# Patient Record
Sex: FEMALE | Race: BLACK OR AFRICAN AMERICAN | NOT HISPANIC OR LATINO | Employment: OTHER | URBAN - METROPOLITAN AREA
[De-identification: names, ages, dates, MRNs, and addresses within clinical notes are randomized per-mention and may not be internally consistent; named-entity substitution may affect disease eponyms.]

---

## 2017-05-09 PROBLEM — E78.2 MIXED HYPERLIPIDEMIA: Status: ACTIVE | Noted: 2017-05-09

## 2017-05-09 PROBLEM — F17.200 TOBACCO USE DISORDER: Status: ACTIVE | Noted: 2017-05-09

## 2017-05-09 PROBLEM — E11.9 DIABETES MELLITUS TYPE 2, INSULIN DEPENDENT: Status: ACTIVE | Noted: 2017-05-09

## 2017-05-09 PROBLEM — I10 ESSENTIAL HYPERTENSION: Status: ACTIVE | Noted: 2017-05-09

## 2017-05-09 PROBLEM — Z79.4 DIABETES MELLITUS TYPE 2, INSULIN DEPENDENT: Status: ACTIVE | Noted: 2017-05-09

## 2017-09-27 ENCOUNTER — TELEPHONE (OUTPATIENT)
Dept: ADMINISTRATIVE | Facility: HOSPITAL | Age: 57
End: 2017-09-27

## 2017-10-02 ENCOUNTER — TELEPHONE (OUTPATIENT)
Dept: ADMINISTRATIVE | Facility: HOSPITAL | Age: 57
End: 2017-10-02

## 2018-10-08 ENCOUNTER — HOSPITAL ENCOUNTER (EMERGENCY)
Facility: HOSPITAL | Age: 58
Discharge: LEFT BEFORE DISCHARGE | End: 2018-10-08
Attending: SURGERY
Payer: MEDICARE

## 2018-10-08 ENCOUNTER — OFFICE VISIT (OUTPATIENT)
Dept: URGENT CARE | Facility: CLINIC | Age: 58
End: 2018-10-08
Payer: MEDICARE

## 2018-10-08 VITALS
WEIGHT: 200 LBS | OXYGEN SATURATION: 98 % | SYSTOLIC BLOOD PRESSURE: 184 MMHG | RESPIRATION RATE: 16 BRPM | HEIGHT: 66 IN | BODY MASS INDEX: 32.14 KG/M2 | TEMPERATURE: 97 F | HEART RATE: 67 BPM | DIASTOLIC BLOOD PRESSURE: 97 MMHG

## 2018-10-08 VITALS
TEMPERATURE: 97 F | DIASTOLIC BLOOD PRESSURE: 84 MMHG | SYSTOLIC BLOOD PRESSURE: 183 MMHG | OXYGEN SATURATION: 98 % | BODY MASS INDEX: 39.16 KG/M2 | HEART RATE: 58 BPM | RESPIRATION RATE: 25 BRPM | WEIGHT: 242.63 LBS

## 2018-10-08 DIAGNOSIS — R50.9 FEVER, UNSPECIFIED FEVER CAUSE: ICD-10-CM

## 2018-10-08 DIAGNOSIS — I10 ESSENTIAL HYPERTENSION: ICD-10-CM

## 2018-10-08 DIAGNOSIS — R07.9 CHEST PAIN: ICD-10-CM

## 2018-10-08 DIAGNOSIS — R06.02 SHORTNESS OF BREATH: ICD-10-CM

## 2018-10-08 DIAGNOSIS — Z91.148 NON COMPLIANCE W MEDICATION REGIMEN: ICD-10-CM

## 2018-10-08 DIAGNOSIS — Z53.29 LEFT AGAINST MEDICAL ADVICE: Primary | ICD-10-CM

## 2018-10-08 DIAGNOSIS — R63.4 WEIGHT LOSS: ICD-10-CM

## 2018-10-08 DIAGNOSIS — E11.9 DIABETES MELLITUS TYPE 2, INSULIN DEPENDENT: ICD-10-CM

## 2018-10-08 DIAGNOSIS — R05.9 COUGH: ICD-10-CM

## 2018-10-08 DIAGNOSIS — E78.2 MIXED HYPERLIPIDEMIA: ICD-10-CM

## 2018-10-08 DIAGNOSIS — Z79.4 DIABETES MELLITUS TYPE 2, INSULIN DEPENDENT: ICD-10-CM

## 2018-10-08 DIAGNOSIS — F17.200 TOBACCO USE DISORDER: ICD-10-CM

## 2018-10-08 DIAGNOSIS — R07.9 CHEST PAIN, UNSPECIFIED TYPE: Primary | ICD-10-CM

## 2018-10-08 LAB
ALBUMIN SERPL BCP-MCNC: 3.2 G/DL
ALP SERPL-CCNC: 76 U/L
ALT SERPL W/O P-5'-P-CCNC: 22 U/L
ANION GAP SERPL CALC-SCNC: 10 MMOL/L
APTT BLDCRRT: 27.8 SEC
AST SERPL-CCNC: 27 U/L
BASOPHILS # BLD AUTO: 0.02 K/UL
BASOPHILS NFR BLD: 0.2 %
BILIRUB SERPL-MCNC: 0.3 MG/DL
BNP SERPL-MCNC: 81 PG/ML
BUN SERPL-MCNC: 11 MG/DL
CALCIUM SERPL-MCNC: 8.7 MG/DL
CHLORIDE SERPL-SCNC: 106 MMOL/L
CK MB SERPL-MCNC: 4.9 NG/ML
CK MB SERPL-RTO: 1.1 %
CK SERPL-CCNC: 459 U/L
CK SERPL-CCNC: 459 U/L
CO2 SERPL-SCNC: 23 MMOL/L
CREAT SERPL-MCNC: 0.8 MG/DL
D DIMER PPP IA.FEU-MCNC: 0.45 MG/L FEU
DIFFERENTIAL METHOD: NORMAL
EOSINOPHIL # BLD AUTO: 0.1 K/UL
EOSINOPHIL NFR BLD: 1.4 %
ERYTHROCYTE [DISTWIDTH] IN BLOOD BY AUTOMATED COUNT: 14.2 %
EST. GFR  (AFRICAN AMERICAN): >60 ML/MIN/1.73 M^2
EST. GFR  (NON AFRICAN AMERICAN): >60 ML/MIN/1.73 M^2
GLUCOSE SERPL-MCNC: 327 MG/DL
HCT VFR BLD AUTO: 42.4 %
HGB BLD-MCNC: 14.6 G/DL
INR PPP: 1
LYMPHOCYTES # BLD AUTO: 2.9 K/UL
LYMPHOCYTES NFR BLD: 34.5 %
MCH RBC QN AUTO: 29.8 PG
MCHC RBC AUTO-ENTMCNC: 34.4 G/DL
MCV RBC AUTO: 87 FL
MONOCYTES # BLD AUTO: 0.5 K/UL
MONOCYTES NFR BLD: 5.4 %
NEUTROPHILS # BLD AUTO: 4.9 K/UL
NEUTROPHILS NFR BLD: 58.5 %
PLATELET # BLD AUTO: 319 K/UL
PMV BLD AUTO: 9.7 FL
POCT GLUCOSE: 298 MG/DL (ref 70–110)
POTASSIUM SERPL-SCNC: 3.4 MMOL/L
PROT SERPL-MCNC: 7.2 G/DL
PROTHROMBIN TIME: 10 SEC
RBC # BLD AUTO: 4.9 M/UL
SODIUM SERPL-SCNC: 139 MMOL/L
TROPONIN I SERPL DL<=0.01 NG/ML-MCNC: 0.06 NG/ML
WBC # BLD AUTO: 8.32 K/UL

## 2018-10-08 PROCEDURE — 93010 ELECTROCARDIOGRAM REPORT: CPT | Mod: HCNC,,, | Performed by: INTERNAL MEDICINE

## 2018-10-08 PROCEDURE — 83880 ASSAY OF NATRIURETIC PEPTIDE: CPT

## 2018-10-08 PROCEDURE — 63600175 PHARM REV CODE 636 W HCPCS: Performed by: SURGERY

## 2018-10-08 PROCEDURE — 85379 FIBRIN DEGRADATION QUANT: CPT

## 2018-10-08 PROCEDURE — 84484 ASSAY OF TROPONIN QUANT: CPT

## 2018-10-08 PROCEDURE — 85025 COMPLETE CBC W/AUTO DIFF WBC: CPT

## 2018-10-08 PROCEDURE — 85610 PROTHROMBIN TIME: CPT

## 2018-10-08 PROCEDURE — 96374 THER/PROPH/DIAG INJ IV PUSH: CPT

## 2018-10-08 PROCEDURE — 36415 COLL VENOUS BLD VENIPUNCTURE: CPT

## 2018-10-08 PROCEDURE — 82550 ASSAY OF CK (CPK): CPT

## 2018-10-08 PROCEDURE — 93005 ELECTROCARDIOGRAM TRACING: CPT

## 2018-10-08 PROCEDURE — 3008F BODY MASS INDEX DOCD: CPT | Mod: CPTII,S$GLB,, | Performed by: NURSE PRACTITIONER

## 2018-10-08 PROCEDURE — 80053 COMPREHEN METABOLIC PANEL: CPT

## 2018-10-08 PROCEDURE — 99285 EMERGENCY DEPT VISIT HI MDM: CPT | Mod: 25

## 2018-10-08 PROCEDURE — 82962 GLUCOSE BLOOD TEST: CPT

## 2018-10-08 PROCEDURE — 25000003 PHARM REV CODE 250: Performed by: SURGERY

## 2018-10-08 PROCEDURE — 3077F SYST BP >= 140 MM HG: CPT | Mod: CPTII,S$GLB,, | Performed by: NURSE PRACTITIONER

## 2018-10-08 PROCEDURE — 85730 THROMBOPLASTIN TIME PARTIAL: CPT

## 2018-10-08 PROCEDURE — 3080F DIAST BP >= 90 MM HG: CPT | Mod: CPTII,S$GLB,, | Performed by: NURSE PRACTITIONER

## 2018-10-08 PROCEDURE — 96375 TX/PRO/DX INJ NEW DRUG ADDON: CPT

## 2018-10-08 PROCEDURE — 82553 CREATINE MB FRACTION: CPT

## 2018-10-08 PROCEDURE — 99215 OFFICE O/P EST HI 40 MIN: CPT | Mod: S$GLB,,, | Performed by: NURSE PRACTITIONER

## 2018-10-08 RX ORDER — METOPROLOL TARTRATE 1 MG/ML
5 INJECTION, SOLUTION INTRAVENOUS
Status: COMPLETED | OUTPATIENT
Start: 2018-10-08 | End: 2018-10-08

## 2018-10-08 RX ORDER — PANTOPRAZOLE SODIUM 40 MG/1
40 TABLET, DELAYED RELEASE ORAL
Status: COMPLETED | OUTPATIENT
Start: 2018-10-08 | End: 2018-10-08

## 2018-10-08 RX ORDER — NAPROXEN SODIUM 220 MG/1
81 TABLET, FILM COATED ORAL
Status: COMPLETED | OUTPATIENT
Start: 2018-10-08 | End: 2018-10-08

## 2018-10-08 RX ORDER — KETOROLAC TROMETHAMINE 30 MG/ML
15 INJECTION, SOLUTION INTRAMUSCULAR; INTRAVENOUS
Status: COMPLETED | OUTPATIENT
Start: 2018-10-08 | End: 2018-10-08

## 2018-10-08 RX ORDER — ATORVASTATIN CALCIUM 40 MG/1
40 TABLET, FILM COATED ORAL
Status: COMPLETED | OUTPATIENT
Start: 2018-10-08 | End: 2018-10-08

## 2018-10-08 RX ADMIN — METOPROLOL TARTRATE 5 MG: 5 INJECTION, SOLUTION INTRAVENOUS at 01:10

## 2018-10-08 RX ADMIN — LIDOCAINE HYDROCHLORIDE 50 ML: 20 SOLUTION ORAL; TOPICAL at 01:10

## 2018-10-08 RX ADMIN — PANTOPRAZOLE SODIUM 40 MG: 40 TABLET, DELAYED RELEASE ORAL at 01:10

## 2018-10-08 RX ADMIN — ATORVASTATIN CALCIUM 40 MG: 40 TABLET, FILM COATED ORAL at 01:10

## 2018-10-08 RX ADMIN — NITROGLYCERIN 1 INCH: 20 OINTMENT TOPICAL at 01:10

## 2018-10-08 RX ADMIN — ASPIRIN 81 MG 81 MG: 81 TABLET ORAL at 12:10

## 2018-10-08 RX ADMIN — KETOROLAC TROMETHAMINE: 30 INJECTION, SOLUTION INTRAMUSCULAR at 03:10

## 2018-10-08 NOTE — ED NOTES
Patient had to be told to keep BP cuff and pulse ox on.  Patient is complaining about the people in the next bed. The patient is complaining that she cannot have a lunch tray.  The patient is complaining that she wants to go walking.  MD notified.

## 2018-10-08 NOTE — PROGRESS NOTES
"Subjective:       Patient ID: Kitty Barnes is a 58 y.o. female.    Vitals:  height is 5' 6" (1.676 m) and weight is 90.7 kg (200 lb). Her oral temperature is 97.1 °F (36.2 °C). Her blood pressure is 184/97 (abnormal) and her pulse is 67. Her respiration is 16 and oxygen saturation is 98%.     Chief Complaint: Cough; Emesis; and Diarrhea    57 y/o female new to me presents with c/o being sick for the last 2 weeks. Reports she has been having cough until vomiting x 2 weeks. Reports also having headache. Pounding. Reports she is out of fiorcet. 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 Reports having chest pain with coughing. Sister gave her some phenergan with codiene cough syrup. Admits to having 10/10 shoulder pain with radiation down the arm. Also reports having heaviness on her chest, "like an elephant sitting on her chest."     Has an appt with cards on Thursday. Has appts every 6 months with them.       Cough   This is a new problem. The current episode started 1 to 4 weeks ago. The problem has been gradually worsening. The cough is non-productive. Associated symptoms include chest pain, chills, a fever, headaches and shortness of breath. Pertinent negatives include no ear pain, eye redness, myalgias, sore throat or wheezing. Nothing aggravates the symptoms. She has tried OTC cough suppressant for the symptoms. The treatment provided no relief.   Emesis    This is a new problem. The current episode started in the past 7 days. The problem occurs less than 2 times per day. The problem has been gradually worsening. The emesis has an appearance of stomach contents. There has been no fever. Associated symptoms include abdominal pain, chest pain, chills, coughing, a fever and headaches. Pertinent negatives include no diarrhea or myalgias. Risk factors: phenergan  The treatment provided no relief.   Diarrhea    This is a new problem. The current episode started in the " past 7 days. The problem has been gradually worsening. The stool consistency is described as watery. The patient states that diarrhea awakens her from sleep. Associated symptoms include abdominal pain, chills, coughing, a fever, headaches and vomiting. Pertinent negatives include no myalgias. Nothing aggravates the symptoms. The treatment provided no relief.     Review of Systems   Constitution: Positive for chills, decreased appetite, diaphoresis, fever, weakness, malaise/fatigue and night sweats.   HENT: Negative for congestion, ear pain, hoarse voice and sore throat.    Eyes: Negative for discharge and redness.   Cardiovascular: Positive for chest pain, dyspnea on exertion and near-syncope. Negative for leg swelling and syncope.   Respiratory: Positive for cough, shortness of breath and sputum production. Negative for wheezing.    Musculoskeletal: Negative for back pain and myalgias.   Gastrointestinal: Positive for abdominal pain and vomiting. Negative for constipation, diarrhea, hematochezia, melena and nausea.   Genitourinary: Negative for dysuria.   Neurological: Positive for headaches. Negative for numbness, paresthesias, seizures and sensory change.       Objective:      Physical Exam   Constitutional: She is oriented to person, place, and time. She appears well-developed and well-nourished. She appears distressed.   unkept   HENT:   Head: Normocephalic and atraumatic.   Cardiovascular: Normal rate, regular rhythm and normal heart sounds.   Pulmonary/Chest:   Seems winded at rest and worse with ambulation. Crackles to bilateral bases. Denies weight gain, reports she is loosing weight.    Musculoskeletal: She exhibits no edema.   Neurological: She is alert and oriented to person, place, and time.   Skin: Skin is warm and dry.   Psychiatric:   Not scared for loosing her life. As I advised her on seriousness of her condition, she seems to not care.    Nursing note and vitals reviewed.      Assessment:       1.  Chest pain, unspecified type    2. Shortness of breath    3. Cough    4. Fever, unspecified fever cause    5. Non compliance w medication regimen    6. Diabetes mellitus type 2, insulin dependent    7. Mixed hyperlipidemia    8. Essential hypertension    9. Tobacco use disorder    10. Weight loss        Plan:         Needs heart w/up. EKG here with Sinus kam with Prolonged QT interval, inverted T waves, Left ventricular hypertrophy changes. Pt with insulin dep diabetic with ? Heart failure vs pneumonia vs tb vs MI. This lady needs a big work-up and frankly just seems to not care. She has been obviously non-compliant with regimen. I do suspect questionable drug use. And then on top of the rest her BP is extremely high and needs tx.     Refused [facility], but  reports she will bring her [facility]. Chely HUTCHISON took report.

## 2018-10-08 NOTE — ED TRIAGE NOTES
"Pt reports cough x's 2 weeks. Also c/o left sided chest pressure radiating down left arm, intermittently "for a while". States has not taken any medications in over 2 months  "

## 2018-10-08 NOTE — ED NOTES
"Lab at bedside to redraw blood. Pt became upset stating she did not want more blood work done. Yelling "give me that paper to sign so I can get out of here". Dr. Torres notified. Pt AAOX4, signed AMA pt, ambulated out of ED without complications  "

## 2018-10-08 NOTE — ED PROVIDER NOTES
[facility]                                                 Chief Complaint  58 y.o. female with chest pain    History of Present Illness  Kitty Barnes presents to the emergency room with chest pain since the weekend  Patient has been coughing with chest pain, showed up at the urgent care that this a.m.  Patient has a cardiac history, diabetic and noncompliant with insulin for over a year now  Patient has a normal sinus rhythm on EKG without ST changes noted in the ER today  Refuses 2nd troponin, noncompliant in the ER and has asked to smoke several times    The history is provided by the patient   device was not used during this ER visit    Past Medical History   -- Anxiety    -- Arthritis    -- CVA (cerebral vascular accident)    -- Depression    -- Diabetes mellitus    -- Heart problem    -- History of psychiatric hospitalization    -- HTN (hypertension)    -- Hx of psychiatric care    -- Hypertension    -- Pacemaker    -- Psychiatric exam requested by authority    -- Psychiatric problem    -- TBI (traumatic brain injury)    -- Therapy      Surgeries:  Pacemaker, cholecystectomy, hysterectomy  Allergies:  Penicillin    Review of Systems and Physical Exam      Review of Systems  -- Constitution - no fever, denies fatigue, no weakness, no chills  -- Eyes - no tearing or redness, no visual disturbance  -- Ear, Nose - no tinnitus or earache, no nasal congestion or discharge  -- Mouth,Throat - no sore throat, no toothache, normal voice, normal swallowing  -- Respiratory - cough and congestion, no shortness of breath, no CALDERA  -- Cardiovascular - chest pain, no palpitations, denies claudication  -- Gastrointestinal - denies abdominal pain, nausea, vomiting, or diarrhea  -- Genitourinary - no dysuria, denies flank pain, no hematuria, no STD risk  -- Musculoskeletal - denies back pain, negative for myalgias and arthralgias   -- Neurological - no headache, denies weakness or  seizure; no LOC  -- Skin - denies pallor, rash, or changes in skin. no hives or welts noted  -- Psychiatric - Denies SI or HI, no psychosis or fractured thought noted     Vital Signs  Her oral temperature is 97 °F (36.1 °C).   Her blood pressure is 183/84 and her pulse is 58  Her respiration is 25 and oxygen saturation is 98%.     Physical Exam  -- Nursing note and vitals reviewed  -- Constitutional: Appears well-developed and well-nourished  -- Head: Atraumatic. Normocephalic. No obvious abnormality  -- Eyes: Pupils are equal and reactive to light. Normal conjunctiva and lids  -- Cardiac: Normal rate, regular rhythm and normal heart sounds  -- Pulmonary: Normal respiratory effort, breath sounds clear to auscultation  -- Abdominal: Soft, no tenderness. Normal bowel sounds. Normal liver edge  -- Musculoskeletal: Normal range of motion, no effusions. Joints stable   -- Neurological: No focal deficits. Showed good interaction with staff  -- Vascular: Posterior tibial, dorsalis pedis and radial pulses 2+ bilaterally      Emergency Room Course      Lab Results     K 3.4 (L)      CO2 23   BUN 11   CREATININE 0.8    (H)   ALKPHOS 76   AST 27   ALT 22   BILITOT 0.3   ALBUMIN 3.2 (L)   PROT 7.2   WBC 8.32   HGB 14.6   HCT 42.4       (H)   CPKMB 4.9   TROPONINI 0.058 (H)   INR 1.0   BNP 81   DDIMER 0.45     EKG  -- The EKG findings today were without concerning findings from baseline    Radiology  -- Chest x-ray showed no infiltrate and showed no acute pathology    Medications Given  aspirin chewable tablet 81 mg (81 mg Oral Given 10/8/18 1224)   nitroGLYCERIN 2% TD oint ointment 1 inch (1 inch Topical (Top) Given 10/8/18 1327)   pantoprazole EC tablet 40 mg (40 mg Oral Given 10/8/18 1327)   atorvastatin tablet 40 mg (40 mg Oral Given 10/8/18 1327)   GI cocktail (mylanta 30 mL, lidocaine 2 % viscous 10 mL, dicyclomine 10 mL) 50 mL   metoprolol injection 5 mg (5 mg Intravenous Given 10/8/18  5144)   ketorolac injection 15 mg ( Intravenous Given 10/8/18 1530)     Medical Decision Making  -- Diagnosis management comments: 58 y.o. female with chest pain issues  -- patient was sent from an urgent care for chest pain evaluation today  -- patient refuses repeat troponins, signed out AMA this afternoon    Diagnosis  -- The primary encounter diagnosis was Left against medical advice.   -- A diagnosis of Chest pain was also pertinent to this visit.    Disposition and Plan  -- Disposition: home  -- Condition: stable  -- Patient is of sound mind and capable of making rational decisions  -- Patient is fully aware of the diagnosis and the consequences of leaving AMA  -- Pt was told inpatient treatment needed but wants to leave against advice  -- Patient signed the AMA papers; all questions answered. Voiced understanding    This note is dictated on Dragon Natural Speaking word recognition program.  There are word recognition mistakes that are occasionally missed on review.         Markel Torres MD  10/08/18 3654

## 2018-10-09 ENCOUNTER — TELEPHONE (OUTPATIENT)
Dept: URGENT CARE | Facility: CLINIC | Age: 58
End: 2018-10-09

## 2018-10-15 ENCOUNTER — OFFICE VISIT (OUTPATIENT)
Dept: URGENT CARE | Facility: CLINIC | Age: 58
End: 2018-10-15
Payer: MEDICARE

## 2018-10-15 VITALS
DIASTOLIC BLOOD PRESSURE: 83 MMHG | SYSTOLIC BLOOD PRESSURE: 157 MMHG | BODY MASS INDEX: 38.89 KG/M2 | HEART RATE: 67 BPM | TEMPERATURE: 98 F | WEIGHT: 242 LBS | HEIGHT: 66 IN | OXYGEN SATURATION: 100 % | RESPIRATION RATE: 18 BRPM

## 2018-10-15 DIAGNOSIS — G62.9 NEUROPATHY: ICD-10-CM

## 2018-10-15 DIAGNOSIS — G43.901 MIGRAINE WITH STATUS MIGRAINOSUS, NOT INTRACTABLE, UNSPECIFIED MIGRAINE TYPE: Primary | ICD-10-CM

## 2018-10-15 PROCEDURE — 99214 OFFICE O/P EST MOD 30 MIN: CPT | Mod: S$GLB,,, | Performed by: NURSE PRACTITIONER

## 2018-10-15 PROCEDURE — 3008F BODY MASS INDEX DOCD: CPT | Mod: CPTII,S$GLB,, | Performed by: NURSE PRACTITIONER

## 2018-10-15 PROCEDURE — 3079F DIAST BP 80-89 MM HG: CPT | Mod: CPTII,S$GLB,, | Performed by: NURSE PRACTITIONER

## 2018-10-15 PROCEDURE — 3077F SYST BP >= 140 MM HG: CPT | Mod: CPTII,S$GLB,, | Performed by: NURSE PRACTITIONER

## 2018-10-15 RX ORDER — BUTALBITAL, ACETAMINOPHEN AND CAFFEINE 50; 325; 40 MG/1; MG/1; MG/1
1 TABLET ORAL EVERY 6 HOURS PRN
Qty: 15 TABLET | Refills: 0 | Status: SHIPPED | OUTPATIENT
Start: 2018-10-15 | End: 2018-12-21 | Stop reason: SDUPTHER

## 2018-10-15 NOTE — PROGRESS NOTES
"Subjective:       Patient ID: Kitty Barnes is a 58 y.o. female.    Vitals:  height is 5' 6" (1.676 m) and weight is 109.8 kg (242 lb). Her oral temperature is 97.5 °F (36.4 °C). Her blood pressure is 157/83 (abnormal) and her pulse is 67. Her respiration is 18 and oxygen saturation is 100%.     Chief Complaint: Pain (Bilateral Foot pain)    59 y/o female presents with headaches and bilateral foot pain. Patient reports hx of migraine headaches and are similar to past headaches and usually relieved with Fioricet. Pain in feet is described as burning and states she takes Neurontin.       Pain   This is a new problem. The current episode started in the past 7 days. The problem occurs constantly. The problem has been gradually worsening. Associated symptoms include headaches and joint swelling. Pertinent negatives include no abdominal pain, chest pain, chills, congestion, coughing, fever, nausea, rash, sore throat, vomiting or weakness. Associated symptoms comments: Nerve type pain in both feet. Nothing aggravates the symptoms. Treatments tried: Fioricet and Neurontin  The treatment provided mild relief.     Review of Systems   Constitution: Negative for chills, decreased appetite, fever, weakness and weight loss.   HENT: Negative for congestion and sore throat.    Eyes: Negative for blurred vision, double vision, pain, photophobia, redness, visual disturbance and visual halos.   Cardiovascular: Negative for chest pain and cyanosis.   Respiratory: Negative for cough, shortness of breath and wheezing.    Skin: Negative for rash.   Musculoskeletal: Positive for joint pain and joint swelling. Negative for back pain.   Gastrointestinal: Negative for abdominal pain, diarrhea, nausea and vomiting.   Neurological: Positive for headaches and paresthesias.   Psychiatric/Behavioral: The patient is not nervous/anxious.        Objective:      Physical Exam   Constitutional: She is oriented to person, place, and time. She is " cooperative.  Non-toxic appearance. She does not appear ill. No distress.   HENT:   Head: Normocephalic and atraumatic.   Right Ear: Hearing, tympanic membrane, external ear and ear canal normal.   Left Ear: Hearing, tympanic membrane, external ear and ear canal normal.   Nose: Nose normal. No mucosal edema, rhinorrhea or nasal deformity. No epistaxis. Right sinus exhibits no maxillary sinus tenderness and no frontal sinus tenderness. Left sinus exhibits no maxillary sinus tenderness and no frontal sinus tenderness.   Mouth/Throat: Uvula is midline, oropharynx is clear and moist and mucous membranes are normal. No trismus in the jaw. Normal dentition. No uvula swelling. No posterior oropharyngeal erythema.   No temporal TTP   Eyes: Conjunctivae, EOM and lids are normal. Pupils are equal, round, and reactive to light. No scleral icterus.   Sclera clear bilat   Neck: Trachea normal, normal range of motion, full passive range of motion without pain and phonation normal. Neck supple. No neck rigidity.   Cardiovascular: Normal rate, regular rhythm, normal heart sounds, intact distal pulses and normal pulses.   Pulmonary/Chest: Effort normal and breath sounds normal. No respiratory distress.   Abdominal: Soft. Normal appearance and bowel sounds are normal. She exhibits no distension. There is no tenderness.   Musculoskeletal: Normal range of motion. She exhibits no edema or deformity.   Neurological: She is alert and oriented to person, place, and time. No cranial nerve deficit. She exhibits normal muscle tone. Coordination normal.   Skin: Skin is warm, dry and intact. She is not diaphoretic. No pallor.   Psychiatric: She has a normal mood and affect. Her speech is normal and behavior is normal. Judgment and thought content normal. Cognition and memory are normal.   Nursing note and vitals reviewed.      Assessment:       1. Migraine with status migrainosus, not intractable, unspecified migraine type    2. Neuropathy         Plan:         Migraine with status migrainosus, not intractable, unspecified migraine type  -     butalbital-acetaminophen-caffeine -40 mg (FIORICET, ESGIC) -40 mg per tablet; Take 1 tablet by mouth every 6 (six) hours as needed for Pain.  Dispense: 15 tablet; Refill: 0    Neuropathy    Continue Neurontin    Follow up with PCP.

## 2018-10-15 NOTE — PATIENT INSTRUCTIONS

## 2018-10-18 ENCOUNTER — TELEPHONE (OUTPATIENT)
Dept: URGENT CARE | Facility: CLINIC | Age: 58
End: 2018-10-18

## 2018-11-14 ENCOUNTER — OFFICE VISIT (OUTPATIENT)
Dept: URGENT CARE | Facility: CLINIC | Age: 58
End: 2018-11-14
Payer: MEDICARE

## 2018-11-14 VITALS
OXYGEN SATURATION: 97 % | SYSTOLIC BLOOD PRESSURE: 171 MMHG | DIASTOLIC BLOOD PRESSURE: 95 MMHG | TEMPERATURE: 98 F | BODY MASS INDEX: 38.89 KG/M2 | HEIGHT: 66 IN | WEIGHT: 242 LBS | HEART RATE: 79 BPM

## 2018-11-14 DIAGNOSIS — J01.10 ACUTE FRONTAL SINUSITIS, RECURRENCE NOT SPECIFIED: Primary | ICD-10-CM

## 2018-11-14 DIAGNOSIS — R05.9 COUGH: ICD-10-CM

## 2018-11-14 DIAGNOSIS — H65.03 BILATERAL ACUTE SEROUS OTITIS MEDIA, RECURRENCE NOT SPECIFIED: ICD-10-CM

## 2018-11-14 PROCEDURE — 99213 OFFICE O/P EST LOW 20 MIN: CPT | Mod: S$GLB,,, | Performed by: INTERNAL MEDICINE

## 2018-11-14 PROCEDURE — 3080F DIAST BP >= 90 MM HG: CPT | Mod: CPTII,S$GLB,, | Performed by: INTERNAL MEDICINE

## 2018-11-14 PROCEDURE — 3077F SYST BP >= 140 MM HG: CPT | Mod: CPTII,S$GLB,, | Performed by: INTERNAL MEDICINE

## 2018-11-14 PROCEDURE — 3008F BODY MASS INDEX DOCD: CPT | Mod: CPTII,S$GLB,, | Performed by: INTERNAL MEDICINE

## 2018-11-14 RX ORDER — PREDNISONE 20 MG/1
20 TABLET ORAL DAILY
Qty: 5 TABLET | Refills: 0 | Status: SHIPPED | OUTPATIENT
Start: 2018-11-14 | End: 2018-11-19

## 2018-11-14 RX ORDER — AZITHROMYCIN 250 MG/1
250 TABLET, FILM COATED ORAL DAILY
Qty: 6 TABLET | Refills: 0 | Status: SHIPPED | OUTPATIENT
Start: 2018-11-14 | End: 2018-11-19

## 2018-11-14 NOTE — PATIENT INSTRUCTIONS
Acute Bacterial Rhinosinusitis (ABRS)    Acute bacterial rhinosinusitis (ABRS) is an infection of your nasal cavity and sinuses. Its caused by bacteria. Acute means that youve had symptoms for less than 12 weeks.  Understanding your sinuses  The nasal cavity is the large air-filled space behind your nose. The sinuses are a group of spaces formed by the bones of your face. They connect with your nasal cavity. ABRS causes the tissue lining these spaces to become inflamed. Mucus may not drain normally. This leads to facial pain and other symptoms.  What causes ABRS?  ABRS most often follows an upper respiratory infection caused by a virus. Bacteria then infect the lining of your nasal cavity and sinuses. But you can also get ABRS if you have:  · Nasal allergies  · Long-term nasal swelling and congestion not caused by allergies  · Blockage in the nose  Symptoms of ABRS  The symptoms of ABRS may be different for each person, and can include:  · Nasal congestion  · Runny nose  · Fluid draining from the nose down the throat (postnasal drip)  · Headache  · Cough  · Pain in the sinuses  · Thick, colored fluid from the nose (mucus)  · Fever  Diagnosing ABRS  ABRS may be diagnosed if youve had an upper respiratory infection like a cold and cough for longer than 10 to 14 days. Your health care provider will ask about your symptoms and your medical history. The provider will check your vital signs, including your temperature. Youll have a physical exam. The health care provider will check your ears, nose, and throat. You likely wont need any tests. If ABRS comes back, you may have a culture or other tests.  Treatment for ABRS  Treatment may include:  · Antibiotic medicine. This is for symptoms that last for at least 10 to 14 days.  · Nasal corticosteroid medicine. Drops or spray used in the nose can lessen swelling and congestion.  · Over-the-counter pain medicine. This is to lessen sinus pain and pressure.  · Nasal  decongestant medicine. Spray or drops may help to lessen congestion. Do not use them for more than a few days.  · Salt wash (saline irrigation). This can help to loosen mucus.  Possible complications of ABRS  ABRS may come back or become long-term (chronic).  In rare cases, ABRS may cause complications such as:   · Inflamed tissue around the brain and spinal cord (meningitis)  · Inflamed tissue around the eyes (orbital cellulitis)  · Inflamed bones around the sinuses (osteitis)  These problems may need to be treated in a hospital with intravenous (IV) antibiotic medicine or surgery.  When to call the health care provider  Call your health care provider if you have any of the following:  · Symptoms that dont get better, or get worse  · Symptoms that dont get better after 3 to 5 days on antibiotics  · Trouble seeing  · Swelling around your eyes  · Confusion or trouble staying awake   Date Last Reviewed: 3/3/2015  © 5300-6479 [employer]. [address] [place] [address]. All rights reserved. This information is not intended as a substitute for professional medical care. Always follow your healthcare professional's instructions.    1) Motrin/advil/ibuprofen- Take Two to Three Tablets(200 mg) three Times a Day for 5 to 7 Days.  2) Mucinex D 1/2 to 1 Tablet twice a day for 5 to 7 Days.  3) Drink Hot Liquids(coffee,WATER,Tea,Hot Chocolate,or Soup) that you put in a Mug place in Microwave for 2.5 to 3 minutes CHANGE THE CUP THAT WAS USED IN THE MICROWAVE SO AS NOT TO BURN YOUR MOUTH,then sniff the steam from the cup and sip the heated liquid TEN TO TWELVE TIMES A DAY for 5 to 7 Days.  4) These 3 things will help the antibiotics and other medications work faster and will speed your recovery!

## 2018-11-14 NOTE — PROGRESS NOTES
"Subjective:       Patient ID: Kitty Barnes is a 58 y.o. female.    Vitals:  height is 5' 6" (1.676 m) and weight is 109.8 kg (242 lb). Her temperature is 98 °F (36.7 °C). Her blood pressure is 171/95 (abnormal) and her pulse is 79. Her oxygen saturation is 97%.     Chief Complaint: Cough    Cough   This is a new problem. The current episode started in the past 7 days. The problem has been unchanged. The cough is productive of purulent sputum. Associated symptoms include postnasal drip and a sore throat. Pertinent negatives include no chest pain, chills, ear pain, eye redness, fever, headaches, myalgias, shortness of breath or wheezing. Nothing aggravates the symptoms. She has tried nothing for the symptoms.     Review of Systems   Constitution: Negative for chills, fever and malaise/fatigue.   HENT: Positive for postnasal drip and sore throat. Negative for congestion, ear pain and hoarse voice.    Eyes: Negative for discharge and redness.   Cardiovascular: Negative for chest pain, dyspnea on exertion and leg swelling.   Respiratory: Positive for cough. Negative for shortness of breath, sputum production and wheezing.    Musculoskeletal: Negative for myalgias.   Gastrointestinal: Negative for abdominal pain and nausea.   Neurological: Negative for headaches.       Objective:      Physical Exam   Constitutional: She is oriented to person, place, and time. She appears well-developed and well-nourished. She is cooperative.  Non-toxic appearance. She does not appear ill. No distress.   HENT:   Head: Normocephalic and atraumatic.   Right Ear: Hearing, external ear and ear canal normal. Tympanic membrane is injected. Tympanic membrane is not erythematous.   Left Ear: Hearing, external ear and ear canal normal. Tympanic membrane is injected. Tympanic membrane is not erythematous.   Nose: Mucosal edema and rhinorrhea present. No nasal deformity. No epistaxis. Right sinus exhibits frontal sinus tenderness. Right sinus " exhibits no maxillary sinus tenderness. Left sinus exhibits frontal sinus tenderness. Left sinus exhibits no maxillary sinus tenderness.   Mouth/Throat: Uvula is midline, oropharynx is clear and moist and mucous membranes are normal. No trismus in the jaw. Normal dentition. No uvula swelling. No posterior oropharyngeal erythema.       Eyes: Conjunctivae and lids are normal. No scleral icterus.   Sclera clear bilat   Neck: Trachea normal, full passive range of motion without pain and phonation normal. Neck supple.   Cardiovascular: Normal rate, regular rhythm, normal heart sounds, intact distal pulses and normal pulses.   Pulmonary/Chest: Effort normal and breath sounds normal. No respiratory distress.   Abdominal: Soft. Normal appearance and bowel sounds are normal. She exhibits no distension. There is no tenderness.   Musculoskeletal: Normal range of motion. She exhibits no edema or deformity.   Neurological: She is alert and oriented to person, place, and time. She exhibits normal muscle tone. Coordination normal.   Skin: Skin is warm, dry and intact. She is not diaphoretic. No pallor.   Psychiatric: She has a normal mood and affect. Her speech is normal and behavior is normal. Judgment and thought content normal. Cognition and memory are normal.   Nursing note and vitals reviewed.      Assessment:       1. Acute frontal sinusitis, recurrence not specified    2. Cough    3. Bilateral acute serous otitis media, recurrence not specified        Plan:         Acute frontal sinusitis, recurrence not specified  -     azithromycin (ZITHROMAX) 250 MG tablet; Take 1 tablet (250 mg total) by mouth once daily. Double first dose for 5 doses  Dispense: 6 tablet; Refill: 0  -     predniSONE (DELTASONE) 20 MG tablet; Take 1 tablet (20 mg total) by mouth once daily. for 5 days  Dispense: 5 tablet; Refill: 0    Cough    Bilateral acute serous otitis media, recurrence not specified  -     azithromycin (ZITHROMAX) 250 MG tablet;  Take 1 tablet (250 mg total) by mouth once daily. Double first dose for 5 doses  Dispense: 6 tablet; Refill: 0  -     predniSONE (DELTASONE) 20 MG tablet; Take 1 tablet (20 mg total) by mouth once daily. for 5 days  Dispense: 5 tablet; Refill: 0       take meds

## 2018-11-17 ENCOUNTER — TELEPHONE (OUTPATIENT)
Dept: URGENT CARE | Facility: CLINIC | Age: 58
End: 2018-11-17

## 2018-12-21 ENCOUNTER — OFFICE VISIT (OUTPATIENT)
Dept: URGENT CARE | Facility: CLINIC | Age: 58
End: 2018-12-21
Payer: MEDICARE

## 2018-12-21 VITALS
HEART RATE: 65 BPM | DIASTOLIC BLOOD PRESSURE: 99 MMHG | RESPIRATION RATE: 16 BRPM | WEIGHT: 220 LBS | BODY MASS INDEX: 35.36 KG/M2 | TEMPERATURE: 98 F | SYSTOLIC BLOOD PRESSURE: 218 MMHG | OXYGEN SATURATION: 96 % | HEIGHT: 66 IN

## 2018-12-21 DIAGNOSIS — G43.901 MIGRAINE WITH STATUS MIGRAINOSUS, NOT INTRACTABLE, UNSPECIFIED MIGRAINE TYPE: ICD-10-CM

## 2018-12-21 DIAGNOSIS — G62.9 NEUROPATHY: Primary | ICD-10-CM

## 2018-12-21 DIAGNOSIS — I10 ESSENTIAL HYPERTENSION: ICD-10-CM

## 2018-12-21 PROCEDURE — 3080F DIAST BP >= 90 MM HG: CPT | Mod: CPTII,S$GLB,, | Performed by: NURSE PRACTITIONER

## 2018-12-21 PROCEDURE — 3077F SYST BP >= 140 MM HG: CPT | Mod: CPTII,S$GLB,, | Performed by: NURSE PRACTITIONER

## 2018-12-21 PROCEDURE — 3008F BODY MASS INDEX DOCD: CPT | Mod: CPTII,S$GLB,, | Performed by: NURSE PRACTITIONER

## 2018-12-21 PROCEDURE — 99214 OFFICE O/P EST MOD 30 MIN: CPT | Mod: S$GLB,,, | Performed by: NURSE PRACTITIONER

## 2018-12-21 RX ORDER — GABAPENTIN 600 MG/1
600 TABLET ORAL 2 TIMES DAILY
Qty: 60 TABLET | Refills: 11 | Status: SHIPPED | OUTPATIENT
Start: 2018-12-21 | End: 2019-02-11

## 2018-12-21 RX ORDER — BUTALBITAL, ACETAMINOPHEN AND CAFFEINE 50; 325; 40 MG/1; MG/1; MG/1
1 TABLET ORAL EVERY 6 HOURS PRN
Qty: 7 TABLET | Refills: 0 | Status: SHIPPED | OUTPATIENT
Start: 2018-12-21 | End: 2019-02-11 | Stop reason: SDUPTHER

## 2018-12-21 RX ORDER — PANTOPRAZOLE SODIUM 40 MG/1
40 TABLET, DELAYED RELEASE ORAL DAILY
COMMUNITY
End: 2019-02-11 | Stop reason: SDUPTHER

## 2018-12-21 RX ORDER — CLONIDINE HYDROCHLORIDE 0.1 MG/1
0.1 TABLET ORAL DAILY
Qty: 30 TABLET | Refills: 0 | Status: SHIPPED | OUTPATIENT
Start: 2018-12-21 | End: 2020-12-07

## 2018-12-21 NOTE — PROGRESS NOTES
"Subjective:       Patient ID: Kitty Barnes is a 58 y.o. female.    Vitals:  height is 5' 6" (1.676 m) and weight is 99.8 kg (220 lb). Her oral temperature is 98.3 °F (36.8 °C). Her blood pressure is 218/99 (abnormal) and her pulse is 65. Her respiration is 16 and oxygen saturation is 96%.     Chief Complaint: Migraine    Patient presents with complaints of migraine.  No new or unusual symptoms from previous heaches.        Migraine    This is a recurrent problem. The current episode started yesterday. The problem occurs constantly. The problem has been gradually worsening. The pain is located in the bilateral region. The quality of the pain is described as shooting and pulsating. The pain is at a severity of 10/10. Pertinent negatives include no dizziness, eye pain, fever, loss of balance, nausea, photophobia, tinnitus, vomiting or weakness. She has tried nothing (out of Rx medication) for the symptoms. Her past medical history is significant for migraine headaches.       Constitution: Negative for chills, sweating and fever.   HENT: Negative for tinnitus, facial swelling, congestion and sinus pain.    Neck: Negative for neck stiffness.   Eyes: Negative for eye pain, photophobia, vision loss and double vision.   Gastrointestinal: Negative for nausea and vomiting.   Genitourinary: Negative for missed menses.   Musculoskeletal: Negative for trauma and muscle ache.   Skin: Negative for rash, wound and lesion.   Neurological: Positive for history of migraines. Negative for dizziness, history of vertigo, light-headedness, facial drooping, speech difficulty, coordination disturbances, loss of balance, disorientation and loss of consciousness.   Psychiatric/Behavioral: Negative for disorientation, confusion, nervous/anxious, sleep disturbance and depression. The patient is not nervous/anxious.        Objective:      Physical Exam   Constitutional: She is oriented to person, place, and time. She appears well-developed " and well-nourished. She is cooperative.  Non-toxic appearance. She does not appear ill. No distress.   HENT:   Head: Normocephalic and atraumatic.   Right Ear: Hearing, tympanic membrane, external ear and ear canal normal.   Left Ear: Hearing, tympanic membrane, external ear and ear canal normal.   Nose: Nose normal. No mucosal edema, rhinorrhea or nasal deformity. No epistaxis. Right sinus exhibits no maxillary sinus tenderness and no frontal sinus tenderness. Left sinus exhibits no maxillary sinus tenderness and no frontal sinus tenderness.   Mouth/Throat: Uvula is midline, oropharynx is clear and moist and mucous membranes are normal. No trismus in the jaw. Normal dentition. No uvula swelling. No posterior oropharyngeal erythema.   No temporal TTP   Eyes: Conjunctivae, EOM and lids are normal. Pupils are equal, round, and reactive to light. No scleral icterus.   Sclera clear bilat   Neck: Trachea normal, normal range of motion, full passive range of motion without pain and phonation normal. Neck supple. No neck rigidity.   Cardiovascular: Normal rate, regular rhythm, normal heart sounds, intact distal pulses and normal pulses.   Pulmonary/Chest: Effort normal and breath sounds normal. No respiratory distress.   Abdominal: Soft. Normal appearance and bowel sounds are normal. She exhibits no distension. There is no tenderness.   Musculoskeletal: Normal range of motion. She exhibits no edema or deformity.   Neurological: She is alert and oriented to person, place, and time. No cranial nerve deficit. She exhibits normal muscle tone. Coordination normal.   Skin: Skin is warm, dry and intact. She is not diaphoretic. No pallor.   Psychiatric: She has a normal mood and affect. Her speech is normal and behavior is normal. Judgment and thought content normal. Cognition and memory are normal.   Nursing note and vitals reviewed.      Assessment:       1. Neuropathy    2. Migraine with status migrainosus, not intractable,  unspecified migraine type    3. Essential hypertension        Plan:         Neuropathy  -     gabapentin (NEURONTIN) 600 MG tablet; Take 1 tablet (600 mg total) by mouth 2 (two) times daily.  Dispense: 60 tablet; Refill: 11    Migraine with status migrainosus, not intractable, unspecified migraine type  -     butalbital-acetaminophen-caffeine -40 mg (FIORICET, ESGIC) -40 mg per tablet; Take 1 tablet by mouth every 6 (six) hours as needed for Pain.  Dispense: 7 tablet; Refill: 0    Essential hypertension  -     cloNIDine (CATAPRES) 0.1 MG tablet; Take 1 tablet (0.1 mg total) by mouth once daily.  Dispense: 30 tablet; Refill: 0

## 2018-12-21 NOTE — PATIENT INSTRUCTIONS
Peripheral Neuropathy  Peripheral neuropathy is a condition that affects the nerves of the arms or legs. It causes a change in physical feeling. Sometimes it causes weakness in the muscles. You may feel tingling, numbness or shooting pains. Symptoms may be more common at night. Skin may be extra sensitive to light touch or temperature changes.  Neuropathy may be a complication of a chronic disease such as diabetes. A ruptured disk with pressure on the spinal nerve may also lead to the problem. Certain vitamin deficiencies may lead to it. It may also be caused by exposure to certain drugs or chemicals.  Home care  · Tell the healthcare provider about all medicines you take. This includes prescription and over-the-counter medicines, vitamins, and herbs. Ask if any of the medicines may be causing your problems. Do not make any changes to prescription medicines without talking to your healthcare provider first.  · You may be prescribed medicines to help relieve the tingling feeling or for pain. Take all medicines as directed.  · A numb hand or foot may be more prone to injury. To help protect it:  ¨ Always use oven mitts.  ¨ Test water with an unaffected hand or foot.  ¨ Use caution when trimming nails. File sharp areas.  ¨ Wear shoes that fit well to avoid pressure points, blisters, and ulcers.  ¨ Inspect your hands and feet carefully (including the soles of your feet and between your toes) at least once a week. If you see red areas, sores, or other problems, tell your healthcare provider.  Follow-up care  Follow up with your doctor or as advised by our staff. You may need further testing or evaluation.  When to seek medical advice  Call your healthcare provider right away if any of the following occur:  · Redness, swelling, cracking, or ulcer on any numb area, especially the feet  · New symptoms of numbness or muscle weakness numbness  · Loss of bowel or bladder control  · Slurred speech, confusion, or trouble  speaking, walking, or seeing  Date Last Reviewed: 9/26/2015  © 3688-4768 [employer]. [address] [place] [address], [place] [address]. All rights reserved. This information is not intended as a substitute for professional medical care. Always follow your healthcare professional's instructions.        Managing Post-Traumatic Headaches After Traumatic Brain Injury  A traumatic brain injury (TBI) is a sudden jolt to your head that changes the way your brain works. Its not surprising that headache would be the most common physical symptom after a brain injury. Because a jolt to your head also causes a jolt to your neck, headaches with neck pain are the most common types of pain after a TBI.  The type of headache you get does not depend on the severity of your TBI is. Thats because symptoms after a TBI are unpredictable. You could have a mild TBI but still have very painful headaches. Also, having headaches can make your other TBI symptoms worse, and other TBI symptoms can make your headaches worse. Because of this, its important to learn how to manage your headaches.  Types of headaches after a TBI   Your headaches may be mild or severe. They may come and go, or you may have them all the time. TBI symptoms, such as trouble sleeping, anxiety, fatigue, depression, slowed thinking, and memory loss, can all be made worse by headaches.  Here are some common types of TBI headaches:  · Headaches that start with pain in the back of your neck and spread to your head. These headaches get worse as the day goes on. They are more likely if your TBI includes a neck injury. This type of headache is often called a tension headache. It is probably the most common type of TBI headache.  · Migraine headaches. These may be triggered by TBI, especially if you have a family history of migraines. A migraine headache is a pounding headache, usually on one side of your head. Its caused by abnormal blood flow to your brain. Stressful  symptoms of a TBI may trigger a migraine attack.  Managing TBI headaches  Strong pain medicines, called opiates or narcotics, are usually not the answer for TBI headaches. These medicines can make other TBI symptoms worse. They also can have unpredictable side effects in a person with a TBI.  If pain medicines are needed, the first choice is a nonnarcotic medicine. Examples include over-the-counter pain relievers like acetaminophen or ibuprofen. If you use pain relief medicines for a headache more than 3 days a week, you need to watch if your headaches are getting worse. This is called rebound headache.  The best way to manage your headaches is with self-care, also called headache hygiene. Here are some self-care tips:  · Take medicines only as your healthcare provider prescribes them. Dont take any medicines on your own.  · If you start getting a headache, try to find a dark, quiet place where you can lie down.  · Wear dark glasses if bright lights seem to trigger your headaches.  · Avoid any foods and beverages that seem to trigger a headache.  · Learn to avoid stress and relax by using techniques like listening to music, meditating, or deep breathing. If needed, work with a mental health expert to reduce stress and anxiety.  · Get daily exercise. This helps your body and your brain.  · Go to bed and get up at the same time every day.  · Avoid caffeine, alcohol, and tobacco.  · Take part in physical therapy to stretch and strengthen your muscles. Learn how to do these exercises at home.  · Think about trying alternative treatments like acupuncture or massage therapy.  · Keep a headache journal to share with your healthcare provider. Write down every time you get a headache, how severe it is, and what seemed to have triggered it.  TBI headaches usually go away with time. How long it takes your brain to recover depends on the type of injury you had. Managing headache pain with self-care can help you heal faster.  Call your healthcare provider if your pain is getting worse. And remember, never try to treat headaches on your own with drugs or alcohol.  Date Last Reviewed: 7/1/2016 © 2000-2017 [employer]. [address] [place] [address], [place] [address]. All rights reserved. This information is not intended as a substitute for professional medical care. Always follow your healthcare professional's instructions.        Uncontrolled High Blood Pressure (Established)    Your blood pressure was unusually high today. This can occur if youve missed doses of your blood pressure medicine. Or it can happen if you are taking other medicines. These include some asthma inhalers, decongestants, diet pills, and street drugs like cocaine and amphetamine.  Other causes include:  · Weight gain  · More salt in your diet  · Smoking  · Caffeine  Your blood pressure can also rise if you are emotionally upset or in intense pain. It may go back to normal after a period of rest.  A blood pressure reading is made up of 2 numbers. There is a top number over a bottom number. The top number is the systolic pressure. The bottom number is the diastolic pressure. A normal blood pressure is a systolic pressure of less than 120 over a diastolic pressure of less than 80. High blood pressure (hypertension) is when the top number is 140 or higher. Or it is when the bottom number is 90 or higher. You will see your blood pressure readings written together. For example, a person with a systolic pressure of 118 and a diastolic pressure of 78 will have 118/78 written in the medical record. To be high blood pressure, the numbers must be higher when tested over a period of time. The blood pressures between normal and hypertension are called prehypertension. Prehypertension is a warning sign. The information gives you a chance to make lifestyle changes (weight loss, more exercise) that can keep your blood pressure from going higher.  Home care  Its important  to take steps to lower your blood pressure. If you are taking blood pressure medicine, the guidelines below may help you need less or no medicines in the future.  · Begin a weight-loss program if you are overweight.  · Cut back on the amount of salt in your diet:  ¨ Avoid high-salt foods like olives, pickles, smoked meats, and salted potato chips.  ¨ Dont add salt to your food at the table.  ¨ Use only small amounts of salt when cooking.  · Begin an exercise program. Talk with your health care provider about what exercise program is best for you. It doesnt have to be difficult. Even brisk walking for 20 minutes 3 times a week is a good form of exercise.  · Avoid medicines that stimulates the heart. This includes many over-the-counter cold and sinus decongestant pills and sprays, as well as diet pills. Check the warnings about hypertension on the label. Before purchasing any over-the-counter medicines or supplements, always ask the pharmacist about the product's potential interaction with your high blood pressure and your medicines.  · Stimulants such as amphetamine or cocaine could be lethal for someone with hypertension. Never take these.  · Limit how much caffeine you drink. Or switch to noncaffeinated beverages.  · Stop smoking. If you are a long-time smoker, this can be hard. Enroll in a stop-smoking program to make it more likely that you will succeed. Talk with your provider about ways to quit.  · Learn how to handle stress better. This is an important part of any program to lower blood pressure. Learn ways to relax. These include meditation, yoga, and biofeedback.  · If medicines were prescribed, take them exactly as directed. Missing doses may cause your blood pressure to get out of control.  · If you miss a dose or doses of your medicines, check with your healthcare provider or pharmacist about what to do.  · Consider buying an automatic blood pressure machine. Your provider may recommend a certain type.  You can get one of these at most pharmacies. Measure your blood pressure twice a day, in the morning, and in the late afternoon. Keep a written record of your home blood pressure readings and take the record to your medical appointments.  Here are some additional guidelines on home blood pressure monitoring from the American [employer] Association.  · Don't smoke or drink coffee for 30 minutes  · Go to the bathroom before the test.  · Relax for 5 minutes before taking the measurement.  · Sit correctly. Be sure your back is supported. Don't sit on a couch or soft chair. Uncross your feet and place them flat on the floor. Place your arm on a solid, flat surface like a table with the upper arm at heart level. Make certain the middle of the cuff is directly above the eye of the elbow. Check the monitor's instruction manual for an illustration.  · Take multiple readings. When you measure, take 2 or 3 readings one minute apart and record all of the results.  · Take your blood pressure at the same time every day, or as your healthcare provider recommends.  · Record the date, time, and blood pressure reading.  · Take the record with you to your next appointment. If your blood pressure monitor has a built-in memory, simply take the monitor with you to your next appointment.  · Call your provider if you have several high readings. Don't be frightened by a single high reading, but if you get several high readings, check in with your healthcare provider.  · Note: When blood pressure reaches a systolic (top number) of 180 or higher or a diastolic (bottom number) of 110 or higher, emergency medical treatment is required. Call your healthcare provider immediately.  Follow-up care  Regular visits to your own healthcare provider for blood pressure and medicine checks are an important part of your care. Make a follow-up appointment as directed. Bring the record of your home blood pressure readings to the appointment.  When to seek medical  advice  Call your healthcare provider right away if any of these occur:  · Blood pressure reaches a systolic (top number) of 180 or higher or diastolic (bottom number) of 110 or higher, emergency medical treatment is required.  · Chest, arm, shoulder, neck, or upper back pain  · Shortness of breath  · Severe headache  · Throbbing or rushing sound in the ears  · Nosebleed  · Extreme drowsiness, confusion, or fainting  · Dizziness or dizziness with spinning sensation (vertigo)  · Weakness in an arm or leg or on one side of the face  · Trouble speaking or seeing   Date Last Reviewed: 1/1/2017 © 2000-2017 [employer]. [address] [place] [address], [place] [address]. All rights reserved. This information is not intended as a substitute for professional medical care. Always follow your healthcare professional's instructions.      .  Take all medications as directed. If you have been prescribed antibiotics, make sure to complete them.    You should schedule a follow-up appointment with your Primary Care Provider/Pediatrician for recheck in 2-3 days or as directed at this visit.   .  If your condition fails to improve in a timely manner, you should receive another evaluation by your Primary Care Provider/Pediatrician to discuss your concerns or return to urgent care for a recheck.  If your condition worsens at any time, you should report immediately to your nearest Emergency Department for further evaluation. **You must understand that you have received Urgent Care treatment only and that you may be released before all of your medical problems are known or treated. You, the patient, are responsible to arrange for follow-up care as instructed.

## 2018-12-27 ENCOUNTER — TELEPHONE (OUTPATIENT)
Dept: URGENT CARE | Facility: CLINIC | Age: 58
End: 2018-12-27

## 2018-12-27 NOTE — TELEPHONE ENCOUNTER
Called [facility] to verify Rx doses for BP medication and gabapentin and last refill.  Notified patient that Medication refilled but must follow up with PCP ASAP.

## 2019-01-09 ENCOUNTER — HOSPITAL ENCOUNTER (EMERGENCY)
Facility: HOSPITAL | Age: 59
Discharge: HOME | End: 2019-01-09
Attending: SURGERY
Payer: MEDICARE

## 2019-01-09 VITALS
SYSTOLIC BLOOD PRESSURE: 111 MMHG | DIASTOLIC BLOOD PRESSURE: 58 MMHG | OXYGEN SATURATION: 96 % | TEMPERATURE: 99 F | HEART RATE: 60 BPM | BODY MASS INDEX: 32.14 KG/M2 | HEIGHT: 66 IN | RESPIRATION RATE: 17 BRPM | WEIGHT: 200 LBS

## 2019-01-09 DIAGNOSIS — F43.21 GRIEF: Primary | ICD-10-CM

## 2019-01-09 DIAGNOSIS — I10 HTN (HYPERTENSION): ICD-10-CM

## 2019-01-09 LAB
ALBUMIN SERPL BCP-MCNC: 3.4 G/DL
ALP SERPL-CCNC: 93 U/L
ALT SERPL W/O P-5'-P-CCNC: 20 U/L
ANION GAP SERPL CALC-SCNC: 13 MMOL/L
AST SERPL-CCNC: 21 U/L
BASOPHILS # BLD AUTO: 0.01 K/UL
BASOPHILS NFR BLD: 0.1 %
BILIRUB SERPL-MCNC: 0.4 MG/DL
BUN SERPL-MCNC: 11 MG/DL
CALCIUM SERPL-MCNC: 8.9 MG/DL
CHLORIDE SERPL-SCNC: 102 MMOL/L
CK MB SERPL-MCNC: 3.7 NG/ML
CK MB SERPL-MCNC: 4.5 NG/ML
CK MB SERPL-RTO: 0.6 %
CK MB SERPL-RTO: 0.7 %
CK SERPL-CCNC: 605 U/L
CK SERPL-CCNC: 605 U/L
CK SERPL-CCNC: 654 U/L
CK SERPL-CCNC: 654 U/L
CO2 SERPL-SCNC: 23 MMOL/L
CREAT SERPL-MCNC: 0.9 MG/DL
DIFFERENTIAL METHOD: ABNORMAL
EOSINOPHIL # BLD AUTO: 0.1 K/UL
EOSINOPHIL NFR BLD: 0.7 %
ERYTHROCYTE [DISTWIDTH] IN BLOOD BY AUTOMATED COUNT: 14.7 %
EST. GFR  (AFRICAN AMERICAN): >60 ML/MIN/1.73 M^2
EST. GFR  (NON AFRICAN AMERICAN): >60 ML/MIN/1.73 M^2
GLUCOSE SERPL-MCNC: 352 MG/DL
HCT VFR BLD AUTO: 43.6 %
HGB BLD-MCNC: 14.6 G/DL
LYMPHOCYTES # BLD AUTO: 1.3 K/UL
LYMPHOCYTES NFR BLD: 13.4 %
MCH RBC QN AUTO: 29.2 PG
MCHC RBC AUTO-ENTMCNC: 33.5 G/DL
MCV RBC AUTO: 87 FL
MONOCYTES # BLD AUTO: 0.4 K/UL
MONOCYTES NFR BLD: 4.4 %
NEUTROPHILS # BLD AUTO: 7.9 K/UL
NEUTROPHILS NFR BLD: 81.4 %
PLATELET # BLD AUTO: 305 K/UL
PMV BLD AUTO: 10.1 FL
POTASSIUM SERPL-SCNC: 2.9 MMOL/L
PROT SERPL-MCNC: 7.7 G/DL
RBC # BLD AUTO: 5 M/UL
SODIUM SERPL-SCNC: 138 MMOL/L
TROPONIN I SERPL DL<=0.01 NG/ML-MCNC: 0.05 NG/ML
TROPONIN I SERPL DL<=0.01 NG/ML-MCNC: 0.06 NG/ML
WBC # BLD AUTO: 9.75 K/UL

## 2019-01-09 PROCEDURE — 99284 EMERGENCY DEPT VISIT MOD MDM: CPT | Mod: 25

## 2019-01-09 PROCEDURE — 84484 ASSAY OF TROPONIN QUANT: CPT | Mod: 91

## 2019-01-09 PROCEDURE — 25000003 PHARM REV CODE 250: Performed by: SURGERY

## 2019-01-09 PROCEDURE — 93005 ELECTROCARDIOGRAM TRACING: CPT

## 2019-01-09 PROCEDURE — 82550 ASSAY OF CK (CPK): CPT | Mod: 91

## 2019-01-09 PROCEDURE — 63600175 PHARM REV CODE 636 W HCPCS: Performed by: SURGERY

## 2019-01-09 PROCEDURE — 96372 THER/PROPH/DIAG INJ SC/IM: CPT

## 2019-01-09 PROCEDURE — 80053 COMPREHEN METABOLIC PANEL: CPT

## 2019-01-09 PROCEDURE — 36415 COLL VENOUS BLD VENIPUNCTURE: CPT

## 2019-01-09 PROCEDURE — 93010 EKG 12-LEAD: ICD-10-PCS | Mod: HCNC,,, | Performed by: INTERNAL MEDICINE

## 2019-01-09 PROCEDURE — 93010 ELECTROCARDIOGRAM REPORT: CPT | Mod: HCNC,,, | Performed by: INTERNAL MEDICINE

## 2019-01-09 PROCEDURE — 85025 COMPLETE CBC W/AUTO DIFF WBC: CPT

## 2019-01-09 PROCEDURE — 82553 CREATINE MB FRACTION: CPT

## 2019-01-09 RX ORDER — CLONIDINE HYDROCHLORIDE 0.1 MG/1
0.2 TABLET ORAL
Status: COMPLETED | OUTPATIENT
Start: 2019-01-09 | End: 2019-01-09

## 2019-01-09 RX ORDER — LORAZEPAM 2 MG/ML
1 INJECTION INTRAMUSCULAR
Status: COMPLETED | OUTPATIENT
Start: 2019-01-09 | End: 2019-01-09

## 2019-01-09 RX ADMIN — CLONIDINE HYDROCHLORIDE 0.2 MG: 0.1 TABLET ORAL at 03:01

## 2019-01-09 RX ADMIN — LORAZEPAM 1 MG: 2 INJECTION INTRAMUSCULAR; INTRAVENOUS at 03:01

## 2019-01-09 NOTE — ED PROVIDER NOTES
[facility]                                                 Chief Complaint  58 y.o. female with GRIEF REACTION    History of Present Illness  Kitty Barnes presents to the emergency room with stressing grief reaction  Patient's sister  of a heart attack tonight, the patient is very very upset tonight  Patient then developed chest pressure after learning of the patient's sisters death  Patient has no chest pain currently, but has a very high blood pressure on ER triage  Patient is normal sinus rhythm on EKG without ST changes noted today in the ER  Patient is obviously upset and distraught, she is noncompliant with lifestyle recently    The history is provided by the patient   device was not used during this ER visit     Past Medical History   -- Anxiety     -- Arthritis     -- CVA (cerebral vascular accident)     -- Depression     -- Diabetes mellitus     -- Heart problem     -- History of psychiatric hospitalization     -- HTN (hypertension)     -- Hx of psychiatric care     -- Hypertension     -- Pacemaker     -- Psychiatric exam requested by authority     -- Psychiatric problem     -- TBI (traumatic brain injury)     -- Therapy        Surgeries:  Pacemaker, cholecystectomy, hysterectomy  Allergies:  Penicillin    Review of Systems and Physical Exam      Review of Systems  -- Constitution - no fever, denies fatigue, no weakness, no chills  -- Eyes - no tearing or redness, no visual disturbance  -- Ear, Nose - no tinnitus or earache, no nasal congestion or discharge  -- Mouth,Throat - no sore throat, no toothache, normal voice, normal swallowing  -- Respiratory - denies cough and congestion, no shortness of breath, no CALDERA  -- Cardiovascular - chest tightness, no palpitations, denies claudication  -- Gastrointestinal - denies abdominal pain, nausea, vomiting, or diarrhea  -- Genitourinary - no dysuria, denies flank pain, no hematuria, no STD risk  --  Musculoskeletal - denies back pain, negative for trauma or injury  -- Neurological - no headache, denies weakness or seizure; no LOC  -- Skin - denies pallor, rash, or changes in skin. no hives or welts noted    Vital Signs  Her oral temperature is 98.6 °F (37 °C).   Her blood pressure is 111/58 and her pulse is 60.   Her respiration is 17 and oxygen saturation is 96%.     Physical Exam  -- Nursing note and vitals reviewed  -- Constitutional: Appears well-developed and well-nourished  -- Head: Atraumatic. Normocephalic. No obvious abnormality  -- Eyes: Pupils are equal and reactive to light. Normal conjunctiva and lids  -- Cardiac: Normal rate, regular rhythm and normal heart sounds  -- Pulmonary: Normal respiratory effort, breath sounds clear to auscultation  -- Abdominal: Soft, no tenderness. Normal bowel sounds. Normal liver edge  -- Musculoskeletal: Normal range of motion, no effusions. Joints stable   -- Neurological: No focal deficits. Showed good interaction with staff  -- Vascular: Posterior tibial, dorsalis pedis and radial pulses 2+ bilaterally    -- Lymphatics: No cervical or peripheral lymphadenopathy. No edema noted  -- Skin: Warm and dry. No evidence of rash or cellulitis  -- Psychiatric: Normal mood and affect. Bedside behavior is appropriate    Emergency Room Course      Lab Results     K 2.9 (L)      CO2 23   BUN 11   CREATININE 0.9    (H)   ALKPHOS 93   AST 21   ALT 20   BILITOT 0.4   ALBUMIN 3.4 (L)   PROT 7.7   WBC 9.75   HGB 14.6   HCT 43.6       (H)    (H)   CPKMB 3.7   TROPONINI 0.053 (H)     EKG  -- The EKG findings today were without concerning findings from baseline     Medications Given  lorazepam injection 1 mg (1 mg Intramuscular Given 1/9/19 0316)   cloNIDine tablet 0.2 mg (0.2 mg Oral Given 1/9/19 0316)     Medical Decision Making  -- Diagnosis management comments: 58 y.o. female with grief after loss of sister  -- high blood pressure, patient  was watched with stable cardiac enzymes tonight  -- stable EKG, patient feels much better after blood pressure treatment in the ER    Diagnosis  -- The primary encounter diagnosis was Grief.   -- A diagnosis of HTN (hypertension) was also pertinent to this visit.    Disposition and Plan  -- Disposition: home  -- Condition: stable  -- Follow-up: Patient to follow up with Omayra Castellano MD in 1-2 days.  -- I advised the patient that we have found no life threatening condition today  -- At this time, I believe the patient is clinically stable for discharge.   -- The patient acknowledges that close follow up with a MD is required   -- Patient agrees to comply with all instruction and direction given in the ER    This note is dictated on M*Modal word recognition program.  There are word recognition mistakes that are occasionally missed on review.                  Markel Torres MD  01/10/19 0634

## 2019-01-19 NOTE — TELEPHONE ENCOUNTER
Attempted to call pt back, straight to VM.   
Called pt, as soon as she picked up the phone she sounded very dyspneic. Reports she is doing worse today than yesterday. I advised her that she needs help for her heart. She had elevated troponins yesterday which meant that her heart was in distress. She reports realizing that and that she should have stayed. I offered to call 911 for her. She reports that her co-worker is calling her now and she will get her to bring her here. I advised she needs to go back to the ER now. She v/u.   
Pt called back, reports increasing chest pain and sob. Requesting for me to call 911. I told her that I would call them now.     Hung up, called 911. Physical address given. Advised to bring to [facility] as she is having active chest pain and they have cards on staff. In route.   
Spoke with pt, vicki is in room. Advised for him to look for [facility] and direct them to her apt. Asked if she has ASA, she states no. Advised for her to sit still and rest with slow deep breaths until they arrive. She hung up with me because son was calling. Vicki remains on site.   
Implemented All Fall Risk Interventions:  [place] to call system. Call bell, personal items and telephone within reach. Instruct patient to call for assistance. Room bathroom lighting operational. Non-slip footwear when patient is off stretcher. Physically safe environment: no spills, clutter or unnecessary equipment. Stretcher in lowest position, wheels locked, appropriate side rails in place. Provide visual cue, wrist band, yellow gown, etc. Monitor gait and stability. Monitor for mental status changes and reorient to person, place, and time. Review medications for side effects contributing to fall risk. Reinforce activity limits and safety measures with patient and family.

## 2019-02-11 ENCOUNTER — OFFICE VISIT (OUTPATIENT)
Dept: INTERNAL MEDICINE | Facility: CLINIC | Age: 59
End: 2019-02-11
Payer: MEDICARE

## 2019-02-11 VITALS
HEART RATE: 92 BPM | RESPIRATION RATE: 18 BRPM | BODY MASS INDEX: 38.65 KG/M2 | SYSTOLIC BLOOD PRESSURE: 130 MMHG | DIASTOLIC BLOOD PRESSURE: 90 MMHG | WEIGHT: 240.5 LBS | HEIGHT: 66 IN | OXYGEN SATURATION: 94 %

## 2019-02-11 DIAGNOSIS — K29.70 GASTRITIS, PRESENCE OF BLEEDING UNSPECIFIED, UNSPECIFIED CHRONICITY, UNSPECIFIED GASTRITIS TYPE: ICD-10-CM

## 2019-02-11 DIAGNOSIS — G43.901 MIGRAINE WITH STATUS MIGRAINOSUS, NOT INTRACTABLE, UNSPECIFIED MIGRAINE TYPE: ICD-10-CM

## 2019-02-11 DIAGNOSIS — I10 HYPERTENSION, UNSPECIFIED TYPE: Primary | ICD-10-CM

## 2019-02-11 DIAGNOSIS — E78.2 MIXED HYPERLIPIDEMIA: ICD-10-CM

## 2019-02-11 DIAGNOSIS — R07.89 RIGHT-SIDED CHEST WALL PAIN: ICD-10-CM

## 2019-02-11 DIAGNOSIS — G62.9 NEUROPATHY: ICD-10-CM

## 2019-02-11 DIAGNOSIS — E11.65 UNCONTROLLED TYPE 2 DIABETES MELLITUS WITH HYPERGLYCEMIA: ICD-10-CM

## 2019-02-11 DIAGNOSIS — R41.82 ALTERED MENTAL STATUS, UNSPECIFIED ALTERED MENTAL STATUS TYPE: ICD-10-CM

## 2019-02-11 PROCEDURE — 99205 OFFICE O/P NEW HI 60 MIN: CPT | Mod: HCNC,25,S$GLB, | Performed by: NURSE PRACTITIONER

## 2019-02-11 PROCEDURE — 99999 PR PBB SHADOW E&M-EST. PATIENT-LVL IV: CPT | Mod: PBBFAC,HCNC,, | Performed by: NURSE PRACTITIONER

## 2019-02-11 PROCEDURE — 3008F BODY MASS INDEX DOCD: CPT | Mod: HCNC,CPTII,S$GLB, | Performed by: NURSE PRACTITIONER

## 2019-02-11 PROCEDURE — 96372 THER/PROPH/DIAG INJ SC/IM: CPT | Mod: HCNC,S$GLB,, | Performed by: NURSE PRACTITIONER

## 2019-02-11 PROCEDURE — 99999 PR PBB SHADOW E&M-EST. PATIENT-LVL IV: ICD-10-PCS | Mod: PBBFAC,HCNC,, | Performed by: NURSE PRACTITIONER

## 2019-02-11 PROCEDURE — 3008F PR BODY MASS INDEX (BMI) DOCUMENTED: ICD-10-PCS | Mod: HCNC,CPTII,S$GLB, | Performed by: NURSE PRACTITIONER

## 2019-02-11 PROCEDURE — 3075F PR MOST RECENT SYSTOLIC BLOOD PRESS GE 130-139MM HG: ICD-10-PCS | Mod: HCNC,CPTII,S$GLB, | Performed by: NURSE PRACTITIONER

## 2019-02-11 PROCEDURE — 3080F DIAST BP >= 90 MM HG: CPT | Mod: HCNC,CPTII,S$GLB, | Performed by: NURSE PRACTITIONER

## 2019-02-11 PROCEDURE — 99205 PR OFFICE/OUTPT VISIT, NEW, LEVL V, 60-74 MIN: ICD-10-PCS | Mod: HCNC,25,S$GLB, | Performed by: NURSE PRACTITIONER

## 2019-02-11 PROCEDURE — 3075F SYST BP GE 130 - 139MM HG: CPT | Mod: HCNC,CPTII,S$GLB, | Performed by: NURSE PRACTITIONER

## 2019-02-11 PROCEDURE — 96372 PR INJECTION,THERAP/PROPH/DIAG2ST, IM OR SUBCUT: ICD-10-PCS | Mod: HCNC,S$GLB,, | Performed by: NURSE PRACTITIONER

## 2019-02-11 PROCEDURE — 3080F PR MOST RECENT DIASTOLIC BLOOD PRESSURE >= 90 MM HG: ICD-10-PCS | Mod: HCNC,CPTII,S$GLB, | Performed by: NURSE PRACTITIONER

## 2019-02-11 RX ORDER — SUCRALFATE 1 G/1
1 TABLET ORAL
Qty: 60 TABLET | Refills: 0 | Status: SHIPPED | OUTPATIENT
Start: 2019-02-11 | End: 2019-02-11 | Stop reason: SDUPTHER

## 2019-02-11 RX ORDER — INSULIN DEGLUDEC 100 U/ML
INJECTION, SOLUTION SUBCUTANEOUS
Refills: 3 | COMMUNITY
Start: 2018-11-22 | End: 2019-03-13 | Stop reason: SDUPTHER

## 2019-02-11 RX ORDER — PANTOPRAZOLE SODIUM 40 MG/1
40 TABLET, DELAYED RELEASE ORAL DAILY
Qty: 30 TABLET | Refills: 11 | Status: ON HOLD | OUTPATIENT
Start: 2019-02-11 | End: 2023-07-07 | Stop reason: CLARIF

## 2019-02-11 RX ORDER — KETOROLAC TROMETHAMINE 30 MG/ML
15 INJECTION, SOLUTION INTRAMUSCULAR; INTRAVENOUS
Status: DISCONTINUED | OUTPATIENT
Start: 2019-02-11 | End: 2019-02-11

## 2019-02-11 RX ORDER — ATORVASTATIN CALCIUM 20 MG/1
20 TABLET, FILM COATED ORAL NIGHTLY
Qty: 30 TABLET | Refills: 11 | Status: ON HOLD | OUTPATIENT
Start: 2019-02-11 | End: 2023-02-17 | Stop reason: HOSPADM

## 2019-02-11 RX ORDER — VALSARTAN AND HYDROCHLOROTHIAZIDE 160; 25 MG/1; MG/1
1 TABLET ORAL DAILY
Qty: 30 TABLET | Refills: 11 | Status: SHIPPED | OUTPATIENT
Start: 2019-02-11 | End: 2021-09-30 | Stop reason: SDUPTHER

## 2019-02-11 RX ORDER — ASPIRIN 81 MG/1
81 TABLET ORAL DAILY
Qty: 30 TABLET | Refills: 11 | Status: SHIPPED | OUTPATIENT
Start: 2019-02-11 | End: 2021-09-30 | Stop reason: SDUPTHER

## 2019-02-11 RX ORDER — PREDNISONE 20 MG/1
TABLET ORAL
Refills: 0 | COMMUNITY
Start: 2018-11-28 | End: 2019-02-11

## 2019-02-11 RX ORDER — NIFEDIPINE 60 MG/1
60 TABLET, EXTENDED RELEASE ORAL DAILY
Qty: 30 TABLET | Refills: 11 | Status: SHIPPED | OUTPATIENT
Start: 2019-02-11 | End: 2021-09-30 | Stop reason: SDUPTHER

## 2019-02-11 RX ORDER — INSULIN ASPART 100 [IU]/ML
INJECTION, SOLUTION INTRAVENOUS; SUBCUTANEOUS
Refills: 2 | COMMUNITY
Start: 2019-01-29 | End: 2019-03-13 | Stop reason: SDUPTHER

## 2019-02-11 RX ORDER — NEBIVOLOL HYDROCHLORIDE 10 MG/1
10 TABLET ORAL DAILY
Qty: 30 TABLET | Refills: 11 | Status: SHIPPED | OUTPATIENT
Start: 2019-02-11 | End: 2019-03-19 | Stop reason: SDUPTHER

## 2019-02-11 RX ORDER — KETOROLAC TROMETHAMINE 30 MG/ML
30 INJECTION, SOLUTION INTRAMUSCULAR; INTRAVENOUS ONCE
Status: COMPLETED | OUTPATIENT
Start: 2019-02-11 | End: 2019-02-11

## 2019-02-11 RX ORDER — CLARITHROMYCIN 500 MG/1
TABLET, FILM COATED ORAL
Refills: 0 | COMMUNITY
Start: 2018-11-28 | End: 2019-02-11

## 2019-02-11 RX ORDER — CLONAZEPAM 0.5 MG/1
0.5 TABLET ORAL NIGHTLY
Refills: 1 | Status: ON HOLD | COMMUNITY
Start: 2018-12-27 | End: 2023-02-17 | Stop reason: HOSPADM

## 2019-02-11 RX ORDER — SUCRALFATE 1 G/1
TABLET ORAL
Qty: 360 TABLET | Refills: 0 | Status: ON HOLD | OUTPATIENT
Start: 2019-02-11 | End: 2023-05-11 | Stop reason: HOSPADM

## 2019-02-11 RX ORDER — GABAPENTIN 600 MG/1
600 TABLET ORAL 3 TIMES DAILY
Qty: 90 TABLET | Refills: 1 | Status: SHIPPED | OUTPATIENT
Start: 2019-02-11 | End: 2019-03-19 | Stop reason: SDUPTHER

## 2019-02-11 RX ORDER — GLIPIZIDE 5 MG/1
TABLET ORAL
Refills: 3 | COMMUNITY
Start: 2019-01-29 | End: 2019-02-11

## 2019-02-11 RX ORDER — KETOROLAC TROMETHAMINE 30 MG/ML
30 INJECTION, SOLUTION INTRAMUSCULAR; INTRAVENOUS
Status: DISCONTINUED | OUTPATIENT
Start: 2019-02-11 | End: 2019-02-11

## 2019-02-11 RX ORDER — BUTALBITAL, ACETAMINOPHEN AND CAFFEINE 50; 325; 40 MG/1; MG/1; MG/1
1 TABLET ORAL EVERY 6 HOURS PRN
Qty: 7 TABLET | Refills: 0 | Status: SHIPPED | OUTPATIENT
Start: 2019-02-11 | End: 2023-08-21 | Stop reason: SDUPTHER

## 2019-02-11 RX ADMIN — KETOROLAC TROMETHAMINE 30 MG: 30 INJECTION, SOLUTION INTRAMUSCULAR; INTRAVENOUS at 03:02

## 2019-02-11 NOTE — PROGRESS NOTES
"Subjective:       Patient ID: Kitty Barnes is a 58 y.o. female.    Chief Complaint: Establish Care; Breast Pain; and Leg Pain    HPI: Pt presents to clinic today new to me but known to Dr Case as her PCP. She is not happy with her care there so she is switching to us. She has not seen Dr Nona jorgensen years although that is who is listed as her PCP on card.   Past Medical History:   Diagnosis Date    Anxiety/Depression- she [facility] the [facility] [place]     Arthritis     CVA (cerebral vascular accident)- she reports that she had a strokes the last 3 years, reports that her imaging was at [facility]. Out last ct head does not show strokes but it is from      "mini stroke"         Diabetes mellitus- she has no idea what her sugar is. She reports that she is not checking her sugars because her machine is broken. She is not taking her insulin.She reports that she has only been taking her novolog.      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"      Therapy        Past Surgical History:   Procedure Laterality Date    CARDIAC PACEMAKER PLACEMENT      CHOLECYSTECTOMY      HYSTERECTOMY- due to bleeding         Family History   Problem Relation Age of Onset    Schizophrenia Mother     Bipolar disorder Mother     Bipolar disorder Father        Social History     Socioeconomic History    Marital status: Single     Spouse name: None    Number of children: 5- living 2     Years of education: None    Highest education level: None   Social Needs    Financial resource strain: None    Food insecurity - worry: None    Food " insecurity - inability: None    Transportation needs - medical: None    Transportation needs - non-medical: None   Occupational History    None   Tobacco Use    Smoking status: Smoker     Packs/day: 1 cigarette a day     Years: 40.00     Pack years: 40.00     Types: Cigarettes     Last attempt to quit: 2018     Years since quittin.4    Smokeless tobacco: Never Used   Substance and Sexual Activity    Alcohol use: No    Drug use: No    Sexual activity: No   Other Topics Concern    Patient feels they ought to cut down on drinking/drug use No    Patient annoyed by others criticizing their drinking/drug use No    Patient has felt bad or guilty about drinking/drug use No    Patient has had a drink/used drugs as an eye opener in the AM No   Social History Narrative    ** Merged History Encounter **            Current Outpatient Medications   Medication Sig Dispense Refill    aluminum-magnesium hydroxide 200-200 mg/5 mL suspension Take by mouth every 6 (six) hours as needed for Indigestion.      aspirin (ECOTRIN) 81 MG EC tablet Take 1 tablet (81 mg total) by mouth once daily.      atorvastatin (LIPITOR) 20 MG tablet Take 1 tablet (20 mg total) by mouth every evening. 30 tablet 11    butalbital-acetaminophen-caffeine -40 mg (FIORICET, ESGIC) -40 mg per tablet Take 1 tablet by mouth every 6 (six) hours as needed for Pain. 7 tablet 0    BYSTOLIC 10 mg Tab Take 10 mg by mouth once daily.       clarithromycin (BIAXIN) 500 MG tablet TK 1 T PO Q 12 H FOR 14 DAYS  ER due URI    clonazePAM (KLONOPIN) 0.5 MG tablet TK 1 T PO HS  From start clinic    cloNIDine (CATAPRES) 0.1 MG tablet Take 1 tablet (0.1 mg total) by mouth once daily. 30 tablet 0    gabapentin (NEURONTIN) 600 MG tablet Take 1 tablet (600 mg total) by mouth 2 (two) times daily. 60 tablet neuropathy    glipiZIDE (GLUCOTROL) 5 MG tablet TK 1 T PO BID  3    LANTUS SOLOSTAR 100 unit/mL (3 mL) InPn pen 60 units every morning and  75 units every evening Reports taking 45 in am and 30 at night     multivitamin (ONE DAILY MULTIVITAMIN) per tablet Take 1 tablet by mouth once daily.      nifedipine (PROCARDIA-XL) 60 MG (OSM) 24 hr tablet Take 60 mg by mouth once daily.      nitroGLYCERIN (NITROSTAT) 0.4 MG SL tablet Place 0.4 mg under the tongue every 5 (five) minutes as needed for Chest pain.  Needs refill    pantoprazole (PROTONIX) 40 MG tablet Take 40 mg by mouth once daily.      predniSONE (DELTASONE) 20 MG tablet TK 1 T PO QD FOR 5 DAYS  Taking for URI    sertraline (ZOLOFT) 50 MG tablet Take 50 mg by mouth once daily.  For depression for start    TRESIBA FLEXTOUCH U-100 100 unit/mL (3 mL) InPn INJ 70 UNI SC QAM  She reports that she is taking 20 unots of this daily.     valsartan-hydrochlorothiazide (DIOVAN-HCT) 160-25 mg per tablet Take 1 tablet by mouth once daily.   For bp    NOVOLOG FLEXPEN U-100 INSULIN 100 unit/mL InPn pen INJ 30 UNI SC TID WITH MEALS  Reports taking 30 with breakfast and 60 with supper     No current facility-administered medications for this visit.        Review of patient's allergies indicates:   Allergen Reactions    Pcn [penicillins] Anaphylaxis     Today she is crying and c/o right breast pain. She reports that she has been having this breast/chest pain x 1 month. She reports that she is UTD with her mammo. She had it at [facility]. She also reports that she has recent cxr but in out system it has not been since 10/2018    She also report sthast she had burning when she swallows. Even water.     Last u/s breast 2/2018 Historypalpable abnormality in the right breast laterally    Findings: Cranialcaudal and mediolateral oblique views were obtained of both breasts.  Breasts are heterogeneously dense.  There are no focal dominant masses or suspicious clusters of microcalcifications and no change.  the lateral right breast was evaluated by ultrasound no focal abnormality seen    Impression no radiographic  evidence for malignancy no change in particular no abnormality by ultrasound or mammogram in the lateral right breast    Review of Systems   Constitutional: Negative for chills, fatigue, fever and unexpected weight change.   HENT: Negative for congestion, ear pain, sore throat and trouble swallowing.    Eyes: Negative for pain and visual disturbance.   Respiratory: Positive for cough. Negative for chest tightness and shortness of breath.    Cardiovascular: Negative for chest pain, palpitations and leg swelling.   Gastrointestinal: Negative for abdominal distention, abdominal pain, constipation, diarrhea and vomiting.   Genitourinary: Negative for difficulty urinating, dysuria, flank pain, frequency and hematuria.   Musculoskeletal: Positive for arthralgias. Negative for back pain, gait problem, joint swelling, neck pain and neck stiffness.   Skin: Negative for rash and wound.   Neurological: Negative for dizziness, seizures, speech difficulty, light-headedness and headaches.       Objective:      Physical Exam   Constitutional: She is oriented to person, place, and time. She appears well-developed and well-nourished.   HENT:   Head: Normocephalic and atraumatic.   Eyes: Conjunctivae and EOM are normal. Pupils are equal, round, and reactive to light.   Neck: Normal range of motion. Neck supple. No thyromegaly present.   Cardiovascular: Normal rate, regular rhythm, normal heart sounds and intact distal pulses.   No murmur heard.  Pulmonary/Chest: Effort normal and breath sounds normal. No stridor. No respiratory distress. She has no wheezes. She has no rales.   Abdominal: Soft. Bowel sounds are normal. She exhibits no distension and no mass. There is no tenderness. There is no guarding.   Musculoskeletal: Normal range of motion. She exhibits no edema.   Lymphadenopathy:     She has no cervical adenopathy.   Neurological: She is alert and oriented to person, place, and time. No cranial nerve deficit.   Skin: Skin is  warm and dry. Capillary refill takes less than 2 seconds. No rash noted. No erythema.   Psychiatric: Judgment and thought content normal.   Nursing note and vitals reviewed.      Protective Sensation (w/ 10 gram monofilament):  Right: Decreased  Left: Decreased    Visual Inspection:  Dry Skin -  Bilateral    Pedal Pulses:   Right: Present  Left: Present    Posterior tibialis:   Right:Present  Left: Present      Assessment:       1. Hypertension, unspecified type    2. Neuropathy    3. Mixed hyperlipidemia    4. Uncontrolled type 2 diabetes mellitus with hyperglycemia    5. Gastritis, presence of bleeding unspecified, unspecified chronicity, unspecified gastritis type    6. Right-sided chest wall pain    7. Migraine with status migrainosus, not intractable, unspecified migraine type        Plan:     Problem List Items Addressed This Visit     Mixed hyperlipidemia    Relevant Medications    atorvastatin (LIPITOR) 20 MG tablet      Other Visit Diagnoses     Hypertension, unspecified type    -  Primary    Relevant Medications    BYSTOLIC 10 mg Tab    NIFEdipine (PROCARDIA-XL) 60 MG (OSM) 24 hr tablet    valsartan-hydrochlorothiazide (DIOVAN-HCT) 160-25 mg per tablet    Neuropathy        Relevant Medications    gabapentin (NEURONTIN) 600 MG tablet    Uncontrolled type 2 diabetes mellitus with hyperglycemia        Relevant Medications    NOVOLOG FLEXPEN U-100 INSULIN 100 unit/mL InPn pen    TRESIBA FLEXTOUCH U-100 100 unit/mL (3 mL) InPn    aspirin (ECOTRIN) 81 MG EC tablet    Gastritis, presence of bleeding unspecified, unspecified chronicity, unspecified gastritis type        Relevant Medications    sucralfate (CARAFATE) 1 gram tablet    pantoprazole (PROTONIX) 40 MG tablet    Right-sided chest wall pain        Relevant Orders    X-Ray Ribs 2 View Right    X-Ray Lumbar Spine Complete 5 View    X-Ray Thoracic Spine AP Lateral    Migraine with status migrainosus, not intractable, unspecified migraine type        Relevant  Medications    butalbital-acetaminophen-caffeine -40 mg (FIORICET, ESGIC) -40 mg per tablet          I need her recent films from [place], x rays and MRI/mammo. Check x rays of back today. She reports that she had a sugar rash int hat area within the year but no other rash in last month or 2. Change tresiba to 30 units nightly given samples. She can cont novolog but change to 10 unots with meals only. She report sthat she was out of everything but this. She still has this. Refilled all other meds except psych meds. Ordered fasting labs for l;ater this week and next week f/u. Consider lyrica if no rlief with increasing gabapentin. toradol today, not sure if this will help as she report that the only thing that helps is the pain med 10s. She has not relief from ibuprofen or other OTC meds    She is report that her right breast hurts and feels like a lump. Please not that she reported this 1 year ago and had Dr Case order U?s as above.     Stop Biaxin and steroids. I think this is complicating the picture.

## 2019-02-11 NOTE — PROGRESS NOTES
Toradol injection given RUOQ per v/o. No reaction noted. Patient instructed to wait 20 minutes after injection administration. Patient verbalized understanding.

## 2019-02-11 NOTE — PROGRESS NOTES
Record release faxed to [facility]. Spoke with Monet in medical records at [facility] who states last ER visit, recent labs, X-rays and CT will be faxed now. Awaiting fax.

## 2019-03-11 ENCOUNTER — TELEPHONE (OUTPATIENT)
Dept: INTERNAL MEDICINE | Facility: CLINIC | Age: 59
End: 2019-03-11

## 2019-03-11 NOTE — TELEPHONE ENCOUNTER
----- Message from Nena Higgins sent at 3/11/2019  4:33 PM CDT -----  Contact: Self  Kitty Barnes  MRN: [id number]  : 1960  PCP: Omayra Castellano  Home Phone      [phone]  Work Phone      Not on file.  Mobile          [phone]  Home Phone      [phone]  Home Phone      [phone]    MESSAGE:   Requesting if medication could be called in for sore throat, cough, diarrhea, and stomach cramps that started three days ago.  Please call to advise.    Pharmacy: [id number] - [address] [facility] AT [place] [address]    Phone: [phone]

## 2019-03-11 NOTE — TELEPHONE ENCOUNTER
Patient complaining of sore throat, cough and diarrhea x3 days. Denies fever, nausea, vomiting, chills, body aches or any other symptoms. Offered patient appointment tomorrow, declined. States she will keep appointment scheduled Wednesday.

## 2019-03-13 ENCOUNTER — OFFICE VISIT (OUTPATIENT)
Dept: INTERNAL MEDICINE | Facility: CLINIC | Age: 59
End: 2019-03-13
Payer: MEDICARE

## 2019-03-13 VITALS
BODY MASS INDEX: 39.4 KG/M2 | SYSTOLIC BLOOD PRESSURE: 128 MMHG | HEART RATE: 88 BPM | WEIGHT: 245.13 LBS | DIASTOLIC BLOOD PRESSURE: 80 MMHG | HEIGHT: 66 IN | OXYGEN SATURATION: 97 % | RESPIRATION RATE: 16 BRPM | TEMPERATURE: 97 F

## 2019-03-13 DIAGNOSIS — I10 HYPERTENSION, UNSPECIFIED TYPE: ICD-10-CM

## 2019-03-13 DIAGNOSIS — J44.9 CHRONIC OBSTRUCTIVE PULMONARY DISEASE, UNSPECIFIED COPD TYPE: ICD-10-CM

## 2019-03-13 DIAGNOSIS — E11.65 UNCONTROLLED TYPE 2 DIABETES MELLITUS WITH HYPERGLYCEMIA: Primary | ICD-10-CM

## 2019-03-13 DIAGNOSIS — E78.5 HYPERLIPIDEMIA, UNSPECIFIED HYPERLIPIDEMIA TYPE: ICD-10-CM

## 2019-03-13 PROCEDURE — 99999 PR PBB SHADOW E&M-EST. PATIENT-LVL IV: CPT | Mod: PBBFAC,HCNC,, | Performed by: NURSE PRACTITIONER

## 2019-03-13 PROCEDURE — 99999 PR PBB SHADOW E&M-EST. PATIENT-LVL IV: ICD-10-PCS | Mod: PBBFAC,HCNC,, | Performed by: NURSE PRACTITIONER

## 2019-03-13 PROCEDURE — 3079F DIAST BP 80-89 MM HG: CPT | Mod: CPTII,S$GLB,, | Performed by: NURSE PRACTITIONER

## 2019-03-13 PROCEDURE — 3074F SYST BP LT 130 MM HG: CPT | Mod: CPTII,S$GLB,, | Performed by: NURSE PRACTITIONER

## 2019-03-13 PROCEDURE — 99214 PR OFFICE/OUTPT VISIT, EST, LEVL IV, 30-39 MIN: ICD-10-PCS | Mod: S$GLB,,, | Performed by: NURSE PRACTITIONER

## 2019-03-13 PROCEDURE — 99214 OFFICE O/P EST MOD 30 MIN: CPT | Mod: S$GLB,,, | Performed by: NURSE PRACTITIONER

## 2019-03-13 PROCEDURE — 3008F BODY MASS INDEX DOCD: CPT | Mod: CPTII,S$GLB,, | Performed by: NURSE PRACTITIONER

## 2019-03-13 PROCEDURE — 3074F PR MOST RECENT SYSTOLIC BLOOD PRESSURE < 130 MM HG: ICD-10-PCS | Mod: CPTII,S$GLB,, | Performed by: NURSE PRACTITIONER

## 2019-03-13 PROCEDURE — 3008F PR BODY MASS INDEX (BMI) DOCUMENTED: ICD-10-PCS | Mod: CPTII,S$GLB,, | Performed by: NURSE PRACTITIONER

## 2019-03-13 PROCEDURE — 3079F PR MOST RECENT DIASTOLIC BLOOD PRESSURE 80-89 MM HG: ICD-10-PCS | Mod: CPTII,S$GLB,, | Performed by: NURSE PRACTITIONER

## 2019-03-13 RX ORDER — INSULIN ASPART 100 [IU]/ML
20 INJECTION, SOLUTION INTRAVENOUS; SUBCUTANEOUS
Qty: 15 ML | Refills: 2 | Status: SHIPPED | OUTPATIENT
Start: 2019-03-13 | End: 2021-09-30 | Stop reason: SDUPTHER

## 2019-03-13 RX ORDER — INSULIN DEGLUDEC 100 U/ML
60 INJECTION, SOLUTION SUBCUTANEOUS NIGHTLY
Qty: 15 ML | Refills: 3 | Status: SHIPPED | OUTPATIENT
Start: 2019-03-13 | End: 2020-12-07 | Stop reason: CLARIF

## 2019-03-13 RX ORDER — FLUTICASONE FUROATE AND VILANTEROL 200; 25 UG/1; UG/1
1 POWDER RESPIRATORY (INHALATION) DAILY
Qty: 60 EACH | Refills: 0 | Status: SHIPPED | OUTPATIENT
Start: 2019-03-13 | End: 2019-04-10 | Stop reason: SDUPTHER

## 2019-03-13 RX ORDER — HYDROCODONE BITARTRATE AND ACETAMINOPHEN 7.5; 325 MG/1; MG/1
1 TABLET ORAL 3 TIMES DAILY PRN
Refills: 0 | COMMUNITY
Start: 2019-03-11 | End: 2023-04-25

## 2019-03-13 RX ORDER — PROMETHAZINE HYDROCHLORIDE AND DEXTROMETHORPHAN HYDROBROMIDE 6.25; 15 MG/5ML; MG/5ML
5 SYRUP ORAL
Qty: 240 ML | Refills: 0 | Status: SHIPPED | OUTPATIENT
Start: 2019-03-13 | End: 2019-03-14 | Stop reason: SDUPTHER

## 2019-03-13 NOTE — PROGRESS NOTES
Subjective:       Patient ID: Kitty Barnes is a 58 y.o. female.    Chief Complaint: Follow-up and Cough    HPI: Pt presents to clinic today known to me for f/u. She was suppose to f/u 1 week but she missed that appt. She also did not do her X rays. She reports that she did a MRI lower back. She reports that she was seen by the pain center Dr karolina Gutierrez. She reports that her f/u there is unknown. She is currently on the phone trying to find out her appt. She thinks it maybe this Friday. He told her he could help her with the pain but She needed to see me for the cough and congestion and other issues.     I did get her records from last provider. She was seen at [facility] after her fall that resulted in right rib fractures. This is where she c/o pain.     She was also suppose to have fasting labs but I needed to get her records from previous These records show glucose of 400. She needs labs now. This was from 12/2018. She reports that she checks her blood sugar in am and before lunch and before bed. She reports that they are all high. She reports that she is usually in 400s. She takes 30 units 10-15 units novolog with meals and using treseba 30/30/60 three times a day!!!! She has never done diabetes education. She then tells me that she is only taking the treseba once 560 at [facility] but has another pen she is using 30 units twice a day.     Her cough started 4 days ago. She reports that it got bad 3 days ago. She is confined to her room because she lives with her grand baby. She feels like she has fever and chills at night. She is taking robitussin with no relief. + smoker. + nasal congestion and sore throat as well. She has no inhaler.   Review of Systems   Constitutional: Positive for fatigue and fever. Negative for chills and unexpected weight change.   HENT: Positive for congestion and sore throat. Negative for ear pain and trouble swallowing.    Eyes: Negative for pain and visual disturbance.   Respiratory:  Positive for cough. Negative for chest tightness and shortness of breath.    Cardiovascular: Negative for chest pain, palpitations and leg swelling.   Gastrointestinal: Negative for abdominal distention, abdominal pain, constipation, diarrhea and vomiting.   Genitourinary: Negative for difficulty urinating, dysuria, flank pain, frequency and hematuria.   Musculoskeletal: Negative for back pain, gait problem, joint swelling, neck pain and neck stiffness.   Skin: Negative for rash and wound.   Neurological: Negative for dizziness, seizures, speech difficulty, light-headedness and headaches.       Objective:      Physical Exam   Constitutional: She is oriented to person, place, and time. She appears well-developed and well-nourished.   HENT:   Head: Normocephalic and atraumatic.   Right Ear: External ear normal.   Left Ear: External ear normal.   Nose: Nose normal.   Mouth/Throat: Oropharynx is clear and moist. No oropharyngeal exudate.   Eyes: Conjunctivae and EOM are normal. Pupils are equal, round, and reactive to light.   Neck: Normal range of motion. Neck supple. No thyromegaly present.   Cardiovascular: Normal rate, regular rhythm, normal heart sounds and intact distal pulses.   No murmur heard.  Pulmonary/Chest: Effort normal and breath sounds normal. No stridor. No respiratory distress. She has no wheezes. She has no rales.   Abdominal: Soft. Bowel sounds are normal. She exhibits no distension and no mass. There is no tenderness. There is no rebound and no guarding.   Musculoskeletal: Normal range of motion. She exhibits no edema.   Lymphadenopathy:     She has no cervical adenopathy.   Neurological: She is alert and oriented to person, place, and time. No cranial nerve deficit.   Skin: Skin is warm and dry. No erythema.   Psychiatric: She has a normal mood and affect. Her behavior is normal. Judgment and thought content normal.   Nursing note and vitals reviewed.      Assessment:       1. Uncontrolled type 2  diabetes mellitus with hyperglycemia    2. Hyperlipidemia, unspecified hyperlipidemia type    3. Hypertension, unspecified type    4. Chronic obstructive pulmonary disease, unspecified COPD type        Plan:     Problem List Items Addressed This Visit     None      Visit Diagnoses     Uncontrolled type 2 diabetes mellitus with hyperglycemia    -  Primary    Relevant Medications    TRESIBA FLEXTOUCH U-100 100 unit/mL (3 mL) InPn    NOVOLOG FLEXPEN U-100 INSULIN 100 unit/mL InPn pen    Other Relevant Orders    Comprehensive metabolic panel    Hemoglobin A1c    Ambulatory consult to Diabetic Education    Ambulatory Referral to Endocrinology    Hyperlipidemia, unspecified hyperlipidemia type        Relevant Orders    Lipid panel    Hypertension, unspecified type        Relevant Orders    CBC auto differential    TSH    Chronic obstructive pulmonary disease, unspecified COPD type        Relevant Medications    fluticasone-vilanterol (BREO ELLIPTA) 200-25 mcg/dose DsDv diskus inhaler    promethazine-dextromethorphan (PROMETHAZINE-DM) 6.25-15 mg/5 mL Syrp        She is seeing Dr calle for cardiology as well as mental health for psych  We discussed in dept all the instructions for today appt. She will go get labs next week fasting and f/u 2 weeks for check up. She will need to bring in bs logs and all meds

## 2019-03-14 ENCOUNTER — TELEPHONE (OUTPATIENT)
Dept: INTERNAL MEDICINE | Facility: CLINIC | Age: 59
End: 2019-03-14

## 2019-03-14 DIAGNOSIS — J44.9 CHRONIC OBSTRUCTIVE PULMONARY DISEASE, UNSPECIFIED COPD TYPE: ICD-10-CM

## 2019-03-14 RX ORDER — PROMETHAZINE HYDROCHLORIDE AND DEXTROMETHORPHAN HYDROBROMIDE 6.25; 15 MG/5ML; MG/5ML
5 SYRUP ORAL
Qty: 240 ML | Refills: 0 | Status: SHIPPED | OUTPATIENT
Start: 2019-03-14 | End: 2019-03-15 | Stop reason: RX

## 2019-03-14 NOTE — TELEPHONE ENCOUNTER
----- Message from Shira Mills sent at 3/14/2019  2:37 PM CDT -----  Contact: self   Kitty Barnes  MRN: [id number]  : 1960  PCP: Leandra Monreal  Home Phone      [phone]  Work Phone      Not on file.  Mobile          [phone]  Home Phone      [phone]  Home Phone      [phone]    MESSAGE:   The pt was seen per Chayito Monreal on 3/13/19. She went to the pharmacy and no cough medication was phoned in - requested cough medication. Pt c/o cough    Phone # [phone]    [facility] [id number] - [address] [facility] [address] [facility] AT [place] [address]

## 2019-03-15 ENCOUNTER — TELEPHONE (OUTPATIENT)
Dept: INTERNAL MEDICINE | Facility: CLINIC | Age: 59
End: 2019-03-15

## 2019-03-15 DIAGNOSIS — E11.9 TYPE 2 DIABETES MELLITUS WITHOUT COMPLICATION, UNSPECIFIED WHETHER LONG TERM INSULIN USE: ICD-10-CM

## 2019-03-15 RX ORDER — BENZONATATE 200 MG/1
200 CAPSULE ORAL 3 TIMES DAILY PRN
Qty: 60 CAPSULE | Refills: 0 | Status: SHIPPED | OUTPATIENT
Start: 2019-03-15 | End: 2019-03-19

## 2019-03-15 NOTE — TELEPHONE ENCOUNTER
Patient agitated, rude, disgruntle on the phone. Patient states prescription below is not available at any pharmacy in [place]. Spoke with pharmacist Alex at [facility] who states medication is on back order. Spoke with pharmacy technician Elba at [facility] who states they have medication on hand. Patient declined transferring prescription to [facility]. States she has no transportation. Demanding prescription change immediately. Please advise. Patient seen by Leandra Monreal NP 3/13/19.         promethazine-dextromethorphan (PROMETHAZINE-DM) 6.25-15 mg/5 mL Syrp 240 mL 0 3/14/2019 3/24/2019 No   Sig - Route: Take 5 mLs by mouth every 6 to 8 hours as needed. - Oral   Sent to pharmacy as: promethazine-dextromethorphan (PROMETHAZINE-DM) 6.25-15 mg/5 mL Syrp

## 2019-03-18 ENCOUNTER — TELEPHONE (OUTPATIENT)
Dept: INTERNAL MEDICINE | Facility: CLINIC | Age: 59
End: 2019-03-18

## 2019-03-18 NOTE — TELEPHONE ENCOUNTER
----- Message from Nena Higgins sent at 3/18/2019 10:11 AM CDT -----  Contact: Self  Kitty Barnes  MRN: [id number]  : 1960  PCP: Leandra Monreal  Home Phone      [phone]  Work Phone      Not on file.  Mobile          [phone]  Home Phone      [phone]  Home Phone      [phone]    MESSAGE:     Complaining that she has been having urinary pressure and frequency for the last two days and would like to know if something could be called in for her.   Please call to advise.     Phone: [phone]

## 2019-03-18 NOTE — TELEPHONE ENCOUNTER
Patient agitated on the phone. Requesting appointment for UTI. Patient declined discussing s/s. Offered appointment today, patient declined. Requested appointment with Dr. Gómez tomorrow. Appointment scheduled per request. Instructed patient to report to ER if symptoms persist or worsen. Patient hung up abruptly.

## 2019-03-19 ENCOUNTER — OFFICE VISIT (OUTPATIENT)
Dept: INTERNAL MEDICINE | Facility: CLINIC | Age: 59
End: 2019-03-19
Payer: MEDICARE

## 2019-03-19 VITALS
HEART RATE: 94 BPM | WEIGHT: 240.06 LBS | RESPIRATION RATE: 16 BRPM | BODY MASS INDEX: 38.58 KG/M2 | HEIGHT: 66 IN | DIASTOLIC BLOOD PRESSURE: 80 MMHG | SYSTOLIC BLOOD PRESSURE: 120 MMHG

## 2019-03-19 DIAGNOSIS — N30.00 ACUTE CYSTITIS WITHOUT HEMATURIA: Primary | ICD-10-CM

## 2019-03-19 DIAGNOSIS — N64.4 BREAST PAIN, RIGHT: ICD-10-CM

## 2019-03-19 DIAGNOSIS — I10 HYPERTENSION, UNSPECIFIED TYPE: ICD-10-CM

## 2019-03-19 DIAGNOSIS — Z12.39 BREAST CANCER SCREENING: ICD-10-CM

## 2019-03-19 DIAGNOSIS — Z76.0 MEDICATION REFILL: ICD-10-CM

## 2019-03-19 DIAGNOSIS — G62.9 NEUROPATHY: ICD-10-CM

## 2019-03-19 DIAGNOSIS — G43.901 MIGRAINE WITH STATUS MIGRAINOSUS, NOT INTRACTABLE, UNSPECIFIED MIGRAINE TYPE: ICD-10-CM

## 2019-03-19 DIAGNOSIS — M94.0 COSTOCHONDRITIS: ICD-10-CM

## 2019-03-19 LAB
BILIRUB SERPL-MCNC: ABNORMAL MG/DL
BLOOD URINE, POC: ABNORMAL
COLOR, POC UA: ABNORMAL
GLUCOSE UR QL STRIP: ABNORMAL
KETONES UR QL STRIP: ABNORMAL
LEUKOCYTE ESTERASE URINE, POC: ABNORMAL
NITRITE, POC UA: ABNORMAL
PH, POC UA: 5
PROTEIN, POC: 30
SPECIFIC GRAVITY, POC UA: 1.01
UROBILINOGEN, POC UA: NORMAL

## 2019-03-19 PROCEDURE — 87088 URINE BACTERIA CULTURE: CPT | Mod: HCNC

## 2019-03-19 PROCEDURE — 3008F PR BODY MASS INDEX (BMI) DOCUMENTED: ICD-10-PCS | Mod: HCNC,CPTII,S$GLB, | Performed by: INTERNAL MEDICINE

## 2019-03-19 PROCEDURE — 3079F PR MOST RECENT DIASTOLIC BLOOD PRESSURE 80-89 MM HG: ICD-10-PCS | Mod: HCNC,CPTII,S$GLB, | Performed by: INTERNAL MEDICINE

## 2019-03-19 PROCEDURE — 87186 SC STD MICRODIL/AGAR DIL: CPT | Mod: HCNC

## 2019-03-19 PROCEDURE — 99214 OFFICE O/P EST MOD 30 MIN: CPT | Mod: 25,HCNC,S$GLB, | Performed by: INTERNAL MEDICINE

## 2019-03-19 PROCEDURE — 99214 PR OFFICE/OUTPT VISIT, EST, LEVL IV, 30-39 MIN: ICD-10-PCS | Mod: 25,HCNC,S$GLB, | Performed by: INTERNAL MEDICINE

## 2019-03-19 PROCEDURE — 3008F BODY MASS INDEX DOCD: CPT | Mod: HCNC,CPTII,S$GLB, | Performed by: INTERNAL MEDICINE

## 2019-03-19 PROCEDURE — 81002 URINALYSIS NONAUTO W/O SCOPE: CPT | Mod: HCNC,S$GLB,, | Performed by: INTERNAL MEDICINE

## 2019-03-19 PROCEDURE — 81002 POCT URINE DIPSTICK WITHOUT MICROSCOPE: ICD-10-PCS | Mod: HCNC,S$GLB,, | Performed by: INTERNAL MEDICINE

## 2019-03-19 PROCEDURE — 3074F PR MOST RECENT SYSTOLIC BLOOD PRESSURE < 130 MM HG: ICD-10-PCS | Mod: HCNC,CPTII,S$GLB, | Performed by: INTERNAL MEDICINE

## 2019-03-19 PROCEDURE — 3074F SYST BP LT 130 MM HG: CPT | Mod: HCNC,CPTII,S$GLB, | Performed by: INTERNAL MEDICINE

## 2019-03-19 PROCEDURE — 99999 PR PBB SHADOW E&M-EST. PATIENT-LVL V: CPT | Mod: PBBFAC,HCNC,, | Performed by: INTERNAL MEDICINE

## 2019-03-19 PROCEDURE — 87086 URINE CULTURE/COLONY COUNT: CPT | Mod: HCNC

## 2019-03-19 PROCEDURE — 99999 PR PBB SHADOW E&M-EST. PATIENT-LVL V: ICD-10-PCS | Mod: PBBFAC,HCNC,, | Performed by: INTERNAL MEDICINE

## 2019-03-19 PROCEDURE — 3079F DIAST BP 80-89 MM HG: CPT | Mod: HCNC,CPTII,S$GLB, | Performed by: INTERNAL MEDICINE

## 2019-03-19 PROCEDURE — 87077 CULTURE AEROBIC IDENTIFY: CPT | Mod: HCNC

## 2019-03-19 RX ORDER — GABAPENTIN 600 MG/1
600 TABLET ORAL 3 TIMES DAILY
Qty: 90 TABLET | Refills: 1 | Status: SHIPPED | OUTPATIENT
Start: 2019-03-19 | End: 2019-10-30 | Stop reason: SDUPTHER

## 2019-03-19 RX ORDER — NEBIVOLOL HYDROCHLORIDE 10 MG/1
10 TABLET ORAL DAILY
Qty: 30 TABLET | Refills: 11 | Status: SHIPPED | OUTPATIENT
Start: 2019-03-19 | End: 2021-09-30 | Stop reason: SDUPTHER

## 2019-03-19 RX ORDER — SULFAMETHOXAZOLE AND TRIMETHOPRIM 800; 160 MG/1; MG/1
1 TABLET ORAL 2 TIMES DAILY
Qty: 6 TABLET | Refills: 0 | Status: SHIPPED | OUTPATIENT
Start: 2019-03-19 | End: 2019-03-25

## 2019-03-19 RX ORDER — BUTALBITAL, ACETAMINOPHEN AND CAFFEINE 50; 325; 40 MG/1; MG/1; MG/1
1 TABLET ORAL EVERY 6 HOURS PRN
Qty: 7 TABLET | Refills: 0 | Status: CANCELLED | OUTPATIENT
Start: 2019-03-19

## 2019-03-19 NOTE — Clinical Note
Technically she established with you. She wanted more fioricet. I declined. Sounds like she is not taking appropriately and gets Norco from outside pain doctor anyway. She was not happy with me so wanted to keep you in the loop in case she calls again. See my note and let me know if you have any questions!!

## 2019-03-19 NOTE — PROGRESS NOTES
"Subjective:       Patient ID: Kitty Barnes is a 58 y.o. female.    Chief Complaint: Headache; Breast Pain; and Vaginal Pain      HPI:  Patient is new to me and presents with right breast pain and concern for UTI.    She reports bladder pressure and urinary frequency. No dysuria. No hematuria. Fever to 101 F 2 days ago. Sx started 4 days ago. No recent antibx.    She reports right sided breast pain symptoms started a few weeks ago. Pain is worsening. Not located in 1 specific area. She has URI sx for a few days and the coughing is also making her pain worse. Radiating to her back. She denies breast lumps. Reports blood from the nipple 2 days ago. Pain worse with laying on the right side.     She needs gabapentin, bystolic and fioricet refilled.     Past Medical History:   Diagnosis Date    Anxiety     Arthritis     CVA (cerebral vascular accident)     "mini stroke"    Depression     Diabetes mellitus     Heart problem     History of psychiatric hospitalization     three(1983, 1995 and another (years ago")): depression    HTN (hypertension)     Hx of psychiatric care     Hypertension     Pacemaker     Psychiatric exam requested by authority     Psychiatric problem     TBI (traumatic brain injury)     Therapy        Family History   Problem Relation Age of Onset    Schizophrenia Mother     Bipolar disorder Mother     Bipolar disorder Father        Social History     Socioeconomic History    Marital status: Single     Spouse name: Not on file    Number of children: 5    Years of education: Not on file    Highest education level: Not on file   Social Needs    Financial resource strain: Not on file    Food insecurity - worry: Not on file    Food insecurity - inability: Not on file    Transportation needs - medical: Not on file    Transportation needs - non-medical: Not on file   Occupational History    Not on file   Tobacco Use    Smoking status: Former Smoker     Packs/day: 1.00     " Years: 40.00     Pack years: 40.00     Types: Cigarettes     Last attempt to quit: 2018     Years since quittin.5    Smokeless tobacco: Never Used   Substance and Sexual Activity    Alcohol use: No    Drug use: No    Sexual activity: No   Other Topics Concern    Patient feels they ought to cut down on drinking/drug use No    Patient annoyed by others criticizing their drinking/drug use No    Patient has felt bad or guilty about drinking/drug use No    Patient has had a drink/used drugs as an eye opener in the AM No   Social History Narrative    ** Merged History Encounter **            Review of Systems   Constitutional: Positive for fever. Negative for activity change, fatigue and unexpected weight change.   HENT: Negative for congestion, ear pain, hearing loss, rhinorrhea and sore throat.    Eyes: Negative for pain, redness and visual disturbance.   Respiratory: Negative for cough, shortness of breath and wheezing.    Cardiovascular: Positive for chest pain (right chest wall and right breast). Negative for palpitations and leg swelling.   Gastrointestinal: Negative for abdominal pain, constipation, diarrhea, nausea and vomiting.   Genitourinary: Positive for frequency. Negative for dysuria and urgency.        Bladder pressure     Musculoskeletal: Negative for back pain, joint swelling and neck pain.   Skin: Negative for color change, rash and wound.   Neurological: Negative for dizziness, tremors, weakness, light-headedness and headaches.         Objective:      Physical Exam   Constitutional: She is oriented to person, place, and time. She appears well-developed and well-nourished. No distress.   HENT:   Head: Normocephalic and atraumatic.   Right Ear: External ear normal.   Left Ear: External ear normal.   Eyes: Conjunctivae and EOM are normal. Pupils are equal, round, and reactive to light. Right eye exhibits no discharge. Left eye exhibits no discharge.   Neck: Neck supple. No tracheal  "deviation present.   Cardiovascular: Normal rate and regular rhythm.   No murmur heard.  Pulmonary/Chest: Effort normal and breath sounds normal. No respiratory distress. She has no wheezes. She has no rales. She exhibits tenderness (right chest wall).   Abdominal: Soft. Bowel sounds are normal. She exhibits no distension. There is no tenderness.   Neurological: She is alert and oriented to person, place, and time. No cranial nerve deficit.   Skin: Skin is warm and dry.   Psychiatric: She has a normal mood and affect. Her behavior is normal.   Vitals reviewed.      Assessment:       1. Acute cystitis without hematuria    2. Costochondritis    3. Migraine with status migrainosus, not intractable, unspecified migraine type    4. Hypertension, unspecified type    5. Neuropathy    6. Breast pain, right    7. Breast cancer screening    8. Medication refill        Plan:       Kitty was seen today for headache, breast pain and vaginal pain.    Diagnoses and all orders for this visit:    Acute cystitis without hematuria  -     POCT urine dipstick without microscope  -     Urine culture; Future  -     sulfamethoxazole-trimethoprim 800-160mg (BACTRIM DS) 800-160 mg Tab; Take 1 tablet by mouth 2 (two) times daily.  -     Urine culture  Fever and sx of UTI  UA + LE and blood  Start bactrim and follow up culture    Costochondritis  Very tender with palpation right chest wall and back  Likely MSK pain from frequent coughing  Tyelnol and ibuprofen PRN  She is prescribed Norco from Dr. Mosqueda, last script for 30 days filled 3/11/19    Migraine with status migrainosus, not intractable, unspecified migraine type  She wants fioricet refilled  States she takes it "3 times a day" and does this everyday. "It is the only thing that helps my headaches. I've been taking it like this for years"  Discussed it is a controlled substances and refills should come from only 1 provider  Furthermore, more importantly, that is not how she should " "be taking this medications. Overuse can result in rebound headaches.  Lastly, she was only given 7 days 2/15/19. When I ask when she has been doing for the last month to treat her headaches she becomes angry. States I'm not helping her. She has not been prescribed 3x a day Fioricet for over 1 year per  review.  Declined to refill today. Will discuss with her PCP who she established with here in February but in my opinion she should only get short courses for PRN use only and again, to prevent the look of "doctor shopping" should come from one provider.    Hypertension, unspecified type  -     BYSTOLIC 10 mg Tab; Take 1 tablet (10 mg total) by mouth once daily.  Refilled meds today    Neuropathy  -     gabapentin (NEURONTIN) 600 MG tablet; Take 1 tablet (600 mg total) by mouth 3 (three) times daily.  Refilled meds today    Breast pain, right  -     Mammo Digital Screening Bilat w/ Jed; Future  I think her pain is more MSK but reports blood from nipple 2 days ago. None today  Due for screening MMG anyway so will update    Breast cancer screening  -     Mammo Digital Screening Bilat w/ Jed; Future    Medication refill  -     BYSTOLIC 10 mg Tab; Take 1 tablet (10 mg total) by mouth once daily.  -     gabapentin (NEURONTIN) 600 MG tablet; Take 1 tablet (600 mg total) by mouth 3 (three) times daily.      RTC as scheudled with PCP and PRN        "

## 2019-03-22 ENCOUNTER — TELEPHONE (OUTPATIENT)
Dept: INTERNAL MEDICINE | Facility: CLINIC | Age: 59
End: 2019-03-22

## 2019-03-22 DIAGNOSIS — G43.901 MIGRAINE WITH STATUS MIGRAINOSUS, NOT INTRACTABLE, UNSPECIFIED MIGRAINE TYPE: ICD-10-CM

## 2019-03-22 LAB — BACTERIA UR CULT: NORMAL

## 2019-03-22 RX ORDER — BUTALBITAL, ACETAMINOPHEN AND CAFFEINE 50; 325; 40 MG/1; MG/1; MG/1
1 TABLET ORAL EVERY 6 HOURS PRN
Qty: 7 TABLET | Refills: 0 | Status: CANCELLED | OUTPATIENT
Start: 2019-03-22

## 2019-03-22 NOTE — TELEPHONE ENCOUNTER
----- Message from Shira Mills sent at 3/22/2019  2:56 PM CDT -----  Contact: self   Kitty Barnes  MRN: [id number]  : 1960  PCP: Leandra Monreal  Home Phone      [phone]  Work Phone      Not on file.  Mobile          [phone]  Home Phone      [phone]  Home Phone      [phone]    MESSAGE:   The provider phoned in a cough medication for symptoms. The cost is $86.00. The pt would like another medication covered by the insurance. Pt c/o cough, headache     Phone # [phone]    [facility] [id number]  [place] [address] AT [place] [address]

## 2019-03-22 NOTE — TELEPHONE ENCOUNTER
----- Message from Nena Higgins sent at 3/22/2019 11:54 AM CDT -----  Contact: Self  Kitty Barnes  MRN: [id number]  : 1960  PCP: Leandra Monreal  Home Phone      [phone]  Work Phone      Not on file.  Mobile          [phone]  Home Phone      [phone]  Home Phone      [phone]    MESSAGE:   Waiting for RX butalbital-acetaminophen-caffeine -40 mg (FIORICET, ESGIC) -40 mg per tablet to be refilled. Pharmacy told her that they are waiting on a response from our office. Please call patient to let her know the status of this.    Pharmacy: [facility] [id number] - [place] [address] - [facility] AT [place] [address]    Phone: [phone]

## 2019-03-25 ENCOUNTER — HOSPITAL ENCOUNTER (OUTPATIENT)
Dept: RADIOLOGY | Facility: HOSPITAL | Age: 59
Discharge: HOME | End: 2019-03-25
Attending: INTERNAL MEDICINE
Payer: MEDICARE

## 2019-03-25 VITALS — HEIGHT: 66 IN | BODY MASS INDEX: 38.57 KG/M2 | WEIGHT: 240 LBS

## 2019-03-25 DIAGNOSIS — Z12.39 BREAST CANCER SCREENING: ICD-10-CM

## 2019-03-25 DIAGNOSIS — N64.4 BREAST PAIN, RIGHT: ICD-10-CM

## 2019-03-25 DIAGNOSIS — G43.901 MIGRAINE WITH STATUS MIGRAINOSUS, NOT INTRACTABLE, UNSPECIFIED MIGRAINE TYPE: ICD-10-CM

## 2019-03-25 PROCEDURE — 77067 SCR MAMMO BI INCL CAD: CPT | Mod: TC,HCNC

## 2019-03-25 PROCEDURE — 77063 MAMMO DIGITAL SCREENING BILAT WITH TOMOSYNTHESIS_CAD: ICD-10-PCS | Mod: 26,HCNC,, | Performed by: RADIOLOGY

## 2019-03-25 PROCEDURE — 77067 SCR MAMMO BI INCL CAD: CPT | Mod: 26,HCNC,, | Performed by: RADIOLOGY

## 2019-03-25 PROCEDURE — 77063 BREAST TOMOSYNTHESIS BI: CPT | Mod: 26,HCNC,, | Performed by: RADIOLOGY

## 2019-03-25 PROCEDURE — 77067 MAMMO DIGITAL SCREENING BILAT WITH TOMOSYNTHESIS_CAD: ICD-10-PCS | Mod: 26,HCNC,, | Performed by: RADIOLOGY

## 2019-03-25 RX ORDER — NITROFURANTOIN 25; 75 MG/1; MG/1
100 CAPSULE ORAL 2 TIMES DAILY
Qty: 6 CAPSULE | Refills: 0 | Status: SHIPPED | OUTPATIENT
Start: 2019-03-25 | End: 2019-03-28

## 2019-03-25 RX ORDER — BUTALBITAL, ACETAMINOPHEN AND CAFFEINE 50; 325; 40 MG/1; MG/1; MG/1
1 TABLET ORAL EVERY 6 HOURS PRN
Qty: 7 TABLET | Refills: 0 | OUTPATIENT
Start: 2019-03-25

## 2019-03-25 NOTE — TELEPHONE ENCOUNTER
Yaa Marie LPN and I have spoken with patient multiple times today. Patient is scheduled for appointment on Wednesday per patient request.

## 2019-03-25 NOTE — TELEPHONE ENCOUNTER
Patient is very upset d/t breast pain and also c/o headaches. She states that she has been awaiting a phone call for 3 days. I reminded her that someone already spoke to her today and that she saw Dr. Gómez for breast pain last week. I advised that she should have MMG done as soon as possible. MMG scheduled for tomorrow @ 3:20 pm. She will follow up in clinic with Leandra on 3/27/19. She is requesting refill on Fioricet. Rx pended. Thanks.

## 2019-03-25 NOTE — TELEPHONE ENCOUNTER
----- Message from Desi Bo sent at 3/25/2019  3:51 PM CDT -----  Contact: Self  Kitty Barnes  MRN: [id number]  : 1960  PCP: Leandra Monreal  Home Phone      [phone]  Work Phone      Not on file.  Mobile          [phone]  Home Phone      [phone]  Home Phone      [phone]      MESSAGE:   Pt states she cannot get a return call and has been calling for a week. States she has miniature seizures and if no one wants to care for her, she can find a new doctor, but she  someone needs to call her back. Please advise. [phone]

## 2019-03-25 NOTE — TELEPHONE ENCOUNTER
"Spoke with patient and "". She claims she needs her fioricet for her "lil brain seizures". I explained that fioricet is not used to treat or prevent seizures. I also expressed that if she is indeed having seizure activity that she needs to go to the ER>. Then her "" got onto the phone and asked when her appt was. Appt date and time given and the call was ended.     Pt has called the clinic numerous times today and was calld back promptly each time. Patient is verbally abusive with each and every phone call. She demands her specific medications (many controlled) and when she does not receive them, she verbally attacks the staff.   "

## 2019-03-25 NOTE — TELEPHONE ENCOUNTER
----- Message from Desi Bo sent at 3/25/2019 12:48 PM CDT -----  Contact: Self  Kitty Barnes  MRN: [id number]  : 1960  PCP: Leandra Monreal  Home Phone      [phone]  Work Phone      Not on file.  Mobile          [phone]  Home Phone      [phone]  Home Phone      [phone]      MESSAGE:   Pt states shes been calling for a month without a return call. Experiencing breast pain, and a pink discharge from her breast. Now she is experiencing a bad headache and she can't get anyone to call her back. Please advise.   Kitty [phone]

## 2019-03-27 ENCOUNTER — OFFICE VISIT (OUTPATIENT)
Dept: INTERNAL MEDICINE | Facility: CLINIC | Age: 59
End: 2019-03-27
Payer: MEDICARE

## 2019-03-27 VITALS
HEART RATE: 70 BPM | BODY MASS INDEX: 38.57 KG/M2 | HEIGHT: 66 IN | SYSTOLIC BLOOD PRESSURE: 120 MMHG | WEIGHT: 240 LBS | RESPIRATION RATE: 10 BRPM | DIASTOLIC BLOOD PRESSURE: 86 MMHG

## 2019-03-27 DIAGNOSIS — N64.4 BREAST PAIN: Primary | ICD-10-CM

## 2019-03-27 PROCEDURE — 99499 UNLISTED E&M SERVICE: CPT | Mod: HCNC,S$GLB,, | Performed by: NURSE PRACTITIONER

## 2019-03-27 PROCEDURE — 99499 NO LOS: ICD-10-PCS | Mod: HCNC,S$GLB,, | Performed by: NURSE PRACTITIONER

## 2019-03-27 PROCEDURE — 99999 PR PBB SHADOW E&M-EST. PATIENT-LVL III: ICD-10-PCS | Mod: PBBFAC,HCNC,, | Performed by: NURSE PRACTITIONER

## 2019-03-27 PROCEDURE — 99999 PR PBB SHADOW E&M-EST. PATIENT-LVL III: CPT | Mod: PBBFAC,HCNC,, | Performed by: NURSE PRACTITIONER

## 2019-03-27 NOTE — PROGRESS NOTES
"Subjective:       Patient ID: Kitty Barnes is a 58 y.o. female.    Chief Complaint: Headache; Blurred Vision; and Breast Pain (pt states that she has an infection in her right breast)    HPI: Pt presents to clinic today known t me for f/u from Dr banks visit. She reports that she was seen last week for right breast pain. This is a long time pain. I reviewed records from years ago of mammos and also has a hx of rib fractures on that side. She also started with breast drainage from right breast last week. Mammo shows The breasts have scattered areas of fibroglandular density. There is no evidence of suspicious masses, microcalcifications or architectural distortion. I asked her to see the drainage. She reports that she has not had drainage since 2 weeks ago. She can not express any drainage today.     She has mammo in 2018 that showed a right breast lump, she then had an u/s that did not show anything. She denies that she ever had pain in that breast prior to jan this year.     She is also seeing Dr Mosqueda for pain management- she reports that she sees him for RA and neuropathy.     She reports that she did not have the back x rays that I ordered in Feb because she had MRI with pain     Review of Systems    Objective:      Physical Exam    Assessment:       No diagnosis found.    Plan:     Problem List Items Addressed This Visit     None        The more we talked the more aggressive she became. When I talked about her U/s she absolutely denied that she ever had this. I pulled up her reports but she is not hearing of it. I also discussed her mammo findings from this visit and she is not happy that it does not show anything. I offered to send her to a breast specialist and that was when she got loud and irritated. She started yelling at me in office that I was doing nothing to help her. She has "something wrong with my breast and you do not care. You wont help me" I tried to talk her down discussing the " "records we reviewed and test we ordered. She is now demanding to be admitted and have test done. I told her I see no reason to admit her and she shouted at me and started bad mouthing the clinic. 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 "I called this clinic 3 days without a call back. " "and that doctor aint worth nothin" (which there is documentation that she was called back) She was told that I don't think that out relationship as provider and patient was going to work out and that she hould seek care else where. She walked out very hostile fussing the whole way down the stone. We had to calm and assure the rest of the patient in the clinic that everything was fine.     "

## 2019-04-01 DIAGNOSIS — Z11.59 NEED FOR HEPATITIS C SCREENING TEST: ICD-10-CM

## 2019-04-01 DIAGNOSIS — Z12.11 COLON CANCER SCREENING: ICD-10-CM

## 2019-04-06 DIAGNOSIS — G62.9 NEUROPATHY: ICD-10-CM

## 2019-04-08 RX ORDER — GABAPENTIN 600 MG/1
TABLET ORAL
Qty: 90 TABLET | Refills: 0 | OUTPATIENT
Start: 2019-04-08

## 2019-04-10 DIAGNOSIS — J44.9 CHRONIC OBSTRUCTIVE PULMONARY DISEASE, UNSPECIFIED COPD TYPE: ICD-10-CM

## 2019-04-11 RX ORDER — FLUTICASONE FUROATE AND VILANTEROL TRIFENATATE 200; 25 UG/1; UG/1
POWDER RESPIRATORY (INHALATION)
Qty: 60 EACH | Refills: 1 | Status: SHIPPED | OUTPATIENT
Start: 2019-04-11 | End: 2022-12-21 | Stop reason: SDUPTHER

## 2021-05-04 ENCOUNTER — PATIENT MESSAGE (OUTPATIENT)
Dept: RESEARCH | Facility: HOSPITAL | Age: 61
End: 2021-05-04

## 2021-07-01 ENCOUNTER — PATIENT MESSAGE (OUTPATIENT)
Dept: ADMINISTRATIVE | Facility: OTHER | Age: 61
End: 2021-07-01

## 2021-09-30 ENCOUNTER — HOSPITAL ENCOUNTER (EMERGENCY)
Facility: HOSPITAL | Age: 61
Discharge: HOME | End: 2021-09-30
Attending: SURGERY
Payer: MEDICARE

## 2021-09-30 VITALS
TEMPERATURE: 98 F | RESPIRATION RATE: 16 BRPM | DIASTOLIC BLOOD PRESSURE: 91 MMHG | HEART RATE: 92 BPM | SYSTOLIC BLOOD PRESSURE: 190 MMHG | OXYGEN SATURATION: 96 %

## 2021-09-30 DIAGNOSIS — I10 HYPERTENSION, UNSPECIFIED TYPE: ICD-10-CM

## 2021-09-30 DIAGNOSIS — E11.65 UNCONTROLLED TYPE 2 DIABETES MELLITUS WITH HYPERGLYCEMIA: ICD-10-CM

## 2021-09-30 DIAGNOSIS — R53.83 FATIGUE: ICD-10-CM

## 2021-09-30 DIAGNOSIS — R30.0 DYSURIA: ICD-10-CM

## 2021-09-30 DIAGNOSIS — R73.9 HYPERGLYCEMIA: Primary | ICD-10-CM

## 2021-09-30 DIAGNOSIS — Z76.0 MEDICATION REFILL: ICD-10-CM

## 2021-09-30 LAB
ALBUMIN SERPL BCP-MCNC: 3.4 G/DL (ref 3.5–5.2)
ALP SERPL-CCNC: 87 U/L (ref 55–135)
ALT SERPL W/O P-5'-P-CCNC: 21 U/L (ref 10–44)
ANION GAP SERPL CALC-SCNC: 11 MMOL/L (ref 8–16)
AST SERPL-CCNC: 14 U/L (ref 10–40)
BASOPHILS # BLD AUTO: 0.04 K/UL (ref 0–0.2)
BASOPHILS NFR BLD: 0.3 % (ref 0–1.9)
BILIRUB SERPL-MCNC: 0.5 MG/DL (ref 0.1–1)
BILIRUB UR QL STRIP: NEGATIVE
BNP SERPL-MCNC: 12 PG/ML (ref 0–99)
BUN SERPL-MCNC: 10 MG/DL (ref 6–20)
CALCIUM SERPL-MCNC: 9.7 MG/DL (ref 8.7–10.5)
CHLORIDE SERPL-SCNC: 98 MMOL/L (ref 95–110)
CK MB SERPL-MCNC: 3.2 NG/ML (ref 0.1–6.5)
CK MB SERPL-RTO: 1.2 % (ref 0–5)
CK SERPL-CCNC: 278 U/L (ref 20–180)
CK SERPL-CCNC: 278 U/L (ref 20–180)
CLARITY UR: CLEAR
CO2 SERPL-SCNC: 25 MMOL/L (ref 23–29)
COLOR UR: YELLOW
CREAT SERPL-MCNC: 1.3 MG/DL (ref 0.5–1.4)
DIFFERENTIAL METHOD: ABNORMAL
EOSINOPHIL # BLD AUTO: 0.1 K/UL (ref 0–0.5)
EOSINOPHIL NFR BLD: 0.4 % (ref 0–8)
ERYTHROCYTE [DISTWIDTH] IN BLOOD BY AUTOMATED COUNT: 13.6 % (ref 11.5–14.5)
EST. GFR  (AFRICAN AMERICAN): 52 ML/MIN/1.73 M^2
EST. GFR  (NON AFRICAN AMERICAN): 45 ML/MIN/1.73 M^2
GLUCOSE SERPL-MCNC: 503 MG/DL (ref 70–110)
GLUCOSE UR QL STRIP: ABNORMAL
HCT VFR BLD AUTO: 47.9 % (ref 37–48.5)
HGB BLD-MCNC: 16 G/DL (ref 12–16)
HGB UR QL STRIP: ABNORMAL
IMM GRANULOCYTES # BLD AUTO: 0.05 K/UL (ref 0–0.04)
IMM GRANULOCYTES NFR BLD AUTO: 0.4 % (ref 0–0.5)
KETONES UR QL STRIP: NEGATIVE
LEUKOCYTE ESTERASE UR QL STRIP: NEGATIVE
LYMPHOCYTES # BLD AUTO: 2.4 K/UL (ref 1–4.8)
LYMPHOCYTES NFR BLD: 20.9 % (ref 18–48)
MCH RBC QN AUTO: 29.9 PG (ref 27–31)
MCHC RBC AUTO-ENTMCNC: 33.4 G/DL (ref 32–36)
MCV RBC AUTO: 90 FL (ref 82–98)
MONOCYTES # BLD AUTO: 0.5 K/UL (ref 0.3–1)
MONOCYTES NFR BLD: 4.6 % (ref 4–15)
NEUTROPHILS # BLD AUTO: 8.5 K/UL (ref 1.8–7.7)
NEUTROPHILS NFR BLD: 73.4 % (ref 38–73)
NITRITE UR QL STRIP: NEGATIVE
NRBC BLD-RTO: 0 /100 WBC
PH UR STRIP: 6 [PH] (ref 5–8)
PLATELET # BLD AUTO: 315 K/UL (ref 150–450)
PMV BLD AUTO: 9.5 FL (ref 9.2–12.9)
POCT GLUCOSE: 313 MG/DL (ref 70–110)
POCT GLUCOSE: 386 MG/DL (ref 70–110)
POCT GLUCOSE: 448 MG/DL (ref 70–110)
POTASSIUM SERPL-SCNC: 3.9 MMOL/L (ref 3.5–5.1)
PROT SERPL-MCNC: 8.6 G/DL (ref 6–8.4)
PROT UR QL STRIP: NEGATIVE
RBC # BLD AUTO: 5.35 M/UL (ref 4–5.4)
SODIUM SERPL-SCNC: 134 MMOL/L (ref 136–145)
SP GR UR STRIP: 1.01 (ref 1–1.03)
TROPONIN I SERPL DL<=0.01 NG/ML-MCNC: 0.08 NG/ML (ref 0–0.03)
URN SPEC COLLECT METH UR: ABNORMAL
UROBILINOGEN UR STRIP-ACNC: NEGATIVE EU/DL
WBC # BLD AUTO: 11.58 K/UL (ref 3.9–12.7)

## 2021-09-30 PROCEDURE — 63600175 PHARM REV CODE 636 W HCPCS: Performed by: SURGERY

## 2021-09-30 PROCEDURE — 96361 HYDRATE IV INFUSION ADD-ON: CPT

## 2021-09-30 PROCEDURE — 82962 GLUCOSE BLOOD TEST: CPT | Mod: 91

## 2021-09-30 PROCEDURE — 82553 CREATINE MB FRACTION: CPT | Performed by: SURGERY

## 2021-09-30 PROCEDURE — 80053 COMPREHEN METABOLIC PANEL: CPT | Performed by: SURGERY

## 2021-09-30 PROCEDURE — 85025 COMPLETE CBC W/AUTO DIFF WBC: CPT | Performed by: SURGERY

## 2021-09-30 PROCEDURE — 99291 CRITICAL CARE FIRST HOUR: CPT | Mod: 25

## 2021-09-30 PROCEDURE — 96374 THER/PROPH/DIAG INJ IV PUSH: CPT

## 2021-09-30 PROCEDURE — 84484 ASSAY OF TROPONIN QUANT: CPT | Performed by: SURGERY

## 2021-09-30 PROCEDURE — 93010 ELECTROCARDIOGRAM REPORT: CPT | Mod: ,,, | Performed by: INTERNAL MEDICINE

## 2021-09-30 PROCEDURE — 25000003 PHARM REV CODE 250: Performed by: SURGERY

## 2021-09-30 PROCEDURE — 81003 URINALYSIS AUTO W/O SCOPE: CPT | Performed by: SURGERY

## 2021-09-30 PROCEDURE — 36415 COLL VENOUS BLD VENIPUNCTURE: CPT | Mod: 59 | Performed by: SURGERY

## 2021-09-30 PROCEDURE — 93010 EKG 12-LEAD: ICD-10-PCS | Mod: ,,, | Performed by: INTERNAL MEDICINE

## 2021-09-30 PROCEDURE — 83880 ASSAY OF NATRIURETIC PEPTIDE: CPT | Performed by: SURGERY

## 2021-09-30 PROCEDURE — 93005 ELECTROCARDIOGRAM TRACING: CPT

## 2021-09-30 RX ORDER — INSULIN ASPART 100 [IU]/ML
20 INJECTION, SOLUTION INTRAVENOUS; SUBCUTANEOUS
Qty: 15 ML | Refills: 2 | Status: SHIPPED | OUTPATIENT
Start: 2021-09-30 | End: 2022-12-23 | Stop reason: SDUPTHER

## 2021-09-30 RX ORDER — ASPIRIN 81 MG/1
81 TABLET ORAL DAILY
Qty: 30 TABLET | Refills: 11 | Status: SHIPPED | OUTPATIENT
Start: 2021-09-30 | End: 2024-02-29 | Stop reason: SDUPTHER

## 2021-09-30 RX ORDER — NITROFURANTOIN 25; 75 MG/1; MG/1
100 CAPSULE ORAL 2 TIMES DAILY
Qty: 14 CAPSULE | Refills: 0 | Status: SHIPPED | OUTPATIENT
Start: 2021-09-30 | End: 2021-09-30 | Stop reason: ALTCHOICE

## 2021-09-30 RX ORDER — VALSARTAN AND HYDROCHLOROTHIAZIDE 160; 25 MG/1; MG/1
1 TABLET ORAL DAILY
Qty: 30 TABLET | Refills: 11 | Status: SHIPPED | OUTPATIENT
Start: 2021-09-30 | End: 2022-12-23 | Stop reason: ALTCHOICE

## 2021-09-30 RX ORDER — NITROFURANTOIN 25; 75 MG/1; MG/1
100 CAPSULE ORAL
Status: COMPLETED | OUTPATIENT
Start: 2021-09-30 | End: 2021-09-30

## 2021-09-30 RX ORDER — NEBIVOLOL HYDROCHLORIDE 10 MG/1
10 TABLET ORAL DAILY
Qty: 30 TABLET | Refills: 11 | Status: SHIPPED | OUTPATIENT
Start: 2021-09-30 | End: 2023-02-13 | Stop reason: DRUGHIGH

## 2021-09-30 RX ORDER — NIFEDIPINE 60 MG/1
60 TABLET, EXTENDED RELEASE ORAL DAILY
Qty: 30 TABLET | Refills: 11 | Status: SHIPPED | OUTPATIENT
Start: 2021-09-30 | End: 2022-12-21

## 2021-09-30 RX ORDER — SODIUM CHLORIDE 9 MG/ML
INJECTION, SOLUTION INTRAVENOUS
Status: COMPLETED | OUTPATIENT
Start: 2021-09-30 | End: 2021-09-30

## 2021-09-30 RX ADMIN — NITROFURANTOIN (MONOHYDRATE/MACROCRYSTALS) 100 MG: 75; 25 CAPSULE ORAL at 06:09

## 2021-09-30 RX ADMIN — SODIUM CHLORIDE 500 ML/HR: 0.9 INJECTION, SOLUTION INTRAVENOUS at 04:09

## 2021-09-30 RX ADMIN — INSULIN HUMAN 7 UNITS: 100 INJECTION, SOLUTION PARENTERAL at 04:09

## 2022-01-31 ENCOUNTER — HISTORICAL (OUTPATIENT)
Dept: ADMINISTRATIVE | Facility: HOSPITAL | Age: 62
End: 2022-01-31

## 2022-10-28 DIAGNOSIS — Z79.4 DIABETES MELLITUS TYPE 2, INSULIN DEPENDENT: Primary | ICD-10-CM

## 2022-10-28 DIAGNOSIS — E11.9 DIABETES MELLITUS TYPE 2, INSULIN DEPENDENT: Primary | ICD-10-CM

## 2022-11-03 DIAGNOSIS — N18.2 CHRONIC KIDNEY DISEASE, STAGE II (MILD): Primary | ICD-10-CM

## 2022-12-06 ENCOUNTER — HOSPITAL ENCOUNTER (EMERGENCY)
Facility: HOSPITAL | Age: 62
Discharge: HOME | End: 2022-12-06
Attending: EMERGENCY MEDICINE
Payer: COMMERCIAL

## 2022-12-06 VITALS
HEART RATE: 75 BPM | TEMPERATURE: 98 F | WEIGHT: 254.44 LBS | BODY MASS INDEX: 40.89 KG/M2 | DIASTOLIC BLOOD PRESSURE: 88 MMHG | OXYGEN SATURATION: 96 % | HEIGHT: 66 IN | RESPIRATION RATE: 20 BRPM | SYSTOLIC BLOOD PRESSURE: 138 MMHG

## 2022-12-06 DIAGNOSIS — M25.551 RIGHT HIP PAIN: Primary | ICD-10-CM

## 2022-12-06 DIAGNOSIS — M25.561 ACUTE PAIN OF RIGHT KNEE: ICD-10-CM
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
## 2022-12-06 DIAGNOSIS — R05.9 COUGH: ICD-10-CM

## 2022-12-06 PROCEDURE — 99284 EMERGENCY DEPT VISIT MOD MDM: CPT | Mod: 25

## 2022-12-06 PROCEDURE — 25000003 PHARM REV CODE 250: Performed by: NURSE PRACTITIONER

## 2022-12-06 RX ORDER — KETOROLAC TROMETHAMINE 10 MG/1
10 TABLET, FILM COATED ORAL
Status: COMPLETED | OUTPATIENT
Start: 2022-12-06 | End: 2022-12-06

## 2022-12-06 RX ORDER — TIZANIDINE 4 MG/1
4 TABLET ORAL 3 TIMES DAILY PRN
Qty: 15 TABLET | Refills: 0 | Status: SHIPPED | OUTPATIENT
Start: 2022-12-06 | End: 2022-12-11

## 2022-12-06 RX ADMIN — KETOROLAC TROMETHAMINE 10 MG: 10 TABLET, FILM COATED ORAL at 11:12

## 2022-12-06 NOTE — ED PROVIDER NOTES
"Encounter Date: 2022       History     Chief Complaint   Patient presents with    Back Pain     Right lower back pain radiating down the hip, leg, to the foot.     Pt is a 62 y.o. female who presents to the [facility] ED complaining of Rt hip, Rt knee pain after falling approx 2 days ago. Denies hitting her head, LOC, chest pain, SOB, weakness, or dizziness. Pt reports mild ache to Rt ribs as well with slight "trouble" when taking deep breaths and coughing. Denies deformity to chest or loss of bowel or bladder control.     Review of patient's allergies indicates:   Allergen Reactions    Pcn [penicillins] Anaphylaxis     Past Medical History:   Diagnosis Date    Anxiety     Arthritis     CVA (cerebral vascular accident)     "mini stroke"    Depression     Diabetes mellitus     Heart problem     History of psychiatric hospitalization     three(,  and another (years ago")): depression    HTN (hypertension)     Hx of psychiatric care     Hypertension     Pacemaker     Psychiatric exam requested by authority     Psychiatric problem     TBI (traumatic brain injury)     Therapy      Past Surgical History:   Procedure Laterality Date    CARDIAC PACEMAKER PLACEMENT      CHOLECYSTECTOMY      HYSTERECTOMY       Family History   Problem Relation Age of Onset    Schizophrenia Mother     Bipolar disorder Mother     Bipolar disorder Father      Social History     Tobacco Use    Smoking status: Every Day     Packs/day: 1.00     Years: 40.00     Pack years: 40.00     Types: Cigarettes     Last attempt to quit: 2018     Years since quittin.2    Smokeless tobacco: Never   Substance Use Topics    Alcohol use: No    Drug use: No     Review of Systems   Constitutional:  Negative for chills, diaphoresis, fatigue and fever.   HENT:  Negative for facial swelling, rhinorrhea, sinus pressure, sinus pain, sore throat and trouble swallowing.    Respiratory:  Positive for cough. Negative for chest tightness, shortness of breath " and wheezing.    Cardiovascular:  Negative for chest pain, palpitations and leg swelling.   Gastrointestinal:  Negative for abdominal pain, diarrhea, nausea and vomiting.   Genitourinary:  Negative for dysuria, flank pain, frequency, hematuria and urgency.   Musculoskeletal:  Positive for arthralgias. Negative for back pain, joint swelling and myalgias.        Rt rib pain   Skin:  Negative for color change and rash.   Neurological:  Negative for dizziness, syncope, weakness and light-headedness.   Hematological:  Does not bruise/bleed easily.   All other systems reviewed and are negative.    Physical Exam     Initial Vitals [12/06/22 1018]   BP Pulse Resp Temp SpO2   (!) 140/84 88 20 98 °F (36.7 °C) 95 %      MAP       --         Physical Exam    Nursing note and vitals reviewed.  Constitutional: She appears well-developed and well-nourished.   HENT:   Head: Normocephalic and atraumatic.   Nose: Nose normal.   Mouth/Throat: Oropharynx is clear and moist.   Eyes: Conjunctivae and EOM are normal. Pupils are equal, round, and reactive to light.   Neck: Neck supple.   Normal range of motion.  Cardiovascular:  Normal rate, regular rhythm, normal heart sounds and intact distal pulses.           Pulmonary/Chest: Effort normal and breath sounds normal. No respiratory distress. She has no wheezes. She has no rhonchi. She has no rales.         Chest wall is not dull to percussion. She exhibits tenderness. She exhibits no bony tenderness, no crepitus, no edema and no retraction.   Abdominal: Abdomen is soft and flat. Bowel sounds are normal. She exhibits no distension. There is no abdominal tenderness. There is no rebound, no guarding, no tenderness at McBurney's point and negative Amato's sign. negative psoas sign  Musculoskeletal:         General: Normal range of motion.      Cervical back: Normal range of motion and neck supple.      Right hip: Tenderness present. No deformity or crepitus. Normal range of motion. Normal  strength.      Right knee: No swelling or deformity. Tenderness present. Normal pulse.        Legs:      Neurological: She is alert and oriented to person, place, and time. She has normal strength and normal reflexes.   Skin: Skin is warm and dry. Capillary refill takes less than 2 seconds.   Psychiatric: She has a normal mood and affect. Her speech is normal and behavior is normal. Judgment and thought content normal.       ED Course   Procedures  Labs Reviewed - No data to display       Imaging Results              X-Ray Hip 2 or 3 views Right (with Pelvis when performed) (Final result)  Result time 12/06/22 13:04:00      Final result by Sung Elaine MD (12/06/22 13:04:00)                   Impression:      Mildly limited assessment with no acute osseous process appreciated.      Electronically signed by: Sung Elaine  Date:    12/06/2022  Time:    13:04               Narrative:    EXAMINATION:  XR HIP WITH PELVIS WHEN PERFORMED, 2 OR 3  VIEWS RIGHT    CLINICAL HISTORY:  Unspecified fall, initial encounter    TECHNIQUE:  AP view of the pelvis and frog leg lateral view of the right hip    COMPARISON:  Pelvic radiograph 10/10/2014    FINDINGS:  Assessment mildly limited by image quality.  No acute fracture identified.  Right hip aligned with underlying mild to moderate osteoarthritis.                                       X-Ray Chest PA And Lateral (Final result)  Result time 12/06/22 13:01:17      Final result by Sung Elaine MD (12/06/22 13:01:17)                   Impression:      No acute pulmonary process appreciated.      Electronically signed by: Sung Elaine  Date:    12/06/2022  Time:    13:01               Narrative:    EXAMINATION:  XR CHEST PA AND LATERAL    CLINICAL HISTORY:  Cough, unspecified    TECHNIQUE:  PA and lateral radiographs of the chest    COMPARISON:  Radiography 02/20/2020    FINDINGS:  Normal cardiac silhouette. The lungs are well-inflated. No consolidation identified. No  pleural effusion or discernible pneumothorax.                                       X-Ray Knee 3 View Right (Final result)  Result time 12/06/22 12:59:30      Final result by Sung Elaine MD (12/06/22 12:59:30)                   Impression:      No acute osseous process appreciated.      Electronically signed by: Sung Elaine  Date:    12/06/2022  Time:    12:59               Narrative:    EXAMINATION:  XR KNEE 3 VIEW RIGHT    CLINICAL HISTORY:  Unspecified fall, initial encounter    TECHNIQUE:  AP, lateral, and oblique views of the right knee.    COMPARISON:  Radiographs 01/28/2020    FINDINGS:  Moderate tricompartment degenerative changes.  No acute fracture or dislocation.  No significant knee effusion.                                       Medications   ketorolac tablet 10 mg (10 mg Oral Given 12/6/22 1106)     Medical Decision Making:   Differential Diagnosis:   Muscle strain  Fall  Fracture  Clinical Tests:   Radiological Study: Ordered and Reviewed  ED Management:  1:25 PM Reassessed patient at this time. Reports condition has improved. Discussed with patient all pertinent ED information and results. Discussed diagnosis and treatment plan with patient. Follow up instructions and return to ED instruction have been given. All questions and concerns were addressed at this time. Patient voices understanding of information and instructions. Patient is comfortable with plan and discharge. Patient is stable for discharge.                           Clinical Impression:   Final diagnoses:  [W19.XXXA] Fall  [R05.9] Cough  [M25.551] Right hip pain (Primary)  [M25.561] Acute pain of right knee        ED Disposition Condition    Discharge Stable          ED Prescriptions       Medication Sig Dispense Start Date End Date Auth. Provider    tiZANidine (ZANAFLEX) 4 MG tablet Take 1 tablet (4 mg total) by mouth 3 (three) times daily as needed (Muscle spasms). 15 tablet 12/6/2022 12/11/2022 Mauri Lima Jr., FNP           Follow-up Information       Follow up With Specialties Details Why Contact Info    Vinay Sandoval MD Internal Medicine In 1 week  [address] [place] [id number]  [phone]      [facility] Emergency Medicine In 3 days As needed, If symptoms worsen [address] [facility] [phone]             Mauri Lima Jr., FNP  12/06/22 1329

## 2022-12-15 ENCOUNTER — HOSPITAL ENCOUNTER (OUTPATIENT)
Dept: RADIOLOGY | Facility: HOSPITAL | Age: 62
Discharge: HOME | End: 2022-12-15
Attending: NURSE PRACTITIONER
Payer: COMMERCIAL

## 2022-12-15 DIAGNOSIS — N18.2 CHRONIC KIDNEY DISEASE, STAGE II (MILD): ICD-10-CM

## 2022-12-15 PROCEDURE — 76770 US EXAM ABDO BACK WALL COMP: CPT | Mod: TC

## 2022-12-21 ENCOUNTER — OFFICE VISIT (OUTPATIENT)
Dept: INTERNAL MEDICINE | Facility: CLINIC | Age: 62
End: 2022-12-21
Payer: COMMERCIAL

## 2022-12-21 VITALS
RESPIRATION RATE: 18 BRPM | BODY MASS INDEX: 40.88 KG/M2 | HEIGHT: 66 IN | TEMPERATURE: 98 F | DIASTOLIC BLOOD PRESSURE: 78 MMHG | HEART RATE: 86 BPM | SYSTOLIC BLOOD PRESSURE: 148 MMHG | WEIGHT: 254.38 LBS

## 2022-12-21 DIAGNOSIS — E66.01 OBESITY, MORBID, BMI 40.0-49.9: ICD-10-CM

## 2022-12-21 DIAGNOSIS — J44.9 CHRONIC OBSTRUCTIVE PULMONARY DISEASE, UNSPECIFIED COPD TYPE: ICD-10-CM

## 2022-12-21 DIAGNOSIS — E11.65 UNCONTROLLED TYPE 2 DIABETES MELLITUS WITH HYPERGLYCEMIA: ICD-10-CM

## 2022-12-21 DIAGNOSIS — F17.200 TOBACCO USE DISORDER: Primary | ICD-10-CM

## 2022-12-21 DIAGNOSIS — Z12.31 ENCOUNTER FOR SCREENING MAMMOGRAM FOR MALIGNANT NEOPLASM OF BREAST: ICD-10-CM

## 2022-12-21 DIAGNOSIS — Z91.199 NONCOMPLIANCE: ICD-10-CM

## 2022-12-21 DIAGNOSIS — M15.9 PRIMARY OSTEOARTHRITIS INVOLVING MULTIPLE JOINTS: ICD-10-CM

## 2022-12-21 DIAGNOSIS — Z12.11 SCREENING FOR MALIGNANT NEOPLASM OF COLON: ICD-10-CM

## 2022-12-21 DIAGNOSIS — F43.10 PTSD (POST-TRAUMATIC STRESS DISORDER): ICD-10-CM

## 2022-12-21 DIAGNOSIS — I10 ESSENTIAL HYPERTENSION: ICD-10-CM

## 2022-12-21 DIAGNOSIS — Z76.89 ENCOUNTER TO ESTABLISH CARE: ICD-10-CM

## 2022-12-21 DIAGNOSIS — Z78.0 POSTMENOPAUSAL: ICD-10-CM

## 2022-12-21 PROCEDURE — 3077F PR MOST RECENT SYSTOLIC BLOOD PRESSURE >= 140 MM HG: ICD-10-PCS | Mod: CPTII,,, | Performed by: NURSE PRACTITIONER

## 2022-12-21 PROCEDURE — 4010F PR ACE/ARB THEARPY RXD/TAKEN: ICD-10-PCS | Mod: CPTII,,, | Performed by: NURSE PRACTITIONER

## 2022-12-21 PROCEDURE — 3078F PR MOST RECENT DIASTOLIC BLOOD PRESSURE < 80 MM HG: ICD-10-PCS | Mod: CPTII,,, | Performed by: NURSE PRACTITIONER

## 2022-12-21 PROCEDURE — 3078F DIAST BP <80 MM HG: CPT | Mod: CPTII,,, | Performed by: NURSE PRACTITIONER

## 2022-12-21 PROCEDURE — 4010F ACE/ARB THERAPY RXD/TAKEN: CPT | Mod: CPTII,,, | Performed by: NURSE PRACTITIONER

## 2022-12-21 PROCEDURE — 99406 PR TOBACCO USE CESSATION INTERMEDIATE 3-10 MINUTES: ICD-10-PCS | Mod: S$PBB,,, | Performed by: NURSE PRACTITIONER

## 2022-12-21 PROCEDURE — 99215 OFFICE O/P EST HI 40 MIN: CPT | Mod: PBBFAC | Performed by: NURSE PRACTITIONER

## 2022-12-21 PROCEDURE — 99205 OFFICE O/P NEW HI 60 MIN: CPT | Mod: 25,S$PBB,, | Performed by: NURSE PRACTITIONER

## 2022-12-21 PROCEDURE — 1159F PR MEDICATION LIST DOCUMENTED IN MEDICAL RECORD: ICD-10-PCS | Mod: CPTII,,, | Performed by: NURSE PRACTITIONER

## 2022-12-21 PROCEDURE — 3008F BODY MASS INDEX DOCD: CPT | Mod: CPTII,,, | Performed by: NURSE PRACTITIONER

## 2022-12-21 PROCEDURE — 99406 BEHAV CHNG SMOKING 3-10 MIN: CPT | Mod: S$PBB,,, | Performed by: NURSE PRACTITIONER

## 2022-12-21 PROCEDURE — 1159F MED LIST DOCD IN RCRD: CPT | Mod: CPTII,,, | Performed by: NURSE PRACTITIONER

## 2022-12-21 PROCEDURE — 3077F SYST BP >= 140 MM HG: CPT | Mod: CPTII,,, | Performed by: NURSE PRACTITIONER

## 2022-12-21 PROCEDURE — 3008F PR BODY MASS INDEX (BMI) DOCUMENTED: ICD-10-PCS | Mod: CPTII,,, | Performed by: NURSE PRACTITIONER

## 2022-12-21 PROCEDURE — 99205 PR OFFICE/OUTPT VISIT, NEW, LEVL V, 60-74 MIN: ICD-10-PCS | Mod: 25,S$PBB,, | Performed by: NURSE PRACTITIONER

## 2022-12-21 RX ORDER — LORATADINE 10 MG/1
10 TABLET ORAL
Status: ON HOLD | COMMUNITY
Start: 2022-10-26 | End: 2023-05-11 | Stop reason: HOSPADM

## 2022-12-21 RX ORDER — ALPRAZOLAM 1 MG/1
TABLET ORAL
COMMUNITY
Start: 2022-12-12

## 2022-12-21 RX ORDER — ACETAMINOPHEN 500 MG
1 TABLET ORAL
COMMUNITY
Start: 2022-08-13

## 2022-12-21 RX ORDER — VALSARTAN AND HYDROCHLOROTHIAZIDE 320; 25 MG/1; MG/1
1 TABLET, FILM COATED ORAL DAILY
COMMUNITY
Start: 2022-09-12 | End: 2022-12-23 | Stop reason: ALTCHOICE

## 2022-12-21 RX ORDER — FLUTICASONE FUROATE AND VILANTEROL 200; 25 UG/1; UG/1
1 POWDER RESPIRATORY (INHALATION) DAILY
Qty: 180 EACH | Refills: 3 | Status: ON HOLD | OUTPATIENT
Start: 2022-12-21 | End: 2023-07-07 | Stop reason: CLARIF

## 2022-12-21 RX ORDER — NYSTATIN 100000 U/G
CREAM TOPICAL
COMMUNITY
Start: 2022-10-26

## 2022-12-21 RX ORDER — POLYETHYLENE GLYCOL 3350 17 G/17G
17 POWDER, FOR SOLUTION ORAL
Status: ON HOLD | COMMUNITY
Start: 2022-04-20 | End: 2023-05-11 | Stop reason: HOSPADM

## 2022-12-21 RX ORDER — SYRING-NEEDL,DISP,INSUL,0.3 ML 29 G X1/2"
296 SYRINGE, EMPTY DISPOSABLE MISCELLANEOUS DAILY PRN
COMMUNITY
Start: 2022-04-20

## 2022-12-21 RX ORDER — B-COMPLEX WITH VITAMIN C
1 TABLET ORAL DAILY
Status: ON HOLD | COMMUNITY
End: 2023-05-11 | Stop reason: HOSPADM

## 2022-12-21 RX ORDER — DICLOFENAC SODIUM 10 MG/G
2 GEL TOPICAL 4 TIMES DAILY
Qty: 100 G | Refills: 3 | Status: SHIPPED | OUTPATIENT
Start: 2022-12-21 | End: 2023-09-26

## 2022-12-21 RX ORDER — CARVEDILOL 3.12 MG/1
3.12 TABLET ORAL 2 TIMES DAILY
Status: ON HOLD | COMMUNITY
Start: 2022-12-01 | End: 2023-02-17 | Stop reason: DRUGHIGH

## 2022-12-21 RX ORDER — NAPROXEN 500 MG/1
500 TABLET ORAL 2 TIMES DAILY
COMMUNITY
Start: 2022-11-07 | End: 2023-06-08 | Stop reason: ALTCHOICE

## 2022-12-21 RX ORDER — VITAMIN B COMPLEX
1 CAPSULE ORAL EVERY MORNING
Status: ON HOLD | COMMUNITY
End: 2023-05-11 | Stop reason: HOSPADM

## 2022-12-21 RX ORDER — INSULIN LISPRO 100 [IU]/ML
INJECTION, SOLUTION INTRAVENOUS; SUBCUTANEOUS
COMMUNITY
Start: 2022-10-26 | End: 2022-12-23 | Stop reason: ALTCHOICE

## 2022-12-21 RX ORDER — GABAPENTIN 800 MG/1
800 TABLET ORAL EVERY 8 HOURS
Qty: 90 TABLET | Refills: 11 | Status: ON HOLD | OUTPATIENT
Start: 2022-12-21 | End: 2023-05-11 | Stop reason: SDUPTHER

## 2022-12-21 RX ORDER — VALSARTAN 160 MG/1
320 TABLET ORAL
Status: ON HOLD | COMMUNITY
Start: 2022-11-22 | End: 2023-02-17 | Stop reason: HOSPADM

## 2022-12-21 RX ORDER — ATORVASTATIN CALCIUM 40 MG/1
40 TABLET, FILM COATED ORAL NIGHTLY
COMMUNITY
Start: 2022-11-22

## 2022-12-21 RX ORDER — ONDANSETRON HYDROCHLORIDE 8 MG/1
8 TABLET, FILM COATED ORAL EVERY 6 HOURS
Status: ON HOLD | COMMUNITY
Start: 2022-11-07 | End: 2023-02-17 | Stop reason: DRUGHIGH

## 2022-12-21 RX ORDER — GLIPIZIDE 10 MG/1
10 TABLET ORAL EVERY MORNING
COMMUNITY
Start: 2022-11-22 | End: 2022-12-23 | Stop reason: ALTCHOICE

## 2022-12-21 RX ORDER — NIFEDIPINE 90 MG/1
90 TABLET, EXTENDED RELEASE ORAL
Status: ON HOLD | COMMUNITY
Start: 2022-11-22 | End: 2023-02-17 | Stop reason: HOSPADM

## 2022-12-21 RX ORDER — NALOXONE HYDROCHLORIDE 4 MG/.1ML
SPRAY NASAL
Status: ON HOLD | COMMUNITY
Start: 2022-10-12 | End: 2023-07-07 | Stop reason: CLARIF

## 2022-12-21 RX ORDER — ASCORBIC ACID 500 MG
500 TABLET,CHEWABLE ORAL
Status: ON HOLD | COMMUNITY
End: 2023-05-11 | Stop reason: HOSPADM

## 2022-12-21 RX ORDER — CONTAINER,EMPTY
EACH MISCELLANEOUS
Status: ON HOLD | COMMUNITY
Start: 2022-04-06 | End: 2023-07-07 | Stop reason: CLARIF

## 2022-12-21 RX ORDER — ESOMEPRAZOLE MAGNESIUM 40 MG/1
40 CAPSULE, DELAYED RELEASE ORAL EVERY MORNING
COMMUNITY
Start: 2022-11-22 | End: 2024-02-29 | Stop reason: SDUPTHER

## 2022-12-21 RX ORDER — BLOOD-GLUCOSE METER
EACH MISCELLANEOUS 4 TIMES DAILY
Status: ON HOLD | COMMUNITY
Start: 2022-12-19 | End: 2023-07-07 | Stop reason: CLARIF

## 2022-12-21 RX ORDER — DEXTROSE 4 G
TABLET,CHEWABLE ORAL
Status: ON HOLD | COMMUNITY
Start: 2022-04-06 | End: 2023-07-07 | Stop reason: CLARIF

## 2022-12-21 RX ORDER — DULOXETIN HYDROCHLORIDE 30 MG/1
30 CAPSULE, DELAYED RELEASE ORAL
Status: ON HOLD | COMMUNITY
Start: 2022-11-07 | End: 2023-07-07 | Stop reason: CLARIF

## 2022-12-21 RX ORDER — LANCETS 33 GAUGE
EACH MISCELLANEOUS 4 TIMES DAILY
Status: ON HOLD | COMMUNITY
Start: 2022-10-27 | End: 2023-07-07 | Stop reason: CLARIF

## 2022-12-21 RX ORDER — INSULIN GLARGINE 100 [IU]/ML
INJECTION, SOLUTION SUBCUTANEOUS
COMMUNITY
Start: 2022-12-19 | End: 2022-12-23 | Stop reason: SDUPTHER

## 2022-12-21 RX ORDER — SYRINGE-NEEDLE,INSULIN,0.5 ML 27GX1/2"
SYRINGE, EMPTY DISPOSABLE MISCELLANEOUS
Status: ON HOLD | COMMUNITY
Start: 2022-04-06 | End: 2023-07-07 | Stop reason: CLARIF

## 2022-12-21 RX ORDER — DOCUSATE SODIUM 250 MG
1 CAPSULE ORAL 2 TIMES DAILY
COMMUNITY
Start: 2022-04-20 | End: 2023-03-08 | Stop reason: SDUPTHER

## 2022-12-21 RX ORDER — PEN NEEDLE, DIABETIC 31 GX5/16"
NEEDLE, DISPOSABLE MISCELLANEOUS
Status: ON HOLD | COMMUNITY
Start: 2022-10-24 | End: 2023-07-07 | Stop reason: CLARIF

## 2022-12-21 RX ORDER — AMLODIPINE BESYLATE 2.5 MG/1
2.5 TABLET ORAL NIGHTLY
COMMUNITY
Start: 2022-12-01 | End: 2023-02-13 | Stop reason: ALTCHOICE

## 2022-12-21 RX ORDER — HYDRALAZINE HYDROCHLORIDE 25 MG/1
25 TABLET, FILM COATED ORAL 3 TIMES DAILY
Status: ON HOLD | COMMUNITY
Start: 2022-11-22 | End: 2023-02-17 | Stop reason: HOSPADM

## 2022-12-21 NOTE — PROGRESS NOTES
VIRGEN Hoyt   [facility]  [address] [place] [address]      PATIENT NAME: Kitty Barnes  : 1960  DATE: 22  MRN: [id number]      Billing Provider: VIRGEN Hoyt  Level of Service: [place] OFFICE/OUTPT VISIT, DWAYNE MARTINEZ, 60-74 MIN  Patient PCP Information       Provider PCP Type    Vinay Sandoval MD General            Reason for Visit / Chief Complaint: Establish Care (Fasting, c/o RUE, RLE pain, refused vaccines)       History of Present Illness / Problem Focused Workflow     Kitty Barnes is a 62 y.o. Black or  female presents to the clinic to establish PCP in [facility] [place] [facility]; formerly seeing Dr. Ana Maria Riddle at [facility], for primary care. PMH uncontrolled DM, HTN, CAD, CHF, DM polyneuropathy, CVA w/right sided deficits (2021), MI, mood disorder (inpt admits x 3), TBI (), lumbar stenosis w/sciatica, noncompliance, GERD, osteoarthritis of multiple sites, tobacco abuse, and morbid obesity. Followed by Dr. Hong, cardio, Marga Toscano, psych, and [facility]. , Laly, present during visit today. Pt was referred to endocrinology during last hospital admission at [facility] in Oct; [facility] never been contacted for appt. Pt had workup for possible NSTEMI and trig were 713; A1C 10.5. Pt underwent LHC on 22 which pt [facility] [place] [facility] has not been informed of results. Has cardio testing scheduled for tomorrow and provider f/u on 1/3/23. Release signed for cardio records. Per chart review, longstanding hx of noncompliance with diabetes. Is attempting ADA diet. States CBG was 97 yesterday and 161 this am which she states is improving. States checks CBGs BID. Reports ongoing right shoulder, hip and knee pain. Has not attempted any OTC measures. Previously had norco; states makes too drowsy. Reports improvement with water therapy in the past. Home Health services have been discontinued.  "Uses rollator for ambulation. Has labs ordered per renal next month. Reports med compliance. Requesting orders for MMG, bone scan and colon cancer screening today. Unsure of last GYN visit. Smokes 1/2 ppd/cigs, not ready to quit. Declines all vaccines today. Denies chest pain, shortness of breath, cough, fever, headache, dizziness, weakness, abdominal pain, nausea, vomiting, diarrhea, constipation, dysuria, SI/HI.    I spent a total of 55 minutes on the day of the visit.This includes face to face time and non-face to face time preparing to see the patient (eg, review of tests), obtaining and/or reviewing separately obtained history, documenting clinical information in the electronic or other health record, independently interpreting results and communicating results to the patient/family/caregiver, or care coordinator.      Review of Systems     Review of Systems   Constitutional: Negative.    HENT: Negative.     Eyes: Negative.    Respiratory: Negative.     Cardiovascular: Negative.    Gastrointestinal: Negative.    Endocrine: Negative.    Genitourinary: Negative.    Musculoskeletal: Negative.    Integumentary:  Negative.   Neurological: Negative.    Psychiatric/Behavioral: Negative.        Medical / Social / Family History     Past Medical History:   Diagnosis Date    Anxiety     Arthritis     CVA (cerebral vascular accident)     "mini stroke"    Depression     Diabetes mellitus     Heart problem     History of psychiatric hospitalization     three(1983, 1995 and another (years ago")): depression    HTN (hypertension)     Hx of psychiatric care     Hypertension     Pacemaker     Psychiatric exam requested by authority     Psychiatric problem     TBI (traumatic brain injury)     Therapy        Past Surgical History:   Procedure Laterality Date    CARDIAC PACEMAKER PLACEMENT      CARDIAC PACEMAKER REMOVAL      CHOLECYSTECTOMY      HYSTERECTOMY         Social History  Ms. Barnes reports that she has been smoking " "cigarettes. She has a 20.00 pack-year smoking history. She has never used smokeless tobacco. She reports current alcohol use. She reports that she does not use drugs.    Family History  's family history includes Bipolar disorder in her father and mother; Schizophrenia in her mother.    Medications and Allergies     Medications  Medication List with Changes/Refills   New Medications    DICLOFENAC SODIUM (VOLTAREN) 1 % GEL    Apply 2 g topically 4 (four) times daily.    GABAPENTIN (NEURONTIN) 800 MG TABLET    Take 1 tablet (800 mg total) by mouth every 8 (eight) hours.   Current Medications    ALBUTEROL (PROVENTIL/VENTOLIN HFA) 90 MCG/ACTUATION INHALER    Inhale 1-2 puffs into the lungs every 6 (six) hours as needed for Wheezing. Rescue    ALPRAZOLAM (XANAX) 1 MG TABLET    Take 1 tablet by mouth once a day as needed as needed for anxiety    AMLODIPINE (NORVASC) 2.5 MG TABLET    Take 2.5 mg by mouth every evening.    ASCORBIC ACID, VITAMIN C, 500 MG CHEW    Take 500 mg by mouth.    ASPIRIN (ECOTRIN) 81 MG EC TABLET    Take 1 tablet (81 mg total) by mouth once daily.    ATORVASTATIN (LIPITOR) 20 MG TABLET    Take 1 tablet (20 mg total) by mouth every evening.    ATORVASTATIN (LIPITOR) 40 MG TABLET    Take 40 mg by mouth every evening.    B COMPLEX VITAMINS CAPSULE    Take 1 capsule by mouth every morning.    B-COMPLEX WITH VITAMIN C (Z-BEC OR EQUIV) TABLET    Take 1 capsule by mouth once daily.    BD ULTRA-FINE SHORT PEN NEEDLE 31 GAUGE X 5/16" NDLE    SMARTSIG:Injection Twice Daily    BLOOD-GLUCOSE METER MISC    CHECK BLOOD GLUCOSE LEVEL TWICE DAILY.    BUTALBITAL-ACETAMINOPHEN-CAFFEINE -40 MG (FIORICET, ESGIC) -40 MG PER TABLET    Take 1 tablet by mouth every 6 (six) hours as needed for Pain.    BYSTOLIC 10 MG TAB    Take 1 tablet (10 mg total) by mouth once daily.    CARVEDILOL (COREG) 3.125 MG TABLET    Take 3.125 mg by mouth 2 (two) times daily.    CHOLECALCIFEROL, VITAMIN D3, (VITAMIN D3) " "50 MCG (2,000 UNIT) CAP CAPSULE    Take 1 capsule by mouth.    CLONAZEPAM (KLONOPIN) 0.5 MG TABLET    Take 0.5 mg by mouth every evening.     CLONIDINE (CATAPRES) 0.1 MG TABLET    Take 0.1 mg by mouth once daily.    DOCUSATE SODIUM (COLACE) 250 MG CAPSULE    Take 1 capsule by mouth 2 (two) times daily.    DULOXETINE (CYMBALTA) 30 MG CAPSULE    Take 30 mg by mouth.    EMPTY CONTAINER (SHARPS CONTAINER) MISC    N/A    ESOMEPRAZOLE (NEXIUM) 40 MG CAPSULE    Take 40 mg by mouth every morning.    HYDRALAZINE (APRESOLINE) 25 MG TABLET    Take 25 mg by mouth 3 (three) times daily.    HYDROCODONE-ACETAMINOPHEN (NORCO) 7.5-325 MG PER TABLET    Take 1 tablet by mouth 3 (three) times daily as needed for Pain.     INSULIN SYRINGE-NEEDLE U-100 1 ML 28 GAUGE X 1/2" SYRG    USE TO inject insulin TWICE DAILY    LORATADINE (CLARITIN) 10 MG TABLET    Take 10 mg by mouth.    MAGNESIUM CITRATE SOLUTION    Take 296 mLs by mouth daily as needed.    MULTIVITAMIN (THERAGRAN) PER TABLET    Take 1 tablet by mouth once daily.    NALOXONE (NARCAN) 4 MG/ACTUATION SPRY    SMARTSI Spray(s) Both Nares PRN    NAPROXEN (NAPROSYN) 500 MG TABLET    Take 500 mg by mouth 2 (two) times daily.    NIFEDIPINE (PROCARDIA-XL) 90 MG (OSM) 24 HR TABLET    Take 90 mg by mouth.    NITROGLYCERIN (NITROSTAT) 0.4 MG SL TABLET    Place 0.4 mg under the tongue every 5 (five) minutes as needed for Chest pain.    NYSTATIN (MYCOSTATIN) CREAM    Apply topically 3 (three) times daily. Use under breast    ONDANSETRON (ZOFRAN) 8 MG TABLET    Take 8 mg by mouth every 6 (six) hours.    ONETOUCH DELICA PLUS LANCET 33 GAUGE MISC    Apply topically 4 (four) times daily.    ONETOUCH VERIO TEST STRIPS STRP    4 (four) times daily.    PANTOPRAZOLE (PROTONIX) 40 MG TABLET    Take 1 tablet (40 mg total) by mouth once daily.    POLYETHYLENE GLYCOL (GLYCOLAX) 17 GRAM/DOSE POWDER    Take 17 g by mouth.    SERTRALINE (ZOLOFT) 50 MG TABLET    Take 50 mg by mouth once daily.    " SUCRALFATE (CARAFATE) 1 GRAM TABLET    TAKE 1 TABLET(1 GRAM) BY MOUTH FOUR TIMES DAILY BEFORE MEALS AND AT NIGHT    VALSARTAN (DIOVAN) 160 MG TABLET    Take 320 mg by mouth.   Changed and/or Refilled Medications    Modified Medication Previous Medication    BASAGLAR KWIKPEN U-100 INSULIN GLARGINE 100 UNITS/ML SUBQ PEN BASAGLAR KWIKPEN U-100 INSULIN glargine 100 units/mL SubQ pen       Inject 80 Units into the skin 2 (two) times daily with meals.    SMARTSI Unit(s) SUB-Q Twice Daily    FLUTICASONE FUROATE-VILANTEROL (BREO ELLIPTA) 200-25 MCG/DOSE DSDV DISKUS INHALER BREO ELLIPTA 200-25 mcg/dose DsDv diskus inhaler       Inhale 1 puff into the lungs once daily. Controller    TAKE 1 INHALATIONS BY MOUTH ONCE DAILY    NOVOLOG FLEXPEN U-100 INSULIN 100 UNIT/ML (3 ML) INPN PEN NOVOLOG FLEXPEN U-100 INSULIN 100 unit/mL (3 mL) InPn pen       Inject 20 Units into the skin 3 (three) times daily with meals.    Inject 20 Units into the skin 3 (three) times daily with meals.   Discontinued Medications    ERYTHROMYCIN (ROMYCIN) OPHTHALMIC OINTMENT    Place a 1/2 inch ribbon of ointment onto eyelid.    GABAPENTIN (NEURONTIN) 600 MG TABLET    Take 600 mg by mouth 3 (three) times daily.    GLIPIZIDE (GLUCOTROL) 10 MG TABLET    Take 10 mg by mouth every morning.    INSULIN LISPRO 100 UNIT/ML INJECTION    Inject into the skin.    NIFEDIPINE (PROCARDIA-XL) 60 MG (OSM) 24 HR TABLET    Take 1 tablet (60 mg total) by mouth once daily.    VALSARTAN-HYDROCHLOROTHIAZIDE (DIOVAN-HCT) 160-25 MG PER TABLET    Take 1 tablet by mouth once daily.    VALSARTAN-HYDROCHLOROTHIAZIDE (DIOVAN-HCT) 320-25 MG PER TABLET    Take 1 tablet by mouth once daily.         Allergies  Review of patient's allergies indicates:   Allergen Reactions    Pcn [penicillins] Anaphylaxis       Physical Examination   Vital Signs  Temp: 98.2 °F (36.8 °C)  Temp src: Oral  Pulse: 86  Resp: 18  BP: (!) 148/78  BP Location: Left arm  Patient Position: Sitting  Pain Score:  "10-Worst pain ever  Pain Loc: Knee (right)  Height and Weight  Height: 5' 5.98" (167.6 cm)  Weight: 115.4 kg (254 lb 6.4 oz)  BSA (Calculated - sq m): 2.32 sq meters  BMI (Calculated): 41.1  Weight in (lb) to have BMI = 25: 154.5]    Physical Exam  Constitutional:       Appearance: Normal appearance.   Cardiovascular:      Rate and Rhythm: Normal rate and regular rhythm.      Pulses:           Dorsalis pedis pulses are 2+ on the right side and 2+ on the left side.        Posterior tibial pulses are 2+ on the right side and 2+ on the left side.   Pulmonary:      Effort: Pulmonary effort is normal.      Breath sounds: Normal breath sounds.   Musculoskeletal:         General: Normal range of motion.      Cervical back: Normal range of motion.   Feet:      Right foot:      Protective Sensation: 9 sites tested.  7 sites sensed.      Skin integrity: Dry skin present.      Toenail Condition: Right toenails are long and ingrown.      Left foot:      Protective Sensation: 9 sites tested.  6 sites sensed.      Skin integrity: Dry skin present.      Toenail Condition: Left toenails are long and ingrown.   Skin:     General: Skin is warm and dry.   Neurological:      General: No focal deficit present.      Mental Status: She is alert and oriented to person, place, and time.   Psychiatric:         Mood and Affect: Mood normal.         Results     Lab Results   Component Value Date    WBC 11.58 09/30/2021    RBC 5.35 09/30/2021    HGB 16.0 09/30/2021    HCT 47.9 09/30/2021    MCV 90 09/30/2021    MCH 29.9 09/30/2021    MCHC 33.4 09/30/2021    RDW 13.6 09/30/2021     09/30/2021    MPV 9.5 09/30/2021    GRAN 8.5 (H) 09/30/2021    GRAN 73.4 (H) 09/30/2021    LYMPH 2.4 09/30/2021    LYMPH 20.9 09/30/2021    MONO 0.5 09/30/2021    MONO 4.6 09/30/2021    EOS 0.1 09/30/2021    BASO 0.04 09/30/2021    EOSINOPHIL 0.4 09/30/2021    BASOPHIL 0.3 09/30/2021     Sodium   Date Value Ref Range Status   09/30/2021 134 (L) 136 - 145 mmol/L " Final     Potassium   Date Value Ref Range Status   09/30/2021 3.9 3.5 - 5.1 mmol/L Final     Chloride   Date Value Ref Range Status   09/30/2021 98 95 - 110 mmol/L Final     CO2   Date Value Ref Range Status   09/30/2021 25 23 - 29 mmol/L Final     Glucose   Date Value Ref Range Status   09/30/2021 503 (HH) 70 - 110 mg/dL Final     Comment:     Glucose  critical result(s) called and verbal readback obtained from   Dr. Torres by AK5 09/30/2021 16:20       BUN   Date Value Ref Range Status   09/30/2021 10 6 - 20 mg/dL Final     Creatinine   Date Value Ref Range Status   09/30/2021 1.3 0.5 - 1.4 mg/dL Final     Calcium   Date Value Ref Range Status   09/30/2021 9.7 8.7 - 10.5 mg/dL Final     Total Protein   Date Value Ref Range Status   09/30/2021 8.6 (H) 6.0 - 8.4 g/dL Final     Albumin   Date Value Ref Range Status   09/30/2021 3.4 (L) 3.5 - 5.2 g/dL Final     Total Bilirubin   Date Value Ref Range Status   09/30/2021 0.5 0.1 - 1.0 mg/dL Final     Comment:     For infants and newborns, interpretation of results should be based  on gestational age, weight and in agreement with clinical  observations.    Premature Infant recommended reference ranges:  Up to 24 hours.............<8.0 mg/dL  Up to 48 hours............<12.0 mg/dL  3-5 days..................<15.0 mg/dL  6-29 days.................<15.0 mg/dL       Alkaline Phosphatase   Date Value Ref Range Status   09/30/2021 87 55 - 135 U/L Final     AST   Date Value Ref Range Status   09/30/2021 14 10 - 40 U/L Final     ALT   Date Value Ref Range Status   09/30/2021 21 10 - 44 U/L Final     Anion Gap   Date Value Ref Range Status   09/30/2021 11 8 - 16 mmol/L Final     eGFR if    Date Value Ref Range Status   09/30/2021 52 (A) >60 mL/min/1.73 m^2 Final     eGFR if non    Date Value Ref Range Status   09/30/2021 45 (A) >60 mL/min/1.73 m^2 Final     Comment:     Calculation used to obtain the estimated glomerular filtration  rate (eGFR) is  the CKD-EPI equation.        No results found for: CHOL  No results found for: HDL  No results found for: LDLCALC  No results found for: TRIG  No results found for: CHOLHDL  No results found for: TSH  Lab Results   Component Value Date    PHUR 6.0 09/30/2021    SPECGRAV 1.015 09/30/2021    PROTEINUA Negative 09/30/2021    GLUCUA 2+ (A) 09/30/2021    KETONESU Negative 09/30/2021    OCCULTUA Trace (A) 09/30/2021    NITRITE Negative 09/30/2021    LEUKOCYTESUR Negative 09/30/2021     Lab Results   Component Value Date    HGBA1C 10.5 (H) 10/18/2022    HGBA1C 11.5 (H) 03/23/2022    HGBA1C >14.0 (H) 11/24/2021     X-Ray Hip 2 or 3 views Right (with Pelvis when performed)  Order: [id number]  Status: Final result     Visible to patient: Yes (not seen)     Next appt: None     Dx: Fall     0 Result Notes  Details    Reading Physician Reading Date Result Priority   Sung Elaine MD  [phone] 12/6/2022 STAT     Narrative & Impression  EXAMINATION:  XR HIP WITH PELVIS WHEN PERFORMED, 2 OR 3  VIEWS RIGHT     CLINICAL HISTORY:  Unspecified fall, initial encounter     TECHNIQUE:  AP view of the pelvis and frog leg lateral view of the right hip     COMPARISON:  Pelvic radiograph 10/10/2014     FINDINGS:  Assessment mildly limited by image quality.  No acute fracture identified.  Right hip aligned with underlying mild to moderate osteoarthritis.     Impression:     Mildly limited assessment with no acute osseous process appreciated.        Electronically signed by: Sung Elaine  Date:                                            12/06/2022  Time:                                           13:04      X-Ray Knee 3 View Right  Order: [id number]  Status: Final result     Visible to patient: Yes (not seen)     Next appt: None     Dx: Fall     0 Result Notes  Details    Reading Physician Reading Date Result Priority   Sung Elaine MD  [phone] 12/6/2022 STAT     Narrative & Impression  EXAMINATION:  XR KNEE 3 VIEW RIGHT      CLINICAL HISTORY:  Unspecified fall, initial encounter     TECHNIQUE:  AP, lateral, and oblique views of the right knee.     COMPARISON:  Radiographs 01/28/2020     FINDINGS:  Moderate tricompartment degenerative changes.  No acute fracture or dislocation.  No significant knee effusion.     Impression:     No acute osseous process appreciated.        Electronically signed by: Sung Elaine  Date:                                            12/06/2022  Time:                                           12:59         Assessment and Plan (including Health Maintenance)     Plan: RTC 4 weeks and prn. Labs must be completed prior to visit.         Health Maintenance Due   Topic Date Due    Hepatitis C Screening  Never done    COVID-19 Vaccine (1) Never done    Diabetes Urine Screening  Never done    Eye Exam  Never done    HIV Screening  Never done    Colorectal Cancer Screening  Never done    Shingles Vaccine (1 of 2) Never done    Pneumococcal Vaccines (Age 0-64) (2 - PCV) 05/09/2017    Mammogram  03/25/2020    Influenza Vaccine (1) Never done       Problem List Items Addressed This Visit          Psychiatric    PTSD (post-traumatic stress disorder)    Current Assessment & Plan     Keep MH appts as scheduled.  Take meds as prescribed.  Denies SI/HI.  Read positive daily meditations, avoid negative media, set healthy boundaries.  Exercise daily, keep consistent sleep pattern, eat a healthy diet.  Establish good social support, make changes to reduce stress.  Do not drink alcohol or use illicit drugs.  Reports any symptoms of suicidal/homicidal ideations or self harm immediately, go to nearest emergency room.                Cardiac/Vascular    Essential hypertension    Current Assessment & Plan     Borderline.  Did not take meds this am.  Continue valsartan and procardia.  Low sodium/dash diet and aerobic exercise encouraged.  Smoking cessation discussed.         Relevant Orders    TSH       Endocrine    Obesity, morbid,  BMI 40.0-49.9    Current Assessment & Plan     BMI 41. Goal BMI <30.  Aerobic exercise 150 minutes per week.  Avoid soda, simple sugars, sweets, excessive rice, pasta, potatoes or bread.   Choose brown options when available and portion control.  Limit fast foods and fried foods.   Choose complex carbs in moderation (ex: green, leafy vegetables, beans, oatmeal).  Eat plenty of fresh fruits and vegetables with lean meats daily.   Consider permanent healthy lifestyle changes.           Uncontrolled type 2 diabetes mellitus with hyperglycemia    Current Assessment & Plan     A1C ordered. Previous A1C 10.5.   Med changes made approx one month ago.  Continue basaglar 80 units BID.  Continue lispro 20 units TIDWM.  Follow ADA diet.  Avoid soda, simple sweets, and limit rice/pasta/bread/starches and consume brown options when possible.   Maintain healthy weight with BMI goal <30.   Perform aerobic exercise for 150 minutes per week (or 5 days a week for 30 minutes each day).   Examine feet daily.   Obtain annual dilated eye exam.  Eye exam: at next visit  Foot exam: 12/21/22           Relevant Medications    NOVOLOG FLEXPEN U-100 INSULIN 100 unit/mL (3 mL) InPn pen    BASAGLAR KWIKPEN U-100 INSULIN glargine 100 units/mL SubQ pen    Other Relevant Orders    Hemoglobin A1C    Lipid Panel    CBC Auto Differential    Comprehensive Metabolic Panel    Urinalysis, Reflex to Urine Culture Urine, Clean Catch    Microalbumin/Creatinine Ratio, Urine    Ambulatory referral/consult to Wound Clinic       Palliative Care    Noncompliance    Current Assessment & Plan     At length discussion with pt regarding continued medical/medicine noncompliance, i.e. stroke, MI, DKA, loss of sight, limb, renal failure and possible death.  Understanding voiced.              Other    Tobacco use disorder - Primary    Current Assessment & Plan     Smoking cessation discussed; total time 3 mins.   Pt not ready to quit.  Declines referral to Smoking  Cessation program.  Discussed benefits of quitting including improved health, decreased cardiac/vascular/pulmonary/stroke risks as well as saving money.           Encounter to establish care    Current Assessment & Plan     Review of complex medical history/EMR.           Other Visit Diagnoses       Encounter for screening mammogram for malignant neoplasm of breast        Relevant Orders    Mammo Digital Screening Bilat w/ Jed    Postmenopausal        Relevant Orders    DXA Bone Density Appendicular Skeleton    Screening for malignant neoplasm of colon        Relevant Orders    Cologuard Screening (Multitarget Stool DNA)    Chronic obstructive pulmonary disease, unspecified COPD type        Relevant Medications    fluticasone furoate-vilanteroL (BREO ELLIPTA) 200-25 mcg/dose DsDv diskus inhaler    Primary osteoarthritis involving multiple joints        Relevant Orders    Ambulatory referral/consult to Physical/Occupational Therapy            Health Maintenance Topics with due status: Not Due       Topic Last Completion Date    TETANUS VACCINE 04/03/2017    LDCT Lung Screen 03/23/2022    Lipid Panel 10/18/2022    Hemoglobin A1c 10/18/2022    High Dose Statin 12/21/2022    Foot Exam 12/21/2022       Future Appointments   Date Time Provider Department Center   12/30/2022 12:45 PM [facility] MAMMO1 SCR1 [facility] [place]   12/30/2022  1:00 PM [facility] [place] [facility]   1/9/2023  2:00 PM [facility] [place]   2/13/2023 12:00 PM [facility]IRGEN [facility] INTMED [place]            Signature:  VIRGEN Hoyt  [facility]  [address] [address]    Date of encounter: 12/21/22

## 2022-12-23 RX ORDER — INSULIN GLARGINE 100 [IU]/ML
80 INJECTION, SOLUTION SUBCUTANEOUS 2 TIMES DAILY WITH MEALS
Qty: 48 ML | Refills: 3 | Status: SHIPPED | OUTPATIENT
Start: 2022-12-23 | End: 2023-06-09

## 2022-12-23 RX ORDER — INSULIN ASPART 100 [IU]/ML
20 INJECTION, SOLUTION INTRAVENOUS; SUBCUTANEOUS
Qty: 18 ML | Refills: 2 | Status: SHIPPED | OUTPATIENT
Start: 2022-12-23 | End: 2023-03-22

## 2022-12-23 NOTE — ASSESSMENT & PLAN NOTE
Borderline.  Did not take meds this am.  Continue valsartan and procardia.  Low sodium/dash diet and aerobic exercise encouraged.  Smoking cessation discussed.

## 2022-12-23 NOTE — ASSESSMENT & PLAN NOTE
Keep MH appts as scheduled.  Take meds as prescribed.  Denies SI/HI.  Read positive daily meditations, avoid negative media, set healthy boundaries.  Exercise daily, keep consistent sleep pattern, eat a healthy diet.  Establish good social support, make changes to reduce stress.  Do not drink alcohol or use illicit drugs.  Reports any symptoms of suicidal/homicidal ideations or self harm immediately, go to nearest emergency room.

## 2022-12-23 NOTE — ASSESSMENT & PLAN NOTE
A1C ordered. Previous A1C 10.5.   Med changes made approx one month ago.  Continue basaglar 80 units BID.  Continue lispro 20 units TIDWM.  Follow ADA diet.  Avoid soda, simple sweets, and limit rice/pasta/bread/starches and consume brown options when possible.   Maintain healthy weight with BMI goal <30.   Perform aerobic exercise for 150 minutes per week (or 5 days a week for 30 minutes each day).   Examine feet daily.   Obtain annual dilated eye exam.  Eye exam: at next visit  Foot exam: 12/21/22

## 2022-12-23 NOTE — ASSESSMENT & PLAN NOTE
At length discussion with pt regarding continued medical/medicine noncompliance, i.e. stroke, MI, DKA, loss of sight, limb, renal failure and possible death.  Understanding voiced.

## 2022-12-23 NOTE — ASSESSMENT & PLAN NOTE
Smoking cessation discussed; total time 3 mins.   Pt not ready to quit.  Declines referral to Smoking Cessation program.  Discussed benefits of quitting including improved health, decreased cardiac/vascular/pulmonary/stroke risks as well as saving money.

## 2022-12-30 ENCOUNTER — HOSPITAL ENCOUNTER (OUTPATIENT)
Dept: RADIOLOGY | Facility: HOSPITAL | Age: 62
Discharge: HOME | End: 2022-12-30
Attending: NURSE PRACTITIONER
Payer: COMMERCIAL

## 2022-12-30 DIAGNOSIS — Z12.31 ENCOUNTER FOR SCREENING MAMMOGRAM FOR MALIGNANT NEOPLASM OF BREAST: ICD-10-CM

## 2022-12-30 DIAGNOSIS — Z78.0 POSTMENOPAUSAL: ICD-10-CM

## 2023-01-06 ENCOUNTER — HOSPITAL ENCOUNTER (EMERGENCY)
Facility: HOSPITAL | Age: 63
Discharge: HOME | End: 2023-01-06
Attending: STUDENT IN AN ORGANIZED HEALTH CARE EDUCATION/TRAINING PROGRAM
Payer: COMMERCIAL

## 2023-01-06 VITALS
BODY MASS INDEX: 41.02 KG/M2 | TEMPERATURE: 98 F | HEART RATE: 74 BPM | SYSTOLIC BLOOD PRESSURE: 183 MMHG | WEIGHT: 254 LBS | DIASTOLIC BLOOD PRESSURE: 94 MMHG | OXYGEN SATURATION: 97 % | RESPIRATION RATE: 17 BRPM

## 2023-01-06 DIAGNOSIS — R06.00 DYSPNEA, UNSPECIFIED TYPE: ICD-10-CM

## 2023-01-06 DIAGNOSIS — M25.561 CHRONIC PAIN OF RIGHT KNEE: Primary | ICD-10-CM

## 2023-01-06 DIAGNOSIS — G89.29 CHRONIC PAIN OF RIGHT KNEE: Primary | ICD-10-CM

## 2023-01-06 DIAGNOSIS — R05.9 COUGH, UNSPECIFIED TYPE: ICD-10-CM

## 2023-01-06 DIAGNOSIS — R73.9 HYPERGLYCEMIA: ICD-10-CM

## 2023-01-06 LAB
ALBUMIN SERPL-MCNC: 3.3 G/DL (ref 3.4–4.8)
ALBUMIN/GLOB SERPL: 0.7 RATIO (ref 1.1–2)
ALP SERPL-CCNC: 68 UNIT/L (ref 40–150)
ALT SERPL-CCNC: 27 UNIT/L (ref 0–55)
AST SERPL-CCNC: 24 UNIT/L (ref 5–34)
BASOPHILS # BLD AUTO: 0.04 X10(3)/MCL (ref 0–0.2)
BASOPHILS NFR BLD AUTO: 0.5 %
BILIRUBIN DIRECT+TOT PNL SERPL-MCNC: 0.3 MG/DL
BNP BLD-MCNC: 27.9 PG/ML
BUN SERPL-MCNC: 12 MG/DL (ref 9.8–20.1)
CALCIUM SERPL-MCNC: 9.5 MG/DL (ref 8.4–10.2)
CHLORIDE SERPL-SCNC: 104 MMOL/L (ref 98–107)
CO2 SERPL-SCNC: 26 MMOL/L (ref 23–31)
CREAT SERPL-MCNC: 0.91 MG/DL (ref 0.55–1.02)
EOSINOPHIL # BLD AUTO: 0.09 X10(3)/MCL (ref 0–0.9)
EOSINOPHIL NFR BLD AUTO: 1.2 %
ERYTHROCYTE [DISTWIDTH] IN BLOOD BY AUTOMATED COUNT: 14.2 % (ref 11–14.5)
FLUAV AG UPPER RESP QL IA.RAPID: NOT DETECTED
FLUBV AG UPPER RESP QL IA.RAPID: NOT DETECTED
GFR SERPLBLD CREATININE-BSD FMLA CKD-EPI: 72 MLS/MIN/1.73/M2
GLOBULIN SER-MCNC: 4.7 GM/DL (ref 2.4–3.5)
GLUCOSE SERPL-MCNC: 290 MG/DL (ref 82–115)
HCT VFR BLD AUTO: 44.9 % (ref 37–47)
HGB BLD-MCNC: 14.7 GM/DL (ref 12–16)
IMM GRANULOCYTES # BLD AUTO: 0.02 X10(3)/MCL (ref 0–0.04)
IMM GRANULOCYTES NFR BLD AUTO: 0.3 %
LYMPHOCYTES # BLD AUTO: 2.62 X10(3)/MCL (ref 0.6–4.6)
LYMPHOCYTES NFR BLD AUTO: 34.1 %
MCH RBC QN AUTO: 28.6 PG
MCHC RBC AUTO-ENTMCNC: 32.7 MG/DL (ref 33–36)
MCV RBC AUTO: 87.4 FL (ref 80–94)
MONOCYTES # BLD AUTO: 0.37 X10(3)/MCL (ref 0.1–1.3)
MONOCYTES NFR BLD AUTO: 4.8 %
NEUTROPHILS # BLD AUTO: 4.54 X10(3)/MCL (ref 2.1–9.2)
NEUTROPHILS NFR BLD AUTO: 59.1 %
NRBC BLD AUTO-RTO: 0 % (ref 0–1)
PLATELET # BLD AUTO: 345 X10(3)/MCL (ref 140–371)
PMV BLD AUTO: 9.8 FL (ref 9.4–12.4)
POCT GLUCOSE: 261 MG/DL (ref 70–110)
POTASSIUM SERPL-SCNC: 4.2 MMOL/L (ref 3.5–5.1)
PROT SERPL-MCNC: 8 GM/DL (ref 5.8–7.6)
RBC # BLD AUTO: 5.14 X10(6)/MCL (ref 4.2–5.4)
RSV A 5' UTR RNA NPH QL NAA+PROBE: NOT DETECTED
SARS-COV-2 RNA RESP QL NAA+PROBE: NOT DETECTED
SODIUM SERPL-SCNC: 140 MMOL/L (ref 136–145)
TROPONIN I SERPL-MCNC: 0.06 NG/ML (ref 0–0.04)
TROPONIN I SERPL-MCNC: 0.07 NG/ML (ref 0–0.04)
WBC # SPEC AUTO: 7.7 X10(3)/MCL (ref 4.5–11.5)

## 2023-01-06 PROCEDURE — 96372 THER/PROPH/DIAG INJ SC/IM: CPT | Performed by: STUDENT IN AN ORGANIZED HEALTH CARE EDUCATION/TRAINING PROGRAM

## 2023-01-06 PROCEDURE — 93005 ELECTROCARDIOGRAM TRACING: CPT

## 2023-01-06 PROCEDURE — 83880 ASSAY OF NATRIURETIC PEPTIDE: CPT | Performed by: STUDENT IN AN ORGANIZED HEALTH CARE EDUCATION/TRAINING PROGRAM

## 2023-01-06 PROCEDURE — 80053 COMPREHEN METABOLIC PANEL: CPT | Performed by: STUDENT IN AN ORGANIZED HEALTH CARE EDUCATION/TRAINING PROGRAM

## 2023-01-06 PROCEDURE — 82962 GLUCOSE BLOOD TEST: CPT

## 2023-01-06 PROCEDURE — 63600175 PHARM REV CODE 636 W HCPCS: Performed by: STUDENT IN AN ORGANIZED HEALTH CARE EDUCATION/TRAINING PROGRAM

## 2023-01-06 PROCEDURE — 0241U COVID/RSV/FLU A&B PCR: CPT | Performed by: STUDENT IN AN ORGANIZED HEALTH CARE EDUCATION/TRAINING PROGRAM

## 2023-01-06 PROCEDURE — 99285 EMERGENCY DEPT VISIT HI MDM: CPT | Mod: 25

## 2023-01-06 PROCEDURE — 85025 COMPLETE CBC W/AUTO DIFF WBC: CPT | Performed by: STUDENT IN AN ORGANIZED HEALTH CARE EDUCATION/TRAINING PROGRAM

## 2023-01-06 PROCEDURE — 84484 ASSAY OF TROPONIN QUANT: CPT | Mod: 91 | Performed by: STUDENT IN AN ORGANIZED HEALTH CARE EDUCATION/TRAINING PROGRAM

## 2023-01-06 PROCEDURE — 25000003 PHARM REV CODE 250: Performed by: STUDENT IN AN ORGANIZED HEALTH CARE EDUCATION/TRAINING PROGRAM

## 2023-01-06 RX ORDER — ASPIRIN 325 MG
325 TABLET ORAL
Status: COMPLETED | OUTPATIENT
Start: 2023-01-06 | End: 2023-01-06

## 2023-01-06 RX ORDER — KETOROLAC TROMETHAMINE 10 MG/1
10 TABLET, FILM COATED ORAL EVERY 6 HOURS PRN
Qty: 20 TABLET | Refills: 0 | Status: SHIPPED | OUTPATIENT
Start: 2023-01-06 | End: 2023-01-11

## 2023-01-06 RX ORDER — KETOROLAC TROMETHAMINE 30 MG/ML
30 INJECTION, SOLUTION INTRAMUSCULAR; INTRAVENOUS
Status: COMPLETED | OUTPATIENT
Start: 2023-01-06 | End: 2023-01-06

## 2023-01-06 RX ORDER — BENZONATATE 100 MG/1
200 CAPSULE ORAL ONCE
Status: COMPLETED | OUTPATIENT
Start: 2023-01-06 | End: 2023-01-06

## 2023-01-06 RX ORDER — BENZONATATE 100 MG/1
200 CAPSULE ORAL 3 TIMES DAILY PRN
Qty: 20 CAPSULE | Refills: 0 | Status: SHIPPED | OUTPATIENT
Start: 2023-01-06 | End: 2023-02-13 | Stop reason: ALTCHOICE

## 2023-01-06 RX ADMIN — KETOROLAC TROMETHAMINE 30 MG: 30 INJECTION, SOLUTION INTRAMUSCULAR; INTRAVENOUS at 11:01

## 2023-01-06 RX ADMIN — ASPIRIN 325 MG ORAL TABLET 325 MG: 325 PILL ORAL at 11:01

## 2023-01-06 RX ADMIN — BENZONATATE 200 MG: 100 CAPSULE ORAL at 11:01

## 2023-01-06 NOTE — ED PROVIDER NOTES
"Encounter Date: 1/6/2023       History     Chief Complaint   Patient presents with    Chest Pain     Chest pain and SOB with cough x1 week, pt reports heart cath x2 weeks ago, pt doesn't know cardiologist.      62-year-old female presents to ED for shortness of breath, cough and right knee pain.  Patient states she has had problems with her right knee for months.  Grossly unchanged.  States she "can not take it anymore".  Is requesting relief from her chronic knee, specifically a knee brace.  Denies any recent traumas, no swelling, no skin changes.  Does not take chronic pain medication.  Additionally states she had a heart catheterization performed 2 weeks ago using a right radial approach.  Informed the cath was clean without any blockages.  Has not had heart attack before, no stents.  For the last week she reports gradual worsening shortness of breath, even at baseline.  Significantly worsened with exertion.  Denies any fever or congestion.  No myalgias.  No known sick contacts.  Also reporting a mild nonproductive cough.  States over-the-counter treatments are not working.  No pleuritic component.  Is denying any new or worsening lower extremity edema or weight change.  Ambulates at baseline with a walker which she does today.  No other complaints or concerns at this time.    Review of patient's allergies indicates:   Allergen Reactions    Pcn [penicillins] Anaphylaxis     Past Medical History:   Diagnosis Date    Anxiety     Arthritis     CVA (cerebral vascular accident)     "mini stroke"    Depression     Diabetes mellitus     Heart problem     History of psychiatric hospitalization     three(1983, 1995 and another (years ago")): depression    HTN (hypertension)     Hx of psychiatric care     Hypertension     Pacemaker     Psychiatric exam requested by authority     Psychiatric problem     TBI (traumatic brain injury)     Therapy      Past Surgical History:   Procedure Laterality Date    " CARDIAC PACEMAKER PLACEMENT      CARDIAC PACEMAKER REMOVAL      CHOLECYSTECTOMY      HYSTERECTOMY       Family History   Problem Relation Age of Onset    Schizophrenia Mother     Bipolar disorder Mother     Bipolar disorder Father      Social History     Tobacco Use    Smoking status: Every Day     Packs/day: 0.50     Years: 40.00     Pack years: 20.00     Types: Cigarettes     Last attempt to quit: 2018     Years since quittin.3    Smokeless tobacco: Never   Substance Use Topics    Alcohol use: Yes     Comment: wine on occ    Drug use: No     Review of Systems   Constitutional:  Negative for chills, diaphoresis and fever.   HENT:  Negative for congestion, rhinorrhea, sinus pain and sore throat.    Eyes:  Negative for pain, discharge and itching.   Respiratory:  Positive for cough and shortness of breath. Negative for chest tightness, wheezing and stridor.    Cardiovascular:  Negative for chest pain, palpitations and leg swelling.   Gastrointestinal:  Negative for abdominal pain, nausea and vomiting.   Genitourinary:  Negative for dysuria, flank pain and hematuria.   Musculoskeletal:  Positive for arthralgias. Negative for back pain and myalgias.   Skin:  Negative for color change and rash.   Neurological:  Negative for dizziness, weakness and headaches.   Psychiatric/Behavioral:  Negative for confusion. The patient is not hyperactive.      Physical Exam     Initial Vitals [23 1014]   BP Pulse Resp Temp SpO2   (!) 202/105 85 18 98.1 °F (36.7 °C) 100 %      MAP       --         Physical Exam    Vitals reviewed.  Constitutional: She appears well-developed and well-nourished. She is not diaphoretic. No distress.   Ambulates with rolling walker.  No acute distress.  No exertional dyspnea.   HENT:   Head: Normocephalic and atraumatic.   Eyes: Conjunctivae and EOM are normal. Pupils are equal, round, and reactive to light.   Neck: Neck supple. No tracheal deviation present.   Normal range of  motion.  Cardiovascular:  Normal rate, regular rhythm, normal heart sounds and intact distal pulses.           Pulmonary/Chest: Breath sounds normal. No accessory muscle usage. No tachypnea. No respiratory distress. She has no decreased breath sounds. She has no wheezes. She has no rhonchi. She has no rales.   Abdominal: Abdomen is soft. There is no abdominal tenderness. There is no rebound and no guarding.   Musculoskeletal:         General: Tenderness present. No edema. Normal range of motion.      Cervical back: Normal range of motion and neck supple.      Comments: Knees appear symmetric.  No gross swelling or evidence of trauma of the right, affected, knee.  Generalized tenderness to palpation, no large effusion.  Patient demonstrates full active range of motion.  Neurovascularly intact distally.     Neurological: She is alert and oriented to person, place, and time. She has normal strength. GCS score is 15. GCS eye subscore is 4. GCS verbal subscore is 5. GCS motor subscore is 6.   Skin: Skin is warm and dry. Capillary refill takes less than 2 seconds. No rash noted.   Psychiatric: She has a normal mood and affect. Her behavior is normal. Judgment and thought content normal.       ED Course   Procedures  Labs Reviewed   COMPREHENSIVE METABOLIC PANEL - Abnormal; Notable for the following components:       Result Value    Glucose Level 290 (*)     Protein Total 8.0 (*)     Albumin Level 3.3 (*)     Globulin 4.7 (*)     Albumin/Globulin Ratio 0.7 (*)     All other components within normal limits   TROPONIN I - Abnormal; Notable for the following components:    Troponin-I 0.074 (*)     All other components within normal limits   CBC WITH DIFFERENTIAL - Abnormal; Notable for the following components:    MCHC 32.7 (*)     All other components within normal limits   TROPONIN I - Abnormal; Notable for the following components:    Troponin-I 0.064 (*)     All other components within normal limits   POCT GLUCOSE -  Abnormal; Notable for the following components:    POCT Glucose 261 (*)     All other components within normal limits   B-TYPE NATRIURETIC PEPTIDE - Normal   COVID/RSV/FLU A&B PCR - Normal    Narrative:     The Xpert Xpress SARS-CoV-2/FLU/RSV plus is a rapid, multiplexed real-time PCR test intended for the simultaneous qualitative detection and differentiation of SARS-CoV-2, Influenza A, Influenza B, and respiratory syncytial virus (RSV) viral RNA in either nasopharyngeal swab or nasal swab specimens.         CBC W/ AUTO DIFFERENTIAL    Narrative:     The following orders were created for panel order CBC auto differential.  Procedure                               Abnormality         Status                     ---------                               -----------         ------                     CBC with Differential[[id number]]        Abnormal            Final result                 Please view results for these tests on the individual orders.   EXTRA TUBES    Narrative:     The following orders were created for panel order EXTRA TUBES.  Procedure                               Abnormality         Status                     ---------                               -----------         ------                     Light Blue Top Hold[[id number]]                              In process                   Please view results for these tests on the individual orders.   LIGHT BLUE TOP HOLD     EKG Readings: (Independently Interpreted)   Initial Reading: No STEMI. Rhythm: Normal Sinus Rhythm. Ectopy: No Ectopy. Conduction: Normal. Axis: Left Axis Deviation. Clinical Impression: Normal Sinus Rhythm Other Impression: LVH   ECG Results              EKG 12-lead (Final result)  Result time 01/06/23 15:43:09      Final result by Interface, Lab In [facility] (01/06/23 15:43:09)                   Narrative:    Test Reason : R07.9,    Vent. Rate : 084 BPM     Atrial Rate : 084 BPM     P-R Int : 142 ms          QRS Dur : 104 ms      QT Int :  402 ms       P-R-T Axes : 058 -26 078 degrees     QTc Int : 475 ms    Normal sinus rhythm  Voltage criteria for left ventricular hypertrophy  Abnormal ECG  Confirmed by Frank Burr MD ([id number]) on 1/6/2023 3:43:00 PM    Referred By: [facility]MA   SELF           Confirmed By:Frank Burr MD                                  Imaging Results              X-Ray Chest PA And Lateral (Final result)  Result time 01/06/23 11:04:45      Final result by Enmanuel Orellana MD (01/06/23 11:04:45)                   Impression:      No acute chest disease is identified.      Electronically signed by: Enmanuel Orellana  Date:    01/06/2023  Time:    11:04               Narrative:    EXAMINATION:  XR CHEST PA AND LATERAL    CLINICAL HISTORY:  Chest Pain;, .    COMPARISON:  December 6, 2022    FINDINGS:  No alveolar consolidation, effusion, or pneumothorax is seen.   The thoracic aorta is normal  cardiac silhouette, central pulmonary vessels and mediastinum are normal in size and are grossly unremarkable.   visualized osseous structures are grossly unremarkable.                                       X-Ray Knee 3 View Right (Final result)  Result time 01/06/23 11:05:22      Final result by Enmanuel Orellana MD (01/06/23 11:05:22)                   Impression:      Tricompartmental degenerative changes.      Electronically signed by: Enmanuel Orellana  Date:    01/06/2023  Time:    11:05               Narrative:    EXAMINATION:  XR KNEE 3 VIEW RIGHT    CLINICAL HISTORY:  Pain, unspecified    COMPARISON:  None.    FINDINGS:  No acute displaced fractures or dislocations.    There is significant narrowing of the lateral compartment of the knee joint with some sclerosis marginal osteophytosis and some subchondral cyst formation degenerative changes also identified of the medial compartment with some narrowing of the patellofemoral joint    No blastic or lytic lesions.    Soft tissues within normal limits.                                        Medications   aspirin tablet 325 mg (325 mg Oral Given 1/6/23 1111)   ketorolac injection 30 mg (30 mg Intramuscular Given 1/6/23 1111)   benzonatate capsule 200 mg (200 mg Oral Given 1/6/23 1111)     Medical Decision Making:   Clinical Tests:   Lab Tests: Reviewed and Ordered  Radiological Study: Ordered and Reviewed  Medical Tests: Reviewed and Ordered  ED Management:  62-year-old female presents to ED for gradual worsening shortness of breath, dry cough and chronic right knee pain.  Denies any trauma.  No fevers.  No exertional dyspnea.  On exam lungs clear without acute pulmonary findings.  No cough evident on exam.  HEENT exam benign.  Patient's knees appear symmetric and mild generalized tenderness to palpation of the right knee.  No evidence of trauma, swelling or infection.  Patient demonstrates full range of motion of the knee without difficulty.  Patient's knee x-ray demonstrates chronic changes and chest x-ray is clear.  Laboratory analysis grossly unremarkable except for initially mildly elevated cardiac enzymes. However when compared to previous workups patient's troponin is always minimally elevated and when repeated 2 hours later was down trending. At no point chest pain or pressure. Patient at this time is stable for discharge.  Provided medications in department for relief of her knee, prescribed antitussives medications and is to follow up closely in the outpatient setting.  Ultimately discharged stable with strict return precautions. (Zmora)                        Clinical Impression:   Final diagnoses:  [M25.561, G89.29] Chronic pain of right knee (Primary)  [R05.9] Cough, unspecified type  [R06.00] Dyspnea, unspecified type  [R73.9] Hyperglycemia        ED Disposition Condition    Discharge Stable          ED Prescriptions       Medication Sig Dispense Start Date End Date Auth. Provider    benzonatate (TESSALON) 100 MG capsule Take 2 capsules (200 mg total) by mouth 3 (three) times daily  as needed for Cough. 20 capsule 1/6/2023 1/16/2023 Markel Rodriguez MD    ketorolac (TORADOL) 10 mg tablet Take 1 tablet (10 mg total) by mouth every 6 (six) hours as needed for Pain. 20 tablet 1/6/2023 1/11/2023 Markel Rodriguez MD          Follow-up Information       Follow up With Specialties Details Why Contact Info    Vinay Sandoval MD Internal Medicine Schedule an appointment as soon as possible for a visit in 3 days  [address] [place] [id number]  [phone]      [facility] Emergency Medicine  As needed, If symptoms worsen [address] [facility] [address] [phone]    Orthopedics  Schedule an appointment as soon as possible for a visit in 1 week               Markel Rodriguez MD  01/07/23 [id number]

## 2023-01-09 ENCOUNTER — HOSPITAL ENCOUNTER (OUTPATIENT)
Dept: WOUND CARE | Facility: HOSPITAL | Age: 63
Discharge: HOME | End: 2023-01-09
Attending: NURSE PRACTITIONER
Payer: COMMERCIAL

## 2023-01-09 VITALS — RESPIRATION RATE: 20 BRPM | DIASTOLIC BLOOD PRESSURE: 88 MMHG | SYSTOLIC BLOOD PRESSURE: 158 MMHG | TEMPERATURE: 98 F

## 2023-01-09 DIAGNOSIS — E66.01 CLASS 3 SEVERE OBESITY WITH BODY MASS INDEX (BMI) OF 40.0 TO 44.9 IN ADULT, UNSPECIFIED OBESITY TYPE, UNSPECIFIED WHETHER SERIOUS COMORBIDITY PRESENT: ICD-10-CM

## 2023-01-09 DIAGNOSIS — L60.2 OVERGROWN TOENAILS: Primary | ICD-10-CM

## 2023-01-09 DIAGNOSIS — E11.65 UNCONTROLLED TYPE 2 DIABETES MELLITUS WITH HYPERGLYCEMIA: ICD-10-CM

## 2023-01-09 PROCEDURE — 11719 PR TRIM NAIL(S): ICD-10-PCS | Mod: Q9,,, | Performed by: NURSE PRACTITIONER

## 2023-01-09 PROCEDURE — 11719 TRIM NAIL(S) ANY NUMBER: CPT | Mod: Q9,,, | Performed by: NURSE PRACTITIONER

## 2023-01-09 PROCEDURE — 11719 TRIM NAIL(S) ANY NUMBER: CPT

## 2023-01-09 PROCEDURE — 99202 OFFICE O/P NEW SF 15 MIN: CPT | Mod: 25,,, | Performed by: NURSE PRACTITIONER

## 2023-01-09 PROCEDURE — 99211 OFF/OP EST MAY X REQ PHY/QHP: CPT | Mod: 25

## 2023-01-09 PROCEDURE — 99202 PR OFFICE/OUTPT VISIT, NEW, LEVL II, 15-29 MIN: ICD-10-PCS | Mod: 25,,, | Performed by: NURSE PRACTITIONER

## 2023-01-09 NOTE — PROGRESS NOTES
Subjective:       Patient ID: Elba Barnes is a 62 y.o. female.    Chief Complaint: DFC    62-year-old female presents to wound care clinic today for diabetic foot care.  Patient presents to clinic with caregiver, and rolling walker.  PCP, VIRGEN Ho last visit 12/21/2022.  Medical history:  Depression, anxiety, Pt SD, obesity, tobacco abuse, hypertension, hyperlipidemia, and noncompliance.    Today's visit 01/09/2023:  Trimmed all 10 toenails using podiatry clippers, filed with Cammal board. Used Dremmel to debulked all nail beds. Monofilament test done with minimal sensation noted is all areas.  Tolerated well.  Instructed on diabetic foot care, checking feet daily, and proper footwear.  Diabetic pamphlet hand out given.  Verbalized understanding of all instructions.  Will have her return in 3 months for diabetic foot care.  Instructed to call the office with any questions, concerns, or new skin issues.    Lab Results   Component Value Date/Time    WBC 7.7 01/06/2023 11:30 AM    WBC 11.58 09/30/2021 03:42 PM    RBC 5.14 01/06/2023 11:30 AM    RBC 5.35 09/30/2021 03:42 PM    HGB 14.7 01/06/2023 11:30 AM    HGB 16.0 09/30/2021 03:42 PM    HCT 44.9 01/06/2023 11:30 AM    HCT 47.9 09/30/2021 03:42 PM    MCV 87.4 01/06/2023 11:30 AM    MCV 90 09/30/2021 03:42 PM    MCH 28.6 01/06/2023 11:30 AM    MCH 29.9 09/30/2021 03:42 PM    CREATININE 0.91 01/06/2023 11:30 AM    CREATININE 1.3 09/30/2021 03:42 PM    HGBA1C 10.5 (H) 10/18/2022 02:27 AM    HGBA1C 9.6 (H) 12/07/2020 02:14 PM     Review of Systems   All other systems reviewed and are negative.        Objective:        Physical Exam  Vitals reviewed.   Cardiovascular:      Pulses:           Dorsalis pedis pulses are detected w/ Doppler on the right side and detected w/ Doppler on the left side.        Posterior tibial pulses are detected w/ Doppler on the right side and detected w/ Doppler on the left side.   Feet:      Right foot:      Skin integrity:  Skin integrity normal.      Toenail Condition: Right toenails are abnormally thick and long.      Left foot:      Skin integrity: Skin integrity normal.      Toenail Condition: Left toenails are abnormally thick and long.      Comments: Monofilament test done minimal sensation in all areas.    Skin:     General: Skin is cool and dry.      Capillary Refill: Capillary refill takes less than 2 seconds.   Neurological:      Mental Status: She is alert.   Psychiatric:         Behavior: Behavior is cooperative.                  Assessment:         ICD-10-CM ICD-9-CM   1. Overgrown toenails  L60.2 703.8   2. Uncontrolled type 2 diabetes mellitus with hyperglycemia  E11.65 250.02   3. Class 3 severe obesity with body mass index (BMI) of 40.0 to 44.9 in adult, unspecified obesity type, unspecified whether serious comorbidity present  E66.01 278.01    Z68.41 V85.41         Plan:   Tissue pathology and/or culture taken:  [] Yes [x] No   Sharp debridement performed:   [] Yes [x] No   Labs ordered this visit:   [] Yes [x] No   Imaging ordered this visit:   [] Yes [x] No             1. Overgrown toenails     Trimmed and tolerated well. Instructed on proper nail care.    2. Uncontrolled type 2 diabetes mellitus with hyperglycemia     Co-factor development of ulcers. Instructed on taking medication DM medications. Last A1C 10.5. Under the care of Purvi Rai NP.    3. Class 3 severe obesity with body mass index (BMI) of 40.0 to 44.9 in adult, unspecified obesity type, unspecified whether serious comorbidity present     Co-factor in delayed healing. Under the care of Purvi Rai NP.              Follow up in about 3 months (around 4/9/2023).

## 2023-01-10 ENCOUNTER — TELEPHONE (OUTPATIENT)
Dept: ADMINISTRATIVE | Facility: HOSPITAL | Age: 63
End: 2023-01-10
Payer: COMMERCIAL

## 2023-01-10 NOTE — TELEPHONE ENCOUNTER
----- Message from VIRGEN Sharp sent at 1/9/2023  3:16 PM CST -----  Regarding: DFC  Good afternoon,   I saw mrs. May today for her DFC. Could she received a script for DM shoes or inserts? Thank you:)

## 2023-01-17 NOTE — TELEPHONE ENCOUNTER
I will refer her to [facility] for rx for DM shoes and inserts; she has medicare. Thanks for seeing her.

## 2023-01-18 ENCOUNTER — HOSPITAL ENCOUNTER (OUTPATIENT)
Dept: RADIOLOGY | Facility: HOSPITAL | Age: 63
Discharge: HOME | End: 2023-01-18
Attending: NURSE PRACTITIONER
Payer: COMMERCIAL

## 2023-01-18 PROCEDURE — 77063 MAMMO DIGITAL SCREENING BILAT WITH TOMO: ICD-10-PCS | Mod: 26,,, | Performed by: RADIOLOGY

## 2023-01-18 PROCEDURE — 77067 MAMMO DIGITAL SCREENING BILAT WITH TOMO: ICD-10-PCS | Mod: 26,,, | Performed by: RADIOLOGY

## 2023-01-18 PROCEDURE — 77081 DXA BONE DENSITY APPENDICULR: CPT | Mod: TC

## 2023-01-18 PROCEDURE — 77067 SCR MAMMO BI INCL CAD: CPT | Mod: 26,,, | Performed by: RADIOLOGY

## 2023-01-18 PROCEDURE — 77063 BREAST TOMOSYNTHESIS BI: CPT | Mod: 26,,, | Performed by: RADIOLOGY

## 2023-01-18 PROCEDURE — 77067 SCR MAMMO BI INCL CAD: CPT | Mod: TC

## 2023-02-08 ENCOUNTER — PATIENT MESSAGE (OUTPATIENT)
Dept: RESEARCH | Facility: HOSPITAL | Age: 63
End: 2023-02-08
Payer: COMMERCIAL

## 2023-02-09 ENCOUNTER — LAB VISIT (OUTPATIENT)
Dept: LAB | Facility: HOSPITAL | Age: 63
End: 2023-02-09
Attending: NURSE PRACTITIONER
Payer: COMMERCIAL

## 2023-02-09 DIAGNOSIS — E11.65 UNCONTROLLED TYPE 2 DIABETES MELLITUS WITH HYPERGLYCEMIA: ICD-10-CM

## 2023-02-09 DIAGNOSIS — I10 ESSENTIAL HYPERTENSION: ICD-10-CM

## 2023-02-09 LAB
ALBUMIN SERPL-MCNC: 3.3 G/DL (ref 3.4–4.8)
ALBUMIN/GLOB SERPL: 0.7 RATIO (ref 1.1–2)
ALP SERPL-CCNC: 71 UNIT/L (ref 40–150)
ALT SERPL-CCNC: 29 UNIT/L (ref 0–55)
APPEARANCE UR: ABNORMAL
AST SERPL-CCNC: 25 UNIT/L (ref 5–34)
BACTERIA #/AREA URNS AUTO: ABNORMAL /HPF
BASOPHILS # BLD AUTO: 0.05 X10(3)/MCL (ref 0–0.2)
BASOPHILS NFR BLD AUTO: 0.5 %
BILIRUB UR QL STRIP.AUTO: NEGATIVE MG/DL
BILIRUBIN DIRECT+TOT PNL SERPL-MCNC: 0.3 MG/DL
BUN SERPL-MCNC: 20 MG/DL (ref 9.8–20.1)
CALCIUM SERPL-MCNC: 9.4 MG/DL (ref 8.4–10.2)
CHLORIDE SERPL-SCNC: 109 MMOL/L (ref 98–107)
CHOLEST SERPL-MCNC: 181 MG/DL
CHOLEST/HDLC SERPL: 4 {RATIO} (ref 0–5)
CO2 SERPL-SCNC: 26 MMOL/L (ref 23–31)
COLOR UR AUTO: ABNORMAL
CREAT SERPL-MCNC: 0.91 MG/DL (ref 0.55–1.02)
CREAT UR-MCNC: 65.7 MG/DL (ref 47–110)
EOSINOPHIL # BLD AUTO: 0.09 X10(3)/MCL (ref 0–0.9)
EOSINOPHIL NFR BLD AUTO: 1 %
ERYTHROCYTE [DISTWIDTH] IN BLOOD BY AUTOMATED COUNT: 14.5 % (ref 11.5–17)
EST. AVERAGE GLUCOSE BLD GHB EST-MCNC: 203 MG/DL
GFR SERPLBLD CREATININE-BSD FMLA CKD-EPI: >60 MLS/MIN/1.73/M2
GLOBULIN SER-MCNC: 4.8 GM/DL (ref 2.4–3.5)
GLUCOSE SERPL-MCNC: 140 MG/DL (ref 82–115)
GLUCOSE UR QL STRIP.AUTO: NORMAL MG/DL
HBA1C MFR BLD: 8.7 %
HCT VFR BLD AUTO: 48 % (ref 37–47)
HDLC SERPL-MCNC: 45 MG/DL (ref 35–60)
HGB BLD-MCNC: 15.5 GM/DL (ref 12–16)
HYALINE CASTS #/AREA URNS LPF: ABNORMAL /LPF
IMM GRANULOCYTES # BLD AUTO: 0.06 X10(3)/MCL (ref 0–0.04)
IMM GRANULOCYTES NFR BLD AUTO: 0.6 %
KETONES UR QL STRIP.AUTO: NEGATIVE MG/DL
LDLC SERPL CALC-MCNC: 97 MG/DL (ref 50–140)
LEUKOCYTE ESTERASE UR QL STRIP.AUTO: 250 UNIT/L
LYMPHOCYTES # BLD AUTO: 3.39 X10(3)/MCL (ref 0.6–4.6)
LYMPHOCYTES NFR BLD AUTO: 35.8 %
MCH RBC QN AUTO: 28.1 PG
MCHC RBC AUTO-ENTMCNC: 32.3 MG/DL (ref 33–36)
MCV RBC AUTO: 87.1 FL (ref 80–94)
MICROALBUMIN UR-MCNC: 697 UG/ML
MICROALBUMIN/CREAT RATIO PNL UR: 1060.9 MG/GM CR (ref 0–30)
MONOCYTES # BLD AUTO: 0.44 X10(3)/MCL (ref 0.1–1.3)
MONOCYTES NFR BLD AUTO: 4.6 %
MUCOUS THREADS URNS QL MICRO: ABNORMAL /LPF
NEUTROPHILS # BLD AUTO: 5.44 X10(3)/MCL (ref 2.1–9.2)
NEUTROPHILS NFR BLD AUTO: 57.5 %
NITRITE UR QL STRIP.AUTO: NEGATIVE
NRBC BLD AUTO-RTO: 0 %
PH UR STRIP.AUTO: 5.5 [PH]
PLATELET # BLD AUTO: 386 X10(3)/MCL (ref 130–400)
PMV BLD AUTO: 10 FL (ref 7.4–10.4)
POTASSIUM SERPL-SCNC: 3.6 MMOL/L (ref 3.5–5.1)
PROT SERPL-MCNC: 8.1 GM/DL (ref 5.8–7.6)
PROT UR QL STRIP.AUTO: ABNORMAL MG/DL
RBC # BLD AUTO: 5.51 X10(6)/MCL (ref 4.2–5.4)
RBC #/AREA URNS AUTO: ABNORMAL /HPF
RBC UR QL AUTO: ABNORMAL UNIT/L
SODIUM SERPL-SCNC: 145 MMOL/L (ref 136–145)
SP GR UR STRIP.AUTO: 1.01
SQUAMOUS #/AREA URNS LPF: ABNORMAL /HPF
TRIGL SERPL-MCNC: 195 MG/DL (ref 37–140)
TSH SERPL-ACNC: 2.1 UIU/ML (ref 0.35–4.94)
UROBILINOGEN UR STRIP-ACNC: NORMAL MG/DL
VLDLC SERPL CALC-MCNC: 39 MG/DL
WBC # SPEC AUTO: 9.5 X10(3)/MCL (ref 4.5–11.5)
WBC #/AREA URNS AUTO: ABNORMAL /HPF

## 2023-02-09 PROCEDURE — 87186 SC STD MICRODIL/AGAR DIL: CPT

## 2023-02-09 PROCEDURE — 85025 COMPLETE CBC W/AUTO DIFF WBC: CPT

## 2023-02-09 PROCEDURE — 87088 URINE BACTERIA CULTURE: CPT

## 2023-02-09 PROCEDURE — 81001 URINALYSIS AUTO W/SCOPE: CPT

## 2023-02-09 PROCEDURE — 84443 ASSAY THYROID STIM HORMONE: CPT

## 2023-02-09 PROCEDURE — 82043 UR ALBUMIN QUANTITATIVE: CPT

## 2023-02-09 PROCEDURE — 80061 LIPID PANEL: CPT

## 2023-02-09 PROCEDURE — 80053 COMPREHEN METABOLIC PANEL: CPT

## 2023-02-09 PROCEDURE — 36415 COLL VENOUS BLD VENIPUNCTURE: CPT

## 2023-02-09 PROCEDURE — 83036 HEMOGLOBIN GLYCOSYLATED A1C: CPT

## 2023-02-12 LAB — BACTERIA UR CULT: ABNORMAL

## 2023-02-13 ENCOUNTER — OFFICE VISIT (OUTPATIENT)
Dept: INTERNAL MEDICINE | Facility: CLINIC | Age: 63
End: 2023-02-13
Payer: COMMERCIAL

## 2023-02-13 ENCOUNTER — HOSPITAL ENCOUNTER (OUTPATIENT)
Facility: HOSPITAL | Age: 63
Discharge: HOME | End: 2023-02-17
Attending: INTERNAL MEDICINE | Admitting: INTERNAL MEDICINE
Payer: COMMERCIAL

## 2023-02-13 VITALS
DIASTOLIC BLOOD PRESSURE: 90 MMHG | RESPIRATION RATE: 20 BRPM | SYSTOLIC BLOOD PRESSURE: 180 MMHG | HEART RATE: 86 BPM | TEMPERATURE: 98 F | BODY MASS INDEX: 40.88 KG/M2 | WEIGHT: 254.38 LBS | HEIGHT: 66 IN

## 2023-02-13 DIAGNOSIS — E11.65 UNCONTROLLED TYPE 2 DIABETES MELLITUS WITH HYPERGLYCEMIA: Chronic | ICD-10-CM

## 2023-02-13 DIAGNOSIS — I16.1 HYPERTENSIVE EMERGENCY WITHOUT CONGESTIVE HEART FAILURE: Primary | ICD-10-CM

## 2023-02-13 DIAGNOSIS — Z13.5 SCREENING FOR DIABETIC RETINOPATHY: Primary | ICD-10-CM

## 2023-02-13 DIAGNOSIS — R07.9 CHEST PAIN, UNSPECIFIED TYPE: ICD-10-CM

## 2023-02-13 DIAGNOSIS — I10 ESSENTIAL HYPERTENSION: ICD-10-CM

## 2023-02-13 DIAGNOSIS — I20.0 UNSTABLE ANGINA: ICD-10-CM

## 2023-02-13 DIAGNOSIS — R11.0 NAUSEA: ICD-10-CM

## 2023-02-13 DIAGNOSIS — R07.9 CHEST PAIN: ICD-10-CM

## 2023-02-13 DIAGNOSIS — E78.5 DYSLIPIDEMIA ASSOCIATED WITH TYPE 2 DIABETES MELLITUS: ICD-10-CM

## 2023-02-13 DIAGNOSIS — E11.69 DYSLIPIDEMIA ASSOCIATED WITH TYPE 2 DIABETES MELLITUS: ICD-10-CM

## 2023-02-13 DIAGNOSIS — N30.00 ACUTE CYSTITIS WITHOUT HEMATURIA: ICD-10-CM

## 2023-02-13 PROBLEM — F17.200 TOBACCO USE DISORDER: Chronic | Status: ACTIVE | Noted: 2017-05-09

## 2023-02-13 PROBLEM — M17.11 TRICOMPARTMENT OSTEOARTHRITIS OF RIGHT KNEE: Status: ACTIVE | Noted: 2023-02-13

## 2023-02-13 PROBLEM — E78.2 MIXED HYPERLIPIDEMIA: Chronic | Status: ACTIVE | Noted: 2017-05-09

## 2023-02-13 PROBLEM — M17.11 TRICOMPARTMENT OSTEOARTHRITIS OF RIGHT KNEE: Chronic | Status: ACTIVE | Noted: 2023-02-13

## 2023-02-13 LAB
ANION GAP SERPL CALC-SCNC: 8 MEQ/L
APTT PPP: 30.7 SECONDS
BASOPHILS # BLD AUTO: 0.05 X10(3)/MCL (ref 0–0.2)
BASOPHILS NFR BLD AUTO: 0.4 %
BUN SERPL-MCNC: 18 MG/DL (ref 9.8–20.1)
CALCIUM SERPL-MCNC: 9.7 MG/DL (ref 8.4–10.2)
CHLORIDE SERPL-SCNC: 105 MMOL/L (ref 98–107)
CO2 SERPL-SCNC: 29 MMOL/L (ref 23–31)
CREAT SERPL-MCNC: 0.94 MG/DL (ref 0.55–1.02)
CREAT/UREA NIT SERPL: 19
EOSINOPHIL # BLD AUTO: 0.16 X10(3)/MCL (ref 0–0.9)
EOSINOPHIL NFR BLD AUTO: 1.4 %
ERYTHROCYTE [DISTWIDTH] IN BLOOD BY AUTOMATED COUNT: 14.7 % (ref 11.5–17)
FLUAV AG UPPER RESP QL IA.RAPID: NOT DETECTED
FLUBV AG UPPER RESP QL IA.RAPID: NOT DETECTED
GFR SERPLBLD CREATININE-BSD FMLA CKD-EPI: >60 MLS/MIN/1.73/M2
GLUCOSE SERPL-MCNC: 157 MG/DL (ref 82–115)
HCT VFR BLD AUTO: 45.3 % (ref 37–47)
HGB BLD-MCNC: 14.8 GM/DL (ref 12–16)
IMM GRANULOCYTES # BLD AUTO: 0.06 X10(3)/MCL (ref 0–0.04)
IMM GRANULOCYTES NFR BLD AUTO: 0.5 %
LYMPHOCYTES # BLD AUTO: 3.67 X10(3)/MCL (ref 0.6–4.6)
LYMPHOCYTES NFR BLD AUTO: 32.1 %
MCH RBC QN AUTO: 28.5 PG
MCHC RBC AUTO-ENTMCNC: 32.7 MG/DL (ref 33–36)
MCV RBC AUTO: 87.3 FL (ref 80–94)
MONOCYTES # BLD AUTO: 0.46 X10(3)/MCL (ref 0.1–1.3)
MONOCYTES NFR BLD AUTO: 4 %
NEUTROPHILS # BLD AUTO: 7.04 X10(3)/MCL (ref 2.1–9.2)
NEUTROPHILS NFR BLD AUTO: 61.6 %
NRBC BLD AUTO-RTO: 0 %
PLATELET # BLD AUTO: 339 X10(3)/MCL (ref 130–400)
PMV BLD AUTO: 9.9 FL (ref 7.4–10.4)
POCT GLUCOSE: 202 MG/DL (ref 70–110)
POTASSIUM SERPL-SCNC: 3.7 MMOL/L (ref 3.5–5.1)
RBC # BLD AUTO: 5.19 X10(6)/MCL (ref 4.2–5.4)
RSV A 5' UTR RNA NPH QL NAA+PROBE: NOT DETECTED
SARS-COV-2 RNA RESP QL NAA+PROBE: NOT DETECTED
SODIUM SERPL-SCNC: 142 MMOL/L (ref 136–145)
TROPONIN I SERPL-MCNC: 0.09 NG/ML (ref 0–0.04)
TROPONIN I SERPL-MCNC: 0.09 NG/ML (ref 0–0.04)
WBC # SPEC AUTO: 11.4 X10(3)/MCL (ref 4.5–11.5)

## 2023-02-13 PROCEDURE — 1159F PR MEDICATION LIST DOCUMENTED IN MEDICAL RECORD: ICD-10-PCS | Mod: CPTII,,, | Performed by: NURSE PRACTITIONER

## 2023-02-13 PROCEDURE — 93005 ELECTROCARDIOGRAM TRACING: CPT

## 2023-02-13 PROCEDURE — 3008F BODY MASS INDEX DOCD: CPT | Mod: CPTII,,, | Performed by: NURSE PRACTITIONER

## 2023-02-13 PROCEDURE — 0241U COVID/RSV/FLU A&B PCR: CPT

## 2023-02-13 PROCEDURE — 3080F PR MOST RECENT DIASTOLIC BLOOD PRESSURE >= 90 MM HG: ICD-10-PCS | Mod: CPTII,,, | Performed by: NURSE PRACTITIONER

## 2023-02-13 PROCEDURE — 99215 PR OFFICE/OUTPT VISIT, EST, LEVL V, 40-54 MIN: ICD-10-PCS | Mod: S$PBB,,, | Performed by: NURSE PRACTITIONER

## 2023-02-13 PROCEDURE — 3077F SYST BP >= 140 MM HG: CPT | Mod: CPTII,,, | Performed by: NURSE PRACTITIONER

## 2023-02-13 PROCEDURE — 25000003 PHARM REV CODE 250

## 2023-02-13 PROCEDURE — 85610 PROTHROMBIN TIME: CPT | Performed by: INTERNAL MEDICINE

## 2023-02-13 PROCEDURE — 3066F PR DOCUMENTATION OF TREATMENT FOR NEPHROPATHY: ICD-10-PCS | Mod: CPTII,,, | Performed by: NURSE PRACTITIONER

## 2023-02-13 PROCEDURE — 1160F PR REVIEW ALL MEDS BY PRESCRIBER/CLIN PHARMACIST DOCUMENTED: ICD-10-PCS | Mod: CPTII,,, | Performed by: NURSE PRACTITIONER

## 2023-02-13 PROCEDURE — 3066F NEPHROPATHY DOC TX: CPT | Mod: CPTII,,, | Performed by: NURSE PRACTITIONER

## 2023-02-13 PROCEDURE — 96366 THER/PROPH/DIAG IV INF ADDON: CPT

## 2023-02-13 PROCEDURE — 80048 BASIC METABOLIC PNL TOTAL CA: CPT | Performed by: INTERNAL MEDICINE

## 2023-02-13 PROCEDURE — 4010F PR ACE/ARB THEARPY RXD/TAKEN: ICD-10-PCS | Mod: CPTII,,, | Performed by: NURSE PRACTITIONER

## 2023-02-13 PROCEDURE — 25000242 PHARM REV CODE 250 ALT 637 W/ HCPCS: Performed by: INTERNAL MEDICINE

## 2023-02-13 PROCEDURE — 25000003 PHARM REV CODE 250: Performed by: INTERNAL MEDICINE

## 2023-02-13 PROCEDURE — 25000003 PHARM REV CODE 250: Performed by: STUDENT IN AN ORGANIZED HEALTH CARE EDUCATION/TRAINING PROGRAM

## 2023-02-13 PROCEDURE — 3077F PR MOST RECENT SYSTOLIC BLOOD PRESSURE >= 140 MM HG: ICD-10-PCS | Mod: CPTII,,, | Performed by: NURSE PRACTITIONER

## 2023-02-13 PROCEDURE — 1159F MED LIST DOCD IN RCRD: CPT | Mod: CPTII,,, | Performed by: NURSE PRACTITIONER

## 2023-02-13 PROCEDURE — 96372 THER/PROPH/DIAG INJ SC/IM: CPT | Mod: 59

## 2023-02-13 PROCEDURE — 84484 ASSAY OF TROPONIN QUANT: CPT | Performed by: INTERNAL MEDICINE

## 2023-02-13 PROCEDURE — 1160F RVW MEDS BY RX/DR IN RCRD: CPT | Mod: CPTII,,, | Performed by: NURSE PRACTITIONER

## 2023-02-13 PROCEDURE — 85025 COMPLETE CBC W/AUTO DIFF WBC: CPT | Performed by: INTERNAL MEDICINE

## 2023-02-13 PROCEDURE — 20000000 HC ICU ROOM

## 2023-02-13 PROCEDURE — 85730 THROMBOPLASTIN TIME PARTIAL: CPT

## 2023-02-13 PROCEDURE — G0378 HOSPITAL OBSERVATION PER HR: HCPCS

## 2023-02-13 PROCEDURE — 96375 TX/PRO/DX INJ NEW DRUG ADDON: CPT

## 2023-02-13 PROCEDURE — 82962 GLUCOSE BLOOD TEST: CPT

## 2023-02-13 PROCEDURE — 63600175 PHARM REV CODE 636 W HCPCS

## 2023-02-13 PROCEDURE — 3008F PR BODY MASS INDEX (BMI) DOCUMENTED: ICD-10-PCS | Mod: CPTII,,, | Performed by: NURSE PRACTITIONER

## 2023-02-13 PROCEDURE — 3062F POS MACROALBUMINURIA REV: CPT | Mod: CPTII,,, | Performed by: NURSE PRACTITIONER

## 2023-02-13 PROCEDURE — 99291 CRITICAL CARE FIRST HOUR: CPT

## 2023-02-13 PROCEDURE — 3080F DIAST BP >= 90 MM HG: CPT | Mod: CPTII,,, | Performed by: NURSE PRACTITIONER

## 2023-02-13 PROCEDURE — 4010F ACE/ARB THERAPY RXD/TAKEN: CPT | Mod: CPTII,,, | Performed by: NURSE PRACTITIONER

## 2023-02-13 PROCEDURE — 99215 OFFICE O/P EST HI 40 MIN: CPT | Mod: S$PBB,,, | Performed by: NURSE PRACTITIONER

## 2023-02-13 PROCEDURE — 99215 OFFICE O/P EST HI 40 MIN: CPT | Mod: PBBFAC,25 | Performed by: NURSE PRACTITIONER

## 2023-02-13 PROCEDURE — 96365 THER/PROPH/DIAG IV INF INIT: CPT

## 2023-02-13 PROCEDURE — 84484 ASSAY OF TROPONIN QUANT: CPT

## 2023-02-13 PROCEDURE — 3062F PR POS MACROALBUMINURIA RESULT DOCUMENTED/REVIEW: ICD-10-PCS | Mod: CPTII,,, | Performed by: NURSE PRACTITIONER

## 2023-02-13 PROCEDURE — 96376 TX/PRO/DX INJ SAME DRUG ADON: CPT

## 2023-02-13 RX ORDER — HYDRALAZINE HYDROCHLORIDE 25 MG/1
25 TABLET, FILM COATED ORAL 3 TIMES DAILY
Status: DISCONTINUED | OUTPATIENT
Start: 2023-02-13 | End: 2023-02-13

## 2023-02-13 RX ORDER — NITROGLYCERIN 0.4 MG/1
0.4 TABLET SUBLINGUAL
Status: COMPLETED | OUTPATIENT
Start: 2023-02-13 | End: 2023-02-13

## 2023-02-13 RX ORDER — SERTRALINE HYDROCHLORIDE 50 MG/1
50 TABLET, FILM COATED ORAL DAILY
Status: DISCONTINUED | OUTPATIENT
Start: 2023-02-14 | End: 2023-02-17 | Stop reason: HOSPADM

## 2023-02-13 RX ORDER — IBUPROFEN 200 MG
16 TABLET ORAL
Status: DISCONTINUED | OUTPATIENT
Start: 2023-02-13 | End: 2023-02-17

## 2023-02-13 RX ORDER — ENOXAPARIN SODIUM 100 MG/ML
40 INJECTION SUBCUTANEOUS EVERY 24 HOURS
Status: DISCONTINUED | OUTPATIENT
Start: 2023-02-13 | End: 2023-02-13

## 2023-02-13 RX ORDER — ACETAMINOPHEN 325 MG/1
650 TABLET ORAL EVERY 8 HOURS PRN
Status: DISCONTINUED | OUTPATIENT
Start: 2023-02-13 | End: 2023-02-17 | Stop reason: HOSPADM

## 2023-02-13 RX ORDER — DULOXETIN HYDROCHLORIDE 30 MG/1
30 CAPSULE, DELAYED RELEASE ORAL DAILY
Status: DISCONTINUED | OUTPATIENT
Start: 2023-02-14 | End: 2023-02-17 | Stop reason: HOSPADM

## 2023-02-13 RX ORDER — NIFEDIPINE 30 MG/1
90 TABLET, EXTENDED RELEASE ORAL DAILY
Status: DISCONTINUED | OUTPATIENT
Start: 2023-02-14 | End: 2023-02-13

## 2023-02-13 RX ORDER — HEPARIN SODIUM,PORCINE/D5W 25000/250
0-40 INTRAVENOUS SOLUTION INTRAVENOUS CONTINUOUS
Status: DISCONTINUED | OUTPATIENT
Start: 2023-02-13 | End: 2023-02-14

## 2023-02-13 RX ORDER — PANTOPRAZOLE SODIUM 40 MG/1
40 TABLET, DELAYED RELEASE ORAL DAILY
Status: DISCONTINUED | OUTPATIENT
Start: 2023-02-14 | End: 2023-02-17 | Stop reason: HOSPADM

## 2023-02-13 RX ORDER — TALC
6 POWDER (GRAM) TOPICAL NIGHTLY PRN
Status: DISCONTINUED | OUTPATIENT
Start: 2023-02-13 | End: 2023-02-17 | Stop reason: HOSPADM

## 2023-02-13 RX ORDER — NEBIVOLOL 10 MG/1
10 TABLET ORAL DAILY
Status: ON HOLD | COMMUNITY
End: 2023-07-17 | Stop reason: HOSPADM

## 2023-02-13 RX ORDER — CYCLOBENZAPRINE HCL 10 MG
5 TABLET ORAL EVERY 8 HOURS PRN
Status: ON HOLD | COMMUNITY
Start: 2023-02-07 | End: 2023-02-17 | Stop reason: HOSPADM

## 2023-02-13 RX ORDER — VALSARTAN 80 MG/1
320 TABLET ORAL DAILY
Status: DISCONTINUED | OUTPATIENT
Start: 2023-02-14 | End: 2023-02-13

## 2023-02-13 RX ORDER — FLUTICASONE FUROATE AND VILANTEROL 200; 25 UG/1; UG/1
1 POWDER RESPIRATORY (INHALATION) DAILY
Status: DISCONTINUED | OUTPATIENT
Start: 2023-02-14 | End: 2023-02-17 | Stop reason: HOSPADM

## 2023-02-13 RX ORDER — LABETALOL HCL 20 MG/4 ML
20 SYRINGE (ML) INTRAVENOUS
Status: COMPLETED | OUTPATIENT
Start: 2023-02-13 | End: 2023-02-13

## 2023-02-13 RX ORDER — NITROGLYCERIN 0.4 MG/1
0.4 TABLET SUBLINGUAL EVERY 5 MIN PRN
Status: DISCONTINUED | OUTPATIENT
Start: 2023-02-13 | End: 2023-02-17 | Stop reason: HOSPADM

## 2023-02-13 RX ORDER — GLIPIZIDE 10 MG/1
10 TABLET ORAL
COMMUNITY
Start: 2023-01-19 | End: 2024-02-29 | Stop reason: DRUGHIGH

## 2023-02-13 RX ORDER — NAPROXEN SODIUM 220 MG/1
324 TABLET, FILM COATED ORAL
Status: COMPLETED | OUTPATIENT
Start: 2023-02-13 | End: 2023-02-13

## 2023-02-13 RX ORDER — IPRATROPIUM BROMIDE AND ALBUTEROL SULFATE 2.5; .5 MG/3ML; MG/3ML
3 SOLUTION RESPIRATORY (INHALATION) EVERY 4 HOURS PRN
Status: DISCONTINUED | OUTPATIENT
Start: 2023-02-13 | End: 2023-02-17 | Stop reason: HOSPADM

## 2023-02-13 RX ORDER — AMMONIUM LACTATE 12 G/100G
LOTION TOPICAL
COMMUNITY
Start: 2023-01-05

## 2023-02-13 RX ORDER — CLONIDINE HYDROCHLORIDE 0.1 MG/1
0.1 TABLET ORAL DAILY
Status: DISCONTINUED | OUTPATIENT
Start: 2023-02-14 | End: 2023-02-13

## 2023-02-13 RX ORDER — SODIUM CHLORIDE 0.9 % (FLUSH) 0.9 %
10 SYRINGE (ML) INJECTION
Status: DISCONTINUED | OUTPATIENT
Start: 2023-02-13 | End: 2023-02-17 | Stop reason: HOSPADM

## 2023-02-13 RX ORDER — ACETAMINOPHEN 500 MG
1000 TABLET ORAL
Status: COMPLETED | OUTPATIENT
Start: 2023-02-13 | End: 2023-02-13

## 2023-02-13 RX ORDER — ALPRAZOLAM 0.5 MG/1
1 TABLET ORAL 4 TIMES DAILY PRN
Status: DISCONTINUED | OUTPATIENT
Start: 2023-02-13 | End: 2023-02-17 | Stop reason: HOSPADM

## 2023-02-13 RX ORDER — KETOROLAC TROMETHAMINE 30 MG/ML
15 INJECTION, SOLUTION INTRAMUSCULAR; INTRAVENOUS EVERY 6 HOURS PRN
Status: DISCONTINUED | OUTPATIENT
Start: 2023-02-13 | End: 2023-02-13

## 2023-02-13 RX ORDER — AMLODIPINE BESYLATE 2.5 MG/1
2.5 TABLET ORAL DAILY
Status: ON HOLD | COMMUNITY
End: 2023-07-17 | Stop reason: HOSPADM

## 2023-02-13 RX ORDER — DOCUSATE CALCIUM 240 MG
240 CAPSULE ORAL 2 TIMES DAILY
Status: DISCONTINUED | OUTPATIENT
Start: 2023-02-13 | End: 2023-02-17 | Stop reason: HOSPADM

## 2023-02-13 RX ORDER — NITROFURANTOIN 25; 75 MG/1; MG/1
100 CAPSULE ORAL 2 TIMES DAILY
Status: DISCONTINUED | OUTPATIENT
Start: 2023-02-13 | End: 2023-02-15

## 2023-02-13 RX ORDER — HYDRALAZINE HYDROCHLORIDE 20 MG/ML
10 INJECTION INTRAMUSCULAR; INTRAVENOUS EVERY 4 HOURS PRN
Status: DISCONTINUED | OUTPATIENT
Start: 2023-02-13 | End: 2023-02-13

## 2023-02-13 RX ORDER — GLUCAGON 1 MG
1 KIT INJECTION
Status: DISCONTINUED | OUTPATIENT
Start: 2023-02-13 | End: 2023-02-17

## 2023-02-13 RX ORDER — LABETALOL HCL 20 MG/4 ML
10 SYRINGE (ML) INTRAVENOUS EVERY 4 HOURS PRN
Status: DISCONTINUED | OUTPATIENT
Start: 2023-02-13 | End: 2023-02-13

## 2023-02-13 RX ORDER — NICARDIPINE HYDROCHLORIDE 0.2 MG/ML
0-15 INJECTION INTRAVENOUS CONTINUOUS
Status: DISCONTINUED | OUTPATIENT
Start: 2023-02-13 | End: 2023-02-14

## 2023-02-13 RX ORDER — ATORVASTATIN CALCIUM 40 MG/1
40 TABLET, FILM COATED ORAL NIGHTLY
Status: DISCONTINUED | OUTPATIENT
Start: 2023-02-13 | End: 2023-02-17 | Stop reason: HOSPADM

## 2023-02-13 RX ORDER — DOCUSATE SODIUM 250 MG
250 CAPSULE ORAL 2 TIMES DAILY
Status: DISCONTINUED | OUTPATIENT
Start: 2023-02-13 | End: 2023-02-13

## 2023-02-13 RX ORDER — NICARDIPINE HYDROCHLORIDE 0.2 MG/ML
0-15 INJECTION INTRAVENOUS CONTINUOUS
Status: DISCONTINUED | OUTPATIENT
Start: 2023-02-13 | End: 2023-02-13

## 2023-02-13 RX ORDER — NITROFURANTOIN 25; 75 MG/1; MG/1
100 CAPSULE ORAL 2 TIMES DAILY
Qty: 14 CAPSULE | Refills: 0 | Status: SHIPPED | OUTPATIENT
Start: 2023-02-13 | End: 2023-02-20

## 2023-02-13 RX ORDER — INSULIN ASPART 100 [IU]/ML
0-15 INJECTION, SOLUTION INTRAVENOUS; SUBCUTANEOUS
Status: DISCONTINUED | OUTPATIENT
Start: 2023-02-13 | End: 2023-02-17

## 2023-02-13 RX ORDER — IBUPROFEN 200 MG
24 TABLET ORAL
Status: DISCONTINUED | OUTPATIENT
Start: 2023-02-13 | End: 2023-02-17

## 2023-02-13 RX ORDER — CARVEDILOL 3.12 MG/1
3.12 TABLET ORAL 2 TIMES DAILY
Status: DISCONTINUED | OUTPATIENT
Start: 2023-02-13 | End: 2023-02-13

## 2023-02-13 RX ORDER — MORPHINE SULFATE 2 MG/ML
1 INJECTION, SOLUTION INTRAMUSCULAR; INTRAVENOUS EVERY 6 HOURS PRN
Status: DISCONTINUED | OUTPATIENT
Start: 2023-02-13 | End: 2023-02-17 | Stop reason: HOSPADM

## 2023-02-13 RX ORDER — METOPROLOL TARTRATE 50 MG/1
50 TABLET ORAL ONCE
Status: COMPLETED | OUTPATIENT
Start: 2023-02-13 | End: 2023-02-13

## 2023-02-13 RX ORDER — ASPIRIN 81 MG/1
81 TABLET ORAL DAILY
Status: DISCONTINUED | OUTPATIENT
Start: 2023-02-14 | End: 2023-02-17 | Stop reason: HOSPADM

## 2023-02-13 RX ADMIN — INSULIN DETEMIR 40 UNITS: 100 INJECTION, SOLUTION SUBCUTANEOUS at 08:02

## 2023-02-13 RX ADMIN — HEPARIN SODIUM 12 UNITS/KG/HR: 10000 INJECTION, SOLUTION INTRAVENOUS at 06:02

## 2023-02-13 RX ADMIN — DOCUSATE CALCIUM 240 MG: 240 CAPSULE, LIQUID FILLED ORAL at 08:02

## 2023-02-13 RX ADMIN — NICARDIPINE HYDROCHLORIDE 0 MG/HR: 0.2 INJECTION, SOLUTION INTRAVENOUS at 07:02

## 2023-02-13 RX ADMIN — NITROGLYCERIN 0.4 MG: 0.4 TABLET SUBLINGUAL at 02:02

## 2023-02-13 RX ADMIN — ACETAMINOPHEN 1000 MG: 500 TABLET ORAL at 07:02

## 2023-02-13 RX ADMIN — ALPRAZOLAM 1 MG: 0.5 TABLET ORAL at 08:02

## 2023-02-13 RX ADMIN — LABETALOL HYDROCHLORIDE 20 MG: 5 INJECTION, SOLUTION INTRAVENOUS at 04:02

## 2023-02-13 RX ADMIN — NITROFURANTOIN 100 MG: 25; 75 CAPSULE ORAL at 08:02

## 2023-02-13 RX ADMIN — NITROGLYCERIN 1 INCH: 20 OINTMENT TOPICAL at 04:02

## 2023-02-13 RX ADMIN — METOPROLOL TARTRATE 50 MG: 50 TABLET, FILM COATED ORAL at 02:02

## 2023-02-13 RX ADMIN — ASPIRIN 324 MG: 81 TABLET, CHEWABLE ORAL at 04:02

## 2023-02-13 RX ADMIN — NICARDIPINE HYDROCHLORIDE 2.5 MG/HR: 0.2 INJECTION, SOLUTION INTRAVENOUS at 06:02

## 2023-02-13 RX ADMIN — ATORVASTATIN CALCIUM 40 MG: 40 TABLET, FILM COATED ORAL at 08:02

## 2023-02-13 NOTE — LETTER
Patient: Elba Barnes  YOB: 1960  Date: 2/13/2023 Time: 3:17 PM  Location: [facility]    Leaving the Hospital Against Medical Advice    Chart #:[id number]    This will certify that I, the undersigned,    ______________________________________________________________________    A patient in the above named medical center, having requested discharge and removal from the medical center against the advice of my attending physician(s), hereby release [facility], its physicians, officers and employees, severally and individually, from any and all liability of any nature whatsoever for any injury or harm or complication of any kind that may result directly or indirectly, by reason of my terminating my stay as a patient at [facility] and my departure from [facility], and hereby waive any and all rights of action I may now have or later acquire as a result of my voluntary departure from [facility] and the termination of my stay as a patient therein.    This release is made with the full knowledge of the danger that may result from the action which I am taking.      Date:_______________________                         ___________________________                                                                                    Patient/Legal Representative    Witness:        ____________________________                          ___________________________  Nurse                                                                        Physician

## 2023-02-13 NOTE — Clinical Note
Diagnosis: Hypertensive emergency without congestive heart failure [029843]   Admitting Provider:: BOLA INGRAM [[id number]]   Future Attending Provider: BOLA INGRAM [[id number]]   Reason for IP Medical Treatment  (Clinical interventions that can only be accomplished in the IP setting? ) :: drip   Estimated Length of Stay:: 2 midnights   I certify that Inpatient services for greater than or equal to 2 midnights are medically necessary:: Yes   Plans for Post-Acute care--if anticipated (pick the single best option):: A. No post acute care anticipated at this time

## 2023-02-13 NOTE — PROGRESS NOTES
Purvi Rai, VIRGEN   [facility]  [address] [facility] [address]      PATIENT NAME: Elba Barnes  : 1960  DATE: 23  MRN: [id number]      Reason for Visit / Chief Complaint: Hypertension (Lab results, refused vaccines, needs refills )       History of Present Illness / Problem Focused Workflow     Elba Barnes is a 62 y.o. Black or  female presents to the clinic for lab results and uncontrolled DM. PMH uncontrolled DM, HTN, CAD, CHF, DM polyneuropathy, CVA w/right sided deficits (2021), MI, mood disorder (inpt admits x 3), TBI (), lumbar stenosis w/sciatica, noncompliance, GERD, osteoarthritis of multiple sites, tobacco abuse, and morbid obesity. Followed by Dr. Hong, cardio, Marga Galeasfield, psych, and [facility]. , Laly, present during visit today.     Today, pt reports has been to ED x 3, [facility] and [facility], for ongoing chronic right knee pain. Upon entry to exam room, pt states has been having chest pain, radiating down her left arm with associated SOB since Saturday. Did not report to ED for evaluation as she thought I could admit her into the hospital from clinic. BP elevated; states took meds prior to appt. Pt has cardiologist, Dr. Sylvester Hong. Per med review, pt is taking procardia and norvasc and also coreg and bystolic. BP meds ordered per cardio. Report called to KIANNA Wilson in ED. Pt transported in  per Sarah Suarez LPN at 1300.     Review of Systems     Review of Systems     See HPI for details    Constitutional: Denies Change in appetite. Denies Chills. Denies Fever. Denies Night sweats.  Eye: Denies Blurred vision. Denies Discharge. Denies Eye pain.  ENT: Denies Decreased hearing. Denies Sore throat. Denies Swollen glands.  Respiratory: Denies Cough. AdmitsShortness of breath. Denies Shortness of breath with exertion. Denies  "Wheezing.  Cardiovascular: Admits Chest pain at rest. Admits Chest pain with exertion. Denies Irregular heartbeat. Denies Palpitations. Denies Edema.  Gastrointestinal: Denies Abdominal pain. Denies Diarrhea. Denies Nausea. Denies Vomiting. Denies Hematemesis or Hematochezia.  Genitourinary: Denies Dysuria. Denies Urinary frequency. Denies Urinary urgency. Denies Blood in urine.  Endocrine: Denies Cold intolerance. Denies Excessive thirst. Denies Heat intolerance. Denies Weight loss. Denies Weight gain.  Musculoskeletal: Denies Painful joints. Denies Weakness.  Integumentary: Denies Rash. Denies Itching. Denies Dry skin.  Neurologic: Denies Dizziness. Denies Fainting. Denies Headache.  Psychiatric: Denies Depression. Denies Anxiety. Denies Suicidal/Homicidal ideations.    All Other ROS: Negative except as stated in HPI.     Medical / Surgical / Social / Family History       ----------------------------  Anxiety  Arthritis  CVA (cerebral vascular accident)      Comment:  "mini stroke"  Depression  Diabetes mellitus  Heart problem  History of psychiatric hospitalization      Comment:  three(,  and another (years ago")): depression  HTN (hypertension)  Hx of psychiatric care  Hypertension  Pacemaker  Psychiatric exam requested by authority  Psychiatric problem  TBI (traumatic brain injury)  Therapy     Past Surgical History:   Procedure Laterality Date    CARDIAC PACEMAKER PLACEMENT      CARDIAC PACEMAKER REMOVAL      CHOLECYSTECTOMY      HYSTERECTOMY         Social History     Socioeconomic History    Marital status: Single    Number of children: 5   Tobacco Use    Smoking status: Every Day     Packs/day: 0.50     Years: 40.00     Pack years: 20.00     Types: Cigarettes     Last attempt to quit: 2018     Years since quittin.4    Smokeless tobacco: Never   Substance and Sexual Activity    Alcohol use: Yes     Comment: wine on occ    Drug use: No    Sexual activity: Never   Other Topics Concern    " Patient feels they ought to cut down on drinking/drug use No    Patient annoyed by others criticizing their drinking/drug use No    Patient has felt bad or guilty about drinking/drug use No    Patient has had a drink/used drugs as an eye opener in the AM No   Social History Narrative    ** Merged History Encounter **          Social Determinants of Health     Transportation Needs: Unmet Transportation Needs    Lack of Transportation (Medical): Yes    Lack of Transportation (Non-Medical): Yes   Social Connections: Moderately Isolated    Frequency of Communication with Friends and Family: More than three times a week    Frequency of Social Gatherings with Friends and Family: Three times a week    Attends [employer] Services: More than 4 times per year    Active Member of Clubs or Organizations: No    Attends Club or Organization Meetings: Never    Marital Status: Never    Housing Stability: Low Risk     Unable to Pay for Housing in the Last Year: No    Number of Places Lived in the Last Year: 1    Unstable Housing in the Last Year: No        Family History   Problem Relation Age of Onset    Schizophrenia Mother     Bipolar disorder Mother     Bipolar disorder Father         Medications and Allergies     Medications  Current Outpatient Medications   Medication Instructions    albuterol (PROVENTIL/VENTOLIN HFA) 90 mcg/actuation inhaler 1-2 puffs, Inhalation, Every 6 hours PRN, Rescue    ALPRAZolam (XANAX) 1 MG tablet Take 1 tablet by mouth once a day as needed as needed for anxiety    amLODIPine (NORVASC) 2.5 mg, Oral, Daily    ammonium lactate (LAC-HYDRIN) 12 % lotion Apply to dry skin areas on feet, legs, and elbow areas TWICE A DAY AS NEEDED    ascorbic acid (vitamin C) 500 mg, Oral    aspirin (ECOTRIN) 81 mg, Oral, Daily    atorvastatin (LIPITOR) 20 mg, Oral, Nightly    atorvastatin (LIPITOR) 40 mg, Oral, Nightly    b complex vitamins capsule 1 capsule, Oral, Every morning    B-complex with vitamin C (Z-BEC OR  "EQUIV) tablet 1 capsule, Oral, Daily    BASAGLAR KWIKPEN U-100 INSULIN 80 Units, Subcutaneous, 2 times daily with meals    BD ULTRA-FINE SHORT PEN NEEDLE 31 gauge x 5/16" Ndle SMARTSIG:Injection Twice Daily    blood-glucose meter Misc CHECK BLOOD GLUCOSE LEVEL TWICE DAILY.    butalbital-acetaminophen-caffeine -40 mg (FIORICET, ESGIC) -40 mg per tablet 1 tablet, Oral, Every 6 hours PRN    carvediloL (COREG) 3.125 mg, Oral, 2 times daily    cholecalciferol, vitamin D3, (VITAMIN D3) 50 mcg (2,000 unit) Cap capsule 1 capsule, Oral    clonazePAM (KLONOPIN) 0.5 mg, Oral, Nightly    cloNIDine (CATAPRES) 0.1 mg, Oral, Daily    cyclobenzaprine (FLEXERIL) 5 mg, Oral, Every 8 hours PRN    diclofenac sodium (VOLTAREN) 2 g, Topical (Top), 4 times daily    docusate sodium (COLACE) 250 MG capsule 1 capsule, Oral, 2 times daily    DULoxetine (CYMBALTA) 30 mg, Oral    empty container (SHARPS CONTAINER) Misc N/A    esomeprazole (NEXIUM) 40 mg, Oral, Every morning    fluticasone furoate-vilanteroL (BREO ELLIPTA) 200-25 mcg/dose DsDv diskus inhaler 1 puff, Inhalation, Daily, Controller    gabapentin (NEURONTIN) 800 mg, Oral, Every 8 hours    glipiZIDE (GLUCOTROL) 10 mg, Oral    hydrALAZINE (APRESOLINE) 25 mg, Oral, 3 times daily    HYDROcodone-acetaminophen (NORCO) 7.5-325 mg per tablet 1 tablet, Oral, 3 times daily PRN    insulin syringe-needle U-100 1 mL 28 gauge x 1/2" Syrg USE TO inject insulin TWICE DAILY    loratadine (CLARITIN) 10 mg, Oral    magnesium citrate solution 296 mLs, Oral, Daily PRN    multivitamin (THERAGRAN) per tablet 1 tablet, Oral, Daily    naloxone (NARCAN) 4 mg/actuation Spry SMARTSI Spray(s) Both Nares PRN    naproxen (NAPROSYN) 500 mg, Oral, 2 times daily    nebivoloL (BYSTOLIC) 10 mg, Oral, Daily    NIFEdipine (PROCARDIA-XL) 90 mg, Oral    nitrofurantoin, macrocrystal-monohydrate, (MACROBID) 100 MG capsule 100 mg, Oral, 2 times daily    nitroGLYCERIN (NITROSTAT) 0.4 mg, Sublingual, Every 5 min " "PRN    NovoLOG FlexPen U-100 Insulin 20 Units, Subcutaneous, 3 times daily with meals    nystatin (MYCOSTATIN) cream Apply topically 3 (three) times daily. Use under breast    ondansetron (ZOFRAN) 8 mg, Oral, Every 6 hours    ONETOUCH DELICA PLUS LANCET 33 gauge Misc Topical (Top), 4 times daily    ONETOUCH VERIO TEST STRIPS Strp 4 times daily    pantoprazole (PROTONIX) 40 mg, Oral, Daily    polyethylene glycol (GLYCOLAX) 17 g, Oral    sertraline (ZOLOFT) 50 mg, Oral, Daily    sucralfate (CARAFATE) 1 gram tablet TAKE 1 TABLET(1 GRAM) BY MOUTH FOUR TIMES DAILY BEFORE MEALS AND AT NIGHT    valsartan (DIOVAN) 320 mg, Oral         Allergies  Review of patient's allergies indicates:   Allergen Reactions    Pcn [penicillins] Anaphylaxis       Physical Examination     BP (!) 180/90 (BP Location: Left arm, Patient Position: Sitting, BP Method: Medium (Manual))   Pulse 86   Temp 98.2 °F (36.8 °C) (Oral)   Resp 20   Ht 5' 5.98" (1.676 m)   Wt 115.4 kg (254 lb 6.4 oz)   BMI 41.08 kg/m²     Physical Exam  Constitutional:       Appearance: She is morbidly obese.       General: Alert and oriented, No acute distress.  Head: Normocephalic, Atraumatic.  Eye: Pupils are equal, round and reactive to light, Extraocular movements are intact, Sclera non-icteric.  Ears/Nose/Throat: Normal, Mucosa moist,Clear.  Neck/Thyroid: Supple, Non-tender, No carotid bruit, No lymphadenopathy, No JVD, Full range of motion.  Respiratory: Clear to auscultation bilaterally; No wheezes, rales or rhonchi,Non-labored respirations, Symmetrical chest wall expansion.  Cardiovascular: Regular rate and rhythm, S1/S2 normal, No murmurs, rubs or gallops.  Gastrointestinal: Soft, Non-tender, Non-distended, Normal bowel sounds, No palpable organomegaly.  Musculoskeletal: Normal range of motion.  Integumentary: Warm, Dry, Intact, No suspicious lesions or rashes.  Extremities: No clubbing, cyanosis or edema  Neurologic: No focal deficits, Cranial Nerves II-XII " are grossly intact, Motor strength normal upper and lower extremities, Sensory exam intact.  Psychiatric: Normal interaction, Coherent speech, Euthymic mood, Appropriate affect       Results     Lab Results   Component Value Date    WBC 9.5 02/09/2023    HGB 15.5 02/09/2023    HCT 48.0 (H) 02/09/2023     02/09/2023    CHOL 181 02/09/2023    TRIG 195 (H) 02/09/2023    ALT 29 02/09/2023    AST 25 02/09/2023     02/09/2023    K 3.6 02/09/2023    CL 98 09/30/2021    CREATININE 0.91 02/09/2023    BUN 20.0 02/09/2023    CO2 26 02/09/2023    TSH 2.100 02/09/2023    INR 1.0 10/08/2018    HGBA1C 8.7 (H) 02/09/2023       Urine Culture, Routine >/= 100,000 colonies/ml Escherichia coli Abnormal             Resulting Agency: [facility]     Susceptibility     Escherichia coli     Not Specified     Amox/K Clav 4  Sensitive     Ampicillin >=32  Resistant     Cefepime <=0.12  Sensitive     Ceftriaxone <=0.25  Sensitive     Cefuroxime 4  Sensitive     Ciprofloxacin <=0.25  Sensitive     Gentamicin <=1  Sensitive     Levofloxacin <=0.12  Sensitive     Meropenem <=0.25  Sensitive     Nitrofurantoin <=16  Sensitive     Piperacillin/Tazobactam <=4  Sensitive     Tetracycline <=1  Sensitive     Tobramycin <=1  Sensitive     Trimethoprim/Sulfamethoxazole <=20  Sensitive                      Assessment and Plan (including Health Maintenance)     Plan:     1. Chest pain, unspecified type  To ED to eval/treat.  Contact cardiologist for f/u visit and med clarification.      2. Essential hypertension  BP elevated at 180/90.  Reports took med this am.    3. Acute cystitis without hematuria  Rx macrobid BID x 7 days.  Start antibiotics as prescribed.   Complete the full course of the medication.  Report any continuing signs such as nausea/vomiting, visible blood in urine, increased low back or flank pain, worsening burning upon urination after antibiotic completion or fever.  Drink plenty of water.  Avoid soda or carbonated beverages.    Urinate frequently; do not hold urine for extended periods of time.  Wear cotton underwear, avoid tight fitting pants.  Use OTC AZO or pyridium for relief of urinary spasms.  Women: wipe front to back, urinate after sexual intercourse, and avoid scented or irritating feminine products, bubble baths, body washes, bath bombs, and soaps.    - nitrofurantoin, macrocrystal-monohydrate, (MACROBID) 100 MG capsule; Take 1 capsule (100 mg total) by mouth 2 (two) times daily. for 7 days  Dispense: 14 capsule; Refill: 0      Problem List Items Addressed This Visit          Cardiac/Vascular    Essential hypertension (Chronic)    Chest pain       Renal/    Acute cystitis without hematuria    Relevant Medications    nitrofurantoin, macrocrystal-monohydrate, (MACROBID) 100 MG capsule       Endocrine    Uncontrolled type 2 diabetes mellitus with hyperglycemia (Chronic)    Relevant Medications    glipiZIDE (GLUCOTROL) 10 MG tablet    Other Relevant Orders    Lipid Panel    Urinalysis, Reflex to Urine Culture    Hemoglobin A1C     Other Visit Diagnoses       Screening for diabetic retinopathy    -  Primary    Relevant Orders    Diabetic Eye Screening Photo    Dyslipidemia associated with type 2 diabetes mellitus        Relevant Medications    glipiZIDE (GLUCOTROL) 10 MG tablet    Other Relevant Orders    Lipid Panel              Health Maintenance Due   Topic Date Due    Hepatitis C Screening  Never done    COVID-19 Vaccine (1) Never done    Eye Exam  Never done    HIV Screening  Never done    Colorectal Cancer Screening  Never done    Shingles Vaccine (1 of 2) Never done    Pneumococcal Vaccines (Age 0-64) (2 - PCV) 05/09/2017    Influenza Vaccine (1) Never done       Follow up in about 3 months (around 5/13/2023) for Follow up. Labs one week prior to appt. .        Signature:  VIRGEN Hoyt  [facility]  [address] [facility] [address]

## 2023-02-13 NOTE — ED PROVIDER NOTES
"Encounter Date: 2023       History     Chief Complaint   Patient presents with    Chest Pain    Shortness of Breath     C/o right chest pain radiating under left breast. Also c/o left arm paresthesia since the weekend. C/o sob during these intervals. Sent from IM clinic     Presents from IM clinic due to chest pain since Saturday. States pain is constant, anterior chest, associated to numbness of her left arm. Denies shortness of breath, nausea or diaphoresis, States some leg swelling but improved. Took a nitroglycerin Friday, but make the pain worse for which do not took any more.     The history is provided by the patient.   Review of patient's allergies indicates:   Allergen Reactions    Pcn [penicillins] Anaphylaxis     Past Medical History:   Diagnosis Date    Anxiety     Arthritis     CVA (cerebral vascular accident)     "mini stroke"    Depression     Diabetes mellitus     Heart problem     History of psychiatric hospitalization     three(,  and another (years ago")): depression    HTN (hypertension)     Hx of psychiatric care     Hypertension     Pacemaker     Psychiatric exam requested by authority     Psychiatric problem     TBI (traumatic brain injury)     Therapy      Past Surgical History:   Procedure Laterality Date    CARDIAC PACEMAKER PLACEMENT      CARDIAC PACEMAKER REMOVAL      CHOLECYSTECTOMY      HYSTERECTOMY       Family History   Problem Relation Age of Onset    Schizophrenia Mother     Bipolar disorder Mother     Bipolar disorder Father      Social History     Tobacco Use    Smoking status: Every Day     Packs/day: 0.50     Years: 40.00     Pack years: 20.00     Types: Cigarettes     Last attempt to quit: 2018     Years since quittin.4    Smokeless tobacco: Never   Substance Use Topics    Alcohol use: Yes     Comment: wine on occ    Drug use: No     Review of Systems   Constitutional:  Negative for fever.   HENT:  Negative for sore throat.    Respiratory:  Negative for " shortness of breath.    Cardiovascular:  Positive for chest pain and leg swelling.   Gastrointestinal:  Negative for nausea.   Genitourinary:  Negative for dysuria.   Musculoskeletal:  Negative for back pain.   Skin:  Negative for rash.   Neurological:  Positive for numbness. Negative for weakness.   Hematological:  Does not bruise/bleed easily.   All other systems reviewed and are negative.    Physical Exam     Initial Vitals [02/13/23 1309]   BP Pulse Resp Temp SpO2   (!) 190/109 86 20 97.5 °F (36.4 °C) 97 %      MAP       --         Physical Exam    Nursing note and vitals reviewed.  Constitutional: She appears well-developed and well-nourished.   HENT:   Head: Normocephalic and atraumatic.   Mouth/Throat: Oropharynx is clear and moist.   Eyes: Conjunctivae are normal. Pupils are equal, round, and reactive to light.   Neck: Neck supple.   Normal range of motion.  Cardiovascular:  Normal rate, regular rhythm, normal heart sounds and intact distal pulses.           Pulmonary/Chest: Breath sounds normal.   Abdominal: Abdomen is soft. Bowel sounds are normal. She exhibits no distension. There is no abdominal tenderness. There is no rebound and no guarding.   Musculoskeletal:         General: No edema. Normal range of motion.      Cervical back: Normal range of motion and neck supple.     Neurological: She is alert and oriented to person, place, and time. She has normal strength. GCS score is 15. GCS eye subscore is 4. GCS verbal subscore is 5. GCS motor subscore is 6.   Skin: Skin is warm and dry. No rash noted.   Psychiatric: Her behavior is normal.       ED Course   Critical Care    Date/Time: 2/13/2023 4:10 PM  Performed by: Bello Acosta MD  Authorized by: Bello Acosta MD   Direct patient critical care time: 10 minutes  Additional history critical care time: 5 minutes  Ordering / reviewing critical care time: 10 minutes  Documentation critical care time: 5 minutes  Consulting other  physicians critical care time: 5 minutes  Total critical care time (exclusive of procedural time) : 35 minutes  Critical care was necessary to treat or prevent imminent or life-threatening deterioration of the following conditions: cardiac failure.  Critical care was time spent personally by me on the following activities: development of treatment plan with patient or surrogate, discussions with consultants, interpretation of cardiac output measurements, evaluation of patient's response to treatment, examination of patient, obtaining history from patient or surrogate, ordering and performing treatments and interventions, ordering and review of laboratory studies, ordering and review of radiographic studies, pulse oximetry, re-evaluation of patient's condition and review of old charts.      Labs Reviewed   TROPONIN I - Abnormal; Notable for the following components:       Result Value    Troponin-I 0.093 (*)     All other components within normal limits   BASIC METABOLIC PANEL - Abnormal; Notable for the following components:    Glucose Level 157 (*)     All other components within normal limits   CBC W/ AUTO DIFFERENTIAL    Narrative:     The following orders were created for panel order CBC auto differential.  Procedure                               Abnormality         Status                     ---------                               -----------         ------                     CBC with Differential[[id number]]                                                         Please view results for these tests on the individual orders.   CBC WITH DIFFERENTIAL   PROTIME-INR     EKG Readings: (Independently Interpreted)   Initial Reading: No STEMI. Rhythm: Normal Sinus Rhythm. Heart Rate: 90. Ectopy: No Ectopy. Conduction: Normal. ST Segments: Normal ST Segments. T Waves: Normal. Axis: Left Axis Deviation. Clinical Impression: Normal Sinus Rhythm Other Impression: LVH   ECG Results              EKG 12-lead (In process)   Result time 02/13/23 13:29:36      In process by Interface, Lab In [facility] (02/13/23 13:29:36)                   Narrative:    Test Reason : R07.9,    Vent. Rate : 091 BPM     Atrial Rate : 091 BPM     P-R Int : 134 ms          QRS Dur : 098 ms      QT Int : 392 ms       P-R-T Axes : 060 -11 118 degrees     QTc Int : 482 ms    Normal sinus rhythm  LVH with repolarization abnormality  Cannot rule out Septal infarct ,age undetermined  Abnormal ECG  When compared with ECG of 06-JAN-2023 10:31,  Minimal criteria for Septal infarct are now Present    Referred By: [email or web address]   SELF           Confirmed By:                                   Imaging Results              X-Ray Chest PA And Lateral (Final result)  Result time 02/13/23 14:16:57      Final result by Sung Elaine MD (02/13/23 14:16:57)                   Impression:      No acute pulmonary process appreciated.      Electronically signed by: Sung Elaine  Date:    02/13/2023  Time:    14:16               Narrative:    EXAMINATION:  XR CHEST PA AND LATERAL    CLINICAL HISTORY:  Chest pain, unspecified    TECHNIQUE:  PA and lateral radiographs of the chest    COMPARISON:  Radiography 01/06/2023    FINDINGS:  Normal cardiac silhouette. The lungs are well-inflated. No consolidation identified. No pleural effusion or discernible pneumothorax.                                       Medications   labetalol 20 mg/4 mL (5 mg/mL) IV syring (has no administration in time range)   nitroGLYCERIN 2% TD oint ointment 1 inch (has no administration in time range)   aspirin chewable tablet 324 mg (has no administration in time range)   metoprolol tartrate (LOPRESSOR) tablet 50 mg (50 mg Oral Given 2/13/23 1428)   nitroGLYCERIN SL tablet 0.4 mg (0.4 mg Sublingual Given 2/13/23 1428)     Medical Decision Making:   Differential Diagnosis:   Miocardial infarction, pneumothorax, aortic dissection, pulmonary emboli, pneumonia, among others    Independently Interpreted Test(s):   I have  ordered and independently interpreted X-rays - see prior notes.  I have ordered and independently interpreted EKG Reading(s) - see prior notes  Clinical Tests:   Lab Tests: Ordered  Radiological Study: Ordered  Medical Tests: Ordered  Other:   I have discussed this case with another health care provider.                 4:07 PM    Pt stable, asymptomatic at this time. Troponin elevated, hier than her baseline but not in MI threshold but needs to be trended. /143 mmHg for which labetalol is ordered and nitropaste placed. Will admit for cardiology evaluation.       Clinical Impression:   Final diagnoses:  [R07.9] Chest pain  [I16.1] Hypertensive emergency without congestive heart failure (Primary)               Bello Acosta MD  02/13/23 [id number]

## 2023-02-13 NOTE — H&P
[facility] Internal Medicine History and Physical     Date of Admit: 2/13/2023    Chief Complaint     Chest Pain and Shortness of Breath (C/o right chest pain radiating under left breast. Also c/o left arm paresthesia since the weekend. C/o sob during these intervals. Sent from  clinic)      Subjective:      History of Present Illness:  Elba Barnes is a 62 y.o. female who has a PMH of hypertension, poorly controlled diabetes, traumatic brain injury, CHF, CVA with right sided deficits, GERD, osteoarthritis of multiple sites, tobacco abuse, and obesity. The patient presented to [facility] ED from  clinic on 2/13/2023 with a primary complaint of chest pain. She was at her PCP appointment today who recommended her go to ED due to chest pain and elevated blood pressure. She says that her symptoms began 1 day prior to arrival. She describes her pain as crushing, located under the left breast, and it is associated with shortness of breath. She says that yesterday she rated her pain as 10/10 and today it is a 9/10. She received 1 dose of nitro SL in ED which states relieved her pain. She says that her shortness of breath has also been present for the past 24 hours. She also has associated numbness and tingling in her left arm in the ulnar nerve distribution which is new. She states that she has had two heart attacks in the past but has never had stents placed, however she did have a LHC in December 2022 and was told that coronary arteries were clear. 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 She currently smokes 1/2 pack of cigarettes per day and is not interested in quitting. She denies alcohol or other illicit drug use. She currently lives with her daughter but wants to live in a nursing home. During the interview she is somewhat tearful saying that she feels that she is a burden to her children  "and that her body is failing her.     In the ED she was noted to be severely hypertensive at 218/118 max, responded to labetalol and lopressor, on 168/94 on my evaluation. Afebrile, normal respiratory rate. Labs reveal elevated troponin of 0.093. EKG with new T wave inversions lead I. CXR clear. Internal medicine consulted for admission due to concern of NSTEMI and hypertensive urgency.    Past Medical History:  Past Medical History:   Diagnosis Date    Anxiety     Arthritis     CVA (cerebral vascular accident)     "mini stroke"    Depression     Diabetes mellitus     Heart problem     History of psychiatric hospitalization     three(1983, 1995 and another (years ago")): depression    HTN (hypertension)     Hx of psychiatric care     Hypertension     Pacemaker     Psychiatric exam requested by authority     Psychiatric problem     TBI (traumatic brain injury)     Therapy        Past Surgical History:  Past Surgical History:   Procedure Laterality Date    CARDIAC PACEMAKER PLACEMENT      CARDIAC PACEMAKER REMOVAL      CHOLECYSTECTOMY      HYSTERECTOMY         Allergies:  Review of patient's allergies indicates:   Allergen Reactions    Pcn [penicillins] Anaphylaxis       Home Medications:  Prior to Admission medications    Medication Sig Start Date End Date Taking? Authorizing Provider   albuterol (PROVENTIL/VENTOLIN HFA) 90 mcg/actuation inhaler Inhale 1-2 puffs into the lungs every 6 (six) hours as needed for Wheezing. Rescue  Patient not taking: Reported on 2/13/2023 1/28/20   Gt Santillan MD   ALPRAZolam (XANAX) 1 MG tablet Take 1 tablet by mouth once a day as needed as needed for anxiety 12/12/22   Historical Provider   amLODIPine (NORVASC) 2.5 MG tablet Take 2.5 mg by mouth once daily.    Historical Provider   ammonium lactate (LAC-HYDRIN) 12 % lotion Apply to dry skin areas on feet, legs, and elbow areas TWICE A DAY AS NEEDED 1/5/23   Historical Provider   ascorbic acid, vitamin C, 500 mg Chew Take 500 mg " "by mouth.    Historical Provider   aspirin (ECOTRIN) 81 MG EC tablet Take 1 tablet (81 mg total) by mouth once daily. 9/30/21   Markel Torres MD   atorvastatin (LIPITOR) 20 MG tablet Take 1 tablet (20 mg total) by mouth every evening.  Patient not taking: Reported on 2/13/2023 2/11/19   Leandra Monreal NP   atorvastatin (LIPITOR) 40 MG tablet Take 40 mg by mouth every evening. 11/22/22   Historical Provider   b complex vitamins capsule Take 1 capsule by mouth every morning.    Historical Provider   B-complex with vitamin C (Z-BEC OR EQUIV) tablet Take 1 capsule by mouth once daily.    Historical Provider   BASAGLAR KWIKPEN U-100 INSULIN glargine 100 units/mL SubQ pen Inject 80 Units into the skin 2 (two) times daily with meals. 12/23/22   VIRGEN Ho   BD ULTRA-FINE SHORT PEN NEEDLE 31 gauge x 5/16" Ndle SMARTSIG:Injection Twice Daily 10/24/22   Historical Provider   blood-glucose meter Misc CHECK BLOOD GLUCOSE LEVEL TWICE DAILY. 4/6/22   Historical Provider   butalbital-acetaminophen-caffeine -40 mg (FIORICET, ESGIC) -40 mg per tablet Take 1 tablet by mouth every 6 (six) hours as needed for Pain. 2/11/19   Leandra Monreal NP   carvediloL (COREG) 3.125 MG tablet Take 3.125 mg by mouth 2 (two) times daily. 12/1/22   Historical Provider   cholecalciferol, vitamin D3, (VITAMIN D3) 50 mcg (2,000 unit) Cap capsule Take 1 capsule by mouth. 8/13/22   Historical Provider   clonazePAM (KLONOPIN) 0.5 MG tablet Take 0.5 mg by mouth every evening.  12/27/18   Historical Provider   cloNIDine (CATAPRES) 0.1 MG tablet Take 0.1 mg by mouth once daily.    Historical Provider   cyclobenzaprine (FLEXERIL) 10 MG tablet Take 5 mg by mouth every 8 (eight) hours as needed. 2/7/23   Historical Provider   diclofenac sodium (VOLTAREN) 1 % Gel Apply 2 g topically 4 (four) times daily. 12/21/22   VIRGEN Ho   docusate sodium (COLACE) 250 MG capsule Take 1 capsule by mouth 2 (two) times daily. 4/20/22   " "Historical Provider   DULoxetine (CYMBALTA) 30 MG capsule Take 30 mg by mouth. 22   Historical Provider   empty container (SHARPS CONTAINER) Misc N/A 22   Historical Provider   esomeprazole (NEXIUM) 40 MG capsule Take 40 mg by mouth every morning. 22   Historical Provider   fluticasone furoate-vilanteroL (BREO ELLIPTA) 200-25 mcg/dose DsDv diskus inhaler Inhale 1 puff into the lungs once daily. Controller 22   PatiVIRGEN Yarbrough   gabapentin (NEURONTIN) 800 MG tablet Take 1 tablet (800 mg total) by mouth every 8 (eight) hours. 22   VIRGEN Ho   glipiZIDE (GLUCOTROL) 10 MG tablet Take 10 mg by mouth. 23   Historical Provider   hydrALAZINE (APRESOLINE) 25 MG tablet Take 25 mg by mouth 3 (three) times daily. 22   Historical Provider   HYDROcodone-acetaminophen (NORCO) 7.5-325 mg per tablet Take 1 tablet by mouth 3 (three) times daily as needed for Pain.  3/11/19   Historical Provider   insulin syringe-needle U-100 1 mL 28 gauge x 1/2" Syrg USE TO inject insulin TWICE DAILY 22   Historical Provider   loratadine (CLARITIN) 10 mg tablet Take 10 mg by mouth. 10/26/22 7/23/23  Historical Provider   magnesium citrate solution Take 296 mLs by mouth daily as needed. 22   Historical Provider   multivitamin (THERAGRAN) per tablet Take 1 tablet by mouth once daily.    Historical Provider   naloxone (NARCAN) 4 mg/actuation Spry SMARTSI Spray(s) Both Nares PRN 10/12/22   Historical Provider   naproxen (NAPROSYN) 500 MG tablet Take 500 mg by mouth 2 (two) times daily. 22   Historical Provider   nebivoloL (BYSTOLIC) 10 MG Tab Take 10 mg by mouth once daily.    Historical Provider   NIFEdipine (PROCARDIA-XL) 90 MG (OSM) 24 hr tablet Take 90 mg by mouth. 22   Historical Provider   nitrofurantoin, macrocrystal-monohydrate, (MACROBID) 100 MG capsule Take 1 capsule (100 mg total) by mouth 2 (two) times daily. for 7 days 23  Patience VIRGEN Rai "   nitroGLYCERIN (NITROSTAT) 0.4 MG SL tablet Place 0.4 mg under the tongue every 5 (five) minutes as needed for Chest pain.    Historical Provider   NOVOLOG FLEXPEN U-100 INSULIN 100 unit/mL (3 mL) InPn pen Inject 20 Units into the skin 3 (three) times daily with meals. 12/23/22   VIRGEN Ho   nystatin (MYCOSTATIN) cream Apply topically 3 (three) times daily. Use under breast 10/26/22   Historical Provider   ondansetron (ZOFRAN) 8 MG tablet Take 8 mg by mouth every 6 (six) hours. 11/7/22   Historical Provider   ONETOUCH DELICA PLUS LANCET 33 gauge Misc Apply topically 4 (four) times daily. 10/27/22   Historical Provider   ONETOUCH VERIO TEST STRIPS Strp 4 (four) times daily. 12/19/22   Historical Provider   pantoprazole (PROTONIX) 40 MG tablet Take 1 tablet (40 mg total) by mouth once daily. 2/11/19   Leandra Monreal NP   polyethylene glycol (GLYCOLAX) 17 gram/dose powder Take 17 g by mouth. 4/20/22   Historical Provider   sertraline (ZOLOFT) 50 MG tablet Take 50 mg by mouth once daily.    Historical Provider   sucralfate (CARAFATE) 1 gram tablet TAKE 1 TABLET(1 GRAM) BY MOUTH FOUR TIMES DAILY BEFORE MEALS AND AT NIGHT  Patient taking differently: Take 1 g by mouth 4 (four) times daily before meals and nightly. 2/11/19   Leandra Monreal NP   valsartan (DIOVAN) 160 MG tablet Take 320 mg by mouth. 11/22/22   Historical Provider   amLODIPine (NORVASC) 2.5 MG tablet Take 2.5 mg by mouth every evening. 12/1/22 2/13/23  Historical Provider   benzonatate (TESSALON) 100 MG capsule Take 2 capsules (200 mg total) by mouth 3 (three) times daily as needed for Cough. 1/6/23 2/13/23  Markel Rodriguez MD   BYSTOLIC 10 mg Tab Take 1 tablet (10 mg total) by mouth once daily. 9/30/21 2/13/23  Markel Torres MD       Family History:  Family History   Problem Relation Age of Onset    Schizophrenia Mother     Bipolar disorder Mother     Bipolar disorder Father        Social History:  Social History     Tobacco Use     "Smoking status: Every Day     Packs/day: 0.50     Years: 40.00     Pack years: 20.00     Types: Cigarettes     Last attempt to quit: 2018     Years since quittin.4    Smokeless tobacco: Never   Substance Use Topics    Alcohol use: Yes     Comment: wine on occ    Drug use: No       Review of Systems:  Review of Systems   Constitutional:  Positive for malaise/fatigue. Negative for chills and fever.   HENT:  Negative for congestion and sinus pain.    Respiratory:  Positive for cough and shortness of breath. Negative for wheezing.    Cardiovascular:  Positive for chest pain. Negative for palpitations, orthopnea and leg swelling.   Gastrointestinal:  Positive for abdominal pain. Negative for constipation, diarrhea, nausea and vomiting.   Genitourinary:  Positive for dysuria. Negative for hematuria.   Musculoskeletal:  Positive for falls and joint pain. Negative for myalgias.   Skin:  Negative for rash.   Neurological:  Positive for tingling, sensory change and focal weakness. Negative for dizziness, speech change, seizures, loss of consciousness, weakness and headaches.   Psychiatric/Behavioral:  Positive for depression. Negative for suicidal ideas. The patient is nervous/anxious.             Objective:   Last 24 Hour Vital Signs:  Vitals  BP: (!) 168/94  Temp: 97.5 °F (36.4 °C)  Temp src: Temporal  Pulse: 96  Resp: (!) 22  SpO2: 95 %  Height: 5' 5" (165.1 cm)  Weight: 115.5 kg (254 lb 10.1 oz)    Physical Examination:  General: Patient resting comfortably, in no acute distress   Eye: pupils equal, EOMI, clear conjunctiva, eyelids normal  HENT: Head-normocephalic. Healed scar with overlying hair loss left-middle frontal bone with depression  Neck: full range of motion, no thyromegaly or lymphadenopathy, trachea midline, supple  Respiratory: clear to auscultation bilaterally without wheezes, rales, rhonchi  Cardiovascular: regular rate and rhythm without murmurs.  No gallops or rubs, no JVD.   Gastrointestinal: " soft, non-tender, non-distended with normal bowel sounds, without masses to palpation  Genitourinary: no CVA tenderness to palpation. Mild suprapubic tenderness  Musculoskeletal: moves all extremities purposefully, no signs of trauma or bruising  Integumentary: no rashes or skin lesions present  Neurologic: motor/sensory function intact, strength in left upper extremity 4/5, all other extremities strength 5/5. Sensation equal and intact bilateral LE and UE. Cranial nerves II-XII in tact.  Psychiatric:  alert and oriented, cooperative with exam, tearful during interview, reports depression but denies suicidal/homicidal ideation        Laboratory:  Most Recent Data:  Most Recent Data:  CBC:   Lab Results   Component Value Date    WBC 11.4 02/13/2023    HGB 14.8 02/13/2023    HCT 45.3 02/13/2023     02/13/2023    MCV 87.3 02/13/2023    RDW 14.7 02/13/2023     WBC Differential:   Recent Labs   Lab 02/09/23  1259 02/13/23  1624   WBC 9.5 11.4   HGB 15.5 14.8   HCT 48.0* 45.3    339   MCV 87.1 87.3     BMP:   Lab Results   Component Value Date     02/13/2023    K 3.7 02/13/2023    CL 98 09/30/2021    CO2 29 02/13/2023    BUN 18.0 02/13/2023    CREATININE 0.94 02/13/2023     (HH) 09/30/2021    CALCIUM 9.7 02/13/2023    PHOS 2.5 12/30/2022     LFTs:   Lab Results   Component Value Date    PROT 8.6 (H) 09/30/2021    ALBUMIN 3.3 (L) 02/09/2023    BILITOT 0.3 02/09/2023    AST 25 02/09/2023    ALKPHOS 71 02/09/2023    ALT 29 02/09/2023       FLP:   Lab Results   Component Value Date    CHOL 181 02/09/2023    HDL 45 02/09/2023    TRIG 195 (H) 02/09/2023     DM:   Lab Results   Component Value Date    HGBA1C 8.7 (H) 02/09/2023    HGBA1C 10.5 (H) 10/18/2022    HGBA1C 11.5 (H) 03/23/2022    CREATININE 0.94 02/13/2023     Thyroid:   Lab Results   Component Value Date    TSH 2.100 02/09/2023     Anemia: No results found for: IRON, TIBC, FERRITIN, CMRVSSQV17, FOLATE  Cardiac:   Lab Results   Component Value  Date    TROPONINI 0.093 (H) 02/13/2023    BNP 27.9 01/06/2023     Urinalysis:   Lab Results   Component Value Date    LABURIN >/= 100,000 colonies/ml Escherichia coli (A) 02/09/2023    COLORU Yellow 09/30/2021    PHUA 5.5 02/09/2023    SPECGRAV 1.015 09/30/2021    NITRITE Negative 09/30/2021    KETONESU Negative 09/30/2021    UROBILINOGEN Normal 02/09/2023    WBCUA 51-99 (A) 02/09/2023       Radiology:  Imaging Results              X-Ray Chest PA And Lateral (Final result)  Result time 02/13/23 14:16:57      Final result by Sung Elaine MD (02/13/23 14:16:57)                   Impression:      No acute pulmonary process appreciated.      Electronically signed by: Sung Elaine  Date:    02/13/2023  Time:    14:16               Narrative:    EXAMINATION:  XR CHEST PA AND LATERAL    CLINICAL HISTORY:  Chest pain, unspecified    TECHNIQUE:  PA and lateral radiographs of the chest    COMPARISON:  Radiography 01/06/2023    FINDINGS:  Normal cardiac silhouette. The lungs are well-inflated. No consolidation identified. No pleural effusion or discernible pneumothorax.                                           Assessment & Plan:     Hypertensive urgency  - On admission max /118  - Denies headache, blurry vision, confusion, nausea but does report chest pain and shortness of breath  - Reports compliance with BP medications, took morning of admission per patient  - Home meds include coreg, clonidine, hydralazine, bystolic, nifedipine, valsartan  - Will initiate nicardipine gtt with goal of lowering 25% in first 24 hours  - Will add home antihypertensives back when appropriate  - Head CT pending    Atypical chest pain  Elevated troponin  - Describes chest pain that is crushing in nature for 24 hours  - Troponin 0.09 on admission, will trend [id number] for 3 occurrences  - Was loaded with ASA in ED  - Heparin gtt  - EKG with new T wave inversion lead I, will trend q6h for 3 occurrences  - Pain control with tylenol for mild  pain, morphine for moderate/severe pain  - Nitro SL prn    Diabetes mellitus  - Long history of poorly controlled DM  - At home takes 80 units long acting insulin BID  - Hold home oral antihyperglycemic agents  - High dose SSI  - 40 units BID while inpatient    Depression, anxiety  - History of mood disorders and depression well documented in chart  - Home meds include cymbalta and zoloft  - Tearfulness during interview, denies suicidal/homicidal ideation  - Continue home medications, consider psych consult if mood worsens or if SI/HI develops  - Xanax 1 mg prn for anxiety    UTI  - Recent UA + leuk esterase, culture + E coli sensitive to macrobid  - Was prescribed 7 day course of macrobid by PCP today, will continue while inpatient     HLD  - In the past TG > 700  - Will repeat lipid panel with AM labs  - Continue atorvastatin 40 mg nightly    Tobacco use  - Has not had formal COPD diagnosis  - Home meds include breo ellipta and albuterol, will continue   - Duonebs prn    CODE STATUS: Full Code  Access: Peripheral  Antibiotics: none  Diet: Diabetic  DVT Prophylaxis: Heparin  GI Prophylaxis: proton pump inhibitor per orders  Fluids: none     Dispo: Admit to ICU for hypertensive emergency. On nicardipine gtt.      Ignacia Lopez, DO  [facility]  HO-1

## 2023-02-14 LAB
ALBUMIN SERPL-MCNC: 2.7 G/DL (ref 3.4–4.8)
ALBUMIN/GLOB SERPL: 0.7 RATIO (ref 1.1–2)
ALP SERPL-CCNC: 63 UNIT/L (ref 40–150)
ALT SERPL-CCNC: 30 UNIT/L (ref 0–55)
APTT PPP: 111.4 SECONDS
APTT PPP: 39.2 SECONDS
APTT PPP: 57.9 SECONDS
APTT PPP: 60.6 SECONDS
AST SERPL-CCNC: 19 UNIT/L (ref 5–34)
AV INDEX (PROSTH): 0.49
AV MEAN GRADIENT: 5 MMHG
AV PEAK GRADIENT: 11 MMHG
AV VALVE AREA: 2.15 CM2
AV VELOCITY RATIO: 0.52
BASOPHILS # BLD AUTO: 0.05 X10(3)/MCL (ref 0–0.2)
BASOPHILS # BLD AUTO: 0.05 X10(3)/MCL (ref 0–0.2)
BASOPHILS NFR BLD AUTO: 0.5 %
BASOPHILS NFR BLD AUTO: 0.5 %
BILIRUBIN DIRECT+TOT PNL SERPL-MCNC: 0.2 MG/DL
BSA FOR ECHO PROCEDURE: 2.35 M2
BUN SERPL-MCNC: 22.3 MG/DL (ref 9.8–20.1)
CALCIUM SERPL-MCNC: 8.9 MG/DL (ref 8.4–10.2)
CHLORIDE SERPL-SCNC: 104 MMOL/L (ref 98–107)
CHOLEST SERPL-MCNC: 173 MG/DL
CHOLEST/HDLC SERPL: 6 {RATIO} (ref 0–5)
CO2 SERPL-SCNC: 27 MMOL/L (ref 23–31)
CREAT SERPL-MCNC: 0.94 MG/DL (ref 0.55–1.02)
CV ECHO LV RWT: 0.83 CM
DOP CALC AO PEAK VEL: 1.63 M/S
DOP CALC AO VTI: 30.2 CM
DOP CALC LVOT AREA: 4.4 CM2
DOP CALC LVOT DIAMETER: 2.37 CM
DOP CALC LVOT PEAK VEL: 0.84 M/S
DOP CALC LVOT STROKE VOLUME: 64.82 CM3
DOP CALC MV VTI: 23.6 CM
DOP CALCLVOT PEAK VEL VTI: 14.7 CM
E WAVE DECELERATION TIME: 131.05 MSEC
E/A RATIO: 0.75
ECHO LV POSTERIOR WALL: 2 CM (ref 0.6–1.1)
EJECTION FRACTION: 51 %
EOSINOPHIL # BLD AUTO: 0.24 X10(3)/MCL (ref 0–0.9)
EOSINOPHIL # BLD AUTO: 0.26 X10(3)/MCL (ref 0–0.9)
EOSINOPHIL NFR BLD AUTO: 2.6 %
EOSINOPHIL NFR BLD AUTO: 2.6 %
ERYTHROCYTE [DISTWIDTH] IN BLOOD BY AUTOMATED COUNT: 14.6 % (ref 11.5–17)
ERYTHROCYTE [DISTWIDTH] IN BLOOD BY AUTOMATED COUNT: 14.8 % (ref 11.5–17)
FRACTIONAL SHORTENING: 25 % (ref 28–44)
GFR SERPLBLD CREATININE-BSD FMLA CKD-EPI: >60 MLS/MIN/1.73/M2
GLOBULIN SER-MCNC: 4 GM/DL (ref 2.4–3.5)
GLUCOSE SERPL-MCNC: 225 MG/DL (ref 82–115)
HCT VFR BLD AUTO: 38.5 % (ref 37–47)
HCT VFR BLD AUTO: 44.1 % (ref 37–47)
HDLC SERPL-MCNC: 31 MG/DL (ref 35–60)
HGB BLD-MCNC: 12.5 GM/DL (ref 12–16)
HGB BLD-MCNC: 14.2 GM/DL (ref 12–16)
IMM GRANULOCYTES # BLD AUTO: 0.05 X10(3)/MCL (ref 0–0.04)
IMM GRANULOCYTES # BLD AUTO: 0.06 X10(3)/MCL (ref 0–0.04)
IMM GRANULOCYTES NFR BLD AUTO: 0.5 %
IMM GRANULOCYTES NFR BLD AUTO: 0.7 %
INTERVENTRICULAR SEPTUM: 1.85 CM (ref 0.6–1.1)
LDLC SERPL CALC-MCNC: 85 MG/DL (ref 50–140)
LEFT ATRIUM SIZE: 4.09 CM
LEFT INTERNAL DIMENSION IN SYSTOLE: 3.63 CM (ref 2.1–4)
LEFT VENTRICLE DIASTOLIC VOLUME INDEX: 48.82 ML/M2
LEFT VENTRICLE DIASTOLIC VOLUME: 109.35 ML
LEFT VENTRICLE MASS INDEX: 201 G/M2
LEFT VENTRICLE SYSTOLIC VOLUME INDEX: 24.8 ML/M2
LEFT VENTRICLE SYSTOLIC VOLUME: 55.61 ML
LEFT VENTRICULAR INTERNAL DIMENSION IN DIASTOLE: 4.83 CM (ref 3.5–6)
LEFT VENTRICULAR MASS: 450.96 G
LVOT MG: 1.22 MMHG
LVOT MV: 0.5 CM/S
LYMPHOCYTES # BLD AUTO: 2.97 X10(3)/MCL (ref 0.6–4.6)
LYMPHOCYTES # BLD AUTO: 3.78 X10(3)/MCL (ref 0.6–4.6)
LYMPHOCYTES NFR BLD AUTO: 32.6 %
LYMPHOCYTES NFR BLD AUTO: 37.6 %
MAGNESIUM SERPL-MCNC: 2 MG/DL (ref 1.6–2.6)
MCH RBC QN AUTO: 28.4 PG
MCH RBC QN AUTO: 28.5 PG
MCHC RBC AUTO-ENTMCNC: 32.2 MG/DL (ref 33–36)
MCHC RBC AUTO-ENTMCNC: 32.5 MG/DL (ref 33–36)
MCV RBC AUTO: 87.9 FL (ref 80–94)
MCV RBC AUTO: 88.2 FL (ref 80–94)
MONOCYTES # BLD AUTO: 0.53 X10(3)/MCL (ref 0.1–1.3)
MONOCYTES # BLD AUTO: 0.55 X10(3)/MCL (ref 0.1–1.3)
MONOCYTES NFR BLD AUTO: 5.5 %
MONOCYTES NFR BLD AUTO: 5.8 %
MV MEAN GRADIENT: 2 MMHG
MV PEAK A VEL: 1.05 M/S
MV PEAK E VEL: 0.79 M/S
MV PEAK GRADIENT: 4 MMHG
MV STENOSIS PRESSURE HALF TIME: 38.01 MS
MV VALVE AREA BY CONTINUITY EQUATION: 2.75 CM2
MV VALVE AREA P 1/2 METHOD: 5.79 CM2
NEUTROPHILS # BLD AUTO: 5.26 X10(3)/MCL (ref 2.1–9.2)
NEUTROPHILS # BLD AUTO: 5.35 X10(3)/MCL (ref 2.1–9.2)
NEUTROPHILS NFR BLD AUTO: 53.3 %
NEUTROPHILS NFR BLD AUTO: 57.8 %
NRBC BLD AUTO-RTO: 0 %
NRBC BLD AUTO-RTO: 0 %
PHOSPHATE SERPL-MCNC: 3.8 MG/DL (ref 2.3–4.7)
PISA TR MAX VEL: 1.37 M/S
PLATELET # BLD AUTO: 287 X10(3)/MCL (ref 130–400)
PLATELET # BLD AUTO: 335 X10(3)/MCL (ref 130–400)
PMV BLD AUTO: 10.5 FL (ref 7.4–10.4)
PMV BLD AUTO: 9.6 FL (ref 7.4–10.4)
POCT GLUCOSE: 173 MG/DL (ref 70–110)
POCT GLUCOSE: 173 MG/DL (ref 70–110)
POCT GLUCOSE: 195 MG/DL (ref 70–110)
POCT GLUCOSE: 221 MG/DL (ref 70–110)
POTASSIUM SERPL-SCNC: 3.6 MMOL/L (ref 3.5–5.1)
PROT SERPL-MCNC: 6.7 GM/DL (ref 5.8–7.6)
RBC # BLD AUTO: 4.38 X10(6)/MCL (ref 4.2–5.4)
RBC # BLD AUTO: 5 X10(6)/MCL (ref 4.2–5.4)
RIGHT VENTRICULAR END-DIASTOLIC DIMENSION: 2.62 CM
SODIUM SERPL-SCNC: 139 MMOL/L (ref 136–145)
TR MAX PG: 8 MMHG
TRIGL SERPL-MCNC: 283 MG/DL (ref 37–140)
TROPONIN I SERPL-MCNC: 0.04 NG/ML (ref 0–0.04)
TROPONIN I SERPL-MCNC: 0.08 NG/ML (ref 0–0.04)
VLDLC SERPL CALC-MCNC: 57 MG/DL
WBC # SPEC AUTO: 10 X10(3)/MCL (ref 4.5–11.5)
WBC # SPEC AUTO: 9.1 X10(3)/MCL (ref 4.5–11.5)

## 2023-02-14 PROCEDURE — 97166 OT EVAL MOD COMPLEX 45 MIN: CPT

## 2023-02-14 PROCEDURE — 96367 TX/PROPH/DG ADDL SEQ IV INF: CPT

## 2023-02-14 PROCEDURE — 27000221 HC OXYGEN, UP TO 24 HOURS

## 2023-02-14 PROCEDURE — 99223 PR INITIAL HOSPITAL CARE,LEVL III: ICD-10-PCS | Mod: GC,,, | Performed by: INTERNAL MEDICINE

## 2023-02-14 PROCEDURE — 96376 TX/PRO/DX INJ SAME DRUG ADON: CPT

## 2023-02-14 PROCEDURE — 96372 THER/PROPH/DIAG INJ SC/IM: CPT | Performed by: STUDENT IN AN ORGANIZED HEALTH CARE EDUCATION/TRAINING PROGRAM

## 2023-02-14 PROCEDURE — 93005 ELECTROCARDIOGRAM TRACING: CPT

## 2023-02-14 PROCEDURE — 25000003 PHARM REV CODE 250: Performed by: STUDENT IN AN ORGANIZED HEALTH CARE EDUCATION/TRAINING PROGRAM

## 2023-02-14 PROCEDURE — 63600175 PHARM REV CODE 636 W HCPCS: Performed by: STUDENT IN AN ORGANIZED HEALTH CARE EDUCATION/TRAINING PROGRAM

## 2023-02-14 PROCEDURE — 63600175 PHARM REV CODE 636 W HCPCS

## 2023-02-14 PROCEDURE — 80053 COMPREHEN METABOLIC PANEL: CPT

## 2023-02-14 PROCEDURE — 25000003 PHARM REV CODE 250

## 2023-02-14 PROCEDURE — 84484 ASSAY OF TROPONIN QUANT: CPT

## 2023-02-14 PROCEDURE — 85730 THROMBOPLASTIN TIME PARTIAL: CPT | Performed by: INTERNAL MEDICINE

## 2023-02-14 PROCEDURE — 99223 1ST HOSP IP/OBS HIGH 75: CPT | Mod: GC,,, | Performed by: INTERNAL MEDICINE

## 2023-02-14 PROCEDURE — 80061 LIPID PANEL: CPT

## 2023-02-14 PROCEDURE — 97162 PT EVAL MOD COMPLEX 30 MIN: CPT

## 2023-02-14 PROCEDURE — 96375 TX/PRO/DX INJ NEW DRUG ADDON: CPT

## 2023-02-14 PROCEDURE — 96365 THER/PROPH/DIAG IV INF INIT: CPT

## 2023-02-14 PROCEDURE — 96372 THER/PROPH/DIAG INJ SC/IM: CPT | Mod: 59

## 2023-02-14 PROCEDURE — 83735 ASSAY OF MAGNESIUM: CPT

## 2023-02-14 PROCEDURE — 99900035 HC TECH TIME PER 15 MIN (STAT)

## 2023-02-14 PROCEDURE — 25000242 PHARM REV CODE 250 ALT 637 W/ HCPCS

## 2023-02-14 PROCEDURE — 85025 COMPLETE CBC W/AUTO DIFF WBC: CPT

## 2023-02-14 PROCEDURE — 85025 COMPLETE CBC W/AUTO DIFF WBC: CPT | Mod: 59 | Performed by: STUDENT IN AN ORGANIZED HEALTH CARE EDUCATION/TRAINING PROGRAM

## 2023-02-14 PROCEDURE — 94640 AIRWAY INHALATION TREATMENT: CPT

## 2023-02-14 PROCEDURE — 85730 THROMBOPLASTIN TIME PARTIAL: CPT

## 2023-02-14 PROCEDURE — 84100 ASSAY OF PHOSPHORUS: CPT

## 2023-02-14 PROCEDURE — G0378 HOSPITAL OBSERVATION PER HR: HCPCS

## 2023-02-14 RX ORDER — HYDRALAZINE HYDROCHLORIDE 50 MG/1
100 TABLET, FILM COATED ORAL EVERY 8 HOURS
Status: DISCONTINUED | OUTPATIENT
Start: 2023-02-14 | End: 2023-02-17 | Stop reason: HOSPADM

## 2023-02-14 RX ORDER — CARVEDILOL 12.5 MG/1
12.5 TABLET ORAL 2 TIMES DAILY
Status: DISCONTINUED | OUTPATIENT
Start: 2023-02-14 | End: 2023-02-17 | Stop reason: HOSPADM

## 2023-02-14 RX ORDER — VALSARTAN 80 MG/1
160 TABLET ORAL DAILY
Status: DISCONTINUED | OUTPATIENT
Start: 2023-02-14 | End: 2023-02-17 | Stop reason: HOSPADM

## 2023-02-14 RX ORDER — HEPARIN SODIUM,PORCINE/D5W 25000/250
0-40 INTRAVENOUS SOLUTION INTRAVENOUS CONTINUOUS
Status: DISCONTINUED | OUTPATIENT
Start: 2023-02-14 | End: 2023-02-14

## 2023-02-14 RX ORDER — BUTALBITAL, ACETAMINOPHEN AND CAFFEINE 50; 325; 40 MG/1; MG/1; MG/1
1 TABLET ORAL ONCE
Status: COMPLETED | OUTPATIENT
Start: 2023-02-14 | End: 2023-02-14

## 2023-02-14 RX ORDER — ENOXAPARIN SODIUM 100 MG/ML
40 INJECTION SUBCUTANEOUS EVERY 24 HOURS
Status: DISCONTINUED | OUTPATIENT
Start: 2023-02-14 | End: 2023-02-14

## 2023-02-14 RX ORDER — CARVEDILOL 3.12 MG/1
6.25 TABLET ORAL 2 TIMES DAILY
Status: DISCONTINUED | OUTPATIENT
Start: 2023-02-14 | End: 2023-02-14

## 2023-02-14 RX ORDER — NIFEDIPINE 30 MG/1
90 TABLET, EXTENDED RELEASE ORAL DAILY
Status: DISCONTINUED | OUTPATIENT
Start: 2023-02-14 | End: 2023-02-17 | Stop reason: HOSPADM

## 2023-02-14 RX ORDER — ENOXAPARIN SODIUM 100 MG/ML
40 INJECTION SUBCUTANEOUS EVERY 24 HOURS
Status: DISCONTINUED | OUTPATIENT
Start: 2023-02-14 | End: 2023-02-17 | Stop reason: HOSPADM

## 2023-02-14 RX ORDER — HYDRALAZINE HYDROCHLORIDE 25 MG/1
25 TABLET, FILM COATED ORAL EVERY 8 HOURS
Status: DISCONTINUED | OUTPATIENT
Start: 2023-02-14 | End: 2023-02-14

## 2023-02-14 RX ORDER — LABETALOL HCL 20 MG/4 ML
20 SYRINGE (ML) INTRAVENOUS EVERY 4 HOURS PRN
Status: DISCONTINUED | OUTPATIENT
Start: 2023-02-14 | End: 2023-02-17 | Stop reason: HOSPADM

## 2023-02-14 RX ADMIN — INSULIN DETEMIR 40 UNITS: 100 INJECTION, SOLUTION SUBCUTANEOUS at 09:02

## 2023-02-14 RX ADMIN — NITROFURANTOIN 100 MG: 25; 75 CAPSULE ORAL at 08:02

## 2023-02-14 RX ADMIN — MORPHINE SULFATE 1 MG: 2 INJECTION, SOLUTION INTRAMUSCULAR; INTRAVENOUS at 03:02

## 2023-02-14 RX ADMIN — NITROFURANTOIN 100 MG: 25; 75 CAPSULE ORAL at 09:02

## 2023-02-14 RX ADMIN — HEPARIN SODIUM 15 UNITS/KG/HR: 10000 INJECTION, SOLUTION INTRAVENOUS at 01:02

## 2023-02-14 RX ADMIN — INSULIN ASPART 3 UNITS: 100 INJECTION, SOLUTION INTRAVENOUS; SUBCUTANEOUS at 07:02

## 2023-02-14 RX ADMIN — ASPIRIN 81 MG: 81 TABLET, COATED ORAL at 08:02

## 2023-02-14 RX ADMIN — FLUTICASONE FUROATE AND VILANTEROL TRIFENATATE 1 PUFF: 200; 25 POWDER RESPIRATORY (INHALATION) at 08:02

## 2023-02-14 RX ADMIN — BUTALBITAL, ACETAMINOPHEN AND CAFFEINE 1 TABLET: 50; 325; 40 TABLET ORAL at 05:02

## 2023-02-14 RX ADMIN — DULOXETINE HYDROCHLORIDE 30 MG: 30 CAPSULE, DELAYED RELEASE ORAL at 08:02

## 2023-02-14 RX ADMIN — SERTRALINE HYDROCHLORIDE 50 MG: 50 TABLET ORAL at 08:02

## 2023-02-14 RX ADMIN — HYDRALAZINE HYDROCHLORIDE 100 MG: 50 TABLET ORAL at 09:02

## 2023-02-14 RX ADMIN — CARVEDILOL 6.25 MG: 3.12 TABLET, FILM COATED ORAL at 12:02

## 2023-02-14 RX ADMIN — MORPHINE SULFATE 1 MG: 2 INJECTION, SOLUTION INTRAMUSCULAR; INTRAVENOUS at 09:02

## 2023-02-14 RX ADMIN — CARVEDILOL 12.5 MG: 12.5 TABLET, FILM COATED ORAL at 09:02

## 2023-02-14 RX ADMIN — PANTOPRAZOLE SODIUM 40 MG: 40 TABLET, DELAYED RELEASE ORAL at 08:02

## 2023-02-14 RX ADMIN — INSULIN ASPART 6 UNITS: 100 INJECTION, SOLUTION INTRAVENOUS; SUBCUTANEOUS at 12:02

## 2023-02-14 RX ADMIN — INSULIN ASPART 2 UNITS: 100 INJECTION, SOLUTION INTRAVENOUS; SUBCUTANEOUS at 09:02

## 2023-02-14 RX ADMIN — DOCUSATE CALCIUM 240 MG: 240 CAPSULE, LIQUID FILLED ORAL at 09:02

## 2023-02-14 RX ADMIN — VALSARTAN 160 MG: 80 TABLET ORAL at 12:02

## 2023-02-14 RX ADMIN — INSULIN ASPART 3 UNITS: 100 INJECTION, SOLUTION INTRAVENOUS; SUBCUTANEOUS at 05:02

## 2023-02-14 RX ADMIN — ACETAMINOPHEN 650 MG: 325 TABLET, FILM COATED ORAL at 06:02

## 2023-02-14 RX ADMIN — DOCUSATE CALCIUM 240 MG: 240 CAPSULE, LIQUID FILLED ORAL at 08:02

## 2023-02-14 RX ADMIN — NIFEDIPINE 90 MG: 30 TABLET, FILM COATED, EXTENDED RELEASE ORAL at 10:02

## 2023-02-14 RX ADMIN — HYDRALAZINE HYDROCHLORIDE 75 MG: 25 TABLET ORAL at 01:02

## 2023-02-14 RX ADMIN — ATORVASTATIN CALCIUM 40 MG: 40 TABLET, FILM COATED ORAL at 09:02

## 2023-02-14 RX ADMIN — MORPHINE SULFATE 1 MG: 2 INJECTION, SOLUTION INTRAMUSCULAR; INTRAVENOUS at 12:02

## 2023-02-14 RX ADMIN — ENOXAPARIN SODIUM 40 MG: 40 INJECTION SUBCUTANEOUS at 06:02

## 2023-02-14 RX ADMIN — INSULIN DETEMIR 40 UNITS: 100 INJECTION, SOLUTION SUBCUTANEOUS at 08:02

## 2023-02-14 NOTE — ED NOTES
Notified dr camejo that pt is complaining of a headache pt is aaox4. Pt states she usually take Fioricet for it. Also informed md the pt MAP has been<113 all night so the Cardene gtt has been held. Md stated to continue to hold the Cardene gtt and he will notify the primary team in the morning to see what they would like to do.

## 2023-02-14 NOTE — H&P
[facility] - Emergency Dept  Pulmonary Critical Care Note      Patient Name: Elba Barnes  MRN: [id number]  Admission Date: 2/13/2023  Hospital Length of Stay: 0 days  Code Status: Full Code  Attending Provider: Tyrell Spain MD  Primary Care Provider: VIRGEN Hoyt     Subjective:     HPI: Elba Barnes is a 62 y.o. female who has a PMH of hypertension, poorly controlled diabetes, traumatic brain injury, CHF, CVA with right sided deficits, GERD, osteoarthritis of multiple sites, tobacco abuse, and obesity. The patient presented to [facility] ED from  clinic on 2/13/2023 with a primary complaint of chest pain. She was at her PCP appointment today who recommended her go to ED due to chest pain and elevated blood pressure. She says that her symptoms began 1 day prior to arrival. She describes her pain as crushing, located under the left breast, and it is associated with shortness of breath. She says that yesterday she rated her pain as 10/10 and today it is a 9/10. She received 1 dose of nitro SL in ED which states relieved her pain. She says that her shortness of breath has also been present for the past 24 hours. She also has associated numbness and tingling in her left arm in the ulnar nerve distribution which is new. She states that she has had two heart attacks in the past but has never had stents placed, however she did have a LHC in December 2022 and was told that coronary arteries were clear. 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 She currently smokes 1/2 pack of cigarettes per day and is not interested in quitting. She denies alcohol or other illicit drug use. She currently lives with her daughter but wants to live in a nursing home. During the interview she is somewhat tearful saying that she feels that she is a burden to her children and that her body  "is failing her.      In the ED she was noted to be severely hypertensive at 218/118 max, responded to labetalol and lopressor, on 168/94 on my evaluation. Afebrile, normal respiratory rate. Labs reveal elevated troponin of 0.093. EKG with new T wave inversions lead I. CXR clear. Internal medicine consulted for admission due to concern of NSTEMI and hypertensive urgency.     Patient says that her chest pain is a chest pressure that is not relieved with nitroglycerin.  Patient says that is unrelenting patient was sent from her primary care physician because her blood pressure was extremely high and she had some chest pain.  Patient says that she takes 2 medications for her hypertension.      Hospital Course/Significant events:   24 Hour Interval History:    Past Medical History:   Diagnosis Date    Anxiety     Arthritis     CVA (cerebral vascular accident)     "mini stroke"    Depression     Diabetes mellitus     Heart problem     History of psychiatric hospitalization     three(1983, 1995 and another (years ago")): depression    HTN (hypertension)     Hx of psychiatric care     Hypertension     Pacemaker     Psychiatric exam requested by authority     Psychiatric problem     TBI (traumatic brain injury)     Therapy        Past Surgical History:   Procedure Laterality Date    CARDIAC PACEMAKER PLACEMENT      CARDIAC PACEMAKER REMOVAL      CHOLECYSTECTOMY      HYSTERECTOMY         Review of patient's allergies indicates:   Allergen Reactions    Pcn [penicillins] Anaphylaxis       Current Outpatient Medications   Medication Instructions    albuterol (PROVENTIL/VENTOLIN HFA) 90 mcg/actuation inhaler 1-2 puffs, Inhalation, Every 6 hours PRN, Rescue    ALPRAZolam (XANAX) 1 MG tablet Take 1 tablet by mouth once a day as needed as needed for anxiety    amLODIPine (NORVASC) 2.5 mg, Oral, Daily    ammonium lactate (LAC-HYDRIN) 12 % lotion Apply to dry skin areas on feet, legs, and elbow areas TWICE A DAY AS NEEDED    ascorbic " "acid (vitamin C) 500 mg, Oral    aspirin (ECOTRIN) 81 mg, Oral, Daily    atorvastatin (LIPITOR) 20 mg, Oral, Nightly    atorvastatin (LIPITOR) 40 mg, Oral, Nightly    b complex vitamins capsule 1 capsule, Oral, Every morning    B-complex with vitamin C (Z-BEC OR EQUIV) tablet 1 capsule, Oral, Daily    BASAGLAR KWIKPEN U-100 INSULIN 80 Units, Subcutaneous, 2 times daily with meals    BD ULTRA-FINE SHORT PEN NEEDLE 31 gauge x 5/16" Ndle SMARTSIG:Injection Twice Daily    blood-glucose meter Misc CHECK BLOOD GLUCOSE LEVEL TWICE DAILY.    butalbital-acetaminophen-caffeine -40 mg (FIORICET, ESGIC) -40 mg per tablet 1 tablet, Oral, Every 6 hours PRN    carvediloL (COREG) 3.125 mg, Oral, 2 times daily    cholecalciferol, vitamin D3, (VITAMIN D3) 50 mcg (2,000 unit) Cap capsule 1 capsule, Oral    clonazePAM (KLONOPIN) 0.5 mg, Oral, Nightly    cloNIDine (CATAPRES) 0.1 mg, Oral, Daily    cyclobenzaprine (FLEXERIL) 5 mg, Oral, Every 8 hours PRN    diclofenac sodium (VOLTAREN) 2 g, Topical (Top), 4 times daily    docusate sodium (COLACE) 250 MG capsule 1 capsule, Oral, 2 times daily    DULoxetine (CYMBALTA) 30 mg, Oral    empty container (SHARPS CONTAINER) Misc N/A    esomeprazole (NEXIUM) 40 mg, Oral, Every morning    fluticasone furoate-vilanteroL (BREO ELLIPTA) 200-25 mcg/dose DsDv diskus inhaler 1 puff, Inhalation, Daily, Controller    gabapentin (NEURONTIN) 800 mg, Oral, Every 8 hours    glipiZIDE (GLUCOTROL) 10 mg, Oral    hydrALAZINE (APRESOLINE) 25 mg, Oral, 3 times daily    HYDROcodone-acetaminophen (NORCO) 7.5-325 mg per tablet 1 tablet, Oral, 3 times daily PRN    insulin syringe-needle U-100 1 mL 28 gauge x 1/2" Syrg USE TO inject insulin TWICE DAILY    loratadine (CLARITIN) 10 mg, Oral    magnesium citrate solution 296 mLs, Oral, Daily PRN    multivitamin (THERAGRAN) per tablet 1 tablet, Oral, Daily    naloxone (NARCAN) 4 mg/actuation Spry SMARTSI Spray(s) Both Nares PRN    naproxen (NAPROSYN) 500 mg, " Oral, 2 times daily    nebivoloL (BYSTOLIC) 10 mg, Oral, Daily    NIFEdipine (PROCARDIA-XL) 90 mg, Oral    nitrofurantoin, macrocrystal-monohydrate, (MACROBID) 100 MG capsule 100 mg, Oral, 2 times daily    nitroGLYCERIN (NITROSTAT) 0.4 mg, Sublingual, Every 5 min PRN    NovoLOG FlexPen U-100 Insulin 20 Units, Subcutaneous, 3 times daily with meals    nystatin (MYCOSTATIN) cream Apply topically 3 (three) times daily. Use under breast    ondansetron (ZOFRAN) 8 mg, Oral, Every 6 hours    ONETOUCH DELICA PLUS LANCET 33 gauge Misc Topical (Top), 4 times daily    ONETOUCH VERIO TEST STRIPS Strp 4 times daily    pantoprazole (PROTONIX) 40 mg, Oral, Daily    polyethylene glycol (GLYCOLAX) 17 g, Oral    sertraline (ZOLOFT) 50 mg, Oral, Daily    sucralfate (CARAFATE) 1 gram tablet TAKE 1 TABLET(1 GRAM) BY MOUTH FOUR TIMES DAILY BEFORE MEALS AND AT NIGHT    valsartan (DIOVAN) 320 mg, Oral        Social History:     Social History     Socioeconomic History    Marital status: Single    Number of children: 5   Tobacco Use    Smoking status: Every Day     Packs/day: 0.50     Years: 40.00     Pack years: 20.00     Types: Cigarettes     Last attempt to quit: 2018     Years since quittin.4    Smokeless tobacco: Never   Substance and Sexual Activity    Alcohol use: Yes     Comment: wine on occ    Drug use: No    Sexual activity: Never   Other Topics Concern    Patient feels they ought to cut down on drinking/drug use No    Patient annoyed by others criticizing their drinking/drug use No    Patient has felt bad or guilty about drinking/drug use No    Patient has had a drink/used drugs as an eye opener in the AM No   Social History Narrative    ** Merged History Encounter **          Social Determinants of Health     Transportation Needs: Unmet Transportation Needs    Lack of Transportation (Medical): Yes    Lack of Transportation (Non-Medical): Yes   Social Connections: Moderately Isolated    Frequency of Communication with  Friends and Family: More than three times a week    Frequency of Social Gatherings with Friends and Family: Three times a week    Attends Hoahaoism Services: More than 4 times per year    Active Member of Clubs or Organizations: No    Attends Club or Organization Meetings: Never    Marital Status: Never    Housing Stability: Low Risk     Unable to Pay for Housing in the Last Year: No    Number of Places Lived in the Last Year: 1    Unstable Housing in the Last Year: No       Family History   Problem Relation Age of Onset    Schizophrenia Mother     Bipolar disorder Mother     Bipolar disorder Father        ROS  Constitutional:  Positive for malaise/fatigue. Negative for chills and fever.   HENT:  Negative for congestion and sinus pain.    Respiratory:  Positive for cough and shortness of breath. Negative for wheezing.    Cardiovascular:  Positive for chest pain. Negative for palpitations, orthopnea and leg swelling.   Gastrointestinal:  Positive for abdominal pain. Negative for constipation, diarrhea, nausea and vomiting.   Genitourinary:  Positive for dysuria. Negative for hematuria.   Musculoskeletal:  Positive for falls and joint pain. Negative for myalgias.   Skin:  Negative for rash.   Neurological:  Positive for tingling, sensory change and focal weakness. Negative for dizziness, speech change, seizures, loss of consciousness, weakness and headaches.   Psychiatric/Behavioral:  Positive for depression. Negative for suicidal ideas. The patient is nervous/anxious.  Objective:     Vital Signs (Most Recent):  Temp: 97.5 °F (36.4 °C) (02/13/23 1309)  Pulse: 84 (02/13/23 1830)  Resp: (!) 22 (02/13/23 1830)  BP: (!) 167/84 (02/13/23 1830)  SpO2: 95 % (02/13/23 1830)    Body mass index is 42.37 kg/m².  Weight: 115.5 kg (254 lb 10.1 oz) Vital Signs (24h Range):  Temp:  [97.5 °F (36.4 °C)-98.2 °F (36.8 °C)] 97.5 °F (36.4 °C)  Pulse:  [84-98] 84  Resp:  [16-24] 22  SpO2:  [94 %-97 %] 95 %  BP: (167-218)/()  167/84     Input/output::   No intake or output data in the 24 hours ending 02/13/23 1843    Physical Exam:   General: Patient resting comfortably, in no acute distress   Eye: pupils equal, EOMI, clear conjunctiva, eyelids normal  HENT: Head-normocephalic. Healed scar with overlying hair loss left-middle frontal bone with depression  Neck: full range of motion, no thyromegaly or lymphadenopathy, trachea midline, supple  Respiratory: clear to auscultation bilaterally without wheezes, rales, rhonchi  Cardiovascular: regular rate and rhythm without murmurs.  No gallops or rubs, no JVD.   Gastrointestinal: soft, non-tender, non-distended with normal bowel sounds, without masses to palpation  Genitourinary: no CVA tenderness to palpation. Mild suprapubic tenderness  Musculoskeletal: moves all extremities purposefully, no signs of trauma or bruising  Integumentary: no rashes or skin lesions present  Neurologic: motor/sensory function intact, strength in left upper extremity 4/5, all other extremities strength 5/5. Sensation equal and intact bilateral LE and UE. Cranial nerves II-XII in tact.  Psychiatric:  alert and oriented, cooperative with exam, tearful during interview, reports depression but denies suicidal/homicidal ideation    Lines/Drains/Airways       None                    Vent:       Significant Labs:    Laboratory Studies:   No results for input(s): PH, PCO2, PO2, HCO3, POCSATURATED, BE in the last 24 hours.  Recent Labs   Lab 02/13/23  1624   WBC 11.4   RBC 5.19   HGB 14.8   HCT 45.3      MCV 87.3   MCH 28.5   MCHC 32.7*     Recent Labs   Lab 02/13/23  1427   GLUCOSE 157*      K 3.7   CHLORIDE 105   CO2 29   BUN 18.0   CREATININE 0.94           ABGs:    No results for input(s): PH, PO2, PCO2, HCO3, BE in the last 168 hours.    Current Medications:     Infusions:   heparin (porcine) in D5W 12 Units/kg/hr (02/13/23 1829)    niCARdipine 0 mg/hr (02/13/23 1900)         Scheduled:   [START ON  2/14/2023] aspirin  81 mg Oral Daily    atorvastatin  40 mg Oral QHS    docusate calcium  240 mg Oral BID    [START ON 2/14/2023] DULoxetine  30 mg Oral Daily    [START ON 2/14/2023] fluticasone furoate-vilanteroL  1 puff Inhalation Daily    insulin detemir U-100  40 Units Subcutaneous BID    nitrofurantoin (macrocrystal-monohydrate)  100 mg Oral BID    [START ON 2/14/2023] pantoprazole  40 mg Oral Daily    [START ON 2/14/2023] sertraline  50 mg Oral Daily         PRN:   acetaminophen    albuterol-ipratropium    ALPRAZolam    dextrose 10%    dextrose 10%    glucagon (human recombinant)    glucose    glucose    heparin (PORCINE)    heparin (PORCINE)    insulin aspart U-100    melatonin    morphine    nitroGLYCERIN    sodium chloride 0.9%        Microbiology Data:  Microbiology Results (last 7 days)       ** No results found for the last 168 hours. **             Antibiotics and Day Number of Therapy:  Antibiotics (From admission, onward)      Start     Stop Route Frequency Ordered    02/13/23 2100  nitrofurantoin (macrocrystal-monohydrate) 100 MG capsule 100 mg         -- Oral 2 times daily 02/13/23 1650                 Imaging:  X-Ray Chest PA And Lateral  Narrative: EXAMINATION:  XR CHEST PA AND LATERAL    CLINICAL HISTORY:  Chest pain, unspecified    TECHNIQUE:  PA and lateral radiographs of the chest    COMPARISON:  Radiography 01/06/2023    FINDINGS:  Normal cardiac silhouette. The lungs are well-inflated. No consolidation identified. No pleural effusion or discernible pneumothorax.  Impression: No acute pulmonary process appreciated.    Electronically signed by: Sung Elaine  Date:    02/13/2023  Time:    14:16        2D ECHO Results    No results found in the last 24 hours.       Pulmonary Functions Testing Results:    No results found for: FEV1, FVC, SLQ7OEU, TLC, DLCO      Assessment/Plan:     Assessment:  Hypertensive emergency   Troponinemia  Diabetes mellitus  Anginal chest pain  Depression,  anxiety  UTI      Plan:  -25% decrease in MAP of 151 is equivalent to 113.  Do not decreased more than 25% in the 1st 24 hours. Keep MAP above 113  -patient had a recent left heart catheterization completed in December which was clear, will treat with a heparin drip and trend troponins.  -Norvasc 2.5, nifedipine 90, Coreg 3.125 b.i.d., hydralazine 25 t.i.d., valsartan 160 patient was discontinued on clonidine  -patient was aspirin 325 load, patient will also be on a heparin GTT for NSTEMI.  Will trend these out.  Patient had a left heart catheterization done in December which was clear.   -continue 7 day course of Macrobid  -will get a head CT    DVT Prophylaxis:  Heparin GTT  GI prophylaxis:  None  Keep HOB elevated > 30°         Ariane Fernando MD  Pulmonary/Critical Care  [facility] - Emergency Dept

## 2023-02-14 NOTE — PROGRESS NOTES
Pt agreeable to having LTPCS in the home, rather than NH placement. Discussed with VOA Laly ([phone]) who arranged for assessment next week.

## 2023-02-14 NOTE — PT/OT/SLP EVAL
Physical Therapy Evaluation    Patient Name:  Elba Barnes   MRN:  [id number]    Recommendations:     Discharge Recommendations:   SNF vs home with family care 24/7 and  PT/OT  Discharge Equipment Recommendations: walker, rolling   Barriers to discharge:  assist needed at home    Assessment:     Elba Barnes is a 62 y.o. female admitted with a medical diagnosis of <principal problem not specified>.  She presents with the following impairments/functional limitations: weakness, gait instability, impaired endurance, impaired self care skills, impaired functional mobility, decreased coordination, decreased lower extremity function, decreased upper extremity function, pain, impaired coordination .  1. Hypertensive emergency without congestive heart failure    2. Chest pain        Rehab Prognosis: Good; patient would benefit from acute skilled PT services to address these deficits and reach maximum level of function.    Recent Surgery: * No surgery found *      Plan:     During this hospitalization, patient to be seen  (3-5xwk) to address the identified rehab impairments via gait training, therapeutic activities, therapeutic exercises and progress toward the following goals:    Plan of Care Expires:  03/13/23    Subjective     Chief Complaint: pain in R flank, knee and hip  Patient/Family Comments/goals: return home  Pain/Comfort:  Pain Rating 1: 9/10  Location - Side 1: Right  Location 1: flank  Pain Addressed 1: Nurse notified  Pain Rating Post-Intervention 1: 10/10  Location 2: other (see comments) (R flank, R hip and R knee)    Patients cultural, spiritual, Denominational conflicts given the current situation: no    Living Environment:  Pt. Lives at home in an apartment by herself. She has a daughter who lives with her 4 year old baby in [place], however she is thinking about moving in with her mother pt. Reports.  Prior to admission, patients level of function was that she needed a lot of help: dressing,  bathing, toileting, cleaning, cooking.  Equipment used at home: rollator, bath bench.  DME owned (not currently used): none.  Upon discharge, patient will have assistance from her neighbor, who is currently every day all day at patients house to assist her. Pt. Reports that her neighbor is not a paid assist, she does it out of the kindness of her heart. Pt. Is sleeping on her sofa.    Objective:     Communicated with nurse Troncoso prior to session.  Patient found supine with PureWick, peripheral IV, telemetry, oxygen  upon PT entry to room.    General Precautions: Standard, fall  Orthopedic Precautions:    Braces: N/A  Respiratory Status: Nasal cannula, flow 1.5 L/min    Exams:  Cognitive Exam:  Patient is oriented to Person, Place, Time, and Situation  Gross Motor Coordination:  decreased on R side hand and foot  RUE ROM: WFL  RUE Strength: WFL  LUE ROM: WFL  LUE Strength: WFL  RLE ROM: WFL  RLE Strength: 3+/5  LLE ROM: WFL  LLE Strength: WFL    Functional Mobility:  Bed Mobility:     Supine to Sit: moderate assistance  Transfers:     Sit to Stand:  moderate assistance with rolling walker  Gait: Pt. Made a few steps towards the head of the bed. Pt. Was afraid to fall, also BP was high.180/104, HR 85, SAT's 98%    Patient left supine with all lines intact, call button in reach, and nurse Aba notified.    GOALS:   Multidisciplinary Problems       Physical Therapy Goals          Problem: Physical Therapy    Goal Priority Disciplines Outcome Goal Variances Interventions   Physical Therapy Goal     PT, PT/OT      Description: Goals to be met by: dc     Patient will increase functional independence with mobility by performin. Supine to sit with Stand-by Assistance  2. Sit to stand transfer with Stand-by Assistance  3. Gait  x 50 feet with Minimal Assistance using Rolling Walker.                          History:     Past Medical History:   Diagnosis Date    Anxiety     Arthritis     CVA (cerebral vascular  "accident)     "mini stroke"    Depression     Diabetes mellitus     Heart problem     History of psychiatric hospitalization     three(1983, 1995 and another (years ago")): depression    HTN (hypertension)     Hx of psychiatric care     Hypertension     Pacemaker     Psychiatric exam requested by authority     Psychiatric problem     TBI (traumatic brain injury)     Therapy        Past Surgical History:   Procedure Laterality Date    CARDIAC PACEMAKER PLACEMENT      CARDIAC PACEMAKER REMOVAL      CHOLECYSTECTOMY      HYSTERECTOMY         Time Tracking:     PT Received On: 02/14/23  PT Start Time: 0753     PT Stop Time: 0823  PT Total Time (min): 30 min     Billable Minutes: Evaluation 30      02/14/2023  "

## 2023-02-14 NOTE — PLAN OF CARE
Problem: Occupational Therapy  Goal: Occupational Therapy Goal  Description: Goals to be met by: d/c     Patient will increase functional independence with ADLs by performing:    LE Dressing with Moderate Assistance.  Grooming while EOB with Supervision after s/u.  Toileting from bedside commode with Moderate Assistance for hygiene and clothing management.   Supine to sit with Contact Guard Assistance in prep for ADL's.  Toilet transfer to bedside commode with Contact Guard Assistance with RW .    Outcome: Ongoing, Progressing

## 2023-02-14 NOTE — PLAN OF CARE
02/14/23 1044   Discharge Assessment   Assessment Type Discharge Planning Assessment   Confirmed/corrected address, phone number and insurance Yes   Confirmed Demographics Correct on Facesheet   Source of Information family;patient   When was your last doctors appointment?   (Marshalorrie Fredi)   Reason For Admission Hypertensive emergency   People in Home child(marisela), adult   Facility Arrived From: PCP's office   Do you expect to return to your current living situation? Yes   Do you have help at home or someone to help you manage your care at home? Yes   Who are your caregiver(s) and their phone number(s)? Adriana Bolton ([phone])   Prior to hospitilization cognitive status: Alert/Oriented   Current cognitive status: Alert/Oriented   Walking or Climbing Stairs ambulation difficulty, requires equipment   Mobility Management Rollator   Dressing/Bathing bathing difficulty, requires equipment   Dressing/Bathing Management Tub Bench   Equipment Currently Used at Home bath bench;rollator   Readmission within 30 days? No   Patient currently being followed by outpatient case management? No   Do you currently have service(s) that help you manage your care at home? Yes   Name and Contact number of [employer]   Do you take prescription medications? Yes  ([facility])   Do you have prescription coverage? Yes   Coverage [employer] M/C; M/D   Do you have any problems affording any of your prescribed medications? No   Who is going to help you get home at discharge? Family   How do you get to doctors appointments? family or friend will provide   Are you on dialysis? No   Discharge Plan A Home with family   DME Needed Upon Discharge  none   Discharge Plan discussed with: Adult children   Discharge Barriers Identified Mental illness   Physical Activity   On average, how many days per week do you engage in moderate to strenuous exercise (like a brisk walk)? 0 days   On average, how many minutes do you  engage in exercise at this level? 0 min   Financial Resource Strain   How hard is it for you to pay for the very basics like food, housing, medical care, and heating? Not hard   Housing Stability   In the last 12 months, was there a time when you were not able to pay the mortgage or rent on time?  --    In the last 12 months, how many places have you lived? 1   In the last 12 months, was there a time when you did not have a steady place to sleep or slept in a shelter (including now)? N   Transportation Needs   In the past 12 months, has lack of transportation kept you from medical appointments or from getting medications? no   In the past 12 months, has lack of transportation kept you from meetings, work, or from getting things needed for daily living? No   Food Insecurity   Within the past 12 months, you worried that your food would run out before you got the money to buy more. Never true   Within the past 12 months, the food you bought just didn't last and you didn't have money to get more. Never true   Stress   Do you feel stress - tense, restless, nervous, or anxious, or unable to sleep at night because your mind is troubled all the time - these days? Not at all   Social Connections   In a typical week, how many times do you talk on the phone with family, friends, or neighbors? More than 3   How often do you get together with friends or relatives? More than 3   How often do you attend Sabianism or Cheondoism services? More than 4  (Buddhist)   Do you belong to any clubs or organizations such as Sabianism groups, unions, fraternal or athletic groups, or school groups? Yes   How often do you attend meetings of the clubs or organizations you belong to? Never   Are you , , , , never , or living with a partner? Never marrie   Alcohol Use   Q1: How often do you have a drink containing alcohol? Never   Q2: How many drinks containing alcohol do you have on a typical day when you are  "drinking? None   Q3: How often do you have six or more drinks on one occasion? Never   OTHER   Name(s) of People in Home Dghtr, Adriana Barnes ([phone])     Pt resides with one of her 6 children, Adriana Barnes ([phone]) and 4 yr old grdchild. Spoke with pt, Adriana, & Emergency Contact/Dghtr, Kathy Barnes ([phone]) re consult for NH placement. Dghtrs attributed pt's request for NH placement to pt "having one of her spells" re to hx of mental illness and insisted pt is not a burden to her children. Pt receives home services through [facility]. Pt's neighbor, Xiao Post, provides transportation as needed.   Following discussion with pt and dghtrs, pt indicated she is reconsidering request for placement and will let me know what she decides.  "

## 2023-02-14 NOTE — ED NOTES
Notified dr matt ([phone]) concerning pt Cardene gtt. I informed her pt blood pressure has been stable and her map has been <120 and the previous nurse elena stated they told him to hold the gtt and start it is the map is >125. Md (dr matt) stated to start the gtt is the map >113.

## 2023-02-14 NOTE — PLAN OF CARE
Problem: Adult Inpatient Plan of Care  Goal: Plan of Care Review  Outcome: Ongoing, Progressing  Goal: Patient-Specific Goal (Individualized)  Outcome: Ongoing, Progressing  Goal: Absence of Hospital-Acquired Illness or Injury  Outcome: Ongoing, Progressing  Goal: Optimal Comfort and Wellbeing  Outcome: Ongoing, Progressing  Goal: Readiness for Transition of Care  Outcome: Ongoing, Progressing     Problem: Bariatric Environmental Safety  Goal: Safety Maintained with Care  Outcome: Ongoing, Progressing     Problem: Fall Injury Risk  Goal: Absence of Fall and Fall-Related Injury  Outcome: Ongoing, Progressing     Problem: Fatigue  Goal: Improved Activity Tolerance  Outcome: Ongoing, Progressing     Problem: Pain Acute  Goal: Acceptable Pain Control and Functional Ability  Outcome: Ongoing, Progressing     Problem: Cardiac Impairment  Goal: Optimal Activity Tolerance  Outcome: Ongoing, Progressing     Problem: Chest Pain  Goal: Resolution of Chest Pain Symptoms  Outcome: Ongoing, Progressing

## 2023-02-14 NOTE — ED NOTES
Called the upper resident dr chang. Informed her pt is complaining of a headache and her blood pressure is 178/89 map is 118. She is not on a cardene gtt because they told previous nurse juju parker to hold Cardene gtt due to map less than 125. Md stated to give a 1 time dose of tylenol. I asked is she was putting the order in she stated no and asked for me to put the order in.

## 2023-02-14 NOTE — PROGRESS NOTES
[facility] - Emergency Dept  Pulmonary Critical Care Note      Patient Name: Elba Barnes  MRN: [id number]  Admission Date: 2/13/2023  Hospital Length of Stay: 1 days  Code Status: Full Code  Attending Provider: Kapil Monroy MD  Primary Care Provider: VIRGEN Hoyt     Subjective:     HPI: Elba Barnes is a 62 y.o. female who has a PMH of hypertension, poorly controlled diabetes, traumatic brain injury, CHF, CVA with right sided deficits, GERD, osteoarthritis of multiple sites, tobacco abuse, and obesity. The patient presented to [facility] ED from  clinic on 2/13/2023 with a primary complaint of chest pain. She was at her PCP appointment today who recommended her go to ED due to chest pain and elevated blood pressure. She says that her symptoms began 1 day prior to arrival. She describes her pain as crushing, located under the left breast, and it is associated with shortness of breath. She says that yesterday she rated her pain as 10/10 and today it is a 9/10. She received 1 dose of nitro SL in ED which states relieved her pain. She says that her shortness of breath has also been present for the past 24 hours. She also has associated numbness and tingling in her left arm in the ulnar nerve distribution which is new. She states that she has had two heart attacks in the past but has never had stents placed, however she did have a LHC in December 2022 and was told that coronary arteries were clear. 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 She currently smokes 1/2 pack of cigarettes per day and is not interested in quitting. She denies alcohol or other illicit drug use. She currently lives with her daughter but wants to live in a nursing home. During the interview she is somewhat tearful saying that she feels that she is a burden to her children and that her body  "is failing her.      In the ED she was noted to be severely hypertensive at 218/118 max, responded to labetalol and lopressor, on 168/94 on my evaluation. Afebrile, normal respiratory rate. Labs reveal elevated troponin of 0.093. EKG with new T wave inversions lead I. CXR clear. Internal medicine consulted for admission due to concern of NSTEMI and hypertensive urgency.     Patient says that her chest pain is a chest pressure that is not relieved with nitroglycerin.  Patient says that is unrelenting patient was sent from her primary care physician because her blood pressure was extremely high and she had some chest pain.  Patient says that she takes 2 medications for her hypertension.      Hospital Course/Significant events:   24 Hour Interval History:  Patient did not require any Cardene infusion, and patient troponin has decreased.  Patient has had headache.  She takes Fioricet chronically she says she takes 1 every other day.  Patient says she is doing well still has some chest pain.    Past Medical History:   Diagnosis Date    Anxiety     Arthritis     CVA (cerebral vascular accident)     "mini stroke"    Depression     Diabetes mellitus     Heart problem     History of psychiatric hospitalization     three(1983, 1995 and another (years ago")): depression    HTN (hypertension)     Hx of psychiatric care     Hypertension     Pacemaker     Psychiatric exam requested by authority     Psychiatric problem     TBI (traumatic brain injury)     Therapy        Past Surgical History:   Procedure Laterality Date    CARDIAC PACEMAKER PLACEMENT      CARDIAC PACEMAKER REMOVAL      CHOLECYSTECTOMY      HYSTERECTOMY         Review of patient's allergies indicates:   Allergen Reactions    Pcn [penicillins] Anaphylaxis       Current Outpatient Medications   Medication Instructions    albuterol (PROVENTIL/VENTOLIN HFA) 90 mcg/actuation inhaler 1-2 puffs, Inhalation, Every 6 hours PRN, Rescue    ALPRAZolam (XANAX) 1 MG tablet Take " "1 tablet by mouth once a day as needed as needed for anxiety    amLODIPine (NORVASC) 2.5 mg, Oral, Daily    ammonium lactate (LAC-HYDRIN) 12 % lotion Apply to dry skin areas on feet, legs, and elbow areas TWICE A DAY AS NEEDED    ascorbic acid (vitamin C) 500 mg, Oral    aspirin (ECOTRIN) 81 mg, Oral, Daily    atorvastatin (LIPITOR) 20 mg, Oral, Nightly    atorvastatin (LIPITOR) 40 mg, Oral, Nightly    b complex vitamins capsule 1 capsule, Oral, Every morning    B-complex with vitamin C (Z-BEC OR EQUIV) tablet 1 capsule, Oral, Daily    BASAGLAR KWIKPEN U-100 INSULIN 80 Units, Subcutaneous, 2 times daily with meals    BD ULTRA-FINE SHORT PEN NEEDLE 31 gauge x 5/16" Ndle SMARTSIG:Injection Twice Daily    blood-glucose meter Misc CHECK BLOOD GLUCOSE LEVEL TWICE DAILY.    butalbital-acetaminophen-caffeine -40 mg (FIORICET, ESGIC) -40 mg per tablet 1 tablet, Oral, Every 6 hours PRN    carvediloL (COREG) 3.125 mg, Oral, 2 times daily    cholecalciferol, vitamin D3, (VITAMIN D3) 50 mcg (2,000 unit) Cap capsule 1 capsule, Oral    clonazePAM (KLONOPIN) 0.5 mg, Oral, Nightly    cloNIDine (CATAPRES) 0.1 mg, Oral, Daily    cyclobenzaprine (FLEXERIL) 5 mg, Oral, Every 8 hours PRN    diclofenac sodium (VOLTAREN) 2 g, Topical (Top), 4 times daily    docusate sodium (COLACE) 250 MG capsule 1 capsule, Oral, 2 times daily    DULoxetine (CYMBALTA) 30 mg, Oral    empty container (SHARPS CONTAINER) Misc N/A    esomeprazole (NEXIUM) 40 mg, Oral, Every morning    fluticasone furoate-vilanteroL (BREO ELLIPTA) 200-25 mcg/dose DsDv diskus inhaler 1 puff, Inhalation, Daily, Controller    gabapentin (NEURONTIN) 800 mg, Oral, Every 8 hours    glipiZIDE (GLUCOTROL) 10 mg, Oral    hydrALAZINE (APRESOLINE) 25 mg, Oral, 3 times daily    HYDROcodone-acetaminophen (NORCO) 7.5-325 mg per tablet 1 tablet, Oral, 3 times daily PRN    insulin syringe-needle U-100 1 mL 28 gauge x 1/2" Syrg USE TO inject insulin TWICE DAILY    loratadine " (CLARITIN) 10 mg, Oral    magnesium citrate solution 296 mLs, Oral, Daily PRN    multivitamin (THERAGRAN) per tablet 1 tablet, Oral, Daily    naloxone (NARCAN) 4 mg/actuation Spry SMARTSI Spray(s) Both Nares PRN    naproxen (NAPROSYN) 500 mg, Oral, 2 times daily    nebivoloL (BYSTOLIC) 10 mg, Oral, Daily    NIFEdipine (PROCARDIA-XL) 90 mg, Oral    nitrofurantoin, macrocrystal-monohydrate, (MACROBID) 100 MG capsule 100 mg, Oral, 2 times daily    nitroGLYCERIN (NITROSTAT) 0.4 mg, Sublingual, Every 5 min PRN    NovoLOG FlexPen U-100 Insulin 20 Units, Subcutaneous, 3 times daily with meals    nystatin (MYCOSTATIN) cream Apply topically 3 (three) times daily. Use under breast    ondansetron (ZOFRAN) 8 mg, Oral, Every 6 hours    ONETOUCH DELICA PLUS LANCET 33 gauge Misc Topical (Top), 4 times daily    ONETOUCH VERIO TEST STRIPS Strp 4 times daily    pantoprazole (PROTONIX) 40 mg, Oral, Daily    polyethylene glycol (GLYCOLAX) 17 g, Oral    sertraline (ZOLOFT) 50 mg, Oral, Daily    sucralfate (CARAFATE) 1 gram tablet TAKE 1 TABLET(1 GRAM) BY MOUTH FOUR TIMES DAILY BEFORE MEALS AND AT NIGHT    valsartan (DIOVAN) 320 mg, Oral        Social History:     Social History     Socioeconomic History    Marital status: Single    Number of children: 5   Tobacco Use    Smoking status: Every Day     Packs/day: 0.50     Years: 40.00     Pack years: 20.00     Types: Cigarettes     Last attempt to quit: 2018     Years since quittin.4    Smokeless tobacco: Never   Substance and Sexual Activity    Alcohol use: Yes     Comment: wine on occ    Drug use: No    Sexual activity: Never   Other Topics Concern    Patient feels they ought to cut down on drinking/drug use No    Patient annoyed by others criticizing their drinking/drug use No    Patient has felt bad or guilty about drinking/drug use No    Patient has had a drink/used drugs as an eye opener in the AM No   Social History Narrative    ** Merged History Encounter **           Social Determinants of Health     Transportation Needs: Unmet Transportation Needs    Lack of Transportation (Medical): Yes    Lack of Transportation (Non-Medical): Yes   Social Connections: Moderately Isolated    Frequency of Communication with Friends and Family: More than three times a week    Frequency of Social Gatherings with Friends and Family: Three times a week    Attends Congregation Services: More than 4 times per year    Active Member of Clubs or Organizations: No    Attends Club or Organization Meetings: Never    Marital Status: Never    Housing Stability: Low Risk     Unable to Pay for Housing in the Last Year: No    Number of Places Lived in the Last Year: 1    Unstable Housing in the Last Year: No       Family History   Problem Relation Age of Onset    Schizophrenia Mother     Bipolar disorder Mother     Bipolar disorder Father        ROS  Constitutional:  Positive for malaise/fatigue. Negative for chills and fever.   HENT:  Negative for congestion and sinus pain.    Respiratory:  Positive for cough and shortness of breath. Negative for wheezing.    Cardiovascular:  Positive for chest pain. Negative for palpitations, orthopnea and leg swelling.   Gastrointestinal:  Positive for abdominal pain. Negative for constipation, diarrhea, nausea and vomiting.   Genitourinary:  Positive for dysuria. Negative for hematuria.   Musculoskeletal:  Positive for falls and joint pain. Negative for myalgias.   Skin:  Negative for rash.   Neurological:  Positive for tingling, sensory change and focal weakness. Negative for dizziness, speech change, seizures, loss of consciousness, weakness and headaches.   Psychiatric/Behavioral:  Positive for depression. Negative for suicidal ideas. The patient is nervous/anxious.  Objective:     Vital Signs (Most Recent):  Temp: 97.9 °F (36.6 °C) (02/14/23 0402)  Pulse: 82 (02/14/23 0602)  Resp: (!) 21 (02/14/23 0602)  BP: (!) 151/91 (02/14/23 0602)  SpO2: 95 % (02/14/23  0602)    Body mass index is 42.13 kg/m².  Weight: 118.4 kg (261 lb) Vital Signs (24h Range):  Temp:  [97.5 °F (36.4 °C)-98.2 °F (36.8 °C)] 97.9 °F (36.6 °C)  Pulse:  [] 82  Resp:  [10-31] 21  SpO2:  [92 %-100 %] 95 %  BP: (140-218)/() 151/91     Input/output::     Intake/Output Summary (Last 24 hours) at 2/14/2023 0824  Last data filed at 2/14/2023 0520  Gross per 24 hour   Intake 240 ml   Output 600 ml   Net -360 ml       Physical Exam:   General: Patient resting comfortably, in no acute distress   Eye: pupils equal, EOMI, clear conjunctiva, eyelids normal  HENT: Head-normocephalic. Healed scar with overlying hair loss left-middle frontal bone with depression  Neck: full range of motion, no thyromegaly or lymphadenopathy, trachea midline, supple  Respiratory: clear to auscultation bilaterally without wheezes, rales, rhonchi  Cardiovascular: regular rate and rhythm without murmurs.  No gallops or rubs, no JVD.   Gastrointestinal: soft, non-tender, non-distended with normal bowel sounds, without masses to palpation  Genitourinary: no CVA tenderness to palpation. Mild suprapubic tenderness  Musculoskeletal: moves all extremities purposefully, no signs of trauma or bruising  Integumentary: no rashes or skin lesions present  Neurologic: motor/sensory function intact, strength in left upper extremity 4/5, all other extremities strength 5/5. Sensation equal and intact bilateral LE and UE. Cranial nerves II-XII in tact.  Psychiatric:  alert and oriented, cooperative with exam, tearful during interview, reports depression but denies suicidal/homicidal ideation    Lines/Drains/Airways       None                    Vent:       Significant Labs:    Laboratory Studies:   No results for input(s): PH, PCO2, PO2, HCO3, POCSATURATED, BE in the last 24 hours.  Recent Labs   Lab 02/14/23 0255   WBC 10.0   RBC 4.38   HGB 12.5   HCT 38.5      MCV 87.9   MCH 28.5   MCHC 32.5*       Recent Labs   Lab 02/14/23 0255    GLUCOSE 225*      K 3.6   CHLORIDE 104   CO2 27   BUN 22.3*   CREATININE 0.94   MG 2.00             ABGs:    No results for input(s): PH, PO2, PCO2, HCO3, BE in the last 168 hours.    Current Medications:     Infusions:   heparin (porcine) in D5W 15 Units/kg/hr (02/14/23 0120)    niCARdipine           Scheduled:   aspirin  81 mg Oral Daily    atorvastatin  40 mg Oral QHS    docusate calcium  240 mg Oral BID    DULoxetine  30 mg Oral Daily    fluticasone furoate-vilanteroL  1 puff Inhalation Daily    insulin detemir U-100  40 Units Subcutaneous BID    nitrofurantoin (macrocrystal-monohydrate)  100 mg Oral BID    pantoprazole  40 mg Oral Daily    sertraline  50 mg Oral Daily         PRN:   acetaminophen    albuterol-ipratropium    ALPRAZolam    dextrose 10%    dextrose 10%    glucagon (human recombinant)    glucose    glucose    heparin (PORCINE)    heparin (PORCINE)    insulin aspart U-100    melatonin    morphine    nitroGLYCERIN    sodium chloride 0.9%        Microbiology Data:  Microbiology Results (last 7 days)       ** No results found for the last 168 hours. **             Antibiotics and Day Number of Therapy:  Antibiotics (From admission, onward)      Start     Stop Route Frequency Ordered    02/13/23 2100  nitrofurantoin (macrocrystal-monohydrate) 100 MG capsule 100 mg         -- Oral 2 times daily 02/13/23 1650                 Imaging:  CT Head Without Contrast  Narrative: EXAMINATION:  CT HEAD WITHOUT CONTRAST    CLINICAL HISTORY:  Headache, sudden, severe;    TECHNIQUE:  Axial images of the head were obtained without IV contrast administration.  Coronal and sagittal reconstructions were provided.  Three dimensional and MIP images were obtained and evaluated.  Total DLP was 960 mGy-cm. Dose lowering technique and automated exposure control were utilized for this exam.    COMPARISON:  CT of the head 12/14/2014.    FINDINGS:  There is normal brain formation.  There is normal gray-white matter  differentiation.  There is no hemorrhage, hydrocephalus, or midline shift.  There is no cytotoxic or vasogenic edema.  There is no intra or extra-axial fluid collection.  There is no herniation.    The calvarium is intact.  There is no fracture.  The bilateral orbits are normal.  The paranasal sinuses and mastoid air cells are normally developed and free of disease.  Impression: No acute intracranial abnormality.    Electronically signed by: Frank Suazo MD  Date:    02/13/2023  Time:    21:42  X-Ray Chest PA And Lateral  Narrative: EXAMINATION:  XR CHEST PA AND LATERAL    CLINICAL HISTORY:  Chest pain, unspecified    TECHNIQUE:  PA and lateral radiographs of the chest    COMPARISON:  Radiography 01/06/2023    FINDINGS:  Normal cardiac silhouette. The lungs are well-inflated. No consolidation identified. No pleural effusion or discernible pneumothorax.  Impression: No acute pulmonary process appreciated.    Electronically signed by: Sung Elaine  Date:    02/13/2023  Time:    14:16        2D ECHO Results    No results found in the last 24 hours.       Pulmonary Functions Testing Results:    No results found for: FEV1, FVC, EFL6IIM, TLC, DLCO      Assessment/Plan:     Assessment:  Hypertensive emergency   Troponinemia  Diabetes mellitus  Anginal chest pain  Depression, anxiety  UTI      Plan:  -patient had a recent left heart catheterization completed in December which was clear, will treat with a heparin drip and trend troponins.  -nifedipine 90, Coreg 3.125 b.i.d., hydralazine 25 t.i.d., valsartan 160 patient was discontinued on clonidine  -start oral medications, downgrade to the floor consult Cardiology  -continue 7 day course of Macrobid      DVT Prophylaxis:  Heparin GTT  GI prophylaxis:  None  Keep HOB elevated > 30°         Ariane Fernando MD  Pulmonary/Critical Care  [facility] - Emergency Dept

## 2023-02-14 NOTE — PLAN OF CARE
Spoke with Carmen in bed control. Notified her of downgrade to observation. She states she will deliver the MOON letter and obtain patient signature.

## 2023-02-14 NOTE — PROGRESS NOTES
Pt in agreement with HH consult. Pt nor VOA  able to recall name of previous provider. Referral submitted to [facility] via [facility], an in-network provider with [facility].

## 2023-02-14 NOTE — PT/OT/SLP EVAL
Occupational Therapy   Evaluation    Name: Elba Barnes  MRN: [id number]  Admitting Diagnosis:   1. Hypertensive emergency without congestive heart failure    2. Chest pain     Elba Barnes is a 62 y.o. female who has a PMH of hypertension, poorly controlled diabetes, traumatic brain injury, CHF, CVA with right sided deficits, GERD, osteoarthritis of multiple sites, tobacco abuse, and obesity. The patient presented to [facility] ED from  clinic on 2/13/2023 with a primary complaint of chest pain. She was at her PCP appointment today who recommended her go to ED due to chest pain and elevated blood pressure. She says that her symptoms began 1 day prior to arrival. She describes her pain as crushing, located under the left breast, and it is associated with shortness of breath. She says that yesterday she rated her pain as 10/10 and today it is a 9/10. She received 1 dose of nitro SL in ED which states relieved her pain. She says that her shortness of breath has also been present for the past 24 hours. She also has associated numbness and tingling in her left arm in the ulnar nerve distribution which is new. She states that she has had two heart attacks in the past but has never had stents placed, however she did have a LHC in December 2022 and was told that coronary arteries were clear. 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 She currently smokes 1/2 pack of cigarettes per day and is not interested in quitting. She denies alcohol or other illicit drug use. She currently lives with her daughter but wants to live in a nursing home. During the interview she is somewhat tearful saying that she feels that she is a burden to her children and that her body is failing her  Recent Surgery: * No surgery found *      Recommendations:     Discharge Recommendations: nursing facility,  skilled  Discharge Equipment Recommendations:  walker, rolling  Barriers to discharge:  Other (Comment) (severity of deficits and no caregiver living with her despite assist needed at all times)    Assessment:     Elba Barnes is a 62 y.o. female with a medical diagnosis of see above.  She presents with functional decline. Performance deficits affecting function: weakness, impaired endurance, impaired self care skills, impaired functional mobility, gait instability, impaired balance, decreased upper extremity function, decreased lower extremity function, pain, decreased ROM, impaired coordination, impaired fine motor.      Rehab Prognosis: Fair; patient would benefit from acute skilled OT services to address these deficits and reach maximum level of function.       Plan:     Patient to be seen  (Mon-Fri 2-5 times per week) to address the above listed problems via self-care/home management, neuromuscular re-education, therapeutic activities, therapeutic exercises  Plan of Care Expires:  (d/c)  Plan of Care Reviewed with: patient    Subjective     Chief Complaint: pain R flank, hip and knee  Patient/Family Comments/goals: pt would like for her daughter to come live with her (move from [facility]) so she can return home ; Daughter has a 4 year old daughter.    Occupational Profile:  Living Environment: Pt lives alone in a 1st floor apt with no steps and tub/shower combo with TTB. Pt reported that she is hoping her dgt  and 4 year old baby will move from [facility] to live with her as she depends on neighbor/friend to assist with all ADL's and mobility  Previous level of function: assisted with basic self care tasks and incontinence B and B most of the time; pt sleeps on sofa   Roles and Routines: pt does not cook or perform household chores; neighbor/friend assists with basic and I ADL's  Equipment Used at Home: bath bench, rollator, other (see comments) (brakes on rollator are broken)  Assistance upon Discharge:  neighbor/friend assists daily as able and is not paid assist.; questionable if daughter will move here to assist      Pain/Comfort:  Pain Rating 1: 9/10  Location - Side 1: Right  Location 1: flank  Pain Addressed 1: Nurse notified  Pain Rating Post-Intervention 1: 10/10  Location 2:  (R hip, knee and flank)  Pain Addressed 2: Nurse notified    Patients cultural, spiritual, Religion conflicts given the current situation: no    Objective:     Communicated with: nurse Troncoso prior to session who reported that pt was on hep drip.   Patient found HOB elevated with blood pressure cuff, oxygen, PureWick, peripheral IV, pulse ox (continuous), telemetry upon OT entry to room.    General Precautions: Standard, fall  Orthopedic Precautions: N/A  Braces: N/A  Respiratory Status: Nasal cannula, flow 1.5  L/min  VITALS  Presession   Post session  BP  180/104   196/108  HR  86   84  O2  98%   98%    Occupational Performance:    Bed Mobility:    Patient completed Supine to Sit with moderate assistance  Patient completed Sit to Supine with moderate assistance    Functional Mobility/Transfers:  Patient completed Sit <> Stand Transfer with moderate assistance  with  rolling walker   Functional Mobility: Pt sidestepped EOB with RW and mod A x 4 steps     Activities of Daily Living:  Feeding:  s/u assist    Lower Body Dressing: total assistance with attempt   Toileting: total assistance with attempt     Cognitive/Visual Perceptual:  Cognitive/Psychosocial Skills:     -       Oriented to: Person, Place, Time, and Situation   -       Follows Commands/attention:Follows two-step commands  -       Safety awareness/insight to disability: Fair ; fear of falling      Physical Exam:  Dominant hand: -       right  Upper Extremity Range of Motion:     -       Right Upper Extremity: Deficits: AROM mod decreased shoulder and grossly WFL elbow, forearm, wrist and hand; PROM WFL  -       Left Upper Extremity: WFL  Upper Extremity Strength:    -      "  Right Upper Extremity: Deficits: 2+/5 shoulder and 3-3-/5 elbow, wrist and hand   -       Left Upper Extremity: grossly WFL   Strength:    -       Right Upper Extremity: Deficits: F-  -       Left Upper Extremity: WFL  Fine Motor Coordination:    -       Impaired  Right hand, finger to nose   and Right hand thumb/finger opposition skills      Patient left HOB elevated with all lines intact, call button in reach, and nurse  notified of BP end of session    GOALS:   Multidisciplinary Problems       Occupational Therapy Goals          Problem: Occupational Therapy    Goal Priority Disciplines Outcome Interventions   Occupational Therapy Goal     OT, PT/OT Ongoing, Progressing    Description: Goals to be met by: d/c     Patient will increase functional independence with ADLs by performing:    LE Dressing with Moderate Assistance.  Grooming while EOB with Supervision after s/u.  Toileting from bedside commode with Moderate Assistance for hygiene and clothing management.   Supine to sit with Contact Guard Assistance in prep for ADL's.  Toilet transfer to bedside commode with Contact Guard Assistance with RW .                         History:     Past Medical History:   Diagnosis Date    Anxiety     Arthritis     CVA (cerebral vascular accident)     "mini stroke"    Depression     Diabetes mellitus     Heart problem     History of psychiatric hospitalization     three(1983, 1995 and another (years ago")): depression    HTN (hypertension)     Hx of psychiatric care     Hypertension     Pacemaker     Psychiatric exam requested by authority     Psychiatric problem     TBI (traumatic brain injury)     Therapy          Past Surgical History:   Procedure Laterality Date    CARDIAC PACEMAKER PLACEMENT      CARDIAC PACEMAKER REMOVAL      CHOLECYSTECTOMY      HYSTERECTOMY         Time Tracking:     OT Date of Treatment:    OT Start Time: 0753  OT Stop Time: 0820  OT Total Time (min): 27 min    Billable Minutes:Evaluation 27 " min    2/14/2023

## 2023-02-14 NOTE — PROGRESS NOTES
"Inpatient Nutrition Evaluation    Admit Date: 2023   Total duration of encounter: 1 day    Nutrition Recommendation/Prescription     Continue diabetic diet  Pt education on diet/ complete  MVI/fe  Weekly wt  Will monitor nutrition status     Nutrition Assessment     Chart Review    Reason Seen: continuous nutrition monitoring    Malnutrition Screening Tool Results   Have you recently lost weight without trying?: No  Have you been eating poorly because of a decreased appetite?: No   MST Score: 0     Diagnosis:  HTN, Troponinemia, DM, CP, Depression, UTI     Relevant Medical History: HTN, poorly controlled DM, TBI, CHF, CVA, GERD, osteoarthritis, obesity     Nutrition-Related Medications: heparin, ASA, atorvastatin, insulin, pantoprazole     Nutrition-Related Labs:  (2-14) H/H  12.5/38.5 Gluc 225(H) Bun 22 Cr 0.9 K 3.6 Alb 2.7(L)     Diet Order: Diet diabetic  Oral Supplement Order: none  Appetite/Oral Intake: good/% of meals  Factors Affecting Nutritional Intake: none identified  Food/Anabaptism/Cultural Preferences: none reported  Food Allergies: none reported       Wound(s):   none reported     Comments    () Pt reported good appetite; no N/V; neighbor assist with meal prep. No wt loss. Pt is morbidly obese; BMI 42. Encouraged diet adherence.     Anthropometrics    Height: 5' 6" (167.6 cm) Height Method: Stated  Last Weight: 118.4 kg (261 lb) (23 0704) Weight Method: Bed Scale  BMI (Calculated): 42.1  BMI Classification: obese grade III (BMI >/=40)     Ideal Body Weight (IBW), Female: 130 lb     % Ideal Body Weight, Female (lb): 203.7 %                    Usual Body Weight (UBW), k.4 kg  % Usual Body Weight: 102.8     Usual Weight Provided By: patient and EMR weight history    Wt Readings from Last 3 Encounters:   23 0704 118.4 kg (261 lb)   23 1309 115.5 kg (254 lb 10.1 oz)   23 1212 115.4 kg (254 lb 6.4 oz)   23 1014 115.2 kg (254 lb)      Weight Change(s) Since " Admission:  Admit Weight: 115.5 kg (254 lb 10.1 oz) (02/13/23 1309)  Wt --stable around 115kg     Patient Education    Education Provided: diabetic diet and low sodium diet  Teaching Method: explanation and printed materials  Comprehension: verbalizes understanding  Barriers to Learning: none evident  Expected Compliance: fair  Comments: All questions were answered and dietitian's contact information was provided.     Monitoring & Evaluation     Dietitian will monitor food and beverage intake, weight, and food/nutrition knowledge skill.  Nutrition Risk/Follow-Up: low (follow-up in 5-7 days)  Patients assigned 'low nutrition risk' status do not qualify for a full nutritional assessment but will be monitored and re-evaluated in a 5-7 day time period. Please consult if re-evaluation needed sooner.

## 2023-02-15 LAB
ALBUMIN SERPL-MCNC: 2.9 G/DL (ref 3.4–4.8)
ALBUMIN/GLOB SERPL: 0.6 RATIO (ref 1.1–2)
ALP SERPL-CCNC: 68 UNIT/L (ref 40–150)
ALT SERPL-CCNC: 25 UNIT/L (ref 0–55)
APPEARANCE UR: CLEAR
AST SERPL-CCNC: 19 UNIT/L (ref 5–34)
BACTERIA #/AREA URNS AUTO: ABNORMAL /HPF
BASOPHILS # BLD AUTO: 0.06 X10(3)/MCL (ref 0–0.2)
BASOPHILS NFR BLD AUTO: 0.7 %
BILIRUB UR QL STRIP.AUTO: NEGATIVE MG/DL
BILIRUBIN DIRECT+TOT PNL SERPL-MCNC: 0.3 MG/DL
BUN SERPL-MCNC: 18.9 MG/DL (ref 9.8–20.1)
CALCIUM SERPL-MCNC: 9.5 MG/DL (ref 8.4–10.2)
CHLORIDE SERPL-SCNC: 106 MMOL/L (ref 98–107)
CO2 SERPL-SCNC: 25 MMOL/L (ref 23–31)
COLOR UR AUTO: ABNORMAL
CREAT SERPL-MCNC: 0.84 MG/DL (ref 0.55–1.02)
EOSINOPHIL # BLD AUTO: 0.22 X10(3)/MCL (ref 0–0.9)
EOSINOPHIL NFR BLD AUTO: 2.5 %
ERYTHROCYTE [DISTWIDTH] IN BLOOD BY AUTOMATED COUNT: 14.7 % (ref 11.5–17)
GFR SERPLBLD CREATININE-BSD FMLA CKD-EPI: >60 MLS/MIN/1.73/M2
GLOBULIN SER-MCNC: 4.5 GM/DL (ref 2.4–3.5)
GLUCOSE SERPL-MCNC: 197 MG/DL (ref 82–115)
GLUCOSE UR QL STRIP.AUTO: NORMAL MG/DL
HCT VFR BLD AUTO: 42.8 % (ref 37–47)
HGB BLD-MCNC: 13.6 GM/DL (ref 12–16)
HYALINE CASTS #/AREA URNS LPF: ABNORMAL /LPF
IMM GRANULOCYTES # BLD AUTO: 0.05 X10(3)/MCL (ref 0–0.04)
IMM GRANULOCYTES NFR BLD AUTO: 0.6 %
KETONES UR QL STRIP.AUTO: NEGATIVE MG/DL
LEUKOCYTE ESTERASE UR QL STRIP.AUTO: 25 UNIT/L
LYMPHOCYTES # BLD AUTO: 2.46 X10(3)/MCL (ref 0.6–4.6)
LYMPHOCYTES NFR BLD AUTO: 27.5 %
MAGNESIUM SERPL-MCNC: 1.9 MG/DL (ref 1.6–2.6)
MCH RBC QN AUTO: 28.3 PG
MCHC RBC AUTO-ENTMCNC: 31.8 MG/DL (ref 33–36)
MCV RBC AUTO: 89 FL (ref 80–94)
MONOCYTES # BLD AUTO: 0.51 X10(3)/MCL (ref 0.1–1.3)
MONOCYTES NFR BLD AUTO: 5.7 %
MUCOUS THREADS URNS QL MICRO: ABNORMAL /LPF
NEUTROPHILS # BLD AUTO: 5.64 X10(3)/MCL (ref 2.1–9.2)
NEUTROPHILS NFR BLD AUTO: 63 %
NITRITE UR QL STRIP.AUTO: NEGATIVE
NRBC BLD AUTO-RTO: 0 %
PH UR STRIP.AUTO: 6 [PH]
PHOSPHATE SERPL-MCNC: 3.5 MG/DL (ref 2.3–4.7)
PLATELET # BLD AUTO: 325 X10(3)/MCL (ref 130–400)
PMV BLD AUTO: 9.7 FL (ref 7.4–10.4)
POCT GLUCOSE: 145 MG/DL (ref 70–110)
POCT GLUCOSE: 200 MG/DL (ref 70–110)
POCT GLUCOSE: 205 MG/DL (ref 70–110)
POCT GLUCOSE: 216 MG/DL (ref 70–110)
POCT GLUCOSE: 222 MG/DL (ref 70–110)
POTASSIUM SERPL-SCNC: 4.1 MMOL/L (ref 3.5–5.1)
PROT SERPL-MCNC: 7.4 GM/DL (ref 5.8–7.6)
PROT UR QL STRIP.AUTO: ABNORMAL MG/DL
RBC # BLD AUTO: 4.81 X10(6)/MCL (ref 4.2–5.4)
RBC #/AREA URNS AUTO: ABNORMAL /HPF
RBC UR QL AUTO: NEGATIVE UNIT/L
SODIUM SERPL-SCNC: 140 MMOL/L (ref 136–145)
SP GR UR STRIP.AUTO: 1.01
SQUAMOUS #/AREA URNS LPF: ABNORMAL /HPF
UROBILINOGEN UR STRIP-ACNC: NORMAL MG/DL
WBC # SPEC AUTO: 8.9 X10(3)/MCL (ref 4.5–11.5)
WBC #/AREA URNS AUTO: ABNORMAL /HPF

## 2023-02-15 PROCEDURE — 63600175 PHARM REV CODE 636 W HCPCS: Performed by: STUDENT IN AN ORGANIZED HEALTH CARE EDUCATION/TRAINING PROGRAM

## 2023-02-15 PROCEDURE — 84100 ASSAY OF PHOSPHORUS: CPT

## 2023-02-15 PROCEDURE — 63600175 PHARM REV CODE 636 W HCPCS

## 2023-02-15 PROCEDURE — S0030 INJECTION, METRONIDAZOLE: HCPCS

## 2023-02-15 PROCEDURE — 27000221 HC OXYGEN, UP TO 24 HOURS

## 2023-02-15 PROCEDURE — 96372 THER/PROPH/DIAG INJ SC/IM: CPT | Mod: 59

## 2023-02-15 PROCEDURE — 99900035 HC TECH TIME PER 15 MIN (STAT)

## 2023-02-15 PROCEDURE — 83735 ASSAY OF MAGNESIUM: CPT

## 2023-02-15 PROCEDURE — 25000003 PHARM REV CODE 250: Performed by: STUDENT IN AN ORGANIZED HEALTH CARE EDUCATION/TRAINING PROGRAM

## 2023-02-15 PROCEDURE — 96372 THER/PROPH/DIAG INJ SC/IM: CPT

## 2023-02-15 PROCEDURE — 96366 THER/PROPH/DIAG IV INF ADDON: CPT

## 2023-02-15 PROCEDURE — 94640 AIRWAY INHALATION TREATMENT: CPT | Mod: XB

## 2023-02-15 PROCEDURE — 81001 URINALYSIS AUTO W/SCOPE: CPT

## 2023-02-15 PROCEDURE — 85025 COMPLETE CBC W/AUTO DIFF WBC: CPT

## 2023-02-15 PROCEDURE — G0378 HOSPITAL OBSERVATION PER HR: HCPCS

## 2023-02-15 PROCEDURE — 25000003 PHARM REV CODE 250

## 2023-02-15 PROCEDURE — 25500020 PHARM REV CODE 255: Performed by: INTERNAL MEDICINE

## 2023-02-15 PROCEDURE — 97116 GAIT TRAINING THERAPY: CPT

## 2023-02-15 PROCEDURE — 96372 THER/PROPH/DIAG INJ SC/IM: CPT | Performed by: STUDENT IN AN ORGANIZED HEALTH CARE EDUCATION/TRAINING PROGRAM

## 2023-02-15 PROCEDURE — 25000242 PHARM REV CODE 250 ALT 637 W/ HCPCS

## 2023-02-15 PROCEDURE — 80053 COMPREHEN METABOLIC PANEL: CPT

## 2023-02-15 PROCEDURE — 94640 AIRWAY INHALATION TREATMENT: CPT

## 2023-02-15 PROCEDURE — S4991 NICOTINE PATCH NONLEGEND: HCPCS

## 2023-02-15 PROCEDURE — 96367 TX/PROPH/DG ADDL SEQ IV INF: CPT

## 2023-02-15 RX ORDER — CIPROFLOXACIN 2 MG/ML
400 INJECTION, SOLUTION INTRAVENOUS
Status: DISCONTINUED | OUTPATIENT
Start: 2023-02-15 | End: 2023-02-17

## 2023-02-15 RX ORDER — POLYETHYLENE GLYCOL 3350 17 G/17G
17 POWDER, FOR SOLUTION ORAL 2 TIMES DAILY
Status: DISCONTINUED | OUTPATIENT
Start: 2023-02-15 | End: 2023-02-17 | Stop reason: HOSPADM

## 2023-02-15 RX ORDER — MAGNESIUM SULFATE 1 G/100ML
1 INJECTION INTRAVENOUS ONCE
Status: DISCONTINUED | OUTPATIENT
Start: 2023-02-15 | End: 2023-02-17 | Stop reason: HOSPADM

## 2023-02-15 RX ORDER — METRONIDAZOLE 500 MG/100ML
500 INJECTION, SOLUTION INTRAVENOUS
Status: DISCONTINUED | OUTPATIENT
Start: 2023-02-15 | End: 2023-02-17

## 2023-02-15 RX ORDER — NICOTINE 7MG/24HR
1 PATCH, TRANSDERMAL 24 HOURS TRANSDERMAL DAILY
Status: DISCONTINUED | OUTPATIENT
Start: 2023-02-15 | End: 2023-02-17 | Stop reason: HOSPADM

## 2023-02-15 RX ADMIN — SERTRALINE HYDROCHLORIDE 50 MG: 50 TABLET ORAL at 08:02

## 2023-02-15 RX ADMIN — NIFEDIPINE 90 MG: 30 TABLET, FILM COATED, EXTENDED RELEASE ORAL at 08:02

## 2023-02-15 RX ADMIN — MORPHINE SULFATE 1 MG: 2 INJECTION, SOLUTION INTRAMUSCULAR; INTRAVENOUS at 08:02

## 2023-02-15 RX ADMIN — IOHEXOL 100 ML: 350 INJECTION, SOLUTION INTRAVENOUS at 11:02

## 2023-02-15 RX ADMIN — INSULIN DETEMIR 40 UNITS: 100 INJECTION, SOLUTION SUBCUTANEOUS at 09:02

## 2023-02-15 RX ADMIN — PANTOPRAZOLE SODIUM 40 MG: 40 TABLET, DELAYED RELEASE ORAL at 08:02

## 2023-02-15 RX ADMIN — ENOXAPARIN SODIUM 40 MG: 40 INJECTION SUBCUTANEOUS at 04:02

## 2023-02-15 RX ADMIN — METRONIDAZOLE 500 MG: 5 INJECTION, SOLUTION INTRAVENOUS at 08:02

## 2023-02-15 RX ADMIN — DULOXETINE HYDROCHLORIDE 30 MG: 30 CAPSULE, DELAYED RELEASE ORAL at 08:02

## 2023-02-15 RX ADMIN — INSULIN DETEMIR 40 UNITS: 100 INJECTION, SOLUTION SUBCUTANEOUS at 08:02

## 2023-02-15 RX ADMIN — CARVEDILOL 12.5 MG: 12.5 TABLET, FILM COATED ORAL at 08:02

## 2023-02-15 RX ADMIN — CIPROFLOXACIN 400 MG: 2 INJECTION, SOLUTION INTRAVENOUS at 09:02

## 2023-02-15 RX ADMIN — VALSARTAN 160 MG: 80 TABLET ORAL at 08:02

## 2023-02-15 RX ADMIN — HYDRALAZINE HYDROCHLORIDE 100 MG: 50 TABLET ORAL at 09:02

## 2023-02-15 RX ADMIN — HYDRALAZINE HYDROCHLORIDE 100 MG: 50 TABLET ORAL at 03:02

## 2023-02-15 RX ADMIN — INSULIN ASPART 2 UNITS: 100 INJECTION, SOLUTION INTRAVENOUS; SUBCUTANEOUS at 08:02

## 2023-02-15 RX ADMIN — ACETAMINOPHEN 650 MG: 325 TABLET, FILM COATED ORAL at 04:02

## 2023-02-15 RX ADMIN — DOCUSATE CALCIUM 240 MG: 240 CAPSULE, LIQUID FILLED ORAL at 09:02

## 2023-02-15 RX ADMIN — IPRATROPIUM BROMIDE AND ALBUTEROL SULFATE 3 ML: 2.5; .5 SOLUTION RESPIRATORY (INHALATION) at 03:02

## 2023-02-15 RX ADMIN — INSULIN ASPART 6 UNITS: 100 INJECTION, SOLUTION INTRAVENOUS; SUBCUTANEOUS at 12:02

## 2023-02-15 RX ADMIN — NICOTINE 1 PATCH: 7 PATCH, EXTENDED RELEASE TRANSDERMAL at 11:02

## 2023-02-15 RX ADMIN — ASPIRIN 81 MG: 81 TABLET, COATED ORAL at 08:02

## 2023-02-15 RX ADMIN — POLYETHYLENE GLYCOL 3350 17 G: 17 POWDER, FOR SOLUTION ORAL at 11:02

## 2023-02-15 RX ADMIN — ALPRAZOLAM 1 MG: 0.5 TABLET ORAL at 08:02

## 2023-02-15 RX ADMIN — INSULIN ASPART 6 UNITS: 100 INJECTION, SOLUTION INTRAVENOUS; SUBCUTANEOUS at 04:02

## 2023-02-15 RX ADMIN — HYDRALAZINE HYDROCHLORIDE 100 MG: 50 TABLET ORAL at 07:02

## 2023-02-15 RX ADMIN — METRONIDAZOLE 500 MG: 5 INJECTION, SOLUTION INTRAVENOUS at 11:02

## 2023-02-15 RX ADMIN — CIPROFLOXACIN 400 MG: 2 INJECTION, SOLUTION INTRAVENOUS at 10:02

## 2023-02-15 RX ADMIN — FLUTICASONE FUROATE AND VILANTEROL TRIFENATATE 1 PUFF: 200; 25 POWDER RESPIRATORY (INHALATION) at 06:02

## 2023-02-15 RX ADMIN — ATORVASTATIN CALCIUM 40 MG: 40 TABLET, FILM COATED ORAL at 08:02

## 2023-02-15 NOTE — PLAN OF CARE
Problem: Adult Inpatient Plan of Care  Goal: Plan of Care Review  Outcome: Ongoing, Progressing  Goal: Patient-Specific Goal (Individualized)  Outcome: Ongoing, Progressing  Goal: Absence of Hospital-Acquired Illness or Injury  Outcome: Ongoing, Progressing  Goal: Optimal Comfort and Wellbeing  Outcome: Ongoing, Progressing  Goal: Readiness for Transition of Care  Outcome: Ongoing, Progressing     Problem: Bariatric Environmental Safety  Goal: Safety Maintained with Care  Outcome: Ongoing, Progressing     Problem: Fall Injury Risk  Goal: Absence of Fall and Fall-Related Injury  Outcome: Ongoing, Progressing     Problem: Fatigue  Goal: Improved Activity Tolerance  Outcome: Ongoing, Progressing     Problem: Pain Acute  Goal: Acceptable Pain Control and Functional Ability  Outcome: Ongoing, Progressing     Problem: Cardiac Impairment  Goal: Optimal Activity Tolerance  Outcome: Ongoing, Progressing     Problem: Chest Pain  Goal: Resolution of Chest Pain Symptoms  Outcome: Ongoing, Progressing     Problem: Diabetes Comorbidity  Goal: Blood Glucose Level Within Targeted Range  Outcome: Ongoing, Progressing

## 2023-02-15 NOTE — PT/OT/SLP PROGRESS
Physical Therapy Treatment    Patient Name:  Elba Barnes   MRN:  [id number]    Recommendations:     Discharge Recommendations:  home health PT, home health OT   Discharge Equipment Recommendations: walker, rolling   Barriers to discharge: Level of Skilled Assistance Needed    Assessment:     Elba Barnes is a 62 y.o. female admitted with a medical diagnosis of <principal problem not specified>.  She presents with the following impairments/functional limitations:  weakness, impaired sensation, impaired self care skills, impaired functional mobility, gait instability, impaired balance, decreased lower extremity function, decreased upper extremity function, impaired coordination .    Rehab Prognosis: Good; patient would benefit from acute skilled PT services to address these deficits and reach maximum level of function.    Recent Surgery: * No surgery found *      Plan:     During this hospitalization, patient to be seen  (3-5xwk) to address the identified rehab impairments via gait training, therapeutic activities, therapeutic exercises and progress toward the following goals:    Plan of Care Expires:  03/13/23    Subjective     Chief Complaint: none this session   Patient/Family Comments/goals: go home  Pain/Comfort:  Pain Rating 1: 0/10  Pain Rating Post-Intervention 1: 0/10      Objective:     Communicated with nurse Haile prior to session.  Patient found supine with peripheral IV upon PT entry to room.     General Precautions: Standard, fall   Orthopedic Precautions:N/A   Braces: N/A  Respiratory Status: Room air     Functional Mobility:  Bed Mobility:     Supine to Sit: stand by assistance  Transfers:     Sit to Stand:  minimum assistance with rolling walker  Gait: 2 person assist when walking as a safety precaution due to R knee giving out at times. Pt. Had falls in the past. However no knee buckling this session, pt. Ambulated with nurse Haile and therapist x 40 ft using a RW, slow pace, small steps.      Patient left supine with all lines intact, call button in reach, and nurse present.    GOALS:   Multidisciplinary Problems       Physical Therapy Goals          Problem: Physical Therapy    Goal Priority Disciplines Outcome Goal Variances Interventions   Physical Therapy Goal     PT, PT/OT      Description: Goals to be met by: dc     Patient will increase functional independence with mobility by performin. Supine to sit with Stand-by Assistance  2. Sit to stand transfer with Stand-by Assistance  3. Gait  x 50 feet with Minimal Assistance using Rolling Walker.   Goals ongoing and progressing 2/15/2023                         Time Tracking:     PT Received On: 02/15/23  PT Start Time: 1005     PT Stop Time: 1015  PT Total Time (min): 10 min     Billable Minutes: Gait Training 10    Treatment Type: Treatment  PT/PTA: PT           02/15/2023

## 2023-02-15 NOTE — PT/OT/SLP PROGRESS
Physical Therapy  Missed Treatment Note    Patient Name:  Elba Barnes   MRN:  [id number]    Patient not seen today secondary to Other (Comment) (in process of being transferred to 6th floor). Will follow-up later today.

## 2023-02-15 NOTE — PROGRESS NOTES
[facility] - Emergency Dept  Pulmonary Critical Care Note      Patient Name: Elba Barnes  MRN: [id number]  Admission Date: 2/13/2023  Hospital Length of Stay: 1 days  Code Status: Full Code  Attending Provider: Kapil Monroy MD  Primary Care Provider: VIRGEN Hoyt     Subjective:     HPI: Elba Barnes is a 62 y.o. female who has a PMH of hypertension, poorly controlled diabetes, traumatic brain injury, CHF, CVA with right sided deficits, GERD, osteoarthritis of multiple sites, tobacco abuse, and obesity. The patient presented to [facility] ED from  clinic on 2/13/2023 with a primary complaint of chest pain. She was at her PCP appointment today who recommended her go to ED due to chest pain and elevated blood pressure. She says that her symptoms began 1 day prior to arrival. She describes her pain as crushing, located under the left breast, and it is associated with shortness of breath. She says that yesterday she rated her pain as 10/10 and today it is a 9/10. She received 1 dose of nitro SL in ED which states relieved her pain. She says that her shortness of breath has also been present for the past 24 hours. She also has associated numbness and tingling in her left arm in the ulnar nerve distribution which is new. She states that she has had two heart attacks in the past but has never had stents placed, however she did have a LHC in December 2022 and was told that coronary arteries were clear. 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 She currently smokes 1/2 pack of cigarettes per day and is not interested in quitting. She denies alcohol or other illicit drug use. She currently lives with her daughter but wants to live in a nursing home. During the interview she is somewhat tearful saying that she feels that she is a burden to her children and that her body  is failing her.      In the ED she was noted to be severely hypertensive at 218/118 max, responded to labetalol and lopressor, on 168/94 on my evaluation. Afebrile, normal respiratory rate. Labs reveal elevated troponin of 0.093. EKG with new T wave inversions lead I. CXR clear. Internal medicine consulted for admission due to concern of NSTEMI and hypertensive urgency.     Patient says that her chest pain is a chest pressure that is not relieved with nitroglycerin.  Patient says that is unrelenting patient was sent from her primary care physician because her blood pressure was extremely high and she had some chest pain.  Patient says that she takes 2 medications for her hypertension.        24 Hour Interval History:  patient had no acute events overnight. She complains of worsening suprapubic pain and is moderately tender on exam. CT abdomen and UA pending. States has not had a bowel movement in several days, refused rectal exam. Have switched to IV antibiotics. From BP standpoint her BP is doing much better, on oral meds only.           ROS Negative unless stated above.    Objective:     Vital Signs (Most Recent):  Temp: 98.2 °F (36.8 °C) (02/15/23 1056)  Pulse: 68 (02/15/23 1119)  Resp: 15 (02/15/23 1119)  BP: 135/75 (02/15/23 1056)  SpO2: (!) 91 % (02/15/23 1119)    Body mass index is 42.13 kg/m².  Weight: 118.4 kg (261 lb) Vital Signs (24h Range):  Temp:  [98.1 °F (36.7 °C)-98.2 °F (36.8 °C)] 98.2 °F (36.8 °C)  Pulse:  [68-99] 68  Resp:  [15-24] 15  SpO2:  [91 %-100 %] 91 %  BP: (127-192)/() 135/75     Input/output::   No intake or output data in the 24 hours ending 02/15/23 1142      Physical Exam:   General: Patient resting comfortably, in no acute distress   Eye: pupils equal, EOMI, clear conjunctiva, eyelids normal  HENT: Head-normocephalic. Healed scar with overlying hair loss left-middle frontal bone with depression  Neck: full range of motion, no thyromegaly or lymphadenopathy, trachea midline,  supple  Respiratory: clear to auscultation bilaterally without wheezes, rales, rhonchi  Cardiovascular: regular rate and rhythm without murmurs.  No gallops or rubs, no JVD.   Gastrointestinal: soft, non-tender, non-distended with normal bowel sounds, without masses to palpation  Genitourinary: no CVA tenderness to palpation. Mild suprapubic tenderness  Musculoskeletal: moves all extremities purposefully, no signs of trauma or bruising  Integumentary: no rashes or skin lesions present  Neurologic: motor/sensory function intact, strength in left upper extremity 4/5, all other extremities strength 5/5. Sensation equal and intact bilateral LE and UE. Cranial nerves II-XII in tact.  Psychiatric:  alert and oriented, cooperative with exam, tearful during interview, reports depression but denies suicidal/homicidal ideation  Rectal: refused        Significant Labs:  Recent Labs   Lab 02/15/23  0400   WBC 8.9   RBC 4.81   HGB 13.6   HCT 42.8      MCV 89.0   MCH 28.3   MCHC 31.8*       Recent Labs   Lab 02/15/23  0400   GLUCOSE 197*      K 4.1   CHLORIDE 106   CO2 25   BUN 18.9   CREATININE 0.84   MG 1.90                   Scheduled:   aspirin  81 mg Oral Daily    atorvastatin  40 mg Oral QHS    carvediloL  12.5 mg Oral BID    ciprofloxacin  400 mg Intravenous Q12H    docusate calcium  240 mg Oral BID    DULoxetine  30 mg Oral Daily    enoxaparin  40 mg Subcutaneous Daily    fluticasone furoate-vilanteroL  1 puff Inhalation Daily    hydrALAZINE  100 mg Oral Q8H    insulin detemir U-100  40 Units Subcutaneous BID    magnesium sulfate IVPB  1 g Intravenous Once    metronidazole  500 mg Intravenous Q8H    nicotine  1 patch Transdermal Daily    NIFEdipine  90 mg Oral Daily    pantoprazole  40 mg Oral Daily    polyethylene glycol  17 g Oral BID    sertraline  50 mg Oral Daily    valsartan  160 mg Oral Daily         PRN:   acetaminophen    albuterol-ipratropium    ALPRAZolam    dextrose 10%    dextrose 10%    glucagon  (human recombinant)    glucose    glucose    insulin aspart U-100    labetalol    melatonin    morphine    nitroGLYCERIN    sodium chloride 0.9%          Antibiotics and Day Number of Therapy:  Antibiotics (From admission, onward)      Start     Stop Route Frequency Ordered    02/15/23 1200  metronidazole IVPB 500 mg         -- IV Every 8 hours (non-standard times) 02/15/23 1051    02/15/23 0930  ciprofloxacin (CIPRO)400mg/200ml D5W IVPB 400 mg         02/22 0929 IV Every 12 hours (non-standard times) 02/15/23 0833                 Imaging:  Echo  · The left ventricle is normal in size with moderate concentric   hypertrophy and low normal systolic function.  · The estimated ejection fraction is 50-55%.  · Grade I left ventricular diastolic dysfunction.           Assessment/Plan:     Assessment:  Hypertensive emergency   Troponinemia  Diabetes mellitus  Anginal chest pain  Depression, anxiety  UTI  Constipation       Plan:  -patient had a recent left heart catheterization completed in December which was clear, will treat with a heparin drip and trend troponins.  -nifedipine 90, Coreg 3.125 b.i.d., hydralazine 25 t.i.d., valsartan 160   - clonidine dc  - spoke with Cardiology who recommended outpatient medical management no indication for LHC in the near future with hx of recent LHC  -continue IV abx for presumed UTI vs intra-abdominal infection, on cipro and flagyl, CT abdomen pending, UA pending  - Abdominal pain may be secondary to constipation, refused rectal exam, bowel regimen added       CODE STATUS: Full Code  Access: Peripheral  Antibiotics: cipro, flagyl  Diet: Diabetic  DVT Prophylaxis: Lovenox  GI Prophylaxis: proton pump inhibitor per orders  Fluids: none.     Continue inpatient treatment for hypertensive urgency, will add cipro and flagyl for presumed intraabdominal infection vs UTI, CT abdomen pending    Ignacia Lopez DO  [facility]  HO-1

## 2023-02-15 NOTE — CONSULTS
" [facility] Consult Note        Attending Physician: Dr. Baldwin  Fellow: Dr. Sammi Chapman    Date of Admit: 2/13/2023    Reason for Consult     Chest Pain and Shortness of Breath (C/o right chest pain radiating under left breast. Also c/o left arm paresthesia since the weekend. C/o sob during these intervals. Sent from IM clinic)      Subjective:      History of Present Illness:  62F with PMHx of CVA, DM2, HTN, PPM, and TBI who presented with L. Arm pain, chest tightness, and SOB x 1 day. Pt was in her USOH until yesterday afternoon when she reports onset of these sxs while at rest which prompted her to come in for further evaluation. She denies lightheadedness, dizziness,n/v, diaphoresis, peripheral edema, orthopnea, or PND. She has no further complaints at this time and reports compliance with home medications.      Past Medical History:  Past Medical History:   Diagnosis Date    Anxiety     Arthritis     CVA (cerebral vascular accident)     "mini stroke"    Depression     Diabetes mellitus     Heart problem     History of psychiatric hospitalization     three(1983, 1995 and another (years ago")): depression    HTN (hypertension)     Hx of psychiatric care     Hypertension     Pacemaker     Psychiatric exam requested by authority     Psychiatric problem     TBI (traumatic brain injury)     Therapy        Past Surgical History:  Past Surgical History:   Procedure Laterality Date    CARDIAC PACEMAKER PLACEMENT      CARDIAC PACEMAKER REMOVAL      CHOLECYSTECTOMY      HYSTERECTOMY         Allergies:  Review of patient's allergies indicates:   Allergen Reactions    Pcn [penicillins] Anaphylaxis       Home Medications:  Prior to Admission medications    Medication Sig Start Date End Date Taking? Authorizing Provider   albuterol (PROVENTIL/VENTOLIN HFA) 90 mcg/actuation inhaler Inhale 1-2 puffs into the lungs every 6 (six) hours as needed for Wheezing. Rescue  Patient not taking: Reported on 2/13/2023 1/28/20   " Dr. [facility] to hold off on ordering pain medications due to LOC. Will reevaluate if necessary. Hospice nurse notified.  Electronically signed by Luca Friedman RN on 8/13/2021 at 6:44 PM "Gt Santillan MD   ALPRAZolam (XANAX) 1 MG tablet Take 1 tablet by mouth once a day as needed as needed for anxiety 12/12/22   Historical Provider   amLODIPine (NORVASC) 2.5 MG tablet Take 2.5 mg by mouth once daily.    Historical Provider   ammonium lactate (LAC-HYDRIN) 12 % lotion Apply to dry skin areas on feet, legs, and elbow areas TWICE A DAY AS NEEDED 1/5/23   Historical Provider   ascorbic acid, vitamin C, 500 mg Chew Take 500 mg by mouth.    Historical Provider   aspirin (ECOTRIN) 81 MG EC tablet Take 1 tablet (81 mg total) by mouth once daily. 9/30/21   Markel Torres MD   atorvastatin (LIPITOR) 20 MG tablet Take 1 tablet (20 mg total) by mouth every evening.  Patient not taking: Reported on 2/13/2023 2/11/19   Leandra Monreal NP   atorvastatin (LIPITOR) 40 MG tablet Take 40 mg by mouth every evening. 11/22/22   Historical Provider   b complex vitamins capsule Take 1 capsule by mouth every morning.    Historical Provider   B-complex with vitamin C (Z-BEC OR EQUIV) tablet Take 1 capsule by mouth once daily.    Historical Provider   BASAGLAR KWIKPEN U-100 INSULIN glargine 100 units/mL SubQ pen Inject 80 Units into the skin 2 (two) times daily with meals. 12/23/22   VIRGEN Ho   BD ULTRA-FINE SHORT PEN NEEDLE 31 gauge x 5/16" Ndle SMARTSIG:Injection Twice Daily 10/24/22   Historical Provider   blood-glucose meter Misc CHECK BLOOD GLUCOSE LEVEL TWICE DAILY. 4/6/22   Historical Provider   butalbital-acetaminophen-caffeine -40 mg (FIORICET, ESGIC) -40 mg per tablet Take 1 tablet by mouth every 6 (six) hours as needed for Pain. 2/11/19   Leandra Monreal NP   carvediloL (COREG) 3.125 MG tablet Take 3.125 mg by mouth 2 (two) times daily. 12/1/22   Historical Provider   cholecalciferol, vitamin D3, (VITAMIN D3) 50 mcg (2,000 unit) Cap capsule Take 1 capsule by mouth. 8/13/22   Historical Provider   clonazePAM (KLONOPIN) 0.5 MG tablet Take 0.5 mg by mouth every evening.  12/27/18   " "Historical Provider   cloNIDine (CATAPRES) 0.1 MG tablet Take 0.1 mg by mouth once daily.    Historical Provider   cyclobenzaprine (FLEXERIL) 10 MG tablet Take 5 mg by mouth every 8 (eight) hours as needed. 23   Historical Provider   diclofenac sodium (VOLTAREN) 1 % Gel Apply 2 g topically 4 (four) times daily. 22   VIRGEN Ho   docusate sodium (COLACE) 250 MG capsule Take 1 capsule by mouth 2 (two) times daily. 22   Historical Provider   DULoxetine (CYMBALTA) 30 MG capsule Take 30 mg by mouth. 22   Historical Provider   empty container (SHARPS CONTAINER) Misc N/A 22   Historical Provider   esomeprazole (NEXIUM) 40 MG capsule Take 40 mg by mouth every morning. 22   Historical Provider   fluticasone furoate-vilanteroL (BREO ELLIPTA) 200-25 mcg/dose DsDv diskus inhaler Inhale 1 puff into the lungs once daily. Controller 22   VIRGEN Ho   gabapentin (NEURONTIN) 800 MG tablet Take 1 tablet (800 mg total) by mouth every 8 (eight) hours. 22   VIRGEN Ho   glipiZIDE (GLUCOTROL) 10 MG tablet Take 10 mg by mouth. 23   Historical Provider   hydrALAZINE (APRESOLINE) 25 MG tablet Take 25 mg by mouth 3 (three) times daily. 22   Historical Provider   HYDROcodone-acetaminophen (NORCO) 7.5-325 mg per tablet Take 1 tablet by mouth 3 (three) times daily as needed for Pain.  3/11/19   Historical Provider   insulin syringe-needle U-100 1 mL 28 gauge x 1/2" Syrg USE TO inject insulin TWICE DAILY 22   Historical Provider   loratadine (CLARITIN) 10 mg tablet Take 10 mg by mouth. 10/26/22 7/23/23  Historical Provider   magnesium citrate solution Take 296 mLs by mouth daily as needed. 22   Historical Provider   multivitamin (THERAGRAN) per tablet Take 1 tablet by mouth once daily.    Historical Provider   naloxone (NARCAN) 4 mg/actuation Spry SMARTSI Spray(s) Both Nares PRN 10/12/22   Historical Provider   naproxen (NAPROSYN) 500 MG tablet Take " 500 mg by mouth 2 (two) times daily. 11/7/22   Historical Provider   nebivoloL (BYSTOLIC) 10 MG Tab Take 10 mg by mouth once daily.    Historical Provider   NIFEdipine (PROCARDIA-XL) 90 MG (OSM) 24 hr tablet Take 90 mg by mouth. 11/22/22   Historical Provider   nitrofurantoin, macrocrystal-monohydrate, (MACROBID) 100 MG capsule Take 1 capsule (100 mg total) by mouth 2 (two) times daily. for 7 days 2/13/23 2/20/23  Patience VIRGEN Rai   nitroGLYCERIN (NITROSTAT) 0.4 MG SL tablet Place 0.4 mg under the tongue every 5 (five) minutes as needed for Chest pain.    Historical Provider   NOVOLOG FLEXPEN U-100 INSULIN 100 unit/mL (3 mL) InPn pen Inject 20 Units into the skin 3 (three) times daily with meals. 12/23/22   PatiVIRGEN Yarbrough   nystatin (MYCOSTATIN) cream Apply topically 3 (three) times daily. Use under breast 10/26/22   Historical Provider   ondansetron (ZOFRAN) 8 MG tablet Take 8 mg by mouth every 6 (six) hours. 11/7/22   Historical Provider   ONETOUCH DELICA PLUS LANCET 33 gauge Misc Apply topically 4 (four) times daily. 10/27/22   Historical Provider   ONETOUCH VERIO TEST STRIPS Strp 4 (four) times daily. 12/19/22   Historical Provider   pantoprazole (PROTONIX) 40 MG tablet Take 1 tablet (40 mg total) by mouth once daily. 2/11/19   Leandra Monreal NP   polyethylene glycol (GLYCOLAX) 17 gram/dose powder Take 17 g by mouth. 4/20/22   Historical Provider   sertraline (ZOLOFT) 50 MG tablet Take 50 mg by mouth once daily.    Historical Provider   sucralfate (CARAFATE) 1 gram tablet TAKE 1 TABLET(1 GRAM) BY MOUTH FOUR TIMES DAILY BEFORE MEALS AND AT NIGHT  Patient taking differently: Take 1 g by mouth 4 (four) times daily before meals and nightly. 2/11/19   Leandra Monreal NP   valsartan (DIOVAN) 160 MG tablet Take 320 mg by mouth. 11/22/22   Historical Provider       Family History:  Family History   Problem Relation Age of Onset    Schizophrenia Mother     Bipolar disorder Mother     Bipolar disorder  "Father        Social History:  Social History     Tobacco Use    Smoking status: Every Day     Packs/day: 0.50     Years: 40.00     Pack years: 20.00     Types: Cigarettes     Last attempt to quit: 2018     Years since quittin.4    Smokeless tobacco: Never   Substance Use Topics    Alcohol use: Yes     Comment: wine on occ    Drug use: No       Review of Systems:  All other systems are reviewed and are negative.       Objective:   Last 24 Hour Vital Signs:  BP  Min: 128/85  Max: 192/102  Pulse  Av.1  Min: 77  Max: 99  Resp  Av.9  Min: 18  Max: 25  SpO2  Av %  Min: 94 %  Max: 100 %  Height  Av' 6" (167.6 cm)  Min: 5' 6" (167.6 cm)  Max: 5' 6" (167.6 cm)  Weight  Av.4 kg (261 lb)  Min: 118.4 kg (261 lb)  Max: 118.4 kg (261 lb)  Body mass index is 42.13 kg/m².  I/O last 3 completed shifts:  In: 240 [P.O.:240]  Out: 600 [Urine:600]    Physical Examination:  Gen: AxOx4, NAD  HEENT: PERRLA, EOMI, MMM  CV: RRR, no S3 or S4, no murmurs  Lungs: CTA bilaterally; no wheezes or rhonchi  Abd: soft, nt/nd, normoactive BS  Ext: no peripheral edema, no rashes/lesions  Neuro: no focal deficits, moving all extremities spontaneously, 5/5 strength of UE and LE bilaterally      Laboratory:  Most Recent Data:  CBC:   Lab Results   Component Value Date    WBC 8.9 02/15/2023    HGB 13.6 02/15/2023    HCT 42.8 02/15/2023     02/15/2023    MCV 89.0 02/15/2023    RDW 14.7 02/15/2023     BMP:   Lab Results   Component Value Date     02/15/2023    K 4.1 02/15/2023    CL 98 2021    CO2 25 02/15/2023    BUN 18.9 02/15/2023    CREATININE 0.84 02/15/2023     (HH) 2021    CALCIUM 9.5 02/15/2023    MG 1.90 02/15/2023    PHOS 3.5 02/15/2023     LFTs:   Lab Results   Component Value Date    PROT 8.6 (H) 2021    ALBUMIN 2.9 (L) 02/15/2023    BILITOT 0.3 02/15/2023    AST 19 02/15/2023    ALKPHOS 68 02/15/2023    ALT 25 02/15/2023     Coags:   Lab Results   Component Value Date    " INR 0.96 02/13/2023    PROTIME 12.6 02/13/2023    PTT 60.6 02/14/2023     FLP:   Lab Results   Component Value Date    CHOL 173 02/14/2023    HDL 31 (L) 02/14/2023    TRIG 283 (H) 02/14/2023     DM:   Lab Results   Component Value Date    HGBA1C 8.7 (H) 02/09/2023    HGBA1C 10.5 (H) 10/18/2022    HGBA1C 11.5 (H) 03/23/2022    CREATININE 0.84 02/15/2023     Thyroid:   Lab Results   Component Value Date    TSH 2.100 02/09/2023     Anemia: No results found for: IRON, TIBC, FERRITIN, [id number], FOLATE  Cardiac:   Lab Results   Component Value Date    TROPONINI 0.043 02/14/2023    BNP 27.9 01/06/2023     Urinalysis:   Lab Results   Component Value Date    LABURIN >/= 100,000 colonies/ml Escherichia coli (A) 02/09/2023    COLORU Yellow 09/30/2021    PHUA 5.5 02/09/2023    SPECGRAV 1.015 09/30/2021    NITRITE Negative 09/30/2021    KETONESU Negative 09/30/2021    UROBILINOGEN Normal 02/09/2023    WBCUA 51-99 (A) 02/09/2023       Trended Lab Data:  Recent Labs   Lab 02/09/23  1259 02/13/23  1427 02/13/23  1624 02/14/23  0255 02/14/23  1531 02/15/23  0400   WBC 9.5  --    < > 10.0 9.1 8.9   HGB 15.5  --    < > 12.5 14.2 13.6   HCT 48.0*  --    < > 38.5 44.1 42.8     --    < > 287 335 325   MCV 87.1  --    < > 87.9 88.2 89.0   RDW 14.5  --    < > 14.8 14.6 14.7    142  --  139  --  140   K 3.6 3.7  --  3.6  --  4.1   CO2 26 29  --  27  --  25   BUN 20.0 18.0  --  22.3*  --  18.9   CREATININE 0.91 0.94  --  0.94  --  0.84   ALBUMIN 3.3*  --   --  2.7*  --  2.9*   BILITOT 0.3  --   --  0.2  --  0.3   AST 25  --   --  19  --  19   ALKPHOS 71  --   --  63  --  68   ALT 29  --   --  30  --  25    < > = values in this interval not displayed.       Trended Cardiac Data:  Recent Labs   Lab 02/13/23  1950 02/14/23  0255 02/14/23  0742   TROPONINI 0.090* 0.077* 0.043         Other Results:  EKG (my interpretation):      Assessment:     62F with PMHx of CVA, DM2, HTN, PPM, and TBI who presented with L. Arm pain, chest  tightness, and SOB in the setting of hypertensive emergency.  Plan:     -sxs and flat troponin elevation consistent with hypertensive emergency and associated demand ischemia BP on admission 218/118 no evidence of new WMA abnormalities  -started on cardene gtt with transition to PO medications thereafter  -on coreg, nifedipine,valsartan, and hydralazine at home recommend uptitration of medications prn to achieve adequate BP control prior to discharge  -follow up in cardiology clinic in 2-4 weeks    Sammi Chapman MD

## 2023-02-15 NOTE — PLAN OF CARE
DME referral packet and prescription for RW have been submitted to [facility] via [employer].

## 2023-02-15 NOTE — PROGRESS NOTES
Pt in process of being transferred ED13  to room 605. OT will attempt to see pt once she is settled in 605

## 2023-02-16 PROBLEM — R07.2 PRECORDIAL PAIN: Status: ACTIVE | Noted: 2023-02-13

## 2023-02-16 PROBLEM — I16.1 HYPERTENSIVE EMERGENCY: Status: ACTIVE | Noted: 2023-02-16

## 2023-02-16 PROBLEM — R11.2 NAUSEA AND VOMITING: Status: ACTIVE | Noted: 2023-02-16

## 2023-02-16 PROBLEM — I21.4 NSTEMI (NON-ST ELEVATED MYOCARDIAL INFARCTION): Status: ACTIVE | Noted: 2023-02-16

## 2023-02-16 PROBLEM — R10.9 ABDOMINAL PAIN: Status: ACTIVE | Noted: 2023-02-16

## 2023-02-16 LAB
ALBUMIN SERPL-MCNC: 2.9 G/DL (ref 3.4–4.8)
ALBUMIN/GLOB SERPL: 0.7 RATIO (ref 1.1–2)
ALP SERPL-CCNC: 60 UNIT/L (ref 40–150)
ALT SERPL-CCNC: 21 UNIT/L (ref 0–55)
AST SERPL-CCNC: 17 UNIT/L (ref 5–34)
BASOPHILS # BLD AUTO: 0.05 X10(3)/MCL (ref 0–0.2)
BASOPHILS NFR BLD AUTO: 0.6 %
BILIRUBIN DIRECT+TOT PNL SERPL-MCNC: 0.4 MG/DL
BUN SERPL-MCNC: 17.1 MG/DL (ref 9.8–20.1)
CALCIUM SERPL-MCNC: 9.1 MG/DL (ref 8.4–10.2)
CHLORIDE SERPL-SCNC: 106 MMOL/L (ref 98–107)
CO2 SERPL-SCNC: 24 MMOL/L (ref 23–31)
CREAT SERPL-MCNC: 0.91 MG/DL (ref 0.55–1.02)
CRP SERPL-MCNC: 16.8 MG/L
EOSINOPHIL # BLD AUTO: 0.14 X10(3)/MCL (ref 0–0.9)
EOSINOPHIL NFR BLD AUTO: 1.7 %
ERYTHROCYTE [DISTWIDTH] IN BLOOD BY AUTOMATED COUNT: 14.8 % (ref 11.5–17)
ERYTHROCYTE [SEDIMENTATION RATE] IN BLOOD: 59 MM/HR (ref 0–20)
FLUAV AG UPPER RESP QL IA.RAPID: NOT DETECTED
FLUBV AG UPPER RESP QL IA.RAPID: NOT DETECTED
GFR SERPLBLD CREATININE-BSD FMLA CKD-EPI: >60 MLS/MIN/1.73/M2
GLOBULIN SER-MCNC: 4.1 GM/DL (ref 2.4–3.5)
GLUCOSE SERPL-MCNC: 159 MG/DL (ref 82–115)
HCT VFR BLD AUTO: 41.7 % (ref 37–47)
HGB BLD-MCNC: 13.7 GM/DL (ref 12–16)
IMM GRANULOCYTES # BLD AUTO: 0.04 X10(3)/MCL (ref 0–0.04)
IMM GRANULOCYTES NFR BLD AUTO: 0.5 %
LYMPHOCYTES # BLD AUTO: 2.15 X10(3)/MCL (ref 0.6–4.6)
LYMPHOCYTES NFR BLD AUTO: 26.8 %
MAGNESIUM SERPL-MCNC: 2 MG/DL (ref 1.6–2.6)
MCH RBC QN AUTO: 28.5 PG
MCHC RBC AUTO-ENTMCNC: 32.9 MG/DL (ref 33–36)
MCV RBC AUTO: 86.9 FL (ref 80–94)
MONOCYTES # BLD AUTO: 0.53 X10(3)/MCL (ref 0.1–1.3)
MONOCYTES NFR BLD AUTO: 6.6 %
NEUTROPHILS # BLD AUTO: 5.1 X10(3)/MCL (ref 2.1–9.2)
NEUTROPHILS NFR BLD AUTO: 63.8 %
NRBC BLD AUTO-RTO: 0 %
PHOSPHATE SERPL-MCNC: 3.6 MG/DL (ref 2.3–4.7)
PLATELET # BLD AUTO: 338 X10(3)/MCL (ref 130–400)
PMV BLD AUTO: 9.9 FL (ref 7.4–10.4)
POCT GLUCOSE: 150 MG/DL (ref 70–110)
POCT GLUCOSE: 156 MG/DL (ref 70–110)
POCT GLUCOSE: 160 MG/DL (ref 70–110)
POCT GLUCOSE: 209 MG/DL (ref 70–110)
POTASSIUM SERPL-SCNC: 4.2 MMOL/L (ref 3.5–5.1)
PROT SERPL-MCNC: 7 GM/DL (ref 5.8–7.6)
RBC # BLD AUTO: 4.8 X10(6)/MCL (ref 4.2–5.4)
RSV A 5' UTR RNA NPH QL NAA+PROBE: NOT DETECTED
SARS-COV-2 RNA RESP QL NAA+PROBE: NOT DETECTED
SODIUM SERPL-SCNC: 139 MMOL/L (ref 136–145)
WBC # SPEC AUTO: 8 X10(3)/MCL (ref 4.5–11.5)

## 2023-02-16 PROCEDURE — 96375 TX/PRO/DX INJ NEW DRUG ADDON: CPT | Mod: 59

## 2023-02-16 PROCEDURE — 25000003 PHARM REV CODE 250

## 2023-02-16 PROCEDURE — 25000003 PHARM REV CODE 250: Performed by: STUDENT IN AN ORGANIZED HEALTH CARE EDUCATION/TRAINING PROGRAM

## 2023-02-16 PROCEDURE — 96366 THER/PROPH/DIAG IV INF ADDON: CPT

## 2023-02-16 PROCEDURE — 85651 RBC SED RATE NONAUTOMATED: CPT | Performed by: STUDENT IN AN ORGANIZED HEALTH CARE EDUCATION/TRAINING PROGRAM

## 2023-02-16 PROCEDURE — 83735 ASSAY OF MAGNESIUM: CPT

## 2023-02-16 PROCEDURE — G0378 HOSPITAL OBSERVATION PER HR: HCPCS

## 2023-02-16 PROCEDURE — 99900035 HC TECH TIME PER 15 MIN (STAT)

## 2023-02-16 PROCEDURE — 0241U COVID/RSV/FLU A&B PCR: CPT

## 2023-02-16 PROCEDURE — 63600175 PHARM REV CODE 636 W HCPCS

## 2023-02-16 PROCEDURE — 94640 AIRWAY INHALATION TREATMENT: CPT | Mod: XB

## 2023-02-16 PROCEDURE — 63600175 PHARM REV CODE 636 W HCPCS: Performed by: STUDENT IN AN ORGANIZED HEALTH CARE EDUCATION/TRAINING PROGRAM

## 2023-02-16 PROCEDURE — 93005 ELECTROCARDIOGRAM TRACING: CPT

## 2023-02-16 PROCEDURE — 94761 N-INVAS EAR/PLS OXIMETRY MLT: CPT

## 2023-02-16 PROCEDURE — 25500020 PHARM REV CODE 255: Performed by: INTERNAL MEDICINE

## 2023-02-16 PROCEDURE — 86140 C-REACTIVE PROTEIN: CPT | Performed by: STUDENT IN AN ORGANIZED HEALTH CARE EDUCATION/TRAINING PROGRAM

## 2023-02-16 PROCEDURE — 85025 COMPLETE CBC W/AUTO DIFF WBC: CPT

## 2023-02-16 PROCEDURE — 80053 COMPREHEN METABOLIC PANEL: CPT

## 2023-02-16 PROCEDURE — 84100 ASSAY OF PHOSPHORUS: CPT

## 2023-02-16 PROCEDURE — S4991 NICOTINE PATCH NONLEGEND: HCPCS

## 2023-02-16 PROCEDURE — 96372 THER/PROPH/DIAG INJ SC/IM: CPT

## 2023-02-16 PROCEDURE — 97530 THERAPEUTIC ACTIVITIES: CPT

## 2023-02-16 PROCEDURE — 94640 AIRWAY INHALATION TREATMENT: CPT

## 2023-02-16 PROCEDURE — 96372 THER/PROPH/DIAG INJ SC/IM: CPT | Mod: 59 | Performed by: STUDENT IN AN ORGANIZED HEALTH CARE EDUCATION/TRAINING PROGRAM

## 2023-02-16 PROCEDURE — 97116 GAIT TRAINING THERAPY: CPT

## 2023-02-16 PROCEDURE — 25000242 PHARM REV CODE 250 ALT 637 W/ HCPCS

## 2023-02-16 PROCEDURE — S0030 INJECTION, METRONIDAZOLE: HCPCS

## 2023-02-16 RX ORDER — CETIRIZINE HYDROCHLORIDE 10 MG/1
20 TABLET ORAL 2 TIMES DAILY
Status: DISCONTINUED | OUTPATIENT
Start: 2023-02-16 | End: 2023-02-17 | Stop reason: HOSPADM

## 2023-02-16 RX ORDER — ONDANSETRON 2 MG/ML
4 INJECTION INTRAMUSCULAR; INTRAVENOUS EVERY 6 HOURS PRN
Status: DISCONTINUED | OUTPATIENT
Start: 2023-02-16 | End: 2023-02-17 | Stop reason: HOSPADM

## 2023-02-16 RX ORDER — PREDNISONE 20 MG/1
40 TABLET ORAL ONCE
Status: COMPLETED | OUTPATIENT
Start: 2023-02-16 | End: 2023-02-16

## 2023-02-16 RX ADMIN — ATORVASTATIN CALCIUM 40 MG: 40 TABLET, FILM COATED ORAL at 08:02

## 2023-02-16 RX ADMIN — ALPRAZOLAM 1 MG: 0.5 TABLET ORAL at 08:02

## 2023-02-16 RX ADMIN — METRONIDAZOLE 500 MG: 5 INJECTION, SOLUTION INTRAVENOUS at 11:02

## 2023-02-16 RX ADMIN — DOCUSATE CALCIUM 240 MG: 240 CAPSULE, LIQUID FILLED ORAL at 09:02

## 2023-02-16 RX ADMIN — PANTOPRAZOLE SODIUM 40 MG: 40 TABLET, DELAYED RELEASE ORAL at 09:02

## 2023-02-16 RX ADMIN — IPRATROPIUM BROMIDE AND ALBUTEROL SULFATE 3 ML: 2.5; .5 SOLUTION RESPIRATORY (INHALATION) at 07:02

## 2023-02-16 RX ADMIN — IOHEXOL 100 ML: 350 INJECTION, SOLUTION INTRAVENOUS at 02:02

## 2023-02-16 RX ADMIN — DULOXETINE HYDROCHLORIDE 30 MG: 30 CAPSULE, DELAYED RELEASE ORAL at 09:02

## 2023-02-16 RX ADMIN — CETIRIZINE HYDROCHLORIDE 20 MG: 10 TABLET, FILM COATED ORAL at 08:02

## 2023-02-16 RX ADMIN — POLYETHYLENE GLYCOL 3350 17 G: 17 POWDER, FOR SOLUTION ORAL at 09:02

## 2023-02-16 RX ADMIN — INSULIN ASPART 2 UNITS: 100 INJECTION, SOLUTION INTRAVENOUS; SUBCUTANEOUS at 08:02

## 2023-02-16 RX ADMIN — METRONIDAZOLE 500 MG: 5 INJECTION, SOLUTION INTRAVENOUS at 08:02

## 2023-02-16 RX ADMIN — ENOXAPARIN SODIUM 40 MG: 40 INJECTION SUBCUTANEOUS at 04:02

## 2023-02-16 RX ADMIN — CARVEDILOL 12.5 MG: 12.5 TABLET, FILM COATED ORAL at 08:02

## 2023-02-16 RX ADMIN — INSULIN ASPART 3 UNITS: 100 INJECTION, SOLUTION INTRAVENOUS; SUBCUTANEOUS at 09:02

## 2023-02-16 RX ADMIN — MELATONIN TAB 3 MG 6 MG: 3 TAB at 08:02

## 2023-02-16 RX ADMIN — INSULIN DETEMIR 40 UNITS: 100 INJECTION, SOLUTION SUBCUTANEOUS at 08:02

## 2023-02-16 RX ADMIN — ONDANSETRON 4 MG: 2 INJECTION INTRAMUSCULAR; INTRAVENOUS at 11:02

## 2023-02-16 RX ADMIN — NICOTINE 1 PATCH: 7 PATCH, EXTENDED RELEASE TRANSDERMAL at 09:02

## 2023-02-16 RX ADMIN — PREDNISONE 40 MG: 20 TABLET ORAL at 08:02

## 2023-02-16 RX ADMIN — HYDRALAZINE HYDROCHLORIDE 100 MG: 50 TABLET ORAL at 10:02

## 2023-02-16 RX ADMIN — HYDRALAZINE HYDROCHLORIDE 100 MG: 50 TABLET ORAL at 01:02

## 2023-02-16 RX ADMIN — VALSARTAN 160 MG: 80 TABLET ORAL at 09:02

## 2023-02-16 RX ADMIN — METRONIDAZOLE 500 MG: 5 INJECTION, SOLUTION INTRAVENOUS at 03:02

## 2023-02-16 RX ADMIN — ACETAMINOPHEN 650 MG: 325 TABLET, FILM COATED ORAL at 03:02

## 2023-02-16 RX ADMIN — SERTRALINE HYDROCHLORIDE 50 MG: 50 TABLET ORAL at 09:02

## 2023-02-16 RX ADMIN — HYDRALAZINE HYDROCHLORIDE 100 MG: 50 TABLET ORAL at 05:02

## 2023-02-16 RX ADMIN — NIFEDIPINE 90 MG: 30 TABLET, FILM COATED, EXTENDED RELEASE ORAL at 09:02

## 2023-02-16 RX ADMIN — ACETAMINOPHEN 650 MG: 325 TABLET, FILM COATED ORAL at 08:02

## 2023-02-16 RX ADMIN — FLUTICASONE FUROATE AND VILANTEROL TRIFENATATE 1 PUFF: 200; 25 POWDER RESPIRATORY (INHALATION) at 07:02

## 2023-02-16 RX ADMIN — CIPROFLOXACIN 400 MG: 2 INJECTION, SOLUTION INTRAVENOUS at 08:02

## 2023-02-16 RX ADMIN — CARVEDILOL 12.5 MG: 12.5 TABLET, FILM COATED ORAL at 09:02

## 2023-02-16 RX ADMIN — INSULIN ASPART 6 UNITS: 100 INJECTION, SOLUTION INTRAVENOUS; SUBCUTANEOUS at 01:02

## 2023-02-16 RX ADMIN — CIPROFLOXACIN 400 MG: 2 INJECTION, SOLUTION INTRAVENOUS at 09:02

## 2023-02-16 RX ADMIN — ASPIRIN 81 MG: 81 TABLET, COATED ORAL at 09:02

## 2023-02-16 RX ADMIN — INSULIN DETEMIR 40 UNITS: 100 INJECTION, SOLUTION SUBCUTANEOUS at 09:02

## 2023-02-16 NOTE — PT/OT/SLP PROGRESS
Occupational Therapy   Treatment    Name: Elba Barnes  MRN: [id number]  Admitting Diagnosis:    Patient Active Problem List   Diagnosis    Depression    Anxiety    PTSD (post-traumatic stress disorder)    Class 3 severe obesity with body mass index (BMI) of 40.0 to 44.9 in adult    Tobacco use disorder    Essential hypertension    Mixed hyperlipidemia    Diabetes mellitus type 2, insulin dependent    Encounter to establish care    Uncontrolled type 2 diabetes mellitus with hyperglycemia    Noncompliance    Overgrown toenails    Acute cystitis without hematuria    Tricompartment osteoarthritis of right knee    Chest pain           Recommendations:     Discharge Recommendations: home with home health, home health OT, other (see comments) (home with caregiver assist)  Discharge Equipment Recommendations:  other (see comments) (Pt received RW and adjusted by PT to her height)  Barriers to discharge:  Other (Comment), Decreased caregiver support (Pt will require 24 hour caregiver assist)    Assessment:     Elba Barnes is a 62 y.o. female with a medical diagnosis of see above.  She presents with functional decline. Performance deficits affecting function are weakness, impaired endurance, impaired sensation, impaired self care skills, impaired functional mobility, gait instability, impaired balance, decreased lower extremity function.     Rehab Prognosis:  Good; patient would benefit from acute skilled OT services to address these deficits and reach maximum level of function.       Plan:     Patient to be seen  (Mon-Fri 2-5 times per week) to address the above listed problems via self-care/home management, therapeutic activities, therapeutic exercises  Plan of Care Expires:  (d/c)  Plan of Care Reviewed with: patient    Subjective     Pain/Comfort:  Pain Rating 1: 10/10  Location - Side 1: Right  Location - Orientation 1: lower  Location 1: abdomen  Pain Addressed 1: Nurse notified, Distraction  Pain Rating  Post-Intervention 1: 10/10    Objective:     Communicated with: Nurse Haile prior to session.  Patient found right sidelying  with c/o nauseau with telemetry, peripheral IV, PureWick, oxygen upon OT entry to room. Pt agreeable to try although nauseated    General Precautions: Standard, fall    Orthopedic Precautions:N/A  Braces: N/A  Respiratory Status: Nasal cannula, flow 2 L/min    Vitals  Presession  Post session  /79  133/75  HR 84  99     Occupational Performance:     Bed Mobility:    Patient completed Scooting/Bridging with stand by assistance  Patient completed Supine to Sit with stand by assistance  Patient completed Sit to Supine with stand by assistance     Functional Mobility/Transfers:  Patient completed Sit <> Stand Transfer with stand by assistance  with  rolling walker   Functional Mobility: pt refused attempt due to nauseau    Activities of Daily Living:  Grooming: supervision after s/u EOB     Treatment & Education:  Pt sat EOB ~ 8 min with supervision and ongoing c/o of nausea and refused OOB for functional mobility and self care tasks     Patient left right sidelying and emesis bag with all lines intact, call button in reach, and nurse Haile notified    GOALS:   Multidisciplinary Problems       Occupational Therapy Goals          Problem: Occupational Therapy    Goal Priority Disciplines Outcome Interventions   Occupational Therapy Goal     OT, PT/OT Ongoing, Progressing    Description: Goals to be met by: d/c     Patient will increase functional independence with ADLs by performing:    LE Dressing with Moderate Assistance.  Grooming while EOB with Supervision after s/u.-goal met 2/16/23  Toileting from bedside commode with Moderate Assistance for hygiene and clothing management.   Supine to sit with Contact Guard Assistance in prep for ADL's.-goal met 2/16/23  Toilet transfer to bedside commode with Contact Guard Assistance with RW .                         Time Tracking:     OT Date of  Treatment: 02/16/23  OT Start Time: 1017  OT Stop Time: 1036  OT Total Time (min): 19 min    Billable Minutes:Therapeutic Activity 19 min    OT/JULIETA: OT          2/16/2023

## 2023-02-16 NOTE — PT/OT/SLP PROGRESS
Physical Therapy Treatment    Patient Name:  Elba Barnes   MRN:  [id number]    Recommendations:     Discharge Recommendations:  home health PT, home health OT   Discharge Equipment Recommendations: other (see comments) (she received her RW and it was adjusted to her height)   Barriers to discharge: None    Assessment:     Elba Barnes is a 62 y.o. female admitted with a medical diagnosis of <principal problem not specified>.  She presents with the following impairments/functional limitations:  weakness, impaired sensation, impaired self care skills, impaired functional mobility, gait instability, impaired balance, decreased lower extremity function .    Rehab Prognosis: Good; patient would benefit from acute skilled PT services to address these deficits and reach maximum level of function.    Recent Surgery: * No surgery found *      Plan:     During this hospitalization, patient to be seen  (3-5xwk) to address the identified rehab impairments via gait training, therapeutic activities, therapeutic exercises and progress toward the following goals:    Plan of Care Expires:  03/13/23    Subjective     Chief Complaint: pt. Stated that she was freezing cold and upon returning to bed, pt. Said that she was nauseated.  Patient/Family Comments/goals: pt. Wants to feel better and go home  Pain/Comfort:  Pain Rating 1: 0/10  Pain Rating Post-Intervention 1: 0/10      Objective:     Communicated with nurse Haile prior to session.  Patient found left sidelying with oxygen, PureWick upon PT entry to room.     General Precautions: Standard, fall   Orthopedic Precautions:N/A   Braces: N/A  Respiratory Status: Nasal cannula, flow 1 L/min     Functional Mobility:  Bed Mobility:     Supine to Sit: stand by assistance  Transfers:     Sit to Stand:  stand by assistance with rolling walker  Gait: Pt. Ambulated 20 ft x 2 with RW with CGA on R side( precaution due to knee giving out and recent fall) No knee buckling observed,  balance was good. Pt. Wanted to return to her room because she was feeling cold and when laying down nauseated.   Changed linen with CNA prior to pt. Returning to bed.     Patient left left sidelying with all lines intact, notified Mic at end of session, call bell in reach.    GOALS:   Multidisciplinary Problems       Physical Therapy Goals          Problem: Physical Therapy    Goal Priority Disciplines Outcome Goal Variances Interventions   Physical Therapy Goal     PT, PT/OT      Description: Goals to be met by: dc     Patient will increase functional independence with mobility by performin. Supine to sit with Stand-by Assistance met 2023    2. Sit to stand transfer with Stand-by Assistance met 2023    3. Gait  x 50 feet with Minimal Assistance using Rolling Walker.   Goals ongoing and progressing 2/15/2023                         Time Tracking:     PT Received On: 23  PT Start Time: 804     PT Stop Time: 823  PT Total Time (min): 19 min     Billable Minutes: Gait Training 19    Treatment Type: Treatment  PT/PTA: PT           2023

## 2023-02-16 NOTE — PROGRESS NOTES
[facility] - Emergency Dept  Pulmonary Critical Care Note      Patient Name: Elba Barnes  MRN: [id number]  Admission Date: 2/13/2023  Hospital Length of Stay: 1 days  Code Status: Full Code  Attending Provider: Kapil Monroy MD  Primary Care Provider: VIRGEN Hoyt     Subjective:     HPI: Elba Barnes is a 62 y.o. female who has a PMH of hypertension, poorly controlled diabetes, traumatic brain injury, CHF, CVA with right sided deficits, GERD, osteoarthritis of multiple sites, tobacco abuse, and obesity. The patient presented to [facility] ED from  clinic on 2/13/2023 with a primary complaint of chest pain. She was at her PCP appointment today who recommended her go to ED due to chest pain and elevated blood pressure. She says that her symptoms began 1 day prior to arrival. She describes her pain as crushing, located under the left breast, and it is associated with shortness of breath. She says that yesterday she rated her pain as 10/10 and today it is a 9/10. She received 1 dose of nitro SL in ED which states relieved her pain. She says that her shortness of breath has also been present for the past 24 hours. She also has associated numbness and tingling in her left arm in the ulnar nerve distribution which is new. She states that she has had two heart attacks in the past but has never had stents placed, however she did have a LHC in December 2022 and was told that coronary arteries were clear. 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 She currently smokes 1/2 pack of cigarettes per day and is not interested in quitting. She denies alcohol or other illicit drug use. She currently lives with her daughter but wants to live in a nursing home. During the interview she is somewhat tearful saying that she feels that she is a burden to her children and that her body  is failing her.      In the ED she was noted to be severely hypertensive at 218/118 max, responded to labetalol and lopressor, on 168/94 on my evaluation. Afebrile, normal respiratory rate. Labs reveal elevated troponin of 0.093. EKG with new T wave inversions lead I. CXR clear. Internal medicine consulted for admission due to concern of NSTEMI and hypertensive urgency.     Patient says that her chest pain is a chest pressure that is not relieved with nitroglycerin.  Patient says that is unrelenting patient was sent from her primary care physician because her blood pressure was extremely high and she had some chest pain.  Patient says that she takes 2 medications for her hypertension.        24 Hour Interval History:  patient had no acute events overnight. She complains of suprapubic and abdominal pain that is worse after eating. Has not eaten today due to fear of abdominal pain. No fever, but does also report some nausea and chills. No vomiting. Did have bowel movement last night that was normal in caliber, non bloody          ROS Negative unless stated above.    Objective:     Vital Signs (Most Recent):  Temp: 98.7 °F (37.1 °C) (02/16/23 1111)  Pulse: 89 (02/16/23 0735)  Resp: 18 (02/16/23 1111)  BP: 105/71 (02/16/23 1111)  SpO2: (!) 93 % (02/16/23 0728)    Body mass index is 42.13 kg/m².  Weight: 118.4 kg (261 lb) Vital Signs (24h Range):  Temp:  [97.9 °F (36.6 °C)-98.7 °F (37.1 °C)] 98.7 °F (37.1 °C)  Pulse:  [78-93] 89  Resp:  [18-24] 18  SpO2:  [90 %-95 %] 93 %  BP: (105-146)/(67-84) 105/71     Input/output::     Intake/Output Summary (Last 24 hours) at 2/16/2023 1248  Last data filed at 2/16/2023 0416  Gross per 24 hour   Intake 650 ml   Output 1350 ml   Net -700 ml       Physical Exam:   General: Patient resting comfortably, in no acute distress   Eye: pupils equal, EOMI, clear conjunctiva, eyelids normal  HENT: Head-normocephalic. Healed scar with overlying hair loss left-middle frontal bone with  depression  Neck: full range of motion, no thyromegaly or lymphadenopathy, trachea midline, supple  Respiratory: clear to auscultation bilaterally without wheezes, rales, rhonchi  Cardiovascular: regular rate and rhythm without murmurs.  No gallops or rubs, no JVD.   Gastrointestinal: soft, moderately tender to palpation diffusely but worse in suprapubic area  Genitourinary: no CVA tenderness to palpation. suprapubic tenderness  Musculoskeletal: moves all extremities purposefully, no signs of trauma or bruising  Integumentary: no rashes or skin lesions present  Neurologic: motor/sensory function intact, strength in left upper extremity 4/5, all other extremities strength 5/5. Sensation equal and intact bilateral LE and UE. Cranial nerves II-XII in tact.  Psychiatric:  alert and oriented, cooperative with exam, tearful during interview, reports depression but denies suicidal/homicidal ideation  Rectal: refused        Significant Labs:  Recent Labs   Lab 02/16/23  0401   WBC 8.0   RBC 4.80   HGB 13.7   HCT 41.7      MCV 86.9   MCH 28.5   MCHC 32.9*     Recent Labs   Lab 02/16/23  0402   GLUCOSE 159*      K 4.2   CHLORIDE 106   CO2 24   BUN 17.1   CREATININE 0.91   MG 2.00                 Scheduled:   aspirin  81 mg Oral Daily    atorvastatin  40 mg Oral QHS    carvediloL  12.5 mg Oral BID    ciprofloxacin  400 mg Intravenous Q12H    docusate calcium  240 mg Oral BID    DULoxetine  30 mg Oral Daily    enoxaparin  40 mg Subcutaneous Daily    fluticasone furoate-vilanteroL  1 puff Inhalation Daily    hydrALAZINE  100 mg Oral Q8H    insulin detemir U-100  40 Units Subcutaneous BID    magnesium sulfate IVPB  1 g Intravenous Once    metronidazole  500 mg Intravenous Q8H    nicotine  1 patch Transdermal Daily    NIFEdipine  90 mg Oral Daily    pantoprazole  40 mg Oral Daily    polyethylene glycol  17 g Oral BID    sertraline  50 mg Oral Daily    valsartan  160 mg Oral Daily         PRN:   acetaminophen     albuterol-ipratropium    ALPRAZolam    dextrose 10%    dextrose 10%    glucagon (human recombinant)    glucose    glucose    insulin aspart U-100    labetalol    melatonin    morphine    nitroGLYCERIN    ondansetron    sodium chloride 0.9%          Antibiotics and Day Number of Therapy:  Antibiotics (From admission, onward)      Start     Stop Route Frequency Ordered    02/15/23 1200  metronidazole IVPB 500 mg         -- IV Every 8 hours (non-standard times) 02/15/23 1051    02/15/23 0930  ciprofloxacin (CIPRO)400mg/200ml D5W IVPB 400 mg         02/22 0929 IV Every 12 hours (non-standard times) 02/15/23 0833                 Imaging:  CT Abdomen Pelvis With Contrast  Narrative: EXAMINATION:  CT ABDOMEN PELVIS WITH CONTRAST    CLINICAL HISTORY:  LLQ abdominal pain;RLQ abdominal pain (Age >= 14y);    TECHNIQUE:  Low dose axial images, sagittal and coronal reformations were obtained from the lung bases to the pubic symphysis following the IV administration of contrast. Automatic exposure control (AEC) is utilized to reduce patient radiation exposure.    COMPARISON:  10/30/2019    FINDINGS:  The lung bases are clear.    The liver is enlarged in size and there appears to be hepatic steatosis..  No liver mass or lesion is seen.  Portal and hepatic veins appear normal.    The patient is status post cholecystectomy..    The pancreas appears normal.  No pancreatic mass or lesion is seen.    The spleen shows no acute abnormality.    There is a 2.4 x 2.4 cm nodule in the adrenal gland on the right.  It is stable since 10/30/2019..  Comparison to a chest CT scan from 10/10/2014 also shows this lesion and appears to be stable.  Findings are consistent with benign adenomatous type lesion.  The left adrenal gland appears normal.    There is a simple cyst in the lower pole of the left kidney.  Kidneys are normal in size..  No hydronephrosis is seen.  No hydroureter is seen.  No nephrolithiasis is seen.  No obvious ureteral stones  are seen.    Urinary bladder appears grossly unremarkable.    No colitis is seen.  There are some diverticuli in sigmoid colon but no diverticulitis is seen.  .  No obvious colonic mass or lesion is seen.    No free air is seen.  No free fluid is seen.    There is some old rib fracture seen on the right side laterally.  The bones are otherwise unremarkable.  Impression: Diverticulosis in the sigmoid colon but no diverticulitis    Hepatic steatosis    Benign-appearing right adrenal nodule stable since 2014    Electronically signed by: Letha Foster  Date:    02/15/2023  Time:    12:00        Assessment/Plan:     Assessment:  Hypertensive emergency   Troponinemia  Diabetes mellitus  Anginal chest pain  Depression, anxiety  UTI  Constipation, abdominal pain      Plan:  -patient had a recent left heart catheterization completed in December which was clear, will treat with a heparin drip and trend troponins.  -nifedipine 90, Coreg 3.125 b.i.d., hydralazine 25 t.i.d., valsartan 160   - clonidine has been dc  - spoke with Cardiology who recommended outpatient medical management no indication for LHC in the near future with hx of recent LHC  - on flagyl and cipro for presumed intraabdominal infection, abdominal pain likely due to UTI. UA improving from day of admission.   - Abdominal pain somewhat concerning for mesenteric ischemia, CTA ordered stat.      CODE STATUS: Full Code  Access: Peripheral  Antibiotics: cipro, flagyl  Diet: Diabetic  DVT Prophylaxis: Lovenox  GI Prophylaxis: proton pump inhibitor per orders  Fluids: none.     Dipso: hypertensive urgency resolved, BP under better control. Abdominal pain concerning for ischemia will order CTA.    Ignacia Lopez,   [facility]  HO-1

## 2023-02-17 ENCOUNTER — DOCUMENTATION ONLY (OUTPATIENT)
Dept: PHARMACY | Facility: HOSPITAL | Age: 63
End: 2023-02-17
Payer: COMMERCIAL

## 2023-02-17 VITALS
RESPIRATION RATE: 18 BRPM | TEMPERATURE: 98 F | BODY MASS INDEX: 41.95 KG/M2 | HEART RATE: 96 BPM | SYSTOLIC BLOOD PRESSURE: 142 MMHG | DIASTOLIC BLOOD PRESSURE: 69 MMHG | HEIGHT: 66 IN | WEIGHT: 261 LBS | OXYGEN SATURATION: 95 %

## 2023-02-17 LAB
ALBUMIN SERPL-MCNC: 3 G/DL (ref 3.4–4.8)
ALBUMIN/GLOB SERPL: 0.7 RATIO (ref 1.1–2)
ALP SERPL-CCNC: 61 UNIT/L (ref 40–150)
ALT SERPL-CCNC: 27 UNIT/L (ref 0–55)
AST SERPL-CCNC: 26 UNIT/L (ref 5–34)
BASOPHILS # BLD AUTO: 0.02 X10(3)/MCL (ref 0–0.2)
BASOPHILS NFR BLD AUTO: 0.3 %
BILIRUBIN DIRECT+TOT PNL SERPL-MCNC: 0.3 MG/DL
BUN SERPL-MCNC: 22.1 MG/DL (ref 9.8–20.1)
CALCIUM SERPL-MCNC: 9.1 MG/DL (ref 8.4–10.2)
CHLORIDE SERPL-SCNC: 102 MMOL/L (ref 98–107)
CO2 SERPL-SCNC: 24 MMOL/L (ref 23–31)
CREAT SERPL-MCNC: 1.18 MG/DL (ref 0.55–1.02)
EOSINOPHIL # BLD AUTO: 0 X10(3)/MCL (ref 0–0.9)
EOSINOPHIL NFR BLD AUTO: 0 %
ERYTHROCYTE [DISTWIDTH] IN BLOOD BY AUTOMATED COUNT: 15.3 % (ref 11.5–17)
GFR SERPLBLD CREATININE-BSD FMLA CKD-EPI: 53 MLS/MIN/1.73/M2
GLOBULIN SER-MCNC: 4.3 GM/DL (ref 2.4–3.5)
GLUCOSE SERPL-MCNC: 260 MG/DL (ref 82–115)
HCT VFR BLD AUTO: 42.1 % (ref 37–47)
HGB BLD-MCNC: 14.1 GM/DL (ref 12–16)
IMM GRANULOCYTES # BLD AUTO: 0.05 X10(3)/MCL (ref 0–0.04)
IMM GRANULOCYTES NFR BLD AUTO: 0.6 %
LYMPHOCYTES # BLD AUTO: 0.89 X10(3)/MCL (ref 0.6–4.6)
LYMPHOCYTES NFR BLD AUTO: 11.2 %
MAGNESIUM SERPL-MCNC: 2.1 MG/DL (ref 1.6–2.6)
MCH RBC QN AUTO: 29 PG
MCHC RBC AUTO-ENTMCNC: 33.5 MG/DL (ref 33–36)
MCV RBC AUTO: 86.6 FL (ref 80–94)
MONOCYTES # BLD AUTO: 0.08 X10(3)/MCL (ref 0.1–1.3)
MONOCYTES NFR BLD AUTO: 1 %
NEUTROPHILS # BLD AUTO: 6.88 X10(3)/MCL (ref 2.1–9.2)
NEUTROPHILS NFR BLD AUTO: 86.9 %
NRBC BLD AUTO-RTO: 0 %
PHOSPHATE SERPL-MCNC: 4.3 MG/DL (ref 2.3–4.7)
PLATELET # BLD AUTO: 354 X10(3)/MCL (ref 130–400)
PMV BLD AUTO: 9.8 FL (ref 7.4–10.4)
POCT GLUCOSE: 264 MG/DL (ref 70–110)
POCT GLUCOSE: 312 MG/DL (ref 70–110)
POCT GLUCOSE: 345 MG/DL (ref 70–110)
POTASSIUM SERPL-SCNC: 4.7 MMOL/L (ref 3.5–5.1)
PROT SERPL-MCNC: 7.3 GM/DL (ref 5.8–7.6)
RBC # BLD AUTO: 4.86 X10(6)/MCL (ref 4.2–5.4)
SODIUM SERPL-SCNC: 135 MMOL/L (ref 136–145)
WBC # SPEC AUTO: 7.9 X10(3)/MCL (ref 4.5–11.5)

## 2023-02-17 PROCEDURE — 85025 COMPLETE CBC W/AUTO DIFF WBC: CPT

## 2023-02-17 PROCEDURE — 99900035 HC TECH TIME PER 15 MIN (STAT)

## 2023-02-17 PROCEDURE — 63600175 PHARM REV CODE 636 W HCPCS: Performed by: STUDENT IN AN ORGANIZED HEALTH CARE EDUCATION/TRAINING PROGRAM

## 2023-02-17 PROCEDURE — 96372 THER/PROPH/DIAG INJ SC/IM: CPT

## 2023-02-17 PROCEDURE — S0030 INJECTION, METRONIDAZOLE: HCPCS

## 2023-02-17 PROCEDURE — 96372 THER/PROPH/DIAG INJ SC/IM: CPT | Performed by: STUDENT IN AN ORGANIZED HEALTH CARE EDUCATION/TRAINING PROGRAM

## 2023-02-17 PROCEDURE — 96366 THER/PROPH/DIAG IV INF ADDON: CPT

## 2023-02-17 PROCEDURE — 25000003 PHARM REV CODE 250

## 2023-02-17 PROCEDURE — G0378 HOSPITAL OBSERVATION PER HR: HCPCS

## 2023-02-17 PROCEDURE — 25000003 PHARM REV CODE 250: Performed by: STUDENT IN AN ORGANIZED HEALTH CARE EDUCATION/TRAINING PROGRAM

## 2023-02-17 PROCEDURE — 84100 ASSAY OF PHOSPHORUS: CPT

## 2023-02-17 PROCEDURE — S4991 NICOTINE PATCH NONLEGEND: HCPCS

## 2023-02-17 PROCEDURE — 80053 COMPREHEN METABOLIC PANEL: CPT

## 2023-02-17 PROCEDURE — 83735 ASSAY OF MAGNESIUM: CPT

## 2023-02-17 PROCEDURE — 94640 AIRWAY INHALATION TREATMENT: CPT

## 2023-02-17 PROCEDURE — 94761 N-INVAS EAR/PLS OXIMETRY MLT: CPT

## 2023-02-17 PROCEDURE — 96361 HYDRATE IV INFUSION ADD-ON: CPT

## 2023-02-17 PROCEDURE — 63600175 PHARM REV CODE 636 W HCPCS

## 2023-02-17 RX ORDER — HYDRALAZINE HYDROCHLORIDE 100 MG/1
100 TABLET, FILM COATED ORAL EVERY 8 HOURS
Qty: 90 TABLET | Refills: 11 | Status: ON HOLD | OUTPATIENT
Start: 2023-02-17 | End: 2023-07-07 | Stop reason: CLARIF

## 2023-02-17 RX ORDER — DEXTROSE 40 %
30 GEL (GRAM) ORAL
Status: DISCONTINUED | OUTPATIENT
Start: 2023-02-17 | End: 2023-02-17 | Stop reason: HOSPADM

## 2023-02-17 RX ORDER — GLUCAGON 1 MG
1 KIT INJECTION
Status: DISCONTINUED | OUTPATIENT
Start: 2023-02-17 | End: 2023-02-17 | Stop reason: HOSPADM

## 2023-02-17 RX ORDER — CIPROFLOXACIN 500 MG/1
500 TABLET ORAL EVERY 12 HOURS
Qty: 14 TABLET | Refills: 0 | Status: SHIPPED | OUTPATIENT
Start: 2023-02-17 | End: 2023-02-17 | Stop reason: SDUPTHER

## 2023-02-17 RX ORDER — VALSARTAN 160 MG/1
160 TABLET ORAL DAILY
Qty: 90 TABLET | Refills: 3 | Status: SHIPPED | OUTPATIENT
Start: 2023-02-17 | End: 2023-06-08 | Stop reason: DRUGHIGH

## 2023-02-17 RX ORDER — ALBUTEROL SULFATE 90 UG/1
1-2 AEROSOL, METERED RESPIRATORY (INHALATION) EVERY 6 HOURS PRN
Qty: 1 G | Refills: 3 | Status: SHIPPED | OUTPATIENT
Start: 2023-02-17 | End: 2024-02-06

## 2023-02-17 RX ORDER — DEXTROSE 40 %
15 GEL (GRAM) ORAL
Status: DISCONTINUED | OUTPATIENT
Start: 2023-02-17 | End: 2023-02-17 | Stop reason: HOSPADM

## 2023-02-17 RX ORDER — CIPROFLOXACIN 500 MG/1
500 TABLET ORAL EVERY 12 HOURS
Qty: 4 TABLET | Refills: 0 | Status: SHIPPED | OUTPATIENT
Start: 2023-02-17 | End: 2023-02-19

## 2023-02-17 RX ORDER — CIPROFLOXACIN 500 MG/1
500 TABLET ORAL EVERY 12 HOURS
Status: DISCONTINUED | OUTPATIENT
Start: 2023-02-17 | End: 2023-02-17 | Stop reason: HOSPADM

## 2023-02-17 RX ORDER — CARVEDILOL 12.5 MG/1
12.5 TABLET ORAL 2 TIMES DAILY
Qty: 60 TABLET | Refills: 11 | Status: ON HOLD | OUTPATIENT
Start: 2023-02-17 | End: 2023-07-07 | Stop reason: CLARIF

## 2023-02-17 RX ORDER — INSULIN ASPART 100 [IU]/ML
5 INJECTION, SOLUTION INTRAVENOUS; SUBCUTANEOUS
Status: DISCONTINUED | OUTPATIENT
Start: 2023-02-17 | End: 2023-02-17 | Stop reason: HOSPADM

## 2023-02-17 RX ORDER — NIFEDIPINE 90 MG/1
90 TABLET, EXTENDED RELEASE ORAL DAILY
Qty: 30 TABLET | Refills: 11 | Status: SHIPPED | OUTPATIENT
Start: 2023-02-17 | End: 2024-02-17

## 2023-02-17 RX ORDER — ONDANSETRON 4 MG/1
4 TABLET, FILM COATED ORAL 2 TIMES DAILY
Qty: 20 TABLET | Refills: 0 | Status: ON HOLD | OUTPATIENT
Start: 2023-02-17 | End: 2023-05-11 | Stop reason: HOSPADM

## 2023-02-17 RX ADMIN — SERTRALINE HYDROCHLORIDE 50 MG: 50 TABLET ORAL at 09:02

## 2023-02-17 RX ADMIN — ASPIRIN 81 MG: 81 TABLET, COATED ORAL at 09:02

## 2023-02-17 RX ADMIN — CARVEDILOL 12.5 MG: 12.5 TABLET, FILM COATED ORAL at 09:02

## 2023-02-17 RX ADMIN — PANTOPRAZOLE SODIUM 40 MG: 40 TABLET, DELAYED RELEASE ORAL at 09:02

## 2023-02-17 RX ADMIN — HYDRALAZINE HYDROCHLORIDE 100 MG: 50 TABLET ORAL at 06:02

## 2023-02-17 RX ADMIN — INSULIN ASPART 12 UNITS: 100 INJECTION, SOLUTION INTRAVENOUS; SUBCUTANEOUS at 06:02

## 2023-02-17 RX ADMIN — SODIUM CHLORIDE, POTASSIUM CHLORIDE, SODIUM LACTATE AND CALCIUM CHLORIDE 1000 ML: 600; 310; 30; 20 INJECTION, SOLUTION INTRAVENOUS at 06:02

## 2023-02-17 RX ADMIN — NICOTINE 1 PATCH: 7 PATCH, EXTENDED RELEASE TRANSDERMAL at 09:02

## 2023-02-17 RX ADMIN — DULOXETINE HYDROCHLORIDE 30 MG: 30 CAPSULE, DELAYED RELEASE ORAL at 09:02

## 2023-02-17 RX ADMIN — VALSARTAN 160 MG: 80 TABLET ORAL at 09:02

## 2023-02-17 RX ADMIN — CIPROFLOXACIN 400 MG: 2 INJECTION, SOLUTION INTRAVENOUS at 09:02

## 2023-02-17 RX ADMIN — FLUTICASONE FUROATE AND VILANTEROL TRIFENATATE 1 PUFF: 200; 25 POWDER RESPIRATORY (INHALATION) at 08:02

## 2023-02-17 RX ADMIN — INSULIN ASPART 5 UNITS: 100 INJECTION, SOLUTION INTRAVENOUS; SUBCUTANEOUS at 09:02

## 2023-02-17 RX ADMIN — CETIRIZINE HYDROCHLORIDE 20 MG: 10 TABLET, FILM COATED ORAL at 09:02

## 2023-02-17 RX ADMIN — DOCUSATE CALCIUM 240 MG: 240 CAPSULE, LIQUID FILLED ORAL at 09:02

## 2023-02-17 RX ADMIN — METRONIDAZOLE 500 MG: 5 INJECTION, SOLUTION INTRAVENOUS at 04:02

## 2023-02-17 RX ADMIN — NIFEDIPINE 90 MG: 30 TABLET, FILM COATED, EXTENDED RELEASE ORAL at 09:02

## 2023-02-17 NOTE — PT/OT/SLP DISCHARGE
Physical Therapy Discharge Summary    Name: Elba Barnes  MRN: [id number]   Principal Problem: Abdominal pain   Post discharge documentation.   Documenting PT did not evaluate / treat patient but evaluating PT not available to complete discharge summary.     Patient Discharged from acute Physical Therapy on 2023.  Please refer to prior PT noted date on 2/15/23 for functional status.     Assessment:     Patient was discharged unexpectedly.  Refer to therapy initial evaluation and last progress note for initial and most recent functional status and goal achievement.  Recommendations made may be found in medical record.    Objective:     GOALS:   Multidisciplinary Problems       Physical Therapy Goals          Problem: Physical Therapy    Goal Priority Disciplines Outcome Goal Variances Interventions   Physical Therapy Goal     PT, PT/OT Partially met     Description: Goals to be met by: dc     Patient will increase functional independence with mobility by performin. Supine to sit with Stand-by Assistance met 2023    2. Sit to stand transfer with Stand-by Assistance met 2023    3. Gait  x 50 feet with Minimal Assistance using Rolling Walker.   Goals ongoing and progressing 2/15/2023                         Reasons for Discontinuation of Therapy Services  Transfer to alternate level of care.      Plan:     Patient Discharged to: Home with Home Health Service.      2023

## 2023-02-17 NOTE — SIGNIFICANT EVENT
Ms. Barnes says she has had red welts on her skin since last night (night of 2/15-2/16). Currently has them that I can see on her posterior left shoulder, over her sternum, and on her right upper arm. Skin on back, abdomen, and legs is normal. Says that since last night, she's felt like she's had a small lump at the bottom of her throat but denies any trouble breathing. Has some abdominal discomfort but seems this was going on since before last night.    On review of her medications, her new medications yesterday (2/15) were IV Mg, metronidazole, contrast    Will hold tonight's dose of flagyl. Will start ceterizine 20mg BID + Prednisone 40mg x1 dose. Defer to primary team in the morning to d/c or continue prednisone     Jorge Trujillo PGY 3

## 2023-02-17 NOTE — PT/OT/SLP DISCHARGE
Post d/c documentation.     Documenting OT did not evaluate/treat patient.  Evaluating OT not available to complete discharge summary.     Occupational Therapy Discharge Summary    Elba Barnes  MRN: [id number]   Principal Problem: Abdominal pain   Patient Active Problem List   Diagnosis    Depression    Anxiety    PTSD (post-traumatic stress disorder)    Class 3 severe obesity with body mass index (BMI) of 40.0 to 44.9 in adult    Tobacco use disorder    Essential hypertension    Mixed hyperlipidemia    Diabetes mellitus type 2, insulin dependent    Encounter to establish care    Uncontrolled type 2 diabetes mellitus with hyperglycemia    Noncompliance    Overgrown toenails    Acute cystitis without hematuria    Tricompartment osteoarthritis of right knee    Precordial pain    Abdominal pain    Nausea and vomiting    NSTEMI (non-ST elevated myocardial infarction)    Hypertensive emergency          Patient Discharged from acute Occupational Therapy.  Please refer to prior OT note for functional status.    Assessment:      Patient has not met goals.    Objective:     GOALS:   Multidisciplinary Problems       Occupational Therapy Goals          Problem: Occupational Therapy    Goal Priority Disciplines Outcome Interventions   Occupational Therapy Goal     OT, PT/OT Goals not met.    Description: Goals to be met by: d/c     Patient will increase functional independence with ADLs by performing:    LE Dressing with Moderate Assistance.  Grooming while EOB with Supervision after s/u.-goal met 2/16/23  Toileting from bedside commode with Moderate Assistance for hygiene and clothing management.   Supine to sit with Contact Guard Assistance in prep for ADL's.-goal met 2/16/23  Toilet transfer to bedside commode with Contact Guard Assistance with RW .                         Reasons for Discontinuation of Therapy Services  Transfer to alternate level of care.      Plan:     Patient Discharged to: Home with Home Health  Service    2/17/2023

## 2023-02-17 NOTE — PROGRESS NOTES
[facility] filled cipro, procardia, coreg, hydralazine, albuterol, valsartan and zofran and delivered to bedside.

## 2023-02-17 NOTE — DISCHARGE SUMMARY
[facility] Discharge Summary    Admitting Physician: Kapil Monroy MD  Attending Physician: Kapil Monroy MD  Date of Admit: 2/13/2023  Date of Discharge: 2/17/2023    Condition: Good  Outcome: Condition has improved and patient is now ready for discharge.  DISPOSITION: Home or Self Care        Discharge Diagnoses:     Patient Active Problem List   Diagnosis    Depression    Anxiety    PTSD (post-traumatic stress disorder)    Class 3 severe obesity with body mass index (BMI) of 40.0 to 44.9 in adult    Tobacco use disorder    Essential hypertension    Mixed hyperlipidemia    Diabetes mellitus type 2, insulin dependent    Encounter to establish care    Uncontrolled type 2 diabetes mellitus with hyperglycemia    Noncompliance    Overgrown toenails    Acute cystitis without hematuria    Tricompartment osteoarthritis of right knee    Precordial pain    Abdominal pain    Nausea and vomiting    NSTEMI (non-ST elevated myocardial infarction)    Hypertensive emergency       Principal Problem:  Abdominal pain    Consultants and Procedures:     Consultants:  IP CONSULT TO HOSPITAL MEDICINE  IP CONSULT TO SOCIAL WORK/CASE MANAGEMENT  IP CONSULT TO CARDIOLOGY  IP CONSULT TO SOCIAL WORK/CASE MANAGEMENT  IP CONSULT TO SOCIAL WORK/CASE MANAGEMENT    Procedures:   * No surgery found *      Brief Admission History:      Elba Barnes is a 62 y.o. female who has a PMH of hypertension, poorly controlled diabetes, traumatic brain injury, CHF, CVA with right sided deficits, GERD, osteoarthritis of multiple sites, tobacco abuse, and obesity. The patient presented to [facility] ED from  clinic on 2/13/2023 with a primary complaint of chest pain. She was at her PCP appointment today who recommended her go to ED due to chest pain and elevated blood pressure. She says that her symptoms began 1 day prior to arrival. She describes her pain as crushing, located under the left breast, and it is associated  with shortness of breath. She says that yesterday she rated her pain as 10/10 and today it is a 9/10. She received 1 dose of nitro SL in ED which states relieved her pain. She says that her shortness of breath has also been present for the past 24 hours. She also has associated numbness and tingling in her left arm in the ulnar nerve distribution which is new. She states that she has had two heart attacks in the past but has never had stents placed, however she did have a C in December 2022 and was told that coronary arteries were clear. 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 She currently smokes 1/2 pack of cigarettes per day and is not interested in quitting. She denies alcohol or other illicit drug use. She currently lives with her daughter but wants to live in a nursing home. During the interview she is somewhat tearful saying that she feels that she is a burden to her children and that her body is failing her.      In the ED she was noted to be severely hypertensive at 218/118 max, responded to labetalol and lopressor, on 168/94 on my evaluation. Afebrile, normal respiratory rate. Labs reveal elevated troponin of 0.093. EKG with new T wave inversions lead I. CXR clear. Internal medicine consulted for admission due to concern of NSTEMI and hypertensive urgency.    Hospital Course with Pertinent Findings:      Patient was admitted to ICU for hypertensive urgency, was started on nicardipine gtt that only was needed for a few hours. Downgraded to regular medical floor. Troponin trended down, cardiology was consulted who recommended treatment of blood pressure and outpatient follow up in their clinic. Referral sent. During her stay she also complained of abdominal pain tender to palpation and suprapubic pain, was already being treated for UTI however due to concern for  "intraabdominal infection was started on cipro and flagyl. CT abdomen showed diverticulosis but no infection. The following day she complained of pain as well as decreased appetite, concern for acute mesenteric ischemia so CTA abdomen pelvis was ordered but no evidence of mesenteric ischemia present. The night prior to discharge she complained of red welts on skin, responded well to cetirizine and prednisone only 1 dose, resolved by the morning. On the day of dc pt feels great with no complaints and feels that she is ready to dischrage home, hemodynamically stable and BP well controlled.  Medication changes for BP delineated below. New meds include carvedilol 12.5 mg bid, hydralazine 100 mg [id number], nifedipine 90 mg daily, valsartan 160 mg daily. Have discontinued clonidine and informed pt not to take it.    Discharge physical exam:  Vitals  BP: (!) 142/69  Temp: 98.1 °F (36.7 °C)  Temp src: Oral  Pulse: 96  Resp: 18  SpO2: 95 %  Height: 5' 6" (167.6 cm)  Weight: 118.4 kg (261 lb)    General: Patient resting comfortably, in no acute distress   Eye: pupils equal, EOMI, clear conjunctiva, eyelids normal  HENT: Head-normocephalic and atraumatic  Neck: full range of motion, no thyromegaly or lymphadenopathy, trachea midline, supple  Respiratory: clear to auscultation bilaterally without wheezes, rales, rhonchi  Cardiovascular: regular rate and rhythm without murmurs.  No gallops or rubs, no JVD.  Capillary refill within normal limits.  Gastrointestinal: soft, mild diffuse nonspecific tenderness, non-distended with normal bowel sounds, without masses to palpation  Genitourinary: no CVA tenderness to palpation, mild suprapubic tenderness  Musculoskeletal: full range of motion of all extremities/spine without limitation or discomfort  Integumentary: no rashes or skin lesions present  Neurologic: no signs of peripheral neurological deficit, motor/sensory function intact  Psychiatric:  alert and oriented, cognitive function " intact, cooperative with exam      TIME SPENT ON DISCHARGE: 35 minutes    Discharge Medications:         Medication List        START taking these medications      ciprofloxacin HCl 500 MG tablet  Commonly known as: CIPRO  Take 1 tablet (500 mg total) by mouth every 12 (twelve) hours. for 7 days            CHANGE how you take these medications      atorvastatin 40 MG tablet  Commonly known as: LIPITOR  Take 40 mg by mouth every evening.  What changed: Another medication with the same name was removed. Continue taking this medication, and follow the directions you see here.     * carvediloL 3.125 MG tablet  Commonly known as: COREG  Take 3.125 mg by mouth 2 (two) times daily.  What changed: Another medication with the same name was added. Make sure you understand how and when to take each.     * carvediloL 12.5 MG tablet  Commonly known as: COREG  Take 1 tablet (12.5 mg total) by mouth 2 (two) times daily.  What changed: You were already taking a medication with the same name, and this prescription was added. Make sure you understand how and when to take each.     hydrALAZINE 100 MG tablet  Commonly known as: APRESOLINE  Take 1 tablet (100 mg total) by mouth every 8 (eight) hours.  What changed:   medication strength  how much to take  when to take this     NIFEdipine 90 MG (OSM) 24 hr tablet  Commonly known as: PROCARDIA-XL  Take 1 tablet (90 mg total) by mouth once daily.  What changed: when to take this     * ondansetron 8 MG tablet  Commonly known as: ZOFRAN  Take 8 mg by mouth every 6 (six) hours.  What changed: Another medication with the same name was added. Make sure you understand how and when to take each.     * ondansetron 4 MG tablet  Commonly known as: ZOFRAN  Take 1 tablet (4 mg total) by mouth 2 (two) times daily.  What changed: You were already taking a medication with the same name, and this prescription was added. Make sure you understand how and when to take each.     sucralfate 1 gram  "tablet  Commonly known as: CARAFATE  TAKE 1 TABLET(1 GRAM) BY MOUTH FOUR TIMES DAILY BEFORE MEALS AND AT NIGHT  What changed: See the new instructions.     valsartan 160 MG tablet  Commonly known as: DIOVAN  Take 1 tablet (160 mg total) by mouth once daily.  What changed:   how much to take  when to take this           * This list has 4 medication(s) that are the same as other medications prescribed for you. Read the directions carefully, and ask your doctor or other care provider to review them with you.                CONTINUE taking these medications      albuterol 90 mcg/actuation inhaler  Commonly known as: PROVENTIL/VENTOLIN HFA  Inhale 1-2 puffs into the lungs every 6 (six) hours as needed for Wheezing. Rescue     ALPRAZolam 1 MG tablet  Commonly known as: XANAX  Take 1 tablet by mouth once a day as needed as needed for anxiety     amLODIPine 2.5 MG tablet  Commonly known as: NORVASC  Take 2.5 mg by mouth once daily.     ammonium lactate 12 % lotion  Commonly known as: LAC-HYDRIN  Apply to dry skin areas on feet, legs, and elbow areas TWICE A DAY AS NEEDED     ascorbic acid (vitamin C) 500 mg Chew  Take 500 mg by mouth.     aspirin 81 MG EC tablet  Commonly known as: ECOTRIN  Take 1 tablet (81 mg total) by mouth once daily.     b complex vitamins capsule  Take 1 capsule by mouth every morning.     B-complex with vitamin C tablet  Commonly known as: Z-Bec or Equiv  Take 1 capsule by mouth once daily.     BASAGLAR KWIKPEN U-100 INSULIN glargine 100 units/mL SubQ pen  Generic drug: insulin  Inject 80 Units into the skin 2 (two) times daily with meals.     BD ULTRA-FINE SHORT PEN NEEDLE 31 gauge x 5/16" Ndle  Generic drug: pen needle, diabetic  SMARTSIG:Injection Twice Daily     blood-glucose meter Misc  CHECK BLOOD GLUCOSE LEVEL TWICE DAILY.     butalbital-acetaminophen-caffeine -40 mg -40 mg per tablet  Commonly known as: FIORICET, ESGIC  Take 1 tablet by mouth every 6 (six) hours as needed for " "Pain.     cholecalciferol (vitamin D3) 50 mcg (2,000 unit) Cap capsule  Commonly known as: VITAMIN D3  Take 1 capsule by mouth.     diclofenac sodium 1 % Gel  Commonly known as: VOLTAREN  Apply 2 g topically 4 (four) times daily.     docusate sodium 250 MG capsule  Commonly known as: COLACE  Take 1 capsule by mouth 2 (two) times daily.     DULoxetine 30 MG capsule  Commonly known as: CYMBALTA  Take 30 mg by mouth.     esomeprazole 40 MG capsule  Commonly known as: NEXIUM  Take 40 mg by mouth every morning.     fluticasone furoate-vilanteroL 200-25 mcg/dose Dsdv diskus inhaler  Commonly known as: BREO ELLIPTA  Inhale 1 puff into the lungs once daily. Controller     gabapentin 800 MG tablet  Commonly known as: NEURONTIN  Take 1 tablet (800 mg total) by mouth every 8 (eight) hours.     glipiZIDE 10 MG tablet  Commonly known as: GLUCOTROL  Take 10 mg by mouth.     HYDROcodone-acetaminophen 7.5-325 mg per tablet  Commonly known as: NORCO  Take 1 tablet by mouth 3 (three) times daily as needed for Pain.     insulin syringe-needle U-100 1 mL 28 gauge x 1/2" Syrg  USE TO inject insulin TWICE DAILY     loratadine 10 mg tablet  Commonly known as: CLARITIN  Take 10 mg by mouth.     magnesium citrate solution  Take 296 mLs by mouth daily as needed.     multivitamin per tablet  Commonly known as: THERAGRAN  Take 1 tablet by mouth once daily.     naloxone 4 mg/actuation Spry  Commonly known as: NARCAN  SMARTSI Spray(s) Both Nares PRN     naproxen 500 MG tablet  Commonly known as: NAPROSYN  Take 500 mg by mouth 2 (two) times daily.     nebivoloL 10 MG Tab  Commonly known as: BYSTOLIC  Take 10 mg by mouth once daily.     nitrofurantoin (macrocrystal-monohydrate) 100 MG capsule  Commonly known as: MACROBID  Take 1 capsule (100 mg total) by mouth 2 (two) times daily. for 7 days     nitroGLYCERIN 0.4 MG SL tablet  Commonly known as: NITROSTAT  Place 0.4 mg under the tongue every 5 (five) minutes as needed for Chest pain.   " "  NovoLOG FlexPen U-100 Insulin 100 unit/mL (3 mL) Inpn pen  Generic drug: insulin aspart U-100  Inject 20 Units into the skin 3 (three) times daily with meals.     nystatin cream  Commonly known as: MYCOSTATIN  Apply topically 3 (three) times daily. Use under breast     ONETOUCH DELICA PLUS LANCET 33 gauge Misc  Generic drug: lancets  Apply topically 4 (four) times daily.     ONETOUCH VERIO TEST STRIPS Strp  Generic drug: blood sugar diagnostic  4 (four) times daily.     pantoprazole 40 MG tablet  Commonly known as: PROTONIX  Take 1 tablet (40 mg total) by mouth once daily.     polyethylene glycol 17 gram/dose powder  Commonly known as: GLYCOLAX  Take 17 g by mouth.     sertraline 50 MG tablet  Commonly known as: ZOLOFT  Take 50 mg by mouth once daily.     SHARPS CONTAINER Misc  Generic drug: empty container  N/A            STOP taking these medications      clonazePAM 0.5 MG tablet  Commonly known as: KlonoPIN     cloNIDine 0.1 MG tablet  Commonly known as: CATAPRES     cyclobenzaprine 10 MG tablet  Commonly known as: FLEXERIL                Discharge Instructions:         Elba Barnes is being discharged Home or Self Care.    Discharge Procedure Orders   WALKER FOR HOME USE   Order Comments: Diagnosis CHF, traumatic brain injury, COPD     Order Specific Question Answer Comments   Type of Walker: Adult (5'4"-6'6")    With wheels? Yes    Height: 5' 6" (1.676 m)    Weight: 118.4 kg (261 lb)    Length of need (1-99 months): 99    Does patient have medical equipment at home? bath bench    Does patient have medical equipment at home? rollator    Please check all that apply: Patient's condition impairs ambulation.    Please check all that apply: Patient is unable to safely ambulate without equipment.      Ambulatory referral/consult to Cardiology   Standing Status: Future   Referral Priority: Routine Referral Type: Consultation   Referral Reason: Specialty Services Required   Requested Specialty: Cardiology "   Number of Visits Requested: 1        Follow-Up Appointments:   Follow-up Information       VIRGEN Hoyt. Schedule an appointment as soon as possible for a visit in 1 week(s).    Specialty: Nurse Practitioner  Contact information:  [address] [facility]  Internal Medicine [facility] [address] [phone]               [facility] - Cardiology Follow up.    Specialty: Cardiology  Contact information:  [address] [facility] [address] [phone]                               At this time, Elba Barnes is determined to have maximized benefits of IP hospitalization. she is discharged in stable condition with OP f/u recommendations and instructions. All questions answered, and patient verbalized agreement with the POC. They were given return precautions prior to d/c including symptoms that should prompt return to ED or to call PCP. Total time spent of DC of 35 minutes.       Ignacia Lopez DO  [facility] Internal Medicine  HO-1

## 2023-02-17 NOTE — PLAN OF CARE
Received call from Janny at [facility] stating that they have spoken with patient to set up first appointment and she told them that she goes to therapy at [facility]. Janny stated that patient can't have both home health and outpatient therapy. They will stay in touch with patient and start seeing her when her outpatient therapy is complete.

## 2023-02-17 NOTE — PLAN OF CARE
02/17/23 1314   Medicare Message   Important Message from Medicare regarding Discharge Appeal Rights Given to patient/caregiver;Explained to patient/caregiver;Signed/date by patient/caregiver   Date IMM was signed 02/17/23   Time IMM was signed 1231

## 2023-02-17 NOTE — PLAN OF CARE
DC summary and home health order have been sent to [facility] via i-drive.

## 2023-02-20 ENCOUNTER — TELEPHONE (OUTPATIENT)
Dept: ADMINISTRATIVE | Facility: HOSPITAL | Age: 63
End: 2023-02-20
Payer: COMMERCIAL

## 2023-03-13 ENCOUNTER — HOSPITAL ENCOUNTER (EMERGENCY)
Facility: HOSPITAL | Age: 63
Discharge: HOME | End: 2023-03-14
Attending: EMERGENCY MEDICINE
Payer: COMMERCIAL

## 2023-03-13 DIAGNOSIS — J18.9 PNEUMONIA DUE TO INFECTIOUS ORGANISM, UNSPECIFIED LATERALITY, UNSPECIFIED PART OF LUNG: Primary | ICD-10-CM

## 2023-03-13 DIAGNOSIS — R07.9 CHEST PAIN: ICD-10-CM

## 2023-03-13 DIAGNOSIS — R06.02 SOB (SHORTNESS OF BREATH): ICD-10-CM

## 2023-03-13 DIAGNOSIS — R51.9 NONINTRACTABLE HEADACHE, UNSPECIFIED CHRONICITY PATTERN, UNSPECIFIED HEADACHE TYPE: ICD-10-CM

## 2023-03-13 LAB
ALBUMIN SERPL-MCNC: 3.2 G/DL (ref 3.4–4.8)
ALBUMIN/GLOB SERPL: 0.7 RATIO (ref 1.1–2)
ALP SERPL-CCNC: 84 UNIT/L (ref 40–150)
ALT SERPL-CCNC: 23 UNIT/L (ref 0–55)
AST SERPL-CCNC: 19 UNIT/L (ref 5–34)
BASOPHILS # BLD AUTO: 0.04 X10(3)/MCL (ref 0–0.2)
BASOPHILS NFR BLD AUTO: 0.5 %
BILIRUBIN DIRECT+TOT PNL SERPL-MCNC: 0.3 MG/DL
BNP BLD-MCNC: 23.5 PG/ML
BUN SERPL-MCNC: 14.3 MG/DL (ref 9.8–20.1)
CALCIUM SERPL-MCNC: 9.6 MG/DL (ref 8.4–10.2)
CHLORIDE SERPL-SCNC: 108 MMOL/L (ref 98–107)
CK MB SERPL-MCNC: 2.4 NG/ML
CK SERPL-CCNC: 340 U/L (ref 29–168)
CO2 SERPL-SCNC: 27 MMOL/L (ref 23–31)
CREAT SERPL-MCNC: 1.11 MG/DL (ref 0.55–1.02)
D DIMER PPP IA.FEU-MCNC: 1 UG/ML FEU (ref 0–0.5)
EOSINOPHIL # BLD AUTO: 0.09 X10(3)/MCL (ref 0–0.9)
EOSINOPHIL NFR BLD AUTO: 1.1 %
ERYTHROCYTE [DISTWIDTH] IN BLOOD BY AUTOMATED COUNT: 15.4 % (ref 11.5–17)
GFR SERPLBLD CREATININE-BSD FMLA CKD-EPI: 57 MLS/MIN/1.73/M2
GLOBULIN SER-MCNC: 4.8 GM/DL (ref 2.4–3.5)
GLUCOSE SERPL-MCNC: 133 MG/DL (ref 82–115)
HCT VFR BLD AUTO: 42.6 % (ref 37–47)
HGB BLD-MCNC: 13.7 G/DL (ref 12–16)
IMM GRANULOCYTES # BLD AUTO: 0.05 X10(3)/MCL (ref 0–0.04)
IMM GRANULOCYTES NFR BLD AUTO: 0.6 %
LYMPHOCYTES # BLD AUTO: 2.86 X10(3)/MCL (ref 0.6–4.6)
LYMPHOCYTES NFR BLD AUTO: 33.4 %
MCH RBC QN AUTO: 28.6 PG
MCHC RBC AUTO-ENTMCNC: 32.2 G/DL (ref 33–36)
MCV RBC AUTO: 88.9 FL (ref 80–94)
MONOCYTES # BLD AUTO: 0.47 X10(3)/MCL (ref 0.1–1.3)
MONOCYTES NFR BLD AUTO: 5.5 %
NEUTROPHILS # BLD AUTO: 5.05 X10(3)/MCL (ref 2.1–9.2)
NEUTROPHILS NFR BLD AUTO: 58.9 %
NRBC BLD AUTO-RTO: 0 %
PLATELET # BLD AUTO: 358 X10(3)/MCL (ref 130–400)
PMV BLD AUTO: 9.5 FL (ref 7.4–10.4)
POTASSIUM SERPL-SCNC: 4.1 MMOL/L (ref 3.5–5.1)
PROT SERPL-MCNC: 8 GM/DL (ref 5.8–7.6)
RBC # BLD AUTO: 4.79 X10(6)/MCL (ref 4.2–5.4)
SODIUM SERPL-SCNC: 143 MMOL/L (ref 136–145)
TROPONIN I SERPL-MCNC: 0.06 NG/ML (ref 0–0.04)
TROPONIN I SERPL-MCNC: 0.07 NG/ML (ref 0–0.04)
WBC # SPEC AUTO: 8.6 X10(3)/MCL (ref 4.5–11.5)

## 2023-03-13 PROCEDURE — 80053 COMPREHEN METABOLIC PANEL: CPT | Performed by: EMERGENCY MEDICINE

## 2023-03-13 PROCEDURE — 93005 ELECTROCARDIOGRAM TRACING: CPT

## 2023-03-13 PROCEDURE — 25000003 PHARM REV CODE 250: Performed by: FAMILY MEDICINE

## 2023-03-13 PROCEDURE — 25000003 PHARM REV CODE 250: Performed by: EMERGENCY MEDICINE

## 2023-03-13 PROCEDURE — 84484 ASSAY OF TROPONIN QUANT: CPT | Mod: 91 | Performed by: FAMILY MEDICINE

## 2023-03-13 PROCEDURE — 96361 HYDRATE IV INFUSION ADD-ON: CPT

## 2023-03-13 PROCEDURE — 84484 ASSAY OF TROPONIN QUANT: CPT | Performed by: EMERGENCY MEDICINE

## 2023-03-13 PROCEDURE — 25000242 PHARM REV CODE 250 ALT 637 W/ HCPCS: Performed by: FAMILY MEDICINE

## 2023-03-13 PROCEDURE — 85025 COMPLETE CBC W/AUTO DIFF WBC: CPT | Performed by: EMERGENCY MEDICINE

## 2023-03-13 PROCEDURE — 96374 THER/PROPH/DIAG INJ IV PUSH: CPT

## 2023-03-13 PROCEDURE — 25500020 PHARM REV CODE 255: Performed by: FAMILY MEDICINE

## 2023-03-13 PROCEDURE — 82553 CREATINE MB FRACTION: CPT | Performed by: EMERGENCY MEDICINE

## 2023-03-13 PROCEDURE — 83880 ASSAY OF NATRIURETIC PEPTIDE: CPT | Performed by: EMERGENCY MEDICINE

## 2023-03-13 PROCEDURE — 82550 ASSAY OF CK (CPK): CPT | Performed by: EMERGENCY MEDICINE

## 2023-03-13 PROCEDURE — 85379 FIBRIN DEGRADATION QUANT: CPT | Performed by: EMERGENCY MEDICINE

## 2023-03-13 PROCEDURE — 63600175 PHARM REV CODE 636 W HCPCS: Performed by: FAMILY MEDICINE

## 2023-03-13 PROCEDURE — 99285 EMERGENCY DEPT VISIT HI MDM: CPT | Mod: 25

## 2023-03-13 RX ORDER — NIFEDIPINE 30 MG/1
90 TABLET, EXTENDED RELEASE ORAL
Status: COMPLETED | OUTPATIENT
Start: 2023-03-13 | End: 2023-03-13

## 2023-03-13 RX ORDER — HYDRALAZINE HYDROCHLORIDE 20 MG/ML
10 INJECTION INTRAMUSCULAR; INTRAVENOUS
Status: COMPLETED | OUTPATIENT
Start: 2023-03-13 | End: 2023-03-13

## 2023-03-13 RX ORDER — LEVOFLOXACIN 750 MG/1
750 TABLET ORAL
Status: COMPLETED | OUTPATIENT
Start: 2023-03-13 | End: 2023-03-13

## 2023-03-13 RX ORDER — NITROGLYCERIN 0.4 MG/1
0.4 TABLET SUBLINGUAL EVERY 5 MIN PRN
Status: DISCONTINUED | OUTPATIENT
Start: 2023-03-13 | End: 2023-03-14 | Stop reason: HOSPADM

## 2023-03-13 RX ORDER — ASPIRIN 325 MG
325 TABLET ORAL
Status: COMPLETED | OUTPATIENT
Start: 2023-03-13 | End: 2023-03-13

## 2023-03-13 RX ORDER — ACETAMINOPHEN 500 MG
1000 TABLET ORAL
Status: COMPLETED | OUTPATIENT
Start: 2023-03-13 | End: 2023-03-13

## 2023-03-13 RX ORDER — METOCLOPRAMIDE HYDROCHLORIDE 5 MG/ML
10 INJECTION INTRAMUSCULAR; INTRAVENOUS
Status: COMPLETED | OUTPATIENT
Start: 2023-03-13 | End: 2023-03-13

## 2023-03-13 RX ORDER — LEVOFLOXACIN 750 MG/1
750 TABLET ORAL DAILY
Status: DISCONTINUED | OUTPATIENT
Start: 2023-03-14 | End: 2023-03-13

## 2023-03-13 RX ORDER — DIPHENHYDRAMINE HCL 25 MG
25 CAPSULE ORAL
Status: COMPLETED | OUTPATIENT
Start: 2023-03-13 | End: 2023-03-13

## 2023-03-13 RX ORDER — VALSARTAN 80 MG/1
320 TABLET ORAL
Status: COMPLETED | OUTPATIENT
Start: 2023-03-13 | End: 2023-03-13

## 2023-03-13 RX ADMIN — VALSARTAN 320 MG: 80 TABLET ORAL at 08:03

## 2023-03-13 RX ADMIN — IOHEXOL 75 ML: 350 INJECTION, SOLUTION INTRAVENOUS at 07:03

## 2023-03-13 RX ADMIN — ASPIRIN 325 MG ORAL TABLET 325 MG: 325 PILL ORAL at 05:03

## 2023-03-13 RX ADMIN — LEVOFLOXACIN 750 MG: 750 TABLET, FILM COATED ORAL at 08:03

## 2023-03-13 RX ADMIN — SODIUM CHLORIDE 1000 ML: 9 INJECTION, SOLUTION INTRAVENOUS at 11:03

## 2023-03-13 RX ADMIN — DIPHENHYDRAMINE HYDROCHLORIDE 25 MG: 25 CAPSULE ORAL at 08:03

## 2023-03-13 RX ADMIN — HYDRALAZINE HYDROCHLORIDE 10 MG: 20 INJECTION INTRAMUSCULAR; INTRAVENOUS at 08:03

## 2023-03-13 RX ADMIN — METOCLOPRAMIDE 10 MG: 5 INJECTION, SOLUTION INTRAMUSCULAR; INTRAVENOUS at 08:03

## 2023-03-13 RX ADMIN — NIFEDIPINE 90 MG: 30 TABLET, FILM COATED, EXTENDED RELEASE ORAL at 08:03

## 2023-03-13 RX ADMIN — ACETAMINOPHEN 1000 MG: 500 TABLET ORAL at 08:03

## 2023-03-13 RX ADMIN — SODIUM CHLORIDE 1000 ML: 9 INJECTION, SOLUTION INTRAVENOUS at 08:03

## 2023-03-13 RX ADMIN — NITROGLYCERIN 0.4 MG: 0.4 TABLET SUBLINGUAL at 10:03

## 2023-03-13 NOTE — ED PROVIDER NOTES
"Encounter Date: 3/13/2023       History     Chief Complaint   Patient presents with    Chest Pain     CP, SOB and dizziness, hx of HF and MI. . Ekg ordered     She is here with concerns about her blood pressure as well as associated symptoms.  Complex history including hypertension, morbid obesity, diabetes, brain injury, previous pacer defibrillator placement and removal after infection, anxiety, depression, others as listed.  She reports that a nurse has been checking her blood pressure at home and it was quite elevated this morning, exact reading not available but thought to be greater than 200/120.  She has had a central chest pain or pressure all day since about 6:00 a.m. this morning with some waxing and waning, it had gotten better but got worse again recently.  There has been mild dizziness and mild dyspnea along with this.  Blood sugar prior to arrival 156.  On arrival, blood pressure 180/90.  She reports excellent compliance with medications.  She reports chronic right leg pain without recent change.  No other specific complaints.    The history is provided by the patient. No  was used.   Review of patient's allergies indicates:   Allergen Reactions    Pcn [penicillins] Anaphylaxis     Past Medical History:   Diagnosis Date    Anxiety     Arthritis     CVA (cerebral vascular accident)     "mini stroke"    Depression     Diabetes mellitus     Heart problem     History of psychiatric hospitalization     three(1983, 1995 and another (years ago")): depression    HTN (hypertension)     Hx of psychiatric care     Hypertension     Pacemaker     Psychiatric exam requested by authority     Psychiatric problem     TBI (traumatic brain injury)     Therapy      Past Surgical History:   Procedure Laterality Date    CARDIAC PACEMAKER PLACEMENT      CARDIAC PACEMAKER REMOVAL      CHOLECYSTECTOMY      HYSTERECTOMY       Family History   Problem Relation Age of Onset    Schizophrenia Mother     " Bipolar disorder Mother     Bipolar disorder Father      Social History     Tobacco Use    Smoking status: Every Day     Packs/day: 0.50     Years: 40.00     Pack years: 20.00     Types: Cigarettes     Last attempt to quit: 2018     Years since quittin.4    Smokeless tobacco: Never   Substance Use Topics    Alcohol use: Yes     Comment: wine on occ    Drug use: No     Review of Systems   Constitutional:  Negative for activity change, fatigue and fever.   HENT:  Negative for congestion, ear pain, facial swelling, nosebleeds, sinus pressure and sore throat.    Eyes:  Negative for pain, discharge, redness and visual disturbance.   Respiratory:  Negative for cough, choking, chest tightness, shortness of breath and wheezing.    Cardiovascular:  Positive for chest pain. Negative for palpitations and leg swelling.   Gastrointestinal:  Negative for abdominal distention, abdominal pain, nausea and vomiting.   Endocrine: Negative for heat intolerance, polydipsia and polyuria.   Genitourinary:  Negative for difficulty urinating, dysuria, flank pain, hematuria and urgency.   Musculoskeletal:  Negative for back pain, gait problem, joint swelling and myalgias.   Skin:  Negative for color change and rash.   Allergic/Immunologic: Negative for environmental allergies and food allergies.   Neurological:  Negative for dizziness, weakness, numbness and headaches.   Hematological:  Negative for adenopathy. Does not bruise/bleed easily.   Psychiatric/Behavioral:  Negative for agitation and behavioral problems. The patient is not nervous/anxious.    All other systems reviewed and are negative.    Physical Exam     Initial Vitals [23 1617]   BP Pulse Resp Temp SpO2   (!) 180/90 98 18 -- 100 %      MAP       --         Physical Exam    Nursing note and vitals reviewed.  Constitutional: She appears well-developed and well-nourished. She is not diaphoretic. No distress.   Obese   HENT:   Head: Normocephalic and atraumatic.    Mouth/Throat: No oropharyngeal exudate.   Eyes: Conjunctivae and EOM are normal. Pupils are equal, round, and reactive to light. Right eye exhibits no discharge. Left eye exhibits no discharge. No scleral icterus.   Neck: Neck supple. No thyromegaly present. No tracheal deviation present. No JVD present.   Normal range of motion.  Cardiovascular:  Normal rate, regular rhythm, normal heart sounds and intact distal pulses.     Exam reveals no gallop and no friction rub.       No murmur heard.  Pulmonary/Chest: Breath sounds normal. No stridor. No respiratory distress. She has no wheezes. She has no rhonchi. She has no rales. She exhibits no tenderness.   Left upper chest surgical site from previous defibrillator placement and removal is well healed and without sign of infection   Abdominal: Abdomen is soft. Bowel sounds are normal. She exhibits no distension and no mass. There is no abdominal tenderness. There is no rebound and no guarding.   Musculoskeletal:         General: No tenderness or edema. Normal range of motion.      Cervical back: Normal range of motion and neck supple.     Neurological: She is alert and oriented to person, place, and time. She has normal strength.   Skin: Skin is warm and dry. No rash and no abscess noted. No erythema.   Psychiatric: She has a normal mood and affect. Her behavior is normal. Judgment and thought content normal.       ED Course   Procedures  Labs Reviewed   COMPREHENSIVE METABOLIC PANEL - Abnormal; Notable for the following components:       Result Value    Chloride 108 (*)     Glucose Level 133 (*)     Creatinine 1.11 (*)     Protein Total 8.0 (*)     Albumin Level 3.2 (*)     Globulin 4.8 (*)     Albumin/Globulin Ratio 0.7 (*)     All other components within normal limits   TROPONIN I - Abnormal; Notable for the following components:    Troponin-I 0.067 (*)     All other components within normal limits   D DIMER, QUANTITATIVE - Abnormal; Notable for the following  components:    D-Dimer 1.00 (*)     All other components within normal limits   CK - Abnormal; Notable for the following components:    Creatine Kinase 340 (*)     All other components within normal limits   CBC WITH DIFFERENTIAL - Abnormal; Notable for the following components:    MCHC 32.2 (*)     IG# 0.05 (*)     All other components within normal limits   TROPONIN I - Abnormal; Notable for the following components:    Troponin-I 0.059 (*)     All other components within normal limits   B-TYPE NATRIURETIC PEPTIDE - Normal   CK-MB - Normal   CBC W/ AUTO DIFFERENTIAL    Narrative:     The following orders were created for panel order CBC auto differential.  Procedure                               Abnormality         Status                     ---------                               -----------         ------                     CBC with Differential[[id number]]        Abnormal            Final result                 Please view results for these tests on the individual orders.   EXTRA TUBES    Narrative:     The following orders were created for panel order EXTRA TUBES.  Procedure                               Abnormality         Status                     ---------                               -----------         ------                     Gold Top Hold[[id number]]                                    In process                   Please view results for these tests on the individual orders.   GOLD TOP HOLD     EKG Readings: (Independently Interpreted)   Initial Reading: No STEMI. Rhythm: Normal Sinus Rhythm. Heart Rate: 88.   Normal sinus rhythm at 88 beats per minute, left ventricular hypertrophy with repolarization abnormality, possible old anteroseptal infarction.  No acute concerning change.   ECG Results              Repeat EKG 12-lead (In process)  Result time 03/14/23 06:26:55      In process by Interface, Lab In [facility] (03/14/23 06:26:55)                   Narrative:    Test Reason : R07.9,    Vent. Rate :  090 BPM     Atrial Rate : 090 BPM     P-R Int : 104 ms          QRS Dur : 096 ms      QT Int : 386 ms       P-R-T Axes : 040 009 128 degrees     QTc Int : 472 ms    Sinus rhythm with short AZ  LVH with repolarization abnormality  Abnormal ECG  When compared with ECG of 13-MAR-2023 16:30,  AZ interval has decreased    Referred By: [email or web address]   SELF           Confirmed By:                                      EKG 12-lead (Preliminary result)  Result time 03/13/23 17:43:27      Wet Read by Satish Costello MD (03/13/23 17:43:27, [facility], Emergency Medicine)    Normal sinus rhythm at a rate of 88, no signs of ST elevation or depression, left axis deviation, normal intervals with a QTC of 459                      In process by Interface, Lab In [facility] (03/13/23 16:44:53)                   Narrative:    Test Reason : R07.9,    Vent. Rate : 088 BPM     Atrial Rate : 088 BPM     P-R Int : 136 ms          QRS Dur : 096 ms      QT Int : 380 ms       P-R-T Axes : 059 -03 093 degrees     QTc Int : 459 ms    Normal sinus rhythm  LVH with repolarization abnormality  Cannot rule out Septal infarct (cited on or before 16-FEB-2023)  Abnormal ECG  When compared with ECG of 16-FEB-2023 11:51,  Serial changes of Septal infarct Present    Referred By:             Confirmed By:                                   Imaging Results              CT Head Without Contrast (Preliminary result)  Result time 03/13/23 21:12:33      Preliminary result by Dank Jasso MD (03/13/23 21:12:33)                   Narrative:    START OF REPORT:  Technique: CT of the head was performed without intravenous contrast with axial as well as coronal and sagittal images.    Comparison: Comparison is with study dated â2023-02-13.    Dosage Information: Automated Exposure Control was utilized 976.54 mGy.cm.    Clinical history: Headache.    Findings:  Artifact: There is mild motion artifact a few of the images.  Hemorrhage: No  acute intracranial hemorrhage is seen.  CSF spaces: The ventricles sulci and basal cisterns are within normal limits.  Brain parenchyma: Unremarkable with preservation of the grey white junction throughout. No acute infarct. Stable appearing scattered microvascular change is seen in portions of the periventricular and deep white matter tracts.  Cerebellum: Unremarkable.  Sella and skull base: The sella appears to be within normal limits for age.  Intracranial calcifications: Incidental note is made of bilateral choroid plexus calcification. Incidental note is made of some pineal region calcification.  Calvarium: No acute linear or depressed skull fracture is seen.    Maxillofacial Structures:  Paranasal sinuses: The visualized paranasal sinuses appear clear with no significant mucoperiosteal thickening or air fluid levels identified.  Orbits: There is bilateral proptosis.  Zygomatic arches: The zygomatic arches are intact and unremarkable.  Temporal bones and mastoids: The temporal bones and mastoids appear unremarkable.  TMJ: The mandibular condyles appear normally placed with respect to the mandibular fossa.  Nasal Bones: The nasal septum is midline. No displaced nasal bone fracture is seen.    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      Impression:  1. No acute intracranial process identified. Details and other findings as noted above.                          Preliminary result by Interface, Rad Results In (03/13/23 21:12:33)                   Narrative:    START OF REPORT:  Technique: CT of the head was performed without intravenous contrast with axial as well as coronal and sagittal images.    Comparison: Comparison is with study dated â2023-02-13.    Dosage Information: Automated Exposure Control was utilized 976.54 mGy.cm.    Clinical history: Headache.    Findings:  Artifact: There is mild motion artifact a few of the images.  Hemorrhage:  No acute intracranial hemorrhage is seen.  CSF spaces: The ventricles sulci and basal cisterns are within normal limits.  Brain parenchyma: Unremarkable with preservation of the grey white junction throughout. No acute infarct. Stable appearing scattered microvascular change is seen in portions of the periventricular and deep white matter tracts.  Cerebellum: Unremarkable.  Sella and skull base: The sella appears to be within normal limits for age.  Intracranial calcifications: Incidental note is made of bilateral choroid plexus calcification. Incidental note is made of some pineal region calcification.  Calvarium: No acute linear or depressed skull fracture is seen.    Maxillofacial Structures:  Paranasal sinuses: The visualized paranasal sinuses appear clear with no significant mucoperiosteal thickening or air fluid levels identified.  Orbits: There is bilateral proptosis.  Zygomatic arches: The zygomatic arches are intact and unremarkable.  Temporal bones and mastoids: The temporal bones and mastoids appear unremarkable.  TMJ: The mandibular condyles appear normally placed with respect to the mandibular fossa.  Nasal Bones: The nasal septum is midline. No displaced nasal bone fracture is seen.    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      Impression:  1. No acute intracranial process identified. Details and other findings as noted above.                                         CTA Chest Non-Coronary (PE Studies) (Final result)  Result time 03/13/23 19:34:24      Final result by Rafael Epstein MD (03/13/23 19:34:24)                   Narrative:    EXAMINATION  CTA CHEST NON CORONARY (PE STUDIES)    CLINICAL HISTORY  Pulmonary embolism (PE) suspected, positive D-dimer;    TECHNIQUE  Post-contrast helical-acquisition CT images were obtained, with bolus timing to pulmonary arterial system for purposes of PE protocol CTA.  Multiplanar reconstructions,  including MIP images, were accomplished by a CT technologist at a separate workstation, pushed to PACS for physician review.    CONTRAST  IV: Omnipaque 350, 100 mL    COMPARISON  10 October 2014    FINDINGS  Images were reviewed in soft tissue, lung, and bone windows.    Exam quality: adequate for evaluation    Lines/tubes: none visualized    Pulmonary Vessels: No central or large segmental filling defect.    Cardiovascular: No suggestion of right heart strain; the RV:LV ratio is <1. No significant heart chamber enlargement. There is no pericardial effusion. No focal contour irregularity or intraluminal abnormality is appreciated involving the visualized aorta and branch vessels. Scattered coronary and aortic calcification noted.    Lungs/Pleura: Central airways are patent. No lobar airspace consolidation is identified.  However, there are irregular scattered areas of ground-glass attenuation through the bilateral perihilar and a few subpleural regions of the lung fields.  Chronic emphysematous changes are also noted.  No pleural thickening, significant effusion, or evidence of pneumothorax.    Other Findings: Scattered mediastinal and bihilar lymph nodes are present, with no pathologic enlargement or necrotic adenopathy visualized.  The included thyroid gland is diffusely heterogeneous and multinodular in appearance, similar to the prior study.  There is redemonstrated homogeneous low-attenuation right adrenal nodule, grossly unchanged size and overall appearance with dimensions of 2.4 cm x 2.6 cm and average internal attenuation less than 10 HU.  No acute or suspicious new focal abnormality of the upper abdomen is visualized.  There are chronic regional osseous alterations, without acute cortical displacement or destructive skeletal lesion.  Old, healed right rib deformities are similar.  No abnormality of the thoracic wall soft tissues.    IMPRESSION  1. No evidence of central or segmental pulmonary  thromboembolism.  2. Scattered bilateral lung field ground-glass attenuation and reactive-appearing intrathoracic lymph nodes may be secondary to developing atypical infectious process.  Close consideration within the full clinical context is needed, with recommended imaging follow-up in 6-8 weeks to ensure resolution.  3. Stable appearance of right adrenal nodule.  Long-term stability and attenuation less than 10 HU statistically ensured benign etiology such as adenoma.  4. Additional secondary details discussed above.    RADIATION DOSE  Automated tube current modulation, weight-based exposure dosing, and/or iterative reconstruction technique utilized to reach lowest reasonably achievable exposure rate.    DLP: 649 mGy*cm      Electronically signed by: Rafael Epstein  Date:    03/13/2023  Time:    19:34                                     X-Ray Chest AP Portable (Final result)  Result time 03/13/23 19:00:52      Final result by Letha Foster MD (03/13/23 19:00:52)                   Impression:      There is some chronic appearing lung changes bilaterally      Electronically signed by: Letha Foster  Date:    03/13/2023  Time:    19:00               Narrative:    EXAMINATION:  XR CHEST AP PORTABLE    CLINICAL HISTORY:  Chest Pain;    TECHNIQUE:  Single frontal view of the chest was performed.    COMPARISON:  02/13/2023    FINDINGS:  There are chronic appearing lung changes seen bilaterally. No evidence of acute infiltrate is seen. No mass is seen. No lesion is seen. No pleural effusion is seen. The heart appears normal. The aorta appears normal. The bones and joints show no acute abnormality.                                       Medications   nitroGLYCERIN SL tablet 0.4 mg (0.4 mg Sublingual Given 3/13/23 2235)   aspirin tablet 325 mg (325 mg Oral Given 3/13/23 1703)   acetaminophen tablet 1,000 mg (1,000 mg Oral Given 3/13/23 2052)   iohexoL (OMNIPAQUE 350) injection 75 mL (75 mLs Intravenous Given  3/13/23 1905)   levoFLOXacin tablet 750 mg (750 mg Oral Given 3/13/23 2054)   NIFEdipine 24 hr tablet 90 mg (90 mg Oral Given 3/13/23 2054)   valsartan tablet 320 mg (320 mg Oral Given 3/13/23 2051)   hydrALAZINE injection 10 mg (10 mg Intravenous Given 3/13/23 2056)   sodium chloride 0.9% bolus 1,000 mL 1,000 mL (0 mLs Intravenous Stopped 3/13/23 2302)   diphenhydrAMINE capsule 25 mg (25 mg Oral Given 3/13/23 2055)   metoclopramide HCl injection 10 mg (10 mg Intravenous Given 3/13/23 2056)   sodium chloride 0.9% bolus 1,000 mL 1,000 mL (0 mLs Intravenous Stopped 3/14/23 0211)     4:55 PM Transferred care to Dr. Costello.                ED Course as of 03/14/23 0702   Mon Mar 13, 2023   1741 Patient was signed out to myself by Dr. Gentile.    62-year-old female with a past medical history of hypertension, CHF, osteoarthritis, CVA, TBI, anxiety, depression that presents to emergency department for chest pain and shortness of breath.  Patient said her symptoms started earlier this morning.  Her home health nurse checked her blood pressure and was proximally 200.  Has been feeling lightheaded with no syncopal episodes.    On my evaluation patient is stable and nontoxic appearing.  Afebrile, non tachycardic, was hypertensive 180/90 on arrival, saturating well on room air.  Physical exam was within normal limits.  Cardiac workup was ordered. [RK]   1742 EKG is nonischemic  No leukocytosis, anemia, thrombocytopenia. [RK]   1806   Troponin 0.067, this seems to be similar to her baseline.  D-dimer elevated at 1.  Will get a repeat troponin.  Will do a CTA to rule out PE.      No severe electrolyte abnormality.   No severe SARAI.  No hyperglycemia.  Liver enzymes are within normal limits.   [RK]   1812   Patient complaining of a mild headache, will give acetaminophen. [RK]   2002 CTA shows no signs of pulmonary embolism, does show signs of infectious process.  Patient will be started on antibiotics.  Will get repeat troponin and  EKG. [RK]   2021   EKG # 2 normal sinus rhythm at a rate of 90, no signs of ST elevation or depression, normal axis, normal intervals with a QTC of 472.      Patient complaining of persistent headache.  With elevated blood pressure will get a CT head to rule out intracranial bleed.  Patient will also get migraine cocktail.  Will give patient nighttime medication for blood pressure which includes nifedipine 90 extended release and valsartan 320.  Patient will also get hydralazine 10 mg which is temporizing. [RK]   2119   Troponin # 2 is similar to previous troponin.  Low suspicion for ACS. [RK]   2152   CT head within normal limits. [RK]   2220   On re-evaluation patient said her headache is now improved.  She continues to have some mild chest pain.  Last blood pressure was 165/90.      I reviewed her cardiac history and patient has had a recent echocardiogram, no recent stress test.  Will give nitroglycerin tablet.  Will admit to the hospital for further management. [RK]   Tue Mar 14, 2023   0410 Medicine is seen and evaluated the patient do not feel that she meets admission criteria.  She is considered low risk from a pneumonia standpoint with curb 65 score 0.  Discussed with the patient trial of outpatient treatment for her pneumonia with close outpatient follow up with her PCP and strict ED return precautions.  Patient is agreeable with this plan.  She was given Levaquin here so will send her home on Levaquin and provide her with Tessalon Perles for cough and Flonase for her sinus congestion. [IB]      ED Course User Index  [IB] Rui Monroy DO  [RK] Satish Costello MD                 Clinical Impression:   Final diagnoses:  [R07.9] Chest pain  [J18.9] Pneumonia due to infectious organism, unspecified laterality, unspecified part of lung (Primary)  [R06.02] SOB (shortness of breath)  [R51.9] Nonintractable headache, unspecified chronicity pattern, unspecified headache type        ED Disposition Condition     Discharge Stable          ED Prescriptions       Medication Sig Dispense Start Date End Date Auth. Provider    benzonatate (TESSALON) 100 MG capsule Take 1 capsule (100 mg total) by mouth 3 (three) times daily as needed for Cough. 20 capsule 3/14/2023 3/24/2023 Rui Monroy DO    levoFLOXacin (LEVAQUIN) 750 MG tablet Take 1 tablet (750 mg total) by mouth once daily. for 5 days 5 tablet 3/14/2023 3/19/2023 Rui Monroy DO    fluticasone propionate (FLONASE) 50 mcg/actuation nasal spray 1 spray (50 mcg total) by Each Nostril route 2 (two) times daily as needed for Rhinitis. 15 g 3/14/2023 -- Rui Monroy DO          Follow-up Information    None          Mauri Gentile MD  03/13/23 1654       Mauri Gentile MD  03/14/23 6822

## 2023-03-14 VITALS
WEIGHT: 261 LBS | DIASTOLIC BLOOD PRESSURE: 74 MMHG | SYSTOLIC BLOOD PRESSURE: 135 MMHG | HEART RATE: 87 BPM | RESPIRATION RATE: 17 BRPM | OXYGEN SATURATION: 94 % | BODY MASS INDEX: 42.13 KG/M2

## 2023-03-14 PROCEDURE — 96361 HYDRATE IV INFUSION ADD-ON: CPT

## 2023-03-14 RX ORDER — FLUTICASONE PROPIONATE 50 MCG
1 SPRAY, SUSPENSION (ML) NASAL 2 TIMES DAILY PRN
Qty: 15 G | Refills: 0 | Status: SHIPPED | OUTPATIENT
Start: 2023-03-14

## 2023-03-14 RX ORDER — BENZONATATE 100 MG/1
100 CAPSULE ORAL 3 TIMES DAILY PRN
Qty: 20 CAPSULE | Refills: 0 | Status: SHIPPED | OUTPATIENT
Start: 2023-03-14 | End: 2023-03-24

## 2023-03-14 RX ORDER — LEVOFLOXACIN 750 MG/1
750 TABLET ORAL DAILY
Qty: 5 TABLET | Refills: 0 | Status: SHIPPED | OUTPATIENT
Start: 2023-03-14 | End: 2023-03-19

## 2023-03-14 NOTE — ED PROVIDER NOTES
ED Course as of 03/14/23 2258   Mon Mar 13, 2023   1741 Patient was signed out to myself by Dr. Gentile.    62-year-old female with a past medical history of hypertension, CHF, osteoarthritis, CVA, TBI, anxiety, depression that presents to emergency department for chest pain and shortness of breath.  Patient said her symptoms started earlier this morning.  Her home health nurse checked her blood pressure and was proximally 200.  Has been feeling lightheaded with no syncopal episodes.    On my evaluation patient is stable and nontoxic appearing.  Afebrile, non tachycardic, was hypertensive 180/90 on arrival, saturating well on room air.  Physical exam was within normal limits.  Cardiac workup was ordered. [RK]   1742 EKG is nonischemic  No leukocytosis, anemia, thrombocytopenia. [RK]   1806   Troponin 0.067, this seems to be similar to her baseline.  D-dimer elevated at 1.  Will get a repeat troponin.  Will do a CTA to rule out PE.      No severe electrolyte abnormality.   No severe SARAI.  No hyperglycemia.  Liver enzymes are within normal limits.   [RK]   1812   Patient complaining of a mild headache, will give acetaminophen. [RK]   2002 CTA shows no signs of pulmonary embolism, does show signs of infectious process.  Patient will be started on antibiotics.  Will get repeat troponin and EKG. [RK]   2021   EKG # 2 normal sinus rhythm at a rate of 90, no signs of ST elevation or depression, normal axis, normal intervals with a QTC of 472.      Patient complaining of persistent headache.  With elevated blood pressure will get a CT head to rule out intracranial bleed.  Patient will also get migraine cocktail.  Will give patient nighttime medication for blood pressure which includes nifedipine 90 extended release and valsartan 320.  Patient will also get hydralazine 10 mg which is temporizing. [RK]   2119   Troponin # 2 is similar to previous troponin.  Low suspicion for ACS. [RK]   2152   CT head within normal limits.  [RK]   2220   On re-evaluation patient said her headache is now improved.  She continues to have some mild chest pain.  Last blood pressure was 165/90.      I reviewed her cardiac history and patient has had a recent echocardiogram, no recent stress test.  Will give nitroglycerin tablet.  Will admit to the hospital for further management. [RK]   Tue Mar 14, 2023   0410 Medicine is seen and evaluated the patient do not feel that she meets admission criteria.  She is considered low risk from a pneumonia standpoint with curb 65 score 0.  Discussed with the patient trial of outpatient treatment for her pneumonia with close outpatient follow up with her PCP and strict ED return precautions.  Patient is agreeable with this plan.  She was given Levaquin here so will send her home on Levaquin and provide her with Tessalon Perles for cough and Flonase for her sinus congestion. [IB]      ED Course User Index  [IB] Rui Monroy DO  [RK] Satish Costello MD     Critical Care    Date/Time: 3/13/2023 10:22 PM  Performed by: Satish Costello MD  Authorized by: Satish Costello MD   Total critical care time (exclusive of procedural time) : 0 minutes  Comments: Upon my evaluation, this patient had a high probability of imminent or life-threatening deterioration due to hypertensive urgency, which required my direct attention, intervention, and personal management.    I have personally provided 35 minutes of critical care time exclusive of time spent on separately billed procedures. Time includes review of chart, history and physical exam of the patient, review of laboratory data, review of radiology results, discussion with consultants, and monitoring for potential decompensation. Interventions were performed as documented above.              Satish Costello MD  03/13/23 3683       Satish Costello MD  03/14/23 5211

## 2023-03-14 NOTE — CONSULTS
"[facility] Consult Note     Attending Physician: Satish Costello MD  Resident: Jo-Ann Jean Baptiste MD        Chief Complaint     "My blood pressure is high"       Subjective:      History of Present Illness:  Elba Barnes is a 62 y.o. female who with a history of HTN, poorly controlled DM II, TBI, HFpEF, CVA with right sided deficits, LYNDON compliant with CPAP, GERD, osteoarthritis of multiple sites, nicotine dependence, obesity who presented to [facility] ED on 3/13/2023  with a primary complaint of concern for elevated blood pressure.  Patient was seen and evaluated by home health nurse today and found to have elevated blood pressures with systolics in the 200s and diastolics in the 120s.  At that time was having symptoms of dizziness, weakness and nausea.  She also reports midsternal chest pain described as intermittent, nonradiating.  Associated with productive cough for the last 2-3 days with yellowish sputum. Chest pain is aggravated with coughing and deep inspiration.  Reported that she was having shortness of breath but that has since resolved while in ED. Denies fever, chills, myalgias, vomiting, abdominal pain, dysuria, diarrhea, known sick contacts, recent travel.      On initial presentation to the ED, afebrile, hypertensive to 180/90, heart rate 98, respiratory rate 18, satting 100% on room air.  Notable labs BNP and CK within normal, troponin (0.067--0.059), d-dimer 1.  Chest x-ray, CT head and CTA PE obtained.  Normal saline bolus x2, aspirin 325mg, acetaminophen a 1000 mg, diphenhydramine 25 mg hydralazine IV 10 mg metoclopramide IV 10 mg, patient's home dose Nocardia and valsartan, sublingual nitroglycerin, as well as levofloxacin 750 mg p.o. Internal medicine consulted for chest pain and hypertension.      Past Medical History:  Past Medical History:   Diagnosis Date    Anxiety     Arthritis     CVA (cerebral vascular accident)     "mini stroke"    Depression     Diabetes mellitus     Heart " "problem     History of psychiatric hospitalization     three(,  and another (years ago")): depression    HTN (hypertension)     Hx of psychiatric care     Hypertension     Pacemaker     Psychiatric exam requested by authority     Psychiatric problem     TBI (traumatic brain injury)     Therapy        Past Surgical History:  Past Surgical History:   Procedure Laterality Date    CARDIAC PACEMAKER PLACEMENT      CARDIAC PACEMAKER REMOVAL      CHOLECYSTECTOMY      HYSTERECTOMY         Family History:  Family History   Problem Relation Age of Onset    Schizophrenia Mother     Bipolar disorder Mother     Bipolar disorder Father        Social History:  Social History     Tobacco Use    Smoking status: Every Day     Packs/day: 0.50     Years: 40.00     Pack years: 20.00     Types: Cigarettes     Last attempt to quit: 2018     Years since quittin.4    Smokeless tobacco: Never   Substance Use Topics    Alcohol use: Yes     Comment: wine on occ    Drug use: No       Allergies:  Review of patient's allergies indicates:   Allergen Reactions    Pcn [penicillins] Anaphylaxis       Home Medications:  Prior to Admission medications    Medication Sig Start Date End Date Taking? Authorizing Provider   albuterol (PROVENTIL/VENTOLIN HFA) 90 mcg/actuation inhaler Inhale 1-2 puffs into the lungs every 6 (six) hours as needed for Wheezing. Rescue 23   Ignacia Lopez DO   ALPRAZolam (XANAX) 1 MG tablet Take 1 tablet by mouth once a day as needed as needed for anxiety 22   Historical Provider   amLODIPine (NORVASC) 2.5 MG tablet Take 2.5 mg by mouth once daily.    Historical Provider   ammonium lactate (LAC-HYDRIN) 12 % lotion Apply to dry skin areas on feet, legs, and elbow areas TWICE A DAY AS NEEDED 23   Historical Provider   ascorbic acid, vitamin C, 500 mg Chew Take 500 mg by mouth.    Historical Provider   aspirin (ECOTRIN) 81 MG EC tablet Take 1 tablet (81 mg total) by mouth once daily. 21   Markel " "SOHAIL Torres MD   atorvastatin (LIPITOR) 40 MG tablet Take 40 mg by mouth every evening. 11/22/22   Historical Provider   b complex vitamins capsule Take 1 capsule by mouth every morning.    Historical Provider   B-complex with vitamin C (Z-BEC OR EQUIV) tablet Take 1 capsule by mouth once daily.    Historical Provider   BASAGLAR KWIKPEN U-100 INSULIN glargine 100 units/mL SubQ pen Inject 80 Units into the skin 2 (two) times daily with meals. 12/23/22   VIRGEN Ho   BD ULTRA-FINE SHORT PEN NEEDLE 31 gauge x 5/16" Ndle SMARTSIG:Injection Twice Daily 10/24/22   Historical Provider   blood-glucose meter Misc CHECK BLOOD GLUCOSE LEVEL TWICE DAILY. 4/6/22   Historical Provider   butalbital-acetaminophen-caffeine -40 mg (FIORICET, ESGIC) -40 mg per tablet Take 1 tablet by mouth every 6 (six) hours as needed for Pain. 2/11/19   Leandra Monreal NP   carvediloL (COREG) 12.5 MG tablet Take 1 tablet (12.5 mg total) by mouth 2 (two) times daily. 2/17/23 2/17/24  Ignacia Lopez DO   cholecalciferol, vitamin D3, (VITAMIN D3) 50 mcg (2,000 unit) Cap capsule Take 1 capsule by mouth. 8/13/22   Historical Provider   diclofenac sodium (VOLTAREN) 1 % Gel Apply 2 g topically 4 (four) times daily. 12/21/22   VIRGEN Ho   docusate sodium (COLACE) 250 MG capsule Take 1 capsule (250 mg total) by mouth 2 (two) times daily. 3/8/23   VIRGEN Ho   DULoxetine (CYMBALTA) 30 MG capsule Take 30 mg by mouth. 11/7/22   Historical Provider   empty container (SHARPS CONTAINER) [facility] [place] [facility] 4/6/22   Historical Provider   esomeprazole (NEXIUM) 40 MG capsule Take 40 mg by mouth every morning. 11/22/22   Historical Provider   fluticasone furoate-vilanteroL (BREO ELLIPTA) 200-25 mcg/dose DsDv diskus inhaler Inhale 1 puff into the lungs once daily. Controller 12/21/22   VIRGEN Ho   gabapentin (NEURONTIN) 800 MG tablet Take 1 tablet (800 mg total) by mouth every 8 (eight) hours. 12/21/22   VIRGEN Ho " "  glipiZIDE (GLUCOTROL) 10 MG tablet Take 10 mg by mouth. 23   Historical Provider   hydrALAZINE (APRESOLINE) 100 MG tablet Take 1 tablet (100 mg total) by mouth every 8 (eight) hours. 23  Ignacia Lopez DO   HYDROcodone-acetaminophen (NORCO) 7.5-325 mg per tablet Take 1 tablet by mouth 3 (three) times daily as needed for Pain.  3/11/19   Historical Provider   insulin syringe-needle U-100 1 mL 28 gauge x 1/2" Syrg USE TO inject insulin TWICE DAILY 22   Historical Provider   loratadine (CLARITIN) 10 mg tablet Take 10 mg by mouth. 10/26/22 7/23/23  Historical Provider   magnesium citrate solution Take 296 mLs by mouth daily as needed. 22   Historical Provider   multivitamin (THERAGRAN) per tablet Take 1 tablet by mouth once daily.    Historical Provider   naloxone (NARCAN) 4 mg/actuation Spry SMARTSI Spray(s) Both Nares PRN 10/12/22   Historical Provider   naproxen (NAPROSYN) 500 MG tablet Take 500 mg by mouth 2 (two) times daily. 22   Historical Provider   nebivoloL (BYSTOLIC) 10 MG Tab Take 10 mg by mouth once daily.    Historical Provider   NIFEdipine (PROCARDIA-XL) 90 MG (OSM) 24 hr tablet Take 1 tablet (90 mg total) by mouth once daily. 23  Ignacia Lopez DO   nitroGLYCERIN (NITROSTAT) 0.4 MG SL tablet Place 0.4 mg under the tongue every 5 (five) minutes as needed for Chest pain.    Historical Provider   NOVOLOG FLEXPEN U-100 INSULIN 100 unit/mL (3 mL) InPn pen Inject 20 Units into the skin 3 (three) times daily with meals. 22   VIRGEN Ho   nystatin (MYCOSTATIN) cream Apply topically 3 (three) times daily. Use under breast 10/26/22   Historical Provider   ondansetron (ZOFRAN) 4 MG tablet Take 1 tablet (4 mg total) by mouth 2 (two) times daily. 23   Ignacia Lopez DO   ONETOUCH DELICA PLUS LANCET 33 gauge Misc Apply topically 4 (four) times daily. 10/27/22   Historical Provider   ONETOUCH VERIO TEST STRIPS Strp 4 (four) times daily. 22   " Historical Provider   pantoprazole (PROTONIX) 40 MG tablet Take 1 tablet (40 mg total) by mouth once daily. 19   Leandra Monreal NP   polyethylene glycol (GLYCOLAX) 17 gram/dose powder Take 17 g by mouth. 22   Historical Provider   sertraline (ZOLOFT) 50 MG tablet Take 50 mg by mouth once daily.    Historical Provider   sucralfate (CARAFATE) 1 gram tablet TAKE 1 TABLET(1 GRAM) BY MOUTH FOUR TIMES DAILY BEFORE MEALS AND AT NIGHT  Patient taking differently: Take 1 g by mouth 4 (four) times daily before meals and nightly. 19   Leandra Monreal NP   valsartan (DIOVAN) 160 MG tablet Take 1 tablet (160 mg total) by mouth once daily. 23  Ignacia Lopez DO         Review of Systems:  Constitutional: No fever, chills  HENT: +headaches, denies lightheadedness  Eyes: Denies vision changes  Cardiovascular: per HPI  Respiratory: per HPI  Abdominal: Denies nausea, vomiting, diarrhea, constipation, abdominal pain  : Denies dysuria, urinary frequency, urinary hesitancy, foul-smelling urine  MSK: chronic weakness, pain  Skin: Denies rashes, trauma  Neuro: Denies numbness, tingling, focal deficits  Heme: Denies bleeding in urine, stool, from any other sources         Objective:   Last 24 Hour Vital Signs:  BP  Min: 178/97  Max: 180/90  Pulse  Av.5  Min: 89  Max: 98  Resp  Av.5  Min: 18  Max: 19  SpO2  Av.5 %  Min: 97 %  Max: 100 %  Weight  Av.4 kg (261 lb)  Min: 118.4 kg (261 lb)  Max: 118.4 kg (261 lb)  Body mass index is 42.13 kg/m².  No intake/output data recorded.    Physical Examination:  General: Alert and oriented, no acute distress.   HEENT: Normocephalic, oral mucosa is moist. pupils are equal, round and reactive to light, extraocular movements are intact, anicteric sclera.   Neck: Supple, non-tender, no lymphadenopathy, no thyromegaly.   Respiratory:  Bibasilar crackles, respirations are non-labored, breath sounds are equal, symmetrical chest wall expansion.    Cardiovascular: Normal rate, regular rhythm, no edema, brisk capillary refill.  Anterior chest wall tenderness. BP on cardiac monitor 139/78  Gastrointestinal: Protuberant, soft, non-tender, non-distended, normoactive bowel sounds.   Musculoskeletal: Symmetric movement of all extremities.  Right-sided lower extremity weakness in comparison to left   Integumentary: Warm, dry, intact.   Neurologic: No focal deficits; Cranial Nerves II-XII are grossly intact.   Cognition and Speech: Oriented, Speech clear and coherent.   Psychiatric: Cooperative, Appropriate mood & affect.       Laboratory:  Most Recent Data:  CBC:   Lab Results   Component Value Date    WBC 8.6 03/13/2023    HGB 13.7 03/13/2023    HCT 42.6 03/13/2023     03/13/2023    MCV 88.9 03/13/2023    RDW 15.4 03/13/2023     WBC Differential:   Recent Labs   Lab 03/13/23  1702   WBC 8.6   HGB 13.7   HCT 42.6      MCV 88.9     BMP:   Lab Results   Component Value Date     03/13/2023    K 4.1 03/13/2023    CL 98 09/30/2021    CO2 27 03/13/2023    BUN 14.3 03/13/2023    CREATININE 1.11 (H) 03/13/2023     (HH) 09/30/2021    CALCIUM 9.6 03/13/2023    MG 2.10 02/17/2023    PHOS 4.3 02/17/2023     LFTs:   Lab Results   Component Value Date    PROT 8.6 (H) 09/30/2021    ALBUMIN 3.2 (L) 03/13/2023    BILITOT 0.3 03/13/2023    AST 19 03/13/2023    ALKPHOS 84 03/13/2023    ALT 23 03/13/2023       Cardiac:   Lab Results   Component Value Date    TROPONINI 0.059 (H) 03/13/2023    BNP 23.5 03/13/2023         Trended Lab Data:  Recent Labs   Lab 03/13/23  1702   WBC 8.6   HGB 13.7   HCT 42.6      MCV 88.9   RDW 15.4      K 4.1   CO2 27   BUN 14.3   CREATININE 1.11*   ALBUMIN 3.2*   BILITOT 0.3   AST 19   ALKPHOS 84   ALT 23       Trended Cardiac Data:  Recent Labs   Lab 03/13/23  1702 03/13/23 2015   TROPONINI 0.067* 0.059*   BNP 23.5  --            Radiology:  Imaging Results              CT Head Without Contrast (Preliminary result)   Result time 03/13/23 21:12:33      Preliminary result by Dank Jasso MD (03/13/23 21:12:33)                   Narrative:    START OF REPORT:  Technique: CT of the head was performed without intravenous contrast with axial as well as coronal and sagittal images.    Comparison: Comparison is with study dated â2023-02-13.    Dosage Information: Automated Exposure Control was utilized 976.54 mGy.cm.    Clinical history: Headache.    Findings:  Artifact: There is mild motion artifact a few of the images.  Hemorrhage: No acute intracranial hemorrhage is seen.  CSF spaces: The ventricles sulci and basal cisterns are within normal limits.  Brain parenchyma: Unremarkable with preservation of the grey white junction throughout. No acute infarct. Stable appearing scattered microvascular change is seen in portions of the periventricular and deep white matter tracts.  Cerebellum: Unremarkable.  Sella and skull base: The sella appears to be within normal limits for age.  Intracranial calcifications: Incidental note is made of bilateral choroid plexus calcification. Incidental note is made of some pineal region calcification.  Calvarium: No acute linear or depressed skull fracture is seen.    Maxillofacial Structures:  Paranasal sinuses: The visualized paranasal sinuses appear clear with no significant mucoperiosteal thickening or air fluid levels identified.  Orbits: There is bilateral proptosis.  Zygomatic arches: The zygomatic arches are intact and unremarkable.  Temporal bones and mastoids: The temporal bones and mastoids appear unremarkable.  TMJ: The mandibular condyles appear normally placed with respect to the mandibular fossa.  Nasal Bones: The nasal septum is midline. No displaced nasal bone fracture is seen.    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      Impression:  1. No acute intracranial process identified. Details and other findings as noted  above.                          Preliminary result by Interface, Rad Results In (03/13/23 21:12:33)                   Narrative:    START OF REPORT:  Technique: CT of the head was performed without intravenous contrast with axial as well as coronal and sagittal images.    Comparison: Comparison is with study dated â2023-02-13.    Dosage Information: Automated Exposure Control was utilized 976.54 mGy.cm.    Clinical history: Headache.    Findings:  Artifact: There is mild motion artifact a few of the images.  Hemorrhage: No acute intracranial hemorrhage is seen.  CSF spaces: The ventricles sulci and basal cisterns are within normal limits.  Brain parenchyma: Unremarkable with preservation of the grey white junction throughout. No acute infarct. Stable appearing scattered microvascular change is seen in portions of the periventricular and deep white matter tracts.  Cerebellum: Unremarkable.  Sella and skull base: The sella appears to be within normal limits for age.  Intracranial calcifications: Incidental note is made of bilateral choroid plexus calcification. Incidental note is made of some pineal region calcification.  Calvarium: No acute linear or depressed skull fracture is seen.    Maxillofacial Structures:  Paranasal sinuses: The visualized paranasal sinuses appear clear with no significant mucoperiosteal thickening or air fluid levels identified.  Orbits: There is bilateral proptosis.  Zygomatic arches: The zygomatic arches are intact and unremarkable.  Temporal bones and mastoids: The temporal bones and mastoids appear unremarkable.  TMJ: The mandibular condyles appear normally placed with respect to the mandibular fossa.  Nasal Bones: The nasal septum is midline. No displaced nasal bone fracture is seen.    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      Impression:  1. No acute intracranial process identified. Details and other findings as  noted above.                                         CTA Chest Non-Coronary (PE Studies) (Final result)  Result time 03/13/23 19:34:24      Final result by Rafael Epstein MD (03/13/23 19:34:24)                   Narrative:    EXAMINATION  CTA CHEST NON CORONARY (PE STUDIES)    CLINICAL HISTORY  Pulmonary embolism (PE) suspected, positive D-dimer;    TECHNIQUE  Post-contrast helical-acquisition CT images were obtained, with bolus timing to pulmonary arterial system for purposes of PE protocol CTA.  Multiplanar reconstructions, including MIP images, were accomplished by a CT technologist at a separate workstation, pushed to PACS for physician review.    CONTRAST  IV: Omnipaque 350, 100 mL    COMPARISON  10 October 2014    FINDINGS  Images were reviewed in soft tissue, lung, and bone windows.    Exam quality: adequate for evaluation    Lines/tubes: none visualized    Pulmonary Vessels: No central or large segmental filling defect.    Cardiovascular: No suggestion of right heart strain; the RV:LV ratio is <1. No significant heart chamber enlargement. There is no pericardial effusion. No focal contour irregularity or intraluminal abnormality is appreciated involving the visualized aorta and branch vessels. Scattered coronary and aortic calcification noted.    Lungs/Pleura: Central airways are patent. No lobar airspace consolidation is identified.  However, there are irregular scattered areas of ground-glass attenuation through the bilateral perihilar and a few subpleural regions of the lung fields.  Chronic emphysematous changes are also noted.  No pleural thickening, significant effusion, or evidence of pneumothorax.    Other Findings: Scattered mediastinal and bihilar lymph nodes are present, with no pathologic enlargement or necrotic adenopathy visualized.  The included thyroid gland is diffusely heterogeneous and multinodular in appearance, similar to the prior study.  There is redemonstrated homogeneous  low-attenuation right adrenal nodule, grossly unchanged size and overall appearance with dimensions of 2.4 cm x 2.6 cm and average internal attenuation less than 10 HU.  No acute or suspicious new focal abnormality of the upper abdomen is visualized.  There are chronic regional osseous alterations, without acute cortical displacement or destructive skeletal lesion.  Old, healed right rib deformities are similar.  No abnormality of the thoracic wall soft tissues.    IMPRESSION  1. No evidence of central or segmental pulmonary thromboembolism.  2. Scattered bilateral lung field ground-glass attenuation and reactive-appearing intrathoracic lymph nodes may be secondary to developing atypical infectious process.  Close consideration within the full clinical context is needed, with recommended imaging follow-up in 6-8 weeks to ensure resolution.  3. Stable appearance of right adrenal nodule.  Long-term stability and attenuation less than 10 HU statistically ensured benign etiology such as adenoma.  4. Additional secondary details discussed above.    RADIATION DOSE  Automated tube current modulation, weight-based exposure dosing, and/or iterative reconstruction technique utilized to reach lowest reasonably achievable exposure rate.    DLP: 649 mGy*cm      Electronically signed by: Rafael Epstein  Date:    03/13/2023  Time:    19:34                                     X-Ray Chest AP Portable (Final result)  Result time 03/13/23 19:00:52      Final result by Letha Foster MD (03/13/23 19:00:52)                   Impression:      There is some chronic appearing lung changes bilaterally      Electronically signed by: Letha Foster  Date:    03/13/2023  Time:    19:00               Narrative:    EXAMINATION:  XR CHEST AP PORTABLE    CLINICAL HISTORY:  Chest Pain;    TECHNIQUE:  Single frontal view of the chest was performed.    COMPARISON:  02/13/2023    FINDINGS:  There are chronic appearing lung changes seen  bilaterally. No evidence of acute infiltrate is seen. No mass is seen. No lesion is seen. No pleural effusion is seen. The heart appears normal. The aorta appears normal. The bones and joints show no acute abnormality.                                           Assessment & Plan:   Community Acquired Pneumonia  Pleuritic chest pain    NSTEMI II 2/2 Pneumonia   Hypertension  -PSI score of 70, low risk   -Patient is afebrile, hemodynamically stable without leukocytosis.  -Chest pain is reproducible on exam  -EKG reveals normal sinus rhythm, left axis deviation no acute signs of ischemia, normal intervals per my read.  -CTA PE negative for pulmonary thromboembolism with reactive thoracic lymph nodes concerning for infectious process  -[facility] 12/2022:  Coronary dominance 20, left main, left anterior descending, left circumflex and right dominant all patent  -patient recently admitted on 02/2023 for concern of anginal chest pain cardiology consulted and recommended outpatient medical management with no indication for repeat left heart catheterization.  -Ambulatory saturations >92%  -Recommend discharge home on oral antibiotics, Levofloxacin 750 mg for a total of 5 days  -Blood pressure now within goal.  Continue home antihypertensives  -Follow up with PCP in 2-3days.   -Patient verbalized agreement and understanding of plan    Plan discussed with attending, Dr. Loulou Jean Baptiste MD  [facility] HO-2

## 2023-03-17 ENCOUNTER — OFFICE VISIT (OUTPATIENT)
Dept: INTERNAL MEDICINE | Facility: CLINIC | Age: 63
End: 2023-03-17
Payer: COMMERCIAL

## 2023-03-17 ENCOUNTER — HOSPITAL ENCOUNTER (OUTPATIENT)
Dept: RADIOLOGY | Facility: HOSPITAL | Age: 63
Discharge: HOME | End: 2023-03-17
Attending: STUDENT IN AN ORGANIZED HEALTH CARE EDUCATION/TRAINING PROGRAM
Payer: COMMERCIAL

## 2023-03-17 ENCOUNTER — OFFICE VISIT (OUTPATIENT)
Dept: ORTHOPEDICS | Facility: CLINIC | Age: 63
End: 2023-03-17
Payer: COMMERCIAL

## 2023-03-17 VITALS
HEART RATE: 90 BPM | DIASTOLIC BLOOD PRESSURE: 71 MMHG | HEIGHT: 66 IN | BODY MASS INDEX: 41.78 KG/M2 | SYSTOLIC BLOOD PRESSURE: 106 MMHG | WEIGHT: 260 LBS

## 2023-03-17 VITALS
WEIGHT: 251.38 LBS | HEART RATE: 81 BPM | DIASTOLIC BLOOD PRESSURE: 89 MMHG | TEMPERATURE: 98 F | BODY MASS INDEX: 40.4 KG/M2 | SYSTOLIC BLOOD PRESSURE: 145 MMHG | HEIGHT: 66 IN | RESPIRATION RATE: 18 BRPM

## 2023-03-17 DIAGNOSIS — M17.11 PRIMARY LOCALIZED OSTEOARTHRITIS OF RIGHT KNEE: Primary | ICD-10-CM

## 2023-03-17 DIAGNOSIS — M25.562 PAIN IN BOTH KNEES, UNSPECIFIED CHRONICITY: ICD-10-CM

## 2023-03-17 DIAGNOSIS — M25.561 CHRONIC PAIN OF RIGHT KNEE: ICD-10-CM

## 2023-03-17 DIAGNOSIS — M25.561 PAIN IN BOTH KNEES, UNSPECIFIED CHRONICITY: ICD-10-CM

## 2023-03-17 DIAGNOSIS — Z01.818 PRE-OP EXAM: Primary | ICD-10-CM

## 2023-03-17 DIAGNOSIS — G89.29 CHRONIC PAIN OF RIGHT KNEE: ICD-10-CM

## 2023-03-17 DIAGNOSIS — E66.01 MORBID OBESITY WITH BMI OF 40.0-44.9, ADULT: ICD-10-CM

## 2023-03-17 PROCEDURE — 99215 OFFICE O/P EST HI 40 MIN: CPT | Mod: PBBFAC

## 2023-03-17 PROCEDURE — 73564 X-RAY EXAM KNEE 4 OR MORE: CPT | Mod: TC,LT

## 2023-03-17 PROCEDURE — 20610 DRAIN/INJ JOINT/BURSA W/O US: CPT | Mod: PBBFAC

## 2023-03-17 PROCEDURE — 73564 X-RAY EXAM KNEE 4 OR MORE: CPT | Mod: TC,RT

## 2023-03-17 PROCEDURE — 99215 OFFICE O/P EST HI 40 MIN: CPT | Mod: PBBFAC,27,25

## 2023-03-17 RX ORDER — CLONIDINE HYDROCHLORIDE 0.1 MG/1
0.1 TABLET ORAL EVERY 6 HOURS PRN
Status: ON HOLD | COMMUNITY
Start: 2022-08-27 | End: 2023-05-11 | Stop reason: HOSPADM

## 2023-03-17 RX ORDER — TERCONAZOLE 4 MG/G
1 CREAM VAGINAL NIGHTLY
COMMUNITY
Start: 2023-02-24

## 2023-03-17 RX ADMIN — Medication 60 MG: at 11:03

## 2023-03-17 NOTE — PROGRESS NOTES
"Subjective:    Patient ID: Elba Barnes is a 62 y.o. female  who presented to [facility] for new visit..      Chief Complaint: Pain of the Right Knee      History of Present Illness:  Pain      Elba Barnes who has a history of knee OA  presented today with with knee pain involving the right knee for the past 1 year. Pain is located lateral joint line. Quality of pain is described as Sharp, Dull, Aching, and Throbbing.  Inciting event:  fell on both knees 1 yr ago . Pain is aggravated by any weight bearing, rising after sitting, standing, and walking.  Patient has had prior knee problems. Evaluation to date: plain films, PCP evaluation, ER evaluation, and PT evaluation. Treatment to date: avoidance of activity, topical analgesics, oral analgesics, and PT. Expectations for today's visit includes pain relief.  Occupation includes retired. PCP is [facility].    Knee Review of Systems:  Swelling?  yes  Instability?  yes  Mechanical sx?  yes  <30 min AM stiffness? yes  Limited ROM? yes  Fever/Chills? yes    Current Choice of Exercise:  none    ROS  See HPI     Objective:      Physical Exam:    /71   Pulse 90   Ht 5' 6" (1.676 m)   Wt 117.9 kg (260 lb)   BMI 41.97 kg/m²     Ortho/SPM Exam    Appearance:  In wheelchair  WBAT  Alignment: Left: normal Right: normal   Soft tissue swelling: Left: no Right: yes  Effusion: Left:  Negative Right: Negative  Erythema: Left no Right: no  Ecchymosis: Left: no Right: no  Atrophy: Left: no Right: yes    Palpation:  Knee Tenderness: Left: None Right: diffuse    Range of motion:  Flexion (140): Left:  120 Right: 120  Extension (0): Left: 1 Right: 3    Strength:  Extension: Left 4/5  Pain: no     Right 4/5 Pain: yes  Flexion: Left 4/5 Pain: no Right   4/5 Pain: yes        Special Tests:  Ballotable Effusion:Left: Negative Right: Negative   Fluid Wave: Left: Negative Right: Negative   Crepitus: Left: Negative " Right: positive  Patellar grind test: Left: Negative  Right: Negative  Apprehension test: Left: Negative Right: Not performed due to pain  Varus: @ 0, Left Not performed Right: Not performed.  @ 30, Left Negative  Right Not performed due to pain  Valgus: @ 0, Left Negative Right: Not performed due to pain.  @ 30, Left Negative  Right Not performed due to pain  Lachman: Left: Negative Right: Not performed due to pain  Ant Drawer: Left: Negative Right: Not performed due to pain  Posterior Drawer: Left: Negative Right: Not performed due to pain  Dial Test: Left: Not performed Right: Not performed   Jairo: Left: Negative Right: Negative   Apley's: Left: Not performed Right: Not performed  Thessaly's: Left: Not performed Right: Not performed   Noble Compression: Left: Not performed Right: Not performed   Yoana: Left: Not performed Right: Not performed       General appearance: NAD  Peripheral pulses: normal bilaterally   Reflexes: Left: normal Right normal   Sensation: normal    Labs:  Last A1c: 8.7     Imaging:   Previous images reviewed.  X-rays ordered and performed today: yes  # of views: 4 Laterality: bilateral  My Interpretation:  no fracture, dislocation . Lateral joint space narrowing with osteophytes of the R knee. Degenerative changes of the L knee     Assessment:        Encounter Diagnoses   Code Name Primary?    M17.11 Primary localized osteoarthritis of right knee Yes    M25.561, G89.29 Chronic pain of right knee     M25.561, M25.562 Pain in both knees, unspecified chronicity     E66.01, Z68.41 Morbid obesity with BMI of 40.0-44.9, adult           Plan:           Orders Placed This Encounter   Procedures    Large Joint Aspiration/Injection: R knee     This order was created via procedure documentation    X-Ray Knee Complete 4 Or More Views Right     Standing Status:   Future     Number of Occurrences:   1     Standing Expiration Date:   3/17/2024     Order Specific Question:   May the Radiologist modify  the order per protocol to meet the clinical needs of the patient?     Answer:   Yes     Order Specific Question:   Release to patient     Answer:   Immediate    X-Ray Knee Complete 4 or More Views Left     Standing Status:   Future     Number of Occurrences:   1     Standing Expiration Date:   3/17/2024     Order Specific Question:   May the Radiologist modify the order per protocol to meet the clinical needs of the patient?     Answer:   Yes     Order Specific Question:   Release to patient     Answer:   Immediate     Medications Ordered This Encounter   Medications    hyaluronate sodium, stabilized (DUROLANE) 60 mg/3 mL 60 mg     MDM:  Referring providers external notes and studies.  Dx: bilateral Knee Osteoarthritis. Acute in moderate exacerbation.   Treatment Plan: Discussed with patient diagnosis, prognosis, and treatment recommendations. Education provided.    Home physical therapy exercise handouts provided to patient.   topical hot or cold therapy  Over the counter NSAID and/or tylenol provided you do not have contraindications such as but not limited to liver or kidney disease or uncontrolled blood pressure. If you're doctors have told you to to not take them based on your health, do not take them.   topical capsaicin as needed  Using a brace provided to you here or from your local pharmacy or durable medical equipment (DME) store.     Imaging: radiological studies ordered and independently reviewed; discussed with patient; pending radiologist interpretation.   Weight Management: is paramount. BMI >35; recommend to discuss with PCP about bariatric surgery options if applicable. A bmi 24.9 or less may provide further relief..   Procedure: Discussed CSI/VSI as treatment options; discussed CSI vs VS injections as treatment options; since conservative measures did not improve symptoms patient consented for starting VS series today.  Has failed 3 months of conservative therapy.  Activity: Activity as tolerated;  HEP to include aerobic conditioning and strength training with non-painful activity. ROM/STG exercises. Proper footware; assistive devises to avoid limping.   Therapy: Physical Therapy  Medication: first line treatment with daily acetaminophen. Up to 1000 mg three times daily can be taken; medication precautions given.. Please see your primary care physician for further refills.  RTC: PRN; call if any issues.         Large Joint Aspiration/Injection: R knee    Date/Time: 3/17/2023 9:20 AM  Performed by: Mac Urrutia MD  Authorized by: Mac Urrutia MD     Consent Done?:  Yes (Written)  Indications:  Arthritis and pain  Site marked: the procedure site was marked    Timeout: prior to procedure the correct patient, procedure, and site was verified    Prep: patient was prepped and draped in usual sterile fashion      Local anesthesia used?: Yes    Local anesthetic:  Topical anesthetic    Details:  Needle Size:  21 G  Approach:  Anteromedial  Location:  Knee  Site:  R knee  Patient tolerance:  Patient tolerated the procedure well with no immediate complications     Staff: Lachelle Rushing MD     Risks:  Possible complications with the injection include bleeding, infection (.01%), tendon rupture, steroid flare, fat pad or soft tissue atrophy, skin depigmentation, allergic reaction to medications and vasovagal response. (steroid flare treatment is rest, ice, NSAIDs and resolves in 24-36 hours.)    Consent:  No absolute contraindications (cellulitis overlying joint, infection, lack of informed consent, allergy to injection medication, AVN protein or egg allergy for sodium hyaluronate, or history of steroid flare) or relative contraindications (uncontrolled DM2 A1c>10, coagulopathy, INR > 3.5, previous joint replacement or history of AVN).        Description:  The patient was prepped in normal sterile fashion use of chlorhexidine scrub and the appropriate and anatomic landmarks were identified with  ultrasound.  Contents of syringe included: 3ml of 20mg/ml of Durolane injected.     Post Procedure: Patient alert, and moving all extremities. ROM improved, pain decreased.  Good peripheral pulses, no signs of vascular compromise and range of motion intact.  Aftercare instructions were given to patient at time of discharge.  Relative rest for 3 days-avoiding excess activity.  Place ice on the area for 15 minutes every 4-6 hours. Patient may take Tylenol a 1000 mg b.i.d. or ibuprofen 600 mg t.i.d. for the next 3-4 days if not on medication already and safe to take pending co-morbidities.  Protect the area for the next 1-8 hours if anesthetic was used.  Avoid excessive activity for the next 3-4 weeks.  ER precautions given for fever, severe joint pain or allergic reaction or other new symptoms related to the joint injection.

## 2023-03-17 NOTE — PROGRESS NOTES
[facility] INTERNAL MEDICINE  SURGICAL CLEARANCE VISIT NOTE    SUBJECTIVE:      Chief Complaint: Surgery Clearance       HPI: Elba Barnes is a 62 y.o. yo female w/ PMH of  has a past medical history of Anxiety, Arthritis, CVA (cerebral vascular accident), Depression, Diabetes mellitus, Heart problem, History of psychiatric hospitalization, HTN (hypertension), psychiatric care, Hypertension, Pacemaker, Psychiatric exam requested by authority, Psychiatric problem, TBI (traumatic brain injury), and Therapy., who presents for  surgical clearance for epidural.    She currently has right hip pain and is unable to walk well. She is scheduled for an transforaminal epidural L4-L5 and will be receiving an epidural for her pain to help her walk. Patient will be receiving IV sedation prior to having the epidural performed. She does have a PMHx of anxiety, arthritis, T2DM A1C 8.0 on 3/17/23, HTN, CKD. Questionable history of CHF. Follows with cardiology. She was seen on the 3/13/23 at [facility] ED for pneumonia, and was discharged with outpatient treatment. She is on a 5 day course of levaquin and is feeling better, but still has a cough. No other complaints other wise.    Past Medical History:   has a past medical history of Anxiety, Arthritis, CVA (cerebral vascular accident), Depression, Diabetes mellitus, Heart problem, History of psychiatric hospitalization, HTN (hypertension), psychiatric care, Hypertension, Pacemaker, Psychiatric exam requested by authority, Psychiatric problem, TBI (traumatic brain injury), and Therapy.     Past Surgical History:   has a past surgical history that includes Cardiac pacemaker placement; Cholecystectomy; Hysterectomy; and Cardiac pacemaker removal.     Family History:  family history includes Bipolar disorder in her father and mother; Schizophrenia in her mother.     Social History:   reports that she has been smoking cigarettes. She has a 20.00 pack-year smoking history. She has never used  "smokeless tobacco. She reports that she does not currently use alcohol. She reports that she does not use drugs.     Allergies:  is allergic to pcn [penicillins].     Home Medications:  Prior to Admission medications    Medication Sig Start Date End Date Taking? Authorizing Provider   albuterol (PROVENTIL/VENTOLIN HFA) 90 mcg/actuation inhaler Inhale 1-2 puffs into the lungs every 6 (six) hours as needed for Wheezing. Rescue 2/17/23  Yes Ignacia Lopez,    ALPRAZolam (XANAX) 1 MG tablet Take 1 tablet by mouth once a day as needed as needed for anxiety 12/12/22  Yes Historical Provider   amLODIPine (NORVASC) 2.5 MG tablet Take 2.5 mg by mouth once daily.   Yes Historical Provider   ammonium lactate (LAC-HYDRIN) 12 % lotion Apply to dry skin areas on feet, legs, and elbow areas TWICE A DAY AS NEEDED 1/5/23  Yes Historical Provider   ascorbic acid, vitamin C, 500 mg Chew Take 500 mg by mouth.   Yes Historical Provider   aspirin (ECOTRIN) 81 MG EC tablet Take 1 tablet (81 mg total) by mouth once daily. 9/30/21  Yes Markel Torres MD   atorvastatin (LIPITOR) 40 MG tablet Take 40 mg by mouth every evening. 11/22/22  Yes Historical Provider   b complex vitamins capsule Take 1 capsule by mouth every morning.   Yes Historical Provider   B-complex with vitamin C (Z-BEC OR EQUIV) tablet Take 1 capsule by mouth once daily.   Yes Historical Provider   BASAGLAR KWIKPEN U-100 INSULIN glargine 100 units/mL SubQ pen Inject 80 Units into the skin 2 (two) times daily with meals. 12/23/22  Yes [facility]   BD ULTRA-FINE SHORT PEN NEEDLE 31 gauge x 5/16" Ndle SMARTSIG:Injection Twice Daily 10/24/22  Yes Historical Provider   benzonatate (TESSALON) 100 MG capsule Take 1 capsule (100 mg total) by mouth 3 (three) times daily as needed for Cough. 3/14/23 3/24/23 Yes Rui Monroy,    blood-glucose meter Misc CHECK BLOOD GLUCOSE LEVEL TWICE DAILY. 4/6/22  Yes Historical Provider   butalbital-acetaminophen-caffeine -40 mg " "(FIORICET, ESGIC) -40 mg per tablet Take 1 tablet by mouth every 6 (six) hours as needed for Pain. 2/11/19  Yes Leandra Monreal NP   carvediloL (COREG) 12.5 MG tablet Take 1 tablet (12.5 mg total) by mouth 2 (two) times daily. 2/17/23 2/17/24 Yes Ignacia Lopez DO   cholecalciferol, vitamin D3, (VITAMIN D3) 50 mcg (2,000 unit) Cap capsule Take 1 capsule by mouth. 8/13/22  Yes Historical Provider   cloNIDine (CATAPRES) 0.1 MG tablet Take 0.1 mg by mouth every 6 (six) hours as needed. 8/27/22  Yes Historical Provider   diclofenac sodium (VOLTAREN) 1 % Gel Apply 2 g topically 4 (four) times daily. 12/21/22  Yes [facility]   docusate sodium (COLACE) 250 MG capsule Take 1 capsule (250 mg total) by mouth 2 (two) times daily. 3/8/23  Yes [facility]   DULoxetine (CYMBALTA) 30 MG capsule Take 30 mg by mouth. 11/7/22  Yes Historical Provider   esomeprazole (NEXIUM) 40 MG capsule Take 40 mg by mouth every morning. 11/22/22  Yes Historical Provider   fluticasone furoate-vilanteroL (BREO ELLIPTA) 200-25 mcg/dose DsDv diskus inhaler Inhale 1 puff into the lungs once daily. Controller 12/21/22  Yes [facility]   fluticasone propionate (FLONASE) 50 mcg/actuation nasal spray 1 spray (50 mcg total) by Each Nostril route 2 (two) times daily as needed for Rhinitis. 3/14/23  Yes Rui Monroy DO   gabapentin (NEURONTIN) 800 MG tablet Take 1 tablet (800 mg total) by mouth every 8 (eight) hours. 12/21/22  Yes [facility]   glipiZIDE (GLUCOTROL) 10 MG tablet Take 10 mg by mouth. 1/19/23  Yes Historical Provider   hydrALAZINE (APRESOLINE) 100 MG tablet Take 1 tablet (100 mg total) by mouth every 8 (eight) hours. 2/17/23 2/17/24 Yes Ignacia Jindia, DO   insulin syringe-needle U-100 1 mL 28 gauge x 1/2" Syrg USE TO inject insulin TWICE DAILY 4/6/22  Yes Historical Provider   levoFLOXacin (LEVAQUIN) 750 MG tablet Take 1 tablet (750 mg total) by mouth once daily. for 5 days 3/14/23 3/19/23 Yes Rui " Porfirio,    loratadine (CLARITIN) 10 mg tablet Take 10 mg by mouth. 10/26/22 7/23/23 Yes Historical Provider   multivitamin (THERAGRAN) per tablet Take 1 tablet by mouth once daily.   Yes Historical Provider   naproxen (NAPROSYN) 500 MG tablet Take 500 mg by mouth 2 (two) times daily. 11/7/22  Yes Historical Provider   nebivoloL (BYSTOLIC) 10 MG Tab Take 10 mg by mouth once daily.   Yes Historical Provider   NIFEdipine (PROCARDIA-XL) 90 MG (OSM) 24 hr tablet Take 1 tablet (90 mg total) by mouth once daily. 2/17/23 2/17/24 Yes Ignacia Lopez,    nitroGLYCERIN (NITROSTAT) 0.4 MG SL tablet Place 0.4 mg under the tongue every 5 (five) minutes as needed for Chest pain.   Yes Historical Provider   NOVOLOG FLEXPEN U-100 INSULIN 100 unit/mL (3 mL) InPn pen Inject 20 Units into the skin 3 (three) times daily with meals. 12/23/22  Yes [facility]   nystatin (MYCOSTATIN) cream Apply topically 3 (three) times daily. Use under breast 10/26/22  Yes Historical Provider   ondansetron (ZOFRAN) 4 MG tablet Take 1 tablet (4 mg total) by mouth 2 (two) times daily. 2/17/23  Yes Ignacia Lopez DO   ONETOUCH DELICA PLUS LANCET 33 gauge Misc Apply topically 4 (four) times daily. 10/27/22  Yes Historical Provider   ONETOUCH VERIO TEST STRIPS Strp 4 (four) times daily. 12/19/22  Yes Historical Provider   pantoprazole (PROTONIX) 40 MG tablet Take 1 tablet (40 mg total) by mouth once daily. 2/11/19  Yes Leandra Monreal NP   polyethylene glycol (GLYCOLAX) 17 gram/dose powder Take 17 g by mouth. 4/20/22  Yes Historical Provider   sertraline (ZOLOFT) 50 MG tablet Take 50 mg by mouth once daily.   Yes Historical Provider   sucralfate (CARAFATE) 1 gram tablet TAKE 1 TABLET(1 GRAM) BY MOUTH FOUR TIMES DAILY BEFORE MEALS AND AT NIGHT  Patient taking differently: Take 1 g by mouth 4 (four) times daily before meals and nightly. 2/11/19  Yes Leandra Monreal NP   terconazole (TERAZOL 7) 0.4 % Crea Place 1 applicator vaginally every  "evening. 23  Yes Historical Provider   valsartan (DIOVAN) 160 MG tablet Take 1 tablet (160 mg total) by mouth once daily. 23 Yes Ignacia Lopez, DO   empty container (SHARPS CONTAINER) [facility] 22   Historical Provider   HYDROcodone-acetaminophen (NORCO) 7.5-325 mg per tablet Take 1 tablet by mouth 3 (three) times daily as needed for Pain.  3/11/19   Historical Provider   magnesium citrate solution Take 296 mLs by mouth daily as needed. 22   Historical Provider   naloxone (NARCAN) 4 mg/actuation Spry SMARTSI Spray(s) Both Nares PRN 10/12/22   Historical Provider       ROS:  Review of Systems   Constitutional:  Negative for chills and fever.   Respiratory:  Negative for shortness of breath and wheezing.    Cardiovascular:  Negative for chest pain and palpitations.   Gastrointestinal:  Negative for abdominal pain, diarrhea, nausea and vomiting.         OBJECTIVE:     Vital signs:   BP (!) 145/89 (BP Location: Left arm, Patient Position: Sitting, BP Method: Large (Automatic))   Pulse 81   Temp 98.3 °F (36.8 °C) (Oral)   Resp 18   Ht 5' 6" (1.676 m)   Wt 114 kg (251 lb 6.4 oz)   BMI 40.58 kg/m²      Physical Examination:  General: Patient resting comfortably in chair, in no acute distress , obese  Eye: PERRLA, EOMI, clear conjunctiva, eyelids normal  HENT: Head-normocephalic and atraumatic  Neck: full range of motion, no thyromegaly or lymphadenopathy, trachea midline, supple, no palpable thyroid nodules  Respiratory: clear to auscultation bilaterally without wheezes, rales, rhonchi  Cardiovascular: regular rate and rhythm without murmurs.  No gallops or rubs no JVD.  Capillary refill within normal limits.  Gastrointestinal: soft, non-tender, non-distended with normal bowel sounds, without masses to palpation.  Genitourinary: no CVA tenderness to palpation  Musculoskeletal: full range of motion of all extremities/spine without limitation or discomfort  Integumentary: no rashes or skin " lesions present  Neurologic: no signs of peripheral neurological deficit, motor/sensory function intact  Psychiatric:  alert and oriented, cognitive function intact, cooperative with exam, good eye contact, judgement and insight intact, mood and affect full range.     Labs:  CMP:   Lab Results   Component Value Date    GLUCOSE 133 (H) 03/13/2023    CALCIUM 9.6 03/13/2023    ALBUMIN 3.2 (L) 03/13/2023    PROT 8.6 (H) 09/30/2021     03/13/2023    K 4.1 03/13/2023    CO2 27 03/13/2023    CL 98 09/30/2021    BUN 14.3 03/13/2023    CREATININE 1.11 (H) 03/13/2023    ALKPHOS 84 03/13/2023    ALT 23 03/13/2023    AST 19 03/13/2023    BILITOT 0.3 03/13/2023      CBC:   Lab Results   Component Value Date    WBC 8.6 03/13/2023    HGB 13.7 03/13/2023    HCT 42.6 03/13/2023    MCV 88.9 03/13/2023    RDW 15.4 03/13/2023         ASSESSMENT & PLAN:     Right hip pain, to undergo a L4-L5 Transforaminal LESI  -Will be undergoing IV sedation to receive an epidural for her R L4-L5 transforaminal LESI  -Reports not taking aspirin every day, however, counseled on holding aspirin for 7 days prior to procedure. Also counseled on holding any NSAIds 7 days prior to procedure  -No labwork necessary prior to procedure  -Patient has hx of T2DM A1C 8.0, CKD stage II, HTN, questionable CHF.  -Patient is medically stable and low risk to to receive IV sedation prior to receiving epidural, a low risk procedure.      Ricardo Arambula DO  [facility], PGY-1

## 2023-03-20 NOTE — PROGRESS NOTES
Faculty Attestation: Elba Barnes  was seen in [facility] Medicine Clinic. Patient seen and evaluated at the time of the visit. History of Present Illness, Physical Exam, and Assessment and Plan reviewed. Treatment plan is reasonable and appropriate. Compliance with treatment recommendations is important.  Radiology images independently reviewed and agree with resident interpretation.  Procedure note reviewed. Present for entire procedure with the resident. Patient tolerated procedure well.      Eder Starks MD

## 2023-04-10 PROBLEM — E11.9 ENCOUNTER FOR DIABETIC FOOT EXAM: Status: ACTIVE | Noted: 2022-12-21

## 2023-04-25 ENCOUNTER — HOSPITAL ENCOUNTER (EMERGENCY)
Facility: HOSPITAL | Age: 63
Discharge: HOME | End: 2023-04-25
Attending: STUDENT IN AN ORGANIZED HEALTH CARE EDUCATION/TRAINING PROGRAM
Payer: COMMERCIAL

## 2023-04-25 VITALS
HEIGHT: 66 IN | OXYGEN SATURATION: 95 % | HEART RATE: 93 BPM | BODY MASS INDEX: 40.39 KG/M2 | TEMPERATURE: 98 F | DIASTOLIC BLOOD PRESSURE: 89 MMHG | SYSTOLIC BLOOD PRESSURE: 148 MMHG | RESPIRATION RATE: 18 BRPM | WEIGHT: 251.31 LBS

## 2023-04-25 DIAGNOSIS — R51.9 CHRONIC NONINTRACTABLE HEADACHE, UNSPECIFIED HEADACHE TYPE: ICD-10-CM

## 2023-04-25 DIAGNOSIS — M25.511 ACUTE PAIN OF RIGHT SHOULDER: Primary | ICD-10-CM

## 2023-04-25 DIAGNOSIS — G89.29 CHRONIC NONINTRACTABLE HEADACHE, UNSPECIFIED HEADACHE TYPE: ICD-10-CM

## 2023-04-25 LAB
ALBUMIN SERPL-MCNC: 3.3 G/DL (ref 3.4–4.8)
ALBUMIN/GLOB SERPL: 0.8 RATIO (ref 1.1–2)
ALP SERPL-CCNC: 77 UNIT/L (ref 40–150)
ALT SERPL-CCNC: 15 UNIT/L (ref 0–55)
AST SERPL-CCNC: 15 UNIT/L (ref 5–34)
BASOPHILS # BLD AUTO: 0.07 X10(3)/MCL (ref 0–0.2)
BASOPHILS NFR BLD AUTO: 0.8 %
BILIRUBIN DIRECT+TOT PNL SERPL-MCNC: 0.2 MG/DL
BUN SERPL-MCNC: 16.3 MG/DL (ref 9.8–20.1)
CALCIUM SERPL-MCNC: 9.1 MG/DL (ref 8.4–10.2)
CHLORIDE SERPL-SCNC: 107 MMOL/L (ref 98–107)
CK SERPL-CCNC: 307 U/L (ref 29–168)
CO2 SERPL-SCNC: 25 MMOL/L (ref 23–31)
CREAT SERPL-MCNC: 0.96 MG/DL (ref 0.55–1.02)
EOSINOPHIL # BLD AUTO: 0.12 X10(3)/MCL (ref 0–0.9)
EOSINOPHIL NFR BLD AUTO: 1.4 %
ERYTHROCYTE [DISTWIDTH] IN BLOOD BY AUTOMATED COUNT: 14.8 % (ref 11.5–17)
FLUAV AG UPPER RESP QL IA.RAPID: NOT DETECTED
FLUBV AG UPPER RESP QL IA.RAPID: NOT DETECTED
GFR SERPLBLD CREATININE-BSD FMLA CKD-EPI: >60 MLS/MIN/1.73/M2
GLOBULIN SER-MCNC: 4.2 GM/DL (ref 2.4–3.5)
GLUCOSE SERPL-MCNC: 254 MG/DL (ref 82–115)
HCT VFR BLD AUTO: 45.6 % (ref 37–47)
HGB BLD-MCNC: 14.8 G/DL (ref 12–16)
IMM GRANULOCYTES # BLD AUTO: 0.05 X10(3)/MCL (ref 0–0.04)
IMM GRANULOCYTES NFR BLD AUTO: 0.6 %
LYMPHOCYTES # BLD AUTO: 2.98 X10(3)/MCL (ref 0.6–4.6)
LYMPHOCYTES NFR BLD AUTO: 33.8 %
MAGNESIUM SERPL-MCNC: 1.9 MG/DL (ref 1.6–2.6)
MCH RBC QN AUTO: 28.3 PG (ref 27–31)
MCHC RBC AUTO-ENTMCNC: 32.5 G/DL (ref 33–36)
MCV RBC AUTO: 87.2 FL (ref 80–94)
MONOCYTES # BLD AUTO: 0.43 X10(3)/MCL (ref 0.1–1.3)
MONOCYTES NFR BLD AUTO: 4.9 %
NEUTROPHILS # BLD AUTO: 5.16 X10(3)/MCL (ref 2.1–9.2)
NEUTROPHILS NFR BLD AUTO: 58.5 %
NRBC BLD AUTO-RTO: 0 %
PHOSPHATE SERPL-MCNC: 3 MG/DL (ref 2.3–4.7)
PLATELET # BLD AUTO: 387 X10(3)/MCL (ref 130–400)
PMV BLD AUTO: 9.6 FL (ref 7.4–10.4)
POTASSIUM SERPL-SCNC: 4.2 MMOL/L (ref 3.5–5.1)
PROT SERPL-MCNC: 7.5 GM/DL (ref 5.8–7.6)
RBC # BLD AUTO: 5.23 X10(6)/MCL (ref 4.2–5.4)
RSV A 5' UTR RNA NPH QL NAA+PROBE: NOT DETECTED
SARS-COV-2 RNA RESP QL NAA+PROBE: NOT DETECTED
SODIUM SERPL-SCNC: 141 MMOL/L (ref 136–145)
TSH SERPL-ACNC: 0.64 UIU/ML (ref 0.35–4.94)
WBC # SPEC AUTO: 8.8 X10(3)/MCL (ref 4.5–11.5)

## 2023-04-25 PROCEDURE — 93005 ELECTROCARDIOGRAM TRACING: CPT

## 2023-04-25 PROCEDURE — 85025 COMPLETE CBC W/AUTO DIFF WBC: CPT | Performed by: STUDENT IN AN ORGANIZED HEALTH CARE EDUCATION/TRAINING PROGRAM

## 2023-04-25 PROCEDURE — 82550 ASSAY OF CK (CPK): CPT | Performed by: STUDENT IN AN ORGANIZED HEALTH CARE EDUCATION/TRAINING PROGRAM

## 2023-04-25 PROCEDURE — 84100 ASSAY OF PHOSPHORUS: CPT | Performed by: STUDENT IN AN ORGANIZED HEALTH CARE EDUCATION/TRAINING PROGRAM

## 2023-04-25 PROCEDURE — 96374 THER/PROPH/DIAG INJ IV PUSH: CPT

## 2023-04-25 PROCEDURE — 0241U COVID/RSV/FLU A&B PCR: CPT | Performed by: STUDENT IN AN ORGANIZED HEALTH CARE EDUCATION/TRAINING PROGRAM

## 2023-04-25 PROCEDURE — 63600175 PHARM REV CODE 636 W HCPCS: Performed by: STUDENT IN AN ORGANIZED HEALTH CARE EDUCATION/TRAINING PROGRAM

## 2023-04-25 PROCEDURE — 84443 ASSAY THYROID STIM HORMONE: CPT | Performed by: STUDENT IN AN ORGANIZED HEALTH CARE EDUCATION/TRAINING PROGRAM

## 2023-04-25 PROCEDURE — 25000003 PHARM REV CODE 250: Performed by: STUDENT IN AN ORGANIZED HEALTH CARE EDUCATION/TRAINING PROGRAM

## 2023-04-25 PROCEDURE — 80053 COMPREHEN METABOLIC PANEL: CPT | Performed by: STUDENT IN AN ORGANIZED HEALTH CARE EDUCATION/TRAINING PROGRAM

## 2023-04-25 PROCEDURE — 99285 EMERGENCY DEPT VISIT HI MDM: CPT | Mod: 25

## 2023-04-25 PROCEDURE — 83735 ASSAY OF MAGNESIUM: CPT | Performed by: STUDENT IN AN ORGANIZED HEALTH CARE EDUCATION/TRAINING PROGRAM

## 2023-04-25 RX ORDER — KETOROLAC TROMETHAMINE 30 MG/ML
15 INJECTION, SOLUTION INTRAMUSCULAR; INTRAVENOUS
Status: COMPLETED | OUTPATIENT
Start: 2023-04-25 | End: 2023-04-25

## 2023-04-25 RX ORDER — ACETAMINOPHEN 500 MG
1000 TABLET ORAL
Status: COMPLETED | OUTPATIENT
Start: 2023-04-25 | End: 2023-04-25

## 2023-04-25 RX ORDER — HYDROCODONE BITARTRATE AND ACETAMINOPHEN 5; 325 MG/1; MG/1
1 TABLET ORAL EVERY 6 HOURS PRN
Qty: 8 TABLET | Refills: 0 | Status: ON HOLD | OUTPATIENT
Start: 2023-04-25 | End: 2023-05-11 | Stop reason: HOSPADM

## 2023-04-25 RX ADMIN — KETOROLAC TROMETHAMINE 15 MG: 30 INJECTION, SOLUTION INTRAMUSCULAR; INTRAVENOUS at 06:04

## 2023-04-25 RX ADMIN — ACETAMINOPHEN 1000 MG: 500 TABLET ORAL at 07:04

## 2023-04-25 NOTE — ED PROVIDER NOTES
"Encounter Date: 2023       History     Chief Complaint   Patient presents with    Chest Pain     Chest pain that started at 6am and right leg pain x2 weeks.      62-year-old female presents to ED for right shoulder pain.  States this has been ongoing for several days.  Reports as generalized shoulder discomfort.  Nonlocalized, no trauma, reports intact distal strength and sensation.  Hx of previous brain surgery.  States she intermittently gets headaches and will have paresthesias of the right arm and leg.  No associated weakness.  States this has been going on for months.  Unchanged.  Requesting medication for relief of the shoulder pain.  No swelling, no skin changes, no other complaints or concerns at this time.    Review of patient's allergies indicates:   Allergen Reactions    Pcn [penicillins] Anaphylaxis     Past Medical History:   Diagnosis Date    Anxiety     Arthritis     CVA (cerebral vascular accident)     "mini stroke"    Depression     Diabetes mellitus     Heart problem     History of psychiatric hospitalization     three(,  and another (years ago")): depression    HTN (hypertension)     Hx of psychiatric care     Hypertension     Pacemaker     Psychiatric exam requested by authority     Psychiatric problem     TBI (traumatic brain injury)     Therapy      Past Surgical History:   Procedure Laterality Date    CARDIAC PACEMAKER PLACEMENT      CARDIAC PACEMAKER REMOVAL      CHOLECYSTECTOMY      HYSTERECTOMY       Family History   Problem Relation Age of Onset    Schizophrenia Mother     Bipolar disorder Mother     Bipolar disorder Father      Social History     Tobacco Use    Smoking status: Every Day     Packs/day: 0.50     Years: 40.00     Pack years: 20.00     Types: Cigarettes     Last attempt to quit: 2018     Years since quittin.6    Smokeless tobacco: Never   Substance Use Topics    Alcohol use: Not Currently     Comment: wine on occ    Drug use: " No     Review of Systems   Constitutional:  Negative for chills, diaphoresis and fever.   HENT:  Negative for congestion, rhinorrhea, sinus pain and sore throat.    Eyes:  Negative for pain, discharge and itching.   Respiratory:  Negative for cough, chest tightness and shortness of breath.    Cardiovascular:  Negative for chest pain and palpitations.   Gastrointestinal:  Negative for abdominal pain, nausea and vomiting.   Genitourinary:  Negative for dysuria, flank pain and hematuria.   Musculoskeletal:  Positive for arthralgias. Negative for back pain and myalgias.        R shoulder pain   Skin:  Negative for color change and rash.   Neurological:  Positive for numbness and headaches. Negative for dizziness and weakness.   Psychiatric/Behavioral:  Negative for confusion. The patient is not hyperactive.      Physical Exam     Initial Vitals [04/25/23 1427]   BP Pulse Resp Temp SpO2   (!) 148/89 93 18 97.9 °F (36.6 °C) 95 %      MAP       --         Physical Exam    Vitals reviewed.  Constitutional: She appears well-developed and well-nourished. She is not diaphoretic. No distress.   Uses a rolling walker in no acute distress.  Playing cell phone games.  Happy pleasant.   HENT:   Head: Normocephalic and atraumatic.   Eyes: Conjunctivae and EOM are normal. Pupils are equal, round, and reactive to light.   Neck: Neck supple. No tracheal deviation present.   Normal range of motion.  Cardiovascular:  Normal rate, regular rhythm, normal heart sounds and intact distal pulses.           Pulmonary/Chest: Breath sounds normal. No respiratory distress.   Abdominal: Abdomen is soft. There is no abdominal tenderness. There is no rebound and no guarding.   Musculoskeletal:         General: Normal range of motion.        Arms:       Cervical back: Normal range of motion and neck supple.     Neurological: She is alert and oriented to person, place, and time. She has normal strength. GCS score is 15. GCS eye subscore is 4. GCS  verbal subscore is 5. GCS motor subscore is 6.   Has baseline weakness of the right leg from prior stroke 2 years ago.  Sensation intact in all dermatomes on the right side.  Neurovascularly intact distally.   Skin: Skin is warm and dry. Capillary refill takes less than 2 seconds. No rash noted.   Psychiatric: She has a normal mood and affect. Her behavior is normal. Judgment and thought content normal.       ED Course   Procedures  Labs Reviewed   COMPREHENSIVE METABOLIC PANEL - Abnormal; Notable for the following components:       Result Value    Glucose Level 254 (*)     Albumin Level 3.3 (*)     Globulin 4.2 (*)     Albumin/Globulin Ratio 0.8 (*)     All other components within normal limits   CK - Abnormal; Notable for the following components:    Creatine Kinase 307 (*)     All other components within normal limits   CBC WITH DIFFERENTIAL - Abnormal; Notable for the following components:    MCHC 32.5 (*)     IG# 0.05 (*)     All other components within normal limits   COVID/RSV/FLU A&B PCR - Normal    Narrative:     The Xpert Xpress SARS-CoV-2/FLU/RSV plus is a rapid, multiplexed real-time PCR test intended for the simultaneous qualitative detection and differentiation of SARS-CoV-2, Influenza A, Influenza B, and respiratory syncytial virus (RSV) viral RNA in either nasopharyngeal swab or nasal swab specimens.         TSH - Normal   MAGNESIUM - Normal   PHOSPHORUS - Normal   CBC W/ AUTO DIFFERENTIAL    Narrative:     The following orders were created for panel order CBC auto differential.  Procedure                               Abnormality         Status                     ---------                               -----------         ------                     CBC with Differential[[id number]]        Abnormal            Final result                 Please view results for these tests on the individual orders.   EXTRA TUBES    Narrative:     The following orders were created for panel order EXTRA  TUBES.  Procedure                               Abnormality         Status                     ---------                               -----------         ------                     Gold Top Hold[[id number]]                                    In process                   Please view results for these tests on the individual orders.   GOLD TOP HOLD     EKG Readings: (Independently Interpreted)   Initial Reading: No STEMI. Rhythm: Normal Sinus Rhythm. Ectopy: No Ectopy. Axis: Left Axis Deviation. Clinical Impression: Normal Sinus Rhythm Other Impression: LVH   ECG Results              EKG 12-lead (Chest Pain) Age >30 (Final result)  Result time 04/25/23 21:43:57      Final result by Interface, Lab In [facility] (04/25/23 21:43:57)                   Narrative:    Test Reason : R07.9,    Vent. Rate : 090 BPM     Atrial Rate : 090 BPM     P-R Int : 136 ms          QRS Dur : 096 ms      QT Int : 392 ms       P-R-T Axes : 065 -23 091 degrees     QTc Int : 479 ms    Normal sinus rhythm  LVH with repolarization abnormality  Abnormal ECG  Confirmed by Osmany Robbins MD ([id number]) on 4/25/2023 9:43:48 PM    Referred By: [email or web address]   SELF           Confirmed By:Osmany Robbins MD                                  Imaging Results              CT Head Without Contrast (Final result)  Result time 04/25/23 16:58:01      Final result by Letha Foster MD (04/25/23 16:58:01)                   Impression:      Chronic age-related changes.  No acute process      Electronically signed by: Letha Foster  Date:    04/25/2023  Time:    16:58               Narrative:    EXAMINATION:  CT HEAD WITHOUT CONTRAST    CLINICAL HISTORY:  Right-sided paresthesias;    TECHNIQUE:  Multiple axial images were obtained from the base of the brain to the vertex without contrast administration.  Sagittal and coronal reconstructions were performed..Automatic exposure control is utilized to reduce patient radiation exposure.    COMPARISON:  None    FINDINGS:  There is  no intracranial mass or lesion seen.  No hemorrhage is seen.  There is old punctate area of lacunar infarct in the basal ganglia on the right.  A no acute infarct is seen.  There is some cerebral atrophy seen.  There is some compensatory ventricular dilatation and periventricular white matter changes consistent with the patient's age.  Calvarium is intact.  The posterior fossa is unremarkable..  The visualized portions of the paranasal sinuses show no acute abnormality.                                       X-Ray Chest AP Portable (Final result)  Result time 04/25/23 16:41:31      Final result by Pham Whipple MD (04/25/23 16:41:31)                   Impression:      No acute cardiopulmonary abnormality.      Electronically signed by: Pham Whipple  Date:    04/25/2023  Time:    16:41               Narrative:    EXAMINATION:  XR CHEST AP PORTABLE    CLINICAL HISTORY:  Chest pain, unspecified    TECHNIQUE:  Single frontal view of the chest was performed.    COMPARISON:  03/13/2023    FINDINGS:  LINES AND TUBES: None    MEDIASTINUM AND ELVIE: The cardiac silhouette is normal.    LUNGS: No lobar consolidation. No edema.    PLEURA:No pleural effusion. No pneumothorax.    BONES: Old rib deformities.                                       X-Ray Shoulder Complete 2 View Right (Final result)  Result time 04/25/23 16:38:12      Final result by Pham Whipple MD (04/25/23 16:38:12)                   Impression:      No acute osseous abnormality.      Electronically signed by: Pham Whipple  Date:    04/25/2023  Time:    16:38               Narrative:    EXAMINATION:  XR SHOULDER COMPLETE 2 OR MORE VIEWS RIGHT    CLINICAL HISTORY:  Pain, unspecified    TECHNIQUE:  Three views of the right shoulder were performed.    COMPARISON  None    FINDINGS:  BONES: No shoulder fracture.  No dislocation.  Chronic right rib deformities.  Degenerative change at the acromioclavicular joint.    SOFT TISSUES:  Regional soft  tissues are normal.                                       Medications   ketorolac injection 15 mg (15 mg Intravenous Given 4/25/23 1845)   acetaminophen tablet 1,000 mg (1,000 mg Oral Given 4/25/23 1900)     Medical Decision Making:   History:   Old Medical Records: I decided to obtain old medical records.  Initial Assessment:   Acute right shoulder pain with chronic right-sided paresthesias  Differential Diagnosis:   Shoulder strain versus sprain  Trauma  Dislocation  Strokes  Paresthesias  Vascular occlusion  Clinical Tests:   Lab Tests: Reviewed and Ordered  Radiological Study: Ordered and Reviewed  Medical Tests: Reviewed and Ordered  ED Management:  Patient in no acute distress.  Large female who ambulates herself using a walker.  Bilateral shoulders symmetric.  No evidence of trauma with normal skin.  Has generalized muscular tenderness to palpation of the right shoulder region.  Neurovascularly intact distally.  Clinically no evidence of dislocation.  X-ray of shoulder and chest unremarkable acute.  Patient is reporting intermittent paresthesias of the right extremities for months with hx of prior stroke with baseline R LE deficits.  No new neuro deficits today. No sensation deficits appreciated on exam. Patient's CT head was negative acute.  Has evidence of hyperglycemia at 250.  Remaining labs grossly unremarkable.  Very mild CPK elevation but not past rhabdomyolysis threshold.  Remaining workup was grossly unremarkable.  Will provide short course p.r.n. pain medications and is to follow up closely in the outpatient setting.  Provided strict return precautions.  Ultimately stable for discharge and released. (Michael)           ED Course as of 05/01/23 1345   Tue Apr 25, 2023   1847 Patient was comfortably sitting up playing a card game on her phone.  No acute distress.  Her vitals are stable. [RZ]   Wed Apr 26, 2023   0157 Patient reportedly a very hard stick per nursing staff.  Two phlebotomist and 5 nurses  attempted without success.  Was able once the patient got a room had labs drawn courtesy of prakash [COLLEEN]      ED Course User Index  [RZ] Markel Rodriguez MD                 Clinical Impression:   Final diagnoses:  [M25.511] Acute pain of right shoulder (Primary)  [R51.9, G89.29] Chronic nonintractable headache, unspecified headache type        ED Disposition Condition    Discharge Stable          ED Prescriptions       Medication Sig Dispense Start Date End Date Auth. Provider    HYDROcodone-acetaminophen (NORCO) 5-325 mg per tablet Take 1 tablet by mouth every 6 (six) hours as needed for Pain. 8 tablet 4/25/2023 -- Markel Rodriguez MD          Follow-up Information       Follow up With Specialties Details Why Contact Info    [facility] Nurse Practitioner Schedule an appointment as soon as possible for a visit in 3 days  [address] [facility]  Internal Medicine Clinic  [facility] [address]  [phone]      [facility] Emergency Medicine  As needed, If symptoms worsen [address] [facility] [id number] [phone]    Orthopedics  Schedule an appointment as soon as possible for a visit in 1 week               Markel Rodriguez MD  05/01/23 8610

## 2023-05-09 ENCOUNTER — HOSPITAL ENCOUNTER (OUTPATIENT)
Facility: HOSPITAL | Age: 63
Discharge: PSYCHIATRIC OR SUBSTANCE USE FACILITY | End: 2023-05-11
Attending: FAMILY MEDICINE | Admitting: INTERNAL MEDICINE
Payer: MEDICARE

## 2023-05-09 DIAGNOSIS — R41.82 AMS (ALTERED MENTAL STATUS): ICD-10-CM

## 2023-05-09 DIAGNOSIS — T50.902A INTENTIONAL DRUG OVERDOSE, INITIAL ENCOUNTER: Primary | ICD-10-CM

## 2023-05-09 DIAGNOSIS — R07.9 CHEST PAIN: ICD-10-CM

## 2023-05-09 DIAGNOSIS — I21.4 NSTEMI (NON-ST ELEVATED MYOCARDIAL INFARCTION): ICD-10-CM

## 2023-05-09 DIAGNOSIS — J18.9 PNEUMONIA DUE TO INFECTIOUS ORGANISM, UNSPECIFIED LATERALITY, UNSPECIFIED PART OF LUNG: ICD-10-CM

## 2023-05-09 PROBLEM — T50.901A INGESTION OF UNKNOWN MEDICATION: Status: ACTIVE | Noted: 2023-05-09

## 2023-05-09 PROBLEM — T50.901A INGESTION OF UNKNOWN MEDICATION: Status: RESOLVED | Noted: 2023-05-09 | Resolved: 2023-05-09

## 2023-05-09 LAB
ALBUMIN SERPL-MCNC: 2.9 G/DL (ref 3.4–4.8)
ALBUMIN/GLOB SERPL: 0.7 RATIO (ref 1.1–2)
ALP SERPL-CCNC: 62 UNIT/L (ref 40–150)
ALT SERPL-CCNC: 17 UNIT/L (ref 0–55)
AMMONIA PLAS-MSCNC: 33.1 UMOL/L (ref 18–72)
APAP SERPL-MCNC: <17.4 UG/ML (ref 17.4–30)
AST SERPL-CCNC: 22 UNIT/L (ref 5–34)
BASOPHILS # BLD AUTO: 0.04 X10(3)/MCL
BASOPHILS NFR BLD AUTO: 0.5 %
BILIRUBIN DIRECT+TOT PNL SERPL-MCNC: 0.2 MG/DL
BUN SERPL-MCNC: 14.3 MG/DL (ref 9.8–20.1)
CALCIUM SERPL-MCNC: 8.9 MG/DL (ref 8.4–10.2)
CHLORIDE SERPL-SCNC: 108 MMOL/L (ref 98–107)
CO2 SERPL-SCNC: 25 MMOL/L (ref 23–31)
CREAT SERPL-MCNC: 0.89 MG/DL (ref 0.55–1.02)
EOSINOPHIL # BLD AUTO: 0.19 X10(3)/MCL (ref 0–0.9)
EOSINOPHIL NFR BLD AUTO: 2.2 %
ERYTHROCYTE [DISTWIDTH] IN BLOOD BY AUTOMATED COUNT: 15 % (ref 11.5–17)
ETHANOL SERPL-MCNC: <10 MG/DL
GFR SERPLBLD CREATININE-BSD FMLA CKD-EPI: >60 MLS/MIN/1.73/M2
GLOBULIN SER-MCNC: 4.3 GM/DL (ref 2.4–3.5)
GLUCOSE SERPL-MCNC: 95 MG/DL (ref 82–115)
HCT VFR BLD AUTO: 43.7 % (ref 37–47)
HGB BLD-MCNC: 13.8 G/DL (ref 12–16)
IMM GRANULOCYTES # BLD AUTO: 0.03 X10(3)/MCL (ref 0–0.04)
IMM GRANULOCYTES NFR BLD AUTO: 0.3 %
LACTATE SERPL-SCNC: 1 MMOL/L (ref 0.5–2.2)
LYMPHOCYTES # BLD AUTO: 2.6 X10(3)/MCL (ref 0.6–4.6)
LYMPHOCYTES NFR BLD AUTO: 29.9 %
MAGNESIUM SERPL-MCNC: 2.1 MG/DL (ref 1.6–2.6)
MCH RBC QN AUTO: 27.8 PG (ref 27–31)
MCHC RBC AUTO-ENTMCNC: 31.6 G/DL (ref 33–36)
MCV RBC AUTO: 87.9 FL (ref 80–94)
MONOCYTES # BLD AUTO: 0.39 X10(3)/MCL (ref 0.1–1.3)
MONOCYTES NFR BLD AUTO: 4.5 %
NEUTROPHILS # BLD AUTO: 5.46 X10(3)/MCL (ref 2.1–9.2)
NEUTROPHILS NFR BLD AUTO: 62.6 %
NRBC BLD AUTO-RTO: 0 %
PHOSPHATE SERPL-MCNC: 3.9 MG/DL (ref 2.3–4.7)
PLATELET # BLD AUTO: 363 X10(3)/MCL (ref 130–400)
PMV BLD AUTO: 9.3 FL (ref 7.4–10.4)
POTASSIUM SERPL-SCNC: 3.7 MMOL/L (ref 3.5–5.1)
PROT SERPL-MCNC: 7.2 GM/DL (ref 5.8–7.6)
RBC # BLD AUTO: 4.97 X10(6)/MCL (ref 4.2–5.4)
SALICYLATES SERPL-MCNC: <5 MG/DL
SODIUM SERPL-SCNC: 142 MMOL/L (ref 136–145)
TROPONIN I SERPL-MCNC: 0.06 NG/ML (ref 0–0.04)
TSH SERPL-ACNC: 0.65 UIU/ML (ref 0.35–4.94)
WBC # SPEC AUTO: 8.71 X10(3)/MCL (ref 4.5–11.5)

## 2023-05-09 PROCEDURE — 84443 ASSAY THYROID STIM HORMONE: CPT | Performed by: FAMILY MEDICINE

## 2023-05-09 PROCEDURE — 82077 ASSAY SPEC XCP UR&BREATH IA: CPT | Performed by: FAMILY MEDICINE

## 2023-05-09 PROCEDURE — 83735 ASSAY OF MAGNESIUM: CPT | Performed by: FAMILY MEDICINE

## 2023-05-09 PROCEDURE — 80053 COMPREHEN METABOLIC PANEL: CPT | Performed by: FAMILY MEDICINE

## 2023-05-09 PROCEDURE — 84484 ASSAY OF TROPONIN QUANT: CPT | Performed by: FAMILY MEDICINE

## 2023-05-09 PROCEDURE — 99285 EMERGENCY DEPT VISIT HI MDM: CPT | Mod: 25

## 2023-05-09 PROCEDURE — 96365 THER/PROPH/DIAG IV INF INIT: CPT

## 2023-05-09 PROCEDURE — 85025 COMPLETE CBC W/AUTO DIFF WBC: CPT | Performed by: FAMILY MEDICINE

## 2023-05-09 PROCEDURE — 82140 ASSAY OF AMMONIA: CPT | Performed by: FAMILY MEDICINE

## 2023-05-09 PROCEDURE — 25000003 PHARM REV CODE 250: Performed by: FAMILY MEDICINE

## 2023-05-09 PROCEDURE — G0378 HOSPITAL OBSERVATION PER HR: HCPCS

## 2023-05-09 PROCEDURE — 80179 DRUG ASSAY SALICYLATE: CPT | Performed by: FAMILY MEDICINE

## 2023-05-09 PROCEDURE — 84100 ASSAY OF PHOSPHORUS: CPT | Performed by: FAMILY MEDICINE

## 2023-05-09 PROCEDURE — 93005 ELECTROCARDIOGRAM TRACING: CPT

## 2023-05-09 PROCEDURE — 85610 PROTHROMBIN TIME: CPT | Performed by: FAMILY MEDICINE

## 2023-05-09 PROCEDURE — 83605 ASSAY OF LACTIC ACID: CPT | Performed by: FAMILY MEDICINE

## 2023-05-09 PROCEDURE — 63600175 PHARM REV CODE 636 W HCPCS: Performed by: FAMILY MEDICINE

## 2023-05-09 PROCEDURE — 80143 DRUG ASSAY ACETAMINOPHEN: CPT | Performed by: FAMILY MEDICINE

## 2023-05-09 RX ORDER — HYDRALAZINE HYDROCHLORIDE 50 MG/1
100 TABLET, FILM COATED ORAL EVERY 8 HOURS
Status: DISCONTINUED | OUTPATIENT
Start: 2023-05-10 | End: 2023-05-11 | Stop reason: HOSPADM

## 2023-05-09 RX ORDER — POLYETHYLENE GLYCOL 3350 17 G/17G
17 POWDER, FOR SOLUTION ORAL DAILY
Status: DISCONTINUED | OUTPATIENT
Start: 2023-05-10 | End: 2023-05-11 | Stop reason: HOSPADM

## 2023-05-09 RX ORDER — LEVOFLOXACIN 5 MG/ML
750 INJECTION, SOLUTION INTRAVENOUS
Status: COMPLETED | OUTPATIENT
Start: 2023-05-09 | End: 2023-05-09

## 2023-05-09 RX ORDER — DEXTROSE 40 %
15 GEL (GRAM) ORAL
Status: DISCONTINUED | OUTPATIENT
Start: 2023-05-10 | End: 2023-05-11 | Stop reason: HOSPADM

## 2023-05-09 RX ORDER — ENOXAPARIN SODIUM 100 MG/ML
40 INJECTION SUBCUTANEOUS EVERY 24 HOURS
Status: DISCONTINUED | OUTPATIENT
Start: 2023-05-10 | End: 2023-05-10

## 2023-05-09 RX ORDER — FLUTICASONE FUROATE AND VILANTEROL 200; 25 UG/1; UG/1
1 POWDER RESPIRATORY (INHALATION) DAILY
Status: DISCONTINUED | OUTPATIENT
Start: 2023-05-10 | End: 2023-05-11 | Stop reason: HOSPADM

## 2023-05-09 RX ORDER — ATORVASTATIN CALCIUM 40 MG/1
40 TABLET, FILM COATED ORAL NIGHTLY
Status: DISCONTINUED | OUTPATIENT
Start: 2023-05-10 | End: 2023-05-11 | Stop reason: HOSPADM

## 2023-05-09 RX ORDER — ASPIRIN 81 MG/1
81 TABLET ORAL DAILY
Status: DISCONTINUED | OUTPATIENT
Start: 2023-05-10 | End: 2023-05-11 | Stop reason: HOSPADM

## 2023-05-09 RX ORDER — NIFEDIPINE 30 MG/1
90 TABLET, EXTENDED RELEASE ORAL DAILY
Status: DISCONTINUED | OUTPATIENT
Start: 2023-05-10 | End: 2023-05-11 | Stop reason: HOSPADM

## 2023-05-09 RX ORDER — GLUCAGON 1 MG
1 KIT INJECTION
Status: DISCONTINUED | OUTPATIENT
Start: 2023-05-10 | End: 2023-05-11 | Stop reason: HOSPADM

## 2023-05-09 RX ORDER — NALOXONE HCL 0.4 MG/ML
0.02 VIAL (ML) INJECTION
Status: DISCONTINUED | OUTPATIENT
Start: 2023-05-10 | End: 2023-05-11 | Stop reason: HOSPADM

## 2023-05-09 RX ORDER — ASPIRIN 325 MG
325 TABLET ORAL
Status: COMPLETED | OUTPATIENT
Start: 2023-05-09 | End: 2023-05-09

## 2023-05-09 RX ORDER — SODIUM CHLORIDE 0.9 % (FLUSH) 0.9 %
10 SYRINGE (ML) INJECTION EVERY 12 HOURS PRN
Status: DISCONTINUED | OUTPATIENT
Start: 2023-05-10 | End: 2023-05-11 | Stop reason: HOSPADM

## 2023-05-09 RX ORDER — INSULIN ASPART 100 [IU]/ML
0-15 INJECTION, SOLUTION INTRAVENOUS; SUBCUTANEOUS
Status: DISCONTINUED | OUTPATIENT
Start: 2023-05-10 | End: 2023-05-11 | Stop reason: HOSPADM

## 2023-05-09 RX ORDER — VALSARTAN 80 MG/1
160 TABLET ORAL DAILY
Status: DISCONTINUED | OUTPATIENT
Start: 2023-05-10 | End: 2023-05-10

## 2023-05-09 RX ORDER — DOCUSATE SODIUM 250 MG
250 CAPSULE ORAL 2 TIMES DAILY
Status: DISCONTINUED | OUTPATIENT
Start: 2023-05-10 | End: 2023-05-09

## 2023-05-09 RX ORDER — DEXTROSE 40 %
30 GEL (GRAM) ORAL
Status: DISCONTINUED | OUTPATIENT
Start: 2023-05-10 | End: 2023-05-11 | Stop reason: HOSPADM

## 2023-05-09 RX ADMIN — LEVOFLOXACIN 750 MG: 750 INJECTION, SOLUTION INTRAVENOUS at 09:05

## 2023-05-09 RX ADMIN — SODIUM CHLORIDE 1000 ML: 9 INJECTION, SOLUTION INTRAVENOUS at 09:05

## 2023-05-09 RX ADMIN — ASPIRIN 325 MG ORAL TABLET 325 MG: 325 PILL ORAL at 09:05

## 2023-05-10 LAB
ALBUMIN SERPL-MCNC: 2.7 G/DL (ref 3.4–4.8)
ALBUMIN/GLOB SERPL: 0.6 RATIO (ref 1.1–2)
ALP SERPL-CCNC: 59 UNIT/L (ref 40–150)
ALT SERPL-CCNC: 17 UNIT/L (ref 0–55)
AMPHET UR QL SCN: NEGATIVE
AST SERPL-CCNC: 19 UNIT/L (ref 5–34)
BARBITURATE SCN PRESENT UR: POSITIVE
BASOPHILS # BLD AUTO: 0.03 X10(3)/MCL
BASOPHILS NFR BLD AUTO: 0.4 %
BENZODIAZ UR QL SCN: POSITIVE
BILIRUBIN DIRECT+TOT PNL SERPL-MCNC: 0.3 MG/DL
BUN SERPL-MCNC: 15 MG/DL (ref 9.8–20.1)
CALCIUM SERPL-MCNC: 8.6 MG/DL (ref 8.4–10.2)
CANNABINOIDS UR QL SCN: NEGATIVE
CHLORIDE SERPL-SCNC: 108 MMOL/L (ref 98–107)
CO2 SERPL-SCNC: 26 MMOL/L (ref 23–31)
COCAINE UR QL SCN: NEGATIVE
CREAT SERPL-MCNC: 1 MG/DL (ref 0.55–1.02)
EOSINOPHIL # BLD AUTO: 0.22 X10(3)/MCL (ref 0–0.9)
EOSINOPHIL NFR BLD AUTO: 2.7 %
ERYTHROCYTE [DISTWIDTH] IN BLOOD BY AUTOMATED COUNT: 15 % (ref 11.5–17)
FENTANYL UR QL SCN: NEGATIVE
GFR SERPLBLD CREATININE-BSD FMLA CKD-EPI: >60 MLS/MIN/1.73/M2
GLOBULIN SER-MCNC: 4.2 GM/DL (ref 2.4–3.5)
GLUCOSE SERPL-MCNC: 97 MG/DL (ref 82–115)
HCT VFR BLD AUTO: 43 % (ref 37–47)
HGB BLD-MCNC: 13.4 G/DL (ref 12–16)
IMM GRANULOCYTES # BLD AUTO: 0.04 X10(3)/MCL (ref 0–0.04)
IMM GRANULOCYTES NFR BLD AUTO: 0.5 %
LYMPHOCYTES # BLD AUTO: 2.66 X10(3)/MCL (ref 0.6–4.6)
LYMPHOCYTES NFR BLD AUTO: 33 %
MCH RBC QN AUTO: 28.1 PG (ref 27–31)
MCHC RBC AUTO-ENTMCNC: 31.2 G/DL (ref 33–36)
MCV RBC AUTO: 90.1 FL (ref 80–94)
MDMA UR QL SCN: NEGATIVE
MONOCYTES # BLD AUTO: 0.43 X10(3)/MCL (ref 0.1–1.3)
MONOCYTES NFR BLD AUTO: 5.3 %
NEUTROPHILS # BLD AUTO: 4.67 X10(3)/MCL (ref 2.1–9.2)
NEUTROPHILS NFR BLD AUTO: 58.1 %
NRBC BLD AUTO-RTO: 0 %
OPIATES UR QL SCN: NEGATIVE
PCP UR QL: NEGATIVE
PH UR: 5.5 [PH] (ref 3–11)
PLATELET # BLD AUTO: 331 X10(3)/MCL (ref 130–400)
PMV BLD AUTO: 9.4 FL (ref 7.4–10.4)
POCT GLUCOSE: 120 MG/DL (ref 70–110)
POCT GLUCOSE: 145 MG/DL (ref 70–110)
POCT GLUCOSE: 184 MG/DL (ref 70–110)
POCT GLUCOSE: 94 MG/DL (ref 70–110)
POTASSIUM SERPL-SCNC: 3.9 MMOL/L (ref 3.5–5.1)
PROT SERPL-MCNC: 6.9 GM/DL (ref 5.8–7.6)
RBC # BLD AUTO: 4.77 X10(6)/MCL (ref 4.2–5.4)
SODIUM SERPL-SCNC: 143 MMOL/L (ref 136–145)
TROPONIN I SERPL-MCNC: 0.06 NG/ML (ref 0–0.04)
WBC # SPEC AUTO: 8.05 X10(3)/MCL (ref 4.5–11.5)

## 2023-05-10 PROCEDURE — 99223 1ST HOSP IP/OBS HIGH 75: CPT | Mod: ,,, | Performed by: STUDENT IN AN ORGANIZED HEALTH CARE EDUCATION/TRAINING PROGRAM

## 2023-05-10 PROCEDURE — 84484 ASSAY OF TROPONIN QUANT: CPT | Performed by: STUDENT IN AN ORGANIZED HEALTH CARE EDUCATION/TRAINING PROGRAM

## 2023-05-10 PROCEDURE — 96367 TX/PROPH/DG ADDL SEQ IV INF: CPT

## 2023-05-10 PROCEDURE — 25000003 PHARM REV CODE 250

## 2023-05-10 PROCEDURE — 25000003 PHARM REV CODE 250: Performed by: STUDENT IN AN ORGANIZED HEALTH CARE EDUCATION/TRAINING PROGRAM

## 2023-05-10 PROCEDURE — G0378 HOSPITAL OBSERVATION PER HR: HCPCS

## 2023-05-10 PROCEDURE — 96372 THER/PROPH/DIAG INJ SC/IM: CPT | Performed by: STUDENT IN AN ORGANIZED HEALTH CARE EDUCATION/TRAINING PROGRAM

## 2023-05-10 PROCEDURE — 93005 ELECTROCARDIOGRAM TRACING: CPT

## 2023-05-10 PROCEDURE — 96372 THER/PROPH/DIAG INJ SC/IM: CPT | Performed by: INTERNAL MEDICINE

## 2023-05-10 PROCEDURE — 80053 COMPREHEN METABOLIC PANEL: CPT | Performed by: STUDENT IN AN ORGANIZED HEALTH CARE EDUCATION/TRAINING PROGRAM

## 2023-05-10 PROCEDURE — 99223 PR INITIAL HOSPITAL CARE,LEVL III: ICD-10-PCS | Mod: ,,, | Performed by: STUDENT IN AN ORGANIZED HEALTH CARE EDUCATION/TRAINING PROGRAM

## 2023-05-10 PROCEDURE — 63600175 PHARM REV CODE 636 W HCPCS: Performed by: STUDENT IN AN ORGANIZED HEALTH CARE EDUCATION/TRAINING PROGRAM

## 2023-05-10 PROCEDURE — 97162 PT EVAL MOD COMPLEX 30 MIN: CPT

## 2023-05-10 PROCEDURE — 27000221 HC OXYGEN, UP TO 24 HOURS

## 2023-05-10 PROCEDURE — 80307 DRUG TEST PRSMV CHEM ANLYZR: CPT | Performed by: STUDENT IN AN ORGANIZED HEALTH CARE EDUCATION/TRAINING PROGRAM

## 2023-05-10 PROCEDURE — 96366 THER/PROPH/DIAG IV INF ADDON: CPT

## 2023-05-10 PROCEDURE — 82962 GLUCOSE BLOOD TEST: CPT

## 2023-05-10 PROCEDURE — 96361 HYDRATE IV INFUSION ADD-ON: CPT

## 2023-05-10 PROCEDURE — 97166 OT EVAL MOD COMPLEX 45 MIN: CPT

## 2023-05-10 PROCEDURE — 85025 COMPLETE CBC W/AUTO DIFF WBC: CPT | Performed by: STUDENT IN AN ORGANIZED HEALTH CARE EDUCATION/TRAINING PROGRAM

## 2023-05-10 PROCEDURE — 99900035 HC TECH TIME PER 15 MIN (STAT)

## 2023-05-10 PROCEDURE — S0030 INJECTION, METRONIDAZOLE: HCPCS | Performed by: STUDENT IN AN ORGANIZED HEALTH CARE EDUCATION/TRAINING PROGRAM

## 2023-05-10 PROCEDURE — 63600175 PHARM REV CODE 636 W HCPCS: Performed by: INTERNAL MEDICINE

## 2023-05-10 PROCEDURE — 94761 N-INVAS EAR/PLS OXIMETRY MLT: CPT

## 2023-05-10 RX ORDER — METRONIDAZOLE 500 MG/1
500 TABLET ORAL EVERY 8 HOURS
Status: DISCONTINUED | OUTPATIENT
Start: 2023-05-10 | End: 2023-05-10

## 2023-05-10 RX ORDER — LEVOFLOXACIN 5 MG/ML
750 INJECTION, SOLUTION INTRAVENOUS
Status: DISCONTINUED | OUTPATIENT
Start: 2023-05-10 | End: 2023-05-10

## 2023-05-10 RX ORDER — DULOXETIN HYDROCHLORIDE 30 MG/1
30 CAPSULE, DELAYED RELEASE ORAL NIGHTLY
Status: DISCONTINUED | OUTPATIENT
Start: 2023-05-10 | End: 2023-05-11 | Stop reason: HOSPADM

## 2023-05-10 RX ORDER — AMLODIPINE BESYLATE 2.5 MG/1
2.5 TABLET ORAL DAILY
Status: DISCONTINUED | OUTPATIENT
Start: 2023-05-10 | End: 2023-05-11 | Stop reason: HOSPADM

## 2023-05-10 RX ORDER — SODIUM CHLORIDE, SODIUM LACTATE, POTASSIUM CHLORIDE, CALCIUM CHLORIDE 600; 310; 30; 20 MG/100ML; MG/100ML; MG/100ML; MG/100ML
INJECTION, SOLUTION INTRAVENOUS CONTINUOUS
Status: DISCONTINUED | OUTPATIENT
Start: 2023-05-10 | End: 2023-05-11 | Stop reason: HOSPADM

## 2023-05-10 RX ORDER — DOXYCYCLINE HYCLATE 100 MG
100 TABLET ORAL 2 TIMES DAILY WITH MEALS
Status: DISCONTINUED | OUTPATIENT
Start: 2023-05-10 | End: 2023-05-10

## 2023-05-10 RX ORDER — ENOXAPARIN SODIUM 100 MG/ML
40 INJECTION SUBCUTANEOUS EVERY 12 HOURS
Status: DISCONTINUED | OUTPATIENT
Start: 2023-05-10 | End: 2023-05-11 | Stop reason: HOSPADM

## 2023-05-10 RX ORDER — HYDROXYZINE PAMOATE 25 MG/1
25 CAPSULE ORAL ONCE
Status: COMPLETED | OUTPATIENT
Start: 2023-05-10 | End: 2023-05-10

## 2023-05-10 RX ORDER — ALPRAZOLAM 0.5 MG/1
0.5 TABLET ORAL NIGHTLY PRN
Status: DISCONTINUED | OUTPATIENT
Start: 2023-05-10 | End: 2023-05-11 | Stop reason: HOSPADM

## 2023-05-10 RX ORDER — ACETAMINOPHEN 325 MG/1
650 TABLET ORAL EVERY 6 HOURS PRN
Status: DISCONTINUED | OUTPATIENT
Start: 2023-05-10 | End: 2023-05-11 | Stop reason: HOSPADM

## 2023-05-10 RX ORDER — LEVOFLOXACIN 5 MG/ML
750 INJECTION, SOLUTION INTRAVENOUS
Status: DISCONTINUED | OUTPATIENT
Start: 2023-05-10 | End: 2023-05-11

## 2023-05-10 RX ORDER — METRONIDAZOLE 500 MG/100ML
500 INJECTION, SOLUTION INTRAVENOUS
Status: DISCONTINUED | OUTPATIENT
Start: 2023-05-10 | End: 2023-05-10

## 2023-05-10 RX ADMIN — SODIUM CHLORIDE, POTASSIUM CHLORIDE, SODIUM LACTATE AND CALCIUM CHLORIDE: 600; 310; 30; 20 INJECTION, SOLUTION INTRAVENOUS at 02:05

## 2023-05-10 RX ADMIN — HYDROXYZINE PAMOATE 25 MG: 25 CAPSULE ORAL at 08:05

## 2023-05-10 RX ADMIN — DULOXETINE 30 MG: 30 CAPSULE, DELAYED RELEASE ORAL at 08:05

## 2023-05-10 RX ADMIN — ASPIRIN 81 MG: 81 TABLET, COATED ORAL at 09:05

## 2023-05-10 RX ADMIN — HYDRALAZINE HYDROCHLORIDE 100 MG: 50 TABLET ORAL at 10:05

## 2023-05-10 RX ADMIN — INSULIN ASPART 3 UNITS: 100 INJECTION, SOLUTION INTRAVENOUS; SUBCUTANEOUS at 03:05

## 2023-05-10 RX ADMIN — HYDRALAZINE HYDROCHLORIDE 100 MG: 50 TABLET ORAL at 06:05

## 2023-05-10 RX ADMIN — METRONIDAZOLE 500 MG: 500 INJECTION, SOLUTION INTRAVENOUS at 03:05

## 2023-05-10 RX ADMIN — ACETAMINOPHEN 650 MG: 325 TABLET ORAL at 08:05

## 2023-05-10 RX ADMIN — POLYETHYLENE GLYCOL 3350 17 G: 17 POWDER, FOR SOLUTION ORAL at 09:05

## 2023-05-10 RX ADMIN — ATORVASTATIN CALCIUM 40 MG: 40 TABLET, FILM COATED ORAL at 08:05

## 2023-05-10 RX ADMIN — AMLODIPINE BESYLATE 2.5 MG: 2.5 TABLET ORAL at 09:05

## 2023-05-10 RX ADMIN — LEVOFLOXACIN 750 MG: 750 INJECTION, SOLUTION INTRAVENOUS at 08:05

## 2023-05-10 RX ADMIN — NIFEDIPINE 90 MG: 30 TABLET, FILM COATED, EXTENDED RELEASE ORAL at 09:05

## 2023-05-10 RX ADMIN — HYDRALAZINE HYDROCHLORIDE 100 MG: 50 TABLET ORAL at 02:05

## 2023-05-10 RX ADMIN — ALPRAZOLAM 0.5 MG: 0.5 TABLET ORAL at 10:05

## 2023-05-10 RX ADMIN — ENOXAPARIN SODIUM 40 MG: 40 INJECTION SUBCUTANEOUS at 08:05

## 2023-05-10 RX ADMIN — ACETAMINOPHEN 650 MG: 325 TABLET ORAL at 02:05

## 2023-05-10 NOTE — PROGRESS NOTES
"[facility] Internal Medicine History & Physical Note     Resident Team: [facility] Medicine List 1  Attending Physician: Loulou Dodge MD  Date of Admit: 5/9/2023    Chief Complaint     Suicidal and Drug Overdose (Not sure what meds were taken.  AAO2)    Subjective:      History of Present Illness:  Elba Barnes is a 62 y.o.  female with a PMH of hypertension, poorly controlled diabetes, traumatic brain injury, CHF, CVA with right sided deficits, GERD, osteoarthritis of multiple sites, tobacco abuse, and obesity was brought to the ED by EMS for intentional drug overdose of current medications on 5/9/2023. Pt states that she took extra medication so she could, "go to sleep". She is "tired of my daughter yelling at me". Does not recall what specific medications she took, but states remembers taking 3 extra pills of each of her medications.  When asked what medication she is gabapentin, then voiced trails off. History is limited secondary to patients clinical presentation. She is very somnolent and hard to arouse. Intermittently answers questions.  Denies currently being in any pain.     Per discussion with ED physician and nursing staff patient did admit to intentionally overdosing on her current medications.  States that she does not want her daughter to be informed of her clinical condition at this time.  Patient was PEC'd by ED physician on 05/09/2023.    On arrival to ED temp 97.2°, HR 88, RR 18, /84, SpO2 99% on 2 L nasal cannula.  Labs were significant for chloride 108, albumin 2.7, troponin 0.061, acetaminophen level less than 17.4.  CT cervical spine with some degenerative changes but no acute fractures appreciated.  CT head without contrast with no acute intracranial hemorrhage/edema.  Chest x-ray with right lower lobe infiltrates.  She was started on levofloxacin by ER physician.  Internal medicine consulted for altered mental status.    Interval History: Patient reports feeling well this morning. NAEON. " "Patient states that she did not overdose on her medications and took her regimen as normal. She denies SI/HI, fever, chills, nausea, vomiting, diarrhea, chest pain, abdominal pain, hallucinations, visual issues, and any other symptoms. Reports that she has had issues with her daughter for a very long time and would never commit suicide.     Past Medical History:  Past Medical History:   Diagnosis Date    Anxiety     Arthritis     CVA (cerebral vascular accident)     "mini stroke"    Depression     Diabetes mellitus     Heart problem     History of psychiatric hospitalization     three(,  and another (years ago")): depression    HTN (hypertension)     Hx of psychiatric care     Hypertension     Pacemaker     Psychiatric exam requested by authority     Psychiatric problem     TBI (traumatic brain injury)     Therapy        Past Surgical History:  Past Surgical History:   Procedure Laterality Date    CARDIAC PACEMAKER PLACEMENT      CARDIAC PACEMAKER REMOVAL      CHOLECYSTECTOMY      HYSTERECTOMY         Family History:  Family History   Problem Relation Age of Onset    Schizophrenia Mother     Bipolar disorder Mother     Bipolar disorder Father        Social History:  Social History     Tobacco Use    Smoking status: Every Day     Packs/day: 0.50     Years: 40.00     Pack years: 20.00     Types: Cigarettes     Last attempt to quit: 2018     Years since quittin.6    Smokeless tobacco: Never   Substance Use Topics    Alcohol use: Not Currently     Comment: wine on occ    Drug use: No       Allergies:  Review of patient's allergies indicates:   Allergen Reactions    Pcn [penicillins] Anaphylaxis       Home Medications:  Prior to Admission medications    Medication Sig Start Date End Date Taking? Authorizing Provider   albuterol (PROVENTIL/VENTOLIN HFA) 90 mcg/actuation inhaler Inhale 1-2 puffs into the lungs every 6 (six) hours as needed for Wheezing. Rescue 23   Ignacia Lopez, DO   ALPRAZolam " "(XANAX) 1 MG tablet Take 1 tablet by mouth once a day as needed as needed for anxiety 12/12/22   Historical Provider   amLODIPine (NORVASC) 2.5 MG tablet Take 2.5 mg by mouth once daily.    Historical Provider   ammonium lactate (LAC-HYDRIN) 12 % lotion Apply to dry skin areas on feet, legs, and elbow areas TWICE A DAY AS NEEDED 1/5/23   Historical Provider   ascorbic acid, vitamin C, 500 mg Chew Take 500 mg by mouth.    Historical Provider   aspirin (ECOTRIN) 81 MG EC tablet Take 1 tablet (81 mg total) by mouth once daily. 9/30/21   Markel Torres MD   atorvastatin (LIPITOR) 40 MG tablet Take 40 mg by mouth every evening. 11/22/22   Historical Provider   b complex vitamins capsule Take 1 capsule by mouth every morning.    Historical Provider   B-complex with vitamin C (Z-BEC OR EQUIV) tablet Take 1 capsule by mouth once daily.    Historical Provider   BASAGLAR KWIKPEN U-100 INSULIN glargine 100 units/mL SubQ pen Inject 80 Units into the skin 2 (two) times daily with meals. 12/23/22   IVRGEN Ho   BD ULTRA-FINE SHORT PEN NEEDLE 31 gauge x 5/16" Ndle SMARTSIG:Injection Twice Daily 10/24/22   Historical Provider   blood-glucose meter Misc CHECK BLOOD GLUCOSE LEVEL TWICE DAILY. 4/6/22   Historical Provider   butalbital-acetaminophen-caffeine -40 mg (FIORICET, ESGIC) -40 mg per tablet Take 1 tablet by mouth every 6 (six) hours as needed for Pain. 2/11/19   Leandra Monreal NP   carvediloL (COREG) 12.5 MG tablet Take 1 tablet (12.5 mg total) by mouth 2 (two) times daily. 2/17/23 2/17/24  Ignacia Lopez DO   cholecalciferol, vitamin D3, (VITAMIN D3) 50 mcg (2,000 unit) Cap capsule Take 1 capsule by mouth. 8/13/22   Historical Provider   cloNIDine (CATAPRES) 0.1 MG tablet Take 0.1 mg by mouth every 6 (six) hours as needed. 8/27/22   Historical Provider   diclofenac sodium (VOLTAREN) 1 % Gel Apply 2 g topically 4 (four) times daily. 12/21/22   VIRGEN Ho   docusate sodium (COLACE) 250 MG " "capsule Take 1 capsule (250 mg total) by mouth 2 (two) times daily. 3/8/23   VIRGEN Ho   DULoxetine (CYMBALTA) 30 MG capsule Take 30 mg by mouth. 22   Historical Provider   empty container (SHARPS CONTAINER) [facility] N/A 22   Historical Provider   esomeprazole (NEXIUM) 40 MG capsule Take 40 mg by mouth every morning. 22   Historical Provider   fluticasone furoate-vilanteroL (BREO ELLIPTA) 200-25 mcg/dose DsDv diskus inhaler Inhale 1 puff into the lungs once daily. Controller 22   VIRGEN Ho   fluticasone propionate (FLONASE) 50 mcg/actuation nasal spray 1 spray (50 mcg total) by Each Nostril route 2 (two) times daily as needed for Rhinitis. 3/14/23   Rui Monroy DO   gabapentin (NEURONTIN) 800 MG tablet Take 1 tablet (800 mg total) by mouth every 8 (eight) hours. 22   VIRGEN Ho   glipiZIDE (GLUCOTROL) 10 MG tablet Take 10 mg by mouth. 23   Historical Provider   hydrALAZINE (APRESOLINE) 100 MG tablet Take 1 tablet (100 mg total) by mouth every 8 (eight) hours. 23  Ignacia Lopez DO   HYDROcodone-acetaminophen (NORCO) 5-325 mg per tablet Take 1 tablet by mouth every 6 (six) hours as needed for Pain. 23   Markel Rodriguez MD   insulin syringe-needle U-100 1 mL 28 gauge x 1/2" Syrg USE TO inject insulin TWICE DAILY 22   Historical Provider   loratadine (CLARITIN) 10 mg tablet Take 10 mg by mouth. 10/26/22 7/23/23  Historical Provider   magnesium citrate solution Take 296 mLs by mouth daily as needed. 22   Historical Provider   multivitamin (THERAGRAN) per tablet Take 1 tablet by mouth once daily.    Historical Provider   naloxone (NARCAN) 4 mg/actuation Spry SMARTSI Spray(s) Both Nares PRN 10/12/22   Historical Provider   naproxen (NAPROSYN) 500 MG tablet Take 500 mg by mouth 2 (two) times daily. 22   Historical Provider   nebivoloL (BYSTOLIC) 10 MG Tab Take 10 mg by mouth once daily.    Historical Provider   NIFEdipine " (PROCARDIA-XL) 90 MG (OSM) 24 hr tablet Take 1 tablet (90 mg total) by mouth once daily. 23  Ignacia Lopez DO   nitroGLYCERIN (NITROSTAT) 0.4 MG SL tablet Place 0.4 mg under the tongue every 5 (five) minutes as needed for Chest pain.    Historical Provider   NOVOLOG FLEXPEN U-100 INSULIN 100 unit/mL (3 mL) InPn pen Inject 20 Units into the skin 3 (three) times daily with meals. DISCARD OPEN PEN AFTER 28 DAYS 3/22/23   VIRGEN Ho   nystatin (MYCOSTATIN) cream Apply topically 3 (three) times daily. Use under breast 10/26/22   Historical Provider   ondansetron (ZOFRAN) 4 MG tablet Take 1 tablet (4 mg total) by mouth 2 (two) times daily. 23   Ignacia Lopez DO   ONETOUCH DELICA PLUS LANCET 33 gauge Misc Apply topically 4 (four) times daily. 10/27/22   Historical Provider   ONETOUCH VERIO TEST STRIPS Strp 4 (four) times daily. 22   Historical Provider   pantoprazole (PROTONIX) 40 MG tablet Take 1 tablet (40 mg total) by mouth once daily. 19   Leandra Monreal NP   polyethylene glycol (GLYCOLAX) 17 gram/dose powder Take 17 g by mouth. 22   Historical Provider   sertraline (ZOLOFT) 50 MG tablet Take 50 mg by mouth once daily.    Historical Provider   sucralfate (CARAFATE) 1 gram tablet TAKE 1 TABLET(1 GRAM) BY MOUTH FOUR TIMES DAILY BEFORE MEALS AND AT NIGHT  Patient taking differently: Take 1 g by mouth 4 (four) times daily before meals and nightly. 19   Leandra Monreal NP   terconazole (TERAZOL 7) 0.4 % Crea Place 1 applicator vaginally every evening. 23   Historical Provider   valsartan (DIOVAN) 160 MG tablet Take 1 tablet (160 mg total) by mouth once daily. 23  Ignacia Lopez DO       Review of Systems:  See HPI unable to complete secondary to patient's clinical condition.    Objective:   Last 24 Hour Vital Signs:  BP  Min: 96/64  Max: 156/85  Temp  Av.1 °F (36.7 °C)  Min: 97.2 °F (36.2 °C)  Max: 99 °F (37.2 °C)  Pulse  Av.5  Min: 60  Max:  "92  Resp  Av.3  Min: 13  Max: 22  SpO2  Av.8 %  Min: 93 %  Max: 99 %  Height  Av' 6" (167.6 cm)  Min: 5' 6" (167.6 cm)  Max: 5' 6" (167.6 cm)  Weight  Av.6 kg (241 lb 10.5 oz)  Min: 104.3 kg (230 lb)  Max: 114.9 kg (253 lb 4.9 oz)  Body mass index is 40.89 kg/m².  I/O last 3 completed shifts:  In: 660.4 [I.V.:560.4; IV Piggyback:100]  Out: -     Physical Examination:  General: in no acute distress, somnolent, in and out of sleep. Answers trail off into a mumble  Eye: clear conjunctiva for brief period held open, eyelids normal  HENT: oropharynx without erythema or exudate, oropharynx and nasal mucosal surfaces moist  Neck: full range of motion, no lymphadenopathy  Respiratory: clear to auscultation bilaterally. No wheezes, rhonchi, or rales.  Cardiovascular: regular rate and rhythm. S1/S2 present. No murmurs, gallops or rubs appreciated  Gastrointestinal: soft, non-tender, non-distended with normal bowel sounds  Musculoskeletal: full range of motion of all extremities and spine without limitation or discomfort  Extremities: No peripheral edema  Neurologic: oriented to self, time, and place. At baseline. Normal judgement and behavior.     Laboratory:  Most Recent Data:  CBC:   Lab Results   Component Value Date    WBC 8.05 05/10/2023    HGB 13.4 05/10/2023    HCT 43.0 05/10/2023     05/10/2023    MCV 90.1 05/10/2023    RDW 15.0 05/10/2023     WBC Differential:   Recent Labs   Lab 23  1942 05/10/23  0119   WBC 8.71 8.05   HGB 13.8 13.4   HCT 43.7 43.0    331   MCV 87.9 90.1     BMP:   Lab Results   Component Value Date     05/10/2023    K 3.9 05/10/2023    CL 98 2021    CO2 26 05/10/2023    BUN 15.0 05/10/2023    CREATININE 1.00 05/10/2023     (HH) 2021    CALCIUM 8.6 05/10/2023    MG 2.10 2023    PHOS 3.9 2023     LFTs:   Lab Results   Component Value Date    PROT 8.6 (H) 2021    ALBUMIN 2.7 (L) 05/10/2023    BILITOT 0.3 05/10/2023    " AST 19 05/10/2023    ALKPHOS 59 05/10/2023    ALT 17 05/10/2023     Coags:   Lab Results   Component Value Date    INR 0.98 05/09/2023    PROTIME 12.8 05/09/2023    PTT 60.6 02/14/2023     FLP:   Lab Results   Component Value Date    CHOL 173 02/14/2023    HDL 31 (L) 02/14/2023    TRIG 283 (H) 02/14/2023     DM:   Lab Results   Component Value Date    HGBA1C 8.7 (H) 02/09/2023    HGBA1C 10.5 (H) 10/18/2022    HGBA1C 11.5 (H) 03/23/2022    CREATININE 1.00 05/10/2023     Thyroid:   Lab Results   Component Value Date    TSH 0.646 05/09/2023      Anemia: No results found for: IRON, TIBC, FERRITIN, SATURATEDIRO  No results found for: [id number]  No results found for: FOLATE     Cardiac:   Lab Results   Component Value Date    TROPONINI 0.064 (H) 05/10/2023    BNP 23.5 03/13/2023     Urinalysis:   Lab Results   Component Value Date    LABURIN >/= 100,000 colonies/ml Escherichia coli (A) 02/09/2023    COLORU Yellow 09/30/2021    PHUA 6.0 02/15/2023    SPECGRAV 1.015 09/30/2021    NITRITE Negative 09/30/2021    KETONESU Negative 09/30/2021    UROBILINOGEN Normal 02/15/2023    WBCUA 0-5 02/15/2023       Trended Lab Data:  Recent Labs   Lab 05/09/23  1942 05/09/23  2021 05/10/23  0119   WBC 8.71  --  8.05   HGB 13.8  --  13.4   HCT 43.7  --  43.0     --  331   MCV 87.9  --  90.1   RDW 15.0  --  15.0   NA  --  142 143   K  --  3.7 3.9   CO2  --  25 26   BUN  --  14.3 15.0   CREATININE  --  0.89 1.00   ALBUMIN  --  2.9* 2.7*   BILITOT  --  0.2 0.3   AST  --  22 19   ALKPHOS  --  62 59   ALT  --  17 17       Trended Cardiac Data:  Recent Labs   Lab 05/09/23 1942 05/10/23  0119   TROPONINI 0.061* 0.064*       Microbiology Data:  Microbiology Results (last 7 days)       Procedure Component Value Units Date/Time    Respiratory Culture [[id number]]     Order Status: Sent Specimen: Sputum, Expectorated              Other Results:  EKG (my interpretation):   EKG with no ST or T-wave changes noted.  Her QTC is prolonged at  499.      Radiology:  Imaging Results              CT Cervical Spine Without Contrast (Final result)  Result time 05/10/23 07:39:28      Final result by Davon Thakkar MD (05/10/23 07:39:28)                   Impression:    Impression:    1. No acute cervical spine fracture dislocation or subluxation is seen.    2. Degenerative changes and other details as above.    No significant discrepancy with overnight report.      Electronically signed by: Davon Thakkar  Date:    05/10/2023  Time:    07:39               Narrative:      TECHNIQUE:CT OF THE CERVICAL SPINE WAS PERFORMED WITHOUT INTRAVENOUS CONTRAST WITH AXIAL AS WELL AS SAGITTAL AND CORONAL IMAGES.    COMPARISON:NONE.    DOSAGE INFORMATION:AUTOMATED EXPOSURE CONTROL WAS UTILIZED.      CLINICAL HISTORY:AMS.    Findings:    Lung apices:Small emphysematous paraseptal bullae are seen at the lung apices.    Spine:    Mineralization:Within normal limits for age.    Scoliosis:No significant scoliosis is seen.    Vertebral Fusion:No vertebral fusion is identified.    Listhesis:No significant listhesis is identified.    Lordosis:The cervical lordosis is maintained.    Intervertebral disc spaces:The intervertebral discs are preserved throughout.    Osteophytes:Large anterior osteophytes are seen off the opposing endplates at C5-C6 and C6-C7.    Endplate Sclerosis:No significant endplate sclerosis is seen.    Uncovertebral degenerative changes:No significant uncovertebral degenerative changes are seen.    Facet degenerative changes:No significant facet degenerative changes are seen.    Fractures:No acute cervical spine fracture dislocation or subluxation is seen.    This exam does not exclude the possibility intrathecal soft tissue, ligamentous or vascular injury.                        Preliminary result by Davon Thakkar MD (05/09/23 21:22:12)                   Narrative:    START OF REPORT:  TECHNIQUE: CT OF THE CERVICAL SPINE WAS PERFORMED WITHOUT INTRAVENOUS  CONTRAST WITH AXIAL AS WELL AS SAGITTAL AND CORONAL IMAGES.    COMPARISON: NONE.    DOSAGE INFORMATION: AUTOMATED EXPOSURE CONTROL WAS UTILIZED.    CLINICAL HISTORY: AMS.    Findings:  Lung apices: Small emphysematous paraseptal bullae are seen at the lung apices.  Spine:  Spinal canal: The spinal canal appears unremarkable.  Spinal cord: The spinal cord appears unremarkable.  Mineralization: Within normal limits for age.  Scoliosis: No significant scoliosis is seen.  Vertebral Fusion: No vertebral fusion is identified.  Listhesis: No significant listhesis is identified.  Lordosis: The cervical lordosis is maintained.  Intervertebral disc spaces: The intervertebral discs are preserved throughout.  Osteophytes: Large anterior osteophytes are seen off the opposing endplates at C5-C6 and C6-C7.  Endplate Sclerosis: No significant endplate sclerosis is seen.  Uncovertebral degenerative changes: No significant uncovertebral degenerative changes are seen.  Facet degenerative changes: No significant facet degenerative changes are seen.  Fractures: No acute cervical spine fracture dislocation or subluxation is seen.      Impression:  1. No acute cervical spine fracture dislocation or subluxation is seen.  2. Degenerative changes and other details as above.                                         CT Head Without Contrast (Final result)  Result time 05/10/23 07:36:05      Final result by Davon Thakkar MD (05/10/23 07:36:05)                   Impression:    Impression:    No acute intracranial process identified. Details and findings as noted above.    No significant discrepancy with overnight report      Electronically signed by: Davon Thakkar  Date:    05/10/2023  Time:    07:36               Narrative:      TECHNIQUE:CT OF THE HEAD WAS PERFORMED WITHOUT INTRAVENOUS CONTRAST WITH AXIAL AS WELL AS CORONAL AND SAGITTAL IMAGES.    COMPARISON:COMPARISON IS WITH STUDY DATED 2023-03-13 21:11:20.    DOSAGE INFORMATION:AUTOMATED  EXPOSURE CONTROL WAS UTILIZED.  DLP 1007    CLINICAL HISTORY:MENTAL STATUS CHANGE, UNKNOWN CAUSE.    Findings:No significant interval change as compared with the prior examination.    Hemorrhage:No acute intracranial hemorrhage is seen.    CSF spaces:The ventricles sulci and basal cisterns are within normal limits for age.    Brain parenchyma:There is no acute large vessel territory infarct.  Chronic microvascular change is seen in portions of the periventricular and deep white matter tracts.    Cerebellum:Unremarkable.    Sella and skull base:The sella appears to be within normal limits for age.    Herniation:None.    Calvarium:No acute linear or depressed skull fracture is seen.    Scalp:There is a stable appearing 1 cm soft tissue density lesion in the right occipital scalp (series 2 image 15). This likely reflects an epidermoid cyst.    Maxillofacial Structures:Again noted is a 4 mm radiodense foreign body in the subcutaneous tissue over the alveolar process of the right maxilla (series 4 image 4).    Paranasal sinuses:Minimal secretions are seen in the left sphenoid sinus.                        Preliminary result by Davon Thakkar MD (05/09/23 21:20:23)                   Narrative:    START OF REPORT:  TECHNIQUE: CT OF THE HEAD WAS PERFORMED WITHOUT INTRAVENOUS CONTRAST WITH AXIAL AS WELL AS CORONAL AND SAGITTAL IMAGES.    COMPARISON: COMPARISON IS WITH STUDY [id number] 21:11:20.    DOSAGE INFORMATION: AUTOMATED EXPOSURE CONTROL WAS UTILIZED.    CLINICAL HISTORY: MENTAL STATUS CHANGE, UNKNOWN CAUSE.    Findings: No significant interval change as compared with the prior examination.  Hemorrhage: No acute intracranial hemorrhage is seen.  CSF spaces: The ventricles sulci and basal cisterns are within normal limits for age.  Brain parenchyma: There is preservation of the grey white junction throughout. Moderate microvascular change is seen in portions of the periventricular and deep white matter  tracts.  Cerebellum: Unremarkable.  Sella and skull base: The sella appears to be within normal limits for age.  Herniation: None.  Intracranial calcifications: Incidental note is made of bilateral choroid plexus calcification. Incidental note is made of some pineal region calcification. Incidental note is made of some calcification of the falx.  Calvarium: No acute linear or depressed skull fracture is seen.  Scalp: There is a stable appearing 1 cm soft tissue density lesion in the right occipital scalp (series 2 image 15). This likely reflects an epidermoid cyst.    Maxillofacial Structures: Again noted is a 4 mm radiodense foreign body in the subcutaneous tissue over the alveolar process of the right maxilla (series 4 image 4).  Paranasal sinuses: Minimal secretions are seen in the left sphenoid sinus.  Orbits: The orbits appear unremarkable.  Zygomatic arches: The zygomatic arches are intact and unremarkable.  Temporal bones and mastoids: The temporal bones and mastoids appear unremarkable.  TMJ: The mandibular condyles appear normally placed with respect to the mandibular fossa.      Impression:  1. No acute intracranial process identified. Details and findings as noted above.                                         X-Ray Chest 1 View (Final result)  Result time 05/09/23 19:54:28      Final result by Kelsie Prescott MD (05/09/23 19:54:28)                   Impression:      Increased patchy opacities in the right lower lung concerning for infection      Electronically signed by: Kelsie Prescott  Date:    05/09/2023  Time:    19:54               Narrative:    EXAMINATION:  XR CHEST 1 VIEW    CLINICAL HISTORY:  AMS;    TECHNIQUE:  AP chest    COMPARISON:  Chest x-ray dated 04/25/2023    FINDINGS:  The heart is stable in size.  There are increased patchy opacities in the right lower lung.  There is no pleural effusion or definite visible pneumothorax.                                      Assessment & Plan:      Altered mental status in the setting of intentional drug overdose  -Unknown ingestion of patient's prescribed medications   -UDS pending   -Neuro checks q4hrs   -PEC'd by ED physician  -Patient with multiple psychiatric admissions, may consider psych consult if deemed necessary  -Living situation with daughter may also have to be explored, given patient admitting that she is the source of her depression. Denies any physical abuse.   -bedside nurse swallow when patient is more alert prior to oral intake  -Psych consult placed; awaiting recs    Aspiration Pneumonia  -CXR with right lung infiltrates, in the setting of her clinical presentation the above is very likely  -Given 1 time dose of Levaquin in the ED  -Patient with severe penicillin allergy and also has QTC of 499  -Usually would start on clindamycin but given her history and findings will empirically start Doxy and Flagyl  -Regiment can be altered per primary team     Elevated troponin  -Trop 0.061 on presentation  -Loaded with ASA in the ED  -No EKG changes, most likely demand ischemia   -Continue to trend cardiac enzymes q6hrs  -If patient becomes symptomatic initiate anticoagulation  -[facility] 12/2022: Coronary dominance 20, left main, left anterior descending, left circumflex and right dominant all patent  -Patient recently admitted on 02/2023 for concern of anginal chest pain cardiology consulted and recommended outpatient medical management with no indication for repeat left heart catheterization.    Diabetes mellitus type 2  -Holding home insulin regiment, placed on sheryl dose ISS  -Once tolerating PO may add basal regiment     Hypertension   CAD  -Resume home meds, per EMR review it appears she is taking Norvasc 2.5 mg, Nifedipine 90 mg, Carvedilol 3.125 BID. Dose of Hydralazine is questionable 25 TID vs 100 BID  -Continue to monitor closely    CODE STATUS: FULL  Access: peripheral   Antibiotics: Given 1x dose of levofloxacin in the ED   Diet: NPO  DVT  Prophylaxis: Lovenox 40 mg SubQ  GI Prophylaxis: none  Fluids:  cc/hr      Disposition: Admitted for AMS in the setting on intentional drug overdose. Patient is protecting airway. Continue to monitor closely. Neuro checks q4hr. On high ISS for now, may resume home basal once she is tolerating oral intake. Psych consulted on patient. Awaiting recs.     Romario Weaver MD  [employer] PGY-1

## 2023-05-10 NOTE — PLAN OF CARE
Problem: Occupational Therapy  Goal: Occupational Therapy Goal  Description: Goals to be met by: d/c     Patient will increase functional independence with ADLs by performing:    LE Dressing with Stand-by Assistance don underwear and pants.    Grooming while standing at sink with Stand-by Assistance.    Toileting from bedside commode with Stand-by Assistance for hygiene and clothing management.     Toilet transfer to bedside commode with Stand-by Assistance with RW .    Outcome: Ongoing, Progressing

## 2023-05-10 NOTE — SUBJECTIVE & OBJECTIVE
"     Patient History               Medical as of 5/10/2023       Past Medical History       Diagnosis Date Comments Source    Anxiety -- -- Provider    Arthritis -- -- Provider    CVA (cerebral vascular accident) -- "mini stroke" Provider    Depression -- -- Provider    Diabetes mellitus -- -- Provider    Heart problem -- -- Provider    History of psychiatric hospitalization -- three(1983, 1995 and another (years ago")): depression Provider    HTN (hypertension) -- -- Provider    Hx of psychiatric care -- -- Provider    Hypertension -- -- Provider    Pacemaker -- -- Provider    Psychiatric exam requested by authority -- -- Provider    Psychiatric problem -- -- Provider    TBI (traumatic brain injury) -- -- Provider    Therapy -- -- Provider              Pertinent Negatives       Diagnosis Date Noted Comments Source    Behavioral problem 09/22/2015 -- Provider    CHF (congestive heart failure) 09/22/2015 -- Provider    COPD (chronic obstructive pulmonary disease) 09/22/2015 -- Provider    Dementia 09/22/2015 -- Provider    Rachel 09/22/2015 -- Provider    Renal disorder 09/22/2015 -- Provider    Seizures 09/22/2015 -- Provider    Self-harming behavior 09/22/2015 -- Provider    Suicide attempt 09/22/2015 -- Provider                          Surgical as of 5/10/2023       Past Surgical History       Procedure Laterality Date Comments Source    CARDIAC PACEMAKER PLACEMENT -- -- -- Provider    CHOLECYSTECTOMY -- -- -- Provider    HYSTERECTOMY -- -- -- Provider    CARDIAC PACEMAKER REMOVAL -- -- -- Provider                          Family as of 5/10/2023       Problem Relation Name Age of Onset Comments Source    Schizophrenia Mother -- -- -- Provider    Bipolar disorder Mother -- -- -- Provider    Bipolar disorder Father -- -- -- Provider                  Tobacco Use as of 5/10/2023       Smoking Status Smoking Start Date Last Attempt to Quit Smoking Frequency    Every Day -- 9/14/2018 0.50 packs/day for 40.00 years " (20.00 pk-yrs)      Smokeless Status Smokeless Type Smokeless Quit Date    Never -- --      Source    --                  Alcohol Use as of 5/10/2023       Alcohol Use Drinks/Week Alcohol/Week Comments Source    Not Currently   -- wine on occ Provider                  Drug Use as of 5/10/2023       Drug Use Types Frequency Comments Source    No -- -- -- Provider                  Sexual Activity as of 5/10/2023       Sexually Active Birth Control Partners Comments Source    Never -- -- -- Provider                  Activities of Daily Living as of 5/10/2023       Activities of Daily Living Question Response Comments Source    Patient feels they ought to cut down on drinking/drug use No -- Provider    Patient annoyed by others criticizing their drinking/drug use No -- Provider    Patient has felt bad or guilty about drinking/drug use No -- Provider    Patient has had a drink/used drugs as an eye opener in the AM No -- Provider                  Social Documentation as of 5/10/2023    ** Merged History Encounter **     Source: Provider               Occupational as of 5/10/2023    None               Socioeconomic as of 5/10/2023       Marital Status Spouse Name Number of Children Years Education Education Level Preferred Language Ethnicity Race Source    Single -- 5 -- -- English Not  or /a Black or  Provider                  Pertinent History       Question Response Comments    Lives with alone --    Place in Birth Order -- --    Lives in home --    Number of Siblings -- --    Raised by biological parents --    Legal Involvement none --    Childhood Trauma -- --    Criminal History of arrest and incarceration once (3 months)     Financial Status other applying for [facility]    Highest Level of Education unfinished highschool 11th grade- stopped secondary to mental illness    Does patient have access to a firearm? No --     Service No --    Primary Leisure Activity other --    Spirituality  "actively participates in organized [employer]          Past Medical History:   Diagnosis Date    Anxiety     Arthritis     CVA (cerebral vascular accident)     "mini stroke"    Depression     Diabetes mellitus     Heart problem     History of psychiatric hospitalization     three(,  and another (years ago")): depression    HTN (hypertension)     Hx of psychiatric care     Hypertension     Pacemaker     Psychiatric exam requested by authority     Psychiatric problem     TBI (traumatic brain injury)     Therapy      Past Surgical History:   Procedure Laterality Date    CARDIAC PACEMAKER PLACEMENT      CARDIAC PACEMAKER REMOVAL      CHOLECYSTECTOMY      HYSTERECTOMY       Family History       Problem Relation (Age of Onset)    Bipolar disorder Mother, Father    Schizophrenia Mother          Tobacco Use    Smoking status: Every Day     Packs/day: 0.50     Years: 40.00     Pack years: 20.00     Types: Cigarettes     Last attempt to quit: 2018     Years since quittin.6    Smokeless tobacco: Never   Substance and Sexual Activity    Alcohol use: Not Currently     Comment: wine on occ    Drug use: No    Sexual activity: Never     Review of patient's allergies indicates:   Allergen Reactions    Pcn [penicillins] Anaphylaxis       No current facility-administered medications on file prior to encounter.     Current Outpatient Medications on File Prior to Encounter   Medication Sig    albuterol (PROVENTIL/VENTOLIN HFA) 90 mcg/actuation inhaler Inhale 1-2 puffs into the lungs every 6 (six) hours as needed for Wheezing. Rescue    ALPRAZolam (XANAX) 1 MG tablet Take 1 tablet by mouth once a day as needed as needed for anxiety    amLODIPine (NORVASC) 2.5 MG tablet Take 2.5 mg by mouth once daily.    ammonium lactate (LAC-HYDRIN) 12 % lotion Apply to dry skin areas on feet, legs, and elbow areas TWICE A DAY AS NEEDED    ascorbic acid, vitamin C, 500 mg Chew Take 500 mg by mouth.    aspirin (ECOTRIN) 81 MG EC " "tablet Take 1 tablet (81 mg total) by mouth once daily.    atorvastatin (LIPITOR) 40 MG tablet Take 40 mg by mouth every evening.    b complex vitamins capsule Take 1 capsule by mouth every morning.    B-complex with vitamin C (Z-BEC OR EQUIV) tablet Take 1 capsule by mouth once daily.    BASAGLAR KWIKPEN U-100 INSULIN glargine 100 units/mL SubQ pen Inject 80 Units into the skin 2 (two) times daily with meals.    BD ULTRA-FINE SHORT PEN NEEDLE 31 gauge x 5/16" Ndle SMARTSIG:Injection Twice Daily    blood-glucose meter Misc CHECK BLOOD GLUCOSE LEVEL TWICE DAILY.    butalbital-acetaminophen-caffeine -40 mg (FIORICET, ESGIC) -40 mg per tablet Take 1 tablet by mouth every 6 (six) hours as needed for Pain.    carvediloL (COREG) 12.5 MG tablet Take 1 tablet (12.5 mg total) by mouth 2 (two) times daily.    cholecalciferol, vitamin D3, (VITAMIN D3) 50 mcg (2,000 unit) Cap capsule Take 1 capsule by mouth.    cloNIDine (CATAPRES) 0.1 MG tablet Take 0.1 mg by mouth every 6 (six) hours as needed.    diclofenac sodium (VOLTAREN) 1 % Gel Apply 2 g topically 4 (four) times daily.    docusate sodium (COLACE) 250 MG capsule Take 1 capsule (250 mg total) by mouth 2 (two) times daily.    DULoxetine (CYMBALTA) 30 MG capsule Take 30 mg by mouth.    empty container (SHARPS CONTAINER) Misc N/A    esomeprazole (NEXIUM) 40 MG capsule Take 40 mg by mouth every morning.    fluticasone furoate-vilanteroL (BREO ELLIPTA) 200-25 mcg/dose DsDv diskus inhaler Inhale 1 puff into the lungs once daily. Controller    fluticasone propionate (FLONASE) 50 mcg/actuation nasal spray 1 spray (50 mcg total) by Each Nostril route 2 (two) times daily as needed for Rhinitis.    gabapentin (NEURONTIN) 800 MG tablet Take 1 tablet (800 mg total) by mouth every 8 (eight) hours.    glipiZIDE (GLUCOTROL) 10 MG tablet Take 10 mg by mouth.    hydrALAZINE (APRESOLINE) 100 MG tablet Take 1 tablet (100 mg total) by mouth every 8 (eight) hours.    " "HYDROcodone-acetaminophen (NORCO) 5-325 mg per tablet Take 1 tablet by mouth every 6 (six) hours as needed for Pain.    insulin syringe-needle U-100 1 mL 28 gauge x 1/2" Syrg USE TO inject insulin TWICE DAILY    loratadine (CLARITIN) 10 mg tablet Take 10 mg by mouth.    magnesium citrate solution Take 296 mLs by mouth daily as needed.    multivitamin (THERAGRAN) per tablet Take 1 tablet by mouth once daily.    naloxone (NARCAN) 4 mg/actuation Spry SMARTSI Spray(s) Both Nares PRN    naproxen (NAPROSYN) 500 MG tablet Take 500 mg by mouth 2 (two) times daily.    nebivoloL (BYSTOLIC) 10 MG Tab Take 10 mg by mouth once daily.    NIFEdipine (PROCARDIA-XL) 90 MG (OSM) 24 hr tablet Take 1 tablet (90 mg total) by mouth once daily.    nitroGLYCERIN (NITROSTAT) 0.4 MG SL tablet Place 0.4 mg under the tongue every 5 (five) minutes as needed for Chest pain.    NOVOLOG FLEXPEN U-100 INSULIN 100 unit/mL (3 mL) InPn pen Inject 20 Units into the skin 3 (three) times daily with meals. DISCARD OPEN PEN AFTER 28 DAYS    nystatin (MYCOSTATIN) cream Apply topically 3 (three) times daily. Use under breast    ondansetron (ZOFRAN) 4 MG tablet Take 1 tablet (4 mg total) by mouth 2 (two) times daily.    ONETOUCH DELICA PLUS LANCET 33 gauge Misc Apply topically 4 (four) times daily.    ONETOUCH VERIO TEST STRIPS Strp 4 (four) times daily.    pantoprazole (PROTONIX) 40 MG tablet Take 1 tablet (40 mg total) by mouth once daily.    polyethylene glycol (GLYCOLAX) 17 gram/dose powder Take 17 g by mouth.    sertraline (ZOLOFT) 50 MG tablet Take 50 mg by mouth once daily.    sucralfate (CARAFATE) 1 gram tablet TAKE 1 TABLET(1 GRAM) BY MOUTH FOUR TIMES DAILY BEFORE MEALS AND AT NIGHT (Patient taking differently: Take 1 g by mouth 4 (four) times daily before meals and nightly.)    terconazole (TERAZOL 7) 0.4 % Crea Place 1 applicator vaginally every evening.    valsartan (DIOVAN) 160 MG tablet Take 1 tablet (160 mg total) by mouth once daily. " "    Psychotherapeutics (From admission, onward)      Start     Stop Route Frequency Ordered    05/10/23 2100  DULoxetine DR capsule 30 mg         -- Oral Nightly 05/10/23 1149          Review of Systems  See HPI above.     Strengths and Liabilities: Strength: Patient has positive support network., Liability: Patient is impulsive., Liability: Patient has poor health., Liability: Patient lacks coping skills.    Objective:     Vital Signs (Most Recent):  Temp: 99 °F (37.2 °C) (05/10/23 1109)  Pulse: 103 (05/10/23 1546)  Resp: 19 (05/10/23 1109)  BP: (!) 148/77 (05/10/23 1546)  SpO2: 100 % (05/10/23 1546) Vital Signs (24h Range):  Temp:  [97.2 °F (36.2 °C)-99 °F (37.2 °C)] 99 °F (37.2 °C)  Pulse:  [] 103  Resp:  [13-22] 19  SpO2:  [93 %-100 %] 100 %  BP: ()/(58-85) 148/77     Height: 5' 6" (167.6 cm)  Weight: 114.9 kg (253 lb 4.9 oz)  Body mass index is 40.89 kg/m².      Intake/Output Summary (Last 24 hours) at 5/10/2023 1705  Last data filed at 5/10/2023 0700  Gross per 24 hour   Intake 660.42 ml   Output --   Net 660.42 ml          Physical Exam     Appearance: age appropriate, obese, dressed in hospital gown  Level of Consciousness: drowsy  Behavior/Cooperation: calm and cooperative  Psychomotor: unremarkable   Speech: normal tone, normal rate, normal pitch, normal volume  Language: english, fluid  Orientation: grossly intact, person, place, day of week, month of year, year  Attention Span/Concentration: intact to interview  Memory: Registers 3/3 objects, recalls 0/3 objects at 5 minutes without cuing  Mood: "sad"  Affect: mood congruent and constricted  Thought Process: linear, goal-directed  Associations: Logical and appropriate  Thought Content: denies SI/HI/paranoia, no delusional ideation volunteered, denies plan or desire for self harm or harm to others  Fund of Knowledge: Aware of current events  Abstraction: proverbs were abstract, similarities were concrete  Insight: limited  Judgment: fair   "   Significant Labs: All pertinent labs within the past 24 hours have been reviewed.    Significant Imaging: I have reviewed all pertinent imaging results/findings within the past 24 hours.

## 2023-05-10 NOTE — PROGRESS NOTES
"Inpatient Nutrition Evaluation    Admit Date: 5/9/2023   Total duration of encounter: 1 day    Nutrition Recommendation/Prescription     Diabetic, Low Na diet  Monitor Weights Weekly   Boost Glucose Control (provides 250 kcal, 14 g protein per serving) if meal intake < 75%    Nutrition Assessment     Chart Review    Reason Seen: continuous nutrition monitoring    Malnutrition Screening Tool Results   Have you recently lost weight without trying?: No  Have you been eating poorly because of a decreased appetite?: Yes   MST Score: 1     Diagnosis:  AMS in setting of intentional drug overdose, Aspiration PNA, Elevated Troponin, DM, HTN, DM    Relevant Medical History: HTN, Poorly controlled DM, TBI, CHF, CVA, GERD, Osteoarthritis, Tobacco abuse, Obesity    Nutrition-Related Medications: LR @ 125 ml/hr, Amlodipine, ASA, Miralax, ISS    Nutrition-Related Labs:  5/10/23 -- Glu 97, K 3.9, BUN 15, Cr 1, Phos 3.9    Diet Order: Diet diabetic Low Sodium  Oral Supplement Order: none  Appetite/Oral Intake: poor/25-50% of meals  Factors Affecting Nutritional Intake: impaired cognitive status/motor control and decreased appetite  Food/Hoahaoism/Cultural Preferences: none reported  Food Allergies: none reported       Wound(s):   none reported    Comments    5/10/23 -- Pt sleeping, awakens to voice; Sitter at bedside reports pt ate <50% of breakfast this am; pt reports just not hungry this am but eating good PTA; no indication of wt loss per MARIA ANTONIA wt hx; will monitor po intake & need for ONS    Anthropometrics    Height: 5' 6" (167.6 cm) Height Method: Stated  Last Weight: 114.9 kg (253 lb 4.9 oz) (05/10/23 0226) Weight Method: Bed Scale  BMI (Calculated): 40.9  BMI Classification: obese grade III (BMI >/=40)     Ideal Body Weight (IBW), Female: 130 lb     % Ideal Body Weight, Female (lb): 194.85 %                             Usual Weight Provided By: EMR weight history    Wt Readings from Last 5 Encounters:   05/10/23 114.9 kg (253 " lb 4.9 oz)   04/25/23 114 kg (251 lb 5.2 oz)   03/17/23 114 kg (251 lb 6.4 oz)   03/17/23 117.9 kg (260 lb)   03/13/23 118.4 kg (261 lb)   01/6/23 115.2 kg    Weight Change(s) Since Admission:  Admit Weight: 104.3 kg (230 lb) (05/09/23 1818)  Stable    Patient Education    Not applicable.    Monitoring & Evaluation     Dietitian will monitor food and beverage intake, weight change, glucose/endocrine profile, and gastrointestinal profile.  Nutrition Risk/Follow-Up: low (follow-up in 5-7 days)  Patients assigned 'low nutrition risk' status do not qualify for a full nutritional assessment but will be monitored and re-evaluated in a 5-7 day time period. Please consult if re-evaluation needed sooner.

## 2023-05-10 NOTE — PT/OT/SLP EVAL
Physical Therapy Evaluation    Patient Name:  Elba Barnes   MRN:  [id number]    Recommendations:     Discharge Recommendations:  nursing facility, skilled   Discharge Equipment Recommendations: bedside commode (RW pending gait assessment)   Barriers to discharge: fall risk, level of skilled assistance required, decreased endurance, decreased caregiver support, limited family support, medical diagnosis, and complicated medical history    Assessment:     Elba Barnes is a 62 y.o. female admitted with a medical diagnosis of Drug overdose, intentional.  She presents with the following impairments/functional limitations:  weakness, gait instability, decreased upper extremity function, decreased ROM, impaired endurance, impaired balance, decreased lower extremity function, impaired coordination, decreased safety awareness, impaired self care skills, pain, impaired functional mobility.    Rehab Prognosis: Good.    Patient would benefit from continued skilled acute PT services to: address above listed impairments/functional limitations; receive patient/caregiver education; reduce fall risk; and maximize independency/safety with functional mobility.    Recent Surgery: * No surgery found *      Plan:     During this hospitalization, patient to be seen 3 x/week to address the identified impairments/functional limitations via gait training, therapeutic activities, therapeutic exercises and progress toward the established goals.    Plan of Care Expires:  06/09/23    Subjective     Communicated with patient's nurse (Siobhan) prior to session.    Patient agreeable to participate in evaluation.     Chief Complaint: pain in R side of body.  Patient/Family Comments/goals: Go home.  Pain/Comfort:  Pain Rating 1: 10/10  Location - Side 1: Right  Location 1:  (R side of body)  Pain Addressed 1: Nurse notified, Reposition, Distraction  Pain Rating Post-Intervention 1: 10/10    Patients cultural, spiritual, Adventist conflicts  given the current situation: no    Social History:  Living Environment: Patient lives with their daughter in a first floor apartment, with no steps, with tub-shower combo.  Functional Level: Prior to admission patient required assistance with ADLs including mobility (bed-transfer-ambulation), eating, dressing, bathing, showering, and toileting.  Equipment at Home :bath bench, rollator  Assistance Upon Discharge:  daughter .    Objective:     Patient found in supine in bed with HOB elevated with telemetry, oxygen, peripheral IV, PureWick  upon PT entry to room.    General Precautions: Standard, fall   Orthopedic Precautions:N/A   Braces: N/A  Respiratory Status: 2 liters/min O2 via nasal cannula    Exams:  Orientation: Patient is oriented to Person, Place, Time, Situation  Commands: Patient follows commands consistently  Sensation:    -     Intact: light/touch bilat lower extremity  Skin Integrity/Edema:      -       Skin integrity: Visible skin intact  -       Edema: None noted B LE's  BILAT UE ROM/strength - defer to OT - see OT note for details  RLE ROM:  WFL AAROM  RLE Strength: Deficits: 2+/5  LLE ROM: WFL  LLE Strength: WFL    (+) tremors R hand    Functional Mobility:    Bed Mobility:  Rolling Right: minimum assistance  Scooting: stand by assistance  Supine to Sit: moderate assistance  Sit to Supine: minimum assistance  with cues for proper technique, UE management, LE management, and trunk management    Transfers:  Sit to Stand: moderate assistance with rolling walker  Bed to Bedside commode: minimum assistance with rolling walker using Stand Pivot  Toilet Transfer: sit<>stand with moderate assistance with rolling walker     Gait:  Not assessed 2* to pt. With c/o fatigue and pain.    Other Mobility:  not assessed    Balance:  Sit  Static: FAIR: Maintains without assist, but unable to take any challenges   Dynamic: FAIR+: Maintains balance through MINIMAL excursions of active trunk motion  Stand  Static:  "POOR+: Needs MINIMAL assist to maintain  Dynamic: POOR+: Needs MIN (minimal ) assist during gait    Patient left in chair position in bed with all lines intact, call button in reach, and nurse present.    Education     Patient was instructed to utilize staff assistance for mobility/transfers.  White board updated regarding patient's safest level of mobility with staff assistance.    Goals     Multidisciplinary Problems       Physical Therapy Goals          Problem: Physical Therapy    Goal Priority Disciplines Outcome Goal Variances Interventions   Physical Therapy Goal     PT, PT/OT      Description: Goals to be met by: Discharge    Pt. Will increase independence with functional mobility by performin. Supine<>sit with SBA.  2. Sit<>stand with SBA.  3. Pt. Will ambulate x 130 feet with RW and SBA.                     History:     Past Medical History:   Diagnosis Date    Anxiety     Arthritis     CVA (cerebral vascular accident)     "mini stroke"    Depression     Diabetes mellitus     Heart problem     History of psychiatric hospitalization     three(,  and another (years ago")): depression    HTN (hypertension)     Hx of psychiatric care     Hypertension     Pacemaker     Psychiatric exam requested by authority     Psychiatric problem     TBI (traumatic brain injury)     Therapy      Past Surgical History:   Procedure Laterality Date    CARDIAC PACEMAKER PLACEMENT      CARDIAC PACEMAKER REMOVAL      CHOLECYSTECTOMY      HYSTERECTOMY       Time Tracking:     PT Received On: 05/10/23  PT Start Time: 1314     PT Stop Time: 1345  PT Total Time (min): 31 min     Billable Minutes: Evaluation 31 minutes    05/10/2023  "

## 2023-05-10 NOTE — HPI
"Per Primary MD:  "Elba Barnes is a 62 y.o.  female with a PMH of hypertension, poorly controlled diabetes, traumatic brain injury, CHF, CVA with right sided deficits, GERD, osteoarthritis of multiple sites, tobacco abuse, and obesity was brought to the ED by EMS for intentional drug overdose of current medications on 5/9/2023. Pt states that she took extra medication so she could, "go to sleep". She is "tired of my daughter yelling at me". Does not recall what specific medications she took, but states remembers taking 3 extra pills of each of her medications.  When asked what medication she is gabapentin, then voiced trails off. History is limited secondary to patients clinical presentation. She is very somnolent and hard to arouse. Intermittently answers questions.  Denies currently being in any pain.      Per discussion with ED physician and nursing staff patient did admit to intentionally overdosing on her current medications.  States that she does not want her daughter to be informed of her clinical condition at this time.  Patient was PEC'd by ED physician on 05/09/2023.     On arrival to ED temp 97.2°, HR 88, RR 18, /84, SpO2 99% on 2 L nasal cannula.  Labs were significant for chloride 108, albumin 2.7, troponin 0.061, acetaminophen level less than 17.4.  CT cervical spine with some degenerative changes but no acute fractures appreciated.  CT head without contrast with no acute intracranial hemorrhage/edema.  Chest x-ray with right lower lobe infiltrates.  She was started on levofloxacin by ER physician.  Internal medicine consulted for altered mental status."    Per Psychiatry MD:   Elba Barnes is a 62 y.o. female with a reported past psychiatric history of "bipolar and schizophrenia", currently presenting with altered mental status.  Psychiatry was consulted for suicide risk assessment.     Pt calm and cooperative with interview.  Pt reports "my daughter" as reason for admission.  Says " "that her daughter "wants me in the hospital."  Pt reports medication ingestion prior to presentation was not a suicide attempt, reports she was taking her nightly medication.  Reports that her medication "always makes me that tired."  Notes that she takes "xanax, a little blue capsule, and my sleeping medicine" nightly.  Also reports gabapentin makes her extremely sleepy and she took gabapentin prior to presentation.  She endorses psychiatric hospitalization "back in my 20s."  Reports she is seeing a psychiatrist in [place].  Endorses feeling depressed currently, poor sleep, poor appetite, low energy, low motivation.  Denies history of episodes concerning for hillary/hypomania.  Reports [facility] of "seeing shadows."  Reports she lives with her daughter and granddaughter in an apartment.  Not currently working, financially supported by disability check.  Pt doesn't feel that she needs psychiatric hospitalization and feels that her current medical admission is unnecessary.  She voices desire for discharge today.      Collateral:   Spoke to pt's daughter Kathy Barnes.  She reported that she lives about 2 hrs away but speaks to her mother frequently.  She confirms that pt has psychiatric diagnoses.  She reports "my mother goes through spells, sometimes things are good and sometimes bad."  She spoke to pt during week of admission but can't remember specifics.  She says that her sister Elida lives with patient.  Called Elida ([phone], Elida).  She reported that pt has "not been doing well."  She reports pt has not been taking her prescribed psychiatric medications.  She is unsure if pt has been attending psychiatric appointments.  She reports pt has been depressed recently.  Pt voiced desire to kill herself 2 wks ago by medication ingestion but didn't act on these thoughts.  Elida did not witness pt's ingestion on day of admission but immediately called for assistance when she saw "all of the pills."  She feels that " "her mother needs treatment currently and advocates for psychiatric admission.  She says that she doesn't live with pt but checks on her frequently.  She is concerned that, without constant supervision, pt will try to hurt herself in the near future. She provides phone number for  that is involved in pt's care ([phone], ARAMIS Ruffin)    SUBJECTIVE   Currently, the patient is endorsing the following:    Medical Review Of Systems:  Constitutional: denies fevers, denies chills, denies recent weight change  Eyes: denies pain in eyes or loss of vision  Ears: denies tinnitis, denies loss of hearing  Mouth/throat: denies difficulty with speaking, denies difficulty with swallowing  Respiratory: denies SOB, denies cough  Gastrointestinal: denies abdominal pain, denies nausea/vomiting, denies constipation/diarrhea  Genitourinary: denies urinary frequency, denies burning on urination  Dermatologic: denies rash, denies erythema  Musculoskeletal: denies myalgias, +arm pain  Hematologic: denies easy bleeding/bruising, denies enlarged lymph nodes  Neurologic: denies seizures, denies headaches, denies loss of sensation, denies weakness  Psychiatric: see HPI    Psychiatric Review Of Systems:  Please see HPI above    Past Medical/Surgical History:  Past Medical History:   Diagnosis Date    Anxiety     Arthritis     CVA (cerebral vascular accident)     "mini stroke"    Depression     Diabetes mellitus     Heart problem     History of psychiatric hospitalization     three(1983, 1995 and another (years ago")): depression    HTN (hypertension)     Hx of psychiatric care     Hypertension     Pacemaker     Psychiatric exam requested by authority     Psychiatric problem     TBI (traumatic brain injury)     Therapy       has a past medical history of Anxiety, Arthritis, CVA (cerebral vascular accident), Depression, Diabetes mellitus, Heart problem, History of psychiatric hospitalization, HTN (hypertension), psychiatric " care, Hypertension, Pacemaker, Psychiatric exam requested by authority, Psychiatric problem, TBI (traumatic brain injury), and Therapy.  Past Surgical History:   Procedure Laterality Date    CARDIAC PACEMAKER PLACEMENT      CARDIAC PACEMAKER REMOVAL      CHOLECYSTECTOMY      HYSTERECTOMY       Past Psychiatric History:  Previous Medication Trials: yes  Previous Psychiatric Hospitalizations: yes  Previous Suicide Attempts: yes  History of Violence: no  Outpatient MH Provider: yes  Access to Gun: no    Social History:  Housing/Lives with: alone in apartment, daughter checks on her frequently  Marital Status: did not assess  Children: yes   Employment Status/Info: not working currently, disability  Education: did not finish HS  History of phys/sexual abuse: denies    Substance Abuse History:  Tobacco Use:yes  Use of Alcohol: denies  Recreational Drugs: denies  Rehab History:denies     Legal History:  Past Charges/Incarcerations:denies  Pending charges:denies     Family Psychiatric History:   Mother with bipolar disorder

## 2023-05-10 NOTE — CONSULTS
"[facility]  Psychiatry  Consult Note    Patient Name: Elba Barnes  MRN: [id number]   Code Status: Full Code  Admission Date: 5/9/2023  Hospital Length of Stay: 0 days  Attending Physician: Loulou Dodge MD  Primary Care Provider: VIRGEN Hoyt    Current Legal Status: PEC    Patient information was obtained from patient, past medical records and ER records.     Inpatient consult to Psychiatry  Consult performed by: Rich Duarte MD  Consult ordered by: Romario Weaver MD        Subjective:     Principal Problem:Drug overdose, intentional    Chief Complaint:  Medication ingestion, possible suicide attempt     HPI:   Per Primary MD:  "Elba Barnes is a 62 y.o.  female with a PMH of hypertension, poorly controlled diabetes, traumatic brain injury, CHF, CVA with right sided deficits, GERD, osteoarthritis of multiple sites, tobacco abuse, and obesity was brought to the ED by EMS for intentional drug overdose of current medications on 5/9/2023. Pt states that she took extra medication so she could, "go to sleep". She is "tired of my daughter yelling at me". Does not recall what specific medications she took, but states remembers taking 3 extra pills of each of her medications.  When asked what medication she is gabapentin, then voiced trails off. History is limited secondary to patients clinical presentation. She is very somnolent and hard to arouse. Intermittently answers questions.  Denies currently being in any pain.      Per discussion with ED physician and nursing staff patient did admit to intentionally overdosing on her current medications.  States that she does not want her daughter to be informed of her clinical condition at this time.  Patient was PEC'd by ED physician on 05/09/2023.     On arrival to ED temp 97.2°, HR 88, RR 18, /84, SpO2 99% on 2 L nasal cannula.  Labs were significant for chloride 108, albumin 2.7, troponin 0.061, acetaminophen level " "less than 17.4.  CT cervical spine with some degenerative changes but no acute fractures appreciated.  CT head without contrast with no acute intracranial hemorrhage/edema.  Chest x-ray with right lower lobe infiltrates.  She was started on levofloxacin by ER physician.  Internal medicine consulted for altered mental status."    Per Psychiatry MD:   Elba Barnes is a 62 y.o. female with a reported past psychiatric history of "bipolar and schizophrenia", currently presenting with altered mental status.  Psychiatry was consulted for suicide risk assessment.     Pt calm and cooperative with interview.  Pt reports "my daughter" as reason for admission.  Says that her daughter "wants me in the hospital."  Pt reports medication ingestion prior to presentation was not a suicide attempt, reports she was taking her nightly medication.  Reports that her medication "always makes me that tired."  Notes that she takes "xanax, a little blue capsule, and my sleeping medicine" nightly.  Also reports gabapentin makes her extremely sleepy and she took gabapentin prior to presentation.  She endorses psychiatric hospitalization "back in my 20s."  Reports she is seeing a psychiatrist in [place].  Endorses feeling depressed currently, poor sleep, poor appetite, low energy, low motivation.  Denies history of episodes concerning for hillary/hypomania.  Reports [facility] of "seeing shadows."  Reports she lives with her daughter and granddaughter in an apartment.  Not currently working, financially supported by disability check.  Pt doesn't feel that she needs psychiatric hospitalization and feels that her current medical admission is unnecessary.  She voices desire for discharge today.      Collateral:   Spoke to pt's daughter Kathy Barnes.  She reported that she lives about 2 hrs away but speaks to her mother frequently.  She confirms that pt has psychiatric diagnoses.  She reports "my mother goes through spells, sometimes things are good " "and sometimes bad."  She spoke to pt during week of admission but can't remember specifics.  She says that her sister Elida lives with patient.  Called Elida ([phone], Elida).  She reported that pt has "not been doing well."  She reports pt has not been taking her prescribed psychiatric medications.  She is unsure if pt has been attending psychiatric appointments.  She reports pt has been depressed recently.  Pt voiced desire to kill herself 2 wks ago by medication ingestion but didn't act on these thoughts.  Elida did not witness pt's ingestion on day of admission but immediately called for assistance when she saw "all of the pills."  She feels that her mother needs treatment currently and advocates for psychiatric admission.  She says that she doesn't live with pt but checks on her frequently.  She is concerned that, without constant supervision, pt will try to hurt herself in the near future. She provides phone number for  that is involved in pt's care ([phone], ARAMIS Ruffin)    SUBJECTIVE   Currently, the patient is endorsing the following:    Medical Review Of Systems:  Constitutional: denies fevers, denies chills, denies recent weight change  Eyes: denies pain in eyes or loss of vision  Ears: denies tinnitis, denies loss of hearing  Mouth/throat: denies difficulty with speaking, denies difficulty with swallowing  Respiratory: denies SOB, denies cough  Gastrointestinal: denies abdominal pain, denies nausea/vomiting, denies constipation/diarrhea  Genitourinary: denies urinary frequency, denies burning on urination  Dermatologic: denies rash, denies erythema  Musculoskeletal: denies myalgias, +arm pain  Hematologic: denies easy bleeding/bruising, denies enlarged lymph nodes  Neurologic: denies seizures, denies headaches, denies loss of sensation, denies weakness  Psychiatric: see HPI    Psychiatric Review Of Systems:  Please see HPI above    Past Medical/Surgical History:  Past Medical " "History:   Diagnosis Date    Anxiety     Arthritis     CVA (cerebral vascular accident)     "mini stroke"    Depression     Diabetes mellitus     Heart problem     History of psychiatric hospitalization     three(1983, 1995 and another (years ago")): depression    HTN (hypertension)     Hx of psychiatric care     Hypertension     Pacemaker     Psychiatric exam requested by authority     Psychiatric problem     TBI (traumatic brain injury)     Therapy       has a past medical history of Anxiety, Arthritis, CVA (cerebral vascular accident), Depression, Diabetes mellitus, Heart problem, History of psychiatric hospitalization, HTN (hypertension), psychiatric care, Hypertension, Pacemaker, Psychiatric exam requested by authority, Psychiatric problem, TBI (traumatic brain injury), and Therapy.  Past Surgical History:   Procedure Laterality Date    CARDIAC PACEMAKER PLACEMENT      CARDIAC PACEMAKER REMOVAL      CHOLECYSTECTOMY      HYSTERECTOMY       Past Psychiatric History:  Previous Medication Trials: yes  Previous Psychiatric Hospitalizations: yes  Previous Suicide Attempts: yes  History of Violence: no  Outpatient  Provider: yes  Access to Gun: no    Social History:  Housing/Lives with: alone in apartment, daughter checks on her frequently  Marital Status: did not assess  Children: yes   Employment Status/Info: not working currently, disability  Education: did not finish HS  History of phys/sexual abuse: denies    Substance Abuse History:  Tobacco Use:yes  Use of Alcohol: denies  Recreational Drugs: denies  Rehab History:denies     Legal History:  Past Charges/Incarcerations:denies  Pending charges:denies     Family Psychiatric History:   Mother with bipolar disorder        Hospital Course: See HPI above.            Patient History               Medical as of 5/10/2023       Past Medical History       Diagnosis Date Comments Source    Anxiety -- -- Provider    Arthritis -- -- Provider    CVA " "(cerebral vascular accident) -- "mini stroke" Provider    Depression -- -- Provider    Diabetes mellitus -- -- Provider    Heart problem -- -- Provider    History of psychiatric hospitalization -- three(1983, 1995 and another (years ago")): depression Provider    HTN (hypertension) -- -- Provider    Hx of psychiatric care -- -- Provider    Hypertension -- -- Provider    Pacemaker -- -- Provider    Psychiatric exam requested by authority -- -- Provider    Psychiatric problem -- -- Provider    TBI (traumatic brain injury) -- -- Provider    Therapy -- -- Provider              Pertinent Negatives       Diagnosis Date Noted Comments Source    Behavioral problem 09/22/2015 -- Provider    CHF (congestive heart failure) 09/22/2015 -- Provider    COPD (chronic obstructive pulmonary disease) 09/22/2015 -- Provider    Dementia 09/22/2015 -- Provider    Rachel 09/22/2015 -- Provider    Renal disorder 09/22/2015 -- Provider    Seizures 09/22/2015 -- Provider    Self-harming behavior 09/22/2015 -- Provider    Suicide attempt 09/22/2015 -- Provider                          Surgical as of 5/10/2023       Past Surgical History       Procedure Laterality Date Comments Source    CARDIAC PACEMAKER PLACEMENT -- -- -- Provider    CHOLECYSTECTOMY -- -- -- Provider    HYSTERECTOMY -- -- -- Provider    CARDIAC PACEMAKER REMOVAL -- -- -- Provider                          Family as of 5/10/2023       Problem Relation Name Age of Onset Comments Source    Schizophrenia Mother -- -- -- Provider    Bipolar disorder Mother -- -- -- Provider    Bipolar disorder Father -- -- -- Provider                  Tobacco Use as of 5/10/2023       Smoking Status Smoking Start Date Last Attempt to Quit Smoking Frequency    Every Day -- 9/14/2018 0.50 packs/day for 40.00 years (20.00 pk-yrs)      Smokeless Status Smokeless Type Smokeless Quit Date    Never -- --      Source    --                  Alcohol Use as of 5/10/2023       Alcohol Use Drinks/Week " "Alcohol/Week Comments Source    Not Currently   -- wine on occ Provider                  Drug Use as of 5/10/2023       Drug Use Types Frequency Comments Source    No -- -- -- Provider                  Sexual Activity as of 5/10/2023       Sexually Active Birth Control Partners Comments Source    Never -- -- -- Provider                  Activities of Daily Living as of 5/10/2023       Activities of Daily Living Question Response Comments Source    Patient feels they ought to cut down on drinking/drug use No -- Provider    Patient annoyed by others criticizing their drinking/drug use No -- Provider    Patient has felt bad or guilty about drinking/drug use No -- Provider    Patient has had a drink/used drugs as an eye opener in the AM No -- Provider                  Social Documentation as of 5/10/2023    ** Merged History Encounter **     Source: Provider               Occupational as of 5/10/2023    None               Socioeconomic as of 5/10/2023       Marital Status Spouse Name Number of Children Years Education Education Level Preferred Language Ethnicity Race Source    Single -- 5 -- -- English Not  or /a Black or  Provider                  Pertinent History       Question Response Comments    Lives with alone --    Place in Birth Order -- --    Lives in home --    Number of Siblings -- --    Raised by biological parents --    Legal Involvement none --    Childhood Trauma -- --    Criminal History of arrest and incarceration once (3 months)     Financial Status other applying for SSI    Highest Level of Education unfinished highschool 11th grade- stopped secondary to mental illness    Does patient have access to a firearm? No --     Service No --    Primary Leisure Activity other --    Spirituality actively participates in organized [employer]          Past Medical History:   Diagnosis Date    Anxiety     Arthritis     CVA (cerebral vascular accident)     "mini " "stroke"    Depression     Diabetes mellitus     Heart problem     History of psychiatric hospitalization     three(,  and another (years ago")): depression    HTN (hypertension)     Hx of psychiatric care     Hypertension     Pacemaker     Psychiatric exam requested by authority     Psychiatric problem     TBI (traumatic brain injury)     Therapy      Past Surgical History:   Procedure Laterality Date    CARDIAC PACEMAKER PLACEMENT      CARDIAC PACEMAKER REMOVAL      CHOLECYSTECTOMY      HYSTERECTOMY       Family History       Problem Relation (Age of Onset)    Bipolar disorder Mother, Father    Schizophrenia Mother          Tobacco Use    Smoking status: Every Day     Packs/day: 0.50     Years: 40.00     Pack years: 20.00     Types: Cigarettes     Last attempt to quit: 2018     Years since quittin.6    Smokeless tobacco: Never   Substance and Sexual Activity    Alcohol use: Not Currently     Comment: wine on occ    Drug use: No    Sexual activity: Never     Review of patient's allergies indicates:   Allergen Reactions    Pcn [penicillins] Anaphylaxis       No current facility-administered medications on file prior to encounter.     Current Outpatient Medications on File Prior to Encounter   Medication Sig    albuterol (PROVENTIL/VENTOLIN HFA) 90 mcg/actuation inhaler Inhale 1-2 puffs into the lungs every 6 (six) hours as needed for Wheezing. Rescue    ALPRAZolam (XANAX) 1 MG tablet Take 1 tablet by mouth once a day as needed as needed for anxiety    amLODIPine (NORVASC) 2.5 MG tablet Take 2.5 mg by mouth once daily.    ammonium lactate (LAC-HYDRIN) 12 % lotion Apply to dry skin areas on feet, legs, and elbow areas TWICE A DAY AS NEEDED    ascorbic acid, vitamin C, 500 mg Chew Take 500 mg by mouth.    aspirin (ECOTRIN) 81 MG EC tablet Take 1 tablet (81 mg total) by mouth once daily.    atorvastatin (LIPITOR) 40 MG tablet Take 40 mg by mouth every evening.    b complex " "vitamins capsule Take 1 capsule by mouth every morning.    B-complex with vitamin C (Z-BEC OR EQUIV) tablet Take 1 capsule by mouth once daily.    BASAGLAR KWIKPEN U-100 INSULIN glargine 100 units/mL SubQ pen Inject 80 Units into the skin 2 (two) times daily with meals.    BD ULTRA-FINE SHORT PEN NEEDLE 31 gauge x 5/16" Ndle SMARTSIG:Injection Twice Daily    blood-glucose meter Misc CHECK BLOOD GLUCOSE LEVEL TWICE DAILY.    butalbital-acetaminophen-caffeine -40 mg (FIORICET, ESGIC) -40 mg per tablet Take 1 tablet by mouth every 6 (six) hours as needed for Pain.    carvediloL (COREG) 12.5 MG tablet Take 1 tablet (12.5 mg total) by mouth 2 (two) times daily.    cholecalciferol, vitamin D3, (VITAMIN D3) 50 mcg (2,000 unit) Cap capsule Take 1 capsule by mouth.    cloNIDine (CATAPRES) 0.1 MG tablet Take 0.1 mg by mouth every 6 (six) hours as needed.    diclofenac sodium (VOLTAREN) 1 % Gel Apply 2 g topically 4 (four) times daily.    docusate sodium (COLACE) 250 MG capsule Take 1 capsule (250 mg total) by mouth 2 (two) times daily.    DULoxetine (CYMBALTA) 30 MG capsule Take 30 mg by mouth.    empty container (SHARPS CONTAINER) Misc N/A    esomeprazole (NEXIUM) 40 MG capsule Take 40 mg by mouth every morning.    fluticasone furoate-vilanteroL (BREO ELLIPTA) 200-25 mcg/dose DsDv diskus inhaler Inhale 1 puff into the lungs once daily. Controller    fluticasone propionate (FLONASE) 50 mcg/actuation nasal spray 1 spray (50 mcg total) by Each Nostril route 2 (two) times daily as needed for Rhinitis.    gabapentin (NEURONTIN) 800 MG tablet Take 1 tablet (800 mg total) by mouth every 8 (eight) hours.    glipiZIDE (GLUCOTROL) 10 MG tablet Take 10 mg by mouth.    hydrALAZINE (APRESOLINE) 100 MG tablet Take 1 tablet (100 mg total) by mouth every 8 (eight) hours.    HYDROcodone-acetaminophen (NORCO) 5-325 mg per tablet Take 1 tablet by mouth every 6 (six) hours as needed for Pain.    insulin " "syringe-needle U-100 1 mL 28 gauge x 1/2" Syrg USE TO inject insulin TWICE DAILY    loratadine (CLARITIN) 10 mg tablet Take 10 mg by mouth.    magnesium citrate solution Take 296 mLs by mouth daily as needed.    multivitamin (THERAGRAN) per tablet Take 1 tablet by mouth once daily.    naloxone (NARCAN) 4 mg/actuation Spry SMARTSI Spray(s) Both Nares PRN    naproxen (NAPROSYN) 500 MG tablet Take 500 mg by mouth 2 (two) times daily.    nebivoloL (BYSTOLIC) 10 MG Tab Take 10 mg by mouth once daily.    NIFEdipine (PROCARDIA-XL) 90 MG (OSM) 24 hr tablet Take 1 tablet (90 mg total) by mouth once daily.    nitroGLYCERIN (NITROSTAT) 0.4 MG SL tablet Place 0.4 mg under the tongue every 5 (five) minutes as needed for Chest pain.    NOVOLOG FLEXPEN U-100 INSULIN 100 unit/mL (3 mL) InPn pen Inject 20 Units into the skin 3 (three) times daily with meals. DISCARD OPEN PEN AFTER 28 DAYS    nystatin (MYCOSTATIN) cream Apply topically 3 (three) times daily. Use under breast    ondansetron (ZOFRAN) 4 MG tablet Take 1 tablet (4 mg total) by mouth 2 (two) times daily.    ONETOUCH DELICA PLUS LANCET 33 gauge Misc Apply topically 4 (four) times daily.    ONETOUCH VERIO TEST STRIPS Strp 4 (four) times daily.    pantoprazole (PROTONIX) 40 MG tablet Take 1 tablet (40 mg total) by mouth once daily.    polyethylene glycol (GLYCOLAX) 17 gram/dose powder Take 17 g by mouth.    sertraline (ZOLOFT) 50 MG tablet Take 50 mg by mouth once daily.    sucralfate (CARAFATE) 1 gram tablet TAKE 1 TABLET(1 GRAM) BY MOUTH FOUR TIMES DAILY BEFORE MEALS AND AT NIGHT (Patient taking differently: Take 1 g by mouth 4 (four) times daily before meals and nightly.)    terconazole (TERAZOL 7) 0.4 % Crea Place 1 applicator vaginally every evening.    valsartan (DIOVAN) 160 MG tablet Take 1 tablet (160 mg total) by mouth once daily.     Psychotherapeutics (From admission, onward)      Start     Stop Route Frequency Ordered    05/10/23 2100  " "DULoxetine DR capsule 30 mg         -- Oral Nightly 05/10/23 1149          Review of Systems  See HPI above.     Strengths and Liabilities: Strength: Patient has positive support network., Liability: Patient is impulsive., Liability: Patient has poor health., Liability: Patient lacks coping skills.    Objective:     Vital Signs (Most Recent):  Temp: 99 °F (37.2 °C) (05/10/23 1109)  Pulse: 103 (05/10/23 1546)  Resp: 19 (05/10/23 1109)  BP: (!) 148/77 (05/10/23 1546)  SpO2: 100 % (05/10/23 1546) Vital Signs (24h Range):  Temp:  [97.2 °F (36.2 °C)-99 °F (37.2 °C)] 99 °F (37.2 °C)  Pulse:  [] 103  Resp:  [13-22] 19  SpO2:  [93 %-100 %] 100 %  BP: ()/(58-85) 148/77     Height: 5' 6" (167.6 cm)  Weight: 114.9 kg (253 lb 4.9 oz)  Body mass index is 40.89 kg/m².      Intake/Output Summary (Last 24 hours) at 5/10/2023 1705  Last data filed at 5/10/2023 0700  Gross per 24 hour   Intake 660.42 ml   Output --   Net 660.42 ml          Physical Exam     Appearance: age appropriate, obese, dressed in hospital gown  Level of Consciousness: drowsy  Behavior/Cooperation: calm and cooperative  Psychomotor: unremarkable   Speech: normal tone, normal rate, normal pitch, normal volume  Language: english, fluid  Orientation: grossly intact, person, place, day of week, month of year, year  Attention Span/Concentration: intact to interview  Memory: Registers 3/3 objects, recalls 0/3 objects at 5 minutes without cuing  Mood: "sad"  Affect: mood congruent and constricted  Thought Process: linear, goal-directed  Associations: Logical and appropriate  Thought Content: denies SI/HI/paranoia, no delusional ideation volunteered, denies plan or desire for self harm or harm to others  Fund of Knowledge: Aware of current events  Abstraction: proverbs were abstract, similarities were concrete  Insight: limited  Judgment: fair     Significant Labs: All pertinent labs within the past 24 hours have been reviewed.    Significant Imaging: I " have reviewed all pertinent imaging results/findings within the past 24 hours.    Assessment/Plan:     * Drug overdose, intentional  ASSESSMENT     Elba Barnes is a 62 y.o. female with a past psychiatric history of bipolar/schizophrenia, currently presenting with Drug overdose, intentional. Psychiatry was consulted for suicide risk assessment.  Pt presented with altered mental status (somnolent), presumably from medication ingestion.  Pt dismisses concerns about her intentions or her behaviors.  Per collateral, pt voiced suicidal ideation as recently as 2 wks prior to presentation.  Pt has minimal support per discussion with family member.  Family member extremely concerned about pt and advocates for psychiatric admission.  Pt currently PECd due to concern that ingestion was self harm attempt.  See below for recommendations.     IMPRESSION  Medication ingestion of unclear intent (likely intentional self harm)  Depression, unspecified    RECOMMENDATION(S)      1. Scheduled Medication(s):  Continue cymbalta 30mg daily  Per PDMP review, pt has consistently been filling Rx for xanax 1mg (#45 tablets) monthly, consider restarting    2. PRN Medication(s):  Recommend hydroxyzine 25mg PO q8 hrs PRN anxiety  Recommend against neuroleptics due to qtc (490)    3.  Monitor:  EKG 5/10/2023 w/ qtc 490    4. Legal Status/Precaution(s):  Recommend to enact PEC if one is not already in place  Recommend 1:1 sitter  Please notify 's office of need for CEC evaluation    5. Disposition:  Recommend IP psychiatric hospitalization when medically cleared       Total Time:  60 minutes      Rich Duarte MD   Psychiatry  [facility]

## 2023-05-10 NOTE — ASSESSMENT & PLAN NOTE
ASSESSMENT     Elba Barnes is a 62 y.o. female with a past psychiatric history of bipolar/schizophrenia, currently presenting with Drug overdose, intentional. Psychiatry was consulted for suicide risk assessment.  Pt presented with altered mental status (somnolent), presumably from medication ingestion.  Pt dismisses concerns about her intentions or her behaviors.  Per collateral, pt voiced suicidal ideation as recently as 2 wks prior to presentation.  Pt has minimal support per discussion with family member.  Family member extremely concerned about pt and advocates for psychiatric admission.  Pt currently PECd due to concern that ingestion was self harm attempt.  See below for recommendations.     IMPRESSION  Medication ingestion of unclear intent (likely intentional self harm)  Depression, unspecified    RECOMMENDATION(S)      1. Scheduled Medication(s):  Continue cymbalta 30mg daily  Per PDMP review, pt has consistently been filling Rx for xanax 1mg (#45 tablets) monthly, consider restarting    2. PRN Medication(s):  Recommend hydroxyzine 25mg PO q8 hrs PRN anxiety  Recommend against neuroleptics due to qtc (490)    3.  Monitor:  EKG 5/10/2023 w/ qtc 490    4. Legal Status/Precaution(s):  Recommend to enact PEC if one is not already in place  Recommend 1:1 sitter  Please notify 's office of need for CEC evaluation    5. Disposition:  Recommend IP psychiatric hospitalization when medically cleared

## 2023-05-10 NOTE — H&P
"[facility] Internal Medicine History & Physical Note     Resident Team: [facility] Medicine List 1  Attending Physician: Loulou Dodge MD  Date of Admit: 5/9/2023    Chief Complaint     Suicidal and Drug Overdose (Not sure what meds were taken.  AAO2)    Subjective:      History of Present Illness:  Elba Barnes is a 62 y.o.  female with a PMH of hypertension, poorly controlled diabetes, traumatic brain injury, CHF, CVA with right sided deficits, GERD, osteoarthritis of multiple sites, tobacco abuse, and obesity was brought to the ED by EMS for intentional drug overdose of current medications on 5/9/2023. Pt states that she took extra medication so she could, "go to sleep". She is "tired of my daughter yelling at me". Does not recall what specific medications she took, but states remembers taking 3 extra pills of each of her medications.  When asked what medication she is gabapentin, then voiced trails off. History is limited secondary to patients clinical presentation. She is very somnolent and hard to arouse. Intermittently answers questions.  Denies currently being in any pain.     Per discussion with ED physician and nursing staff patient did admit to intentionally overdosing on her current medications.  States that she does not want her daughter to be informed of her clinical condition at this time.  Patient was PEC'd by ED physician on 05/09/2023.    On arrival to ED temp 97.2°, HR 88, RR 18, /84, SpO2 99% on 2 L nasal cannula.  Labs were significant for chloride 108, albumin 2.7, troponin 0.061, acetaminophen level less than 17.4.  CT cervical spine with some degenerative changes but no acute fractures appreciated.  CT head without contrast with no acute intracranial hemorrhage/edema.  Chest x-ray with right lower lobe infiltrates.  She was started on levofloxacin by ER physician.  Internal medicine consulted for altered mental status.    Past Medical History:  Past Medical History:   Diagnosis Date    Anxiety  " "   Arthritis     CVA (cerebral vascular accident)     "mini stroke"    Depression     Diabetes mellitus     Heart problem     History of psychiatric hospitalization     three(,  and another (years ago")): depression    HTN (hypertension)     Hx of psychiatric care     Hypertension     Pacemaker     Psychiatric exam requested by authority     Psychiatric problem     TBI (traumatic brain injury)     Therapy        Past Surgical History:  Past Surgical History:   Procedure Laterality Date    CARDIAC PACEMAKER PLACEMENT      CARDIAC PACEMAKER REMOVAL      CHOLECYSTECTOMY      HYSTERECTOMY         Family History:  Family History   Problem Relation Age of Onset    Schizophrenia Mother     Bipolar disorder Mother     Bipolar disorder Father        Social History:  Social History     Tobacco Use    Smoking status: Every Day     Packs/day: 0.50     Years: 40.00     Pack years: 20.00     Types: Cigarettes     Last attempt to quit: 2018     Years since quittin.6    Smokeless tobacco: Never   Substance Use Topics    Alcohol use: Not Currently     Comment: wine on occ    Drug use: No       Allergies:  Review of patient's allergies indicates:   Allergen Reactions    Pcn [penicillins] Anaphylaxis       Home Medications:  Prior to Admission medications    Medication Sig Start Date End Date Taking? Authorizing Provider   albuterol (PROVENTIL/VENTOLIN HFA) 90 mcg/actuation inhaler Inhale 1-2 puffs into the lungs every 6 (six) hours as needed for Wheezing. Rescue 23   Ignacia Lopez DO   ALPRAZolam (XANAX) 1 MG tablet Take 1 tablet by mouth once a day as needed as needed for anxiety 22   Historical Provider   amLODIPine (NORVASC) 2.5 MG tablet Take 2.5 mg by mouth once daily.    Historical Provider   ammonium lactate (LAC-HYDRIN) 12 % lotion Apply to dry skin areas on feet, legs, and elbow areas TWICE A DAY AS NEEDED 23   Historical Provider   ascorbic acid, vitamin C, 500 mg Chew Take 500 mg by mouth.  " "  Historical Provider   aspirin (ECOTRIN) 81 MG EC tablet Take 1 tablet (81 mg total) by mouth once daily. 9/30/21   Markel Torres MD   atorvastatin (LIPITOR) 40 MG tablet Take 40 mg by mouth every evening. 11/22/22   Historical Provider   b complex vitamins capsule Take 1 capsule by mouth every morning.    Historical Provider   B-complex with vitamin C (Z-BEC OR EQUIV) tablet Take 1 capsule by mouth once daily.    Historical Provider   BASAGLAR KWIKPEN U-100 INSULIN glargine 100 units/mL SubQ pen Inject 80 Units into the skin 2 (two) times daily with meals. 12/23/22   PatiVIRGEN Yarbrough   BD ULTRA-FINE SHORT PEN NEEDLE 31 gauge x 5/16" Ndle SMARTSIG:Injection Twice Daily 10/24/22   Historical Provider   blood-glucose meter Misc CHECK BLOOD GLUCOSE LEVEL TWICE DAILY. 4/6/22   Historical Provider   butalbital-acetaminophen-caffeine -40 mg (FIORICET, ESGIC) -40 mg per tablet Take 1 tablet by mouth every 6 (six) hours as needed for Pain. 2/11/19   Leandra Monreal NP   carvediloL (COREG) 12.5 MG tablet Take 1 tablet (12.5 mg total) by mouth 2 (two) times daily. 2/17/23 2/17/24  Ignacia Lopez DO   cholecalciferol, vitamin D3, (VITAMIN D3) 50 mcg (2,000 unit) Cap capsule Take 1 capsule by mouth. 8/13/22   Historical Provider   cloNIDine (CATAPRES) 0.1 MG tablet Take 0.1 mg by mouth every 6 (six) hours as needed. 8/27/22   Historical Provider   diclofenac sodium (VOLTAREN) 1 % Gel Apply 2 g topically 4 (four) times daily. 12/21/22   VIRGEN Ho   docusate sodium (COLACE) 250 MG capsule Take 1 capsule (250 mg total) by mouth 2 (two) times daily. 3/8/23   VIRGEN Ho   DULoxetine (CYMBALTA) 30 MG capsule Take 30 mg by mouth. 11/7/22   Historical Provider   empty container (SHARPS CONTAINER) [facility] N/A 4/6/22   Historical Provider   esomeprazole (NEXIUM) 40 MG capsule Take 40 mg by mouth every morning. 11/22/22   Historical Provider   fluticasone furoate-vilanteroL (BREO ELLIPTA) 200-25 " "mcg/dose DsDv diskus inhaler Inhale 1 puff into the lungs once daily. Controller 22   VIRGEN Ho   fluticasone propionate (FLONASE) 50 mcg/actuation nasal spray 1 spray (50 mcg total) by Each Nostril route 2 (two) times daily as needed for Rhinitis. 3/14/23   Rui Monroy, DO   gabapentin (NEURONTIN) 800 MG tablet Take 1 tablet (800 mg total) by mouth every 8 (eight) hours. 22   VIRGEN Ho   glipiZIDE (GLUCOTROL) 10 MG tablet Take 10 mg by mouth. 23   Historical Provider   hydrALAZINE (APRESOLINE) 100 MG tablet Take 1 tablet (100 mg total) by mouth every 8 (eight) hours. 23  Ignacia Lopez DO   HYDROcodone-acetaminophen (NORCO) 5-325 mg per tablet Take 1 tablet by mouth every 6 (six) hours as needed for Pain. 23   Markel Rodriguez MD   insulin syringe-needle U-100 1 mL 28 gauge x 1/2" Syrg USE TO inject insulin TWICE DAILY 22   Historical Provider   loratadine (CLARITIN) 10 mg tablet Take 10 mg by mouth. 10/26/22 7/23/23  Historical Provider   magnesium citrate solution Take 296 mLs by mouth daily as needed. 22   Historical Provider   multivitamin (THERAGRAN) per tablet Take 1 tablet by mouth once daily.    Historical Provider   naloxone (NARCAN) 4 mg/actuation Spry SMARTSI Spray(s) Both Nares PRN 10/12/22   Historical Provider   naproxen (NAPROSYN) 500 MG tablet Take 500 mg by mouth 2 (two) times daily. 22   Historical Provider   nebivoloL (BYSTOLIC) 10 MG Tab Take 10 mg by mouth once daily.    Historical Provider   NIFEdipine (PROCARDIA-XL) 90 MG (OSM) 24 hr tablet Take 1 tablet (90 mg total) by mouth once daily. 23  Ignacia Lopez DO   nitroGLYCERIN (NITROSTAT) 0.4 MG SL tablet Place 0.4 mg under the tongue every 5 (five) minutes as needed for Chest pain.    Historical Provider   NOVOLOG FLEXPEN U-100 INSULIN 100 unit/mL (3 mL) InPn pen Inject 20 Units into the skin 3 (three) times daily with meals. DISCARD OPEN PEN AFTER 28 DAYS " "3/22/23   VIRGEN Ho   nystatin (MYCOSTATIN) cream Apply topically 3 (three) times daily. Use under breast 10/26/22   Historical Provider   ondansetron (ZOFRAN) 4 MG tablet Take 1 tablet (4 mg total) by mouth 2 (two) times daily. 23   Ignacia Lopez DO   ONETOUCH DELICA PLUS LANCET 33 gauge Misc Apply topically 4 (four) times daily. 10/27/22   Historical Provider   ONETOUCH VERIO TEST STRIPS Strp 4 (four) times daily. 22   Historical Provider   pantoprazole (PROTONIX) 40 MG tablet Take 1 tablet (40 mg total) by mouth once daily. 19   Leandra Monreal NP   polyethylene glycol (GLYCOLAX) 17 gram/dose powder Take 17 g by mouth. 22   Historical Provider   sertraline (ZOLOFT) 50 MG tablet Take 50 mg by mouth once daily.    Historical Provider   sucralfate (CARAFATE) 1 gram tablet TAKE 1 TABLET(1 GRAM) BY MOUTH FOUR TIMES DAILY BEFORE MEALS AND AT NIGHT  Patient taking differently: Take 1 g by mouth 4 (four) times daily before meals and nightly. 19   Leandra Monreal NP   terconazole (TERAZOL 7) 0.4 % Crea Place 1 applicator vaginally every evening. 23   Historical Provider   valsartan (DIOVAN) 160 MG tablet Take 1 tablet (160 mg total) by mouth once daily. 23  Ignacia Lopez DO       Review of Systems:  See HPI unable to complete secondary to patient's clinical condition.    Objective:   Last 24 Hour Vital Signs:  BP  Min: 112/63  Max: 136/84  Temp  Av.2 °F (36.2 °C)  Min: 97.2 °F (36.2 °C)  Max: 97.2 °F (36.2 °C)  Pulse  Av  Min: 81  Max: 88  Resp  Av  Min: 18  Max: 18  SpO2  Av.7 %  Min: 93 %  Max: 99 %  Height  Av' 6" (167.6 cm)  Min: 5' 6" (167.6 cm)  Max: 5' 6" (167.6 cm)  Weight  Av.3 kg (230 lb)  Min: 104.3 kg (230 lb)  Max: 104.3 kg (230 lb)  Body mass index is 37.12 kg/m².  No intake/output data recorded.    Physical Examination:  General: in no acute distress, somnolent, in and out of sleep. Answers trail off into a " mumble  Eye: clear conjunctiva for brief period held open, eyelids normal  HENT: oropharynx without erythema or exudate, oropharynx and nasal mucosal surfaces moist  Neck: full range of motion, no lymphadenopathy  Respiratory: clear to auscultation bilaterally. No wheezes, rhonchi, or rales.  Cardiovascular: regular rate and rhythm. S1/S2 present. No murmurs, gallops or rubs appreciated  Gastrointestinal: soft, non-tender, non-distended with normal bowel sounds  Musculoskeletal: full range of motion of all extremities and spine without limitation or discomfort  Extremities: No peripheral edema  Neurologic: oriented to self, time, and place partially. Intermittently answers questions appropriately. Unable to always follow command given somnolence. A thorough neurological exam is difficult to ascertain given patient's clinical status.      Laboratory:  Most Recent Data:  CBC:   Lab Results   Component Value Date    WBC 8.71 05/09/2023    HGB 13.8 05/09/2023    HCT 43.7 05/09/2023     05/09/2023    MCV 87.9 05/09/2023    RDW 15.0 05/09/2023     WBC Differential:   Recent Labs   Lab 05/09/23 1942   WBC 8.71   HGB 13.8   HCT 43.7      MCV 87.9     BMP:   Lab Results   Component Value Date     05/09/2023    K 3.7 05/09/2023    CL 98 09/30/2021    CO2 25 05/09/2023    BUN 14.3 05/09/2023    CREATININE 0.89 05/09/2023     (HH) 09/30/2021    CALCIUM 8.9 05/09/2023    MG 2.10 05/09/2023    PHOS 3.9 05/09/2023     LFTs:   Lab Results   Component Value Date    PROT 8.6 (H) 09/30/2021    ALBUMIN 2.9 (L) 05/09/2023    BILITOT 0.2 05/09/2023    AST 22 05/09/2023    ALKPHOS 62 05/09/2023    ALT 17 05/09/2023     Coags:   Lab Results   Component Value Date    INR 0.98 05/09/2023    PROTIME 12.8 05/09/2023    PTT 60.6 02/14/2023     FLP:   Lab Results   Component Value Date    CHOL 173 02/14/2023    HDL 31 (L) 02/14/2023    TRIG 283 (H) 02/14/2023     DM:   Lab Results   Component Value Date    HGBA1C 8.7  (H) 02/09/2023    HGBA1C 10.5 (H) 10/18/2022    HGBA1C 11.5 (H) 03/23/2022    CREATININE 0.89 05/09/2023     Thyroid:   Lab Results   Component Value Date    TSH 0.646 05/09/2023      Anemia: No results found for: IRON, TIBC, FERRITIN, SATURATEDIRO  No results found for: [id number]  No results found for: FOLATE     Cardiac:   Lab Results   Component Value Date    TROPONINI 0.061 (H) 05/09/2023    BNP 23.5 03/13/2023     Urinalysis:   Lab Results   Component Value Date    LABURIN >/= 100,000 colonies/ml Escherichia coli (A) 02/09/2023    COLORU Yellow 09/30/2021    PHUA 6.0 02/15/2023    SPECGRAV 1.015 09/30/2021    NITRITE Negative 09/30/2021    KETONESU Negative 09/30/2021    UROBILINOGEN Normal 02/15/2023    WBCUA 0-5 02/15/2023       Trended Lab Data:  Recent Labs   Lab 05/09/23 1942 05/09/23 2021   WBC 8.71  --    HGB 13.8  --    HCT 43.7  --      --    MCV 87.9  --    RDW 15.0  --    NA  --  142   K  --  3.7   CO2  --  25   BUN  --  14.3   CREATININE  --  0.89   ALBUMIN  --  2.9*   BILITOT  --  0.2   AST  --  22   ALKPHOS  --  62   ALT  --  17       Trended Cardiac Data:  Recent Labs   Lab 05/09/23 1942   TROPONINI 0.061*       Microbiology Data:  Microbiology Results (last 7 days)       ** No results found for the last 168 hours. **             Other Results:  EKG (my interpretation):   EKG with no ST or T-wave changes noted.  Her QTC is prolonged at 499.      Radiology:  Imaging Results              CT Cervical Spine Without Contrast (Preliminary result)  Result time 05/09/23 21:22:12      Preliminary result by Fredrick Carr Jr., MD (05/09/23 21:22:12)                   Narrative:    START OF REPORT:  TECHNIQUE: CT OF THE CERVICAL SPINE WAS PERFORMED WITHOUT INTRAVENOUS CONTRAST WITH AXIAL AS WELL AS SAGITTAL AND CORONAL IMAGES.    COMPARISON: NONE.    DOSAGE INFORMATION: AUTOMATED EXPOSURE CONTROL WAS UTILIZED.    CLINICAL HISTORY: AMS.    Findings:  Lung apices: Small emphysematous  paraseptal bullae are seen at the lung apices.  Spine:  Spinal canal: The spinal canal appears unremarkable.  Spinal cord: The spinal cord appears unremarkable.  Mineralization: Within normal limits for age.  Scoliosis: No significant scoliosis is seen.  Vertebral Fusion: No vertebral fusion is identified.  Listhesis: No significant listhesis is identified.  Lordosis: The cervical lordosis is maintained.  Intervertebral disc spaces: The intervertebral discs are preserved throughout.  Osteophytes: Large anterior osteophytes are seen off the opposing endplates at C5-C6 and C6-C7.  Endplate Sclerosis: No significant endplate sclerosis is seen.  Uncovertebral degenerative changes: No significant uncovertebral degenerative changes are seen.  Facet degenerative changes: No significant facet degenerative changes are seen.  Fractures: No acute cervical spine fracture dislocation or subluxation is seen.      Impression:  1. No acute cervical spine fracture dislocation or subluxation is seen.  2. Degenerative changes and other details as above.                                         CT Head Without Contrast (Preliminary result)  Result time 05/09/23 21:20:23      Preliminary result by Fredrick Carr Jr., MD (05/09/23 21:20:23)                   Narrative:    START OF REPORT:  TECHNIQUE: CT OF THE HEAD WAS PERFORMED WITHOUT INTRAVENOUS CONTRAST WITH AXIAL AS WELL AS CORONAL AND SAGITTAL IMAGES.    COMPARISON: COMPARISON IS WITH STUDY [id number] 21:11:20.    DOSAGE INFORMATION: AUTOMATED EXPOSURE CONTROL WAS UTILIZED.    CLINICAL HISTORY: MENTAL STATUS CHANGE, UNKNOWN CAUSE.    Findings: No significant interval change as compared with the prior examination.  Hemorrhage: No acute intracranial hemorrhage is seen.  CSF spaces: The ventricles sulci and basal cisterns are within normal limits for age.  Brain parenchyma: There is preservation of the grey white junction throughout. Moderate microvascular change is seen in  portions of the periventricular and deep white matter tracts.  Cerebellum: Unremarkable.  Sella and skull base: The sella appears to be within normal limits for age.  Herniation: None.  Intracranial calcifications: Incidental note is made of bilateral choroid plexus calcification. Incidental note is made of some pineal region calcification. Incidental note is made of some calcification of the falx.  Calvarium: No acute linear or depressed skull fracture is seen.  Scalp: There is a stable appearing 1 cm soft tissue density lesion in the right occipital scalp (series 2 image 15). This likely reflects an epidermoid cyst.    Maxillofacial Structures: Again noted is a 4 mm radiodense foreign body in the subcutaneous tissue over the alveolar process of the right maxilla (series 4 image 4).  Paranasal sinuses: Minimal secretions are seen in the left sphenoid sinus.  Orbits: The orbits appear unremarkable.  Zygomatic arches: The zygomatic arches are intact and unremarkable.  Temporal bones and mastoids: The temporal bones and mastoids appear unremarkable.  TMJ: The mandibular condyles appear normally placed with respect to the mandibular fossa.      Impression:  1. No acute intracranial process identified. Details and findings as noted above.                                         X-Ray Chest 1 View (Final result)  Result time 05/09/23 19:54:28      Final result by Kelsie Prescott MD (05/09/23 19:54:28)                   Impression:      Increased patchy opacities in the right lower lung concerning for infection      Electronically signed by: Kelsie Prescott  Date:    05/09/2023  Time:    19:54               Narrative:    EXAMINATION:  XR CHEST 1 VIEW    CLINICAL HISTORY:  AMS;    TECHNIQUE:  AP chest    COMPARISON:  Chest x-ray dated 04/25/2023    FINDINGS:  The heart is stable in size.  There are increased patchy opacities in the right lower lung.  There is no pleural effusion or definite visible pneumothorax.                                       Assessment & Plan:     Altered mental status in the setting of intentional drug overdose  -Unknown ingestion of patient's prescribed medications   -UDS pending   -Neuro checks q4hrs   -PEC'd by ED physician  -Patient with multiple psychiatric admissions, may consider psych consult if deemed necessary  -Living situation with daughter may also have to be explored, given patient admitting that she is the source of her depression. Denies any physical abuse.   -bedside nurse swallow when patient is more alert prior to oral intake    Aspiration Pneumonia  -CXR with right lung infiltrates, in the setting of her clinical presentation the above is very likely  -Given 1 time dose of Levaquin in the ED  -Patient with severe penicillin allergy and also has QTC of 499  -Usually would start on clindamycin but given her history and findings will empirically start Doxy and Flagyl  -Regiment can be altered per primary team     Elevated troponin  -Trop 0.061 on presentation  -Loaded with ASA in the ED  -No EKG changes, most likely demand ischemia   -Continue to trend cardiac enzymes q6hrs  -If patient becomes symptomatic initiate anticoagulation  -[facility] 12/2022: Coronary dominance 20, left main, left anterior descending, left circumflex and right dominant all patent  -Patient recently admitted on 02/2023 for concern of anginal chest pain cardiology consulted and recommended outpatient medical management with no indication for repeat left heart catheterization.    Diabetes mellitus type 2  -Holding home insulin regiment, placed on sheryl dose ISS  -Once tolerating PO may add basal regiment     Hypertension   CAD  -Resume home meds, per EMR review it appears she is taking Norvasc 2.5 mg, Nifedipine 90 mg, Carvedilol 3.125 BID. Dose of Hydralazine is questionable 25 TID vs 100 BID  -Continue to monitor closely    CODE STATUS: FULL  Access: peripheral   Antibiotics: Given 1x dose of levofloxacin in the ED    Diet: NPO  DVT Prophylaxis: Lovenox 40 mg SubQ  GI Prophylaxis: none  Fluids:  cc/hr      Disposition: Admitted for AMS in the setting on intentional drug overdose. Patient is protecting airway. Continue to monitor closely. Neuro checks q4hr. On high ISS for now, may resume home basal once she is tolerating oral intake.     Jossie Werner MD  \[facility]\"" Family Medicine HO-3

## 2023-05-10 NOTE — PT/OT/SLP PROGRESS
Physical Therapy    Missed Treatment Session    Patient Name:  Elba Barnes   MRN:  [id number]      Patient not seen at this time secondary to Patient unwilling to participate.  Pt. Educated in importance of OOB mobility to prevent further complications and to help with discharge recommendations, however, pt. Continued to refuse.  Pt. With c/o 10/10 right side of body pain.  Nurse notified.    Will follow-up as patient is available to participate and as therapists' schedule allows.

## 2023-05-10 NOTE — PROGRESS NOTES
05/10/23 1102   Missed Time Reason   OT Attempted Eval Date 05/10/23   OT Attempted Eval Time 0824   Missed Treatment Reason Patient unwilling to participate     Will attempt again as schedule permits.

## 2023-05-10 NOTE — PT/OT/SLP EVAL
Occupational Therapy   Evaluation    Name: Elba Barnes  MRN: [id number]  Admitting Diagnosis: Drug overdose, intentional  Final diagnoses:  [R41.82] AMS (altered mental status)  [T50.902A] Intentional drug overdose, initial encounter (Primary)  [J18.9] Pneumonia due to infectious organism, unspecified laterality, unspecified part of lung  [I21.4] NSTEMI (non-ST elevated myocardial infarction)   Patient Active Problem List   Diagnosis    Depression    Anxiety    PTSD (post-traumatic stress disorder)    Class 3 severe obesity with body mass index (BMI) of 40.0 to 44.9 in adult    Tobacco use disorder    Essential hypertension    Mixed hyperlipidemia    Diabetes mellitus type 2, insulin dependent    Encounter for diabetic foot exam    Uncontrolled type 2 diabetes mellitus with hyperglycemia    Noncompliance    Overgrown toenails    Acute cystitis without hematuria    Tricompartment osteoarthritis of right knee    Precordial pain    Abdominal pain    Nausea and vomiting    NSTEMI (non-ST elevated myocardial infarction)    Hypertensive emergency    Drug overdose, intentional      Recent Surgery: * No surgery found *      Recommendations:     Discharge Recommendations: nursing facility, skilled  Discharge Equipment Recommendations:  bedside commode, other (see comments) (RW pending further evaluation)  Barriers to discharge:  Other (Comment), Decreased caregiver support (level of skilled services at this time, fall risk, limited family support, endurance)    Assessment:     Elba Barnes is a 62 y.o. female with a medical diagnosis of Drug overdose, intentional.  She presents with functional decline. Performance deficits affecting function: weakness, impaired endurance, impaired self care skills, impaired functional mobility, gait instability, impaired balance, decreased upper extremity function, decreased lower extremity function, decreased safety awareness, pain, abnormal tone, decreased ROM, impaired  "coordination, impaired fine motor.      Limited participation but agreed after 2nd attempt for OT and PT to complete eval.     Rehab Prognosis: Good; patient would benefit from acute skilled OT services to address these deficits and reach maximum level of function.       Plan:     Patient to be seen 3 x/week to address the above listed problems via self-care/home management, therapeutic activities, therapeutic exercises, neuromuscular re-education  Plan of Care Expires:  (d/c)  Plan of Care Reviewed with: patient    Subjective     Chief Complaint: pain R side of body and generalized weakness  Patient/Family Comments/goals: go home    Occupational Profile:  Living Environment: Pt lives alone in 1st floor apt with no steps and tub shower combo with bath tub bench  Previous level of function: Pt with fluctuating history and questionable accuracy; Pt reported required assist with basic self care tasks and ambulation with rollator; Pt reported episodes of incontinence.  Roles and Routines: Pt reported being sedentary and primarily "staying on couch" due to risk for fall; Pts daughter shopped and performed most household chores and cooking.  Equipment Used at Home: bath bench, rollator  Assistance upon Discharge: daughter     Patient  + for fall < 1 week ago     Pain/Comfort:  Pain Rating 1: 8/10  Location - Side 1: Right  Location 1:  (R side of body)  Pain Addressed 1: Reposition, Distraction, Nurse notified  Pain Rating Post-Intervention 1: 8/10    Patients cultural, spiritual, Jewish conflicts given the current situation: no    Objective:     Communicated with: Nurse Siobhan  prior to session.  Patient found HOB elevated with oxygen, PureWick, peripheral IV, telemetry upon OT entry to room.    General Precautions: Standard, fall, other (see comments) (PEC'd)  Orthopedic Precautions: N/A  Braces: N/A  Respiratory Status: Nasal cannula, flow 2 L/min    Occupational Performance:    Bed Mobility:    Patient completed " Rolling/Turning to Right with minimum assistance  Patient completed Supine to Sit with moderate assistance  Patient completed Sit to Supine with minimum assistance    Functional Mobility/Transfers:  Patient completed Sit <> Stand Transfer with moderate assistance  with  rolling walker   Patient completed Toilet Transfer Stand Pivot technique with minimum assistance with  rolling walker and bedside commode    Activities of Daily Living:  Grooming: stand by assistance while seated on BSC for brushing teeth and rinsing mouth   Bathing: stand by assistance UB wipe up while seated EOB and applied deodorant   Lower Body Dressing: total assistance don socks while supine in bed  Toileting: minimum assistance hygiene while standing with CGA and unilateral UE support on RW ; Pt stood with CGA and B UE support on RW while OT donned diaper     Cognitive/Visual Perceptual:  Cognitive/Psychosocial Skills:     -       Oriented to: Person, Place, Time, and Situation   -       Follows Commands/attention:followed 2-3 step commands  -       Safety awareness/insight to disability: impaired   -       Mood/Affect/Coping skills/emotional control: Flat affect, encouragement to participate in eval process ; Pt refused 1st attempt but with encouragement agreed at 2nd attempt     Physical Exam:  Sensation:    -       Intact  light/touch BUE and hand  Dominant hand: -       Pt utilizes left as dominant and Right as assist   Upper Extremity Range of Motion:     -       Right Upper Extremity: Deficits: limited cooperation in exam; grossly mod decreased throughout   -       Left Upper Extremity: WFL  Upper Extremity Strength:    -       Right Upper Extremity: Deficits: limited cooperation in exam; grossly 2-2+/5 throughout   -       Left Upper Extremity: WFL   Strength:    -       Right Upper Extremity: Deficits: grossly 2+/5  -       Left Upper Extremity: WFL  Fine Motor Coordination:    -       Impaired  Right hand, finger to nose  , Right  "hand thumb/finger opposition skills  , and Right hand, manipulation of objects    Gross motor coordination:   impaired R UE       Treatment & Education:  Pt. educated on OT goals, POC, orientation to environment, use of call bell for assist with transfers OOB or for any other needs due to fall risk.     Patient left with bed in chair position with all lines intact, call button in reach, and nurse  notified    GOALS:   Multidisciplinary Problems       Occupational Therapy Goals          Problem: Occupational Therapy    Goal Priority Disciplines Outcome Interventions   Occupational Therapy Goal     OT, PT/OT Ongoing, Progressing    Description: Goals to be met by: d/c     Patient will increase functional independence with ADLs by performing:    LE Dressing with Stand-by Assistance don underwear and pants.    Grooming while standing at sink with Stand-by Assistance.    Toileting from bedside commode with Stand-by Assistance for hygiene and clothing management.     Toilet transfer to bedside commode with Stand-by Assistance with RW .                         History:     Past Medical History:   Diagnosis Date    Anxiety     Arthritis     CVA (cerebral vascular accident)     "mini stroke"    Depression     Diabetes mellitus     Heart problem     History of psychiatric hospitalization     three(1983, 1995 and another (years ago")): depression    HTN (hypertension)     Hx of psychiatric care     Hypertension     Pacemaker     Psychiatric exam requested by authority     Psychiatric problem     TBI (traumatic brain injury)     Therapy          Past Surgical History:   Procedure Laterality Date    CARDIAC PACEMAKER PLACEMENT      CARDIAC PACEMAKER REMOVAL      CHOLECYSTECTOMY      HYSTERECTOMY         Time Tracking:     OT Date of Treatment: 05/10/23  OT Start Time: 1313  OT Stop Time: 1345  OT Total Time (min): 32 min    Billable Minutes:Evaluation 32 min    5/10/2023  "

## 2023-05-10 NOTE — PLAN OF CARE
Problem: Violence Risk or Actual  Goal: Anger and Impulse Control  Outcome: Ongoing, Progressing     Problem: Adult Inpatient Plan of Care  Goal: Plan of Care Review  Outcome: Ongoing, Progressing  Goal: Patient-Specific Goal (Individualized)  Outcome: Ongoing, Progressing  Goal: Absence of Hospital-Acquired Illness or Injury  Outcome: Ongoing, Progressing  Goal: Optimal Comfort and Wellbeing  Outcome: Ongoing, Progressing  Goal: Readiness for Transition of Care  Outcome: Ongoing, Progressing     Problem: Diabetes Comorbidity  Goal: Blood Glucose Level Within Targeted Range  Outcome: Ongoing, Progressing     Problem: Bariatric Environmental Safety  Goal: Safety Maintained with Care  Outcome: Ongoing, Progressing

## 2023-05-10 NOTE — ED PROVIDER NOTES
"Name: Elba Barnes   Age: 62 y.o.  Sex: female    Chief complaint:   Chief Complaint   Patient presents with    Suicidal    Drug Overdose     Not sure what meds were taken.  AAO2      Patient arrived with: EMS  History obtained from: EMS    Subjective:   62-year-old female with a past medical history of CAD, hypertension, diabetes, CVA, osteoarthritis, traumatic brain injury that presents to the emergency department for suicide attempt by overdose.  Patient was extremely obtunded and was unable to answer any questions.  All history was obtained by EMS.  Per EMS patient was attempting suicide and took a lot of her medications.  We do not know what medications or quantity of medications taken.  No acute intervention was needed by EMS.    Past Medical History:   Diagnosis Date    Anxiety     Arthritis     CVA (cerebral vascular accident)     "mini stroke"    Depression     Diabetes mellitus     Heart problem     History of psychiatric hospitalization     three(,  and another (years ago")): depression    HTN (hypertension)     Hx of psychiatric care     Hypertension     Pacemaker     Psychiatric exam requested by authority     Psychiatric problem     TBI (traumatic brain injury)     Therapy      Past Surgical History:   Procedure Laterality Date    CARDIAC PACEMAKER PLACEMENT      CARDIAC PACEMAKER REMOVAL      CHOLECYSTECTOMY      HYSTERECTOMY       Social History     Socioeconomic History    Marital status: Single    Number of children: 5   Tobacco Use    Smoking status: Every Day     Packs/day: 0.50     Years: 40.00     Pack years: 20.00     Types: Cigarettes     Last attempt to quit: 2018     Years since quittin.6    Smokeless tobacco: Never   Substance and Sexual Activity    Alcohol use: Not Currently     Comment: wine on occ    Drug use: No    Sexual activity: Never   Other Topics Concern    Patient feels they ought to cut down on drinking/drug use No    Patient annoyed by others criticizing " their drinking/drug use No    Patient has felt bad or guilty about drinking/drug use No    Patient has had a drink/used drugs as an eye opener in the AM No   Social History Narrative    ** Merged History Encounter **          Social Determinants of Health     Financial Resource Strain: Low Risk     Difficulty of Paying Living Expenses: Not hard at all   Food Insecurity: No Food Insecurity    Worried About Running Out of Food in the Last Year: Never true    Ran Out of Food in the Last Year: Never true   Transportation Needs: No Transportation Needs    Lack of Transportation (Medical): No    Lack of Transportation (Non-Medical): No   Physical Activity: Inactive    Days of Exercise per Week: 0 days    Minutes of Exercise per Session: 0 min   Stress: No Stress Concern Present    Feeling of Stress : Not at all   Social Connections: Moderately Integrated    Frequency of Communication with Friends and Family: More than three times a week    Frequency of Social Gatherings with Friends and Family: More than three times a week    Attends Gnosticist Services: More than 4 times per year    Active Member of Clubs or Organizations: Yes    Attends Club or Organization Meetings: Never    Marital Status: Never    Housing Stability: Low Risk     Unable to Pay for Housing in the Last Year: No    Number of Places Lived in the Last Year: 1    Unstable Housing in the Last Year: No     Review of patient's allergies indicates:   Allergen Reactions    Pcn [penicillins] Anaphylaxis        Review of Systems   Unable to perform ROS: Mental status change        Objective:     Vitals:    05/09/23 1840   BP: 112/63   Pulse: 81   Resp:    Temp:         Physical Exam  Constitutional:       Appearance: She is not ill-appearing or toxic-appearing.   HENT:      Head: Normocephalic and atraumatic.   Eyes:      Extraocular Movements: Extraocular movements intact.      Pupils: Pupils are equal, round, and reactive to light.   Cardiovascular:       Rate and Rhythm: Regular rhythm.      Pulses: Normal pulses.   Pulmonary:      Effort: Pulmonary effort is normal.      Breath sounds: Normal breath sounds.   Abdominal:      General: Abdomen is flat. Bowel sounds are normal.      Palpations: Abdomen is soft.      Tenderness: There is no abdominal tenderness.   Musculoskeletal:         General: No deformity. Normal range of motion.      Cervical back: Normal range of motion and neck supple.   Skin:     General: Skin is warm and dry.   Neurological:      General: No focal deficit present.      Mental Status: She is disoriented.      GCS: GCS eye subscore is 4. GCS verbal subscore is 4. GCS motor subscore is 4.   Psychiatric:         Mood and Affect: Mood normal.         Behavior: Behavior normal.        Records:  Nursing records and triage records reviewed  Prior records reviewed    Medical decision making:   Presents emergency department for suicide attempt by overdosing on medications.  Unknown medications that were ingested an unknown quantity.  Patient is obtunded but arousable to verbal stimuli.  GCS 12, on airway is secure and does not need intubation at this time.  No acute signs of trauma on physical exam.  Will get labs and imaging for further evaluation.  Will pec.  Will contact poison Control.    ED Course as of 05/09/23 2154 Tue May 09, 2023   2025 EKG is nonischemic with a prolonged QTC of 499 [RK]   2026 X-rays show signs of a right lower lobe opacity.  Will be started on antibiotics. [RK]   2028 No severe electrolyte abnormality.  New SARAI or hypoglycemia.  Liver enzymes within normal limits.  Lactic acid within normal limits.  Salicylate, ETOH, acetaminophen negative. [RK]   2030 No leukocytosis, anemia, thrombocytopenia.  Troponin elevated at 0.061, will give full-dose aspirin. [RK]   2055 Coags normal.  Ammonia within normal limits. [RK]   2149 Poison control recommendations for supportive management. [RK]   2149 CT cervical spine within normal  limits [RK]   2153 CC abnormal.      Patient admitted to the hospital for further management.  Discussed with internal medicine team. [RK]      ED Course User Index  [RK] Satish Costello MD          EKG:  ECG Results              EKG 12-lead (Preliminary result)  Result time 05/09/23 20:25:47      Wet Read by Satish Costello MD (05/09/23 20:25:47, [facility] Dept, Emergency Medicine)    Normal sinus rhythm at a rate of 71, no signs of ST elevation or depression, left axis deviation, prolonged QTC of 499 otherwise normal intervals.                                     Critical Care    Date/Time: 5/9/2023 9:54 PM  Performed by: Satish Costello MD  Authorized by: Satish Costello MD   Total critical care time (exclusive of procedural time) : 75 minutes  Comments: Upon my evaluation, this patient had a high probability of imminent or life-threatening deterioration due to intentional drug overdose as a suicide attempt, NSTEMI, which required my direct attention, intervention, and personal management.    I have personally provided 75 minutes of critical care time exclusive of time spent on separately billed procedures. Time includes review of chart, history and physical exam of the patient, review of laboratory data, review of radiology results, discussion with consultants, and monitoring for potential decompensation. Interventions were performed as documented above.              Diagnosis:  Final diagnoses:  [R41.82] AMS (altered mental status)  [T50.902A] Intentional drug overdose, initial encounter (Primary)  [J18.9] Pneumonia due to infectious organism, unspecified laterality, unspecified part of lung  [I21.4] NSTEMI (non-ST elevated myocardial infarction)          Satish Costello M.D.  Emergency Medicine Physician     (Please note that this chart was completed via voice to text dictation. There may be typographical errors or substitutions that are unintentional, or uncorrected.  Every attempt was made to proofread the chart prior to completion. If there are any questions, please contact the physician for final clarification).         Satish Costello MD  05/09/23 8103

## 2023-05-11 VITALS
OXYGEN SATURATION: 91 % | DIASTOLIC BLOOD PRESSURE: 73 MMHG | TEMPERATURE: 98 F | RESPIRATION RATE: 12 BRPM | HEIGHT: 66 IN | HEART RATE: 92 BPM | SYSTOLIC BLOOD PRESSURE: 145 MMHG | BODY MASS INDEX: 40.71 KG/M2 | WEIGHT: 253.31 LBS

## 2023-05-11 LAB
ALBUMIN SERPL-MCNC: 2.8 G/DL (ref 3.4–4.8)
ALBUMIN/GLOB SERPL: 0.7 RATIO (ref 1.1–2)
ALP SERPL-CCNC: 55 UNIT/L (ref 40–150)
ALT SERPL-CCNC: 16 UNIT/L (ref 0–55)
AST SERPL-CCNC: 16 UNIT/L (ref 5–34)
BASOPHILS # BLD AUTO: 0.04 X10(3)/MCL
BASOPHILS NFR BLD AUTO: 0.5 %
BILIRUBIN DIRECT+TOT PNL SERPL-MCNC: 0.3 MG/DL
BUN SERPL-MCNC: 12 MG/DL (ref 9.8–20.1)
CALCIUM SERPL-MCNC: 8.8 MG/DL (ref 8.4–10.2)
CHLORIDE SERPL-SCNC: 105 MMOL/L (ref 98–107)
CO2 SERPL-SCNC: 24 MMOL/L (ref 23–31)
CREAT SERPL-MCNC: 0.8 MG/DL (ref 0.55–1.02)
EOSINOPHIL # BLD AUTO: 0.08 X10(3)/MCL (ref 0–0.9)
EOSINOPHIL NFR BLD AUTO: 1 %
ERYTHROCYTE [DISTWIDTH] IN BLOOD BY AUTOMATED COUNT: 15 % (ref 11.5–17)
GFR SERPLBLD CREATININE-BSD FMLA CKD-EPI: >60 MLS/MIN/1.73/M2
GLOBULIN SER-MCNC: 3.9 GM/DL (ref 2.4–3.5)
GLUCOSE SERPL-MCNC: 155 MG/DL (ref 82–115)
HCT VFR BLD AUTO: 39.1 % (ref 37–47)
HGB BLD-MCNC: 12.6 G/DL (ref 12–16)
IMM GRANULOCYTES # BLD AUTO: 0.04 X10(3)/MCL (ref 0–0.04)
IMM GRANULOCYTES NFR BLD AUTO: 0.5 %
LYMPHOCYTES # BLD AUTO: 2.28 X10(3)/MCL (ref 0.6–4.6)
LYMPHOCYTES NFR BLD AUTO: 28 %
MCH RBC QN AUTO: 28.3 PG (ref 27–31)
MCHC RBC AUTO-ENTMCNC: 32.2 G/DL (ref 33–36)
MCV RBC AUTO: 87.9 FL (ref 80–94)
MONOCYTES # BLD AUTO: 0.41 X10(3)/MCL (ref 0.1–1.3)
MONOCYTES NFR BLD AUTO: 5 %
NEUTROPHILS # BLD AUTO: 5.3 X10(3)/MCL (ref 2.1–9.2)
NEUTROPHILS NFR BLD AUTO: 65 %
NRBC BLD AUTO-RTO: 0 %
PLATELET # BLD AUTO: 316 X10(3)/MCL (ref 130–400)
PMV BLD AUTO: 9.3 FL (ref 7.4–10.4)
POCT GLUCOSE: 116 MG/DL (ref 70–110)
POCT GLUCOSE: 149 MG/DL (ref 70–110)
POCT GLUCOSE: 194 MG/DL (ref 70–110)
POTASSIUM SERPL-SCNC: 4.1 MMOL/L (ref 3.5–5.1)
PROT SERPL-MCNC: 6.7 GM/DL (ref 5.8–7.6)
RBC # BLD AUTO: 4.45 X10(6)/MCL (ref 4.2–5.4)
SODIUM SERPL-SCNC: 137 MMOL/L (ref 136–145)
WBC # SPEC AUTO: 8.15 X10(3)/MCL (ref 4.5–11.5)

## 2023-05-11 PROCEDURE — 63600175 PHARM REV CODE 636 W HCPCS: Performed by: INTERNAL MEDICINE

## 2023-05-11 PROCEDURE — 25000242 PHARM REV CODE 250 ALT 637 W/ HCPCS: Performed by: STUDENT IN AN ORGANIZED HEALTH CARE EDUCATION/TRAINING PROGRAM

## 2023-05-11 PROCEDURE — 85025 COMPLETE CBC W/AUTO DIFF WBC: CPT | Performed by: STUDENT IN AN ORGANIZED HEALTH CARE EDUCATION/TRAINING PROGRAM

## 2023-05-11 PROCEDURE — 96361 HYDRATE IV INFUSION ADD-ON: CPT

## 2023-05-11 PROCEDURE — 94640 AIRWAY INHALATION TREATMENT: CPT

## 2023-05-11 PROCEDURE — 25000003 PHARM REV CODE 250

## 2023-05-11 PROCEDURE — G0378 HOSPITAL OBSERVATION PER HR: HCPCS

## 2023-05-11 PROCEDURE — 27000221 HC OXYGEN, UP TO 24 HOURS

## 2023-05-11 PROCEDURE — 80053 COMPREHEN METABOLIC PANEL: CPT | Performed by: STUDENT IN AN ORGANIZED HEALTH CARE EDUCATION/TRAINING PROGRAM

## 2023-05-11 PROCEDURE — 94761 N-INVAS EAR/PLS OXIMETRY MLT: CPT

## 2023-05-11 PROCEDURE — 63600175 PHARM REV CODE 636 W HCPCS: Performed by: STUDENT IN AN ORGANIZED HEALTH CARE EDUCATION/TRAINING PROGRAM

## 2023-05-11 PROCEDURE — 25000003 PHARM REV CODE 250: Performed by: STUDENT IN AN ORGANIZED HEALTH CARE EDUCATION/TRAINING PROGRAM

## 2023-05-11 PROCEDURE — 97116 GAIT TRAINING THERAPY: CPT

## 2023-05-11 PROCEDURE — 97530 THERAPEUTIC ACTIVITIES: CPT

## 2023-05-11 PROCEDURE — 96372 THER/PROPH/DIAG INJ SC/IM: CPT | Performed by: STUDENT IN AN ORGANIZED HEALTH CARE EDUCATION/TRAINING PROGRAM

## 2023-05-11 PROCEDURE — 96372 THER/PROPH/DIAG INJ SC/IM: CPT | Performed by: INTERNAL MEDICINE

## 2023-05-11 RX ORDER — BUTALBITAL, ACETAMINOPHEN AND CAFFEINE 50; 325; 40 MG/1; MG/1; MG/1
1 TABLET ORAL EVERY 6 HOURS PRN
Status: DISCONTINUED | OUTPATIENT
Start: 2023-05-11 | End: 2023-05-11 | Stop reason: HOSPADM

## 2023-05-11 RX ORDER — LEVOFLOXACIN 750 MG/1
750 TABLET ORAL DAILY
Status: DISCONTINUED | OUTPATIENT
Start: 2023-05-11 | End: 2023-05-11 | Stop reason: HOSPADM

## 2023-05-11 RX ORDER — GABAPENTIN 600 MG/1
600 TABLET ORAL 3 TIMES DAILY
Qty: 90 TABLET | Refills: 5 | Status: SHIPPED | OUTPATIENT
Start: 2023-05-11 | End: 2024-02-29 | Stop reason: ALTCHOICE

## 2023-05-11 RX ORDER — LEVOFLOXACIN 500 MG/1
750 TABLET, FILM COATED ORAL DAILY
Qty: 3 TABLET | Refills: 0 | Status: SHIPPED | OUTPATIENT
Start: 2023-05-11 | End: 2023-06-08 | Stop reason: ALTCHOICE

## 2023-05-11 RX ADMIN — SODIUM CHLORIDE, POTASSIUM CHLORIDE, SODIUM LACTATE AND CALCIUM CHLORIDE: 600; 310; 30; 20 INJECTION, SOLUTION INTRAVENOUS at 04:05

## 2023-05-11 RX ADMIN — ENOXAPARIN SODIUM 40 MG: 40 INJECTION SUBCUTANEOUS at 08:05

## 2023-05-11 RX ADMIN — NIFEDIPINE 90 MG: 30 TABLET, FILM COATED, EXTENDED RELEASE ORAL at 08:05

## 2023-05-11 RX ADMIN — BUTALBITAL, ACETAMINOPHEN, AND CAFFEINE 1 TABLET: 325; 50; 40 TABLET ORAL at 08:05

## 2023-05-11 RX ADMIN — ASPIRIN 81 MG: 81 TABLET, COATED ORAL at 08:05

## 2023-05-11 RX ADMIN — POLYETHYLENE GLYCOL 3350 17 G: 17 POWDER, FOR SOLUTION ORAL at 08:05

## 2023-05-11 RX ADMIN — FLUTICASONE FUROATE AND VILANTEROL TRIFENATATE 1 PUFF: 200; 25 POWDER RESPIRATORY (INHALATION) at 07:05

## 2023-05-11 RX ADMIN — INSULIN ASPART 3 UNITS: 100 INJECTION, SOLUTION INTRAVENOUS; SUBCUTANEOUS at 11:05

## 2023-05-11 RX ADMIN — AMLODIPINE BESYLATE 2.5 MG: 2.5 TABLET ORAL at 08:05

## 2023-05-11 RX ADMIN — ACETAMINOPHEN 650 MG: 325 TABLET ORAL at 07:05

## 2023-05-11 RX ADMIN — HYDRALAZINE HYDROCHLORIDE 100 MG: 50 TABLET ORAL at 06:05

## 2023-05-11 NOTE — PT/OT/SLP DISCHARGE
Occupational Therapy Discharge Summary    Elba Barnes  MRN: [id number]   Principal Problem: Drug overdose, intentional      Patient Discharged from acute Occupational Therapy on 5/11/23.  Please refer to prior OT note dated 5/10/23 for functional status.    Assessment:      Patient was discharged unexpectedly.  Information required to complete an accurate discharge summary is unknown.  Refer to therapy initial evaluation for most recent functional status Recommendations made may be found in medical record.    Objective:     GOALS: 0/4 goals met  Multidisciplinary Problems       Occupational Therapy Goals          Problem: Occupational Therapy    Goal Priority Disciplines Outcome Interventions   Occupational Therapy Goal     OT, PT/OT Goals not met/unknown as pt d/cd after IE and no further treat     Description: Goals to be met by: d/c     Patient will increase functional independence with ADLs by performing:    LE Dressing with Stand-by Assistance don underwear and pants.    Grooming while standing at sink with Stand-by Assistance.    Toileting from bedside commode with Stand-by Assistance for hygiene and clothing management.     Toilet transfer to bedside commode with Stand-by Assistance with RW .                         Reasons for Discontinuation of Therapy Services  Hospital d/c       Plan:     Patient Discharged to:  psychiatric hospital    5/11/2023

## 2023-05-11 NOTE — PLAN OF CARE
Spoke to Kendra at [facility]. Accepting MD is Dr. Amato. Patient has been accepted at [facility] in [place]: [address] [place] [address]. P: [phone]. Spoke to Alysha with [employer], P: [phone], to arrange transportation. Trip # [id number]. Patient has been updated and patient's nurse has been given all information.

## 2023-05-11 NOTE — NURSING
Patient was discharged to [facility] in [place]. Patient was discharged with personal belonging bag which included medication and purse with  phone and phone . Report was called to nurse walker. Patient was transported via [facility].

## 2023-05-11 NOTE — PLAN OF CARE
New consult for inpatient psych noted. Spoke to Eunice at [facility], P: [phone]. They will begin to look for placement for patient. Will follow.

## 2023-05-11 NOTE — PROGRESS NOTES
"[facility] Internal Medicine History & Physical Note     Resident Team: [facility] Medicine List 1  Attending Physician: Loulou Dodge MD  Date of Admit: 5/9/2023    Chief Complaint     Suicidal and Drug Overdose (Not sure what meds were taken.  AAO2)    Subjective:      History of Present Illness:  Elba Barnes is a 62 y.o.  female with a PMH of hypertension, poorly controlled diabetes, traumatic brain injury, CHF, CVA with right sided deficits, GERD, osteoarthritis of multiple sites, tobacco abuse, and obesity was brought to the ED by EMS for intentional drug overdose of current medications on 5/9/2023. Pt states that she took extra medication so she could, "go to sleep". She is "tired of my daughter yelling at me". Does not recall what specific medications she took, but states remembers taking 3 extra pills of each of her medications.  When asked what medication she is gabapentin, then voiced trails off. History is limited secondary to patients clinical presentation. She is very somnolent and hard to arouse. Intermittently answers questions.  Denies currently being in any pain.     Per discussion with ED physician and nursing staff patient did admit to intentionally overdosing on her current medications.  States that she does not want her daughter to be informed of her clinical condition at this time.  Patient was PEC'd by ED physician on 05/09/2023.    On arrival to ED temp 97.2°, HR 88, RR 18, /84, SpO2 99% on 2 L nasal cannula.  Labs were significant for chloride 108, albumin 2.7, troponin 0.061, acetaminophen level less than 17.4.  CT cervical spine with some degenerative changes but no acute fractures appreciated.  CT head without contrast with no acute intracranial hemorrhage/edema.  Chest x-ray with right lower lobe infiltrates.  She was started on levofloxacin by ER physician.  Internal medicine consulted for altered mental status.    Interval History: Patient reports having a generalized headache this " "morning. She reports being on Fioricet for headaches. Order placed. No other associated symptoms. Patient denies fever, chills, nausea, vomiting, diarrhea, chest pain, abdominal pain, and any other symptoms. Psych consulted and recommend inpatient psych placement. Patient is refusing. Will speak to family.      Past Medical History:  Past Medical History:   Diagnosis Date    Anxiety     Arthritis     CVA (cerebral vascular accident)     "mini stroke"    Depression     Diabetes mellitus     Heart problem     History of psychiatric hospitalization     three(,  and another (years ago")): depression    HTN (hypertension)     Hx of psychiatric care     Hypertension     Pacemaker     Psychiatric exam requested by authority     Psychiatric problem     TBI (traumatic brain injury)     Therapy        Past Surgical History:  Past Surgical History:   Procedure Laterality Date    CARDIAC PACEMAKER PLACEMENT      CARDIAC PACEMAKER REMOVAL      CHOLECYSTECTOMY      HYSTERECTOMY         Family History:  Family History   Problem Relation Age of Onset    Schizophrenia Mother     Bipolar disorder Mother     Bipolar disorder Father        Social History:  Social History     Tobacco Use    Smoking status: Every Day     Packs/day: 0.50     Years: 40.00     Pack years: 20.00     Types: Cigarettes     Last attempt to quit: 2018     Years since quittin.6    Smokeless tobacco: Never   Substance Use Topics    Alcohol use: Not Currently     Comment: wine on occ    Drug use: No       Allergies:  Review of patient's allergies indicates:   Allergen Reactions    Pcn [penicillins] Anaphylaxis       Home Medications:  Prior to Admission medications    Medication Sig Start Date End Date Taking? Authorizing Provider   albuterol (PROVENTIL/VENTOLIN HFA) 90 mcg/actuation inhaler Inhale 1-2 puffs into the lungs every 6 (six) hours as needed for Wheezing. Rescue 23   Ignacia Lopez DO   ALPRAZolam (XANAX) 1 MG tablet Take 1 tablet " "by mouth once a day as needed as needed for anxiety 12/12/22   Historical Provider   amLODIPine (NORVASC) 2.5 MG tablet Take 2.5 mg by mouth once daily.    Historical Provider   ammonium lactate (LAC-HYDRIN) 12 % lotion Apply to dry skin areas on feet, legs, and elbow areas TWICE A DAY AS NEEDED 1/5/23   Historical Provider   ascorbic acid, vitamin C, 500 mg Chew Take 500 mg by mouth.    Historical Provider   aspirin (ECOTRIN) 81 MG EC tablet Take 1 tablet (81 mg total) by mouth once daily. 9/30/21   Markel Torres MD   atorvastatin (LIPITOR) 40 MG tablet Take 40 mg by mouth every evening. 11/22/22   Historical Provider   b complex vitamins capsule Take 1 capsule by mouth every morning.    Historical Provider   B-complex with vitamin C (Z-BEC OR EQUIV) tablet Take 1 capsule by mouth once daily.    Historical Provider   BASAGLAR KWIKPEN U-100 INSULIN glargine 100 units/mL SubQ pen Inject 80 Units into the skin 2 (two) times daily with meals. 12/23/22   VIRGEN Ho   BD ULTRA-FINE SHORT PEN NEEDLE 31 gauge x 5/16" Ndle SMARTSIG:Injection Twice Daily 10/24/22   Historical Provider   blood-glucose meter Misc CHECK BLOOD GLUCOSE LEVEL TWICE DAILY. 4/6/22   Historical Provider   butalbital-acetaminophen-caffeine -40 mg (FIORICET, ESGIC) -40 mg per tablet Take 1 tablet by mouth every 6 (six) hours as needed for Pain. 2/11/19   Leandra Monreal NP   carvediloL (COREG) 12.5 MG tablet Take 1 tablet (12.5 mg total) by mouth 2 (two) times daily. 2/17/23 2/17/24  Ignacia Lopez DO   cholecalciferol, vitamin D3, (VITAMIN D3) 50 mcg (2,000 unit) Cap capsule Take 1 capsule by mouth. 8/13/22   Historical Provider   cloNIDine (CATAPRES) 0.1 MG tablet Take 0.1 mg by mouth every 6 (six) hours as needed. 8/27/22   Historical Provider   diclofenac sodium (VOLTAREN) 1 % Gel Apply 2 g topically 4 (four) times daily. 12/21/22   VIRGEN Ho   docusate sodium (COLACE) 250 MG capsule Take 1 capsule (250 mg " "total) by mouth 2 (two) times daily. 3/8/23   VIRGEN Ho   DULoxetine (CYMBALTA) 30 MG capsule Take 30 mg by mouth. 22   Historical Provider   empty container (SHARPS CON[place]ER) [facility] [place]   Historical Provider   esomeprazole (NEXIUM) 40 MG capsule Take 40 mg by mouth every morning. 22   Historical Provider   fluticasone furoate-vilanteroL (BREO ELLIPTA) 200-25 mcg/dose DsDv diskus inhaler Inhale 1 puff into the lungs once daily. Controller 22   VIRGEN Ho   fluticasone propionate (FLONASE) 50 mcg/actuation nasal spray 1 spray (50 mcg total) by Each Nostril route 2 (two) times daily as needed for Rhinitis. 3/14/23   Rui Monroy DO   gabapentin (NEURONTIN) 800 MG tablet Take 1 tablet (800 mg total) by mouth every 8 (eight) hours. 22   VIRGEN Ho   glipiZIDE (GLUCOTROL) 10 MG tablet Take 10 mg by mouth. 23   Historical Provider   hydrALAZINE (APRESOLINE) 100 MG tablet Take 1 tablet (100 mg total) by mouth every 8 (eight) hours. 23  Ignacia Lopez DO   HYDROcodone-acetaminophen (NORCO) 5-325 mg per tablet Take 1 tablet by mouth every 6 (six) hours as needed for Pain. 23   Markel Rodriguez MD   insulin syringe-needle U-100 1 mL 28 gauge x 1/2" Syrg USE TO inject insulin TWICE DAILY 22   Historical Provider   loratadine (CLARITIN) 10 mg tablet Take 10 mg by mouth. 10/26/22 7/23/23  Historical Provider   magnesium citrate solution Take 296 mLs by mouth daily as needed. 22   Historical Provider   multivitamin (THERAGRAN) per tablet Take 1 tablet by mouth once daily.    Historical Provider   naloxone (NARCAN) 4 mg/actuation Spry SMARTSI Spray(s) Both Nares PRN 10/12/22   Historical Provider   naproxen (NAPROSYN) 500 MG tablet Take 500 mg by mouth 2 (two) times daily. 11/7/22   Historical Provider   nebivoloL (BYSTOLIC) 10 MG Tab Take 10 mg by mouth once daily.    Historical Provider   NIFEdipine (PROCARDIA-XL) 90 MG (OSM) 24 hr " tablet Take 1 tablet (90 mg total) by mouth once daily. 23  Ignacia Lopez DO   nitroGLYCERIN (NITROSTAT) 0.4 MG SL tablet Place 0.4 mg under the tongue every 5 (five) minutes as needed for Chest pain.    Historical Provider   NOVOLOG FLEXPEN U-100 INSULIN 100 unit/mL (3 mL) InPn pen Inject 20 Units into the skin 3 (three) times daily with meals. DISCARD OPEN PEN AFTER 28 DAYS 3/22/23   VIRGEN Ho   nystatin (MYCOSTATIN) cream Apply topically 3 (three) times daily. Use under breast 10/26/22   Historical Provider   ondansetron (ZOFRAN) 4 MG tablet Take 1 tablet (4 mg total) by mouth 2 (two) times daily. 23   Ignacia Lopez DO   ONETOUCH DELICA PLUS LANCET 33 gauge Misc Apply topically 4 (four) times daily. 10/27/22   Historical Provider   ONETOUCH VERIO TEST STRIPS Strp 4 (four) times daily. 22   Historical Provider   pantoprazole (PROTONIX) 40 MG tablet Take 1 tablet (40 mg total) by mouth once daily. 19   Leandra Monreal NP   polyethylene glycol (GLYCOLAX) 17 gram/dose powder Take 17 g by mouth. 22   Historical Provider   sertraline (ZOLOFT) 50 MG tablet Take 50 mg by mouth once daily.    Historical Provider   sucralfate (CARAFATE) 1 gram tablet TAKE 1 TABLET(1 GRAM) BY MOUTH FOUR TIMES DAILY BEFORE MEALS AND AT NIGHT  Patient taking differently: Take 1 g by mouth 4 (four) times daily before meals and nightly. 19   Leandra Monreal NP   terconazole (TERAZOL 7) 0.4 % Crea Place 1 applicator vaginally every evening. 23   Historical Provider   valsartan (DIOVAN) 160 MG tablet Take 1 tablet (160 mg total) by mouth once daily. 23  Ignacia Lopez DO       Review of Systems:  See HPI unable to complete secondary to patient's clinical condition.    Objective:   Last 24 Hour Vital Signs:  BP  Min: 136/64  Max: 161/72  Temp  Av.3 °F (37.4 °C)  Min: 99 °F (37.2 °C)  Max: 100.2 °F (37.9 °C)  Pulse  Av.1  Min: 90  Max: 103  Resp  Av.4  Min: 12   Max: 24  SpO2  Av %  Min: 95 %  Max: 100 %  Body mass index is 40.89 kg/m².  I/O last 3 completed shifts:  In: 2250 [I.V.:2000; IV Piggyback:250]  Out: 1300 [Urine:1300]    Physical Examination:  General: in no acute distress, somnolent, in and out of sleep. Answers trail off into a mumble  Eye: clear conjunctiva for brief period held open, eyelids normal  HENT: oropharynx without erythema or exudate, oropharynx and nasal mucosal surfaces moist  Neck: full range of motion, no lymphadenopathy  Respiratory: clear to auscultation bilaterally. No wheezes, rhonchi, or rales.  Cardiovascular: regular rate and rhythm. S1/S2 present. No murmurs, gallops or rubs appreciated  Gastrointestinal: soft, non-tender, non-distended with normal bowel sounds  Musculoskeletal: full range of motion of all extremities and spine without limitation or discomfort  Extremities: No peripheral edema  Neurologic: oriented to self, time, and place. At baseline. Normal judgement and behavior.     Laboratory:  Most Recent Data:  CBC:   Lab Results   Component Value Date    WBC 8.15 2023    HGB 12.6 2023    HCT 39.1 2023     2023    MCV 87.9 2023    RDW 15.0 2023     WBC Differential:   Recent Labs   Lab 23  1942 05/10/23  0119 23  0423   WBC 8.71 8.05 8.15   HGB 13.8 13.4 12.6   HCT 43.7 43.0 39.1    331 316   MCV 87.9 90.1 87.9     BMP:   Lab Results   Component Value Date     2023    K 4.1 2023    CL 98 2021    CO2 24 2023    BUN 12.0 2023    CREATININE 0.80 2023     (HH) 2021    CALCIUM 8.8 2023    MG 2.10 2023    PHOS 3.9 2023     LFTs:   Lab Results   Component Value Date    PROT 8.6 (H) 2021    ALBUMIN 2.8 (L) 2023    BILITOT 0.3 2023    AST 16 2023    ALKPHOS 55 2023    ALT 16 2023     Coags:   Lab Results   Component Value Date    INR 0.98 2023    PROTIME 12.8  05/09/2023    PTT 60.6 02/14/2023     FLP:   Lab Results   Component Value Date    CHOL 173 02/14/2023    HDL 31 (L) 02/14/2023    TRIG 283 (H) 02/14/2023     DM:   Lab Results   Component Value Date    HGBA1C 8.7 (H) 02/09/2023    HGBA1C 10.5 (H) 10/18/2022    HGBA1C 11.5 (H) 03/23/2022    CREATININE 0.80 05/11/2023     Thyroid:   Lab Results   Component Value Date    TSH 0.646 05/09/2023      Anemia: No results found for: IRON, TIBC, FERRITIN, SATURATEDIRO  No results found for: [id number]  No results found for: FOLATE     Cardiac:   Lab Results   Component Value Date    TROPONINI 0.064 (H) 05/10/2023    BNP 23.5 03/13/2023     Urinalysis:   Lab Results   Component Value Date    LABURIN >/= 100,000 colonies/ml Escherichia coli (A) 02/09/2023    COLORU Yellow 09/30/2021    PHUA 6.0 02/15/2023    SPECGRAV 1.015 09/30/2021    NITRITE Negative 09/30/2021    KETONESU Negative 09/30/2021    UROBILINOGEN Normal 02/15/2023    WBCUA 0-5 02/15/2023       Trended Lab Data:  Recent Labs   Lab 05/09/23  1942 05/09/23  2021 05/10/23  0119 05/11/23  0423   WBC 8.71  --  8.05 8.15   HGB 13.8  --  13.4 12.6   HCT 43.7  --  43.0 39.1     --  331 316   MCV 87.9  --  90.1 87.9   RDW 15.0  --  15.0 15.0   NA  --  142 143 137   K  --  3.7 3.9 4.1   CO2  --  25 26 24   BUN  --  14.3 15.0 12.0   CREATININE  --  0.89 1.00 0.80   ALBUMIN  --  2.9* 2.7* 2.8*   BILITOT  --  0.2 0.3 0.3   AST  --  22 19 16   ALKPHOS  --  62 59 55   ALT  --  17 17 16       Trended Cardiac Data:  Recent Labs   Lab 05/09/23  1942 05/10/23  0119   TROPONINI 0.061* 0.064*       Microbiology Data:  Microbiology Results (last 7 days)       Procedure Component Value Units Date/Time    Respiratory Culture [[id number]]     Order Status: Sent Specimen: Sputum, Expectorated              Other Results:  EKG (my interpretation):   EKG with no ST or T-wave changes noted.  Her QTC is prolonged at 499.      Radiology:  Imaging Results              CT Cervical Spine  Without Contrast (Final result)  Result time 05/10/23 07:39:28      Final result by Davon Thakkar MD (05/10/23 07:39:28)                   Impression:    Impression:    1. No acute cervical spine fracture dislocation or subluxation is seen.    2. Degenerative changes and other details as above.    No significant discrepancy with overnight report.      Electronically signed by: Davon Thakkar  Date:    05/10/2023  Time:    07:39               Narrative:      TECHNIQUE:CT OF THE CERVICAL SPINE WAS PERFORMED WITHOUT INTRAVENOUS CONTRAST WITH AXIAL AS WELL AS SAGITTAL AND CORONAL IMAGES.    COMPARISON:NONE.    DOSAGE INFORMATION:AUTOMATED EXPOSURE CONTROL WAS UTILIZED.      CLINICAL HISTORY:AMS.    Findings:    Lung apices:Small emphysematous paraseptal bullae are seen at the lung apices.    Spine:    Mineralization:Within normal limits for age.    Scoliosis:No significant scoliosis is seen.    Vertebral Fusion:No vertebral fusion is identified.    Listhesis:No significant listhesis is identified.    Lordosis:The cervical lordosis is maintained.    Intervertebral disc spaces:The intervertebral discs are preserved throughout.    Osteophytes:Large anterior osteophytes are seen off the opposing endplates at C5-C6 and C6-C7.    Endplate Sclerosis:No significant endplate sclerosis is seen.    Uncovertebral degenerative changes:No significant uncovertebral degenerative changes are seen.    Facet degenerative changes:No significant facet degenerative changes are seen.    Fractures:No acute cervical spine fracture dislocation or subluxation is seen.    This exam does not exclude the possibility intrathecal soft tissue, ligamentous or vascular injury.                        Preliminary result by Davon Thakkar MD (05/09/23 21:22:12)                   Narrative:    START OF REPORT:  TECHNIQUE: CT OF THE CERVICAL SPINE WAS PERFORMED WITHOUT INTRAVENOUS CONTRAST WITH AXIAL AS WELL AS SAGITTAL AND CORONAL  IMAGES.    COMPARISON: NONE.    DOSAGE INFORMATION: AUTOMATED EXPOSURE CONTROL WAS UTILIZED.    CLINICAL HISTORY: AMS.    Findings:  Lung apices: Small emphysematous paraseptal bullae are seen at the lung apices.  Spine:  Spinal canal: The spinal canal appears unremarkable.  Spinal cord: The spinal cord appears unremarkable.  Mineralization: Within normal limits for age.  Scoliosis: No significant scoliosis is seen.  Vertebral Fusion: No vertebral fusion is identified.  Listhesis: No significant listhesis is identified.  Lordosis: The cervical lordosis is maintained.  Intervertebral disc spaces: The intervertebral discs are preserved throughout.  Osteophytes: Large anterior osteophytes are seen off the opposing endplates at C5-C6 and C6-C7.  Endplate Sclerosis: No significant endplate sclerosis is seen.  Uncovertebral degenerative changes: No significant uncovertebral degenerative changes are seen.  Facet degenerative changes: No significant facet degenerative changes are seen.  Fractures: No acute cervical spine fracture dislocation or subluxation is seen.      Impression:  1. No acute cervical spine fracture dislocation or subluxation is seen.  2. Degenerative changes and other details as above.                                         CT Head Without Contrast (Final result)  Result time 05/10/23 07:36:05      Final result by Davon Thakkar MD (05/10/23 07:36:05)                   Impression:    Impression:    No acute intracranial process identified. Details and findings as noted above.    No significant discrepancy with overnight report      Electronically signed by: Davon Thakkar  Date:    05/10/2023  Time:    07:36               Narrative:      TECHNIQUE:CT OF THE HEAD WAS PERFORMED WITHOUT INTRAVENOUS CONTRAST WITH AXIAL AS WELL AS CORONAL AND SAGITTAL IMAGES.    COMPARISON:COMPARISON IS WITH STUDY DATED 2023-03-13 21:11:20.    DOSAGE INFORMATION:AUTOMATED EXPOSURE CONTROL WAS UTILIZED.  DLP 1007    CLINICAL  HISTORY:MENTAL STATUS CHANGE, UNKNOWN CAUSE.    Findings:No significant interval change as compared with the prior examination.    Hemorrhage:No acute intracranial hemorrhage is seen.    CSF spaces:The ventricles sulci and basal cisterns are within normal limits for age.    Brain parenchyma:There is no acute large vessel territory infarct.  Chronic microvascular change is seen in portions of the periventricular and deep white matter tracts.    Cerebellum:Unremarkable.    Sella and skull base:The sella appears to be within normal limits for age.    Herniation:None.    Calvarium:No acute linear or depressed skull fracture is seen.    Scalp:There is a stable appearing 1 cm soft tissue density lesion in the right occipital scalp (series 2 image 15). This likely reflects an epidermoid cyst.    Maxillofacial Structures:Again noted is a 4 mm radiodense foreign body in the subcutaneous tissue over the alveolar process of the right maxilla (series 4 image 4).    Paranasal sinuses:Minimal secretions are seen in the left sphenoid sinus.                        Preliminary result by Davon Thakkar MD (05/09/23 21:20:23)                   Narrative:    START OF REPORT:  TECHNIQUE: CT OF THE HEAD WAS PERFORMED WITHOUT INTRAVENOUS CONTRAST WITH AXIAL AS WELL AS CORONAL AND SAGITTAL IMAGES.    COMPARISON: COMPARISON IS WITH STUDY [id number] 21:11:20.    DOSAGE INFORMATION: AUTOMATED EXPOSURE CONTROL WAS UTILIZED.    CLINICAL HISTORY: MENTAL STATUS CHANGE, UNKNOWN CAUSE.    Findings: No significant interval change as compared with the prior examination.  Hemorrhage: No acute intracranial hemorrhage is seen.  CSF spaces: The ventricles sulci and basal cisterns are within normal limits for age.  Brain parenchyma: There is preservation of the grey white junction throughout. Moderate microvascular change is seen in portions of the periventricular and deep white matter tracts.  Cerebellum: Unremarkable.  Sella and skull base: The  sella appears to be within normal limits for age.  Herniation: None.  Intracranial calcifications: Incidental note is made of bilateral choroid plexus calcification. Incidental note is made of some pineal region calcification. Incidental note is made of some calcification of the falx.  Calvarium: No acute linear or depressed skull fracture is seen.  Scalp: There is a stable appearing 1 cm soft tissue density lesion in the right occipital scalp (series 2 image 15). This likely reflects an epidermoid cyst.    Maxillofacial Structures: Again noted is a 4 mm radiodense foreign body in the subcutaneous tissue over the alveolar process of the right maxilla (series 4 image 4).  Paranasal sinuses: Minimal secretions are seen in the left sphenoid sinus.  Orbits: The orbits appear unremarkable.  Zygomatic arches: The zygomatic arches are intact and unremarkable.  Temporal bones and mastoids: The temporal bones and mastoids appear unremarkable.  TMJ: The mandibular condyles appear normally placed with respect to the mandibular fossa.      Impression:  1. No acute intracranial process identified. Details and findings as noted above.                                         X-Ray Chest 1 View (Final result)  Result time 05/09/23 19:54:28      Final result by Kelsie Prescott MD (05/09/23 19:54:28)                   Impression:      Increased patchy opacities in the right lower lung concerning for infection      Electronically signed by: Kelsie Prescott  Date:    05/09/2023  Time:    19:54               Narrative:    EXAMINATION:  XR CHEST 1 VIEW    CLINICAL HISTORY:  AMS;    TECHNIQUE:  AP chest    COMPARISON:  Chest x-ray dated 04/25/2023    FINDINGS:  The heart is stable in size.  There are increased patchy opacities in the right lower lung.  There is no pleural effusion or definite visible pneumothorax.                                      Assessment & Plan:     Altered mental status in the setting of intentional drug  overdose  -Unknown ingestion of patient's prescribed medications   -UDS pending   -Neuro checks q4hrs   -PEC'd by ED physician  -Patient with multiple psychiatric admissions, may consider psych consult if deemed necessary  -Living situation with daughter may also have to be explored, given patient admitting that she is the source of her depression. Denies any physical abuse.   -bedside nurse swallow when patient is more alert prior to oral intake  -Psych consult placed; awaiting recs    Aspiration Pneumonia  -CXR with right lung infiltrates, in the setting of her clinical presentation the above is very likely  -Given 1 time dose of Levaquin in the ED  -Patient with severe penicillin allergy and also has QTC of 499  -Usually would start on clindamycin but given her history and findings will empirically start Doxy and Flagyl  -Regiment can be altered per primary team     Elevated troponin  -Trop 0.061 on presentation  -Loaded with ASA in the ED  -No EKG changes, most likely demand ischemia   -Continue to trend cardiac enzymes q6hrs  -If patient becomes symptomatic initiate anticoagulation  -[facility] 12/2022: Coronary dominance 20, left main, left anterior descending, left circumflex and right dominant all patent  -Patient recently admitted on 02/2023 for concern of anginal chest pain cardiology consulted and recommended outpatient medical management with no indication for repeat left heart catheterization.    Diabetes mellitus type 2  -Holding home insulin regiment, placed on sheryl dose ISS  -Once tolerating PO may add basal regiment     Hypertension   CAD  -Resume home meds, per EMR review it appears she is taking Norvasc 2.5 mg, Nifedipine 90 mg, Carvedilol 3.125 BID. Dose of Hydralazine is questionable 25 TID vs 100 BID  -Continue to monitor closely    CODE STATUS: FULL  Access: peripheral   Antibiotics: Given 1x dose of levofloxacin in the ED   Diet: NPO  DVT Prophylaxis: Lovenox 40 mg SubQ  GI Prophylaxis:  none  Fluids:  cc/hr      Disposition: Admitted for AMS in the setting on intentional drug overdose. Patient is protecting airway. Continue to monitor closely. Neuro checks q4hr. On high ISS for now, may resume home basal once she is tolerating oral intake. Psych recommends inpatient psych placement. Patient refusing. Will contact family today.     Romario Weaver MD  [employer] PGY-1

## 2023-05-11 NOTE — DISCHARGE SUMMARY
[facility] INTERNAL MEDICINE  INPATIENT DISCHARGE SUMMARY    Admitting Physician: Arabella Dailey MD  Attending Physician: Suzanne Martins MD  Date of Admit: 5/9/2023  Date of Discharge: 5/11/2023    [facility]   Condition: Stable    Discharge Diagnoses     Patient Active Problem List   Diagnosis    Depression    Anxiety    PTSD (post-traumatic stress disorder)    Class 3 severe obesity with body mass index (BMI) of 40.0 to 44.9 in adult    Tobacco use disorder    Essential hypertension    Mixed hyperlipidemia    Diabetes mellitus type 2, insulin dependent    Encounter for diabetic foot exam    Uncontrolled type 2 diabetes mellitus with hyperglycemia    Noncompliance    Overgrown toenails    Acute cystitis without hematuria    Tricompartment osteoarthritis of right knee    Precordial pain    Abdominal pain    Nausea and vomiting    NSTEMI (non-ST elevated myocardial infarction)    Hypertensive emergency    Drug overdose, intentional       Consultants and Procedures     Consultants:  Consults (From admission, onward)          Status Ordering Provider     Inpatient consult to Social Work/Case Management  Once        Provider:  (Not yet assigned)    Completed ERVIN PARSON     Inpatient consult to Psychiatry  Once        Provider:  Rich Duarte MD    Completed ONIEL CORREA     IP consult to case management  Once        Provider:  (Not yet assigned)    Completed LEAH GOOD     Inpatient consult to Hospitalist  Once        Provider:  Suzanne Martins MD    Acknowledged COSTA WOLFF             Procedures:   None    Brief History of Present Illness      Elba Barnes is a 62 y.o.  female with a PMH of hypertension, poorly controlled diabetes, traumatic brain injury, CHF, CVA with right sided deficits, GERD, osteoarthritis of multiple sites, tobacco abuse, and obesity was brought to the ED by EMS for intentional drug overdose of current medications on 5/9/2023. Pt states that she took extra  "medication so she could, "go to sleep". She is "tired of my daughter yelling at me". Does not recall what specific medications she took, but states remembers taking 3 extra pills of each of her medications.  When asked what medication she is gabapentin, then voiced trails off. History is limited secondary to patients clinical presentation. She is very somnolent and hard to arouse. Intermittently answers questions.  Denies currently being in any pain. Per discussion with ED physician and nursing staff patient did admit to intentionally overdosing on her current medications.  States that she does not want her daughter to be informed of her clinical condition at this time.  Patient was PEC'd by ED physician on 05/09/2023. On arrival to ED temp 97.2°, HR 88, RR 18, /84, SpO2 99% on 2 L nasal cannula.  Labs were significant for chloride 108, albumin 2.7, troponin 0.061, acetaminophen level less than 17.4.  CT cervical spine with some degenerative changes but no acute fractures appreciated.  CT head without contrast with no acute intracranial hemorrhage/edema.  Chest x-ray with right lower lobe infiltrates.  She was started on levofloxacin by ER physician.  Internal medicine consulted for altered mental status.    Hospital Course with Pertinent Findings     Admitted to inpatient unit for ongoing monitoring and medical therapy on 5/9/2023. Inpatient imaging included: CXR 5/9/2023 revealed patchy opacities in right lower lung, CT head w/o contrast 5/10/2023 revealed no acute intracranial abnormalities, CT C-spine 5/10/2023 revealed degenerative changes in C5-6 and C6-7. Hospital course: patient's mental status improved over the course of this hospitalization w/o significant laboratory derangements. Inpatient psychiatrist was consulted and recommended transferring patient to an inpatient psych facility. Patient is currently stable to be transferred to an inpatient psych facility for additional therapy.    Discharge " "physical exam:  Vitals:    05/11/23 1126   BP: (!) 145/73   Pulse: 92   Resp: 12   Temp: 98.4 °F (36.9 °C)     Please refer to progress note for physical examination    TIME SPENT ON DISCHARGE: 60 minutes    Discharge Medications        Medication List        START taking these medications      levoFLOXacin 500 MG tablet  Commonly known as: LEVAQUIN  Take 1.5 tablets (750 mg total) by mouth once daily.            CHANGE how you take these medications      gabapentin 600 MG tablet  Commonly known as: NEURONTIN  Take 1 tablet (600 mg total) by mouth 3 (three) times daily.  What changed:   medication strength  how much to take  when to take this            CONTINUE taking these medications      albuterol 90 mcg/actuation inhaler  Commonly known as: PROVENTIL/VENTOLIN HFA  Inhale 1-2 puffs into the lungs every 6 (six) hours as needed for Wheezing. Rescue     ALPRAZolam 1 MG tablet  Commonly known as: XANAX     amLODIPine 2.5 MG tablet  Commonly known as: NORVASC     ammonium lactate 12 % lotion  Commonly known as: LAC-HYDRIN     aspirin 81 MG EC tablet  Commonly known as: ECOTRIN  Take 1 tablet (81 mg total) by mouth once daily.     atorvastatin 40 MG tablet  Commonly known as: LIPITOR     BASAGLAR KWIKPEN U-100 INSULIN glargine 100 units/mL SubQ pen  Generic drug: insulin  Inject 80 Units into the skin 2 (two) times daily with meals.     BD ULTRA-FINE SHORT PEN NEEDLE 31 gauge x 5/16" Ndle  Generic drug: pen needle, diabetic     blood-glucose meter Misc     butalbital-acetaminophen-caffeine -40 mg -40 mg per tablet  Commonly known as: FIORICET, ESGIC  Take 1 tablet by mouth every 6 (six) hours as needed for Pain.     carvediloL 12.5 MG tablet  Commonly known as: COREG  Take 1 tablet (12.5 mg total) by mouth 2 (two) times daily.     cholecalciferol (vitamin D3) 50 mcg (2,000 unit) Cap capsule  Commonly known as: VITAMIN D3     diclofenac sodium 1 % Gel  Commonly known as: VOLTAREN  Apply 2 g topically 4 " "(four) times daily.     docusate sodium 250 MG capsule  Commonly known as: COLACE  Take 1 capsule (250 mg total) by mouth 2 (two) times daily.     DULoxetine 30 MG capsule  Commonly known as: CYMBALTA     esomeprazole 40 MG capsule  Commonly known as: NEXIUM     fluticasone furoate-vilanteroL 200-25 mcg/dose Dsdv diskus inhaler  Commonly known as: BREO ELLIPTA  Inhale 1 puff into the lungs once daily. Controller     fluticasone propionate 50 mcg/actuation nasal spray  Commonly known as: FLONASE  1 spray (50 mcg total) by Each Nostril route 2 (two) times daily as needed for Rhinitis.     glipiZIDE 10 MG tablet  Commonly known as: GLUCOTROL     hydrALAZINE 100 MG tablet  Commonly known as: APRESOLINE  Take 1 tablet (100 mg total) by mouth every 8 (eight) hours.     insulin syringe-needle U-100 1 mL 28 gauge x 1/2" Syrg     magnesium citrate solution     multivitamin per tablet  Commonly known as: THERAGRAN     naloxone 4 mg/actuation Spry  Commonly known as: NARCAN     naproxen 500 MG tablet  Commonly known as: NAPROSYN     nebivoloL 10 MG Tab  Commonly known as: BYSTOLIC     NIFEdipine 90 MG (OSM) 24 hr tablet  Commonly known as: PROCARDIA-XL  Take 1 tablet (90 mg total) by mouth once daily.     nitroGLYCERIN 0.4 MG SL tablet  Commonly known as: NITROSTAT     NovoLOG FlexPen U-100 Insulin 100 unit/mL (3 mL) Inpn pen  Generic drug: insulin aspart U-100  Inject 20 Units into the skin 3 (three) times daily with meals. DISCARD OPEN PEN AFTER 28 DAYS     nystatin cream  Commonly known as: MYCOSTATIN     ONETOUCH DELICA PLUS LANCET 33 gauge Misc  Generic drug: lancets     ONETOUCH VERIO TEST STRIPS Strp  Generic drug: blood sugar diagnostic     pantoprazole 40 MG tablet  Commonly known as: PROTONIX  Take 1 tablet (40 mg total) by mouth once daily.     sertraline 50 MG tablet  Commonly known as: ZOLOFT     SHARPS CONTAINER Misc  Generic drug: empty container     terconazole 0.4 % Crea  Commonly known as: TERAZOL 7   "   valsartan 160 MG tablet  Commonly known as: DIOVAN  Take 1 tablet (160 mg total) by mouth once daily.            STOP taking these medications      ascorbic acid (vitamin C) 500 mg Chew     b complex vitamins capsule     B-complex with vitamin C tablet  Commonly known as: Z-Bec or Equiv     cloNIDine 0.1 MG tablet  Commonly known as: CATAPRES     HYDROcodone-acetaminophen 5-325 mg per tablet  Commonly known as: NORCO     loratadine 10 mg tablet  Commonly known as: CLARITIN     ondansetron 4 MG tablet  Commonly known as: ZOFRAN     polyethylene glycol 17 gram/dose powder  Commonly known as: GLYCOLAX     sucralfate 1 gram tablet  Commonly known as: CARAFATE               Where to Get Your Medications        These medications were sent to [facility], [place] - [address] [address]      Phone: [phone]   gabapentin 600 MG tablet       These medications were sent to [address] [address]      Phone: [phone]   levoFLOXacin 500 MG tablet         Discharge Information:     -Transferred patient to inpatient psych facility ([facility])  -ED precautions provided     Orlando Kearns DO  [facility] Internal Medicine PGY-II

## 2023-05-11 NOTE — PT/OT/SLP PROGRESS
Physical Therapy Treatment and Discharge Note    Patient Name:  Elba Barnes   MRN:  [id number]    Recommendations     Discharge Recommendations:  nursing facility, skilled   Discharge Equipment Recommendations: bedside commode (RW pending gait assessment)   Barriers to discharge: fall risk    Assessment     Elba Barnes is a 62 y.o. female admitted with a medical diagnosis of Drug overdose, intentional.    She presents with the following impairments/functional limitations:  weakness, impaired endurance, gait instability, impaired functional mobility, impaired self care skills, impaired balance, decreased safety awareness, pain.    Rehab Prognosis: Good.    Transfer to alternate level of care anticipated today.    Recent Surgery: * No surgery found *      Plan     Patient discharged from acute PT Services on 05/11/23.    Anticipated patient disposition at hospital discharge: psychiatric facility per chart.    Subjective     Communicated with patient's nurse Kayce prior to session.    Patient agreeable to participate in treatment session.    Chief Complaint: headache  Patient/Family Comments/goals: get stronger to go home  Pain/Comfort:  Pain Rating 1: 10/10  Location 1: head  Pain Addressed 1: Pre-medicate for activity  Pain Rating Post-Intervention 1: 10/10    Objective     Patient found with HOB elevated with peripheral IV, telemetry, PureWick  upon PT entry to room.    General Precautions: Standard, fall   Orthopedic Precautions:N/A   Braces: N/A  Respiratory Status: room air    Functional Mobility:    Bed Mobility:  Rolling Right: stand by assistance  Supine to Sit: stand by assistance  Sit to Supine: stand by assistance    Transfers:  Sit to Stand: contact guard assistance with rolling walker    Gait:  Patient ambulated 130ft with rolling walker and minimum assistance.  Patient demonstrates unsteady gait and inconsistent bilateral foot placement.    Other Mobility:  not assessed    Patient left right  sidelying with all lines intact, call button in reach, and nurse present.    Goals     Multidisciplinary Problems       Physical Therapy Goals          Problem: Physical Therapy    Goal Priority Disciplines Outcome Goal Variances Interventions   Physical Therapy Goal     PT, PT/OT Ongoing, Progressing     Description: Goals to be met by: Discharge    Pt. Will increase independence with functional mobility by performin. Supine<>sit with SBA.  2. Sit<>stand with SBA.  3. Pt. Will ambulate x 130 feet with RW and SBA.                     Time Tracking     PT Received On: 23  PT Start Time: 900     PT Stop Time: 928  PT Total Time (min): 28 min     Billable Minutes: Gait Training 15min and Therapeutic Activity 13min    2023

## 2023-05-18 ENCOUNTER — PATIENT MESSAGE (OUTPATIENT)
Dept: ADMINISTRATIVE | Facility: HOSPITAL | Age: 63
End: 2023-05-18
Payer: MEDICARE

## 2023-05-22 PROBLEM — I21.4 NSTEMI (NON-ST ELEVATED MYOCARDIAL INFARCTION): Status: RESOLVED | Noted: 2023-02-16 | Resolved: 2023-05-22

## 2023-06-08 ENCOUNTER — HOSPITAL ENCOUNTER (OUTPATIENT)
Dept: RADIOLOGY | Facility: HOSPITAL | Age: 63
Discharge: HOME | End: 2023-06-08
Attending: NURSE PRACTITIONER
Payer: MEDICARE

## 2023-06-08 ENCOUNTER — OFFICE VISIT (OUTPATIENT)
Dept: INTERNAL MEDICINE | Facility: CLINIC | Age: 63
End: 2023-06-08
Payer: MEDICARE

## 2023-06-08 VITALS
SYSTOLIC BLOOD PRESSURE: 121 MMHG | RESPIRATION RATE: 18 BRPM | DIASTOLIC BLOOD PRESSURE: 64 MMHG | WEIGHT: 253.63 LBS | HEIGHT: 66 IN | TEMPERATURE: 99 F | HEART RATE: 93 BPM | BODY MASS INDEX: 40.76 KG/M2

## 2023-06-08 DIAGNOSIS — N32.81 OAB (OVERACTIVE BLADDER): Chronic | ICD-10-CM

## 2023-06-08 DIAGNOSIS — K59.03 DRUG-INDUCED CONSTIPATION: ICD-10-CM

## 2023-06-08 DIAGNOSIS — J69.0 ASPIRATION PNEUMONIA OF LEFT LOWER LOBE, UNSPECIFIED ASPIRATION PNEUMONIA TYPE: ICD-10-CM

## 2023-06-08 DIAGNOSIS — M17.11 TRICOMPARTMENT OSTEOARTHRITIS OF RIGHT KNEE: Chronic | ICD-10-CM

## 2023-06-08 DIAGNOSIS — T50.902D INTENTIONAL DRUG OVERDOSE, SUBSEQUENT ENCOUNTER: ICD-10-CM

## 2023-06-08 DIAGNOSIS — F17.200 TOBACCO USE DISORDER: Chronic | ICD-10-CM

## 2023-06-08 DIAGNOSIS — F39 MOOD DISORDER: Primary | Chronic | ICD-10-CM

## 2023-06-08 PROCEDURE — 4010F ACE/ARB THERAPY RXD/TAKEN: CPT | Mod: CPTII,,, | Performed by: NURSE PRACTITIONER

## 2023-06-08 PROCEDURE — 99215 OFFICE O/P EST HI 40 MIN: CPT | Mod: PBBFAC,25 | Performed by: NURSE PRACTITIONER

## 2023-06-08 PROCEDURE — 3062F PR POS MACROALBUMINURIA RESULT DOCUMENTED/REVIEW: ICD-10-PCS | Mod: CPTII,,, | Performed by: NURSE PRACTITIONER

## 2023-06-08 PROCEDURE — 96372 THER/PROPH/DIAG INJ SC/IM: CPT | Mod: PBBFAC

## 2023-06-08 PROCEDURE — 3074F SYST BP LT 130 MM HG: CPT | Mod: CPTII,,, | Performed by: NURSE PRACTITIONER

## 2023-06-08 PROCEDURE — 1160F PR REVIEW ALL MEDS BY PRESCRIBER/CLIN PHARMACIST DOCUMENTED: ICD-10-PCS | Mod: CPTII,,, | Performed by: NURSE PRACTITIONER

## 2023-06-08 PROCEDURE — 1159F PR MEDICATION LIST DOCUMENTED IN MEDICAL RECORD: ICD-10-PCS | Mod: CPTII,,, | Performed by: NURSE PRACTITIONER

## 2023-06-08 PROCEDURE — 3008F PR BODY MASS INDEX (BMI) DOCUMENTED: ICD-10-PCS | Mod: CPTII,,, | Performed by: NURSE PRACTITIONER

## 2023-06-08 PROCEDURE — 4010F PR ACE/ARB THEARPY RXD/TAKEN: ICD-10-PCS | Mod: CPTII,,, | Performed by: NURSE PRACTITIONER

## 2023-06-08 PROCEDURE — 3078F PR MOST RECENT DIASTOLIC BLOOD PRESSURE < 80 MM HG: ICD-10-PCS | Mod: CPTII,,, | Performed by: NURSE PRACTITIONER

## 2023-06-08 PROCEDURE — 3066F NEPHROPATHY DOC TX: CPT | Mod: CPTII,,, | Performed by: NURSE PRACTITIONER

## 2023-06-08 PROCEDURE — 99214 OFFICE O/P EST MOD 30 MIN: CPT | Mod: 25,S$PBB,, | Performed by: NURSE PRACTITIONER

## 2023-06-08 PROCEDURE — 3078F DIAST BP <80 MM HG: CPT | Mod: CPTII,,, | Performed by: NURSE PRACTITIONER

## 2023-06-08 PROCEDURE — 1160F RVW MEDS BY RX/DR IN RCRD: CPT | Mod: CPTII,,, | Performed by: NURSE PRACTITIONER

## 2023-06-08 PROCEDURE — 1159F MED LIST DOCD IN RCRD: CPT | Mod: CPTII,,, | Performed by: NURSE PRACTITIONER

## 2023-06-08 PROCEDURE — 99214 PR OFFICE/OUTPT VISIT, EST, LEVL IV, 30-39 MIN: ICD-10-PCS | Mod: 25,S$PBB,, | Performed by: NURSE PRACTITIONER

## 2023-06-08 PROCEDURE — 3062F POS MACROALBUMINURIA REV: CPT | Mod: CPTII,,, | Performed by: NURSE PRACTITIONER

## 2023-06-08 PROCEDURE — 74019 RADEX ABDOMEN 2 VIEWS: CPT | Mod: TC

## 2023-06-08 PROCEDURE — 3066F PR DOCUMENTATION OF TREATMENT FOR NEPHROPATHY: ICD-10-PCS | Mod: CPTII,,, | Performed by: NURSE PRACTITIONER

## 2023-06-08 PROCEDURE — 71046 X-RAY EXAM CHEST 2 VIEWS: CPT | Mod: TC

## 2023-06-08 PROCEDURE — 3074F PR MOST RECENT SYSTOLIC BLOOD PRESSURE < 130 MM HG: ICD-10-PCS | Mod: CPTII,,, | Performed by: NURSE PRACTITIONER

## 2023-06-08 PROCEDURE — 3008F BODY MASS INDEX DOCD: CPT | Mod: CPTII,,, | Performed by: NURSE PRACTITIONER

## 2023-06-08 RX ORDER — DULOXETIN HYDROCHLORIDE 60 MG/1
60 CAPSULE, DELAYED RELEASE ORAL EVERY MORNING
COMMUNITY
Start: 2023-05-09

## 2023-06-08 RX ORDER — OXYBUTYNIN CHLORIDE 5 MG/1
5 TABLET, EXTENDED RELEASE ORAL DAILY
Qty: 30 TABLET | Refills: 1 | Status: SHIPPED | OUTPATIENT
Start: 2023-06-08

## 2023-06-08 RX ORDER — VALSARTAN 320 MG/1
320 TABLET ORAL
Status: ON HOLD | COMMUNITY
Start: 2023-05-09 | End: 2023-07-17 | Stop reason: HOSPADM

## 2023-06-08 RX ORDER — KETOROLAC TROMETHAMINE 10 MG/1
10 TABLET, FILM COATED ORAL EVERY 6 HOURS
Qty: 20 TABLET | Refills: 0 | Status: SHIPPED | OUTPATIENT
Start: 2023-06-08 | End: 2023-06-13

## 2023-06-08 RX ORDER — HYDRALAZINE HYDROCHLORIDE 25 MG/1
25 TABLET, FILM COATED ORAL 3 TIMES DAILY
Status: ON HOLD | COMMUNITY
Start: 2023-05-09 | End: 2023-07-17 | Stop reason: HOSPADM

## 2023-06-08 RX ORDER — DOCUSATE SODIUM 250 MG
1 CAPSULE ORAL 2 TIMES DAILY
Qty: 60 CAPSULE | Refills: 3 | Status: SHIPPED | OUTPATIENT
Start: 2023-06-08 | End: 2024-02-29 | Stop reason: ALTCHOICE

## 2023-06-08 RX ORDER — INSULIN LISPRO 100 [IU]/ML
INJECTION, SOLUTION INTRAVENOUS; SUBCUTANEOUS
Status: ON HOLD | COMMUNITY
Start: 2023-06-05 | End: 2023-07-17 | Stop reason: HOSPADM

## 2023-06-08 RX ORDER — CARVEDILOL 3.12 MG/1
3.12 TABLET ORAL 2 TIMES DAILY
COMMUNITY
Start: 2023-05-09

## 2023-06-08 RX ORDER — KETOROLAC TROMETHAMINE 30 MG/ML
30 INJECTION, SOLUTION INTRAMUSCULAR; INTRAVENOUS
Status: COMPLETED | OUTPATIENT
Start: 2023-06-08 | End: 2023-06-08

## 2023-06-08 RX ORDER — POLYETHYLENE GLYCOL 3350 17 G/17G
17 POWDER, FOR SOLUTION ORAL DAILY
Qty: 595 G | Refills: 1 | Status: SHIPPED | OUTPATIENT
Start: 2023-06-08 | End: 2024-02-29 | Stop reason: ALTCHOICE

## 2023-06-08 RX ADMIN — KETOROLAC TROMETHAMINE 30 MG: 30 INJECTION, SOLUTION INTRAMUSCULAR; INTRAVENOUS at 02:06

## 2023-06-08 NOTE — PROGRESS NOTES
Purvi Rai, VIRGEN   [facility]  [address] [address]      PATIENT NAME: Elba Barnes  : 1960  DATE: 23  MRN: [id number]      Reason for Visit / Chief Complaint: Follow-up (ED f/u, R shoulder/knee pain. Knee worse than shoulder 10/10, stabbing)       History of Present Illness / Problem Focused Workflow     Elba Barnes is a 62 y.o. Black or  female presents to the clinic for inpt admit for pneumonia. PMH uncontrolled DM, HTN, CAD, CHF, DM polyneuropathy, CVA w/right sided deficits (2021), MI, mood disorder (inpt admits x 3), TBI (), lumbar stenosis w/sciatica, noncompliance, GERD, osteoarthritis of multiple sites, tobacco abuse, and morbid obesity. Followed by Dr. Hong, cardio, Marga Toscano, psych, and [facility].     Pt reports today after inpt admit/PEC for intentional overdose with multiple medications in May. Pt was dx'd with PNA, treated with levaquin and transferred to [facility] in [place]. Pt has f/u visit with MH provider, LOWELL Nino next week. Reports mood is stable but wishes chronic knee pain would improve so she could sleep throughout the night. Reports med compliance since discharge. Pt is unsure when next visit with cardiology; contacted  for date. Then pt states she desires to establish cardio care at [facility]; appt scheduled for 23. Pt reports has not had BM x 8 days with associated abd pain; did not attempt any alleviating measures but is passing gas. Has not been taking colace daily d/t not filled. Reports ongoing, chronic bilateral knee pain, R>L, that is 10/10 today. Did not attempt any alleviating measures. Pt received CSI in March per ortho to right knee with improvement. Also reports constant bladder leakage. Is using rolling walker and denies any recent falls. Continues to smoke 2 cigs/day. Denies  "chest pain, shortness of breath, cough, fever, headache, dizziness, weakness, nausea, vomiting, diarrhea, dysuria, SI/HI.    Review of Systems     Review of Systems     See HPI for details    Constitutional: Denies Change in appetite. Denies Chills. Denies Fever. Denies Night sweats.  Eye: Denies Blurred vision. Denies Discharge. Denies Eye pain.  ENT: Denies Decreased hearing. Denies Sore throat. Denies Swollen glands.  Respiratory: Denies Cough. Denies Shortness of breath. Denies Shortness of breath with exertion. Denies Wheezing.  Cardiovascular: Denies Chest pain at rest. Denies Chest pain with exertion. Denies Irregular heartbeat. Denies Palpitations. Denies Edema.  Gastrointestinal: Admits Abdominal pain. Denies Diarrhea. Denies Nausea. Denies Vomiting. Denies Hematemesis or Hematochezia. Admits Constipation.  Genitourinary: Denies Dysuria. Denies Urinary frequency. Denies Urinary urgency. Denies Blood in urine. Admits Urinary leakage.  Endocrine: Denies Cold intolerance. Denies Excessive thirst. Denies Heat intolerance. Denies Weight loss. Denies Weight gain.  Musculoskeletal: Admits Painful joints. Denies Weakness.  Integumentary: Denies Rash. Denies Itching. Denies Dry skin.  Neurologic: Denies Dizziness. Denies Fainting. Denies Headache.  Psychiatric: Denies Depression. Denies Anxiety. Denies Suicidal/Homicidal ideations.    All Other ROS: Negative except as stated in HPI.     Medical / Surgical / Social / Family History       ----------------------------  Anxiety  Arthritis  CVA (cerebral vascular accident)      Comment:  "mini stroke"  Depression  Diabetes mellitus  Heart problem  History of psychiatric hospitalization      Comment:  three(1983, 1995 and another (years ago")): depression  HTN (hypertension)  Hx of psychiatric care  Hypertension  Pacemaker  Psychiatric exam requested by authority  Psychiatric problem  TBI (traumatic brain injury)  Therapy     Past Surgical History:   Procedure Laterality " Date    CARDIAC PACEMAKER PLACEMENT      CARDIAC PACEMAKER REMOVAL      CHOLECYSTECTOMY      HYSTERECTOMY         Social History     Socioeconomic History    Marital status: Single    Number of children: 5   Tobacco Use    Smoking status: Every Day     Years: 40.00     Types: Cigarettes     Last attempt to quit: 2018     Years since quittin.7    Smokeless tobacco: Never    Tobacco comments:     1 pk every 2 weeks, 2 a day   Substance and Sexual Activity    Alcohol use: Not Currently     Comment: wine on occ    Drug use: No    Sexual activity: Never   Other Topics Concern    Patient feels they ought to cut down on drinking/drug use No    Patient annoyed by others criticizing their drinking/drug use No    Patient has felt bad or guilty about drinking/drug use No    Patient has had a drink/used drugs as an eye opener in the AM No   Social History Narrative    ** Merged History Encounter **          Social Determinants of Health     Financial Resource Strain: Low Risk     Difficulty of Paying Living Expenses: Not hard at all   Food Insecurity: No Food Insecurity    Worried About Running Out of Food in the Last Year: Never true    Ran Out of Food in the Last Year: Never true   Transportation Needs: No Transportation Needs    Lack of Transportation (Medical): No    Lack of Transportation (Non-Medical): No   Physical Activity: Inactive    Days of Exercise per Week: 0 days    Minutes of Exercise per Session: 0 min   Stress: No Stress Concern Present    Feeling of Stress : Not at all   Social Connections: Moderately Integrated    Frequency of Communication with Friends and Family: More than three times a week    Frequency of Social Gatherings with Friends and Family: More than three times a week    Attends Zoroastrianism Services: More than 4 times per year    Active Member of Clubs or Organizations: Yes    Attends Club or Organization Meetings: Never    Marital Status: Never    Housing Stability: Low Risk      "Unable to Pay for Housing in the Last Year: No    Number of Places Lived in the Last Year: 1    Unstable Housing in the Last Year: No        Family History   Problem Relation Age of Onset    Schizophrenia Mother     Bipolar disorder Mother     Bipolar disorder Father         Medications and Allergies     Medications  Current Outpatient Medications   Medication Instructions    albuterol (PROVENTIL/VENTOLIN HFA) 90 mcg/actuation inhaler 1-2 puffs, Inhalation, Every 6 hours PRN, Rescue    ALPRAZolam (XANAX) 1 MG tablet Take 1 tablet by mouth once a day as needed as needed for anxiety    amLODIPine (NORVASC) 2.5 mg, Oral, Daily    ammonium lactate (LAC-HYDRIN) 12 % lotion Apply to dry skin areas on feet, legs, and elbow areas TWICE A DAY AS NEEDED    aspirin (ECOTRIN) 81 mg, Oral, Daily    atorvastatin (LIPITOR) 40 mg, Oral, Nightly    BD ULTRA-FINE SHORT PEN NEEDLE 31 gauge x 5/16" Ndle SMARTSIG:Injection Twice Daily    blood-glucose meter Misc CHECK BLOOD GLUCOSE LEVEL TWICE DAILY.    butalbital-acetaminophen-caffeine -40 mg (FIORICET, ESGIC) -40 mg per tablet 1 tablet, Oral, Every 6 hours PRN    carvediloL (COREG) 12.5 mg, Oral, 2 times daily    carvediloL (COREG) 3.125 mg, Oral, 2 times daily    cholecalciferol, vitamin D3, (VITAMIN D3) 50 mcg (2,000 unit) Cap capsule 1 capsule, Oral    diclofenac sodium (VOLTAREN) 2 g, Topical (Top), 4 times daily    docusate sodium (COLACE) 250 mg, Oral, 2 times daily    DULoxetine (CYMBALTA) 30 mg, Oral    DULoxetine (CYMBALTA) 60 mg, Oral, Every morning    empty container (SHARPS CONTAINER) Misc N/A    esomeprazole (NEXIUM) 40 mg, Oral, Every morning    fluticasone furoate-vilanteroL (BREO ELLIPTA) 200-25 mcg/dose DsDv diskus inhaler 1 puff, Inhalation, Daily, Controller    fluticasone propionate (FLONASE) 50 mcg, Each Nostril, 2 times daily PRN    gabapentin (NEURONTIN) 600 mg, Oral, 3 times daily    glipiZIDE (GLUCOTROL) 10 mg, Oral    hydrALAZINE (APRESOLINE) 100 " "mg, Oral, Every 8 hours    hydrALAZINE (APRESOLINE) 25 mg, Oral, 3 times daily    insulin (LANTUS SOLOSTAR U-100 INSULIN) 80 Units, Subcutaneous, 2 times daily before meals    insulin lispro 100 unit/mL pen SMARTSI Unit(s) SUB-Q 3 Times Daily    insulin syringe-needle U-100 1 mL 28 gauge x 1/2" Syrg USE TO inject insulin TWICE DAILY    ketorolac (TORADOL) 10 mg, Oral, Every 6 hours    magnesium citrate solution 296 mLs, Oral, Daily PRN    multivitamin (THERAGRAN) per tablet 1 tablet, Oral, Daily    naloxone (NARCAN) 4 mg/actuation Spry SMARTSI Spray(s) Both Nares PRN    nebivoloL (BYSTOLIC) 10 mg, Oral, Daily    NIFEdipine (PROCARDIA-XL) 90 mg, Oral, Daily    nitroGLYCERIN (NITROSTAT) 0.4 mg, Sublingual, Every 5 min PRN    NOVOLOG FLEXPEN U-100 INSULIN 100 unit/mL (3 mL) InPn pen Inject 20 Units into the skin 3 (three) times daily with meals. DISCARD OPEN PEN AFTER 28 DAYS    nystatin (MYCOSTATIN) cream Apply topically 3 (three) times daily. Use under breast    ONETOUCH DELICA PLUS LANCET 33 gauge Misc Topical (Top), 4 times daily    ONETOUCH VERIO TEST STRIPS Strp 4 times daily    oxybutynin (DITROPAN-XL) 5 mg, Oral, Daily    pantoprazole (PROTONIX) 40 mg, Oral, Daily    polyethylene glycol (GLYCOLAX) 17 g, Oral, Daily    sertraline (ZOLOFT) 50 mg, Oral, Daily    terconazole (TERAZOL 7) 0.4 % Crea 1 applicator, Vaginal, Nightly    valsartan (DIOVAN) 320 mg, Oral         Allergies  Review of patient's allergies indicates:   Allergen Reactions    Pcn [penicillins] Anaphylaxis       Physical Examination     /64 (BP Location: Left arm, Patient Position: Sitting, BP Method: Large (Automatic))   Pulse 93   Temp 98.6 °F (37 °C) (Oral)   Resp 18   Ht 5' 5.98" (1.676 m)   Wt 115 kg (253 lb 9.6 oz)   BMI 40.95 kg/m²     Physical Exam  Constitutional:       Appearance: She is morbidly obese.   Pulmonary:      Breath sounds: Examination of the right-lower field reveals decreased breath sounds. Examination of " the left-lower field reveals decreased breath sounds. Decreased breath sounds present.   Abdominal:      General: Abdomen is protuberant. Bowel sounds are normal.   Musculoskeletal:      Right knee: Crepitus present. Decreased range of motion. Tenderness present.      Left knee: Decreased range of motion. Tenderness present.       General: Alert and oriented, No acute distress.  Head: Normocephalic, Atraumatic.  Eye: Pupils are equal, round and reactive to light, Extraocular movements are intact, Sclera non-icteric.  Ears/Nose/Throat: Normal, Mucosa moist,Clear.  Neck/Thyroid: Supple, Non-tender, No carotid bruit, No lymphadenopathy, No JVD, Full range of motion.  Respiratory: Clear to auscultation bilaterally; No wheezes, rales or rhonchi,Non-labored respirations, Symmetrical chest wall expansion.  Cardiovascular: Regular rate and rhythm, S1/S2 normal, No murmurs, rubs or gallops.  Gastrointestinal: Soft, Non-tender, Normal bowel sounds, No palpable organomegaly.  Integumentary: Warm, Dry, Intact, No suspicious lesions or rashes.  Extremities: No clubbing, cyanosis or edema  Neurologic: No focal deficits, Cranial Nerves II-XII are grossly intact, Motor strength normal upper and lower extremities, Sensory exam intact.  Psychiatric: Normal interaction, Coherent speech, Appropriate affect       Results     Lab Results   Component Value Date    WBC 8.15 05/11/2023    HGB 12.6 05/11/2023    HCT 39.1 05/11/2023     05/11/2023    CHOL 173 02/14/2023    TRIG 283 (H) 02/14/2023    ALT 16 05/11/2023    AST 16 05/11/2023     05/11/2023    K 4.1 05/11/2023    CL 98 09/30/2021    CREATININE 0.80 05/11/2023    BUN 12.0 05/11/2023    CO2 24 05/11/2023    TSH 0.646 05/09/2023    INR 0.98 05/09/2023    HGBA1C 8.7 (H) 02/09/2023         Assessment and Plan (including Health Maintenance)     Plan:     1. Aspiration pneumonia of left lower lobe, unspecified aspiration pneumonia type  Completed levaquin.  Repeat CXR  ordered.  - X-Ray Chest PA And Lateral; Future    2. Intentional drug overdose, subsequent encounter  Was PEC'd and admitted to [facility].  Denies SI/HI.  Has MH f/u next week.  Take meds as prescribed.  Read positive daily meditations, avoid negative media, set healthy boundaries.  Exercise daily, keep consistent sleep pattern, eat a healthy diet.  Establish good social support, make changes to reduce stress.  Do not drink alcohol or use illicit drugs.  Reports any symptoms of suicidal/homicidal ideations or self harm immediately, go to nearest emergency room.      3. Mood disorder  Keep MH visit as scheduled.  Take meds as prescribed.  Denies SI/HI.  Read positive daily meditations, avoid negative media, set healthy boundaries.  Exercise daily, keep consistent sleep pattern, eat a healthy diet.  Establish good social support, make changes to reduce stress.  Do not drink alcohol or use illicit drugs.  Reports any symptoms of suicidal/homicidal ideations or self harm immediately, go to nearest emergency room.      4. Tricompartment osteoarthritis of right knee  Toradol 30 mg IM today.  Rx toradol 10 mg q6 hrs x 5 days.  Take only Tylenol prn pain/headaches.  Pt to contact ortho for return visit.  Referral to PT to eval/treat.  Referral to case management to eval for electric scooter.  Perform knee exercises daily.   Avoid activities than increase knee pain or stiffness.   Apply heating pad, ice pack, and BioFreeze/topical capsaicin as needed; alternate every 15-20 minutes.   Continue tylenol arthritis/NSAID/muscle relaxer as needed..    - Ambulatory referral/consult to Physical/Occupational Therapy; Future  - ketorolac injection 30 mg  - ketorolac (TORADOL) 10 mg tablet; Take 1 tablet (10 mg total) by mouth every 6 (six) hours. for 5 days  Dispense: 20 tablet; Refill: 0  - Ambulatory referral/consult to Orthopedics; Future  - Ambulatory referral/consult to Outpatient Case Management    5. Tobacco use disorder  Smoking  cessation discussed; total time 3 mins.   Pt ready to quit.  Referred to Smoking Cessation program.  Discussed benefits of quitting including improved health, decreased cardiac/vascular/pulmonary/stroke risks as well as saving money.      6. Drug-induced constipation  KUB ordered.  Refilled colace.  Rx miralax daily prn.   ED precautions.  - X-Ray Abdomen Flat And Erect; Future    7. OAB (overactive bladder)  Rx oxybutynin.  Take meds as prescribed.  Avoid caffeine (coffee, teas, sodas) and alcohol.  Use bathroom regularly and completely empty bladder.  Weight loss encouraged.    - oxybutynin (DITROPAN-XL) 5 MG TR24; Take 1 tablet (5 mg total) by mouth once daily.  Dispense: 30 tablet; Refill: 1      Problem List Items Addressed This Visit          Psychiatric    Mood disorder - Primary (Chronic)    Drug overdose, intentional       Pulmonary    Aspiration pneumonia    Relevant Orders    X-Ray Chest PA And Lateral (Completed)       Renal/    OAB (overactive bladder) (Chronic)    Relevant Medications    oxybutynin (DITROPAN-XL) 5 MG TR24       GI    Drug-induced constipation    Relevant Orders    X-Ray Abdomen Flat And Erect (Completed)       Orthopedic    Tricompartment osteoarthritis of right knee (Chronic)    Relevant Medications    ketorolac injection 30 mg (Completed)    ketorolac (TORADOL) 10 mg tablet    Other Relevant Orders    Ambulatory referral/consult to Physical/Occupational Therapy    Ambulatory referral/consult to Orthopedics    Ambulatory referral/consult to Outpatient Case Management       Other    Tobacco use disorder (Chronic)    Relevant Orders    Ambulatory referral/consult to Smoking Cessation Program         Health Maintenance Due   Topic Date Due    Hepatitis C Screening  Never done    COVID-19 Vaccine (1) Never done    Eye Exam  Never done    HIV Screening  Never done    Colorectal Cancer Screening  Never done    Shingles Vaccine (1 of 2) Never done    Pneumococcal Vaccines (Age 0-64) (2 -  PCV) 05/09/2017    Hemoglobin A1c  05/09/2023     Will call with KUB results.   Follow up if symptoms worsen or fail to improve, for Keep appt as scheduled in July with labs one week prior..        Signature:  VIRGEN Hoyt  [facility]  [address] [facility] [address] [facility] [address]

## 2023-06-09 ENCOUNTER — TELEPHONE (OUTPATIENT)
Dept: INTERNAL MEDICINE | Facility: CLINIC | Age: 63
End: 2023-06-09
Payer: MEDICARE

## 2023-06-09 RX ORDER — INSULIN GLARGINE 100 [IU]/ML
80 INJECTION, SOLUTION SUBCUTANEOUS
Qty: 60 ML | Refills: 3 | Status: ON HOLD | OUTPATIENT
Start: 2023-06-09 | End: 2023-07-17 | Stop reason: SDUPTHER

## 2023-06-09 NOTE — TELEPHONE ENCOUNTER
Patient is currently out of basaglar and called for a refill at pharmacy, they said her basaglar is no longer covered by her insurance but she can do lantus because it's interchangeable. Please advise.

## 2023-06-28 ENCOUNTER — TELEPHONE (OUTPATIENT)
Dept: FAMILY MEDICINE | Facility: CLINIC | Age: 63
End: 2023-06-28
Payer: MEDICARE

## 2023-06-28 DIAGNOSIS — M17.0 PRIMARY OSTEOARTHRITIS OF KNEES, BILATERAL: Primary | ICD-10-CM

## 2023-06-28 NOTE — TELEPHONE ENCOUNTER
[facility] is stating Ms Arreola knees are to weak to do in the gym she would need to do the physical therapy in the pool. Need new referral sent to [facility].

## 2023-07-06 ENCOUNTER — HOSPITAL ENCOUNTER (OUTPATIENT)
Facility: HOSPITAL | Age: 63
Discharge: HOME HEALTH | End: 2023-07-17
Attending: EMERGENCY MEDICINE | Admitting: HOSPITALIST
Payer: MEDICARE

## 2023-07-06 DIAGNOSIS — F17.200 TOBACCO USE DISORDER: Chronic | ICD-10-CM

## 2023-07-06 DIAGNOSIS — E11.69 TYPE 2 DIABETES MELLITUS WITH OTHER SPECIFIED COMPLICATION, UNSPECIFIED WHETHER LONG TERM INSULIN USE: ICD-10-CM

## 2023-07-06 DIAGNOSIS — R53.1 RIGHT SIDED WEAKNESS: ICD-10-CM

## 2023-07-06 DIAGNOSIS — I63.9 STROKE: ICD-10-CM

## 2023-07-06 DIAGNOSIS — I63.9 CEREBROVASCULAR ACCIDENT (CVA), UNSPECIFIED MECHANISM: Primary | ICD-10-CM

## 2023-07-06 DIAGNOSIS — I25.10 CORONARY ARTERY DISEASE, UNSPECIFIED VESSEL OR LESION TYPE, UNSPECIFIED WHETHER ANGINA PRESENT, UNSPECIFIED WHETHER NATIVE OR TRANSPLANTED HEART: ICD-10-CM

## 2023-07-06 DIAGNOSIS — J44.9 CHRONIC OBSTRUCTIVE PULMONARY DISEASE, UNSPECIFIED COPD TYPE: ICD-10-CM

## 2023-07-06 DIAGNOSIS — I50.9 CONGESTIVE HEART FAILURE, UNSPECIFIED HF CHRONICITY, UNSPECIFIED HEART FAILURE TYPE: ICD-10-CM

## 2023-07-06 LAB
ALBUMIN SERPL-MCNC: 3.3 G/DL (ref 3.4–4.8)
ALBUMIN/GLOB SERPL: 0.7 RATIO (ref 1.1–2)
ALP SERPL-CCNC: 78 UNIT/L (ref 40–150)
ALT SERPL-CCNC: 35 UNIT/L (ref 0–55)
ANION GAP SERPL CALC-SCNC: 17 MMOL/L (ref 8–16)
APTT PPP: 28.1 SECONDS (ref 23.2–33.7)
AST SERPL-CCNC: 22 UNIT/L (ref 5–34)
BASOPHILS # BLD AUTO: 0.04 X10(3)/MCL
BASOPHILS NFR BLD AUTO: 0.5 %
BILIRUBIN DIRECT+TOT PNL SERPL-MCNC: 0.2 MG/DL
BNP BLD-MCNC: 16.7 PG/ML
BUN SERPL-MCNC: 13.8 MG/DL (ref 9.8–20.1)
BUN SERPL-MCNC: 16 MG/DL (ref 6–30)
CALCIUM SERPL-MCNC: 9.2 MG/DL (ref 8.4–10.2)
CHLORIDE SERPL-SCNC: 107 MMOL/L (ref 95–110)
CHLORIDE SERPL-SCNC: 107 MMOL/L (ref 98–107)
CHOLEST SERPL-MCNC: 190 MG/DL
CHOLEST/HDLC SERPL: 7 {RATIO} (ref 0–5)
CO2 SERPL-SCNC: 23 MMOL/L (ref 23–31)
CREAT SERPL-MCNC: 0.8 MG/DL (ref 0.5–1.4)
CREAT SERPL-MCNC: 0.95 MG/DL (ref 0.55–1.02)
CRP SERPL-MCNC: 24.1 MG/L
EOSINOPHIL # BLD AUTO: 0.11 X10(3)/MCL (ref 0–0.9)
EOSINOPHIL NFR BLD AUTO: 1.3 %
ERYTHROCYTE [DISTWIDTH] IN BLOOD BY AUTOMATED COUNT: 15.2 % (ref 11.5–17)
ERYTHROCYTE [SEDIMENTATION RATE] IN BLOOD: 60 MM/HR (ref 0–20)
EST. AVERAGE GLUCOSE BLD GHB EST-MCNC: 194.4 MG/DL
GFR SERPLBLD CREATININE-BSD FMLA CKD-EPI: >60 MLS/MIN/1.73/M2
GLOBULIN SER-MCNC: 4.7 GM/DL (ref 2.4–3.5)
GLUCOSE SERPL-MCNC: 246 MG/DL (ref 82–115)
GLUCOSE SERPL-MCNC: 250 MG/DL (ref 70–110)
HBA1C MFR BLD: 8.4 %
HCT VFR BLD AUTO: 46.2 % (ref 37–47)
HCT VFR BLD CALC: 47 %PCV (ref 36–54)
HDLC SERPL-MCNC: 28 MG/DL (ref 35–60)
HGB BLD-MCNC: 15.1 G/DL (ref 12–16)
HGB BLD-MCNC: 16 G/DL
IMM GRANULOCYTES # BLD AUTO: 0.03 X10(3)/MCL (ref 0–0.04)
IMM GRANULOCYTES NFR BLD AUTO: 0.3 %
INR BLD: 1 (ref 0–1.3)
LDLC SERPL CALC-MCNC: 69 MG/DL (ref 50–140)
LYMPHOCYTES # BLD AUTO: 2.48 X10(3)/MCL (ref 0.6–4.6)
LYMPHOCYTES NFR BLD AUTO: 28.2 %
MCH RBC QN AUTO: 28.6 PG (ref 27–31)
MCHC RBC AUTO-ENTMCNC: 32.7 G/DL (ref 33–36)
MCV RBC AUTO: 87.5 FL (ref 80–94)
MONOCYTES # BLD AUTO: 0.43 X10(3)/MCL (ref 0.1–1.3)
MONOCYTES NFR BLD AUTO: 4.9 %
NEUTROPHILS # BLD AUTO: 5.69 X10(3)/MCL (ref 2.1–9.2)
NEUTROPHILS NFR BLD AUTO: 64.8 %
NRBC BLD AUTO-RTO: 0 %
PLATELET # BLD AUTO: 386 X10(3)/MCL (ref 130–400)
PMV BLD AUTO: 9.3 FL (ref 7.4–10.4)
POC IONIZED CALCIUM: 1.08 MMOL/L (ref 1.06–1.42)
POC TCO2 (MEASURED): 23 MMOL/L (ref 23–29)
POCT GLUCOSE: 158 MG/DL (ref 70–110)
POCT GLUCOSE: 205 MG/DL (ref 70–110)
POTASSIUM BLD-SCNC: 4.4 MMOL/L (ref 3.5–5.1)
POTASSIUM SERPL-SCNC: 4.7 MMOL/L (ref 3.5–5.1)
PROT SERPL-MCNC: 8 GM/DL (ref 5.8–7.6)
PROTHROMBIN TIME: 13.1 SECONDS (ref 12.5–14.5)
RBC # BLD AUTO: 5.28 X10(6)/MCL (ref 4.2–5.4)
SAMPLE: ABNORMAL
SODIUM BLD-SCNC: 141 MMOL/L (ref 136–145)
SODIUM SERPL-SCNC: 138 MMOL/L (ref 136–145)
TRIGL SERPL-MCNC: 466 MG/DL (ref 37–140)
TROPONIN I SERPL-MCNC: 0.02 NG/ML (ref 0–0.04)
TSH SERPL-ACNC: 0.86 UIU/ML (ref 0.35–4.94)
VLDLC SERPL CALC-MCNC: 93 MG/DL
WBC # SPEC AUTO: 8.78 X10(3)/MCL (ref 4.5–11.5)

## 2023-07-06 PROCEDURE — 63600175 PHARM REV CODE 636 W HCPCS: Performed by: PHYSICIAN ASSISTANT

## 2023-07-06 PROCEDURE — 80061 LIPID PANEL: CPT | Performed by: EMERGENCY MEDICINE

## 2023-07-06 PROCEDURE — 99222 PR INITIAL HOSPITAL CARE,LEVL II: ICD-10-PCS | Mod: ,,, | Performed by: PSYCHIATRY & NEUROLOGY

## 2023-07-06 PROCEDURE — 86140 C-REACTIVE PROTEIN: CPT | Performed by: PHYSICIAN ASSISTANT

## 2023-07-06 PROCEDURE — 25000003 PHARM REV CODE 250: Performed by: EMERGENCY MEDICINE

## 2023-07-06 PROCEDURE — 99222 1ST HOSP IP/OBS MODERATE 55: CPT | Mod: ,,, | Performed by: PSYCHIATRY & NEUROLOGY

## 2023-07-06 PROCEDURE — 84484 ASSAY OF TROPONIN QUANT: CPT | Performed by: EMERGENCY MEDICINE

## 2023-07-06 PROCEDURE — 83036 HEMOGLOBIN GLYCOSYLATED A1C: CPT

## 2023-07-06 PROCEDURE — 85652 RBC SED RATE AUTOMATED: CPT | Performed by: PHYSICIAN ASSISTANT

## 2023-07-06 PROCEDURE — 85730 THROMBOPLASTIN TIME PARTIAL: CPT | Performed by: EMERGENCY MEDICINE

## 2023-07-06 PROCEDURE — 80048 BASIC METABOLIC PNL TOTAL CA: CPT | Mod: XB

## 2023-07-06 PROCEDURE — 96372 THER/PROPH/DIAG INJ SC/IM: CPT | Mod: 59 | Performed by: PHYSICIAN ASSISTANT

## 2023-07-06 PROCEDURE — G0378 HOSPITAL OBSERVATION PER HR: HCPCS

## 2023-07-06 PROCEDURE — 21400001 HC TELEMETRY ROOM

## 2023-07-06 PROCEDURE — 93005 ELECTROCARDIOGRAM TRACING: CPT

## 2023-07-06 PROCEDURE — 96374 THER/PROPH/DIAG INJ IV PUSH: CPT

## 2023-07-06 PROCEDURE — 84443 ASSAY THYROID STIM HORMONE: CPT | Performed by: EMERGENCY MEDICINE

## 2023-07-06 PROCEDURE — 82962 GLUCOSE BLOOD TEST: CPT

## 2023-07-06 PROCEDURE — 99285 EMERGENCY DEPT VISIT HI MDM: CPT | Mod: 25

## 2023-07-06 PROCEDURE — 80053 COMPREHEN METABOLIC PANEL: CPT | Performed by: EMERGENCY MEDICINE

## 2023-07-06 PROCEDURE — 85025 COMPLETE CBC W/AUTO DIFF WBC: CPT | Performed by: EMERGENCY MEDICINE

## 2023-07-06 PROCEDURE — 85610 PROTHROMBIN TIME: CPT | Performed by: EMERGENCY MEDICINE

## 2023-07-06 PROCEDURE — 63600175 PHARM REV CODE 636 W HCPCS: Performed by: NURSE PRACTITIONER

## 2023-07-06 PROCEDURE — 83880 ASSAY OF NATRIURETIC PEPTIDE: CPT | Performed by: EMERGENCY MEDICINE

## 2023-07-06 PROCEDURE — 11000001 HC ACUTE MED/SURG PRIVATE ROOM

## 2023-07-06 RX ORDER — GLUCAGON 1 MG
1 KIT INJECTION
Status: DISCONTINUED | OUTPATIENT
Start: 2023-07-06 | End: 2023-07-17 | Stop reason: HOSPADM

## 2023-07-06 RX ORDER — NAPROXEN SODIUM 220 MG/1
TABLET, FILM COATED ORAL
Status: COMPLETED
Start: 2023-07-06 | End: 2023-07-06

## 2023-07-06 RX ORDER — NICOTINE 7MG/24HR
1 PATCH, TRANSDERMAL 24 HOURS TRANSDERMAL DAILY
Status: DISCONTINUED | OUTPATIENT
Start: 2023-07-07 | End: 2023-07-17 | Stop reason: HOSPADM

## 2023-07-06 RX ORDER — ASPIRIN 300 MG/1
300 SUPPOSITORY RECTAL
Status: DISPENSED | OUTPATIENT
Start: 2023-07-06 | End: 2023-07-07

## 2023-07-06 RX ORDER — INSULIN ASPART 100 [IU]/ML
1-10 INJECTION, SOLUTION INTRAVENOUS; SUBCUTANEOUS EVERY 6 HOURS PRN
Status: DISCONTINUED | OUTPATIENT
Start: 2023-07-06 | End: 2023-07-07

## 2023-07-06 RX ORDER — ATORVASTATIN CALCIUM 40 MG/1
40 TABLET, FILM COATED ORAL DAILY
Status: DISCONTINUED | OUTPATIENT
Start: 2023-07-07 | End: 2023-07-17 | Stop reason: HOSPADM

## 2023-07-06 RX ORDER — SODIUM CHLORIDE 0.9 % (FLUSH) 0.9 %
10 SYRINGE (ML) INJECTION EVERY 8 HOURS
Status: DISCONTINUED | OUTPATIENT
Start: 2023-07-06 | End: 2023-07-17 | Stop reason: HOSPADM

## 2023-07-06 RX ORDER — LORAZEPAM 2 MG/ML
1 INJECTION INTRAMUSCULAR ONCE
Status: COMPLETED | OUTPATIENT
Start: 2023-07-06 | End: 2023-07-06

## 2023-07-06 RX ORDER — ASPIRIN 81 MG/1
81 TABLET ORAL DAILY
Status: DISCONTINUED | OUTPATIENT
Start: 2023-07-07 | End: 2023-07-17 | Stop reason: HOSPADM

## 2023-07-06 RX ORDER — LABETALOL HYDROCHLORIDE 5 MG/ML
10 INJECTION, SOLUTION INTRAVENOUS
Status: DISCONTINUED | OUTPATIENT
Start: 2023-07-06 | End: 2023-07-17 | Stop reason: HOSPADM

## 2023-07-06 RX ORDER — NAPROXEN SODIUM 220 MG/1
324 TABLET, FILM COATED ORAL
Status: COMPLETED | OUTPATIENT
Start: 2023-07-06 | End: 2023-07-06

## 2023-07-06 RX ORDER — LABETALOL HYDROCHLORIDE 5 MG/ML
10 INJECTION, SOLUTION INTRAVENOUS
Status: COMPLETED | OUTPATIENT
Start: 2023-07-06 | End: 2023-07-06

## 2023-07-06 RX ADMIN — ASPIRIN 81 MG CHEWABLE TABLET 324 MG: 81 TABLET CHEWABLE at 02:07

## 2023-07-06 RX ADMIN — LABETALOL HYDROCHLORIDE 10 MG: 5 INJECTION, SOLUTION INTRAVENOUS at 02:07

## 2023-07-06 RX ADMIN — LORAZEPAM 1 MG: 2 INJECTION INTRAMUSCULAR; INTRAVENOUS at 11:07

## 2023-07-06 RX ADMIN — INSULIN ASPART 2 UNITS: 100 INJECTION, SOLUTION INTRAVENOUS; SUBCUTANEOUS at 05:07

## 2023-07-06 NOTE — ED NOTES
Pt became verbally aggressive, threatening to leave, and accusing nurse of injecting air instead of insulin. Myself, another RN ( Frankie) brought in AMA form as requested, and patient escalated. 2 security members responded and remained at bedside. Pt verbally agreed to stay and receive care and verbally agreed to a Patient Participation Agreement in which 4 witnesses were present.

## 2023-07-06 NOTE — HPI
62-year-old female with PMH anxiety, CVA with residual right-sided weakness, depression, DM, HTN, and TBI who presented to ED on 07/06 for right facial droop, worsening right-sided weakness/numbness, and expressive aphasia. LKN 0600. Of note, took her 80 units of insulin long-acting at 6:00 a.m. and BG was 43 when she took the 80 units. Pt had syncopal episode at approx 0715 the morning PTA.     Code LVO called at 1342. Stroke NP responded at 1348. Neurologist notified at 1350. TNK contraindicated d/t OOW.     CVA w/u negative for acute infarct. R sided weakness significantly improved hours later.     Neurology re-consulted on 7/10 for persistent RLE weakness. MRI c-spine w/o on 7/8 unrevealing for cord signal abnormalities. MRI l-spine on 7/10 revealing for impingement of S1 nerve root.     On exam pt appears to be in significant pain with any tactile stimulation or ROM of RUE/RLE. Pt reports recent fall at home PTA where she fell on R side and has had R sided weakness with associated tenderness since.    no

## 2023-07-06 NOTE — CONSULTS
"[facility] - Emergency Dept  Neurology  Consult Note    Patient Name: Elba Barnes  MRN: [id number]  Admission Date: 7/6/2023  Hospital Length of Stay: 0 days  Code Status: Prior   Attending Provider: Issac Lopez MD   Consulting Provider: Ksenia Isaacs [facility]  Primary Care Physician: VIRGEN Hoyt  Principal Problem:<principal problem not specified>    Inpatient consult to Vascular (Stroke) Neurology  Consult performed by: VIRGEN De Los Santos  Consult ordered by: VIRGEN De Los Santos         Subjective:     Chief Complaint:       HPI:   62-year-old female with PMH anxiety, CVA with residual right-sided weakness, depression, DM, HTN, and TBI who presented to ED on 07/06 for right facial droop, worsening right-sided weakness/numbness, and expressive aphasia. LKN 0600.     Code LVO called at 1342. Stroke NP responded at 1348. Neurologist notified at 1350. TNK contraindicated d/t OOW.        Past Medical History:   Diagnosis Date    Anxiety     Arthritis     CVA (cerebral vascular accident)     "mini stroke"    Depression     Diabetes mellitus     Heart problem     History of psychiatric hospitalization     three(1983, 1995 and another (years ago")): depression    HTN (hypertension)     Hx of psychiatric care     Hypertension     Pacemaker     Psychiatric exam requested by authority     Psychiatric problem     TBI (traumatic brain injury)     Therapy        Past Surgical History:   Procedure Laterality Date    CARDIAC PACEMAKER PLACEMENT      CARDIAC PACEMAKER REMOVAL      CHOLECYSTECTOMY      HYSTERECTOMY         Review of patient's allergies indicates:   Allergen Reactions    Pcn [penicillins] Anaphylaxis       Current Neurological Medications:     No current facility-administered medications on file prior to encounter.     Current Outpatient Medications on File Prior to Encounter   Medication Sig    albuterol (PROVENTIL/VENTOLIN HFA) 90 mcg/actuation inhaler Inhale " "1-2 puffs into the lungs every 6 (six) hours as needed for Wheezing. Rescue    ALPRAZolam (XANAX) 1 MG tablet Take 1 tablet by mouth once a day as needed as needed for anxiety    amLODIPine (NORVASC) 2.5 MG tablet Take 2.5 mg by mouth once daily.    ammonium lactate (LAC-HYDRIN) 12 % lotion Apply to dry skin areas on feet, legs, and elbow areas TWICE A DAY AS NEEDED    aspirin (ECOTRIN) 81 MG EC tablet Take 1 tablet (81 mg total) by mouth once daily.    atorvastatin (LIPITOR) 40 MG tablet Take 40 mg by mouth every evening.    BD ULTRA-FINE SHORT PEN NEEDLE 31 gauge x 5/16" Ndle SMARTSIG:Injection Twice Daily    blood-glucose meter Misc CHECK BLOOD GLUCOSE LEVEL TWICE DAILY.    butalbital-acetaminophen-caffeine -40 mg (FIORICET, ESGIC) -40 mg per tablet Take 1 tablet by mouth every 6 (six) hours as needed for Pain.    carvediloL (COREG) 12.5 MG tablet Take 1 tablet (12.5 mg total) by mouth 2 (two) times daily.    carvediloL (COREG) 3.125 MG tablet Take 3.125 mg by mouth 2 (two) times daily.    cholecalciferol, vitamin D3, (VITAMIN D3) 50 mcg (2,000 unit) Cap capsule Take 1 capsule by mouth.    diclofenac sodium (VOLTAREN) 1 % Gel Apply 2 g topically 4 (four) times daily.    docusate sodium (COLACE) 250 MG capsule Take 1 capsule (250 mg total) by mouth 2 (two) times daily.    DULoxetine (CYMBALTA) 30 MG capsule Take 30 mg by mouth.    DULoxetine (CYMBALTA) 60 MG capsule Take 60 mg by mouth every morning.    empty container (SHARPS CONTAINER) Misc N/A    esomeprazole (NEXIUM) 40 MG capsule Take 40 mg by mouth every morning.    fluticasone furoate-vilanteroL (BREO ELLIPTA) 200-25 mcg/dose DsDv diskus inhaler Inhale 1 puff into the lungs once daily. Controller    fluticasone propionate (FLONASE) 50 mcg/actuation nasal spray 1 spray (50 mcg total) by Each Nostril route 2 (two) times daily as needed for Rhinitis.    gabapentin (NEURONTIN) 600 MG tablet Take 1 tablet (600 mg total) by mouth 3 " "(three) times daily.    glipiZIDE (GLUCOTROL) 10 MG tablet Take 10 mg by mouth.    hydrALAZINE (APRESOLINE) 100 MG tablet Take 1 tablet (100 mg total) by mouth every 8 (eight) hours.    hydrALAZINE (APRESOLINE) 25 MG tablet Take 25 mg by mouth 3 (three) times daily.    insulin (LANTUS SOLOSTAR U-100 INSULIN) glargine 100 units/mL SubQ pen Inject 80 Units into the skin 2 (two) times daily before meals.    insulin lispro 100 unit/mL pen SMARTSI Unit(s) SUB-Q 3 Times Daily    insulin syringe-needle U-100 1 mL 28 gauge x 1/2" Syrg USE TO inject insulin TWICE DAILY    magnesium citrate solution Take 296 mLs by mouth daily as needed.    multivitamin (THERAGRAN) per tablet Take 1 tablet by mouth once daily.    naloxone (NARCAN) 4 mg/actuation Spry SMARTSI Spray(s) Both Nares PRN    nebivoloL (BYSTOLIC) 10 MG Tab Take 10 mg by mouth once daily.    NIFEdipine (PROCARDIA-XL) 90 MG (OSM) 24 hr tablet Take 1 tablet (90 mg total) by mouth once daily.    nitroGLYCERIN (NITROSTAT) 0.4 MG SL tablet Place 0.4 mg under the tongue every 5 (five) minutes as needed for Chest pain.    NOVOLOG FLEXPEN U-100 INSULIN 100 unit/mL (3 mL) InPn pen Inject 20 Units into the skin 3 (three) times daily with meals. DISCARD OPEN PEN AFTER 28 DAYS    nystatin (MYCOSTATIN) cream Apply topically 3 (three) times daily. Use under breast    ONETOUCH DELICA PLUS LANCET 33 gauge Misc Apply topically 4 (four) times daily.    ONETOUCH VERIO TEST STRIPS Strp 4 (four) times daily.    oxybutynin (DITROPAN-XL) 5 MG TR24 Take 1 tablet (5 mg total) by mouth once daily.    pantoprazole (PROTONIX) 40 MG tablet Take 1 tablet (40 mg total) by mouth once daily.    polyethylene glycol (GLYCOLAX) 17 gram/dose powder Take 17 g by mouth once daily.    sertraline (ZOLOFT) 50 MG tablet Take 50 mg by mouth once daily.    terconazole (TERAZOL 7) 0.4 % Crea Place 1 applicator vaginally every evening.    valsartan (DIOVAN) 320 MG tablet Take 320 mg by " mouth.     Family History       Problem Relation (Age of Onset)    Bipolar disorder Mother, Father    Schizophrenia Mother          Tobacco Use    Smoking status: Every Day     Years: 40.00     Types: Cigarettes     Last attempt to quit: 2018     Years since quittin.8    Smokeless tobacco: Never    Tobacco comments:     1 pk every 2 weeks, 2 a day   Substance and Sexual Activity    Alcohol use: Not Currently     Comment: wine on occ    Drug use: No    Sexual activity: Never     Review of Systems  A 14pt ros was reviewed & is negative unless o/w documented in the hpi    Objective:     Vital Signs (Most Recent):  Temp: 97.9 °F (36.6 °C) (23 1356)  Pulse: 87 (23 1356)  Resp: 18 (23 1356)  BP: (!) 150/71 (23 1356)  SpO2: 98 % (23 1356) Vital Signs (24h Range):  Temp:  [97.9 °F (36.6 °C)] 97.9 °F (36.6 °C)  Pulse:  [87] 87  Resp:  [18] 18  SpO2:  [98 %] 98 %  BP: (150)/(71) 150/71     Weight: 115.5 kg (254 lb 10.1 oz)  Body mass index is 41.12 kg/m².     Physical Exam  Vitals reviewed.   Constitutional:       General: She is awake.      Appearance: Normal appearance.   HENT:      Head: Normocephalic and atraumatic.      Nose: Nose normal.      Mouth/Throat:      Mouth: Mucous membranes are moist.      Pharynx: Oropharynx is clear.   Eyes:      Extraocular Movements: Extraocular movements intact and EOM normal.      Pupils: Pupils are equal, round, and reactive to light.   Pulmonary:      Effort: Pulmonary effort is normal.   Skin:     General: Skin is warm and dry.   Neurological:      Mental Status: She is alert and oriented to person, place, and time.   Psychiatric:         Behavior: Behavior is cooperative.        NEUROLOGICAL EXAMINATION:     MENTAL STATUS   Oriented to person, place, and time.   Follows 3 step commands.   Speech: (Expressive aphasia)  Level of consciousness: alert    CRANIAL NERVES     CN II   Visual fields full to confrontation.     CN III, IV, VI    Pupils are equal, round, and reactive to light.  Extraocular motions are normal.   Nystagmus: none   Conjugate gaze: present    CN V   Right facial sensation deficit: none  Left facial sensation deficit: none    CN VII   Right facial weakness: central  Left facial weakness: none    MOTOR EXAM     Strength   Right deltoid: 0/5  Left deltoid: 5/5  Right biceps: 0/5  Left biceps: 5/5  Right triceps: 0/5  Left triceps: 5/5  Right quadriceps: 0/5  Left quadriceps: 5/5  Right hamstrin/5  Left hamstrin/5  Right glutei: 0/5  Left glutei: 5/5  Right anterior tibial: 0/5  Left anterior tibial: 5/5  Right posterior tibial: 0/5  Left posterior tibial: 5/5    SENSORY EXAM   Right arm light touch: decreased from elbow  Left arm light touch: normal  Right leg light touch: decreased from knee  Left leg light touch: normal    Significant Labs:   Recent Lab Results         23  1442   23  1419   23  1401        Baso # 0.04           Basophil % 0.5           Eos # 0.11           Eosinophil % 1.3           Hematocrit 46.2           Hemoglobin 15.1           Immature Grans (Abs) 0.03           Immature Granulocytes 0.3           Lymph # 2.48           LYMPH % 28.2           MCH 28.6           MCHC 32.7           MCV 87.5           Mono # 0.43           Mono % 4.9           MPV 9.3           Neut # 5.69           Neut % 64.8           nRBC 0.0           Platelets 386           POC Anion Gap   17         POC BUN   16         POC Chloride   107         POC Creatinine   0.8         POC Glucose   250         POC Hematocrit   47         POC HEMOGLOBIN   16         POC Ionized Calcium   1.08         Potassium, Blood Gas   4.4         Sodium, Blood Gas   141         POC TCO2 (MEASURED)   23         POCT Glucose     205       RBC 5.28           RDW 15.2           Sample   VENOUS         WBC 8.78                   Significant Imaging: I have reviewed all pertinent imaging results/findings within the past 24  hours.    Assessment and Plan:     Right sided weakness  With associated numbness, R facial droop, and expressive aphasia    Stroke Work-Up:    CT head w/o: unrevealing for acute intracranial abnormalities  CTA head/neck: no LVO, aneurysms, or flow limiting stenosis  MRI brain w/o: ordered   CUS: pending  TTE: pending  Labs: LDL 69, hemoglobin A1C and TSH pending  Home medications: ASA 81 mg daily, atorvastatin 40 mg daily    Given  mg in ED  Outside window for TNK  Normotension  Fall precautions  PT/OT                VTE Risk Mitigation (From admission, onward)    None          Thank you for your consult. Further recommendations to follow per MD Ksenia Isaacs, [facility]  Inpatient Neurology  [facility] - Emergency Dept

## 2023-07-06 NOTE — H&P
[facility] Medicine History & Physical Examination       Patient Name: Elba Barnes  MRN: [id number]  Patient Class: IP- Inpatient   Admission Date: 7/6/2023   Admitting Physician: Nitish Cornejo MD   Length of Stay: 0  Attending Physician: Nitish Cornejo MD   Primary Care Provider: VIRGEN Hoyt  Face-to-Face encounter date: 07/06/2023  Code Status: Full Code   Chief Complaint: Cerebrovascular Accident (ASSI with R leg weakness, R facial droop, R arm drift, slurred speech, blurry vision. Last known normal 0600, )        Patient information was obtained from patient, patient's family, past medical records and ER records.     HISTORY OF PRESENT ILLNESS:   Elba Barnes is a 62 y.o. Black or  female with a past medical history of hypertension, hyperlipidemia, anxiety/depression, diabetes mellitus type 2, CVA, rheumatoid arthritis, sciatica and TBI. The patient presented to [facility] on 7/6/2023 with a primary complaint of right facial droop, right leg and arm weakness, slurred speech and blurry vision, headache and right-sided chest pain.  Patient reports waking up this morning with weakness the right or and leg and nausea with an episode of vomiting.  Patient reports symptoms are improving.  She denies complaints of shortness of breath and diarrhea.  She is supposed to be taking aspirin daily but does not do so.  She is a current smoker.    Upon presentation to the ED, temperature 97.9° F, heart rate 87, blood pressure 150/71, respiratory rate 18 SpO2 98% on room air.  Labs with glucose 246 and troponin 0.020 and BNP 16.7.  EKG normal sinus rhythm and nonspecific ST and T-wave abnormalities.  Chest x-ray with questionable pulmonary vascular congestion.  CT of the head with no acute intracranial abnormalities but chronic microvascular ischemic changes.  CTA multiphase stroke event no hemodynamically significant stenosis, occlusion, aneurysm or  "dissection.  In ED patient received full-dose aspirin and labetalol.  She is admitted to hospital medicine services for further medical management.    PAST MEDICAL HISTORY:     Past Medical History:   Diagnosis Date    Anxiety     Arthritis     CVA (cerebral vascular accident)     "mini stroke"    Depression     Diabetes mellitus     Heart problem     History of psychiatric hospitalization     three(,  and another (years ago")): depression    HTN (hypertension)     Hx of psychiatric care     Hypertension     Pacemaker     Psychiatric exam requested by authority     Psychiatric problem     TBI (traumatic brain injury)     Therapy    Rheumatoid arthritis   Sciatica     PAST SURGICAL HISTORY:     Past Surgical History:   Procedure Laterality Date    CARDIAC PACEMAKER PLACEMENT      CARDIAC PACEMAKER REMOVAL      CHOLECYSTECTOMY      HYSTERECTOMY     Brain surgery    ALLERGIES:   Pcn [penicillins]    FAMILY HISTORY:   Mother: Stroke, hypertension, diabetes mellitus    SOCIAL HISTORY:     Social History     Tobacco Use    Smoking status: Every Day     Years: 40.00     Types: Cigarettes     Last attempt to quit: 2018     Years since quittin.8    Smokeless tobacco: Never    Tobacco comments:     1 pk every 2 weeks, 2 a day   Substance Use Topics    Alcohol use: Not Currently     Comment: wine on occ        HOME MEDICATIONS:     Prior to Admission medications    Medication Sig Start Date End Date Taking? Authorizing Provider   albuterol (PROVENTIL/VENTOLIN HFA) 90 mcg/actuation inhaler Inhale 1-2 puffs into the lungs every 6 (six) hours as needed for Wheezing. Rescue 23   Ignacia Lopez DO   ALPRAZolam (XANAX) 1 MG tablet Take 1 tablet by mouth once a day as needed as needed for anxiety 22   Historical Provider   amLODIPine (NORVASC) 2.5 MG tablet Take 2.5 mg by mouth once daily.    Historical Provider   ammonium lactate (LAC-HYDRIN) 12 % lotion Apply to dry skin areas on feet, legs, and elbow " "areas TWICE A DAY AS NEEDED 1/5/23   Historical Provider   aspirin (ECOTRIN) 81 MG EC tablet Take 1 tablet (81 mg total) by mouth once daily. 9/30/21   Markel Torres MD   atorvastatin (LIPITOR) 40 MG tablet Take 40 mg by mouth every evening. 11/22/22   Historical Provider   BD ULTRA-FINE SHORT PEN NEEDLE 31 gauge x 5/16" Ndle SMARTSIG:Injection Twice Daily 10/24/22   Historical Provider   blood-glucose meter Misc CHECK BLOOD GLUCOSE LEVEL TWICE DAILY. 4/6/22   Historical Provider   butalbital-acetaminophen-caffeine -40 mg (FIORICET, ESGIC) -40 mg per tablet Take 1 tablet by mouth every 6 (six) hours as needed for Pain. 2/11/19   Leandra Monreal NP   carvediloL (COREG) 12.5 MG tablet Take 1 tablet (12.5 mg total) by mouth 2 (two) times daily. 2/17/23 2/17/24  Ignacia Lopez DO   carvediloL (COREG) 3.125 MG tablet Take 3.125 mg by mouth 2 (two) times daily. 5/9/23   Historical Provider   cholecalciferol, vitamin D3, (VITAMIN D3) 50 mcg (2,000 unit) Cap capsule Take 1 capsule by mouth. 8/13/22   Historical Provider   diclofenac sodium (VOLTAREN) 1 % Gel Apply 2 g topically 4 (four) times daily. 12/21/22   VIRGEN Ho   docusate sodium (COLACE) 250 MG capsule Take 1 capsule (250 mg total) by mouth 2 (two) times daily. 6/8/23   VIRGEN Ho   DULoxetine (CYMBALTA) 30 MG capsule Take 30 mg by mouth. 11/7/22   Historical Provider   DULoxetine (CYMBALTA) 60 MG capsule Take 60 mg by mouth every morning. 5/9/23   Historical Provider   empty container (SHARPS CONTAINER) [facility] N/A 4/6/22   Historical Provider   esomeprazole (NEXIUM) 40 MG capsule Take 40 mg by mouth every morning. 11/22/22   Historical Provider   fluticasone furoate-vilanteroL (BREO ELLIPTA) 200-25 mcg/dose DsDv diskus inhaler Inhale 1 puff into the lungs once daily. Controller 12/21/22   VIRGEN Ho   fluticasone propionate (FLONASE) 50 mcg/actuation nasal spray 1 spray (50 mcg total) by Each Nostril route 2 (two) " "times daily as needed for Rhinitis. 3/14/23   Rui Monroy,    gabapentin (NEURONTIN) 600 MG tablet Take 1 tablet (600 mg total) by mouth 3 (three) times daily. 23  Orlando Kearns DO   glipiZIDE (GLUCOTROL) 10 MG tablet Take 10 mg by mouth. 23   Historical Provider   hydrALAZINE (APRESOLINE) 100 MG tablet Take 1 tablet (100 mg total) by mouth every 8 (eight) hours. 23  Ignacia Lopez DO   hydrALAZINE (APRESOLINE) 25 MG tablet Take 25 mg by mouth 3 (three) times daily. 23   Historical Provider   insulin (LANTUS SOLOSTAR U-100 INSULIN) glargine 100 units/mL SubQ pen Inject 80 Units into the skin 2 (two) times daily before meals. 23   VIRGEN Ho   insulin lispro 100 unit/mL pen SMARTSI Unit(s) SUB-Q 3 Times Daily 23   Historical Provider   insulin syringe-needle U-100 1 mL 28 gauge x 1/2" Syrg USE TO inject insulin TWICE DAILY 22   Historical Provider   magnesium citrate solution Take 296 mLs by mouth daily as needed. 22   Historical Provider   multivitamin (THERAGRAN) per tablet Take 1 tablet by mouth once daily.    Historical Provider   naloxone (NARCAN) 4 mg/actuation [place] SMARTSI Spray(s) Both Nares PRN 10/12/22   Historical Provider   nebivoloL (BYSTOLIC) 10 MG Tab Take 10 mg by mouth once daily.    Historical Provider   NIFEdipine (PROCARDIA-XL) 90 MG (OSM) 24 hr tablet Take 1 tablet (90 mg total) by mouth once daily. 23  Ignacia Lopez DO   nitroGLYCERIN (NITROSTAT) 0.4 MG SL tablet Place 0.4 mg under the tongue every 5 (five) minutes as needed for Chest pain.    Historical Provider   NOVOLOG FLEXPEN U-100 INSULIN 100 unit/mL (3 mL) InPn pen Inject 20 Units into the skin 3 (three) times daily with meals. DISCARD OPEN PEN AFTER 28 DAYS 3/22/23   VIRGEN Ho   nystatin (MYCOSTATIN) cream Apply topically 3 (three) times daily. Use under breast 10/26/22   Historical Provider   ONETOUCH DELICA PLUS LANCET 33 gauge Misc Apply " topically 4 (four) times daily. 10/27/22   Historical Provider   ONETOUCH VERIO TEST STRIPS Strp 4 (four) times daily. 12/19/22   Historical Provider   oxybutynin (DITROPAN-XL) 5 MG TR24 Take 1 tablet (5 mg total) by mouth once daily. 6/8/23   Patilorrie aRi ROSASMITA   pantoprazole (PROTONIX) 40 MG tablet Take 1 tablet (40 mg total) by mouth once daily. 2/11/19   Leandra Monreal, AWAIS   polyethylene glycol (GLYCOLAX) 17 gram/dose powder Take 17 g by mouth once daily. 6/8/23   Patilorrie RaiVIRGEN   sertraline (ZOLOFT) 50 MG tablet Take 50 mg by mouth once daily.    Historical Provider   terconazole (TERAZOL 7) 0.4 % Crea Place 1 applicator vaginally every evening. 2/24/23   Historical Provider   valsartan (DIOVAN) 320 MG tablet Take 320 mg by mouth. 5/9/23   Historical Provider       REVIEW OF SYSTEMS:   Except as documented, all other systems reviewed and negative     PHYSICAL EXAM:     VITAL SIGNS: 24 HRS MIN & MAX LAST   Temp  Min: 97.9 °F (36.6 °C)  Max: 97.9 °F (36.6 °C) 97.9 °F (36.6 °C)   BP  Min: 150/71  Max: 175/81 (!) 175/81   Pulse  Min: 73  Max: 89  73   Resp  Min: 18  Max: 23 20   SpO2  Min: 95 %  Max: 100 % 100 %       General appearance: Well-developed, well-nourished female in no apparent distress. No family at bedside.  HEENT: Atraumatic head. Moist mucous membranes of oral cavity.  Lungs: Clear to auscultation bilaterally.   Heart: Regular rate and rhythm.   Abdomen: Obese, soft, non-distended.   Extremities: No cyanosis, clubbing. No deformities.  Skin: No Rash. Warm and dry.  Neuro: Awake, alert and oriented. Speech slurred. Motor and sensory exams grossly intact.  Psych/mental status: Appropriate mood and affect. Cooperative. Responds appropriately to questions.       LABS AND IMAGING:     Recent Labs   Lab 07/06/23  1419 07/06/23  1442   WBC  --  8.78   RBC  --  5.28   HGB  --  15.1   HCT 47 46.2   MCV  --  87.5   MCH  --  28.6   MCHC  --  32.7*   RDW  --  15.2   PLT  --  386   MPV  --  9.3        Recent Labs   Lab 07/06/23  1441      K 4.7   CO2 23   BUN 13.8   CREATININE 0.95   CALCIUM 9.2   ALBUMIN 3.3*   ALKPHOS 78   ALT 35   AST 22   BILITOT 0.2       Microbiology Results (last 7 days)       ** No results found for the last 168 hours. **             CTA STROKE MULTI-PHASE  Narrative: EXAMINATION:  CTA STROKE MULTI-PHASE    CLINICAL HISTORY:  Neuro deficit, acute, stroke suspected;    TECHNIQUE:  CT angiogram was performed from the level of the norma to the vertex prior to and following the IV administration of intravenous contrast.  Axial, sagittal and coronal reconstructions and maximum intensity projection reconstructions were performed. Arterial stenosis percentages are based on NASCET measurement criteria.    Automated tube current modulation, weight-based exposure dosing, and/or iterative reconstruction technique utilized to reach lowest reasonably achievable exposure rate.    DLP: 1988 mGycm    COMPARISON:  CT head 07/06/2023.  CTA chest 03/13/2023    FINDINGS:  CTA NECK:    AORTIC ARCH AND GREAT VESSELS: Normal 3 vessel arch.    RIGHT ICA: No hemodynamically significant stenosis, aneurysmal dilatation, or dissection.  Mild carotid bulb calcifications.    LEFT ICA: No hemodynamically significant stenosis, aneurysmal dilatation, or dissection.  Mild carotid bulb calcifications.    VERTEBRAL ARTERIES: Patent extracranial segments. No hemodynamically significant stenosis, aneurysmal dilatation, or dissection.    CTA HEAD:    ANTERIOR CIRCULATION: No hemodynamically significant stenosis, aneurysmal dilatation, or dissection.    POSTERIOR CIRCULATION: No hemodynamically significant stenosis, aneurysmal dilatation, or dissection.    NON-VASCULAR STRUCTURES (LIMITED EVALUATION): 2 cm nodule at the right lobe of the thyroid.  A 1 cm nodule at the thoracic inlet on the right may represent a lymph node or parathyroid nodule.  This can be evaluated with outpatient thyroid sonogram.  Unchanged  from prior exam apical emphysema.  Old right rib deformities.  Impression: No hemodynamically significant stenosis, occlusion, aneurysm or dissection.    Electronically signed by: Pham Whipple  Date:    07/06/2023  Time:    15:06  X-Ray Chest AP Portable  Narrative: EXAMINATION:  XR CHEST AP PORTABLE    CLINICAL HISTORY:  Stroke;    COMPARISON:  8 June 2023    FINDINGS:  Portable frontal view of the chest was obtained. The heart is not significantly enlarged.  There is some prominence of the interstitium.  No dense consolidation or pneumothorax.  Impression: Question some pulmonary vascular congestion.    Electronically signed by: Mauri Morris  Date:    07/06/2023  Time:    15:04  CT HEAD FOR STROKE  Narrative: EXAMINATION:  CT HEAD FOR STROKE    CLINICAL HISTORY:  Neuro deficit, acute, stroke suspected;stroke alert;;    TECHNIQUE:  Axial scans were obtained from skull base to the vertex.    Coronal and sagittal reconstructions obtained from the axial data.    Automatic exposure control was utilized to limit radiation dose.    Contrast: None    Radiation Dose:    Total DLP: 897 mGy*cm    COMPARISON:  None    FINDINGS:  There is no acute intracranial hemorrhage or edema. The gray-white matter differentiation is preserved.  Patchy hypodensities in the subcortical and periventricular white matter, basal ganglia and thalami likely represent chronic microvascular ischemic changes.    There is no mass effect or midline shift. The ventricles and sulci are normal in size. The basal cisterns are patent. There is no abnormal extra-axial fluid collection.    The calvarium and skull base are intact.  There is mild scattered paranasal sinus mucosal thickening.  Impression: 1. No acute intracranial abnormality.  2. Chronic microvascular ischemic changes.  Code fast findings given to Dr. Noel at the time of dictation.    Electronically signed by: Kelsie Prescott  Date:    07/06/2023  Time:    14:25        ASSESSMENT &  PLAN:   Assessment:  Acute CVA rule out  Tobacco use  History of hypertension, hyperlipidemia, anxiety/depression, diabetes mellitus type 2, rheumatoid arthritis, sciatica, CVA and TBI    Plan:  - Neurology consulted. Appreciate recommendations  - CVA work up:  > MRI Brain - ordered  > Echo and Carotid US - ordered  > Hemoglobin A1C - 8.4  > Lipid Panel - cholesterol 190, HDL 28, LDL 67, triglycerides 466  > CRP and ESR - ordered  > UDS - ordered  > Physical, Occupation and Speech Therapy consulted  > Permissive hypertension, IV Labetalol and Hydralazine as needed for SBP >220 or DBP> 120  - Accu-checks and sliding scale  - Nicotine patch  - Resume appropriate home medications when deemed necessary   - Labs in AM      VTE Prophylaxis: will be placed on SCD for DVT prophylaxis and will be advised to be as mobile as possible and sit in a chair as tolerated      __________________________________________________________________________  INPATIENT LIST OF MEDICATIONS     Current Facility-Administered Medications:     aspirin suppository 300 mg, 300 mg, Rectal, ED 1 Time, Issac Lopez MD    labetaloL injection 10 mg, 10 mg, Intravenous, Q15 Min PRN, Melody Dover PA-C    sodium chloride 0.9% flush 10 mL, 10 mL, Intravenous, Q8H, Melody Dover PA-C    Current Outpatient Medications:     albuterol (PROVENTIL/VENTOLIN HFA) 90 mcg/actuation inhaler, Inhale 1-2 puffs into the lungs every 6 (six) hours as needed for Wheezing. Rescue, Disp: 1 g, Rfl: 3    ALPRAZolam (XANAX) 1 MG tablet, Take 1 tablet by mouth once a day as needed as needed for anxiety, Disp: , Rfl:     amLODIPine (NORVASC) 2.5 MG tablet, Take 2.5 mg by mouth once daily., Disp: , Rfl:     ammonium lactate (LAC-HYDRIN) 12 % lotion, Apply to dry skin areas on feet, legs, and elbow areas TWICE A DAY AS NEEDED, Disp: , Rfl:     aspirin (ECOTRIN) 81 MG EC tablet, Take 1 tablet (81 mg total) by mouth once daily., Disp: 30 tablet, Rfl: 11    atorvastatin  "(LIPITOR) 40 MG tablet, Take 40 mg by mouth every evening., Disp: , Rfl:     BD ULTRA-FINE SHORT PEN NEEDLE 31 gauge x 5/16" Ndle, SMARTSIG:Injection Twice Daily, Disp: , Rfl:     blood-glucose meter Misc, CHECK BLOOD GLUCOSE LEVEL TWICE DAILY., Disp: , Rfl:     butalbital-acetaminophen-caffeine -40 mg (FIORICET, ESGIC) -40 mg per tablet, Take 1 tablet by mouth every 6 (six) hours as needed for Pain., Disp: 7 tablet, Rfl: 0    carvediloL (COREG) 12.5 MG tablet, Take 1 tablet (12.5 mg total) by mouth 2 (two) times daily., Disp: 60 tablet, Rfl: 11    carvediloL (COREG) 3.125 MG tablet, Take 3.125 mg by mouth 2 (two) times daily., Disp: , Rfl:     cholecalciferol, vitamin D3, (VITAMIN D3) 50 mcg (2,000 unit) Cap capsule, Take 1 capsule by mouth., Disp: , Rfl:     diclofenac sodium (VOLTAREN) 1 % Gel, Apply 2 g topically 4 (four) times daily., Disp: 100 g, Rfl: 3    docusate sodium (COLACE) 250 MG capsule, Take 1 capsule (250 mg total) by mouth 2 (two) times daily., Disp: 60 capsule, Rfl: 3    DULoxetine (CYMBALTA) 30 MG capsule, Take 30 mg by mouth., Disp: , Rfl:     DULoxetine (CYMBALTA) 60 MG capsule, Take 60 mg by mouth every morning., Disp: , Rfl:     empty container (SHARPS CONTAINER) Misc, N/A, Disp: , Rfl:     esomeprazole (NEXIUM) 40 MG capsule, Take 40 mg by mouth every morning., Disp: , Rfl:     fluticasone furoate-vilanteroL (BREO ELLIPTA) 200-25 mcg/dose DsDv diskus inhaler, Inhale 1 puff into the lungs once daily. Controller, Disp: 180 each, Rfl: 3    fluticasone propionate (FLONASE) 50 mcg/actuation nasal spray, 1 spray (50 mcg total) by Each Nostril route 2 (two) times daily as needed for Rhinitis., Disp: 15 g, Rfl: 0    gabapentin (NEURONTIN) 600 MG tablet, Take 1 tablet (600 mg total) by mouth 3 (three) times daily., Disp: 90 tablet, Rfl: 5    glipiZIDE (GLUCOTROL) 10 MG tablet, Take 10 mg by mouth., Disp: , Rfl:     hydrALAZINE (APRESOLINE) 100 MG tablet, Take 1 tablet (100 mg total) by " "mouth every 8 (eight) hours., Disp: 90 tablet, Rfl: 11    hydrALAZINE (APRESOLINE) 25 MG tablet, Take 25 mg by mouth 3 (three) times daily., Disp: , Rfl:     insulin (LANTUS SOLOSTAR U-100 INSULIN) glargine 100 units/mL SubQ pen, Inject 80 Units into the skin 2 (two) times daily before meals., Disp: 60 mL, Rfl: 3    insulin lispro 100 unit/mL pen, SMARTSI Unit(s) SUB-Q 3 Times Daily, Disp: , Rfl:     insulin syringe-needle U-100 1 mL 28 gauge x 1/2" Syrg, USE TO inject insulin TWICE DAILY, Disp: , Rfl:     magnesium citrate solution, Take 296 mLs by mouth daily as needed., Disp: , Rfl:     multivitamin (THERAGRAN) per tablet, Take 1 tablet by mouth once daily., Disp: , Rfl:     naloxone (NARCAN) 4 mg/actuation [place], SMARTSI Spray(s) Both Nares PRN, Disp: , Rfl:     nebivoloL (BYSTOLIC) 10 MG Tab, Take 10 mg by mouth once daily., Disp: , Rfl:     NIFEdipine (PROCARDIA-XL) 90 MG (OSM) 24 hr tablet, Take 1 tablet (90 mg total) by mouth once daily., Disp: 30 tablet, Rfl: 11    nitroGLYCERIN (NITROSTAT) 0.4 MG SL tablet, Place 0.4 mg under the tongue every 5 (five) minutes as needed for Chest pain., Disp: , Rfl:     NOVOLOG FLEXPEN U-100 INSULIN 100 unit/mL (3 mL) InPn pen, Inject 20 Units into the skin 3 (three) times daily with meals. DISCARD OPEN PEN AFTER 28 DAYS, Disp: 15 mL, Rfl: 2    nystatin (MYCOSTATIN) cream, Apply topically 3 (three) times daily. Use under breast, Disp: , Rfl:     ONETOUCH DELICA PLUS LANCET 33 gauge Misc, Apply topically 4 (four) times daily., Disp: , Rfl:     ONETOUCH VERIO TEST STRIPS Strp, 4 (four) times daily., Disp: , Rfl:     oxybutynin (DITROPAN-XL) 5 MG TR24, Take 1 tablet (5 mg total) by mouth once daily., Disp: 30 tablet, Rfl: 1    pantoprazole (PROTONIX) 40 MG tablet, Take 1 tablet (40 mg total) by mouth once daily., Disp: 30 tablet, Rfl: 11    polyethylene glycol (GLYCOLAX) 17 gram/dose powder, Take 17 g by mouth once daily., Disp: 595 g, Rfl: 1    sertraline (ZOLOFT) 50 MG " tablet, Take 50 mg by mouth once daily., Disp: , Rfl:     terconazole (TERAZOL 7) 0.4 % Crea, Place 1 applicator vaginally every evening., Disp: , Rfl:     valsartan (DIOVAN) 320 MG tablet, Take 320 mg by mouth., Disp: , Rfl:       Scheduled Meds:   aspirin  300 mg Rectal ED 1 Time    sodium chloride 0.9%  10 mL Intravenous Q8H     Continuous Infusions:  PRN Meds:.labetalol      Discharge Planning and Disposition: Anticipated discharge to be determined.    I, TEZ Milton, have reviewed and discussed the case with Dr. Nitish Cornejo MD    Please see the following addendum for further assessment and plan from there attending MD.    Melody Dover PA-C  07/06/2023    For this patient encounter, I reviewed the NP/PA/resident documentation, treatment plan, and medical decision making; and I had face-to-face time with this patient.     History: Reviewed HPI, medical, surgical, family, and social histories as above    Physical exam: Agree with documentation as above    Treatment Plan:  I was present with NP for entire H/P. Patient reports new onset of worsened R-sided weakness, slurred speech, and R facial droop around 6am.  She did not come to the hospital until around 1pm so she was outside the window for TNK. She reports sympotms have somewhat improved since first being seen.  Neuro has already evaluated and recommending full dose aspirin and storke work up.  She has risk factors of HLD (on statin), DM (a1c 8.7, down from 13) and tobacco abuse.  She continues to smoke daily and counseled on cessation.  She is still slurring at time of my exam so will leave her NPO until speech has a chance to round, though she did pass bedside swallow.  CT unremarkable. Echo, carotid and MRI pending.      Critical care diagnosis: acute CVA  Critical care interventions: hands on evaluation, review of labs/radiographs/records and discussions with family  Critical care time spent: >32 minutes    12 minutes spent on counseling on  tobacco cessation.  Counseled on harm and risks associated with smoking including stroke, heart disease, lung disease.  Discussed cessation resources and treatment including nicotine patch.

## 2023-07-06 NOTE — SUBJECTIVE & OBJECTIVE
"Past Medical History:   Diagnosis Date    Anxiety     Arthritis     CVA (cerebral vascular accident)     "mini stroke"    Depression     Diabetes mellitus     Heart problem     History of psychiatric hospitalization     three(1983, 1995 and another (years ago")): depression    HTN (hypertension)     Hx of psychiatric care     Hypertension     Pacemaker     Psychiatric exam requested by authority     Psychiatric problem     TBI (traumatic brain injury)     Therapy        Past Surgical History:   Procedure Laterality Date    CARDIAC PACEMAKER PLACEMENT      CARDIAC PACEMAKER REMOVAL      CHOLECYSTECTOMY      HYSTERECTOMY         Review of patient's allergies indicates:   Allergen Reactions    Pcn [penicillins] Anaphylaxis       Current Neurological Medications:     No current facility-administered medications on file prior to encounter.     Current Outpatient Medications on File Prior to Encounter   Medication Sig    albuterol (PROVENTIL/VENTOLIN HFA) 90 mcg/actuation inhaler Inhale 1-2 puffs into the lungs every 6 (six) hours as needed for Wheezing. Rescue    ALPRAZolam (XANAX) 1 MG tablet Take 1 tablet by mouth once a day as needed as needed for anxiety    amLODIPine (NORVASC) 2.5 MG tablet Take 2.5 mg by mouth once daily.    ammonium lactate (LAC-HYDRIN) 12 % lotion Apply to dry skin areas on feet, legs, and elbow areas TWICE A DAY AS NEEDED    aspirin (ECOTRIN) 81 MG EC tablet Take 1 tablet (81 mg total) by mouth once daily.    atorvastatin (LIPITOR) 40 MG tablet Take 40 mg by mouth every evening.    BD ULTRA-FINE SHORT PEN NEEDLE 31 gauge x 5/16" Ndle SMARTSIG:Injection Twice Daily    blood-glucose meter Misc CHECK BLOOD GLUCOSE LEVEL TWICE DAILY.    butalbital-acetaminophen-caffeine -40 mg (FIORICET, ESGIC) -40 mg per tablet Take 1 tablet by mouth every 6 (six) hours as needed for Pain.    carvediloL (COREG) 12.5 MG tablet Take 1 tablet (12.5 mg total) by mouth 2 (two) times daily.    carvediloL " "(COREG) 3.125 MG tablet Take 3.125 mg by mouth 2 (two) times daily.    cholecalciferol, vitamin D3, (VITAMIN D3) 50 mcg (2,000 unit) Cap capsule Take 1 capsule by mouth.    diclofenac sodium (VOLTAREN) 1 % Gel Apply 2 g topically 4 (four) times daily.    docusate sodium (COLACE) 250 MG capsule Take 1 capsule (250 mg total) by mouth 2 (two) times daily.    DULoxetine (CYMBALTA) 30 MG capsule Take 30 mg by mouth.    DULoxetine (CYMBALTA) 60 MG capsule Take 60 mg by mouth every morning.    empty container (SHARPS CONTAINER) Misc N/A    esomeprazole (NEXIUM) 40 MG capsule Take 40 mg by mouth every morning.    fluticasone furoate-vilanteroL (BREO ELLIPTA) 200-25 mcg/dose DsDv diskus inhaler Inhale 1 puff into the lungs once daily. Controller    fluticasone propionate (FLONASE) 50 mcg/actuation nasal spray 1 spray (50 mcg total) by Each Nostril route 2 (two) times daily as needed for Rhinitis.    gabapentin (NEURONTIN) 600 MG tablet Take 1 tablet (600 mg total) by mouth 3 (three) times daily.    glipiZIDE (GLUCOTROL) 10 MG tablet Take 10 mg by mouth.    hydrALAZINE (APRESOLINE) 100 MG tablet Take 1 tablet (100 mg total) by mouth every 8 (eight) hours.    hydrALAZINE (APRESOLINE) 25 MG tablet Take 25 mg by mouth 3 (three) times daily.    insulin (LANTUS SOLOSTAR U-100 INSULIN) glargine 100 units/mL SubQ pen Inject 80 Units into the skin 2 (two) times daily before meals.    insulin lispro 100 unit/mL pen SMARTSI Unit(s) SUB-Q 3 Times Daily    insulin syringe-needle U-100 1 mL 28 gauge x 1/2" Syrg USE TO inject insulin TWICE DAILY    magnesium citrate solution Take 296 mLs by mouth daily as needed.    multivitamin (THERAGRAN) per tablet Take 1 tablet by mouth once daily.    naloxone (NARCAN) 4 mg/actuation Spry SMARTSI Spray(s) Both Nares PRN    nebivoloL (BYSTOLIC) 10 MG Tab Take 10 mg by mouth once daily.    NIFEdipine (PROCARDIA-XL) 90 MG (OSM) 24 hr tablet Take 1 tablet (90 mg total) by mouth once daily.    " nitroGLYCERIN (NITROSTAT) 0.4 MG SL tablet Place 0.4 mg under the tongue every 5 (five) minutes as needed for Chest pain.    NOVOLOG FLEXPEN U-100 INSULIN 100 unit/mL (3 mL) InPn pen Inject 20 Units into the skin 3 (three) times daily with meals. DISCARD OPEN PEN AFTER 28 DAYS    nystatin (MYCOSTATIN) cream Apply topically 3 (three) times daily. Use under breast    ONETOUCH DELICA PLUS LANCET 33 gauge Misc Apply topically 4 (four) times daily.    ONETOUCH VERIO TEST STRIPS Strp 4 (four) times daily.    oxybutynin (DITROPAN-XL) 5 MG TR24 Take 1 tablet (5 mg total) by mouth once daily.    pantoprazole (PROTONIX) 40 MG tablet Take 1 tablet (40 mg total) by mouth once daily.    polyethylene glycol (GLYCOLAX) 17 gram/dose powder Take 17 g by mouth once daily.    sertraline (ZOLOFT) 50 MG tablet Take 50 mg by mouth once daily.    terconazole (TERAZOL 7) 0.4 % Crea Place 1 applicator vaginally every evening.    valsartan (DIOVAN) 320 MG tablet Take 320 mg by mouth.     Family History       Problem Relation (Age of Onset)    Bipolar disorder Mother, Father    Schizophrenia Mother          Tobacco Use    Smoking status: Every Day     Years: 40.00     Types: Cigarettes     Last attempt to quit: 2018     Years since quittin.8    Smokeless tobacco: Never    Tobacco comments:     1 pk every 2 weeks, 2 a day   Substance and Sexual Activity    Alcohol use: Not Currently     Comment: wine on occ    Drug use: No    Sexual activity: Never     Review of Systems  A 14pt ros was reviewed & is negative unless o/w documented in the hpi    Objective:     Vital Signs (Most Recent):  Temp: 97.9 °F (36.6 °C) (23 1356)  Pulse: 87 (23 1356)  Resp: 18 (23 1356)  BP: (!) 150/71 (23 1356)  SpO2: 98 % (23 135) Vital Signs (24h Range):  Temp:  [97.9 °F (36.6 °C)] 97.9 °F (36.6 °C)  Pulse:  [87] 87  Resp:  [18] 18  SpO2:  [98 %] 98 %  BP: (150)/(71) 150/71     Weight: 115.5 kg (254 lb 10.1 oz)  Body mass index  is 41.12 kg/m².     Physical Exam  Vitals reviewed.   Constitutional:       General: She is awake.      Appearance: Normal appearance.   HENT:      Head: Normocephalic and atraumatic.      Nose: Nose normal.      Mouth/Throat:      Mouth: Mucous membranes are moist.      Pharynx: Oropharynx is clear.   Eyes:      Extraocular Movements: Extraocular movements intact and EOM normal.      Pupils: Pupils are equal, round, and reactive to light.   Pulmonary:      Effort: Pulmonary effort is normal.   Skin:     General: Skin is warm and dry.   Neurological:      Mental Status: She is alert and oriented to person, place, and time.   Psychiatric:         Behavior: Behavior is cooperative.        NEUROLOGICAL EXAMINATION:     MENTAL STATUS   Oriented to person, place, and time.   Follows 3 step commands.   Speech: (Expressive aphasia)  Level of consciousness: alert    CRANIAL NERVES     CN II   Visual fields full to confrontation.     CN III, IV, VI   Pupils are equal, round, and reactive to light.  Extraocular motions are normal.   Nystagmus: none   Conjugate gaze: present    CN V   Right facial sensation deficit: none  Left facial sensation deficit: none    CN VII   Right facial weakness: central  Left facial weakness: none    MOTOR EXAM     Strength   Right deltoid: 0/5  Left deltoid: 5/5  Right biceps: 0/5  Left biceps: 5/5  Right triceps: 0/5  Left triceps: 5/5  Right quadriceps: 0/5  Left quadriceps: 5/5  Right hamstrin/5  Left hamstrin/5  Right glutei: 0/5  Left glutei: 5/5  Right anterior tibial: 0/5  Left anterior tibial: 5/5  Right posterior tibial: 0/5  Left posterior tibial: 5/5    SENSORY EXAM   Right arm light touch: decreased from elbow  Left arm light touch: normal  Right leg light touch: decreased from knee  Left leg light touch: normal    Significant Labs:   Recent Lab Results         23  1442   23  1419   23  1401        Baso # 0.04           Basophil % 0.5           Eos # 0.11            Eosinophil % 1.3           Hematocrit 46.2           Hemoglobin 15.1           Immature Grans (Abs) 0.03           Immature Granulocytes 0.3           Lymph # 2.48           LYMPH % 28.2           MCH 28.6           MCHC 32.7           MCV 87.5           Mono # 0.43           Mono % 4.9           MPV 9.3           Neut # 5.69           Neut % 64.8           nRBC 0.0           Platelets 386           POC Anion Gap   17         POC BUN   16         POC Chloride   107         POC Creatinine   0.8         POC Glucose   250         POC Hematocrit   47         POC HEMOGLOBIN   16         POC Ionized Calcium   1.08         Potassium, Blood Gas   4.4         Sodium, Blood Gas   141         POC TCO2 (MEASURED)   23         POCT Glucose     205       RBC 5.28           RDW 15.2           Sample   VENOUS         WBC 8.78                   Significant Imaging: I have reviewed all pertinent imaging results/findings within the past 24 hours.

## 2023-07-06 NOTE — ASSESSMENT & PLAN NOTE
With associated numbness, R facial droop, and expressive aphasia    Stroke Work-Up:    CT head w/o: unrevealing for acute intracranial abnormalities  CTA head/neck: no LVO, aneurysms, or flow limiting stenosis  MRI brain w/o: ordered   CUS: pending  TTE: pending  Labs: LDL 69, hemoglobin A1C and TSH pending  Home medications: ASA 81 mg daily, atorvastatin 40 mg daily    Given  mg in ED  Outside window for TNK  Normotension  Fall precautions  PT/OT

## 2023-07-06 NOTE — ED PROVIDER NOTES
"Encounter Date: 2023    SCRIBE #1 NOTE: I, Erwin Mullins, am scribing for, and in the presence of,  Issac Lopez MD. I have scribed the following portions of the note - Other sections scribed: HPI,ROS,PE.     History     Chief Complaint   Patient presents with    Cerebrovascular Accident     ASSI with R leg weakness, R facial droop, R arm drift, slurred speech, blurry vision. Last known normal 0600,      61 y/o female with PMHx of anxiety, CVA, depression, DM, HTN, and TBI presents to ED via EMS c/o CVA today. Pt was last seen normal at 0600. Pt states getting up this morning, felt weak and fell. Per triage R leg weakness, R facial droop, R arm drift, slurred speech, blurry vision. In ED nurse reports pt has baseline right sided deficits but is worse today.     The history is provided by the patient. No  was used.   Review of patient's allergies indicates:   Allergen Reactions    Pcn [penicillins] Anaphylaxis     Past Medical History:   Diagnosis Date    Anxiety     Arthritis     CVA (cerebral vascular accident)     "mini stroke"    Depression     Diabetes mellitus     Heart problem     History of psychiatric hospitalization     three(,  and another (years ago")): depression    HTN (hypertension)     Hx of psychiatric care     Hypertension     Pacemaker     Psychiatric exam requested by authority     Psychiatric problem     TBI (traumatic brain injury)     Therapy      Past Surgical History:   Procedure Laterality Date    CARDIAC PACEMAKER PLACEMENT      CARDIAC PACEMAKER REMOVAL      CHOLECYSTECTOMY      HYSTERECTOMY       Family History   Problem Relation Age of Onset    Schizophrenia Mother     Bipolar disorder Mother     Bipolar disorder Father      Social History     Tobacco Use    Smoking status: Every Day     Years: 40.00     Types: Cigarettes     Last attempt to quit: 2018     Years since quittin.8    Smokeless tobacco: Never    Tobacco comments:     1 pk " every 2 weeks, 2 a day   Substance Use Topics    Alcohol use: Not Currently     Comment: wine on occ    Drug use: No     Review of Systems   Constitutional:  Negative for chills and fever.   Eyes:  Positive for visual disturbance.   Respiratory:  Negative for cough and shortness of breath.    Cardiovascular:  Negative for chest pain.   Gastrointestinal:  Negative for abdominal pain, nausea and vomiting.   Musculoskeletal:  Negative for myalgias.   Neurological:  Positive for speech difficulty and weakness. Negative for syncope and headaches.   All other systems reviewed and are negative.    Physical Exam     Initial Vitals [07/06/23 1356]   BP Pulse Resp Temp SpO2   (!) 150/71 87 18 97.9 °F (36.6 °C) 98 %      MAP       --         Physical Exam    Nursing note and vitals reviewed.  Constitutional: She appears well-developed and well-nourished. No distress.   HENT:   Head: Normocephalic and atraumatic.   Eyes: Conjunctivae are normal.   Cardiovascular:  Normal rate and intact distal pulses.           Pulmonary/Chest: No respiratory distress. She has no rhonchi.   Abdominal: Abdomen is soft. Bowel sounds are normal. There is no abdominal tenderness. There is no rebound and no guarding.   Musculoskeletal:         General: No edema.     Neurological: She is alert.   Left facial weakness. Right arm and leg weakness, that is worse than baseline. Has expressive aphasia.    Skin: Skin is warm and dry.   Psychiatric: She has a normal mood and affect.       ED Course   Procedures  Labs Reviewed   COMPREHENSIVE METABOLIC PANEL - Abnormal; Notable for the following components:       Result Value    Glucose Level 246 (*)     Protein Total 8.0 (*)     Albumin Level 3.3 (*)     Globulin 4.7 (*)     Albumin/Globulin Ratio 0.7 (*)     All other components within normal limits   LIPID PANEL - Abnormal; Notable for the following components:    HDL Cholesterol 28 (*)     Triglyceride 466 (*)     Cholesterol/HDL Ratio 7 (*)     All  other components within normal limits   CBC WITH DIFFERENTIAL - Abnormal; Notable for the following components:    MCHC 32.7 (*)     All other components within normal limits   HEMOGLOBIN A1C - Abnormal; Notable for the following components:    Hemoglobin A1c 8.4 (*)     All other components within normal limits   POCT GLUCOSE - Abnormal; Notable for the following components:    POCT Glucose 205 (*)     All other components within normal limits   ISTAT CHEM8 - Abnormal; Notable for the following components:    POC Glucose 250 (*)     POC Anion Gap 17 (*)     All other components within normal limits   PROTIME-INR - Normal   APTT - Normal   TSH - Normal   TROPONIN I - Normal   B-TYPE NATRIURETIC PEPTIDE - Normal   CBC W/ AUTO DIFFERENTIAL    Narrative:     The following orders were created for panel order CBC W/ AUTO DIFFERENTIAL.  Procedure                               Abnormality         Status                     ---------                               -----------         ------                     CBC with Differential[[id number]]        Abnormal            Final result                 Please view results for these tests on the individual orders.        ECG Results              EKG 12-lead (Final result)  Result time 07/06/23 14:53:14      Final result by Interface, Lab In [facility] (07/06/23 14:53:14)                   Narrative:    Test Reason : I63.9,    Vent. Rate : 081 BPM     Atrial Rate : 081 BPM     P-R Int : 140 ms          QRS Dur : 096 ms      QT Int : 396 ms       P-R-T Axes : 073 -16 073 degrees     QTc Int : 460 ms    Normal sinus rhythm  Voltage criteria for left ventricular hypertrophy ( R in aVL , Sokolow-Mancilla   , Jose product )  Nonspecific ST and T wave abnormality  Abnormal ECG  When compared with ECG of 10-MAY-2023 01:52,  No significant change was found  Confirmed by Gt Montejo MD ([id number]) on 7/6/2023 2:53:07 PM    Referred By:             Confirmed By:Gt Montejo MD                       Wet Read by Issac oLpez MD (07/06/23 14:42:37, [facility] - Emergency Dept, Emergency Medicine)    EKG at 2:30 p.m..  Sinus rhythm no ST elevation or depression motion artifact                                  Imaging Results              CTA STROKE MULTI-PHASE (Final result)  Result time 07/06/23 15:06:32      Final result by Pham Whipple MD (07/06/23 15:06:32)                   Impression:      No hemodynamically significant stenosis, occlusion, aneurysm or dissection.      Electronically signed by: Pham Whipple  Date:    07/06/2023  Time:    15:06               Narrative:    EXAMINATION:  CTA STROKE MULTI-PHASE    CLINICAL HISTORY:  Neuro deficit, acute, stroke suspected;    TECHNIQUE:  CT angiogram was performed from the level of the norma to the vertex prior to and following the IV administration of intravenous contrast.  Axial, sagittal and coronal reconstructions and maximum intensity projection reconstructions were performed. Arterial stenosis percentages are based on NASCET measurement criteria.    Automated tube current modulation, weight-based exposure dosing, and/or iterative reconstruction technique utilized to reach lowest reasonably achievable exposure rate.    DLP: 1988 mGycm    COMPARISON:  CT head 07/06/2023.  CTA chest 03/13/2023    FINDINGS:  CTA NECK:    AORTIC ARCH AND GREAT VESSELS: Normal 3 vessel arch.    RIGHT ICA: No hemodynamically significant stenosis, aneurysmal dilatation, or dissection.  Mild carotid bulb calcifications.    LEFT ICA: No hemodynamically significant stenosis, aneurysmal dilatation, or dissection.  Mild carotid bulb calcifications.    VERTEBRAL ARTERIES: Patent extracranial segments. No hemodynamically significant stenosis, aneurysmal dilatation, or dissection.    CTA HEAD:    ANTERIOR CIRCULATION: No hemodynamically significant stenosis, aneurysmal dilatation, or dissection.    POSTERIOR CIRCULATION: No hemodynamically significant  stenosis, aneurysmal dilatation, or dissection.    NON-VASCULAR STRUCTURES (LIMITED EVALUATION): 2 cm nodule at the right lobe of the thyroid.  A 1 cm nodule at the thoracic inlet on the right may represent a lymph node or parathyroid nodule.  This can be evaluated with outpatient thyroid sonogram.  Unchanged from prior exam apical emphysema.  Old right rib deformities.                                       X-Ray Chest AP Portable (Final result)  Result time 07/06/23 15:04:29      Final result by Mauri Morris MD (07/06/23 15:04:29)                   Impression:      Question some pulmonary vascular congestion.      Electronically signed by: Mauri Morris  Date:    07/06/2023  Time:    15:04               Narrative:    EXAMINATION:  XR CHEST AP PORTABLE    CLINICAL HISTORY:  Stroke;    COMPARISON:  8 June 2023    FINDINGS:  Portable frontal view of the chest was obtained. The heart is not significantly enlarged.  There is some prominence of the interstitium.  No dense consolidation or pneumothorax.                                       CT HEAD FOR STROKE (Final result)  Result time 07/06/23 14:25:57      Final result by Kelsie Prescott MD (07/06/23 14:25:57)                   Impression:      1. No acute intracranial abnormality.  2. Chronic microvascular ischemic changes.  Code fast findings given to Dr. Noel at the time of dictation.      Electronically signed by: Kelsie Prescott  Date:    07/06/2023  Time:    14:25               Narrative:    EXAMINATION:  CT HEAD FOR STROKE    CLINICAL HISTORY:  Neuro deficit, acute, stroke suspected;stroke alert;;    TECHNIQUE:  Axial scans were obtained from skull base to the vertex.    Coronal and sagittal reconstructions obtained from the axial data.    Automatic exposure control was utilized to limit radiation dose.    Contrast: None    Radiation Dose:    Total DLP: 897 mGy*cm    COMPARISON:  None    FINDINGS:  There is no acute intracranial hemorrhage or edema.  The gray-white matter differentiation is preserved.  Patchy hypodensities in the subcortical and periventricular white matter, basal ganglia and thalami likely represent chronic microvascular ischemic changes.    There is no mass effect or midline shift. The ventricles and sulci are normal in size. The basal cisterns are patent. There is no abnormal extra-axial fluid collection.    The calvarium and skull base are intact.  There is mild scattered paranasal sinus mucosal thickening.                                       Medications   aspirin suppository 300 mg (300 mg Rectal Not Given 7/6/23 1445)   labetaloL injection 10 mg (10 mg Intravenous Given 7/6/23 1453)   aspirin chewable tablet 324 mg (324 mg Oral Given 7/6/23 1453)              Scribe Attestation:   Scribe #1: I performed the above scribed service and the documentation accurately describes the services I performed. I attest to the accuracy of the note.    Attending Attestation:           Physician Attestation for Scribe:  Physician Attestation Statement for Scribe #1: I, Issac Lopez MD, reviewed documentation, as scribed by Erwin Mullins in my presence, and it is both accurate and complete.       Medical Decision Making  The differential diagnosis includes, but is not limited to, CVA, and TIA.     Patient's history of previous CVA.  States that she is chronic weakness in the right side but it is much worse today.  She also appears to be having some expressive difficulties.  Stroke team saw the patient in CT recommends CT angiogram which Dr. Patel reviewed no LV 0.  Stroke team recommends admission for further stroke evaluation.  Hospitalist has been paged aspirin given in the ED    Problems Addressed:  Cerebrovascular accident (CVA), unspecified mechanism: acute illness or injury that poses a threat to life or bodily functions    Amount and/or Complexity of Data Reviewed  Labs: ordered. Decision-making details documented in ED Course.  Radiology:  ordered. Decision-making details documented in ED Course.  ECG/medicine tests: ordered. Decision-making details documented in ED Course.    Critical Care  Total time providing critical care: minutes (I spent less than 30 minutes critical care time on this patient)         ED Course as of 07/06/23 1620   Thu Jul 06, 2023   1406 Neurology has evaluated.  Last seen normal 6:00 a.m..  They are getting CT and CT angiograms [LF]   1437 Dr Patel reviewed images, no large vessel occlusion [LF]   1442 Patient has refused rectal aspirin.  She prefers to take it orally.  RN has discussed with her that could be concern of aspiration however patient did tolerate a small sip of water.  We will attempt the oral aspirin per her request [LF]   1538 Paged hospitalist. [MM]   1618 Discussed with the hospitalist who will admit [LF]      ED Course User Index  [LF] Issac Lopez MD  [MM] Savana Croft                 Clinical Impression:   Final diagnoses:  [I63.9] Cerebrovascular accident (CVA), unspecified mechanism (Primary)  [I25.10] Coronary artery disease, unspecified vessel or lesion type, unspecified whether angina present, unspecified whether native or transplanted heart  [I50.9] Congestive heart failure, unspecified HF chronicity, unspecified heart failure type  [E11.69] Type 2 diabetes mellitus with other specified complication, unspecified whether long term insulin use        ED Disposition Condition    Admit Stable                Issac Lopez MD  07/06/23 2821

## 2023-07-07 PROBLEM — I51.9 HEART PROBLEM: Status: ACTIVE | Noted: 2023-07-07

## 2023-07-07 PROBLEM — I63.9 CEREBROVASCULAR ACCIDENT (CVA): Status: ACTIVE | Noted: 2023-07-07

## 2023-07-07 LAB
ALBUMIN SERPL-MCNC: 3 G/DL (ref 3.4–4.8)
ALBUMIN/GLOB SERPL: 0.7 RATIO (ref 1.1–2)
ALP SERPL-CCNC: 67 UNIT/L (ref 40–150)
ALT SERPL-CCNC: 17 UNIT/L (ref 0–55)
APTT PPP: 30 SECONDS (ref 23.2–33.7)
AST SERPL-CCNC: 16 UNIT/L (ref 5–34)
AV INDEX (PROSTH): 0.62
AV MEAN GRADIENT: 6 MMHG
AV PEAK GRADIENT: 10 MMHG
AV VALVE AREA: 2.34 CM2
AV VELOCITY RATIO: 0.6
BASOPHILS # BLD AUTO: 0.04 X10(3)/MCL
BASOPHILS NFR BLD AUTO: 0.5 %
BILIRUBIN DIRECT+TOT PNL SERPL-MCNC: 0.2 MG/DL
BSA FOR ECHO PROCEDURE: 2.32 M2
BUN SERPL-MCNC: 11.9 MG/DL (ref 9.8–20.1)
CALCIUM SERPL-MCNC: 8.9 MG/DL (ref 8.4–10.2)
CHLORIDE SERPL-SCNC: 107 MMOL/L (ref 98–107)
CK MB SERPL-MCNC: 2.6 NG/ML
CO2 SERPL-SCNC: 25 MMOL/L (ref 23–31)
CREAT SERPL-MCNC: 0.87 MG/DL (ref 0.55–1.02)
CV ECHO LV RWT: 0.5 CM
DOP CALC AO PEAK VEL: 1.58 M/S
DOP CALC AO VTI: 29.4 CM
DOP CALC LVOT AREA: 3.8 CM2
DOP CALC LVOT DIAMETER: 2.2 CM
DOP CALC LVOT PEAK VEL: 0.95 M/S
DOP CALC LVOT STROKE VOLUME: 68.77 CM3
DOP CALC MV VTI: 26.2 CM
DOP CALCLVOT PEAK VEL VTI: 18.1 CM
E WAVE DECELERATION TIME: 254 MSEC
E/A RATIO: 0.93
ECHO LV POSTERIOR WALL: 1.42 CM (ref 0.6–1.1)
EJECTION FRACTION: 45 %
EOSINOPHIL # BLD AUTO: 0.15 X10(3)/MCL (ref 0–0.9)
EOSINOPHIL NFR BLD AUTO: 2 %
ERYTHROCYTE [DISTWIDTH] IN BLOOD BY AUTOMATED COUNT: 15.2 % (ref 11.5–17)
FRACTIONAL SHORTENING: 25 % (ref 28–44)
GFR SERPLBLD CREATININE-BSD FMLA CKD-EPI: >60 MLS/MIN/1.73/M2
GLOBULIN SER-MCNC: 4.4 GM/DL (ref 2.4–3.5)
GLUCOSE SERPL-MCNC: 228 MG/DL (ref 82–115)
HCT VFR BLD AUTO: 45.2 % (ref 37–47)
HGB BLD-MCNC: 14.3 G/DL (ref 12–16)
IMM GRANULOCYTES # BLD AUTO: 0.03 X10(3)/MCL (ref 0–0.04)
IMM GRANULOCYTES NFR BLD AUTO: 0.4 %
INR BLD: 1.04 (ref 0–1.3)
INTERVENTRICULAR SEPTUM: 1.33 CM (ref 0.6–1.1)
LEFT ATRIUM SIZE: 4.5 CM
LEFT ATRIUM VOLUME INDEX MOD: 35.4 ML/M2
LEFT ATRIUM VOLUME MOD: 78.5 CM3
LEFT INTERNAL DIMENSION IN SYSTOLE: 4.25 CM (ref 2.1–4)
LEFT VENTRICLE DIASTOLIC VOLUME INDEX: 70.72 ML/M2
LEFT VENTRICLE DIASTOLIC VOLUME: 157 ML
LEFT VENTRICLE MASS INDEX: 155 G/M2
LEFT VENTRICLE SYSTOLIC VOLUME INDEX: 36.4 ML/M2
LEFT VENTRICLE SYSTOLIC VOLUME: 80.8 ML
LEFT VENTRICULAR INTERNAL DIMENSION IN DIASTOLE: 5.65 CM (ref 3.5–6)
LEFT VENTRICULAR MASS: 343.67 G
LVOT MG: 2 MMHG
LVOT MV: 0.59 CM/S
LYMPHOCYTES # BLD AUTO: 2.53 X10(3)/MCL (ref 0.6–4.6)
LYMPHOCYTES NFR BLD AUTO: 33.3 %
MAGNESIUM SERPL-MCNC: 1.8 MG/DL (ref 1.6–2.6)
MCH RBC QN AUTO: 27.9 PG (ref 27–31)
MCHC RBC AUTO-ENTMCNC: 31.6 G/DL (ref 33–36)
MCV RBC AUTO: 88.3 FL (ref 80–94)
MONOCYTES # BLD AUTO: 0.52 X10(3)/MCL (ref 0.1–1.3)
MONOCYTES NFR BLD AUTO: 6.9 %
MV MEAN GRADIENT: 2 MMHG
MV PEAK A VEL: 0.95 M/S
MV PEAK E VEL: 0.88 M/S
MV PEAK GRADIENT: 3 MMHG
MV STENOSIS PRESSURE HALF TIME: 121 MS
MV VALVE AREA BY CONTINUITY EQUATION: 2.62 CM2
MV VALVE AREA P 1/2 METHOD: 1.82 CM2
NEUTROPHILS # BLD AUTO: 4.32 X10(3)/MCL (ref 2.1–9.2)
NEUTROPHILS NFR BLD AUTO: 56.9 %
NRBC BLD AUTO-RTO: 0 %
OHS LV EJECTION FRACTION SIMPSONS BIPLANE MOD: 4 %
PHOSPHATE SERPL-MCNC: 3.7 MG/DL (ref 2.3–4.7)
PLATELET # BLD AUTO: 352 X10(3)/MCL (ref 130–400)
PMV BLD AUTO: 9.2 FL (ref 7.4–10.4)
POCT GLUCOSE: 196 MG/DL (ref 70–110)
POCT GLUCOSE: 207 MG/DL (ref 70–110)
POCT GLUCOSE: 208 MG/DL (ref 70–110)
POCT GLUCOSE: 252 MG/DL (ref 70–110)
POTASSIUM SERPL-SCNC: 3.9 MMOL/L (ref 3.5–5.1)
PROT SERPL-MCNC: 7.4 GM/DL (ref 5.8–7.6)
PROTHROMBIN TIME: 13.5 SECONDS (ref 12.5–14.5)
RA PRESSURE: 3 MMHG
RBC # BLD AUTO: 5.12 X10(6)/MCL (ref 4.2–5.4)
SINUS: 3.2 CM
SODIUM SERPL-SCNC: 140 MMOL/L (ref 136–145)
TRICUSPID ANNULAR PLANE SYSTOLIC EXCURSION: 2.19 CM
TROPONIN I SERPL-MCNC: 0.04 NG/ML (ref 0–0.04)
WBC # SPEC AUTO: 7.59 X10(3)/MCL (ref 4.5–11.5)

## 2023-07-07 PROCEDURE — 25000003 PHARM REV CODE 250: Performed by: NURSE PRACTITIONER

## 2023-07-07 PROCEDURE — 85025 COMPLETE CBC W/AUTO DIFF WBC: CPT | Performed by: PHYSICIAN ASSISTANT

## 2023-07-07 PROCEDURE — 27000221 HC OXYGEN, UP TO 24 HOURS

## 2023-07-07 PROCEDURE — G0378 HOSPITAL OBSERVATION PER HR: HCPCS

## 2023-07-07 PROCEDURE — 63600175 PHARM REV CODE 636 W HCPCS: Performed by: INTERNAL MEDICINE

## 2023-07-07 PROCEDURE — 82553 CREATINE MB FRACTION: CPT | Performed by: PHYSICIAN ASSISTANT

## 2023-07-07 PROCEDURE — 25000003 PHARM REV CODE 250: Performed by: INTERNAL MEDICINE

## 2023-07-07 PROCEDURE — 85730 THROMBOPLASTIN TIME PARTIAL: CPT | Performed by: PHYSICIAN ASSISTANT

## 2023-07-07 PROCEDURE — 85610 PROTHROMBIN TIME: CPT | Performed by: PHYSICIAN ASSISTANT

## 2023-07-07 PROCEDURE — 25000003 PHARM REV CODE 250

## 2023-07-07 PROCEDURE — 83735 ASSAY OF MAGNESIUM: CPT | Performed by: PHYSICIAN ASSISTANT

## 2023-07-07 PROCEDURE — 84484 ASSAY OF TROPONIN QUANT: CPT | Performed by: PHYSICIAN ASSISTANT

## 2023-07-07 PROCEDURE — 92610 EVALUATE SWALLOWING FUNCTION: CPT

## 2023-07-07 PROCEDURE — 96372 THER/PROPH/DIAG INJ SC/IM: CPT | Performed by: INTERNAL MEDICINE

## 2023-07-07 PROCEDURE — 25000242 PHARM REV CODE 250 ALT 637 W/ HCPCS: Performed by: INTERNAL MEDICINE

## 2023-07-07 PROCEDURE — 25000003 PHARM REV CODE 250: Performed by: PHYSICIAN ASSISTANT

## 2023-07-07 PROCEDURE — 21400001 HC TELEMETRY ROOM

## 2023-07-07 PROCEDURE — 99232 SBSQ HOSP IP/OBS MODERATE 35: CPT | Mod: ,,, | Performed by: PSYCHIATRY & NEUROLOGY

## 2023-07-07 PROCEDURE — 84100 ASSAY OF PHOSPHORUS: CPT | Performed by: PHYSICIAN ASSISTANT

## 2023-07-07 PROCEDURE — 99232 PR SUBSEQUENT HOSPITAL CARE,LEVL II: ICD-10-PCS | Mod: ,,, | Performed by: PSYCHIATRY & NEUROLOGY

## 2023-07-07 PROCEDURE — 80053 COMPREHEN METABOLIC PANEL: CPT | Performed by: PHYSICIAN ASSISTANT

## 2023-07-07 PROCEDURE — A4216 STERILE WATER/SALINE, 10 ML: HCPCS | Performed by: PHYSICIAN ASSISTANT

## 2023-07-07 RX ORDER — SERTRALINE HYDROCHLORIDE 50 MG/1
50 TABLET, FILM COATED ORAL DAILY
Status: DISCONTINUED | OUTPATIENT
Start: 2023-07-07 | End: 2023-07-07

## 2023-07-07 RX ORDER — CARVEDILOL 3.12 MG/1
3.12 TABLET ORAL 2 TIMES DAILY
Status: DISCONTINUED | OUTPATIENT
Start: 2023-07-07 | End: 2023-07-17 | Stop reason: HOSPADM

## 2023-07-07 RX ORDER — GLIPIZIDE 10 MG/1
10 TABLET ORAL
Status: DISCONTINUED | OUTPATIENT
Start: 2023-07-07 | End: 2023-07-07

## 2023-07-07 RX ORDER — HYDRALAZINE HYDROCHLORIDE 25 MG/1
25 TABLET, FILM COATED ORAL 3 TIMES DAILY
Status: DISCONTINUED | OUTPATIENT
Start: 2023-07-07 | End: 2023-07-11

## 2023-07-07 RX ORDER — HYDROCODONE BITARTRATE AND ACETAMINOPHEN 10; 325 MG/1; MG/1
1 TABLET ORAL EVERY 4 HOURS PRN
Status: DISCONTINUED | OUTPATIENT
Start: 2023-07-07 | End: 2023-07-11

## 2023-07-07 RX ORDER — FLUTICASONE FUROATE AND VILANTEROL 200; 25 UG/1; UG/1
1 POWDER RESPIRATORY (INHALATION) DAILY
Status: DISCONTINUED | OUTPATIENT
Start: 2023-07-07 | End: 2023-07-17 | Stop reason: HOSPADM

## 2023-07-07 RX ORDER — VALSARTAN 80 MG/1
320 TABLET ORAL DAILY
Status: DISCONTINUED | OUTPATIENT
Start: 2023-07-07 | End: 2023-07-10

## 2023-07-07 RX ORDER — HYDROCODONE BITARTRATE AND ACETAMINOPHEN 5; 325 MG/1; MG/1
1 TABLET ORAL EVERY 4 HOURS PRN
Status: DISCONTINUED | OUTPATIENT
Start: 2023-07-07 | End: 2023-07-11

## 2023-07-07 RX ORDER — ALPRAZOLAM 0.5 MG/1
1 TABLET ORAL DAILY PRN
Status: DISCONTINUED | OUTPATIENT
Start: 2023-07-07 | End: 2023-07-17 | Stop reason: HOSPADM

## 2023-07-07 RX ORDER — NIFEDIPINE 90 MG/1
90 TABLET, EXTENDED RELEASE ORAL DAILY
Status: DISCONTINUED | OUTPATIENT
Start: 2023-07-07 | End: 2023-07-17 | Stop reason: HOSPADM

## 2023-07-07 RX ORDER — DULOXETIN HYDROCHLORIDE 30 MG/1
60 CAPSULE, DELAYED RELEASE ORAL DAILY
Status: DISCONTINUED | OUTPATIENT
Start: 2023-07-07 | End: 2023-07-17 | Stop reason: HOSPADM

## 2023-07-07 RX ORDER — ACETAMINOPHEN 325 MG/1
650 TABLET ORAL EVERY 6 HOURS PRN
Status: DISCONTINUED | OUTPATIENT
Start: 2023-07-07 | End: 2023-07-17 | Stop reason: HOSPADM

## 2023-07-07 RX ORDER — INSULIN ASPART 100 [IU]/ML
1-15 INJECTION, SOLUTION INTRAVENOUS; SUBCUTANEOUS
Status: DISCONTINUED | OUTPATIENT
Start: 2023-07-07 | End: 2023-07-17 | Stop reason: HOSPADM

## 2023-07-07 RX ORDER — INSULIN ASPART 100 [IU]/ML
1-15 INJECTION, SOLUTION INTRAVENOUS; SUBCUTANEOUS EVERY 6 HOURS PRN
Status: DISCONTINUED | OUTPATIENT
Start: 2023-07-07 | End: 2023-07-07

## 2023-07-07 RX ORDER — GABAPENTIN 300 MG/1
600 CAPSULE ORAL 3 TIMES DAILY
Status: DISCONTINUED | OUTPATIENT
Start: 2023-07-07 | End: 2023-07-08

## 2023-07-07 RX ADMIN — RIVAROXABAN 10 MG: 10 TABLET, FILM COATED ORAL at 04:07

## 2023-07-07 RX ADMIN — CARVEDILOL 3.12 MG: 3.12 TABLET, FILM COATED ORAL at 09:07

## 2023-07-07 RX ADMIN — HYDRALAZINE HYDROCHLORIDE 25 MG: 25 TABLET, FILM COATED ORAL at 09:07

## 2023-07-07 RX ADMIN — ATORVASTATIN CALCIUM 40 MG: 40 TABLET, FILM COATED ORAL at 09:07

## 2023-07-07 RX ADMIN — INSULIN DETEMIR 30 UNITS: 100 INJECTION, SOLUTION SUBCUTANEOUS at 09:07

## 2023-07-07 RX ADMIN — GABAPENTIN 600 MG: 300 CAPSULE ORAL at 09:07

## 2023-07-07 RX ADMIN — SODIUM CHLORIDE, PRESERVATIVE FREE 10 ML: 5 INJECTION INTRAVENOUS at 12:07

## 2023-07-07 RX ADMIN — NIFEDIPINE 90 MG: 90 TABLET, FILM COATED, EXTENDED RELEASE ORAL at 09:07

## 2023-07-07 RX ADMIN — GABAPENTIN 600 MG: 300 CAPSULE ORAL at 03:07

## 2023-07-07 RX ADMIN — SODIUM CHLORIDE, PRESERVATIVE FREE 10 ML: 5 INJECTION INTRAVENOUS at 05:07

## 2023-07-07 RX ADMIN — HYDROCODONE BITARTRATE AND ACETAMINOPHEN 1 TABLET: 10; 325 TABLET ORAL at 11:07

## 2023-07-07 RX ADMIN — ACETAMINOPHEN 650 MG: 325 TABLET, FILM COATED ORAL at 05:07

## 2023-07-07 RX ADMIN — ALPRAZOLAM 1 MG: 0.5 TABLET ORAL at 12:07

## 2023-07-07 RX ADMIN — HYDROCODONE BITARTRATE AND ACETAMINOPHEN 1 TABLET: 10; 325 TABLET ORAL at 12:07

## 2023-07-07 RX ADMIN — FLUTICASONE FUROATE AND VILANTEROL TRIFENATATE 1 PUFF: 200; 25 POWDER RESPIRATORY (INHALATION) at 09:07

## 2023-07-07 RX ADMIN — DULOXETINE HYDROCHLORIDE 60 MG: 30 CAPSULE, DELAYED RELEASE ORAL at 09:07

## 2023-07-07 RX ADMIN — HYDRALAZINE HYDROCHLORIDE 25 MG: 25 TABLET, FILM COATED ORAL at 03:07

## 2023-07-07 RX ADMIN — INSULIN ASPART 6 UNITS: 100 INJECTION, SOLUTION INTRAVENOUS; SUBCUTANEOUS at 12:07

## 2023-07-07 RX ADMIN — ASPIRIN 81 MG: 81 TABLET, COATED ORAL at 09:07

## 2023-07-07 RX ADMIN — HYDROCODONE BITARTRATE AND ACETAMINOPHEN 1 TABLET: 10; 325 TABLET ORAL at 07:07

## 2023-07-07 RX ADMIN — SODIUM CHLORIDE, PRESERVATIVE FREE 10 ML: 5 INJECTION INTRAVENOUS at 02:07

## 2023-07-07 RX ADMIN — INSULIN ASPART 6 UNITS: 100 INJECTION, SOLUTION INTRAVENOUS; SUBCUTANEOUS at 04:07

## 2023-07-07 RX ADMIN — VALSARTAN 320 MG: 80 TABLET, FILM COATED ORAL at 10:07

## 2023-07-07 NOTE — NURSING
Nurses Note -- 4 Eyes      7/6/2023   9:59 PM      Skin assessed during: Admit      [x] No Altered Skin Integrity Present    [x]Prevention Measures Documented      [] Yes- Altered Skin Integrity Present or Discovered   [] LDA Added if Not in Epic (Describe Wound)   [] New Altered Skin Integrity was Present on Admit and Documented in LDA   [] Wound Image Taken    Wound Care Consulted? No    Attending Nurse:  Cecy Bradshaw LPN     Second RN/Staff Member:  Violette Valles LPN

## 2023-07-07 NOTE — PT/OT/SLP PROGRESS
"Physical Therapy Treatment    Patient Name:  Elba Barnes   MRN:  [id number]    Attempted to see patient 2X this am. Patient refused both times despite max encouragement. Excuses included: "I'm tired." "I'm too weak." "I don't want to fall." PT will f/u tomorrow.   "

## 2023-07-07 NOTE — PROGRESS NOTES
"Inpatient Nutrition Evaluation    Admit Date: 7/6/2023   Total duration of encounter: 1 day    Nutrition Recommendation/Prescription     Continue diabetic diet     Nutrition Assessment     Chart Review    Reason Seen: continuous nutrition monitoring for Dx    Malnutrition Screening Tool Results   Have you recently lost weight without trying?: No  Have you been eating poorly because of a decreased appetite?: No   MST Score: 0     Diagnosis:  Right-sided weakness   Syncope-likely 2/2 hypoglycemia  Substernal chest pain   Polypharmacy     Relevant Medical History: hypertension, hyperlipidemia, anxiety/depression, diabetes mellitus type 2, CVA, rheumatoid arthritis, sciatica and TBI    Nutrition-Related Medications: glipizide, insulin    Nutrition-Related Labs:  7/7: Gluc 228, Alb 3.0    Diet Order: Diet diabetic  Oral Supplement Order: none  Appetite/Oral Intake: fair/50-75% of meals  Factors Affecting Nutritional Intake: constipation  Food/Catholic/Cultural Preferences: none reported  Food Allergies: no known food allergies       Wound(s):   none noted    Comments    7/7/23: Patient reports fair appetite, states daughter cooks for her at home and she consumes about 50% of what she makes. Denies nausea, vomiting and diarrhea. Constipation noted.    Anthropometrics    Height: 5' 6" (167.6 cm)    Last Weight: 115.5 kg (254 lb 10.1 oz) (07/06/23 2145) Weight Method: Standard Scale  BMI (Calculated): 41.1  BMI Classification: obese grade III (BMI >/=40)     Ideal Body Weight (IBW), Female: 130 lb     % Ideal Body Weight, Female (lb): 195.87 %                             Usual Weight Provided By: patient and EMR weight history, patient reports UBW about 340 lbs years ago. Wt stable past 3-4 months.     Wt Readings from Last 5 Encounters:   07/06/23 115.5 kg (254 lb 10.1 oz)   06/08/23 115 kg (253 lb 9.6 oz)   05/10/23 114.9 kg (253 lb 4.9 oz)   04/25/23 114 kg (251 lb 5.2 oz)   03/17/23 114 kg (251 lb 6.4 oz)     Weight " Change(s) Since Admission:  Admit Weight: 115.5 kg (254 lb 10.1 oz) (07/06/23 1408)  .    Patient Education    Not applicable.    Monitoring & Evaluation     Dietitian will monitor energy intake and weight.  Nutrition Risk/Follow-Up: low (follow-up in 5-7 days)  Patients assigned 'low nutrition risk' status do not qualify for a full nutritional assessment but will be monitored and re-evaluated in a 5-7 day time period. Please consult if re-evaluation needed sooner.

## 2023-07-07 NOTE — PLAN OF CARE
Problem: Violence Risk or Actual  Goal: Anger and Impulse Control  Outcome: Ongoing, Progressing     Problem: Adult Inpatient Plan of Care  Goal: Plan of Care Review  Outcome: Ongoing, Progressing  Goal: Patient-Specific Goal (Individualized)  Outcome: Ongoing, Progressing  Goal: Absence of Hospital-Acquired Illness or Injury  Outcome: Ongoing, Progressing  Goal: Optimal Comfort and Wellbeing  Outcome: Ongoing, Progressing  Goal: Readiness for Transition of Care  Outcome: Ongoing, Progressing     Problem: Bariatric Environmental Safety  Goal: Safety Maintained with Care  Outcome: Ongoing, Progressing     Problem: Adjustment to Illness (Stroke, Ischemic/Transient Ischemic Attack)  Goal: Optimal Coping  Outcome: Ongoing, Progressing     Problem: Bowel Elimination Impaired (Stroke, Ischemic/Transient Ischemic Attack)  Goal: Effective Bowel Elimination  Outcome: Ongoing, Progressing     Problem: Cerebral Tissue Perfusion (Stroke, Ischemic/Transient Ischemic Attack)  Goal: Optimal Cerebral Tissue Perfusion  Outcome: Ongoing, Progressing     Problem: Cognitive Impairment (Stroke, Ischemic/Transient Ischemic Attack)  Goal: Optimal Cognitive Function  Outcome: Ongoing, Progressing     Problem: Communication Impairment (Stroke, Ischemic/Transient Ischemic Attack)  Goal: Improved Communication Skills  Outcome: Ongoing, Progressing     Problem: Functional Ability Impaired (Stroke, Ischemic/Transient Ischemic Attack)  Goal: Optimal Functional Ability  Outcome: Ongoing, Progressing     Problem: Respiratory Compromise (Stroke, Ischemic/Transient Ischemic Attack)  Goal: Effective Oxygenation and Ventilation  Outcome: Ongoing, Progressing     Problem: Sensorimotor Impairment (Stroke, Ischemic/Transient Ischemic Attack)  Goal: Improved Sensorimotor Function  Outcome: Ongoing, Progressing     Problem: Swallowing Impairment (Stroke, Ischemic/Transient Ischemic Attack)  Goal: Optimal Eating and Swallowing without Aspiration  Outcome:  Ongoing, Progressing     Problem: Urinary Elimination Impaired (Stroke, Ischemic/Transient Ischemic Attack)  Goal: Effective Urinary Elimination  Outcome: Ongoing, Progressing

## 2023-07-07 NOTE — PT/OT/SLP EVAL
"Speech Language Pathology Department  Clinical Swallow Evaluation    Patient Name:  Elba Barnes   MRN:  [id number]    Recommendations:     General recommendations:  Speech/Language and Cognitive Evaluation  Diet recommendations:  Regular Diet - IDDSI Level 7, Liquid Diet Level: Thin liquids - IDDSI Level 0   Swallow strategies/precautions: small bites/sips and slow rate  Precautions: Standard,      History:     Past Medical History:   Diagnosis Date    Anxiety     Arthritis     CVA (cerebral vascular accident)     "mini stroke"    Depression     Diabetes mellitus     Heart problem     History of psychiatric hospitalization     three(1983, 1995 and another (years ago")): depression    HTN (hypertension)     Hx of psychiatric care     Hypertension     Pacemaker     Psychiatric exam requested by authority     Psychiatric problem     TBI (traumatic brain injury)     Therapy      Past Surgical History:   Procedure Laterality Date    CARDIAC PACEMAKER PLACEMENT      CARDIAC PACEMAKER REMOVAL      CHOLECYSTECTOMY      HYSTERECTOMY       Home Diet: Regular and thin liquids  Current Method of Nutrition: NPO    Imaging   Results for orders placed during the hospital encounter of 05/09/23    X-Ray Chest 1 View    Narrative  EXAMINATION:  XR CHEST 1 VIEW    CLINICAL HISTORY:  AMS;    TECHNIQUE:  AP chest    COMPARISON:  Chest x-ray dated 04/25/2023    FINDINGS:  The heart is stable in size.  There are increased patchy opacities in the right lower lung.  There is no pleural effusion or definite visible pneumothorax.    Impression  Increased patchy opacities in the right lower lung concerning for infection      Electronically signed by: Kelsie Prescott  Date:    05/09/2023  Time:    19:54    No results found for this or any previous visit.    Results for orders placed during the hospital encounter of 07/06/23    MRI BRAIN WITHOUT CONTRAST    Narrative  TECHNIQUE:MULTIPLANAR, MULTISEQUENCE MAGNETIC RESONANCE IMAGING OF THE " "BRAIN WAS PERFORMED WITHOUT INTRAVENOUS CONTRAST.    COMPARISON:CORRELATION IS WITH CT STUDY DATED 2023-07-06 14:13:13.    CLINICAL HISTORY:STROKE FOLLOW UP, FLEX COIL USED.    Findings:    Hemorrhage:No acute intracranial hemorrhage is identified.    Stroke:No abnormal signal is identified on the diffusion images to suggest acute infarct.    Extra axial spaces:Age appropriate.    Cerebral, cerebellar, and brainstem parenchyma:There are periventricular and subcortical and brainstem white matter signal abnormalities are seen. The main consideration is small vessel ischemic changes.    Herniation:None.    Calvarium:Within normal limits. Post surgical changes are seen in the left frontal scalp, also appreciated on the CT study.    Impression  Impression:    1. No acute intracranial hemorrhage is identified.    2. No abnormal signal is identified on the diffusion images to suggest acute infarct.    3. No acute intracranial process is identified.    No significant discrepancy with overnight report.      Electronically signed by: Davon Thakkar  Date:    07/07/2023  Time:    08:18    Subjective     Patient awake, alert, and cooperative.    Pain/Comfort: Pain Rating 1: 0/10    Per MD note, "She is still slurring at time of my exam so will leave her NPO until speech has a chance to round, though she did pass bedside swallow."    Objective:     ORAL MUSCULATURE  Secretion Management: adequate  Facial Movement: WFL  Buccal Strength & Mobility: WFL  Mandibular Strength & Mobility: WFL  Oral Labial Strength & Mobility: WFL  Lingual Strength & Mobility: WFL  Vocal Quality: adequate      Consistency Fed By Oral Symptoms Pharyngeal Symptoms   Thin liquid by cup SLP None None   Thin liquid by straw SLP None None   Puree SLP None None     Assessment:     Appropriate for PO intake. ST to complete cognitive-linguistic evaluation next visit.    Goals:     Multidisciplinary Problems       SLP Goals       Not on file              "     Patient Education:     Patient provided with verbal education regarding ST POC.  Understanding was verbalized.    Plan:     SLP Follow-Up:  Yes   Patient to be seen:  5 x/week   Plan of Care expires:  07/21/23  Plan of Care reviewed with:  patient      Time Tracking:     SLP Treatment Date:   07/07/23  Speech Start Time:  1115  Speech Stop Time:  1130     Speech Total Time (min):  15 min    Billable minutes:  Swallow and Oral Function Evaluation, 15 minutes     07/07/2023

## 2023-07-07 NOTE — PROGRESS NOTES
[facility] - 9th Floor Med Surg  Neurology  Progress Note    Patient Name: Elba Barnes  MRN: [id number]  Admission Date: 7/6/2023  Hospital Length of Stay: 1 days  Code Status: Full Code   Attending Provider: Lanre Curry MD  Primary Care Physician: VIRGEN Hoyt   Principal Problem:<principal problem not specified>      Overview/Hospital Course:  7/6 presented to right sided weakness, aphasia, and right facial droop  Stroke alert LVO called, OOW for TNK, no LVO  Admitted for stroke workup       Subjective:     Interval History:   She reports she feels close to baseline, improved compared to yesterday. She states she was walking in her house and was suddenly very short of breath, reports everything was black, she had RLE weakness and fell. Her granddaughter called EMS. She is complaining of generalized body aches, right hip and knee pain, itching and feeling hungry. Reports she has arthritis of the hip and knee but this pain is different.     Current Neurological Medications:     Current Facility-Administered Medications   Medication Dose Route Frequency Provider Last Rate Last Admin    acetaminophen tablet 650 mg  650 mg Oral Q6H PRN Samantha Dominguez, AGACNP-BC   650 mg at 07/07/23 0537    ALPRAZolam tablet 1 mg  1 mg Oral Daily PRN Chandu Monroy MD        aspirin EC tablet 81 mg  81 mg Oral Daily Clare Cuello NP        atorvastatin tablet 40 mg  40 mg Oral Daily Clare Cuello NP        carvediloL tablet 3.125 mg  3.125 mg Oral BID Chandu Monroy MD        dextrose 50% injection 12.5 g  12.5 g Intravenous PRN Melody Dover PA-C        DULoxetine DR capsule 60 mg  60 mg Oral Daily Chandu Monroy MD        fluticasone furoate-vilanteroL 200-25 mcg/dose diskus inhaler 1 puff  1 puff Inhalation Daily Chandu Monroy MD        gabapentin capsule 600 mg  600 mg Oral TID Chandu Monroy MD        glipiZIDE tablet 10 mg  10 mg Oral Daily with breakfast Chandu Monroy MD        glucagon  (human recombinant) injection 1 mg  1 mg Intramuscular PRN Melody Dover PA-C        hydrALAZINE tablet 25 mg  25 mg Oral TID Chandu Monroy MD        insulin aspart U-100 injection 1-10 Units  1-10 Units Subcutaneous Q6H PRN Melody Dover PA-C   2 Units at 07/06/23 1739    insulin detemir U-100 injection 30 Units  30 Units Subcutaneous BID Chandu Monroy MD        labetaloL injection 10 mg  10 mg Intravenous Q15 Min PRN Melody Dover PA-C        nicotine 7 mg/24 hr 1 patch  1 patch Transdermal Daily Melody Dover PA-C        NIFEdipine 24 hr tablet 90 mg  90 mg Oral Daily Chandu Monroy MD        sodium chloride 0.9% flush 10 mL  10 mL Intravenous Q8H Melody Dover PA-C   10 mL at 07/07/23 0540    valsartan tablet 320 mg  320 mg Oral Daily Chandu Monroy MD           Review of Systems   Musculoskeletal:  Positive for arthralgias and myalgias.   Neurological:  Negative for dizziness, tremors, seizures, syncope, facial asymmetry, speech difficulty, weakness, light-headedness, numbness and headaches.   All other systems reviewed and are negative.  Objective:     Vital Signs (Most Recent):  Temp: 98 °F (36.7 °C) (07/07/23 0537)  Pulse: 78 (07/07/23 0418)  Resp: (!) 82 (07/06/23 2145)  BP: (!) 151/84 (07/07/23 0418)  SpO2: (!) 91 % (07/07/23 0418) Vital Signs (24h Range):  Temp:  [97.9 °F (36.6 °C)-98 °F (36.7 °C)] 98 °F (36.7 °C)  Pulse:  [73-89] 78  Resp:  [18-82] 82  SpO2:  [91 %-100 %] 91 %  BP: (150-175)/(71-94) 151/84     Weight: 115.5 kg (254 lb 10.1 oz)  Body mass index is 41.1 kg/m².     Physical Exam  Vitals and nursing note reviewed.   Constitutional:       General: She is awake. She is not in acute distress.     Appearance: She is obese. She is not ill-appearing or toxic-appearing.   HENT:      Head: Normocephalic and atraumatic.   Eyes:      General: No visual field deficit.     Extraocular Movements:      Right eye: Normal extraocular motion and no nystagmus.      Left eye: Normal extraocular  motion and no nystagmus.      Pupils: Pupils are equal, round, and reactive to light.   Cardiovascular:      Rate and Rhythm: Normal rate.   Pulmonary:      Effort: Pulmonary effort is normal.      Breath sounds: Normal breath sounds.   Musculoskeletal:         General: No swelling. Normal range of motion.      Cervical back: Normal range of motion and neck supple.      Right lower leg: No edema.      Left lower leg: No edema.   Skin:     General: Skin is warm and dry.   Neurological:      General: No focal deficit present.      Mental Status: She is alert and oriented to person, place, and time.      Cranial Nerves: No cranial nerve deficit, dysarthria or facial asymmetry.      Sensory: Sensation is intact. No sensory deficit.      Motor: Motor function is intact. No weakness, tremor, atrophy, abnormal muscle tone or pronator drift.      Coordination: Coordination normal. Finger-Nose-Finger Test and Heel to Shin Test normal.   Psychiatric:         Attention and Perception: Attention normal.         Mood and Affect: Affect normal.         Speech: Speech normal.         Behavior: Behavior normal. Behavior is cooperative.        NEUROLOGICAL EXAMINATION:     MENTAL STATUS   Oriented to person, place, and time.   Speech: speech is normal     CRANIAL NERVES     CN III, IV, VI   Pupils are equal, round, and reactive to light.    GAIT AND COORDINATION      Coordination   Finger to nose coordination: normal    Significant Labs:   Recent Lab Results  (Last 5 results in the past 24 hours)        07/07/23  0538   07/07/23  0441   07/06/23  1704   07/06/23  1530   07/06/23  1442        Albumin/Globulin Ratio   0.7             Albumin   3.0             Alkaline Phosphatase   67             ALT   17             aPTT   30.0  Comment: For Minimal Heparin Infusion, the goal aPTT 64-85 seconds corresponds to an anti-Xa of 0.3-0.5.    For Low Intensity and High Intensity Heparin, the goal aPTT  seconds corresponds to an anti-Xa  of 0.3-0.7     28.1  Comment: For Minimal Heparin Infusion, the goal aPTT 64-85 seconds corresponds to an anti-Xa of 0.3-0.5.    For Low Intensity and High Intensity Heparin, the goal aPTT  seconds corresponds to an anti-Xa of 0.3-0.7         AST   16             Baso #   0.04       0.04       Basophil %   0.5       0.5       BILIRUBIN TOTAL   0.2             BNP               BUN   11.9             Calcium   8.9             Chloride   107             Cholesterol               CO2   25             CPK MB   2.6             Creatinine   0.87             CRP               eGFR   >60             Eos #   0.15       0.11       Eosinophil %   2.0       1.3       Estimated Avg Glucose         194.4       Globulin, Total   4.4             Glucose   228             HDL               Hematocrit   45.2       46.2       Hemoglobin   14.3       15.1       Hemoglobin A1C External         8.4       Immature Grans (Abs)   0.03       0.03       Immature Granulocytes   0.4       0.3       INR   1.04     1.00         LDL Cholesterol External               Lymph #   2.53       2.48       LYMPH %   33.3       28.2       Magnesium   1.80             MCH   27.9       28.6       MCHC   31.6       32.7       MCV   88.3       87.5       Mono #   0.52       0.43       Mono %   6.9       4.9       MPV   9.2       9.3       Neut #   4.32       5.69       Neut %   56.9       64.8       nRBC   0.0       0.0       Phosphorus   3.7             Platelets   352       386       POCT Glucose 196     158           Potassium   3.9             PROTEIN TOTAL   7.4             Protime   13.5     13.1         RBC   5.12       5.28       RDW   15.2       15.2       Sed Rate         60       Sodium   140             Thyroid Stimulating Hormone               Total Cholesterol/HDL Ratio               Triglycerides               Troponin I   0.043  Comment: Troponin failed Delta Check, make courtesy call to provider             Very Low Density Lipoprotein                WBC   7.59       8.78                              Significant Imaging: I have reviewed all pertinent imaging results/findings within the past 24 hours.    Assessment and Plan:     Right sided weakness  With associated numbness, R facial droop, and expressive aphasia (resolved)      Will defer to primary team about further workup for right hip and knee pain  Stroke workup negative thus far, will await ECHO      Stroke Work-Up:    CT head w/o: unrevealing for acute intracranial abnormalities  CTA head/neck: no LVO, aneurysms, or flow limiting stenosis  MRI brain w/o: negative   CUS: negative   TTE: pending  Labs:   A1c: 8.4  LDL: 69  TSH: 0.858   Home medications: ASA 81 mg daily, atorvastatin 40 mg daily    Normotension  Fall precautions  PT/OT  Noncompliant with ASA  Strongly encouraged smoking cessation           Further recommendations to follow from MD    VTE Risk Mitigation (From admission, onward)           Ordered     Reason for No Pharmacological VTE Prophylaxis  Once        Question:  Reasons:  Answer:  Risk of Bleeding    07/06/23 1626     IP VTE HIGH RISK PATIENT  Once         07/06/23 1626     Place sequential compression device  Until discontinued         07/06/23 1626                    Janey Gutierrez NP  Neurology  [facility] - 9th Floor Med Surg      I have seen/examined the patient.  NP was scribe.  I agree with the findings unless outlined below:    Elvis Briggs MD  [facility]     Recc full dose aspirin, statin, outpatient DM mgt, and smoking cessation    OK to go home

## 2023-07-07 NOTE — PROGRESS NOTES
[facility] - 9th Floor [facility] MEDICINE ~ PROGRESS NOTE        CHIEF COMPLAINT   Hospital follow up    HOSPITAL COURSE   Elba Barnes is a 62 y.o. Black or  female with a past medical history of hypertension, hyperlipidemia, anxiety/depression, diabetes mellitus type 2, CVA, rheumatoid arthritis, sciatica and TBI. The patient presented to [facility] on 7/6/2023 with a primary complaint of right facial droop, right leg and arm weakness, slurred speech and blurry vision, headache and right-sided chest pain.  Patient reports waking up this morning with weakness the right or and leg and nausea with an episode of vomiting.  Patient reports symptoms are improving.  She denies complaints of shortness of breath and diarrhea.  She is supposed to be taking aspirin daily but does not do so.  She is a current smoker.   Upon presentation to the ED, temperature 97.9° F, heart rate 87, blood pressure 150/71, respiratory rate 18 SpO2 98% on room air.  Labs with glucose 246 and troponin 0.020 and BNP 16.7.  EKG normal sinus rhythm and nonspecific ST and T-wave abnormalities.  Chest x-ray with questionable pulmonary vascular congestion.  CT of the head with no acute intracranial abnormalities but chronic microvascular ischemic changes.  CTA multiphase stroke event no hemodynamically significant stenosis, occlusion, aneurysm or dissection.  In ED patient received full-dose aspirin and labetalol.  She is admitted to hospital medicine services for further medical management.    Today  Seen and examined this morning.  She still has right-sided weakness arm is more so weaker than the leg but both are weaker than the left side.  She states that she normally takes her insulin at 6:00 a.m. 80 units and when she checked it the morning it was 43 and she thought that was good because it was low.  Around 715 she had a syncopal event.  I suspect that she may have had a hypoglycemic event.  She states that  this has been happening to her a few times per month with blacking out but she was unclear what was causing it.        OBJECTIVE/PHYSICAL EXAM     VITAL SIGNS (MOST RECENT):  Temp: 98 °F (36.7 °C) (07/07/23 0732)  Pulse: 73 (07/07/23 0732)  Resp: (!) 82 (07/06/23 2145)  BP: (!) 153/83 (07/07/23 0732)  SpO2: (!) 92 % (07/07/23 0732) VITAL SIGNS (24 HOUR RANGE):  Temp:  [97.9 °F (36.6 °C)-98 °F (36.7 °C)] 98 °F (36.7 °C)  Pulse:  [73-89] 73  Resp:  [18-82] 82  SpO2:  [91 %-100 %] 92 %  BP: (150-175)/(71-94) 153/83   GENERAL: In no acute distress, afebrile  HEENT:  CHEST: Clear to auscultation bilaterally  HEART: S1, S2, no appreciable murmur  ABDOMEN: Soft, nontender, BS +  MSK: Warm, no lower extremity edema, no clubbing or cyanosis  NEUROLOGIC: Alert and oriented x4, right upper extremity 3/5, right lower extremity 4/5, left side 5/5  INTEGUMENTARY:  PSYCHIATRY:        ASSESSMENT/PLAN   Right-sided weakness   Syncope-likely 2/2 hypoglycemia  Substernal chest pain   Polypharmacy     History of: HTN, HLD, insulin-dependent diabetes mellitus, anxiety/depression, CVA, rheumatoid arthritis, sciatica, TBI      Neurology following.  Still has right-sided weakness, MRI brain negative.  Adjust insulin dosing to prevent hypoglycemic events   Cardiology consulted for substernal chest pain  Repeat lipid panel with fasting a.m., it was drawn mid afternoon not fasting.    DVT prophylaxis:  Xarelto 10     Anticipated discharge and disposition:   __________________________________________________________________________    LABS/MICRO/MEDS/DIAGNOSTICS       LABS  Recent Labs     07/07/23  0441      K 3.9   CHLORIDE 107   CO2 25   BUN 11.9   CREATININE 0.87   GLUCOSE 228*   CALCIUM 8.9   ALKPHOS 67   AST 16   ALT 17   ALBUMIN 3.0*     Recent Labs     07/07/23  0441   WBC 7.59   RBC 5.12   HCT 45.2   MCV 88.3          MICROBIOLOGY  Microbiology Results (last 7 days)       ** No results found for the last 168 hours. **                MEDICATIONS   aspirin  81 mg Oral Daily    atorvastatin  40 mg Oral Daily    carvediloL  3.125 mg Oral BID    DULoxetine  60 mg Oral Daily    fluticasone furoate-vilanteroL  1 puff Inhalation Daily    gabapentin  600 mg Oral TID    glipiZIDE  10 mg Oral Daily with breakfast    hydrALAZINE  25 mg Oral TID    insulin detemir U-100  30 Units Subcutaneous BID    nicotine  1 patch Transdermal Daily    NIFEdipine  90 mg Oral Daily    sodium chloride 0.9%  10 mL Intravenous Q8H    valsartan  320 mg Oral Daily         INFUSIONS         DIAGNOSTIC TESTS  MRI BRAIN WITHOUT CONTRAST   Final Result   Impression:      1. No acute intracranial hemorrhage is identified.      2. No abnormal signal is identified on the diffusion images to suggest acute infarct.      3. No acute intracranial process is identified.      No significant discrepancy with overnight report.         Electronically signed by: Davon Thakkar   Date:    07/07/2023   Time:    08:18      CTA STROKE MULTI-PHASE   Final Result      No hemodynamically significant stenosis, occlusion, aneurysm or dissection.         Electronically signed by: Pham Whipple   Date:    07/06/2023   Time:    15:06      X-Ray Chest AP Portable   Final Result      Question some pulmonary vascular congestion.         Electronically signed by: Mauri Morris   Date:    07/06/2023   Time:    15:04      CT HEAD FOR STROKE   Final Result      1. No acute intracranial abnormality.   2. Chronic microvascular ischemic changes.   Code fast findings given to Dr. Noel at the time of dictation.         Electronically signed by: Kelsie Prescott   Date:    07/06/2023   Time:    14:25           EF   Date Value Ref Range Status   02/14/2023 51 % Final            Case related differential diagnoses have been reviewed; assessment and plan has been documented. I have personally reviewed the labs and test results that are currently available; I have reviewed the patients medication list. I  have reviewed the consulting providers recommendations. I have reviewed or attempted to review medical records based upon their availability.  All of the patient's and/or family's questions have been addressed and answered to the best of my ability.  I will continue to monitor closely and make adjustments to medical management as needed.  This document was created using [employer] Fluency Direct.  Transcription errors may have been made.  Please contact me if any questions may rise regarding documentation to clarify transcription.        Chandu Monroy MD   Internal Medicine  Department of Hospital Medicine  [facility] - 9th Floor Med Surg

## 2023-07-07 NOTE — ASSESSMENT & PLAN NOTE
With associated numbness, R facial droop, and expressive aphasia (resolved)      Will defer to primary team about further workup for right hip and knee pain  Stroke workup negative thus far, will await ECHO      Stroke Work-Up:    CT head w/o: unrevealing for acute intracranial abnormalities  CTA head/neck: no LVO, aneurysms, or flow limiting stenosis  MRI brain w/o: negative   CUS: negative   TTE: pending  Labs:   A1c: 8.4  LDL: 69  TSH: 0.858   Home medications: ASA 81 mg daily, atorvastatin 40 mg daily    Normotension  Fall precautions  PT/OT  Noncompliant with ASA  Strongly encouraged smoking cessation

## 2023-07-07 NOTE — SUBJECTIVE & OBJECTIVE
Subjective:     Interval History:   She reports she feels close to baseline, improved compared to yesterday. She states she was walking in her house and was suddenly very short of breath, reports everything was black, she had RLE weakness and fell. Her granddaughter called EMS. She is complaining of generalized body aches, right hip and knee pain, itching and feeling hungry. Reports she has arthritis of the hip and knee but this pain is different.     Current Neurological Medications:     Current Facility-Administered Medications   Medication Dose Route Frequency Provider Last Rate Last Admin    acetaminophen tablet 650 mg  650 mg Oral Q6H PRN Samantha Dominguez, AGACNP-BC   650 mg at 07/07/23 0537    ALPRAZolam tablet 1 mg  1 mg Oral Daily PRN Chandu Monroy MD        aspirin EC tablet 81 mg  81 mg Oral Daily Clare Cuello NP        atorvastatin tablet 40 mg  40 mg Oral Daily Clare Cuello NP        carvediloL tablet 3.125 mg  3.125 mg Oral BID Chandu Monroy MD        dextrose 50% injection 12.5 g  12.5 g Intravenous PRN Melody Dover PA-C        DULoxetine DR capsule 60 mg  60 mg Oral Daily Chandu Monroy MD        fluticasone furoate-vilanteroL 200-25 mcg/dose diskus inhaler 1 puff  1 puff Inhalation Daily Chandu Monroy MD        gabapentin capsule 600 mg  600 mg Oral TID Chandu Monroy MD        glipiZIDE tablet 10 mg  10 mg Oral Daily with breakfast Chandu Monroy MD        glucagon (human recombinant) injection 1 mg  1 mg Intramuscular PRN Melody Dover PA-C        hydrALAZINE tablet 25 mg  25 mg Oral TID Chandu Monroy MD        insulin aspart U-100 injection 1-10 Units  1-10 Units Subcutaneous Q6H PRN Melody Dover PA-C   2 Units at 07/06/23 1739    insulin detemir U-100 injection 30 Units  30 Units Subcutaneous BID Chandu Monroy MD        labetaloL injection 10 mg  10 mg Intravenous Q15 Min PRN Melody Dover PA-C        nicotine 7 mg/24 hr 1 patch  1 patch Transdermal Daily Melody Dover  GARFIELD        NIFEdipine 24 hr tablet 90 mg  90 mg Oral Daily Chandu Monroy MD        sodium chloride 0.9% flush 10 mL  10 mL Intravenous Q8H BYRON MunguiaBONNIE   10 mL at 07/07/23 0540    valsartan tablet 320 mg  320 mg Oral Daily Chandu Monroy MD           Review of Systems   Musculoskeletal:  Positive for arthralgias and myalgias.   Neurological:  Negative for dizziness, tremors, seizures, syncope, facial asymmetry, speech difficulty, weakness, light-headedness, numbness and headaches.   All other systems reviewed and are negative.  Objective:     Vital Signs (Most Recent):  Temp: 98 °F (36.7 °C) (07/07/23 0537)  Pulse: 78 (07/07/23 0418)  Resp: (!) 82 (07/06/23 2145)  BP: (!) 151/84 (07/07/23 0418)  SpO2: (!) 91 % (07/07/23 0418) Vital Signs (24h Range):  Temp:  [97.9 °F (36.6 °C)-98 °F (36.7 °C)] 98 °F (36.7 °C)  Pulse:  [73-89] 78  Resp:  [18-82] 82  SpO2:  [91 %-100 %] 91 %  BP: (150-175)/(71-94) 151/84     Weight: 115.5 kg (254 lb 10.1 oz)  Body mass index is 41.1 kg/m².     Physical Exam  Vitals and nursing note reviewed.   Constitutional:       General: She is awake. She is not in acute distress.     Appearance: She is obese. She is not ill-appearing or toxic-appearing.   HENT:      Head: Normocephalic and atraumatic.   Eyes:      General: No visual field deficit.     Extraocular Movements:      Right eye: Normal extraocular motion and no nystagmus.      Left eye: Normal extraocular motion and no nystagmus.      Pupils: Pupils are equal, round, and reactive to light.   Cardiovascular:      Rate and Rhythm: Normal rate.   Pulmonary:      Effort: Pulmonary effort is normal.      Breath sounds: Normal breath sounds.   Musculoskeletal:         General: No swelling. Normal range of motion.      Cervical back: Normal range of motion and neck supple.      Right lower leg: No edema.      Left lower leg: No edema.   Skin:     General: Skin is warm and dry.   Neurological:      General: No focal deficit present.       Mental Status: She is alert and oriented to person, place, and time.      Cranial Nerves: No cranial nerve deficit, dysarthria or facial asymmetry.      Sensory: Sensation is intact. No sensory deficit.      Motor: Motor function is intact. No weakness, tremor, atrophy, abnormal muscle tone or pronator drift.      Coordination: Coordination normal. Finger-Nose-Finger Test and Heel to Shin Test normal.   Psychiatric:         Attention and Perception: Attention normal.         Mood and Affect: Affect normal.         Speech: Speech normal.         Behavior: Behavior normal. Behavior is cooperative.        NEUROLOGICAL EXAMINATION:     MENTAL STATUS   Oriented to person, place, and time.   Speech: speech is normal     CRANIAL NERVES     CN III, IV, VI   Pupils are equal, round, and reactive to light.    GAIT AND COORDINATION      Coordination   Finger to nose coordination: normal    Significant Labs:   Recent Lab Results  (Last 5 results in the past 24 hours)        07/07/23  0538   07/07/23  0441   07/06/23  1704   07/06/23  1530   07/06/23  1442        Albumin/Globulin Ratio   0.7             Albumin   3.0             Alkaline Phosphatase   67             ALT   17             aPTT   30.0  Comment: For Minimal Heparin Infusion, the goal aPTT 64-85 seconds corresponds to an anti-Xa of 0.3-0.5.    For Low Intensity and High Intensity Heparin, the goal aPTT  seconds corresponds to an anti-Xa of 0.3-0.7     28.1  Comment: For Minimal Heparin Infusion, the goal aPTT 64-85 seconds corresponds to an anti-Xa of 0.3-0.5.    For Low Intensity and High Intensity Heparin, the goal aPTT  seconds corresponds to an anti-Xa of 0.3-0.7         AST   16             Baso #   0.04       0.04       Basophil %   0.5       0.5       BILIRUBIN TOTAL   0.2             BNP               BUN   11.9             Calcium   8.9             Chloride   107             Cholesterol               CO2   25             CPK MB   2.6              Creatinine   0.87             CRP               eGFR   >60             Eos #   0.15       0.11       Eosinophil %   2.0       1.3       Estimated Avg Glucose         194.4       Globulin, Total   4.4             Glucose   228             HDL               Hematocrit   45.2       46.2       Hemoglobin   14.3       15.1       Hemoglobin A1C External         8.4       Immature Grans (Abs)   0.03       0.03       Immature Granulocytes   0.4       0.3       INR   1.04     1.00         LDL Cholesterol External               Lymph #   2.53       2.48       LYMPH %   33.3       28.2       Magnesium   1.80             MCH   27.9       28.6       MCHC   31.6       32.7       MCV   88.3       87.5       Mono #   0.52       0.43       Mono %   6.9       4.9       MPV   9.2       9.3       Neut #   4.32       5.69       Neut %   56.9       64.8       nRBC   0.0       0.0       Phosphorus   3.7             Platelets   352       386       POCT Glucose 196     158           Potassium   3.9             PROTEIN TOTAL   7.4             Protime   13.5     13.1         RBC   5.12       5.28       RDW   15.2       15.2       Sed Rate         60       Sodium   140             Thyroid Stimulating Hormone               Total Cholesterol/HDL Ratio               Triglycerides               Troponin I   0.043  Comment: Troponin failed Delta Check, make courtesy call to provider             Very Low Density Lipoprotein               WBC   7.59       8.78                              Significant Imaging: I have reviewed all pertinent imaging results/findings within the past 24 hours.

## 2023-07-07 NOTE — PLAN OF CARE
07/07/23 1605   Discharge Assessment   Assessment Type Discharge Planning Assessment   Confirmed/corrected address, phone number and insurance Yes   Confirmed Demographics Correct on Facesheet   Source of Information patient   When was your last doctors appointment?   (Purvi Rai Is PCP. Last seen 2 to 3 weeks ago)   Communicated THANIA with patient/caregiver Date not available/Unable to determine   Reason For Admission Cerebbrovascular Accident (CVA)   People in Home child(marisela), adult   Do you expect to return to your current living situation? Yes   Do you have help at home or someone to help you manage your care at home?   (Long Term Care worker with [facility] helps with cooking and cleaning)   Prior to hospitilization cognitive status: Alert/Oriented   Current cognitive status: Alert/Oriented   Walking or Climbing Stairs   (Has a rollator she uses)   Dressing/Bathing   (Can dress herself)   Do you have any problems with:   (Long term care worker brings her grocery shopping and errands)   Home Layout Able to live on 1st floor   Equipment Currently Used at Home rollator;glucometer   Readmission within 30 days? No   Patient currently being followed by outpatient case management?   (Has a Long Term Care worker with [facility] who helps her AT HOME)   Do you currently have service(s) that help you manage your care at home?   (Long Term Care Attendant)   Is the pt/caregiver preference to resume services with current agency   (Will Continue with Long Term Care Attendant)   Do you take prescription medications? Yes   Do you have prescription coverage? Yes   Coverage [facility]   Do you have any problems affording any of your prescribed medications? No   Who is going to help you get home at discharge? Daughter and has long term care worker with [facility]   How do you get to doctors appointments? health plan transportation   Are you on dialysis? No   Do you take coumadin? No   Discharge Plan A Home Health    Discharge Plan B Home   DME Needed Upon Discharge  none   Discharge Plan discussed with: Patient   Transition of Care Barriers None   Physical Activity   On average, how many days per week do you engage in moderate to strenuous exercise (like a brisk walk)? 0 days   On average, how many minutes do you engage in exercise at this level? 0 min   Financial Resource Strain   How hard is it for you to pay for the very basics like food, housing, medical care, and heating? Not hard   Housing Stability   In the last 12 months, was there a time when you were not able to pay the mortgage or rent on time? N   In the last 12 months, how many places have you lived? 1   In the last 12 months, was there a time when you did not have a steady place to sleep or slept in a shelter (including now)? N   Transportation Needs   In the past 12 months, has lack of transportation kept you from medical appointments or from getting medications? no   In the past 12 months, has lack of transportation kept you from meetings, work, or from getting things needed for daily living? No   Food Insecurity   Within the past 12 months, you worried that your food would run out before you got the money to buy more. Never true   Within the past 12 months, the food you bought just didn't last and you didn't have money to get more. Never true   Stress   Do you feel stress - tense, restless, nervous, or anxious, or unable to sleep at night because your mind is troubled all the time - these days? Only a littl   Social Connections   In a typical week, how many times do you talk on the phone with family, friends, or neighbors? More than 3   How often do you get together with friends or relatives? More than 3   How often do you attend Latter-day or Advent services? 1 to 4   Do you belong to any clubs or organizations such as Latter-day groups, unions, fraternal or athletic groups, or school groups? No   How often do you attend meetings of the clubs or organizations you  belong to? Never   Are you , , , , never , or living with a partner? Never marrie   Alcohol Use   Q1: How often do you have a drink containing alcohol? Never   Q2: How many drinks containing alcohol do you have on a typical day when you are drinking? None   Q3: How often do you have six or more drinks on one occasion? Never   OTHER   Name(s) of People in Home daughter

## 2023-07-08 LAB
ANION GAP SERPL CALC-SCNC: 7 MEQ/L
BUN SERPL-MCNC: 23.7 MG/DL (ref 9.8–20.1)
CALCIUM SERPL-MCNC: 8.8 MG/DL (ref 8.4–10.2)
CHLORIDE SERPL-SCNC: 103 MMOL/L (ref 98–107)
CHOLEST SERPL-MCNC: 155 MG/DL
CHOLEST/HDLC SERPL: 5 {RATIO} (ref 0–5)
CO2 SERPL-SCNC: 27 MMOL/L (ref 23–31)
CREAT SERPL-MCNC: 1.4 MG/DL (ref 0.55–1.02)
CREAT/UREA NIT SERPL: 17
GFR SERPLBLD CREATININE-BSD FMLA CKD-EPI: 43 MLS/MIN/1.73/M2
GLUCOSE SERPL-MCNC: 288 MG/DL (ref 82–115)
HDLC SERPL-MCNC: 32 MG/DL (ref 35–60)
LDLC SERPL CALC-MCNC: 69 MG/DL (ref 50–140)
POCT GLUCOSE: 223 MG/DL (ref 70–110)
POCT GLUCOSE: 238 MG/DL (ref 70–110)
POCT GLUCOSE: 267 MG/DL (ref 70–110)
POCT GLUCOSE: 282 MG/DL (ref 70–110)
POTASSIUM SERPL-SCNC: 4.7 MMOL/L (ref 3.5–5.1)
SODIUM SERPL-SCNC: 137 MMOL/L (ref 136–145)
TRIGL SERPL-MCNC: 268 MG/DL (ref 37–140)
VLDLC SERPL CALC-MCNC: 54 MG/DL

## 2023-07-08 PROCEDURE — 63600175 PHARM REV CODE 636 W HCPCS: Performed by: INTERNAL MEDICINE

## 2023-07-08 PROCEDURE — 97162 PT EVAL MOD COMPLEX 30 MIN: CPT

## 2023-07-08 PROCEDURE — 96372 THER/PROPH/DIAG INJ SC/IM: CPT | Performed by: INTERNAL MEDICINE

## 2023-07-08 PROCEDURE — 27000221 HC OXYGEN, UP TO 24 HOURS

## 2023-07-08 PROCEDURE — G0378 HOSPITAL OBSERVATION PER HR: HCPCS

## 2023-07-08 PROCEDURE — 25000003 PHARM REV CODE 250: Performed by: PHYSICIAN ASSISTANT

## 2023-07-08 PROCEDURE — 80048 BASIC METABOLIC PNL TOTAL CA: CPT | Performed by: INTERNAL MEDICINE

## 2023-07-08 PROCEDURE — A4216 STERILE WATER/SALINE, 10 ML: HCPCS | Performed by: PHYSICIAN ASSISTANT

## 2023-07-08 PROCEDURE — 21400001 HC TELEMETRY ROOM

## 2023-07-08 PROCEDURE — 80061 LIPID PANEL: CPT | Performed by: INTERNAL MEDICINE

## 2023-07-08 PROCEDURE — 99900035 HC TECH TIME PER 15 MIN (STAT)

## 2023-07-08 PROCEDURE — 94760 N-INVAS EAR/PLS OXIMETRY 1: CPT

## 2023-07-08 PROCEDURE — 25000003 PHARM REV CODE 250

## 2023-07-08 PROCEDURE — 27000190 HC CPAP FULL FACE MASK W/VALVE

## 2023-07-08 PROCEDURE — 25000003 PHARM REV CODE 250: Performed by: INTERNAL MEDICINE

## 2023-07-08 PROCEDURE — 94761 N-INVAS EAR/PLS OXIMETRY MLT: CPT | Mod: 59

## 2023-07-08 PROCEDURE — 97166 OT EVAL MOD COMPLEX 45 MIN: CPT

## 2023-07-08 PROCEDURE — 25000242 PHARM REV CODE 250 ALT 637 W/ HCPCS: Performed by: INTERNAL MEDICINE

## 2023-07-08 RX ORDER — FENOFIBRATE 48 MG/1
48 TABLET, FILM COATED ORAL DAILY
Status: DISCONTINUED | OUTPATIENT
Start: 2023-07-08 | End: 2023-07-11

## 2023-07-08 RX ORDER — GABAPENTIN 400 MG/1
800 CAPSULE ORAL 3 TIMES DAILY
Status: DISCONTINUED | OUTPATIENT
Start: 2023-07-08 | End: 2023-07-17 | Stop reason: HOSPADM

## 2023-07-08 RX ORDER — SODIUM CHLORIDE, SODIUM LACTATE, POTASSIUM CHLORIDE, CALCIUM CHLORIDE 600; 310; 30; 20 MG/100ML; MG/100ML; MG/100ML; MG/100ML
INJECTION, SOLUTION INTRAVENOUS CONTINUOUS
Status: DISCONTINUED | OUTPATIENT
Start: 2023-07-08 | End: 2023-07-09

## 2023-07-08 RX ADMIN — NIFEDIPINE 90 MG: 90 TABLET, FILM COATED, EXTENDED RELEASE ORAL at 09:07

## 2023-07-08 RX ADMIN — FLUTICASONE FUROATE AND VILANTEROL TRIFENATATE 1 PUFF: 200; 25 POWDER RESPIRATORY (INHALATION) at 09:07

## 2023-07-08 RX ADMIN — HYDROCODONE BITARTRATE AND ACETAMINOPHEN 1 TABLET: 10; 325 TABLET ORAL at 09:07

## 2023-07-08 RX ADMIN — GABAPENTIN 800 MG: 400 CAPSULE ORAL at 02:07

## 2023-07-08 RX ADMIN — INSULIN ASPART 6 UNITS: 100 INJECTION, SOLUTION INTRAVENOUS; SUBCUTANEOUS at 11:07

## 2023-07-08 RX ADMIN — SODIUM CHLORIDE, PRESERVATIVE FREE 10 ML: 5 INJECTION INTRAVENOUS at 02:07

## 2023-07-08 RX ADMIN — GABAPENTIN 800 MG: 400 CAPSULE ORAL at 08:07

## 2023-07-08 RX ADMIN — ATORVASTATIN CALCIUM 40 MG: 40 TABLET, FILM COATED ORAL at 09:07

## 2023-07-08 RX ADMIN — SODIUM CHLORIDE, PRESERVATIVE FREE 10 ML: 5 INJECTION INTRAVENOUS at 12:07

## 2023-07-08 RX ADMIN — HYDRALAZINE HYDROCHLORIDE 25 MG: 25 TABLET, FILM COATED ORAL at 08:07

## 2023-07-08 RX ADMIN — GABAPENTIN 800 MG: 400 CAPSULE ORAL at 09:07

## 2023-07-08 RX ADMIN — CARVEDILOL 3.12 MG: 3.12 TABLET, FILM COATED ORAL at 08:07

## 2023-07-08 RX ADMIN — HYDRALAZINE HYDROCHLORIDE 25 MG: 25 TABLET, FILM COATED ORAL at 02:07

## 2023-07-08 RX ADMIN — HYDRALAZINE HYDROCHLORIDE 25 MG: 25 TABLET, FILM COATED ORAL at 09:07

## 2023-07-08 RX ADMIN — CARVEDILOL 3.12 MG: 3.12 TABLET, FILM COATED ORAL at 09:07

## 2023-07-08 RX ADMIN — VALSARTAN 320 MG: 80 TABLET, FILM COATED ORAL at 09:07

## 2023-07-08 RX ADMIN — INSULIN ASPART 4 UNITS: 100 INJECTION, SOLUTION INTRAVENOUS; SUBCUTANEOUS at 08:07

## 2023-07-08 RX ADMIN — SODIUM CHLORIDE, PRESERVATIVE FREE 10 ML: 5 INJECTION INTRAVENOUS at 05:07

## 2023-07-08 RX ADMIN — HYDROCODONE BITARTRATE AND ACETAMINOPHEN 1 TABLET: 10; 325 TABLET ORAL at 05:07

## 2023-07-08 RX ADMIN — SODIUM CHLORIDE, POTASSIUM CHLORIDE, SODIUM LACTATE AND CALCIUM CHLORIDE: 600; 310; 30; 20 INJECTION, SOLUTION INTRAVENOUS at 05:07

## 2023-07-08 RX ADMIN — ASPIRIN 81 MG: 81 TABLET, COATED ORAL at 09:07

## 2023-07-08 RX ADMIN — INSULIN DETEMIR 40 UNITS: 100 INJECTION, SOLUTION SUBCUTANEOUS at 09:07

## 2023-07-08 RX ADMIN — DULOXETINE HYDROCHLORIDE 60 MG: 30 CAPSULE, DELAYED RELEASE ORAL at 09:07

## 2023-07-08 RX ADMIN — INSULIN DETEMIR 40 UNITS: 100 INJECTION, SOLUTION SUBCUTANEOUS at 08:07

## 2023-07-08 RX ADMIN — INSULIN ASPART 9 UNITS: 100 INJECTION, SOLUTION INTRAVENOUS; SUBCUTANEOUS at 06:07

## 2023-07-08 RX ADMIN — FENOFIBRATE 48 MG: 48 TABLET, FILM COATED ORAL at 09:07

## 2023-07-08 RX ADMIN — LORAZEPAM 1 MG: 2 INJECTION INTRAMUSCULAR; INTRAVENOUS at 03:07

## 2023-07-08 NOTE — PLAN OF CARE
Problem: Adult Inpatient Plan of Care  Goal: Plan of Care Review  7/8/2023 1806 by Pawel Mccarthy RN  Outcome: Ongoing, Progressing  7/8/2023 1805 by Pawel Mccarthy RN  Outcome: Ongoing, Progressing  Goal: Patient-Specific Goal (Individualized)  7/8/2023 1806 by Pawel Mccarthy RN  Outcome: Ongoing, Progressing  7/8/2023 1805 by Pawel Mccarthy RN  Outcome: Ongoing, Progressing  Goal: Absence of Hospital-Acquired Illness or Injury  7/8/2023 1806 by Pawel Mccarthy RN  Outcome: Ongoing, Progressing  7/8/2023 1805 by Pawel Mccarthy RN  Outcome: Ongoing, Progressing  Goal: Optimal Comfort and Wellbeing  7/8/2023 1806 by Pawel Mccarthy RN  Outcome: Ongoing, Progressing  7/8/2023 1805 by Pawel Mccarthy RN  Outcome: Ongoing, Progressing  Goal: Readiness for Transition of Care  7/8/2023 1806 by Pawel Mccarthy RN  Outcome: Ongoing, Progressing  7/8/2023 1805 by Pawel Mccarthy RN  Outcome: Ongoing, Progressing     Problem: Cognitive Impairment (Stroke, Ischemic/Transient Ischemic Attack)  Goal: Optimal Cognitive Function  Outcome: Ongoing, Progressing     Problem: Diabetes Comorbidity  Goal: Blood Glucose Level Within Targeted Range  7/8/2023 1806 by Pawel Mccarthy RN  Outcome: Ongoing, Progressing  7/8/2023 1805 by Pawel Mccarthy RN  Outcome: Ongoing, Progressing

## 2023-07-08 NOTE — CONSULTS
"  Cardiology Consult Note    Patient Demographics:  Name: Elba Barnes  Age:  62 y.o.  MRN:  [id number]  Admission Date: 2023    Chief Complaint upon admit:    Chief Complaint   Patient presents with    Cerebrovascular Accident     ASSI with R leg weakness, R facial droop, R arm drift, slurred speech, blurry vision. Last known normal 0600,          History of Present Illness     61 yo who presented with CVA symptoms and was found to be hypoglycemic with increased exogenous insulin administration. After admission she started having right sided chest pains. She says that it is sharp and last for seconds. Worse with movement and position and reproducible. Multiple CAD risk factors with no known CAD. She follows with Dr Hong in clinic.      Past Medical History:   Diagnosis Date    Anxiety     Arthritis     CVA (cerebral vascular accident)     "mini stroke"    Depression     Diabetes mellitus     Heart problem     History of psychiatric hospitalization     three(,  and another (years ago")): depression    HTN (hypertension)     Hx of psychiatric care     Hypertension     Pacemaker     Psychiatric exam requested by authority     Psychiatric problem     TBI (traumatic brain injury)     Therapy        Social History     Socioeconomic History    Marital status: Single    Number of children: 5   Tobacco Use    Smoking status: Every Day     Years: 40.00     Types: Cigarettes     Last attempt to quit: 2018     Years since quittin.8    Smokeless tobacco: Never    Tobacco comments:     1 pk every 2 weeks, 2 a day   Substance and Sexual Activity    Alcohol use: Not Currently     Comment: wine on occ    Drug use: No    Sexual activity: Never   Other Topics Concern    Patient feels they ought to cut down on drinking/drug use No    Patient annoyed by others criticizing their drinking/drug use No    Patient has felt bad or guilty about drinking/drug use No    Patient has had a drink/used drugs as an eye " opener in the AM No   Social History Narrative    ** Merged History Encounter **          Social Determinants of Health     Financial Resource Strain: Low Risk     Difficulty of Paying Living Expenses: Not hard at all   Food Insecurity: No Food Insecurity    Worried About Running Out of Food in the Last Year: Never true    Ran Out of Food in the Last Year: Never true   Transportation Needs: No Transportation Needs    Lack of Transportation (Medical): No    Lack of Transportation (Non-Medical): No   Physical Activity: Inactive    Days of Exercise per Week: 0 days    Minutes of Exercise per Session: 0 min   Stress: No Stress Concern Present    Feeling of Stress : Only a little   Social Connections: Moderately Isolated    Frequency of Communication with Friends and Family: More than three times a week    Frequency of Social Gatherings with Friends and Family: More than three times a week    Attends Caoda[employer] Services: 1 to 4 times per year    Active Member of Clubs or Organizations: No    Attends Club or Organization Meetings: Never    Marital Status: Never    Housing Stability: Low Risk     Unable to Pay for Housing in the Last Year: No    Number of Places Lived in the Last Year: 1    Unstable Housing in the Last Year: No       Past Surgical History:   Procedure Laterality Date    CARDIAC PACEMAKER PLACEMENT      CARDIAC PACEMAKER REMOVAL      CHOLECYSTECTOMY      HYSTERECTOMY         Family History   Problem Relation Age of Onset    Schizophrenia Mother     Bipolar disorder Mother     Bipolar disorder Father        Allergies:   Review of patient's allergies indicates:   Allergen Reactions    Pcn [penicillins] Anaphylaxis       Prior to Admission medications    Medication Sig Start Date End Date Taking? Authorizing Provider   albuterol (PROVENTIL/VENTOLIN HFA) 90 mcg/actuation inhaler Inhale 1-2 puffs into the lungs every 6 (six) hours as needed for Wheezing. Rescue 2/17/23  Yes Ignacia Lopez DO   ALPRAZolam  (XANAX) 1 MG tablet Take 1 tablet by mouth once a day as needed as needed for anxiety 12/12/22  Yes Historical Provider   amLODIPine (NORVASC) 2.5 MG tablet Take 2.5 mg by mouth once daily.   Yes Historical Provider   ammonium lactate (LAC-HYDRIN) 12 % lotion Apply to dry skin areas on feet, legs, and elbow areas TWICE A DAY AS NEEDED 1/5/23  Yes Historical Provider   aspirin (ECOTRIN) 81 MG EC tablet Take 1 tablet (81 mg total) by mouth once daily. 9/30/21  Yes Markel Torres MD   atorvastatin (LIPITOR) 40 MG tablet Take 40 mg by mouth every evening. 11/22/22  Yes Historical Provider   butalbital-acetaminophen-caffeine -40 mg (FIORICET, ESGIC) -40 mg per tablet Take 1 tablet by mouth every 6 (six) hours as needed for Pain. 2/11/19  Yes Leandra Monreal NP   carvediloL (COREG) 3.125 MG tablet Take 3.125 mg by mouth 2 (two) times daily. 5/9/23  Yes Historical Provider   cholecalciferol, vitamin D3, (VITAMIN D3) 50 mcg (2,000 unit) Cap capsule Take 1 capsule by mouth. 8/13/22  Yes Historical Provider   diclofenac sodium (VOLTAREN) 1 % Gel Apply 2 g topically 4 (four) times daily. 12/21/22  Yes VIRGEN Ho   docusate sodium (COLACE) 250 MG capsule Take 1 capsule (250 mg total) by mouth 2 (two) times daily. 6/8/23  Yes VIRGEN Ho   esomeprazole (NEXIUM) 40 MG capsule Take 40 mg by mouth every morning. 11/22/22  Yes Historical Provider   fluticasone propionate (FLONASE) 50 mcg/actuation nasal spray 1 spray (50 mcg total) by Each Nostril route 2 (two) times daily as needed for Rhinitis. 3/14/23  Yes Rui Monroy, DO   gabapentin (NEURONTIN) 600 MG tablet Take 1 tablet (600 mg total) by mouth 3 (three) times daily. 5/11/23 11/7/23 Yes Orlando Kearns, DO   glipiZIDE (GLUCOTROL) 10 MG tablet Take 10 mg by mouth. 1/19/23  Yes Historical Provider   hydrALAZINE (APRESOLINE) 25 MG tablet Take 25 mg by mouth 3 (three) times daily. 5/9/23  Yes Historical Provider   magnesium citrate solution Take  296 mLs by mouth daily as needed. 22  Yes Historical Provider   multivitamin (THERAGRAN) per tablet Take 1 tablet by mouth once daily.   Yes Historical Provider   nebivoloL (BYSTOLIC) 10 MG Tab Take 10 mg by mouth once daily.   Yes Historical Provider   NIFEdipine (PROCARDIA-XL) 90 MG (OSM) 24 hr tablet Take 1 tablet (90 mg total) by mouth once daily. 23 Yes Ignacia Lopez DO   nitroGLYCERIN (NITROSTAT) 0.4 MG SL tablet Place 0.4 mg under the tongue every 5 (five) minutes as needed for Chest pain.   Yes Historical Provider   oxybutynin (DITROPAN-XL) 5 MG TR24 Take 1 tablet (5 mg total) by mouth once daily. 23  Yes VIRGEN Ho   polyethylene glycol (GLYCOLAX) 17 gram/dose powder Take 17 g by mouth once daily. 23  Yes VIRGEN Ho   sertraline (ZOLOFT) 50 MG tablet Take 50 mg by mouth once daily.   Yes Historical Provider   terconazole (TERAZOL 7) 0.4 % Crea Place 1 applicator vaginally every evening. 23  Yes Historical Provider   valsartan (DIOVAN) 320 MG tablet Take 320 mg by mouth. 23  Yes Historical Provider   DULoxetine (CYMBALTA) 60 MG capsule Take 60 mg by mouth every morning. 23   Historical Provider   insulin (LANTUS SOLOSTAR U-100 INSULIN) glargine 100 units/mL SubQ pen Inject 80 Units into the skin 2 (two) times daily before meals. 23   VIRGEN Ho   insulin lispro 100 unit/mL pen SMARTSI Unit(s) SUB-Q 3 Times Daily 23   Historical Provider   NOVOLOG FLEXPEN U-100 INSULIN 100 unit/mL (3 mL) InPn pen Inject 20 Units into the skin 3 (three) times daily with meals. DISCARD OPEN PEN AFTER 28 DAYS 3/22/23   VIRGEN Ho   nystatin (MYCOSTATIN) cream Apply topically 3 (three) times daily. Use under breast 10/26/22   Historical Provider         Current Facility-Administered Medications:     acetaminophen tablet 650 mg, 650 mg, Oral, Q6H PRN, Samantha Dominguez, [facility], 650 mg at 23 0537    ALPRAZolam tablet 1 mg, 1 mg, Oral,  Daily PRN, Chandu Monroy MD, 1 mg at 07/07/23 1208    aspirin EC tablet 81 mg, 81 mg, Oral, Daily, Clare Cuello NP, 81 mg at 07/08/23 0930    atorvastatin tablet 40 mg, 40 mg, Oral, Daily, Clare Cuello NP, 40 mg at 07/08/23 0930    carvediloL tablet 3.125 mg, 3.125 mg, Oral, BID, Chandu Monroy MD, 3.125 mg at 07/08/23 0929    dextrose 50% injection 12.5 g, 12.5 g, Intravenous, PRN, Melody Dover PA-C    DULoxetine DR capsule 60 mg, 60 mg, Oral, Daily, Chandu Monroy MD, 60 mg at 07/08/23 0930    fenofibrate tablet 48 mg, 48 mg, Oral, Daily, Chandu Monroy MD, 48 mg at 07/08/23 0928    fluticasone furoate-vilanteroL 200-25 mcg/dose diskus inhaler 1 puff, 1 puff, Inhalation, Daily, Chandu Monroy MD, 1 puff at 07/08/23 0930    gabapentin capsule 800 mg, 800 mg, Oral, TID, Chandu Monroy MD, 800 mg at 07/08/23 0928    glucagon (human recombinant) injection 1 mg, 1 mg, Intramuscular, PRN, Melody Dover PA-C    hydrALAZINE tablet 25 mg, 25 mg, Oral, TID, Chandu Monroy MD, 25 mg at 07/08/23 0930    HYDROcodone-acetaminophen  mg per tablet 1 tablet, 1 tablet, Oral, Q4H PRN, Chandu Monroy MD, 1 tablet at 07/08/23 0928    HYDROcodone-acetaminophen 5-325 mg per tablet 1 tablet, 1 tablet, Oral, Q4H PRN, Chandu Monroy MD    insulin aspart U-100 injection 1-15 Units, 1-15 Units, Subcutaneous, PRN, Chandu Monroy MD, 9 Units at 07/08/23 0617    insulin detemir U-100 injection 40 Units, 40 Units, Subcutaneous, BID, Chandu Monroy MD, 40 Units at 07/08/23 0937    labetaloL injection 10 mg, 10 mg, Intravenous, Q15 Min PRN, Melody Dover PA-C    nicotine 7 mg/24 hr 1 patch, 1 patch, Transdermal, Daily, Melody Dover PA-C    NIFEdipine 24 hr tablet 90 mg, 90 mg, Oral, Daily, Chandu Monroy MD, 90 mg at 07/08/23 0930    rivaroxaban tablet 10 mg, 10 mg, Oral, Daily with dinner, Chandu Monroy MD, 10 mg at 07/07/23 1644    sodium chloride 0.9% flush 10 mL, 10 mL, Intravenous, Q8H, Melody Dover PA-C, 10 mL at 07/08/23  0555    valsartan tablet 320 mg, 320 mg, Oral, Daily, Chandu Monroy MD, 320 mg at 07/08/23 0900    Active Hospital Problems    Diagnosis  POA    Cerebrovascular accident (CVA) [I63.9]  Unknown    Heart problem [I51.9]  Unknown    Right sided weakness [R53.1]  Unknown      Resolved Hospital Problems   No resolved problems to display.     Constitutional: Negative for fever, chills, weight loss  Eyes: Negative for changes in vision, diplopia, pain, or discharge  Ears: Negative for changes in hearing, tinnitus,  pain, or discharge,  Mouth and Throat: Negative for mouth or throat soreness, bleeding gums, lesions, petechia  Respiratory: Negative for cough, sputum production, shortness of breath  Cardiovascular: Negative for chest pain, palpitations, dyspnea on excursion, syncope    Gastrointestinal: Negative for abdominal pain, nausea, vomiting, diarrhea, constipation  Genitourinary: Negative for dysuria, hematuria, frequency, urgency or incontinence  Musculoskeletal: Negative for weakness, pain, swelling, decreased ROM  Skin: Negative for rash, pruritis, breakdown, hematoma, ecchymosis, petechia, jaundice  Neuro: Negative for headache, lightheadedness, syncope or weakness  Psyc: Negative for changes in mood or behavior, Madai SI or HI  All other systems are reviewed and are negative.      Objective     Vitals:    07/08/23 0506 07/08/23 0700 07/08/23 0928 07/08/23 0930   BP: 118/74 127/75     BP Location:  Left arm     Pulse: 79 80  80   Resp: 19 20 20    Temp: 98.2 °F (36.8 °C) 98.3 °F (36.8 °C)     TempSrc: Oral Oral     SpO2: 98% (!) 93%     Weight:       Height:           Intake/Output Summary (Last 24 hours) at 7/8/2023 1051  Last data filed at 7/7/2023 2228  Gross per 24 hour   Intake 240 ml   Output 300 ml   Net -60 ml          General: Alert, Awake, Oriented x 3, No Acute Distress, Obese   Head: normocephalic, atraumatic  Eyes: PERRL, EOMI, no scleral icterus, no conjunctival pallor  Nose: no tenderness to  palpation, no drainage or erythema appreciated  Mouth and Throat: poor dentition, no lesions noted, moist mucus membranes  Neck: no lymphadenopathy appreciated, No carotid bruits, JVD 9 cm  Respiratory: lungs clear to ascultation bilaterally, no accessory use of muscles   Cardiovascular: regular rate with regular rhythm, no murmurs, rubs, or S3, S4, normal apical impulse.  Gastrointestinal: soft, nontender, nondistended,no rebound or guarding, normoactive bowel sounds.   Extremities: pulses 2+ in all extremities, no pitting edema, normal ROM   Neuro:  full strength even in all extremities, no sensory deficits.   Skin: no lesions, rashes, or breakdown.       Inpatient Diagnostics:  Recent Results (from the past 24 hour(s))   Echo Saline Bubble? Yes    Collection Time: 07/07/23 10:52 AM   Result Value Ref Range    BSA 2.32 m2    Right Atrial Pressure (from IVC) 3 mmHg    EF 45 %    Left Ventricular Outflow Tract Mean Velocity 0.59 cm/s    Left Ventricular Outflow Tract Mean Gradient 2.00 mmHg    LVIDd 5.65 3.5 - 6.0 cm    IVS 1.33 (A) 0.6 - 1.1 cm    Posterior Wall 1.42 (A) 0.6 - 1.1 cm    LVIDs 4.25 (A) 2.1 - 4.0 cm    FS 25 28 - 44 %    Sinus 3.20 cm    LV mass 343.67 g    LA size 4.50 cm    TAPSE 2.19 cm    Left Ventricle Relative Wall Thickness 0.50 cm    AV mean gradient 6 mmHg    AV valve area 2.34 cm2    AV Velocity Ratio 0.60     AV index (prosthetic) 0.62     MV mean gradient 2 mmHg    MV valve area p 1/2 method 1.82 cm2    MV valve area by continuity eq 2.62 cm2    E/A ratio 0.93     E wave deceleration time 254.00 msec    LVOT diameter 2.20 cm    LVOT area 3.8 cm2    LVOT peak bladimir 0.95 m/s    LVOT peak VTI 18.10 cm    Ao peak bladimir 1.58 m/s    Ao VTI 29.4 cm    LVOT stroke volume 68.77 cm3    AV peak gradient 10 mmHg    MV peak gradient 3 mmHg    MV Peak E Bladimir 0.88 m/s    MV VTI 26.2 cm    MV stenosis pressure 1/2 time 121.00 ms    MV Peak A Bladimir 0.95 m/s    LV Systolic Volume 80.80 mL    LV Systolic Volume  Index 36.4 mL/m2    LV Diastolic Volume 157.00 mL    LV Diastolic Volume Index 70.72 mL/m2    LV Mass Index 155 g/m2    LA Volume Index (Mod) 35.4 mL/m2    LA volume (mod) 78.50 cm3    Pal's Biplane MOD Ejection Fraction 4 %   POCT glucose    Collection Time: 07/07/23 11:17 AM   Result Value Ref Range    POCT Glucose 207 (H) 70 - 110 mg/dL   POCT glucose    Collection Time: 07/07/23  4:08 PM   Result Value Ref Range    POCT Glucose 208 (H) 70 - 110 mg/dL   POCT glucose    Collection Time: 07/07/23  8:23 PM   Result Value Ref Range    POCT Glucose 252 (H) 70 - 110 mg/dL   Basic Metabolic Panel    Collection Time: 07/08/23  5:52 AM   Result Value Ref Range    Sodium Level 137 136 - 145 mmol/L    Potassium Level 4.7 3.5 - 5.1 mmol/L    Chloride 103 98 - 107 mmol/L    Carbon Dioxide 27 23 - 31 mmol/L    Glucose Level 288 (H) 82 - 115 mg/dL    Blood Urea Nitrogen 23.7 (H) 9.8 - 20.1 mg/dL    Creatinine 1.40 (H) 0.55 - 1.02 mg/dL    BUN/Creatinine Ratio 17     Calcium Level Total 8.8 8.4 - 10.2 mg/dL    Anion Gap 7.0 mEq/L    eGFR 43 mls/min/1.73/m2   Lipid Panel    Collection Time: 07/08/23  5:52 AM   Result Value Ref Range    Cholesterol Total 155 <=200 mg/dL    HDL Cholesterol 32 (L) 35 - 60 mg/dL    Triglyceride 268 (H) 37 - 140 mg/dL    Cholesterol/HDL Ratio 5 0 - 5    Very Low Density Lipoprotein 54     LDL Cholesterol 69.00 50.00 - 140.00 mg/dL   POCT glucose    Collection Time: 07/08/23  6:13 AM   Result Value Ref Range    POCT Glucose 267 (H) 70 - 110 mg/dL   POCT glucose    Collection Time: 07/08/23  8:18 AM   Result Value Ref Range    POCT Glucose 282 (H) 70 - 110 mg/dL         Assessment     61 yo with presentation of right sided weakness and hypoglycemia now with right sided pleuritic chest pain with position.    Plan     Chest pains   - trops remain negative   - EKG with nonspecific changes, not diagnostic of ischemia   - chest pain is reproducible and atypical in nature.   - Recommend outpatient Stress  testing with Dr Hong.   - Continue ASA and Statin   Volume status at baseline   BP better controlled.     Terry Castellon    7/8/2023, 10:51 AM

## 2023-07-08 NOTE — PT/OT/SLP EVAL
Physical Therapy Evaluation    Patient Name:  Elba Barnes   MRN:  [id number]    Recommendations:     Discharge Recommendations: home with home health   Discharge Equipment Recommendations: none   Barriers to discharge: None    Assessment:     Elba Barnes is a 62 y.o. female admitted with a medical diagnosis of weakness, h/o chronic CVA with RUE/RLE weakness. She presents with the following impairments/functional limitations: weakness, impaired endurance, gait instability, impaired balance, decreased lower extremity function, decreased safety awareness. Patient tolerated PT evaluation well, mobilizing with Sindy-SBA. Patient refused gait belt and refused hands-on assistance for sit<>stand and bed to chair transfers despite poor strength in RLE with MMT assessment. Patient with <3/5 strength in RLE with MMT, however RLE did not buckle in standing. Pt unsafe to ambulate away from bed due to refusing gait belt and slight impulsiveness. Pt performed bed to chair transfers w/ CGA-SBA, no AD. Pt reports at baseline she sleeps on sofa and transfers on/off BSC at home. Pt reports she ambulates very limited household distances with rollator and family assists with all ADLs. Pt appears close to baseline level of function but would benefit from continued acute PT services while in-house to reduce fall risk and minimize burden of care.     Rehab Prognosis: Fair; patient would benefit from acute skilled PT services to address these deficits and reach maximum level of function.    Recent Surgery: * No surgery found *      Plan:     During this hospitalization, patient to be seen 5 x/week to address the identified rehab impairments via gait training, therapeutic activities, therapeutic exercises, neuromuscular re-education and progress toward the following goals:    Plan of Care Expires:  07/31/23    Subjective     Chief Complaint: none stated  Patient/Family Comments/goals: to go home  Pain/Comfort:  Pain Rating 1:  0/10    Patients cultural, spiritual, Hoahaoism conflicts given the current situation: no    Living Environment:  Pt lives in [facility] with daughter.   Prior to admission, patients level of function was assist required for transfers, ambulation, and ADLs.  Equipment used at home: rollator.  DME owned (not currently used): none.  Upon discharge, patient will have assistance from family.    Objective:     Communicated with RN prior to session.  Patient found HOB elevated with PureWick, peripheral IV  upon PT entry to room.    General Precautions: Standard, fall  Orthopedic Precautions:N/A   Braces: N/A  Respiratory Status: Room air, SpO2: 94-89% throughout mobility  Blood Pressure: 133/75, HR: 71      Exams:  RLE ROM: PROM WFL  RLE Strength: poor cooperation with MMT, appears less than 3/5; 1/5 at DF/PF  LLE ROM: WFL  LLE Strength: WFL  Skin integrity: Visible skin intact      Functional Mobility:  Bed Mobility:  Supine to Sit: minimum assistance  Transfers:  Sit to Stand:  contact guard assistance with rolling walker  Bed to Chair: stand by assistance with  no AD  using  Squat Pivot  Gait: patient ambulated x3 side steps at EOB w/ RW and Sindy, no buckling of R knee observed      AM-PAC 6 CLICK MOBILITY  Total Score:17       Treatment & Education:    Patient provided with verbal education regarding PT POC, safety with mobility.  Understanding was verbalized.     Patient left HOB elevated with all lines intact, call button in reach, and RN notified.    GOALS:   Multidisciplinary Problems       Physical Therapy Goals          Problem: Physical Therapy    Goal Priority Disciplines Outcome Goal Variances Interventions   Physical Therapy Goal     PT, PT/OT Ongoing, Progressing     Description: Goals to be met by: 23     Patient will increase functional independence with mobility by performin. Supine to sit with Modified [place]  2. Sit to stand transfer with Modified [place]  3. Gait  x 50 feet with  "Supervision using Rolling Walker.                          History:     Past Medical History:   Diagnosis Date    Anxiety     Arthritis     CVA (cerebral vascular accident)     "mini stroke"    Depression     Diabetes mellitus     Heart problem     History of psychiatric hospitalization     three(1983, 1995 and another (years ago")): depression    HTN (hypertension)     Hx of psychiatric care     Hypertension     Pacemaker     Psychiatric exam requested by authority     Psychiatric problem     TBI (traumatic brain injury)     Therapy        Past Surgical History:   Procedure Laterality Date    CARDIAC PACEMAKER PLACEMENT      CARDIAC PACEMAKER REMOVAL      CHOLECYSTECTOMY      HYSTERECTOMY         Time Tracking:     PT Received On: 07/08/23  PT Start Time: 1027     PT Stop Time: 1050  PT Total Time (min): 23 min     Billable Minutes: Evaluation Mod complexity      07/08/2023    "

## 2023-07-08 NOTE — PT/OT/SLP EVAL
"Occupational Therapy  Evaluation    Name: Elba Barnes  MRN: [id number]  ED due to: R LE/arm weakness  Admitting Diagnosis: R sided weakness, stroke workup (MRI negative), syncope likely 2/2 hypoglycemia  Med hx: CVA with R sided weakness, TBI, sciatica   Recent Surgery: * No surgery found *      Recommendations:     Discharge Recommendations: home with home health  Discharge Equipment Recommendations:     Barriers to discharge:       Assessment:     Elba Barnes is a 62 y.o. female with a medical diagnosis of RUE/LE weakness.  She presents with R UE/LE weakness, decreased activity tolerance, generalized weakness. Pt with history of R UE/LE weakness from previous CVA, pt appears to be close to baseline from pt report of PLOF and assist req'd prior to admit. Will cont OT while in hospital to decrease fall risk. Performance deficits affecting function: weakness, impaired endurance, impaired self care skills, impaired functional mobility, impaired balance, gait instability.      Rehab Prognosis: Fair; patient would benefit from acute skilled OT services to address these deficits and reach maximum level of function.       Plan:     Patient to be seen 3 x/week to address the above listed problems via self-care/home management, therapeutic activities, therapeutic exercises, neuromuscular re-education, wheelchair management/training  Plan of Care Expires:    Plan of Care Reviewed with: patient    Subjective     Chief Complaint: "light headed", "dizziness"  Patient/Family Comments/goals: "I want to go home"    Occupational Profile:  Living Environment: resides at home , daughter resides with pt, no steps, tub/shower combo with TTB  Previous level of function: pt reports able to ambulate short distances with rollator although endorses falls, reports assistance with all ADL tasks, mostly uses BSC for toileting but at times ambulates to bathroom with daughters, sleeps on sofa, "they are supposed to be getting me a " "scooter"  Roles and Routines:   Equipment Used at Home:  (rollator, BSC, TTB)  Assistance upon Discharge: brett    Pain/Comfort:  Pain Rating 1:  (pt c/o "light headed" ? from pain medication)    Patients cultural, spiritual, Yazdanism conflicts given the current situation:      Objective:     Communicated with: RN prior to session, administered pain meds.  Patient found HOB elevated with telemetry upon OT entry to room.    General Precautions: Standard,  (fall)  Orthopedic Precautions:    Braces:    Respiratory Status: Room air  Vital Signs: Blood Pressure: 133/75  HR: 80  Sp02: 89-91%    Occupational Performance:    Bed Mobility:    Patient completed Rolling/Turning to Right with minimum assistance  Patient completed Scooting/Bridging with stand by assistance  Patient completed Supine to Sit with minimum assistance  Patient completed Sit to Supine with minimum assistance    Functional Mobility/Transfers:  Patient completed Sit <> Stand Transfer with contact guard assistance  with  rolling walker   Patient completed Bed <> Chair Transfer using Stand Pivot technique with contact guard assistance with no assistive device  Functional Mobility: pt refused to rebecca gait belt, unsafe to attempt to ambulate away from bed due to fall risk, lateral steps along EOB with RW CGA    Activities of Daily Living:  Lower Body Dressing: total assistance .  Toileting: total assistance purewick    Cognitive/Visual Perceptual:  Cognitive/Psychosocial Skills:     -       Follows Commands/attention:Follows two-step commands  -       Safety awareness/insight to disability: impaired   -       Mood/Affect/Coping skills/emotional control: Pleasant    Physical Exam:  Balance: -       sitting balance mod I, standing balance CGA    Upper Extremity Strength: L UE WNL  RUE shld mod deficits 3-/5, elbow 3+/5,  3/5    Therapeutic Positioning  Risk for acquired pressure injuries is increased due to relative decrease in mobility d/t " "hospitalization .    OT interventions performed during the course of today's session in an effort to prevent and/or reduce acquired pressure injuries:   Education on Pressure Ulcer Prevention provided    Skin assessment:  sacral visualized intact    OT recommendations for therapeutic positioning throughout hospitalization:   Follow [facility] Pressure Injury Prevention Protocol      [facility] 6 Click ADL:  [facility] Total Score:      Additional Treatment:  Reiterated "call before you fall" pt requesting shower OT rec bed bath/chair bath at this time due to difficulty stepping into shower would be a fall risk     Patient Education:  Patient provided with verbal education regarding OT role/goals/POC, fall prevention, and Discharge/DME recommendations.  Understanding was verbalized, however additional teaching warranted.     Patient left HOB elevated with all lines intact and call button in reach    GOALS:   Multidisciplinary Problems       Occupational Therapy Goals          Problem: Occupational Therapy    Goal Priority Disciplines Outcome Interventions   Occupational Therapy Goal     OT, PT/OT Ongoing, Progressing    Description: Pt will t/f EOB/chair <>BSC mod I without AD (stand pivot)  Pt will increase standing endurance x 3 minutes to assist with ADL tasks                       History:     Past Medical History:   Diagnosis Date    Anxiety     Arthritis     CVA (cerebral vascular accident)     "mini stroke"    Depression     Diabetes mellitus     Heart problem     History of psychiatric hospitalization     three(1983, 1995 and another (years ago")): depression    HTN (hypertension)     Hx of psychiatric care     Hypertension     Pacemaker     Psychiatric exam requested by authority     Psychiatric problem     TBI (traumatic brain injury)     Therapy          Past Surgical History:   Procedure Laterality Date    CARDIAC PACEMAKER PLACEMENT      CARDIAC PACEMAKER REMOVAL      CHOLECYSTECTOMY      HYSTERECTOMY         Time " Tracking:     OT Date of Treatment:    OT Start Time: 1026  OT Stop Time: 1053  OT Total Time (min): 27 min    Billable Minutes:Evaluation mod comp    7/8/2023

## 2023-07-08 NOTE — PROGRESS NOTES
[facility] - 9th Floor [facility] MEDICINE ~ PROGRESS NOTE        CHIEF COMPLAINT   Hospital follow up    HOSPITAL COURSE   Elba Barnes is a 62 y.o. Black or  female with a past medical history of hypertension, hyperlipidemia, anxiety/depression, diabetes mellitus type 2, CVA, rheumatoid arthritis, sciatica and TBI. The patient presented to [facility] on 7/6/2023 with a primary complaint of right facial droop, right leg and arm weakness, slurred speech and blurry vision, headache and right-sided chest pain.  Patient reports waking up this morning with weakness the right or and leg and nausea with an episode of vomiting.  Patient reports symptoms are improving.  She denies complaints of shortness of breath and diarrhea.  She is supposed to be taking aspirin daily but does not do so.  She is a current smoker.   Upon presentation to the ED, temperature 97.9° F, heart rate 87, blood pressure 150/71, respiratory rate 18 SpO2 98% on room air.  Labs with glucose 246 and troponin 0.020 and BNP 16.7.  EKG normal sinus rhythm and nonspecific ST and T-wave abnormalities.  Chest x-ray with questionable pulmonary vascular congestion.  CT of the head with no acute intracranial abnormalities but chronic microvascular ischemic changes.  CTA multiphase stroke event no hemodynamically significant stenosis, occlusion, aneurysm or dissection.  In ED patient received full-dose aspirin and labetalol.  She is admitted to hospital medicine services for further medical management.  Upon further clarification she had a syncopal event at around 7:15 a.m., she took her 80 units of insulin long-acting at 6:00 a.m. and was 43 glucose when she took the 80 units.  She thought the lower the glucose the better.    Today  Seen and examined this morning.  Right-sided remains weak and also now complaining of neuropathy especially today right upper extremity as well as neck pain.  I will get a MRI of the cervical  spine.        OBJECTIVE/PHYSICAL EXAM     VITAL SIGNS (MOST RECENT):  Temp: 98.3 °F (36.8 °C) (07/08/23 0700)  Pulse: 80 (07/08/23 0700)  Resp: 20 (07/08/23 0700)  BP: 127/75 (07/08/23 0700)  SpO2: (!) 93 % (07/08/23 0700) VITAL SIGNS (24 HOUR RANGE):  Temp:  [97.8 °F (36.6 °C)-98.6 °F (37 °C)] 98.3 °F (36.8 °C)  Pulse:  [73-87] 80  Resp:  [13-20] 20  SpO2:  [88 %-98 %] 93 %  BP: (115-153)/(65-88) 127/75   GENERAL: In no acute distress, afebrile  HEENT:  CHEST: Clear to auscultation bilaterally  HEART: S1, S2, no appreciable murmur  ABDOMEN: Soft, nontender, BS +  MSK: Warm, no lower extremity edema, no clubbing or cyanosis  NEUROLOGIC: Alert and oriented x4, right upper extremity 3/5, right lower extremity 4/5, left side 5/5  INTEGUMENTARY:  PSYCHIATRY:        ASSESSMENT/PLAN   Right-sided weakness   Syncope-likely 2/2 hypoglycemia  Substernal chest pain   Polypharmacy   Hypertriglyceridemia    History of: HTN, HLD, insulin-dependent diabetes mellitus, anxiety/depression, CVA, rheumatoid arthritis, sciatica, TBI      Neurology signed off.  Still has right-sided weakness, MRI brain negative.  Ordered MRI cervical spine.  Increase gabapentin.  Adjust insulin dosing to prevent hypoglycemic events   Cardiology consulted for substernal chest pain, abnormal echocardiogram.    DVT prophylaxis:  Xarelto 10     Anticipated discharge and disposition:   __________________________________________________________________________    LABS/MICRO/MEDS/DIAGNOSTICS       LABS  Recent Labs     07/07/23  0441 07/08/23  0552    137   K 3.9 4.7   CHLORIDE 107 103   CO2 25 27   BUN 11.9 23.7*   CREATININE 0.87 1.40*   GLUCOSE 228* 288*   CALCIUM 8.9 8.8   ALKPHOS 67  --    AST 16  --    ALT 17  --    ALBUMIN 3.0*  --        Recent Labs     07/07/23  0441   WBC 7.59   RBC 5.12   HCT 45.2   MCV 88.3            MICROBIOLOGY  Microbiology Results (last 7 days)       ** No results found for the last 168 hours. **                MEDICATIONS   aspirin  81 mg Oral Daily    atorvastatin  40 mg Oral Daily    carvediloL  3.125 mg Oral BID    DULoxetine  60 mg Oral Daily    fluticasone furoate-vilanteroL  1 puff Inhalation Daily    gabapentin  600 mg Oral TID    hydrALAZINE  25 mg Oral TID    insulin detemir U-100  40 Units Subcutaneous BID    nicotine  1 patch Transdermal Daily    NIFEdipine  90 mg Oral Daily    rivaroxaban  10 mg Oral Daily with dinner    sodium chloride 0.9%  10 mL Intravenous Q8H    valsartan  320 mg Oral Daily         INFUSIONS         DIAGNOSTIC TESTS  X-Ray Ankle Complete Right   Final Result      No acute osseous abnormality.         Electronically signed by: Pham Whipple   Date:    07/07/2023   Time:    12:27      X-Ray Elbow Complete 3 views Right   Final Result      No acute osseous abnormality.         Electronically signed by: Pham Whipple   Date:    07/07/2023   Time:    12:26      X-ray Shoulder 2 or More Views Right   Final Result      No acute osseous abnormality.         Electronically signed by: Pham Whipple   Date:    07/07/2023   Time:    12:26      MRI BRAIN WITHOUT CONTRAST   Final Result   Impression:      1. No acute intracranial hemorrhage is identified.      2. No abnormal signal is identified on the diffusion images to suggest acute infarct.      3. No acute intracranial process is identified.      No significant discrepancy with overnight report.         Electronically signed by: Davon Thakkar   Date:    07/07/2023   Time:    08:18      CTA STROKE MULTI-PHASE   Final Result      No hemodynamically significant stenosis, occlusion, aneurysm or dissection.         Electronically signed by: Pham Whipple   Date:    07/06/2023   Time:    15:06      X-Ray Chest AP Portable   Final Result      Question some pulmonary vascular congestion.         Electronically signed by: Mauri Morris   Date:    07/06/2023   Time:    15:04      CT HEAD FOR STROKE   Final Result      1. No acute  intracranial abnormality.   2. Chronic microvascular ischemic changes.   Code fast findings given to Dr. Noel at the time of dictation.         Electronically signed by: Kelsie Prescott   Date:    07/06/2023   Time:    14:25           EF   Date Value Ref Range Status   07/07/2023 45 % Final   02/14/2023 51 % Final            Case related differential diagnoses have been reviewed; assessment and plan has been documented. I have personally reviewed the labs and test results that are currently available; I have reviewed the patients medication list. I have reviewed the consulting providers recommendations. I have reviewed or attempted to review medical records based upon their availability.  All of the patient's and/or family's questions have been addressed and answered to the best of my ability.  I will continue to monitor closely and make adjustments to medical management as needed.  This document was created using M*Modal Fluency Direct.  Transcription errors may have been made.  Please contact me if any questions may rise regarding documentation to clarify transcription.        Chandu Monroy MD   Internal Medicine  Department of Hospital Medicine [facility] - 9th Floor Med Surg

## 2023-07-08 NOTE — PLAN OF CARE
Problem: Adult Inpatient Plan of Care  Goal: Plan of Care Review  Outcome: Ongoing, Progressing  Goal: Patient-Specific Goal (Individualized)  Outcome: Ongoing, Progressing  Goal: Absence of Hospital-Acquired Illness or Injury  Outcome: Ongoing, Progressing  Goal: Optimal Comfort and Wellbeing  Outcome: Ongoing, Progressing  Goal: Readiness for Transition of Care  Outcome: Ongoing, Progressing     Problem: Cognitive Impairment (Stroke, Ischemic/Transient Ischemic Attack)  Goal: Optimal Cognitive Function  Outcome: Ongoing, Progressing     Problem: Diabetes Comorbidity  Goal: Blood Glucose Level Within Targeted Range  Outcome: Ongoing, Progressing

## 2023-07-08 NOTE — PLAN OF CARE
Problem: Violence Risk or Actual  Goal: Anger and Impulse Control  Outcome: Ongoing, Progressing     Problem: Adult Inpatient Plan of Care  Goal: Plan of Care Review  Outcome: Ongoing, Progressing  Goal: Patient-Specific Goal (Individualized)  Outcome: Ongoing, Progressing  Goal: Absence of Hospital-Acquired Illness or Injury  Outcome: Ongoing, Progressing  Goal: Optimal Comfort and Wellbeing  Outcome: Ongoing, Progressing  Goal: Readiness for Transition of Care  Outcome: Ongoing, Progressing     Problem: Bariatric Environmental Safety  Goal: Safety Maintained with Care  Outcome: Ongoing, Progressing     Problem: Adjustment to Illness (Stroke, Ischemic/Transient Ischemic Attack)  Goal: Optimal Coping  Outcome: Ongoing, Progressing     Problem: Bowel Elimination Impaired (Stroke, Ischemic/Transient Ischemic Attack)  Goal: Effective Bowel Elimination  Outcome: Ongoing, Progressing     Problem: Cerebral Tissue Perfusion (Stroke, Ischemic/Transient Ischemic Attack)  Goal: Optimal Cerebral Tissue Perfusion  Outcome: Ongoing, Progressing     Problem: Cognitive Impairment (Stroke, Ischemic/Transient Ischemic Attack)  Goal: Optimal Cognitive Function  Outcome: Ongoing, Progressing     Problem: Communication Impairment (Stroke, Ischemic/Transient Ischemic Attack)  Goal: Improved Communication Skills  Outcome: Ongoing, Progressing     Problem: Functional Ability Impaired (Stroke, Ischemic/Transient Ischemic Attack)  Goal: Optimal Functional Ability  Outcome: Ongoing, Progressing     Problem: Respiratory Compromise (Stroke, Ischemic/Transient Ischemic Attack)  Goal: Effective Oxygenation and Ventilation  Outcome: Ongoing, Progressing     Problem: Sensorimotor Impairment (Stroke, Ischemic/Transient Ischemic Attack)  Goal: Improved Sensorimotor Function  Outcome: Ongoing, Progressing     Problem: Swallowing Impairment (Stroke, Ischemic/Transient Ischemic Attack)  Goal: Optimal Eating and Swallowing without Aspiration  Outcome:  Ongoing, Progressing     Problem: Urinary Elimination Impaired (Stroke, Ischemic/Transient Ischemic Attack)  Goal: Effective Urinary Elimination  Outcome: Ongoing, Progressing     Problem: Diabetes Comorbidity  Goal: Blood Glucose Level Within Targeted Range  Outcome: Ongoing, Progressing

## 2023-07-08 NOTE — PLAN OF CARE
Problem: Physical Therapy  Goal: Physical Therapy Goal  Description: Goals to be met by: 23     Patient will increase functional independence with mobility by performin. Supine to sit with Modified Navarro  2. Sit to stand transfer with Modified Navarro  3. Gait  x 50 feet with Supervision using Rolling Walker.     Outcome: Ongoing, Progressing

## 2023-07-09 LAB
ANION GAP SERPL CALC-SCNC: 8 MEQ/L
BUN SERPL-MCNC: 33.9 MG/DL (ref 9.8–20.1)
CALCIUM SERPL-MCNC: 8.6 MG/DL (ref 8.4–10.2)
CHLORIDE SERPL-SCNC: 101 MMOL/L (ref 98–107)
CO2 SERPL-SCNC: 24 MMOL/L (ref 23–31)
CREAT SERPL-MCNC: 1.05 MG/DL (ref 0.55–1.02)
CREAT/UREA NIT SERPL: 32
GFR SERPLBLD CREATININE-BSD FMLA CKD-EPI: >60 MLS/MIN/1.73/M2
GLUCOSE SERPL-MCNC: 298 MG/DL (ref 82–115)
POCT GLUCOSE: 247 MG/DL (ref 70–110)
POCT GLUCOSE: 251 MG/DL (ref 70–110)
POCT GLUCOSE: 255 MG/DL (ref 70–110)
POCT GLUCOSE: 270 MG/DL (ref 70–110)
POTASSIUM SERPL-SCNC: 5.1 MMOL/L (ref 3.5–5.1)
SODIUM SERPL-SCNC: 133 MMOL/L (ref 136–145)

## 2023-07-09 PROCEDURE — G0378 HOSPITAL OBSERVATION PER HR: HCPCS

## 2023-07-09 PROCEDURE — 80048 BASIC METABOLIC PNL TOTAL CA: CPT | Performed by: INTERNAL MEDICINE

## 2023-07-09 PROCEDURE — 25000242 PHARM REV CODE 250 ALT 637 W/ HCPCS: Performed by: INTERNAL MEDICINE

## 2023-07-09 PROCEDURE — 25000003 PHARM REV CODE 250: Performed by: INTERNAL MEDICINE

## 2023-07-09 PROCEDURE — 63600175 PHARM REV CODE 636 W HCPCS: Performed by: INTERNAL MEDICINE

## 2023-07-09 PROCEDURE — 96372 THER/PROPH/DIAG INJ SC/IM: CPT | Performed by: INTERNAL MEDICINE

## 2023-07-09 PROCEDURE — 25000003 PHARM REV CODE 250

## 2023-07-09 PROCEDURE — A4216 STERILE WATER/SALINE, 10 ML: HCPCS | Performed by: PHYSICIAN ASSISTANT

## 2023-07-09 PROCEDURE — 94660 CPAP INITIATION&MGMT: CPT

## 2023-07-09 PROCEDURE — 21400001 HC TELEMETRY ROOM

## 2023-07-09 PROCEDURE — 25000003 PHARM REV CODE 250: Performed by: PHYSICIAN ASSISTANT

## 2023-07-09 RX ORDER — INSULIN ASPART 100 [IU]/ML
12 INJECTION, SOLUTION INTRAVENOUS; SUBCUTANEOUS
Status: DISCONTINUED | OUTPATIENT
Start: 2023-07-09 | End: 2023-07-10

## 2023-07-09 RX ORDER — FUROSEMIDE 10 MG/ML
40 INJECTION INTRAMUSCULAR; INTRAVENOUS ONCE
Status: COMPLETED | OUTPATIENT
Start: 2023-07-09 | End: 2023-07-09

## 2023-07-09 RX ORDER — FUROSEMIDE 80 MG/1
80 TABLET ORAL ONCE
Status: COMPLETED | OUTPATIENT
Start: 2023-07-09 | End: 2023-07-09

## 2023-07-09 RX ORDER — IPRATROPIUM BROMIDE AND ALBUTEROL SULFATE 2.5; .5 MG/3ML; MG/3ML
3 SOLUTION RESPIRATORY (INHALATION)
Status: DISCONTINUED | OUTPATIENT
Start: 2023-07-09 | End: 2023-07-17 | Stop reason: HOSPADM

## 2023-07-09 RX ADMIN — INSULIN ASPART 12 UNITS: 100 INJECTION, SOLUTION INTRAVENOUS; SUBCUTANEOUS at 04:07

## 2023-07-09 RX ADMIN — ATORVASTATIN CALCIUM 40 MG: 40 TABLET, FILM COATED ORAL at 08:07

## 2023-07-09 RX ADMIN — FENOFIBRATE 48 MG: 48 TABLET, FILM COATED ORAL at 08:07

## 2023-07-09 RX ADMIN — IPRATROPIUM BROMIDE AND ALBUTEROL SULFATE 3 ML: 2.5; .5 SOLUTION RESPIRATORY (INHALATION) at 01:07

## 2023-07-09 RX ADMIN — ALPRAZOLAM 1 MG: 0.5 TABLET ORAL at 10:07

## 2023-07-09 RX ADMIN — FUROSEMIDE 80 MG: 80 TABLET ORAL at 04:07

## 2023-07-09 RX ADMIN — FUROSEMIDE 40 MG: 10 INJECTION, SOLUTION INTRAMUSCULAR; INTRAVENOUS at 06:07

## 2023-07-09 RX ADMIN — GABAPENTIN 800 MG: 400 CAPSULE ORAL at 08:07

## 2023-07-09 RX ADMIN — GABAPENTIN 800 MG: 400 CAPSULE ORAL at 04:07

## 2023-07-09 RX ADMIN — INSULIN ASPART 12 UNITS: 100 INJECTION, SOLUTION INTRAVENOUS; SUBCUTANEOUS at 01:07

## 2023-07-09 RX ADMIN — HYDRALAZINE HYDROCHLORIDE 25 MG: 25 TABLET, FILM COATED ORAL at 08:07

## 2023-07-09 RX ADMIN — SODIUM CHLORIDE, PRESERVATIVE FREE 10 ML: 5 INJECTION INTRAVENOUS at 06:07

## 2023-07-09 RX ADMIN — INSULIN DETEMIR 40 UNITS: 100 INJECTION, SOLUTION SUBCUTANEOUS at 08:07

## 2023-07-09 RX ADMIN — HYDRALAZINE HYDROCHLORIDE 25 MG: 25 TABLET, FILM COATED ORAL at 04:07

## 2023-07-09 RX ADMIN — INSULIN ASPART 9 UNITS: 100 INJECTION, SOLUTION INTRAVENOUS; SUBCUTANEOUS at 06:07

## 2023-07-09 RX ADMIN — SODIUM CHLORIDE, PRESERVATIVE FREE 10 ML: 5 INJECTION INTRAVENOUS at 01:07

## 2023-07-09 RX ADMIN — CARVEDILOL 3.12 MG: 3.12 TABLET, FILM COATED ORAL at 08:07

## 2023-07-09 RX ADMIN — DULOXETINE HYDROCHLORIDE 60 MG: 30 CAPSULE, DELAYED RELEASE ORAL at 08:07

## 2023-07-09 RX ADMIN — NIFEDIPINE 90 MG: 90 TABLET, FILM COATED, EXTENDED RELEASE ORAL at 08:07

## 2023-07-09 RX ADMIN — VALSARTAN 320 MG: 80 TABLET, FILM COATED ORAL at 08:07

## 2023-07-09 RX ADMIN — SODIUM CHLORIDE, POTASSIUM CHLORIDE, SODIUM LACTATE AND CALCIUM CHLORIDE: 600; 310; 30; 20 INJECTION, SOLUTION INTRAVENOUS at 06:07

## 2023-07-09 RX ADMIN — FLUTICASONE FUROATE AND VILANTEROL TRIFENATATE 1 PUFF: 200; 25 POWDER RESPIRATORY (INHALATION) at 09:07

## 2023-07-09 RX ADMIN — ASPIRIN 81 MG: 81 TABLET, COATED ORAL at 08:07

## 2023-07-09 RX ADMIN — HYDROCODONE BITARTRATE AND ACETAMINOPHEN 1 TABLET: 10; 325 TABLET ORAL at 10:07

## 2023-07-09 RX ADMIN — HYDROCODONE BITARTRATE AND ACETAMINOPHEN 1 TABLET: 10; 325 TABLET ORAL at 08:07

## 2023-07-09 NOTE — PT/OT/SLP PROGRESS
Speech Language Pathology Department  Diet Tolerance Follow-up    Patient Name:  Elba Barnes   MRN:  [id number]  Admitting Diagnosis: CVA workup    Recommendations:     General recommendations:  Speech/Language and Cognitive Evaluation  Diet recommendations:  Regular Diet - IDDSI Level 7, Liquid Diet Level: Thin liquids - IDDSI Level 0   Swallow strategies/precautions: small bites/sips and slow rate  Precautions: Standard,      Diet tolerance:     Nursing reports no difficulty regarding diet tolerance.      Plan:     SLP to continue POC.      07/09/2023

## 2023-07-09 NOTE — PLAN OF CARE
Problem: Violence Risk or Actual  Goal: Anger and Impulse Control  7/8/2023 2256 by Cecy Bradshaw LPN  Outcome: Ongoing, Progressing  7/8/2023 2248 by Cecy Bradshaw LPN  Outcome: Ongoing, Progressing     Problem: Adult Inpatient Plan of Care  Goal: Plan of Care Review  7/8/2023 2256 by Cecy Bradshaw LPN  Outcome: Ongoing, Progressing  7/8/2023 2248 by Cecy Bradshaw LPN  Outcome: Ongoing, Progressing  Goal: Patient-Specific Goal (Individualized)  7/8/2023 2256 by Cecy Bradshaw LPN  Outcome: Ongoing, Progressing  7/8/2023 2248 by eCcy Bradshaw LPN  Outcome: Ongoing, Progressing  Goal: Absence of Hospital-Acquired Illness or Injury  7/8/2023 2256 by Cecy Bradshaw LPN  Outcome: Ongoing, Progressing  7/8/2023 2248 by Cecy Bradshaw LPN  Outcome: Ongoing, Progressing  Goal: Optimal Comfort and Wellbeing  7/8/2023 2256 by Cecy Bradshaw LPN  Outcome: Ongoing, Progressing  7/8/2023 2248 by Cecy Bradshaw LPN  Outcome: Ongoing, Progressing  Goal: Readiness for Transition of Care  7/8/2023 2256 by Cecy Bradshaw LPN  Outcome: Ongoing, Progressing  7/8/2023 2248 by Cecy Bradshaw LPN  Outcome: Ongoing, Progressing     Problem: Bariatric Environmental Safety  Goal: Safety Maintained with Care  7/8/2023 2256 by Cecy Bradshaw LPN  Outcome: Ongoing, Progressing  7/8/2023 2248 by Cecy Bradshaw LPN  Outcome: Ongoing, Progressing     Problem: Adjustment to Illness (Stroke, Ischemic/Transient Ischemic Attack)  Goal: Optimal Coping  7/8/2023 2256 by Cecy Bradshaw LPN  Outcome: Ongoing, Progressing  7/8/2023 2248 by Cecy Bradshaw LPN  Outcome: Ongoing, Progressing     Problem: Bowel Elimination Impaired (Stroke, Ischemic/Transient Ischemic Attack)  Goal: Effective Bowel Elimination  7/8/2023 2256 by Cecy Bradshaw LPN  Outcome: Ongoing, Progressing  7/8/2023 2248 by Cecy Bradshaw LPN  Outcome: Ongoing, Progressing     Problem: Cerebral Tissue Perfusion (Stroke, Ischemic/Transient Ischemic Attack)  Goal: Optimal Cerebral Tissue  Perfusion  7/8/2023 2256 by Cecy Bradshaw LPN  Outcome: Ongoing, Progressing  7/8/2023 2248 by Cecy Bradshaw LPN  Outcome: Ongoing, Progressing     Problem: Cognitive Impairment (Stroke, Ischemic/Transient Ischemic Attack)  Goal: Optimal Cognitive Function  7/8/2023 2256 by Cecy Bradshaw LPN  Outcome: Ongoing, Progressing  7/8/2023 2248 by Cecy Bradshaw LPN  Outcome: Ongoing, Progressing     Problem: Communication Impairment (Stroke, Ischemic/Transient Ischemic Attack)  Goal: Improved Communication Skills  7/8/2023 2256 by Cecy Bradshaw LPN  Outcome: Ongoing, Progressing  7/8/2023 2248 by Cecy Bradshaw LPN  Outcome: Ongoing, Progressing     Problem: Functional Ability Impaired (Stroke, Ischemic/Transient Ischemic Attack)  Goal: Optimal Functional Ability  7/8/2023 2256 by Cecy Bradshaw LPN  Outcome: Ongoing, Progressing  7/8/2023 2248 by Cecy Bradshaw LPN  Outcome: Ongoing, Progressing     Problem: Respiratory Compromise (Stroke, Ischemic/Transient Ischemic Attack)  Goal: Effective Oxygenation and Ventilation  7/8/2023 2256 by Cecy Bradshaw LPN  Outcome: Ongoing, Progressing  7/8/2023 2248 by Cecy Bradshaw LPN  Outcome: Ongoing, Progressing     Problem: Sensorimotor Impairment (Stroke, Ischemic/Transient Ischemic Attack)  Goal: Improved Sensorimotor Function  7/8/2023 2256 by Cecy Bradshaw LPN  Outcome: Ongoing, Progressing  7/8/2023 2248 by Cecy Bradshaw LPN  Outcome: Ongoing, Progressing     Problem: Swallowing Impairment (Stroke, Ischemic/Transient Ischemic Attack)  Goal: Optimal Eating and Swallowing without Aspiration  7/8/2023 2256 by Cecy Bradshaw LPN  Outcome: Ongoing, Progressing  7/8/2023 2248 by Cecy Bradshaw LPN  Outcome: Ongoing, Progressing     Problem: Urinary Elimination Impaired (Stroke, Ischemic/Transient Ischemic Attack)  Goal: Effective Urinary Elimination  7/8/2023 2256 by Cecy Bradshaw LPN  Outcome: Ongoing, Progressing  7/8/2023 2248 by Cecy Bradshaw LPN  Outcome: Ongoing, Progressing      Problem: Diabetes Comorbidity  Goal: Blood Glucose Level Within Targeted Range  7/8/2023 2256 by Cecy Bradshaw LPN  Outcome: Ongoing, Progressing  7/8/2023 2248 by Cecy Bradshaw LPN  Outcome: Ongoing, Progressing

## 2023-07-09 NOTE — PROGRESS NOTES
[facility] - 9th Floor [facility] MEDICINE ~ PROGRESS NOTE        CHIEF COMPLAINT   Hospital follow up    HOSPITAL COURSE   Elba Barnes is a 62 y.o. Black or  female with a past medical history of hypertension, hyperlipidemia, anxiety/depression, diabetes mellitus type 2, CVA, rheumatoid arthritis, sciatica and TBI. The patient presented to [facility] on 7/6/2023 with a primary complaint of right facial droop, right leg and arm weakness, slurred speech and blurry vision, headache and right-sided chest pain.  Patient reports waking up this morning with weakness the right or and leg and nausea with an episode of vomiting.  Patient reports symptoms are improving.  She denies complaints of shortness of breath and diarrhea.  She is supposed to be taking aspirin daily but does not do so.  She is a current smoker.   Upon presentation to the ED, temperature 97.9° F, heart rate 87, blood pressure 150/71, respiratory rate 18 SpO2 98% on room air.  Labs with glucose 246 and troponin 0.020 and BNP 16.7.  EKG normal sinus rhythm and nonspecific ST and T-wave abnormalities.  Chest x-ray with questionable pulmonary vascular congestion.  CT of the head with no acute intracranial abnormalities but chronic microvascular ischemic changes.  CTA multiphase stroke event no hemodynamically significant stenosis, occlusion, aneurysm or dissection.  In ED patient received full-dose aspirin and labetalol.  She is admitted to hospital medicine services for further medical management.  Upon further clarification she had a syncopal event at around 7:15 a.m., she took her 80 units of insulin long-acting at 6:00 a.m. and was 43 glucose when she took the 80 units.  She thought the lower the glucose the better.  MRI brain was negative for acute abnormalities.  MRI cervical spine showed spondylotic changes without evidence of significant central canal stenosis or neuroforaminal narrowing.    Today  Seen  examined this morning.  Right-sided stronger than yesterday.  She is still requiring oxygen at 3 L nasal cannula this morning.  We will get a chest x-ray and discussed with nurse to start weaning aggressively.  She would also need to mobilize.        OBJECTIVE/PHYSICAL EXAM     VITAL SIGNS (MOST RECENT):  Temp: 98.4 °F (36.9 °C) (07/09/23 0802)  Pulse: 71 (07/09/23 0906)  Resp: 18 (07/09/23 1058)  BP: 124/61 (07/09/23 0854)  SpO2: (!) 94 % (07/09/23 0906) VITAL SIGNS (24 HOUR RANGE):  Temp:  [97.6 °F (36.4 °C)-98.4 °F (36.9 °C)] 98.4 °F (36.9 °C)  Pulse:  [68-80] 71  Resp:  [16-20] 18  SpO2:  [94 %-100 %] 94 %  BP: (122-139)/(61-85) 124/61   GENERAL: In no acute distress, afebrile  HEENT:  CHEST: Clear to auscultation bilaterally  HEART: S1, S2, no appreciable murmur  ABDOMEN: Soft, nontender, BS +  MSK: Warm, no lower extremity edema, no clubbing or cyanosis  NEUROLOGIC: Alert and oriented x4, right upper extremity 4/5, right lower extremity 4/5, left side 5/5  INTEGUMENTARY:  PSYCHIATRY:        ASSESSMENT/PLAN   Right-sided weakness and neuropathy  Syncope-likely 2/2 hypoglycemia  Substernal chest pain   Polypharmacy   Hypertriglyceridemia    History of: HTN, HLD, insulin-dependent diabetes mellitus, anxiety/depression, CVA, rheumatoid arthritis, sciatica, TBI      Neurology signed off.  Still has right-sided weakness, MRI brain negative, cervical spine spondylotic changes.  Increase gabapentin.  Adjust insulin dosing to prevent hypoglycemic events   Cardiology signed off.  Outpatient follow-up with Dr. Hong.    DVT prophylaxis:  Xarelto 10     Anticipated discharge and disposition:   __________________________________________________________________________    LABS/MICRO/MEDS/DIAGNOSTICS       LABS  Recent Labs     07/07/23  0441 07/08/23  0552 07/09/23  0518      < > 133*   K 3.9   < > 5.1   CHLORIDE 107   < > 101   CO2 25   < > 24   BUN 11.9   < > 33.9*   CREATININE 0.87   < > 1.05*   GLUCOSE 228*   < >  298*   CALCIUM 8.9   < > 8.6   ALKPHOS 67  --   --    AST 16  --   --    ALT 17  --   --    ALBUMIN 3.0*  --   --     < > = values in this interval not displayed.       Recent Labs     07/07/23  0441   WBC 7.59   RBC 5.12   HCT 45.2   MCV 88.3            MICROBIOLOGY  Microbiology Results (last 7 days)       ** No results found for the last 168 hours. **               MEDICATIONS   aspirin  81 mg Oral Daily    atorvastatin  40 mg Oral Daily    carvediloL  3.125 mg Oral BID    DULoxetine  60 mg Oral Daily    fenofibrate  48 mg Oral Daily    fluticasone furoate-vilanteroL  1 puff Inhalation Daily    gabapentin  800 mg Oral TID    hydrALAZINE  25 mg Oral TID    insulin aspart U-100  12 Units Subcutaneous TIDWM    insulin detemir U-100  40 Units Subcutaneous BID    nicotine  1 patch Transdermal Daily    NIFEdipine  90 mg Oral Daily    sodium chloride 0.9%  10 mL Intravenous Q8H    valsartan  320 mg Oral Daily         INFUSIONS   lactated ringers 75 mL/hr at 07/09/23 0644          DIAGNOSTIC TESTS  MRI Cervical Spine Without Contrast   Final Result      Spondylotic changes of the cervical spine without evidence for significant central canal stenosis or neural foraminal narrowing.         Electronically signed by: Frank Lr MD   Date:    07/08/2023   Time:    16:46      X-Ray Ankle Complete Right   Final Result      No acute osseous abnormality.         Electronically signed by: Pahm Whipple   Date:    07/07/2023   Time:    12:27      X-Ray Elbow Complete 3 views Right   Final Result      No acute osseous abnormality.         Electronically signed by: Pham Whipple   Date:    07/07/2023   Time:    12:26      X-ray Shoulder 2 or More Views Right   Final Result      No acute osseous abnormality.         Electronically signed by: Pham Whipple   Date:    07/07/2023   Time:    12:26      MRI BRAIN WITHOUT CONTRAST   Final Result   Impression:      1. No acute intracranial hemorrhage is identified.      2.  No abnormal signal is identified on the diffusion images to suggest acute infarct.      3. No acute intracranial process is identified.      No significant discrepancy with overnight report.         Electronically signed by: Davon Thakkar   Date:    07/07/2023   Time:    08:18      CTA STROKE MULTI-PHASE   Final Result      No hemodynamically significant stenosis, occlusion, aneurysm or dissection.         Electronically signed by: Pham Whipple   Date:    07/06/2023   Time:    15:06      X-Ray Chest AP Portable   Final Result      Question some pulmonary vascular congestion.         Electronically signed by: Mauri Morris   Date:    07/06/2023   Time:    15:04      CT HEAD FOR STROKE   Final Result      1. No acute intracranial abnormality.   2. Chronic microvascular ischemic changes.   Code fast findings given to Dr. Noel at the time of dictation.         Electronically signed by: Kelsie Prescott   Date:    07/06/2023   Time:    14:25      X-Ray Chest 1 View    (Results Pending)        EF   Date Value Ref Range Status   07/07/2023 45 % Final   02/14/2023 51 % Final            Case related differential diagnoses have been reviewed; assessment and plan has been documented. I have personally reviewed the labs and test results that are currently available; I have reviewed the patients medication list. I have reviewed the consulting providers recommendations. I have reviewed or attempted to review medical records based upon their availability.  All of the patient's and/or family's questions have been addressed and answered to the best of my ability.  I will continue to monitor closely and make adjustments to medical management as needed.  This document was created using M*Modal Fluency Direct.  Transcription errors may have been made.  Please contact me if any questions may rise regarding documentation to clarify transcription.        Chandu Monroy MD   Internal Medicine  Department of Hospital Medicine [facility] - 9th Floor Med Surg

## 2023-07-10 LAB
ANION GAP SERPL CALC-SCNC: 6 MEQ/L
BUN SERPL-MCNC: 32.4 MG/DL (ref 9.8–20.1)
CALCIUM SERPL-MCNC: 8.7 MG/DL (ref 8.4–10.2)
CHLORIDE SERPL-SCNC: 104 MMOL/L (ref 98–107)
CO2 SERPL-SCNC: 27 MMOL/L (ref 23–31)
CREAT SERPL-MCNC: 1.19 MG/DL (ref 0.55–1.02)
CREAT/UREA NIT SERPL: 27
GFR SERPLBLD CREATININE-BSD FMLA CKD-EPI: 52 MLS/MIN/1.73/M2
GLUCOSE SERPL-MCNC: 247 MG/DL (ref 82–115)
POCT GLUCOSE: 195 MG/DL (ref 70–110)
POCT GLUCOSE: 207 MG/DL (ref 70–110)
POCT GLUCOSE: 227 MG/DL (ref 70–110)
POCT GLUCOSE: 259 MG/DL (ref 70–110)
POTASSIUM SERPL-SCNC: 5.6 MMOL/L (ref 3.5–5.1)
SODIUM SERPL-SCNC: 137 MMOL/L (ref 136–145)

## 2023-07-10 PROCEDURE — 27000221 HC OXYGEN, UP TO 24 HOURS

## 2023-07-10 PROCEDURE — 97530 THERAPEUTIC ACTIVITIES: CPT | Mod: CQ

## 2023-07-10 PROCEDURE — 99232 PR SUBSEQUENT HOSPITAL CARE,LEVL II: ICD-10-PCS | Mod: ,,, | Performed by: PSYCHIATRY & NEUROLOGY

## 2023-07-10 PROCEDURE — G0378 HOSPITAL OBSERVATION PER HR: HCPCS

## 2023-07-10 PROCEDURE — 99232 SBSQ HOSP IP/OBS MODERATE 35: CPT | Mod: ,,, | Performed by: PSYCHIATRY & NEUROLOGY

## 2023-07-10 PROCEDURE — 99205 PR OFFICE/OUTPT VISIT, NEW, LEVL V, 60-74 MIN: ICD-10-PCS | Mod: FS,,, | Performed by: NEUROLOGICAL SURGERY

## 2023-07-10 PROCEDURE — 94761 N-INVAS EAR/PLS OXIMETRY MLT: CPT

## 2023-07-10 PROCEDURE — 96372 THER/PROPH/DIAG INJ SC/IM: CPT | Performed by: INTERNAL MEDICINE

## 2023-07-10 PROCEDURE — S4991 NICOTINE PATCH NONLEGEND: HCPCS | Performed by: PHYSICIAN ASSISTANT

## 2023-07-10 PROCEDURE — 80048 BASIC METABOLIC PNL TOTAL CA: CPT | Performed by: INTERNAL MEDICINE

## 2023-07-10 PROCEDURE — 99205 OFFICE O/P NEW HI 60 MIN: CPT | Mod: FS,,, | Performed by: NEUROLOGICAL SURGERY

## 2023-07-10 PROCEDURE — 21400001 HC TELEMETRY ROOM

## 2023-07-10 PROCEDURE — 97530 THERAPEUTIC ACTIVITIES: CPT | Mod: CO

## 2023-07-10 PROCEDURE — A4216 STERILE WATER/SALINE, 10 ML: HCPCS | Performed by: PHYSICIAN ASSISTANT

## 2023-07-10 PROCEDURE — 94640 AIRWAY INHALATION TREATMENT: CPT | Mod: XB

## 2023-07-10 PROCEDURE — 25000003 PHARM REV CODE 250: Performed by: INTERNAL MEDICINE

## 2023-07-10 PROCEDURE — 94660 CPAP INITIATION&MGMT: CPT

## 2023-07-10 PROCEDURE — 25000003 PHARM REV CODE 250

## 2023-07-10 PROCEDURE — 99900031 HC PATIENT EDUCATION (STAT)

## 2023-07-10 PROCEDURE — 25000242 PHARM REV CODE 250 ALT 637 W/ HCPCS: Performed by: INTERNAL MEDICINE

## 2023-07-10 PROCEDURE — 99900035 HC TECH TIME PER 15 MIN (STAT)

## 2023-07-10 PROCEDURE — 63600175 PHARM REV CODE 636 W HCPCS: Performed by: INTERNAL MEDICINE

## 2023-07-10 PROCEDURE — 25000003 PHARM REV CODE 250: Performed by: PHYSICIAN ASSISTANT

## 2023-07-10 PROCEDURE — 97116 GAIT TRAINING THERAPY: CPT | Mod: CQ

## 2023-07-10 RX ORDER — AMOXICILLIN 250 MG
2 CAPSULE ORAL 2 TIMES DAILY
Status: DISCONTINUED | OUTPATIENT
Start: 2023-07-11 | End: 2023-07-17 | Stop reason: HOSPADM

## 2023-07-10 RX ORDER — VALSARTAN 80 MG/1
160 TABLET ORAL DAILY
Status: DISCONTINUED | OUTPATIENT
Start: 2023-07-10 | End: 2023-07-11

## 2023-07-10 RX ORDER — INSULIN ASPART 100 [IU]/ML
18 INJECTION, SOLUTION INTRAVENOUS; SUBCUTANEOUS
Status: DISCONTINUED | OUTPATIENT
Start: 2023-07-10 | End: 2023-07-11

## 2023-07-10 RX ORDER — INSULIN ASPART 100 [IU]/ML
15 INJECTION, SOLUTION INTRAVENOUS; SUBCUTANEOUS
Status: DISCONTINUED | OUTPATIENT
Start: 2023-07-10 | End: 2023-07-10

## 2023-07-10 RX ORDER — FUROSEMIDE 40 MG/1
40 TABLET ORAL 2 TIMES DAILY
Status: DISCONTINUED | OUTPATIENT
Start: 2023-07-10 | End: 2023-07-17 | Stop reason: HOSPADM

## 2023-07-10 RX ORDER — AMOXICILLIN 250 MG
4 CAPSULE ORAL ONCE
Status: COMPLETED | OUTPATIENT
Start: 2023-07-10 | End: 2023-07-10

## 2023-07-10 RX ADMIN — IPRATROPIUM BROMIDE AND ALBUTEROL SULFATE 3 ML: 2.5; .5 SOLUTION RESPIRATORY (INHALATION) at 08:07

## 2023-07-10 RX ADMIN — INSULIN ASPART 6 UNITS: 100 INJECTION, SOLUTION INTRAVENOUS; SUBCUTANEOUS at 09:07

## 2023-07-10 RX ADMIN — HYDROCODONE BITARTRATE AND ACETAMINOPHEN 1 TABLET: 10; 325 TABLET ORAL at 09:07

## 2023-07-10 RX ADMIN — GABAPENTIN 800 MG: 400 CAPSULE ORAL at 08:07

## 2023-07-10 RX ADMIN — ATORVASTATIN CALCIUM 40 MG: 40 TABLET, FILM COATED ORAL at 08:07

## 2023-07-10 RX ADMIN — INSULIN ASPART 18 UNITS: 100 INJECTION, SOLUTION INTRAVENOUS; SUBCUTANEOUS at 05:07

## 2023-07-10 RX ADMIN — SODIUM CHLORIDE, PRESERVATIVE FREE 10 ML: 5 INJECTION INTRAVENOUS at 02:07

## 2023-07-10 RX ADMIN — RIVAROXABAN 10 MG: 10 TABLET, FILM COATED ORAL at 05:07

## 2023-07-10 RX ADMIN — IPRATROPIUM BROMIDE AND ALBUTEROL SULFATE 3 ML: 2.5; .5 SOLUTION RESPIRATORY (INHALATION) at 01:07

## 2023-07-10 RX ADMIN — HYDRALAZINE HYDROCHLORIDE 25 MG: 25 TABLET, FILM COATED ORAL at 08:07

## 2023-07-10 RX ADMIN — GABAPENTIN 800 MG: 400 CAPSULE ORAL at 03:07

## 2023-07-10 RX ADMIN — INSULIN DETEMIR 40 UNITS: 100 INJECTION, SOLUTION SUBCUTANEOUS at 09:07

## 2023-07-10 RX ADMIN — INSULIN DETEMIR 40 UNITS: 100 INJECTION, SOLUTION SUBCUTANEOUS at 08:07

## 2023-07-10 RX ADMIN — SODIUM ZIRCONIUM CYCLOSILICATE 10 G: 10 POWDER, FOR SUSPENSION ORAL at 02:07

## 2023-07-10 RX ADMIN — ALPRAZOLAM 1 MG: 0.5 TABLET ORAL at 09:07

## 2023-07-10 RX ADMIN — HYDROCODONE BITARTRATE AND ACETAMINOPHEN 1 TABLET: 10; 325 TABLET ORAL at 02:07

## 2023-07-10 RX ADMIN — FUROSEMIDE 40 MG: 40 TABLET ORAL at 09:07

## 2023-07-10 RX ADMIN — INSULIN ASPART 15 UNITS: 100 INJECTION, SOLUTION INTRAVENOUS; SUBCUTANEOUS at 11:07

## 2023-07-10 RX ADMIN — CARVEDILOL 3.12 MG: 3.12 TABLET, FILM COATED ORAL at 08:07

## 2023-07-10 RX ADMIN — CARVEDILOL 3.12 MG: 3.12 TABLET, FILM COATED ORAL at 09:07

## 2023-07-10 RX ADMIN — FUROSEMIDE 40 MG: 40 TABLET ORAL at 05:07

## 2023-07-10 RX ADMIN — HYDRALAZINE HYDROCHLORIDE 25 MG: 25 TABLET, FILM COATED ORAL at 09:07

## 2023-07-10 RX ADMIN — NICOTINE 1 PATCH: 7 PATCH, EXTENDED RELEASE TRANSDERMAL at 09:07

## 2023-07-10 RX ADMIN — FLUTICASONE FUROATE AND VILANTEROL TRIFENATATE 1 PUFF: 200; 25 POWDER RESPIRATORY (INHALATION) at 09:07

## 2023-07-10 RX ADMIN — SODIUM CHLORIDE, PRESERVATIVE FREE 10 ML: 5 INJECTION INTRAVENOUS at 06:07

## 2023-07-10 RX ADMIN — SODIUM ZIRCONIUM CYCLOSILICATE 10 G: 10 POWDER, FOR SUSPENSION ORAL at 06:07

## 2023-07-10 RX ADMIN — ASPIRIN 81 MG: 81 TABLET, COATED ORAL at 08:07

## 2023-07-10 RX ADMIN — SENNOSIDES AND DOCUSATE SODIUM 4 TABLET: 50; 8.6 TABLET ORAL at 06:07

## 2023-07-10 RX ADMIN — HYDRALAZINE HYDROCHLORIDE 25 MG: 25 TABLET, FILM COATED ORAL at 03:07

## 2023-07-10 RX ADMIN — FENOFIBRATE 48 MG: 48 TABLET, FILM COATED ORAL at 08:07

## 2023-07-10 RX ADMIN — GABAPENTIN 800 MG: 400 CAPSULE ORAL at 09:07

## 2023-07-10 RX ADMIN — NIFEDIPINE 90 MG: 90 TABLET, FILM COATED, EXTENDED RELEASE ORAL at 08:07

## 2023-07-10 RX ADMIN — DULOXETINE HYDROCHLORIDE 60 MG: 30 CAPSULE, DELAYED RELEASE ORAL at 08:07

## 2023-07-10 RX ADMIN — SODIUM CHLORIDE, PRESERVATIVE FREE 10 ML: 5 INJECTION INTRAVENOUS at 12:07

## 2023-07-10 RX ADMIN — VALSARTAN 160 MG: 80 TABLET, FILM COATED ORAL at 08:07

## 2023-07-10 RX ADMIN — SODIUM CHLORIDE, PRESERVATIVE FREE 10 ML: 5 INJECTION INTRAVENOUS at 09:07

## 2023-07-10 RX ADMIN — INSULIN ASPART 15 UNITS: 100 INJECTION, SOLUTION INTRAVENOUS; SUBCUTANEOUS at 06:07

## 2023-07-10 RX ADMIN — HYDROCODONE BITARTRATE AND ACETAMINOPHEN 1 TABLET: 10; 325 TABLET ORAL at 12:07

## 2023-07-10 NOTE — PROGRESS NOTES
[facility] - 9th Floor [facility] MEDICINE ~ PROGRESS NOTE        CHIEF COMPLAINT   Hospital follow up    HOSPITAL COURSE   Elba Barnes is a 62 y.o. Black or  female with a past medical history of hypertension, hyperlipidemia, anxiety/depression, diabetes mellitus type 2, CVA, rheumatoid arthritis, sciatica and TBI. The patient presented to [facility] on 7/6/2023 with a primary complaint of right facial droop, right leg and arm weakness, slurred speech and blurry vision, headache and right-sided chest pain.  Patient reports waking up this morning with weakness the right or and leg and nausea with an episode of vomiting.  Patient reports symptoms are improving.  She denies complaints of shortness of breath and diarrhea.  She is supposed to be taking aspirin daily but does not do so.  She is a current smoker.   Upon presentation to the ED, temperature 97.9° F, heart rate 87, blood pressure 150/71, respiratory rate 18 SpO2 98% on room air.  Labs with glucose 246 and troponin 0.020 and BNP 16.7.  EKG normal sinus rhythm and nonspecific ST and T-wave abnormalities.  Chest x-ray with questionable pulmonary vascular congestion.  CT of the head with no acute intracranial abnormalities but chronic microvascular ischemic changes.  CTA multiphase stroke event no hemodynamically significant stenosis, occlusion, aneurysm or dissection.  In ED patient received full-dose aspirin and labetalol.  She is admitted to hospital medicine services for further medical management.  Upon further clarification she had a syncopal event at around 7:15 a.m., she took her 80 units of insulin long-acting at 6:00 a.m. and was 43 glucose when she took the 80 units.  She thought the lower the glucose the better.  MRI brain was negative for acute abnormalities.  MRI cervical spine showed spondylotic changes without evidence of significant central canal stenosis or neuroforaminal narrowing.    Today  Right-sided  remains weak especially the leg which is almost 0/5.  I will reconsult Neurology for evaluation.  Also ordered lumbar spine given back pain.        OBJECTIVE/PHYSICAL EXAM     VITAL SIGNS (MOST RECENT):  Temp: 97.4 °F (36.3 °C) (07/10/23 0830)  Pulse: 70 (07/10/23 0830)  Resp: 16 (07/10/23 0100)  BP: 111/74 (07/10/23 0830)  SpO2: 96 % (07/10/23 0830) VITAL SIGNS (24 HOUR RANGE):  Temp:  [97.4 °F (36.3 °C)-98.4 °F (36.9 °C)] 97.4 °F (36.3 °C)  Pulse:  [68-85] 70  Resp:  [16-20] 16  SpO2:  [83 %-98 %] 96 %  BP: (110-137)/(61-76) 111/74   GENERAL: In no acute distress, afebrile  HEENT:  CHEST: Clear to auscultation bilaterally  HEART: S1, S2, no appreciable murmur  ABDOMEN: Soft, nontender, BS +  MSK: Warm, no lower extremity edema, no clubbing or cyanosis  NEUROLOGIC: Alert and oriented x4, right upper extremity 4/5, right lower extremity 0/5, left side 5/5  INTEGUMENTARY:  PSYCHIATRY:        ASSESSMENT/PLAN   Right-sided weakness and neuropathy  Syncope-likely 2/2 hypoglycemia  Substernal chest pain   Hypertriglyceridemia    History of: HTN, HLD, insulin-dependent diabetes mellitus, anxiety/depression, CVA, rheumatoid arthritis, sciatica, TBI, mildly reduced systolic 45% ejection fraction      Neurology signed off.  Still has right-sided weakness, MRI brain negative, cervical spine spondylotic changes.  Increase gabapentin.  Neurology reconsulted, MRI lumbar spine ordered.  Adjust insulin dosing to prevent hypoglycemic events   Cardiology signed off.  Outpatient follow-up with Dr. Hong.  Was told she needed home oxygen but she declined.  Will likely need home oxygen upon discharge.    DVT prophylaxis:  Xarelto 10     Anticipated discharge and disposition:   __________________________________________________________________________    LABS/MICRO/MEDS/DIAGNOSTICS       LABS  Recent Labs     07/10/23  0410      K 5.6*   CHLORIDE 104   CO2 27   BUN 32.4*   CREATININE 1.19*   GLUCOSE 247*   CALCIUM 8.7       No  results for input(s): WBC, RBC, HCT, MCV, PLT in the last 72 hours.    Invalid input(s):       MICROBIOLOGY  Microbiology Results (last 7 days)       ** No results found for the last 168 hours. **               MEDICATIONS   albuterol-ipratropium  3 mL Nebulization Q6H WAKE    aspirin  81 mg Oral Daily    atorvastatin  40 mg Oral Daily    carvediloL  3.125 mg Oral BID    DULoxetine  60 mg Oral Daily    fenofibrate  48 mg Oral Daily    fluticasone furoate-vilanteroL  1 puff Inhalation Daily    gabapentin  800 mg Oral TID    hydrALAZINE  25 mg Oral TID    insulin aspart U-100  15 Units Subcutaneous TIDWM    insulin detemir U-100  40 Units Subcutaneous BID    nicotine  1 patch Transdermal Daily    NIFEdipine  90 mg Oral Daily    sodium chloride 0.9%  10 mL Intravenous Q8H    sodium zirconium cyclosilicate  10 g Oral Q6H    valsartan  160 mg Oral Daily         INFUSIONS           DIAGNOSTIC TESTS  X-Ray Chest 1 View   Final Result      Likely changes of interstitial edema without significant interval change.         Electronically signed by: Frank Lr MD   Date:    07/09/2023   Time:    12:55      MRI Cervical Spine Without Contrast   Final Result      Spondylotic changes of the cervical spine without evidence for significant central canal stenosis or neural foraminal narrowing.         Electronically signed by: Frank Lr MD   Date:    07/08/2023   Time:    16:46      X-Ray Ankle Complete Right   Final Result      No acute osseous abnormality.         Electronically signed by: Pham Whipple   Date:    07/07/2023   Time:    12:27      X-Ray Elbow Complete 3 views Right   Final Result      No acute osseous abnormality.         Electronically signed by: Pham Whipple   Date:    07/07/2023   Time:    12:26      X-ray Shoulder 2 or More Views Right   Final Result      No acute osseous abnormality.         Electronically signed by: Pham Whipple   Date:    07/07/2023   Time:    12:26      MRI BRAIN WITHOUT  CONTRAST   Final Result   Impression:      1. No acute intracranial hemorrhage is identified.      2. No abnormal signal is identified on the diffusion images to suggest acute infarct.      3. No acute intracranial process is identified.      No significant discrepancy with overnight report.         Electronically signed by: Davon Thakkar   Date:    07/07/2023   Time:    08:18      CTA STROKE MULTI-PHASE   Final Result      No hemodynamically significant stenosis, occlusion, aneurysm or dissection.         Electronically signed by: Pham Whipple   Date:    07/06/2023   Time:    15:06      X-Ray Chest AP Portable   Final Result      Question some pulmonary vascular congestion.         Electronically signed by: Mauri Morris   Date:    07/06/2023   Time:    15:04      CT HEAD FOR STROKE   Final Result      1. No acute intracranial abnormality.   2. Chronic microvascular ischemic changes.   Code fast findings given to Dr. Noel at the time of dictation.         Electronically signed by: Kelsie Prescott   Date:    07/06/2023   Time:    14:25      MRI Lumbar Spine Without Contrast    (Results Pending)        EF   Date Value Ref Range Status   07/07/2023 45 % Final   02/14/2023 51 % Final            Case related differential diagnoses have been reviewed; assessment and plan has been documented. I have personally reviewed the labs and test results that are currently available; I have reviewed the patients medication list. I have reviewed the consulting providers recommendations. I have reviewed or attempted to review medical records based upon their availability.  All of the patient's and/or family's questions have been addressed and answered to the best of my ability.  I will continue to monitor closely and make adjustments to medical management as needed.  This document was created using M*Modal Fluency Direct.  Transcription errors may have been made.  Please contact me if any questions may rise regarding  documentation to clarify transcription.        Chandu Monroy MD   Internal Medicine  Department of [facility] Medicine  [facility] - 9th Floor Med Surg

## 2023-07-10 NOTE — CONSULTS
"[facility] - 9th Floor Med Surg  Neurology  Consult Note    Patient Name: Elba Barnes  MRN: [id number]  Admission Date: 7/6/2023  Hospital Length of Stay: 4 days  Code Status: Full Code   Attending Provider: Chandu Monroy MD   Consulting Provider: Ksenia Isaacs [facility]  Primary Care Physician: VIRGEN Hoyt  Principal Problem:<principal problem not specified>    Inpatient consult to Neurology  Consult performed by: VIRGEN De Los Santos  Consult ordered by: Chandu Monroy MD         Subjective:     Chief Complaint:       HPI:   62-year-old female with PMH anxiety, CVA with residual right-sided weakness, depression, DM, HTN, and TBI who presented to ED on 07/06 for right facial droop, worsening right-sided weakness/numbness, and expressive aphasia. LKN 0600. Of note, took her 80 units of insulin long-acting at 6:00 a.m. and BG was 43 when she took the 80 units. Pt had syncopal episode at approx 0715 the morning PTA.     Code LVO called at 1342. Stroke NP responded at 1348. Neurologist notified at 1350. TNK contraindicated d/t OOW.     CVA w/u negative for acute infarct. R sided weakness significantly improved hours later.     Neurology re-consulted on 7/10 for persistent RLE weakness. MRI c-spine w/o on 7/8 unrevealing for cord signal abnormalities. MRI l-spine on 7/10 revealing for impingement of S1 nerve root.     On exam pt appears to be in significant pain with any tactile stimulation or ROM of RUE/RLE. Pt reports recent fall at home PTA where she fell on R side and has had R sided weakness with associated tenderness since.        Past Medical History:   Diagnosis Date    Anxiety     Arthritis     CVA (cerebral vascular accident)     "mini stroke"    Depression     Diabetes mellitus     Heart problem     History of psychiatric hospitalization     three(1983, 1995 and another (years ago")): depression    HTN (hypertension)     Hx of psychiatric care     Hypertension     Pacemaker     " Psychiatric exam requested by authority     Psychiatric problem     TBI (traumatic brain injury)     Therapy        Past Surgical History:   Procedure Laterality Date    CARDIAC PACEMAKER PLACEMENT      CARDIAC PACEMAKER REMOVAL      CHOLECYSTECTOMY      HYSTERECTOMY         Review of patient's allergies indicates:   Allergen Reactions    Pcn [penicillins] Anaphylaxis       Current Neurological Medications:     No current facility-administered medications on file prior to encounter.     Current Outpatient Medications on File Prior to Encounter   Medication Sig    albuterol (PROVENTIL/VENTOLIN HFA) 90 mcg/actuation inhaler Inhale 1-2 puffs into the lungs every 6 (six) hours as needed for Wheezing. Rescue    ALPRAZolam (XANAX) 1 MG tablet Take 1 tablet by mouth once a day as needed as needed for anxiety    amLODIPine (NORVASC) 2.5 MG tablet Take 2.5 mg by mouth once daily.    ammonium lactate (LAC-HYDRIN) 12 % lotion Apply to dry skin areas on feet, legs, and elbow areas TWICE A DAY AS NEEDED    aspirin (ECOTRIN) 81 MG EC tablet Take 1 tablet (81 mg total) by mouth once daily.    atorvastatin (LIPITOR) 40 MG tablet Take 40 mg by mouth every evening.    butalbital-acetaminophen-caffeine -40 mg (FIORICET, ESGIC) -40 mg per tablet Take 1 tablet by mouth every 6 (six) hours as needed for Pain.    carvediloL (COREG) 3.125 MG tablet Take 3.125 mg by mouth 2 (two) times daily.    cholecalciferol, vitamin D3, (VITAMIN D3) 50 mcg (2,000 unit) Cap capsule Take 1 capsule by mouth.    diclofenac sodium (VOLTAREN) 1 % Gel Apply 2 g topically 4 (four) times daily.    docusate sodium (COLACE) 250 MG capsule Take 1 capsule (250 mg total) by mouth 2 (two) times daily.    esomeprazole (NEXIUM) 40 MG capsule Take 40 mg by mouth every morning.    fluticasone propionate (FLONASE) 50 mcg/actuation nasal spray 1 spray (50 mcg total) by Each Nostril route 2 (two) times daily as needed for Rhinitis.    gabapentin (NEURONTIN) 600  MG tablet Take 1 tablet (600 mg total) by mouth 3 (three) times daily.    glipiZIDE (GLUCOTROL) 10 MG tablet Take 10 mg by mouth.    hydrALAZINE (APRESOLINE) 25 MG tablet Take 25 mg by mouth 3 (three) times daily.    magnesium citrate solution Take 296 mLs by mouth daily as needed.    multivitamin (THERAGRAN) per tablet Take 1 tablet by mouth once daily.    nebivoloL (BYSTOLIC) 10 MG Tab Take 10 mg by mouth once daily.    NIFEdipine (PROCARDIA-XL) 90 MG (OSM) 24 hr tablet Take 1 tablet (90 mg total) by mouth once daily.    nitroGLYCERIN (NITROSTAT) 0.4 MG SL tablet Place 0.4 mg under the tongue every 5 (five) minutes as needed for Chest pain.    oxybutynin (DITROPAN-XL) 5 MG TR24 Take 1 tablet (5 mg total) by mouth once daily.    polyethylene glycol (GLYCOLAX) 17 gram/dose powder Take 17 g by mouth once daily.    sertraline (ZOLOFT) 50 MG tablet Take 50 mg by mouth once daily.    terconazole (TERAZOL 7) 0.4 % Crea Place 1 applicator vaginally every evening.    valsartan (DIOVAN) 320 MG tablet Take 320 mg by mouth.    DULoxetine (CYMBALTA) 60 MG capsule Take 60 mg by mouth every morning.    insulin (LANTUS SOLOSTAR U-100 INSULIN) glargine 100 units/mL SubQ pen Inject 80 Units into the skin 2 (two) times daily before meals.    insulin lispro 100 unit/mL pen SMARTSI Unit(s) SUB-Q 3 Times Daily    NOVOLOG FLEXPEN U-100 INSULIN 100 unit/mL (3 mL) InPn pen Inject 20 Units into the skin 3 (three) times daily with meals. DISCARD OPEN PEN AFTER 28 DAYS    nystatin (MYCOSTATIN) cream Apply topically 3 (three) times daily. Use under breast     Family History       Problem Relation (Age of Onset)    Bipolar disorder Mother, Father    Schizophrenia Mother          Tobacco Use    Smoking status: Every Day     Years: 40.00     Types: Cigarettes     Last attempt to quit: 2018     Years since quittin.8    Smokeless tobacco: Never    Tobacco comments:     1 pk every 2 weeks, 2 a day   Substance and Sexual Activity     Alcohol use: Not Currently     Comment: wine on occ    Drug use: No    Sexual activity: Never     Review of Systems  A 14pt ros was reviewed & is negative unless o/w documented in the hpi  Objective:     Vital Signs (Most Recent):  Temp: 97.4 °F (36.3 °C) (07/10/23 0830)  Pulse: 67 (07/10/23 0925)  Resp: 20 (07/10/23 0926)  BP: 111/74 (07/10/23 0830)  SpO2: 95 % (07/10/23 0925) Vital Signs (24h Range):  Temp:  [97.4 °F (36.3 °C)-98.4 °F (36.9 °C)] 97.4 °F (36.3 °C)  Pulse:  [67-85] 67  Resp:  [16-20] 20  SpO2:  [83 %-98 %] 95 %  BP: (110-137)/(65-76) 111/74     Weight: 115.5 kg (254 lb 10.1 oz)  Body mass index is 41.1 kg/m².     Physical Exam  Vitals reviewed.   Constitutional:       General: She is awake.      Appearance: Normal appearance.   HENT:      Head: Normocephalic and atraumatic.      Nose: Nose normal.      Mouth/Throat:      Mouth: Mucous membranes are moist.      Pharynx: Oropharynx is clear.   Eyes:      Extraocular Movements: Extraocular movements intact and EOM normal.      Pupils: Pupils are equal, round, and reactive to light.   Skin:     General: Skin is warm and dry.   Neurological:      Mental Status: She is alert and oriented to person, place, and time.      Deep Tendon Reflexes:      Reflex Scores:       Patellar reflexes are 0 on the right side and 2+ on the left side.  Psychiatric:         Mood and Affect: Mood normal.         Speech: Speech normal.         Behavior: Behavior is cooperative.         Thought Content: Thought content normal.         Judgment: Judgment normal.        NEUROLOGICAL EXAMINATION:     MENTAL STATUS   Oriented to person, place, and time.   Follows 3 step commands.   Attention: normal. Concentration: normal.   Speech: speech is normal   Level of consciousness: alert  Knowledge: good.   Normal comprehension.     CRANIAL NERVES     CN II   Visual fields full to confrontation.     CN III, IV, VI   Pupils are equal, round, and reactive to light.  Extraocular motions are  normal.   Nystagmus: none   Conjugate gaze: present    MOTOR EXAM     Strength   Right deltoid: 2/5  Left deltoid: 5/5  Right biceps: 2/5  Left biceps: 5/5  Right triceps: 2/5  Left triceps: 5/5  Right quadriceps: 1/5  Left quadriceps: 5/5  Right hamstrin/5  Left hamstrin/5  Right glutei: 1/5  Left glutei: 5/5  Right anterior tibial: 2/5  Left anterior tibial: 5/5  Right posterior tibial: 2/5  Left posterior tibial: 5/5    REFLEXES     Reflexes   Right patellar: 0  Left patellar: 2+  Right plantar: normal  Left plantar: normal  Right ankle clonus: absent  Left ankle clonus: absent    SENSORY EXAM   Right arm light touch: normal  Left arm light touch: normal  Right leg light touch: decreased from knee  Left leg light touch: normal    Significant Labs:   Recent Lab Results  (Last 5 results in the past 24 hours)        07/10/23  0533   07/10/23  0410   23  2044   23  1630   23  1305        Anion Gap   6.0             BUN   32.4             BUN/CREAT RATIO   27             Calcium   8.7             Chloride   104             CO2   27             Creatinine   1.19             eGFR   52             Glucose   247             POCT Glucose 227     247   270   251       Potassium   5.6             Sodium   137                                    Significant Imaging:   MRI l-spine w/o 7/10/2023:  FINDINGS:  There are 5 non-rib-bearing lumbar type vertebral bodies.  Alignment is normal.  The vertebral body heights are maintained.     The conus terminates at the level of L1.  It is normal in signal and contour.  There is no epidural fluid collection.     Disc spaces, spinal canal and neural foramina are as follows:     L1-L2: Disc bulge and facet hypertrophy with mild narrowing of the spinal canal.  No significant neural foraminal stenosis.     L2-L3: Disc bulge and facet hypertrophy with mild narrowing of the spinal canal.  No significant neural foraminal stenosis.     L3-L4: Disc bulge and facet  hypertrophy with severe narrowing of the spinal canal.  Mild bilateral neural foraminal stenosis.     L4-L5: Disc bulge and facet hypertrophy with severe narrowing of the spinal canal.  Mild bilateral neural foraminal stenosis.     L5-S1: Disc bulge and facet hypertrophy with mild narrowing of the spinal canal and impingement on the descending bilateral S1 nerve roots.  No significant neural foraminal stenosis.     No significant abnormality within the visualized paraspinous musculature.     Impression:     1. Severe degenerative spinal canal stenosis at L3-L5, mild at other levels with impingement on the descending bilateral S1 nerve roots at L5-S1.  2. Mild neural foraminal stenoses as described.    I have reviewed all pertinent imaging results/findings within the past 24 hours.    Assessment and Plan:     Right sided weakness  With associated tenderness  MRI l-spine w/o: impingement of S1 nerve root at L5-S1    Stroke Work-Up:    CT head w/o: unrevealing for acute intracranial abnormalities  CTA head/neck: no LVO, aneurysms, or flow limiting stenosis  MRI brain w/o: negative   CUS: negative   Labs:   A1c: 8.4  LDL: 69  TSH: 0.858   Home medications: ASA 81 mg daily, atorvastatin 40 mg daily      -Fall precautions  -PT/OT  -consider Neurosurgery consult              VTE Risk Mitigation (From admission, onward)           Ordered     rivaroxaban tablet 10 mg  With dinner         07/10/23 0836     Reason for No Pharmacological VTE Prophylaxis  Once        Question:  Reasons:  Answer:  Risk of Bleeding    07/06/23 1626     IP VTE HIGH RISK PATIENT  Once         07/06/23 1626     Place sequential compression device  Until discontinued         07/06/23 1626                    Thank you for your consult.  Further recommendations to follow per MD Ksenia Isaacs, [facility]  Inpatient Neurology  [facility] - 9th Floor Med Surg    I have seen/examined the patient.  NP was scribe.  I agree with the findings  unless outlined below:    Adam N Foreman, MD [facility]     Multiple pain/weakness complaints    Spinal imaging nonlocalizing    No facial droop  On eliquis    Does not appear to be another stroke    Recc shoulder imaging    This is not a new neurovascular issue  Signing off

## 2023-07-10 NOTE — PT/OT/SLP PROGRESS
SLP attempting speech, language, cognitive evaluation however pt lethargic. Pt requesting SLP to return at a later time. SLP to reattempt as schedule allows.

## 2023-07-10 NOTE — PLAN OF CARE
Problem: Violence Risk or Actual  Goal: Anger and Impulse Control  Outcome: Ongoing, Progressing     Problem: Adult Inpatient Plan of Care  Goal: Plan of Care Review  Outcome: Ongoing, Progressing  Goal: Patient-Specific Goal (Individualized)  Outcome: Ongoing, Progressing  Goal: Absence of Hospital-Acquired Illness or Injury  Outcome: Ongoing, Progressing  Goal: Optimal Comfort and Wellbeing  Outcome: Ongoing, Progressing  Goal: Readiness for Transition of Care  Outcome: Ongoing, Progressing     Problem: Bariatric Environmental Safety  Goal: Safety Maintained with Care  Outcome: Ongoing, Progressing     Problem: Adjustment to Illness (Stroke, Ischemic/Transient Ischemic Attack)  Goal: Optimal Coping  Outcome: Ongoing, Progressing     Problem: Bowel Elimination Impaired (Stroke, Ischemic/Transient Ischemic Attack)  Goal: Effective Bowel Elimination  Outcome: Ongoing, Progressing     Problem: Cerebral Tissue Perfusion (Stroke, Ischemic/Transient Ischemic Attack)  Goal: Optimal Cerebral Tissue Perfusion  Outcome: Ongoing, Progressing     Problem: Cognitive Impairment (Stroke, Ischemic/Transient Ischemic Attack)  Goal: Optimal Cognitive Function  Outcome: Ongoing, Progressing     Problem: Communication Impairment (Stroke, Ischemic/Transient Ischemic Attack)  Goal: Improved Communication Skills  Outcome: Ongoing, Progressing     Problem: Functional Ability Impaired (Stroke, Ischemic/Transient Ischemic Attack)  Goal: Optimal Functional Ability  Outcome: Ongoing, Progressing     Problem: Respiratory Compromise (Stroke, Ischemic/Transient Ischemic Attack)  Goal: Effective Oxygenation and Ventilation  Outcome: Ongoing, Progressing     Problem: Sensorimotor Impairment (Stroke, Ischemic/Transient Ischemic Attack)  Goal: Improved Sensorimotor Function  Outcome: Ongoing, Progressing     Problem: Swallowing Impairment (Stroke, Ischemic/Transient Ischemic Attack)  Goal: Optimal Eating and Swallowing without Aspiration  Outcome:  Ongoing, Progressing     Problem: Urinary Elimination Impaired (Stroke, Ischemic/Transient Ischemic Attack)  Goal: Effective Urinary Elimination  Outcome: Ongoing, Progressing     Problem: Diabetes Comorbidity  Goal: Blood Glucose Level Within Targeted Range  Outcome: Ongoing, Progressing     Problem: Skin Injury Risk Increased  Goal: Skin Health and Integrity  Outcome: Ongoing, Progressing

## 2023-07-10 NOTE — ASSESSMENT & PLAN NOTE
With associated tenderness  MRI l-spine w/o: impingement of S1 nerve root at L5-S1    Stroke Work-Up:    CT head w/o: unrevealing for acute intracranial abnormalities  CTA head/neck: no LVO, aneurysms, or flow limiting stenosis  MRI brain w/o: negative   CUS: negative   Labs:   A1c: 8.4  LDL: 69  TSH: 0.858   Home medications: ASA 81 mg daily, atorvastatin 40 mg daily      -Fall precautions  -PT/OT  -consider Neurosurgery consult

## 2023-07-10 NOTE — PT/OT/SLP PROGRESS
Physical Therapy Treatment    Patient Name:  Elba Barnes   MRN:  [id number]    Recommendations:     Discharge Recommendations: nursing facility, skilled  Discharge Equipment Recommendations: walker, rolling  Barriers to discharge: Impaired mobility and Ongoing medical needs    Assessment:     Elba Barnes is a 62 y.o. female admitted with a medical diagnosis of  weakness, h/o chronic CVA with RUE/RLE weakness.  She presents with the following impairments/functional limitations: weakness, impaired endurance, impaired functional mobility, gait instability, impaired balance, decreased lower extremity function, decreased safety awareness, pain.     Rehab Prognosis: Fair; patient would benefit from acute skilled PT services to address these deficits and reach maximum level of function.    Recent Surgery: * No surgery found *      Plan:     During this hospitalization, patient to be seen 5 x/week to address the identified rehab impairments via therapeutic exercises, therapeutic activities, gait training and progress toward the following goals:    Plan of Care Expires:  07/31/23    Subjective     Chief Complaint: needing to go to restroom  Patient/Family Comments/goals: to get to restroom  Pain/Comfort:   0/10    Objective:     Patient found HOB elevated upon PT entry to room.     General Precautions: Standard, fall  Orthopedic Precautions: N/A  Braces: N/A  Respiratory Status: Nasal cannula, flow 3 L/min  Blood Pressure: 129/76, HR:71    Functional Mobility:  Bed Mobility:     Supine <> Sit: Sindy-modA. Patient required assistance with LEs during sit to supine.   Transfers:     Sit <> Stand:  minimum assistance with rolling walker  Toilet Transfer: minimum assistance with  rolling walker  using  Step Transfer. Patient looked unstable while sitting on toilet and displayed significant leaning to L side.   Gait: Pt. ambulated ~15ftx2 Sindy with RW. Patient required R knee blocking during stance phase and dragged RLE  during ambulation.     Therapeutic Activities/Exercises:  Patient performed static standing while washing hands after +void. Patient presented with fatigue demonstrated by resting forearms on sinks 3-4 times and only WB on LLEduring 4min static standing.     Patient left supine with call button in reach..    GOALS:   Multidisciplinary Problems       Physical Therapy Goals          Problem: Physical Therapy    Goal Priority Disciplines Outcome Goal Variances Interventions   Physical Therapy Goal     PT, PT/OT Ongoing, Progressing     Description: Goals to be met by: 23     Patient will increase functional independence with mobility by performin. Supine to sit with Modified [place]  2. Sit to stand transfer with Modified [place]  3. Gait  x 50 feet with Supervision using Rolling Walker.                          Time Tracking:     PT Received On: 07/10/23  PT Start Time: 1010     PT Stop Time: 1040  PT Total Time (min): 30 min     Billable Minutes: Gait Training 15 and Therapeutic Activity 15    Treatment Type: Treatment  PT/PTA: PTA     Number of PTA visits since last PT visit: 1     07/10/2023

## 2023-07-10 NOTE — PT/OT/SLP PROGRESS
Occupational Therapy   Treatment    Name: Elba Barnes  MRN: [id number]  Admitting Diagnosis:  <principal problem not specified>       Recommendations:     Discharge Recommendations: nursing facility, skilled  Discharge Equipment Recommendations:  walker, rolling  Barriers to discharge:       Assessment:     Elba Barnes is a 62 y.o. female with a medical diagnosis of <principal problem not specified>.  She presents with decreased mobility and increased pain. Performance deficits affecting function are weakness, gait instability, impaired endurance, impaired balance, decreased lower extremity function, decreased safety awareness, impaired self care skills, impaired functional mobility.     Rehab Prognosis:  Good; patient would benefit from acute skilled OT services to address these deficits and reach maximum level of function.       Plan:     Patient to be seen 3 x/week to address the above listed problems via self-care/home management, therapeutic activities, therapeutic exercises  Plan of Care Expires:    Plan of Care Reviewed with: patient    Subjective     Pain/Comfort:  Complains of 40/10 pain in BLE    Objective:     Communicated with: nurse prior and following session.  Patient found HOB elevated with PureWick, peripheral IV upon OT entry to room.    General Precautions: Standard, fall    Orthopedic Precautions:N/A  Braces: N/A  Respiratory Status: Room air     Occupational Performance:     Bed Mobility:    Max A to move side to side in bed, Max A to lift LE for positioning secondary to pain     Therapeutic Positioning    OT interventions performed during the course of today's session in an effort to prevent and/or reduce acquired pressure injuries:   Therapeutic positioning completed     [facility] 6 Click ADL: 15    Patient Education:  Patient provided with verbal and demonstration education regarding safety awareness.  Understanding was verbalized, however additional teaching warranted.      Patient left  HOB elevated with all lines intact and call button in reach    GOALS:   Multidisciplinary Problems       Occupational Therapy Goals          Problem: Occupational Therapy    Goal Priority Disciplines Outcome Interventions   Occupational Therapy Goal     OT, PT/OT Ongoing, Progressing    Description: Pt will t/f EOB/chair <>BSC mod I without AD (stand pivot)  Pt will increase standing endurance x 3 minutes to assist with ADL tasks                       Time Tracking:     OT Date of Treatment: 07/10/23  OT Start Time: 1449  OT Stop Time: 1500  OT Total Time (min): 11 min    Billable Minutes:Therapeutic Activity 11    OT/JULIETA: JULIETA     Number of JULIETA visits since last OT visit: 1    7/10/2023

## 2023-07-10 NOTE — SUBJECTIVE & OBJECTIVE
"Past Medical History:   Diagnosis Date    Anxiety     Arthritis     CVA (cerebral vascular accident)     "mini stroke"    Depression     Diabetes mellitus     Heart problem     History of psychiatric hospitalization     three(1983, 1995 and another (years ago")): depression    HTN (hypertension)     Hx of psychiatric care     Hypertension     Pacemaker     Psychiatric exam requested by authority     Psychiatric problem     TBI (traumatic brain injury)     Therapy        Past Surgical History:   Procedure Laterality Date    CARDIAC PACEMAKER PLACEMENT      CARDIAC PACEMAKER REMOVAL      CHOLECYSTECTOMY      HYSTERECTOMY         Review of patient's allergies indicates:   Allergen Reactions    Pcn [penicillins] Anaphylaxis       Current Neurological Medications:     No current facility-administered medications on file prior to encounter.     Current Outpatient Medications on File Prior to Encounter   Medication Sig    albuterol (PROVENTIL/VENTOLIN HFA) 90 mcg/actuation inhaler Inhale 1-2 puffs into the lungs every 6 (six) hours as needed for Wheezing. Rescue    ALPRAZolam (XANAX) 1 MG tablet Take 1 tablet by mouth once a day as needed as needed for anxiety    amLODIPine (NORVASC) 2.5 MG tablet Take 2.5 mg by mouth once daily.    ammonium lactate (LAC-HYDRIN) 12 % lotion Apply to dry skin areas on feet, legs, and elbow areas TWICE A DAY AS NEEDED    aspirin (ECOTRIN) 81 MG EC tablet Take 1 tablet (81 mg total) by mouth once daily.    atorvastatin (LIPITOR) 40 MG tablet Take 40 mg by mouth every evening.    butalbital-acetaminophen-caffeine -40 mg (FIORICET, ESGIC) -40 mg per tablet Take 1 tablet by mouth every 6 (six) hours as needed for Pain.    carvediloL (COREG) 3.125 MG tablet Take 3.125 mg by mouth 2 (two) times daily.    cholecalciferol, vitamin D3, (VITAMIN D3) 50 mcg (2,000 unit) Cap capsule Take 1 capsule by mouth.    diclofenac sodium (VOLTAREN) 1 % Gel Apply 2 g topically 4 (four) times daily.    " docusate sodium (COLACE) 250 MG capsule Take 1 capsule (250 mg total) by mouth 2 (two) times daily.    esomeprazole (NEXIUM) 40 MG capsule Take 40 mg by mouth every morning.    fluticasone propionate (FLONASE) 50 mcg/actuation nasal spray 1 spray (50 mcg total) by Each Nostril route 2 (two) times daily as needed for Rhinitis.    gabapentin (NEURONTIN) 600 MG tablet Take 1 tablet (600 mg total) by mouth 3 (three) times daily.    glipiZIDE (GLUCOTROL) 10 MG tablet Take 10 mg by mouth.    hydrALAZINE (APRESOLINE) 25 MG tablet Take 25 mg by mouth 3 (three) times daily.    magnesium citrate solution Take 296 mLs by mouth daily as needed.    multivitamin (THERAGRAN) per tablet Take 1 tablet by mouth once daily.    nebivoloL (BYSTOLIC) 10 MG Tab Take 10 mg by mouth once daily.    NIFEdipine (PROCARDIA-XL) 90 MG (OSM) 24 hr tablet Take 1 tablet (90 mg total) by mouth once daily.    nitroGLYCERIN (NITROSTAT) 0.4 MG SL tablet Place 0.4 mg under the tongue every 5 (five) minutes as needed for Chest pain.    oxybutynin (DITROPAN-XL) 5 MG TR24 Take 1 tablet (5 mg total) by mouth once daily.    polyethylene glycol (GLYCOLAX) 17 gram/dose powder Take 17 g by mouth once daily.    sertraline (ZOLOFT) 50 MG tablet Take 50 mg by mouth once daily.    terconazole (TERAZOL 7) 0.4 % Crea Place 1 applicator vaginally every evening.    valsartan (DIOVAN) 320 MG tablet Take 320 mg by mouth.    DULoxetine (CYMBALTA) 60 MG capsule Take 60 mg by mouth every morning.    insulin (LANTUS SOLOSTAR U-100 INSULIN) glargine 100 units/mL SubQ pen Inject 80 Units into the skin 2 (two) times daily before meals.    insulin lispro 100 unit/mL pen SMARTSI Unit(s) SUB-Q 3 Times Daily    NOVOLOG FLEXPEN U-100 INSULIN 100 unit/mL (3 mL) InPn pen Inject 20 Units into the skin 3 (three) times daily with meals. DISCARD OPEN PEN AFTER 28 DAYS    nystatin (MYCOSTATIN) cream Apply topically 3 (three) times daily. Use under breast     Family History       Problem  Relation (Age of Onset)    Bipolar disorder Mother, Father    Schizophrenia Mother          Tobacco Use    Smoking status: Every Day     Years: 40.00     Types: Cigarettes     Last attempt to quit: 2018     Years since quittin.8    Smokeless tobacco: Never    Tobacco comments:     1 pk every 2 weeks, 2 a day   Substance and Sexual Activity    Alcohol use: Not Currently     Comment: wine on occ    Drug use: No    Sexual activity: Never     Review of Systems  A 14pt ros was reviewed & is negative unless o/w documented in the hpi  Objective:     Vital Signs (Most Recent):  Temp: 97.4 °F (36.3 °C) (07/10/23 0830)  Pulse: 67 (07/10/23 09)  Resp: 20 (07/10/23 0926)  BP: 111/74 (07/10/23 0830)  SpO2: 95 % (07/10/23 0925) Vital Signs (24h Range):  Temp:  [97.4 °F (36.3 °C)-98.4 °F (36.9 °C)] 97.4 °F (36.3 °C)  Pulse:  [67-85] 67  Resp:  [16-20] 20  SpO2:  [83 %-98 %] 95 %  BP: (110-137)/(65-76) 111/74     Weight: 115.5 kg (254 lb 10.1 oz)  Body mass index is 41.1 kg/m².     Physical Exam  Vitals reviewed.   Constitutional:       General: She is awake.      Appearance: Normal appearance.   HENT:      Head: Normocephalic and atraumatic.      Nose: Nose normal.      Mouth/Throat:      Mouth: Mucous membranes are moist.      Pharynx: Oropharynx is clear.   Eyes:      Extraocular Movements: Extraocular movements intact and EOM normal.      Pupils: Pupils are equal, round, and reactive to light.   Skin:     General: Skin is warm and dry.   Neurological:      Mental Status: She is alert and oriented to person, place, and time.      Deep Tendon Reflexes:      Reflex Scores:       Patellar reflexes are 0 on the right side and 2+ on the left side.  Psychiatric:         Mood and Affect: Mood normal.         Speech: Speech normal.         Behavior: Behavior is cooperative.         Thought Content: Thought content normal.         Judgment: Judgment normal.        NEUROLOGICAL EXAMINATION:     MENTAL STATUS   Oriented to  person, place, and time.   Follows 3 step commands.   Attention: normal. Concentration: normal.   Speech: speech is normal   Level of consciousness: alert  Knowledge: good.   Normal comprehension.     CRANIAL NERVES     CN II   Visual fields full to confrontation.     CN III, IV, VI   Pupils are equal, round, and reactive to light.  Extraocular motions are normal.   Nystagmus: none   Conjugate gaze: present    MOTOR EXAM     Strength   Right deltoid: 2/5  Left deltoid: 5/5  Right biceps: 2/5  Left biceps: 5/5  Right triceps: 2/5  Left triceps: 5/5  Right quadriceps: 1/5  Left quadriceps: 5/5  Right hamstrin/5  Left hamstrin/5  Right glutei: 1/5  Left glutei: 5/5  Right anterior tibial: 2/5  Left anterior tibial: 5/5  Right posterior tibial: 2/5  Left posterior tibial: 5/5    REFLEXES     Reflexes   Right patellar: 0  Left patellar: 2+  Right plantar: normal  Left plantar: normal  Right ankle clonus: absent  Left ankle clonus: absent    SENSORY EXAM   Right arm light touch: normal  Left arm light touch: normal  Right leg light touch: decreased from knee  Left leg light touch: normal    Significant Labs:   Recent Lab Results  (Last 5 results in the past 24 hours)        07/10/23  0533   07/10/23  0410   23  2044   23  1630   23  1305        Anion Gap   6.0             BUN   32.4             BUN/CREAT RATIO   27             Calcium   8.7             Chloride   104             CO2   27             Creatinine   1.19             eGFR   52             Glucose   247             POCT Glucose 227     247   270   251       Potassium   5.6             Sodium   137                                    Significant Imaging:   MRI l-spine w/o 7/10/2023:  FINDINGS:  There are 5 non-rib-bearing lumbar type vertebral bodies.  Alignment is normal.  The vertebral body heights are maintained.     The conus terminates at the level of L1.  It is normal in signal and contour.  There is no epidural fluid collection.      Disc spaces, spinal canal and neural foramina are as follows:     L1-L2: Disc bulge and facet hypertrophy with mild narrowing of the spinal canal.  No significant neural foraminal stenosis.     L2-L3: Disc bulge and facet hypertrophy with mild narrowing of the spinal canal.  No significant neural foraminal stenosis.     L3-L4: Disc bulge and facet hypertrophy with severe narrowing of the spinal canal.  Mild bilateral neural foraminal stenosis.     L4-L5: Disc bulge and facet hypertrophy with severe narrowing of the spinal canal.  Mild bilateral neural foraminal stenosis.     L5-S1: Disc bulge and facet hypertrophy with mild narrowing of the spinal canal and impingement on the descending bilateral S1 nerve roots.  No significant neural foraminal stenosis.     No significant abnormality within the visualized paraspinous musculature.     Impression:     1. Severe degenerative spinal canal stenosis at L3-L5, mild at other levels with impingement on the descending bilateral S1 nerve roots at L5-S1.  2. Mild neural foraminal stenoses as described.    I have reviewed all pertinent imaging results/findings within the past 24 hours.

## 2023-07-10 NOTE — CONSULTS
[facility] - 9th Floor Med Surg  Neurosurgery  Consult Note    Inpatient consult to Neurosurgery  Consult performed by: TEZ Lynn  Consult ordered by: Chandu Monroy MD  Reason for consult: Right leg pain/weakness      Subjective:     Chief Complaint/Reason for Admission: Right leg pain    History of Present Illness: Patient is a 62 y.o. female with a past medical history of hypertension, hyperlipidemia, anxiety/depression, diabetes mellitus type 2, CVA, rheumatoid arthritis, sciatica and TBI, who presented to [facility] on 7/6/2023 with a primary complaint of right facial droop, right leg and arm weakness, slurred speech and blurry vision, headache and right-sided chest pain.  Patient reported waking up with weakness in the right arm and leg and nausea with an episode of vomiting. She denied complaints of shortness of breath and diarrhea.  She was supposed to be taking aspirin daily but does not do so. She is a current smoker.     CT of the head with no acute intracranial abnormalities but chronic microvascular ischemic changes.  CTA multiphase stroke event no hemodynamically significant stenosis, occlusion, aneurysm or dissection.  In ED patient received full-dose aspirin and labetalol.  She is admitted to hospital medicine services for further medical management.  Upon further clarification she had a syncopal event at around 7:15 a.m., she took her 80 units of insulin long-acting at 6:00 a.m. and was 43 glucose when she took the 80 units.  She thought the lower the glucose the better.  MRI brain was negative for acute abnormalities.  MRI cervical spine showed spondylotic changes without evidence of significant central canal stenosis or neuroforaminal narrowing.  MRI lumbar completed today shows severe degenerative spinal canal stenosis at L3-L5, mild at other levels with impingement on the descending bilateral S1 nerve roots at L5-S1. Patient has had persistent weakness and pain in the right  LE so Dr. Sears has been consulted for evaluation and treatment recommendations.    On PE this am she is sitting up in bed, mildly distressed d/t lateral hip pain. She is extremely tender to palpation over her right greater troch and along her IT band. She states this pain started yesterday afternoon and has intensified with sitting in bed. She is very guarded with ROM in the right leg d/t the pain. She does have a history of bursitis and has had an injection with pain management in the past. She denies left LE complaints and denies bladder and bowel dysfunction.    PTA Medications   Medication Sig    albuterol (PROVENTIL/VENTOLIN HFA) 90 mcg/actuation inhaler Inhale 1-2 puffs into the lungs every 6 (six) hours as needed for Wheezing. Rescue    ALPRAZolam (XANAX) 1 MG tablet Take 1 tablet by mouth once a day as needed as needed for anxiety    amLODIPine (NORVASC) 2.5 MG tablet Take 2.5 mg by mouth once daily.    ammonium lactate (LAC-HYDRIN) 12 % lotion Apply to dry skin areas on feet, legs, and elbow areas TWICE A DAY AS NEEDED    aspirin (ECOTRIN) 81 MG EC tablet Take 1 tablet (81 mg total) by mouth once daily.    atorvastatin (LIPITOR) 40 MG tablet Take 40 mg by mouth every evening.    butalbital-acetaminophen-caffeine -40 mg (FIORICET, ESGIC) -40 mg per tablet Take 1 tablet by mouth every 6 (six) hours as needed for Pain.    carvediloL (COREG) 3.125 MG tablet Take 3.125 mg by mouth 2 (two) times daily.    cholecalciferol, vitamin D3, (VITAMIN D3) 50 mcg (2,000 unit) Cap capsule Take 1 capsule by mouth.    diclofenac sodium (VOLTAREN) 1 % Gel Apply 2 g topically 4 (four) times daily.    docusate sodium (COLACE) 250 MG capsule Take 1 capsule (250 mg total) by mouth 2 (two) times daily.    esomeprazole (NEXIUM) 40 MG capsule Take 40 mg by mouth every morning.    fluticasone propionate (FLONASE) 50 mcg/actuation nasal spray 1 spray (50 mcg total) by Each Nostril route 2 (two) times daily as needed for  "Rhinitis.    gabapentin (NEURONTIN) 600 MG tablet Take 1 tablet (600 mg total) by mouth 3 (three) times daily.    glipiZIDE (GLUCOTROL) 10 MG tablet Take 10 mg by mouth.    hydrALAZINE (APRESOLINE) 25 MG tablet Take 25 mg by mouth 3 (three) times daily.    magnesium citrate solution Take 296 mLs by mouth daily as needed.    multivitamin (THERAGRAN) per tablet Take 1 tablet by mouth once daily.    nebivoloL (BYSTOLIC) 10 MG Tab Take 10 mg by mouth once daily.    NIFEdipine (PROCARDIA-XL) 90 MG (OSM) 24 hr tablet Take 1 tablet (90 mg total) by mouth once daily.    nitroGLYCERIN (NITROSTAT) 0.4 MG SL tablet Place 0.4 mg under the tongue every 5 (five) minutes as needed for Chest pain.    oxybutynin (DITROPAN-XL) 5 MG TR24 Take 1 tablet (5 mg total) by mouth once daily.    polyethylene glycol (GLYCOLAX) 17 gram/dose powder Take 17 g by mouth once daily.    sertraline (ZOLOFT) 50 MG tablet Take 50 mg by mouth once daily.    terconazole (TERAZOL 7) 0.4 % Crea Place 1 applicator vaginally every evening.    valsartan (DIOVAN) 320 MG tablet Take 320 mg by mouth.    DULoxetine (CYMBALTA) 60 MG capsule Take 60 mg by mouth every morning.    insulin (LANTUS SOLOSTAR U-100 INSULIN) glargine 100 units/mL SubQ pen Inject 80 Units into the skin 2 (two) times daily before meals.    insulin lispro 100 unit/mL pen SMARTSI Unit(s) SUB-Q 3 Times Daily    NOVOLOG FLEXPEN U-100 INSULIN 100 unit/mL (3 mL) InPn pen Inject 20 Units into the skin 3 (three) times daily with meals. DISCARD OPEN PEN AFTER 28 DAYS    nystatin (MYCOSTATIN) cream Apply topically 3 (three) times daily. Use under breast       Review of patient's allergies indicates:   Allergen Reactions    Pcn [penicillins] Anaphylaxis       Past Medical History:   Diagnosis Date    Anxiety     Arthritis     CVA (cerebral vascular accident)     "mini stroke"    Depression     Diabetes mellitus     Heart problem     History of psychiatric hospitalization     three(,  and " "another (years ago")): depression    HTN (hypertension)     Hx of psychiatric care     Hypertension     Pacemaker     Psychiatric exam requested by authority     Psychiatric problem     TBI (traumatic brain injury)     Therapy      Past Surgical History:   Procedure Laterality Date    CARDIAC PACEMAKER PLACEMENT      CARDIAC PACEMAKER REMOVAL      CHOLECYSTECTOMY      HYSTERECTOMY       Family History       Problem Relation (Age of Onset)    Bipolar disorder Mother, Father    Schizophrenia Mother          Tobacco Use    Smoking status: Every Day     Years: 40.00     Types: Cigarettes     Last attempt to quit: 2018     Years since quittin.8    Smokeless tobacco: Never    Tobacco comments:     1 pk every 2 weeks, 2 a day   Substance and Sexual Activity    Alcohol use: Not Currently     Comment: wine on occ    Drug use: No    Sexual activity: Never     Review of Systems  Objective:   12 pt ROS WNL, except for HPI    Weight: 115.5 kg (254 lb 10.1 oz)  Body mass index is 41.1 kg/m².  Vital Signs (Most Recent):  Temp: 97.5 °F (36.4 °C) (07/10/23 1135)  Pulse: 63 (07/10/23 1330)  Resp: 20 (07/10/23 1408)  BP: 134/78 (07/10/23 1135)  SpO2: 97 % (07/10/23 1330) Vital Signs (24h Range):  Temp:  [97.4 °F (36.3 °C)-98.2 °F (36.8 °C)] 97.5 °F (36.4 °C)  Pulse:  [63-85] 63  Resp:  [16-20] 20  SpO2:  [83 %-98 %] 97 %  BP: (110-137)/(65-78) 134/78                Oxygen Concentration (%):  [30] 30             Physical Exam:  Nursing note and vitals reviewed.    Constitutional: She appears well-nourished. No distress.     Eyes: Pupils are equal, round, and reactive to light. EOM are normal.     Cardiovascular: Intact distal pulses.     Abdominal: Soft. Bowel sounds are normal.     Psych/Behavior: She is alert. She is oriented to person, place, and time. She has a normal mood and affect.     Musculoskeletal:        Neck: Range of motion is full.        Back: Range of motion is full. There is tenderness.      Comments: Right " UE 5/5 except for , 4/5  Right LE: Very guarded d/t lateral hip pain. Tender over greater troch. 3/5 hip flex/knee ext, 4/5 DF, 3/5 PF, 4/5 EHL  Left UE and LE 5/5 throughout  Sensation grossly intact to bilateral LE  -Gan's bilateral   -clonus     Neurological:        DTRs: Tricep reflexes are 2+ on the right side and 2+ on the left side. Bicep reflexes are 2+ on the right side and 2+ on the left side. Patellar reflexes are 1+ on the right side and 1+ on the left side. Achilles reflexes are 0 on the right side and 0 on the left side. She displays no Babinski's sign on the right side. She displays no Babinski's sign on the left side.        Cranial nerves: Cranial nerve(s) II, III, IV, V, VI, VII, VIII, IX, X, XI and XII are intact.     Significant Labs:  Recent Labs   Lab 07/09/23  0518 07/10/23  0410   * 137   K 5.1 5.6*   CO2 24 27   BUN 33.9* 32.4*   CREATININE 1.05* 1.19*   CALCIUM 8.6 8.7     No results for input(s): WBC, HGB, HCT, PLT in the last 48 hours.  No results for input(s): LABPT, INR, APTT in the last 48 hours.  Microbiology Results (last 7 days)       ** No results found for the last 168 hours. **            Significant Diagnostics:  MRI Cervical Spine Without Contrast [[id number]] Resulted: 07/08/23 1646   Order Status: Completed Updated: 07/08/23 1649   Narrative:     EXAMINATION:   MRI CERVICAL SPINE WITHOUT CONTRAST     CLINICAL HISTORY:   right sided weakness and neuropathy;.     TECHNIQUE:   Multiplanar, multisequence MR images of the cervical spine were acquired without the administration of contrast.     COMPARISON:   05/09/2023     FINDINGS:   The posterior fossa is within normal limits with likely changes of microangiopathy of the milo.  Spinal cord is of normal caliber without signal abnormality.     The alignment curvature of the cervical spine is normal.  The vertebral heights are maintained.  Preservation of the marrow signal pattern with disc desiccation.  No evidence for  compression deformity, fracture, or subluxation.  Intervertebral disc space narrowing is not identified.  The predental space and prevertebral soft tissues are grossly normal.     C2-C3: Central disc protrusion without evidence for central canal stenosis or neural foraminal narrowing.     C3-C4: Central disc protrusion with effacement of ventral CSF and indentation of spinal cord.  No evidence for central canal stenosis or neural foraminal narrowing.     C4-C5: Central disc protrusion with indentation of the thecal sac and deformity of the spinal cord.  No evidence for central canal stenosis or neural foraminal narrowing.     C5-C6: No significant abnormality.     C6-C7: Transverse spondylotic ridge with effacement of ventral CSF.  No evidence for central canal stenosis or neural foraminal narrowing.     C7-T1: No significant abnormality.     The visualized soft tissues of the neck are normal.  The visualized T2 flow voids are grossly normal.  Nonspecific lesion is identified within the right parotid gland.  Stable from 05/09/2023    Impression:       Spondylotic changes of the cervical spine without evidence for significant central canal stenosis or neural foraminal narrowing.      MRI Lumbar Spine Without Contrast [[id number]] Resulted: 07/10/23 1015   Order Status: Completed Updated: 07/10/23 1018   Narrative:     EXAMINATION:   MRI LUMBAR SPINE WITHOUT CONTRAST     CLINICAL HISTORY:   RLE 0/5 weakness and numbness ;     TECHNIQUE:   Multiplanar multisequence MR images of the lumbar spine are obtained without contrast.     COMPARISON:   CT abdomen pelvis dated 02/15/2023     FINDINGS:   There are 5 non-rib-bearing lumbar type vertebral bodies.  Alignment is normal.  The vertebral body heights are maintained.     The conus terminates at the level of L1.  It is normal in signal and contour.  There is no epidural fluid collection.     Disc spaces, spinal canal and neural foramina are as follows:     L1-L2: Disc bulge  and facet hypertrophy with mild narrowing of the spinal canal.  No significant neural foraminal stenosis.     L2-L3: Disc bulge and facet hypertrophy with mild narrowing of the spinal canal.  No significant neural foraminal stenosis.     L3-L4: Disc bulge and facet hypertrophy with severe narrowing of the spinal canal.  Mild bilateral neural foraminal stenosis.     L4-L5: Disc bulge and facet hypertrophy with severe narrowing of the spinal canal.  Mild bilateral neural foraminal stenosis.     L5-S1: Disc bulge and facet hypertrophy with mild narrowing of the spinal canal and impingement on the descending bilateral S1 nerve roots.  No significant neural foraminal stenosis.     No significant abnormality within the visualized paraspinous musculature.    Impression:       1. Severe degenerative spinal canal stenosis at L3-L5, mild at other levels with impingement on the descending bilateral S1 nerve roots at L5-S1.   2. Mild neural foraminal stenoses as described.      Assessment/Plan:     Active Diagnoses:    Diagnosis Date Noted POA    Cerebrovascular accident (CVA) [I63.9] 07/07/2023 Unknown    Heart problem [I51.9] 07/07/2023 Unknown    Right sided weakness [R53.1] 07/06/2023 Unknown      Problems Resolved During this Admission:     She continues with weakness in the right LE although full exam is limited d/t moderate pain along the right lateral hip. Per chart, her strength has improved since admit.  I did order right hip xrays  Would recommend consult to orthopedics for possible bursitis  Continue gabapentin TID  Further recs to follow per Dr. Sears once imaging reviewed    Thank you for your consult. I will follow-up with patient. Please contact us if you have any additional questions.    TEZ Lynn  Neurosurgery  [facility] - 9th Floor Med Surg

## 2023-07-11 LAB
LEFT CCA DIST DIAS: 10 CM/S
LEFT CCA DIST SYS: 67 CM/S
LEFT CCA PROX DIAS: 13 CM/S
LEFT CCA PROX SYS: 76 CM/S
LEFT ECA DIAS: 7 CM/S
LEFT ECA SYS: 85 CM/S
LEFT ICA DIST DIAS: 33 CM/S
LEFT ICA DIST SYS: 111 CM/S
LEFT ICA MID DIAS: 19 CM/S
LEFT ICA MID SYS: 91 CM/S
LEFT ICA PROX DIAS: 10 CM/S
LEFT ICA PROX SYS: 50 CM/S
LEFT VERTEBRAL DIAS: 1 CM/S
LEFT VERTEBRAL SYS: 48 CM/S
OHS CV CAROTID RIGHT ICA EDV HIGHEST: 14
OHS CV CAROTID ULTRASOUND LEFT ICA/CCA RATIO: 1.66
OHS CV CAROTID ULTRASOUND RIGHT ICA/CCA RATIO: 1.26
OHS CV PV CAROTID LEFT HIGHEST CCA: 76
OHS CV PV CAROTID LEFT HIGHEST ICA: 111
OHS CV PV CAROTID RIGHT HIGHEST CCA: 75
OHS CV PV CAROTID RIGHT HIGHEST ICA: 93
OHS CV US CAROTID LEFT HIGHEST EDV: 33
POCT GLUCOSE: 186 MG/DL (ref 70–110)
POCT GLUCOSE: 206 MG/DL (ref 70–110)
POCT GLUCOSE: 385 MG/DL (ref 70–110)
RIGHT CCA DIST DIAS: 9 CM/S
RIGHT CCA DIST SYS: 74 CM/S
RIGHT CCA PROX DIAS: 9 CM/S
RIGHT CCA PROX SYS: 75 CM/S
RIGHT ECA DIAS: 10 CM/S
RIGHT ECA SYS: 111 CM/S
RIGHT ICA DIST DIAS: 14 CM/S
RIGHT ICA DIST SYS: 93 CM/S
RIGHT ICA MID DIAS: 12 CM/S
RIGHT ICA MID SYS: 59 CM/S
RIGHT ICA PROX DIAS: 10 CM/S
RIGHT ICA PROX SYS: 46 CM/S
RIGHT VERTEBRAL DIAS: 8 CM/S
RIGHT VERTEBRAL SYS: 54 CM/S

## 2023-07-11 PROCEDURE — 97530 THERAPEUTIC ACTIVITIES: CPT | Mod: CQ

## 2023-07-11 PROCEDURE — 25000242 PHARM REV CODE 250 ALT 637 W/ HCPCS: Performed by: INTERNAL MEDICINE

## 2023-07-11 PROCEDURE — A4216 STERILE WATER/SALINE, 10 ML: HCPCS | Performed by: PHYSICIAN ASSISTANT

## 2023-07-11 PROCEDURE — 99215 OFFICE O/P EST HI 40 MIN: CPT | Mod: FS,,, | Performed by: NEUROLOGICAL SURGERY

## 2023-07-11 PROCEDURE — 94660 CPAP INITIATION&MGMT: CPT

## 2023-07-11 PROCEDURE — 99900035 HC TECH TIME PER 15 MIN (STAT)

## 2023-07-11 PROCEDURE — 92610 EVALUATE SWALLOWING FUNCTION: CPT | Mod: 59

## 2023-07-11 PROCEDURE — 94761 N-INVAS EAR/PLS OXIMETRY MLT: CPT

## 2023-07-11 PROCEDURE — 99215 PR OFFICE/OUTPT VISIT, EST, LEVL V, 40-54 MIN: ICD-10-PCS | Mod: FS,,, | Performed by: NEUROLOGICAL SURGERY

## 2023-07-11 PROCEDURE — S4991 NICOTINE PATCH NONLEGEND: HCPCS | Performed by: PHYSICIAN ASSISTANT

## 2023-07-11 PROCEDURE — 97530 THERAPEUTIC ACTIVITIES: CPT | Mod: CO

## 2023-07-11 PROCEDURE — 27000221 HC OXYGEN, UP TO 24 HOURS

## 2023-07-11 PROCEDURE — 96372 THER/PROPH/DIAG INJ SC/IM: CPT | Performed by: INTERNAL MEDICINE

## 2023-07-11 PROCEDURE — 94640 AIRWAY INHALATION TREATMENT: CPT

## 2023-07-11 PROCEDURE — 25000003 PHARM REV CODE 250

## 2023-07-11 PROCEDURE — 97535 SELF CARE MNGMENT TRAINING: CPT | Mod: CO

## 2023-07-11 PROCEDURE — 25000003 PHARM REV CODE 250: Performed by: INTERNAL MEDICINE

## 2023-07-11 PROCEDURE — 25000003 PHARM REV CODE 250: Performed by: PHYSICIAN ASSISTANT

## 2023-07-11 PROCEDURE — 63600175 PHARM REV CODE 636 W HCPCS: Performed by: INTERNAL MEDICINE

## 2023-07-11 PROCEDURE — 21400001 HC TELEMETRY ROOM

## 2023-07-11 PROCEDURE — 92611 MOTION FLUOROSCOPY/SWALLOW: CPT

## 2023-07-11 PROCEDURE — 99900031 HC PATIENT EDUCATION (STAT)

## 2023-07-11 PROCEDURE — G0378 HOSPITAL OBSERVATION PER HR: HCPCS

## 2023-07-11 RX ORDER — INSULIN ASPART 100 [IU]/ML
20 INJECTION, SOLUTION INTRAVENOUS; SUBCUTANEOUS
Status: DISCONTINUED | OUTPATIENT
Start: 2023-07-11 | End: 2023-07-11

## 2023-07-11 RX ORDER — INSULIN ASPART 100 [IU]/ML
18 INJECTION, SOLUTION INTRAVENOUS; SUBCUTANEOUS
Status: DISCONTINUED | OUTPATIENT
Start: 2023-07-11 | End: 2023-07-12

## 2023-07-11 RX ORDER — TRAMADOL HYDROCHLORIDE 50 MG/1
50 TABLET ORAL 3 TIMES DAILY
Status: DISCONTINUED | OUTPATIENT
Start: 2023-07-11 | End: 2023-07-17 | Stop reason: HOSPADM

## 2023-07-11 RX ORDER — TIZANIDINE 2 MG/1
2 TABLET ORAL 3 TIMES DAILY
Status: DISCONTINUED | OUTPATIENT
Start: 2023-07-11 | End: 2023-07-17 | Stop reason: HOSPADM

## 2023-07-11 RX ORDER — VALSARTAN 80 MG/1
80 TABLET ORAL DAILY
Status: DISCONTINUED | OUTPATIENT
Start: 2023-07-11 | End: 2023-07-12

## 2023-07-11 RX ORDER — DEXAMETHASONE 4 MG/1
12 TABLET ORAL ONCE
Status: COMPLETED | OUTPATIENT
Start: 2023-07-11 | End: 2023-07-11

## 2023-07-11 RX ORDER — ACETAMINOPHEN 500 MG
1000 TABLET ORAL 3 TIMES DAILY
Status: DISCONTINUED | OUTPATIENT
Start: 2023-07-11 | End: 2023-07-17 | Stop reason: HOSPADM

## 2023-07-11 RX ADMIN — ACETAMINOPHEN 1000 MG: 500 TABLET, FILM COATED ORAL at 09:07

## 2023-07-11 RX ADMIN — FLUTICASONE FUROATE AND VILANTEROL TRIFENATATE 1 PUFF: 200; 25 POWDER RESPIRATORY (INHALATION) at 09:07

## 2023-07-11 RX ADMIN — SENNOSIDES AND DOCUSATE SODIUM 2 TABLET: 50; 8.6 TABLET ORAL at 09:07

## 2023-07-11 RX ADMIN — TRAMADOL HYDROCHLORIDE 50 MG: 50 TABLET, COATED ORAL at 09:07

## 2023-07-11 RX ADMIN — IPRATROPIUM BROMIDE AND ALBUTEROL SULFATE 3 ML: 2.5; .5 SOLUTION RESPIRATORY (INHALATION) at 08:07

## 2023-07-11 RX ADMIN — HYDROCODONE BITARTRATE AND ACETAMINOPHEN 1 TABLET: 10; 325 TABLET ORAL at 06:07

## 2023-07-11 RX ADMIN — INSULIN ASPART 20 UNITS: 100 INJECTION, SOLUTION INTRAVENOUS; SUBCUTANEOUS at 12:07

## 2023-07-11 RX ADMIN — NIFEDIPINE 90 MG: 90 TABLET, FILM COATED, EXTENDED RELEASE ORAL at 09:07

## 2023-07-11 RX ADMIN — CARVEDILOL 3.12 MG: 3.12 TABLET, FILM COATED ORAL at 08:07

## 2023-07-11 RX ADMIN — ACETAMINOPHEN 1000 MG: 500 TABLET, FILM COATED ORAL at 02:07

## 2023-07-11 RX ADMIN — GABAPENTIN 800 MG: 400 CAPSULE ORAL at 08:07

## 2023-07-11 RX ADMIN — INSULIN DETEMIR 40 UNITS: 100 INJECTION, SOLUTION SUBCUTANEOUS at 08:07

## 2023-07-11 RX ADMIN — DEXAMETHASONE 12 MG: 4 TABLET ORAL at 09:07

## 2023-07-11 RX ADMIN — SODIUM CHLORIDE, PRESERVATIVE FREE 10 ML: 5 INJECTION INTRAVENOUS at 02:07

## 2023-07-11 RX ADMIN — FUROSEMIDE 40 MG: 40 TABLET ORAL at 09:07

## 2023-07-11 RX ADMIN — CARVEDILOL 3.12 MG: 3.12 TABLET, FILM COATED ORAL at 09:07

## 2023-07-11 RX ADMIN — INSULIN DETEMIR 40 UNITS: 100 INJECTION, SOLUTION SUBCUTANEOUS at 10:07

## 2023-07-11 RX ADMIN — TRAMADOL HYDROCHLORIDE 50 MG: 50 TABLET, COATED ORAL at 02:07

## 2023-07-11 RX ADMIN — VALSARTAN 80 MG: 80 TABLET, FILM COATED ORAL at 09:07

## 2023-07-11 RX ADMIN — INSULIN ASPART 20 UNITS: 100 INJECTION, SOLUTION INTRAVENOUS; SUBCUTANEOUS at 09:07

## 2023-07-11 RX ADMIN — GABAPENTIN 800 MG: 400 CAPSULE ORAL at 02:07

## 2023-07-11 RX ADMIN — ASPIRIN 81 MG: 81 TABLET, COATED ORAL at 09:07

## 2023-07-11 RX ADMIN — GABAPENTIN 800 MG: 400 CAPSULE ORAL at 09:07

## 2023-07-11 RX ADMIN — SENNOSIDES AND DOCUSATE SODIUM 2 TABLET: 50; 8.6 TABLET ORAL at 08:07

## 2023-07-11 RX ADMIN — NICOTINE 1 PATCH: 7 PATCH, EXTENDED RELEASE TRANSDERMAL at 09:07

## 2023-07-11 RX ADMIN — IPRATROPIUM BROMIDE AND ALBUTEROL SULFATE 3 ML: 2.5; .5 SOLUTION RESPIRATORY (INHALATION) at 03:07

## 2023-07-11 RX ADMIN — DULOXETINE HYDROCHLORIDE 60 MG: 30 CAPSULE, DELAYED RELEASE ORAL at 09:07

## 2023-07-11 RX ADMIN — FUROSEMIDE 40 MG: 40 TABLET ORAL at 05:07

## 2023-07-11 RX ADMIN — TRAMADOL HYDROCHLORIDE 50 MG: 50 TABLET, COATED ORAL at 08:07

## 2023-07-11 RX ADMIN — INSULIN ASPART 18 UNITS: 100 INJECTION, SOLUTION INTRAVENOUS; SUBCUTANEOUS at 05:07

## 2023-07-11 RX ADMIN — ATORVASTATIN CALCIUM 40 MG: 40 TABLET, FILM COATED ORAL at 09:07

## 2023-07-11 RX ADMIN — TIZANIDINE 2 MG: 2 TABLET ORAL at 02:07

## 2023-07-11 RX ADMIN — SODIUM CHLORIDE, PRESERVATIVE FREE 10 ML: 5 INJECTION INTRAVENOUS at 06:07

## 2023-07-11 RX ADMIN — ACETAMINOPHEN 1000 MG: 500 TABLET, FILM COATED ORAL at 08:07

## 2023-07-11 RX ADMIN — RIVAROXABAN 10 MG: 10 TABLET, FILM COATED ORAL at 05:07

## 2023-07-11 RX ADMIN — TIZANIDINE 2 MG: 2 TABLET ORAL at 08:07

## 2023-07-11 NOTE — PLAN OF CARE
TCU not in network with [facility]. New  Pt Choice reviewed with pt and pt chose [facility]. PT Choice placed in pt chart. PASSR completed and given to Maya PRIETO . [facility]

## 2023-07-11 NOTE — PLAN OF CARE
07/11/23 1327   Discharge Assessment   Assessment Type Discharge Planning Reassessment   Source of Information patient;family   People in Home child(marisela), adult   Discharge Plan A Skilled Nursing Facility   Discharge Plan B Home Health;Home with family   Discharge Plan discussed with: Patient;Adult children     Discussed Skilled rehab with pt for dc planning. She stated she would like to go home and states her dgt Adriana lives with her and is her caregiver. At pt request I called her dgt Kathy who lives in [facility]. Kathy discussed with her mom the importance and her recommendation of getting rehab. PT agreed and stated she would like to go to TCU. PT Choice place in pt chart. Referral sent via [facility].

## 2023-07-11 NOTE — PT/OT/SLP PROGRESS
Physical Therapy Treatment    Patient Name:  Elba Barnes   MRN:  [id number]    Recommendations:     Discharge Recommendations: nursing facility, skilled  Discharge Equipment Recommendations: walker, rolling  Barriers to discharge: Impaired mobility and Ongoing medical needs    Assessment:     Elba Barnes is a 62 y.o. female admitted with a medical diagnosis of  weakness, h/o chronic CVA with RUE/RLE weakness.  She presents with the following impairments/functional limitations: weakness, impaired endurance, impaired functional mobility, impaired self care skills, gait instability, impaired balance, decreased lower extremity function, decreased safety awareness, pain. Treatment session terminated due to patient being taken for xray.     Rehab Prognosis: Fair; patient would benefit from acute skilled PT services to address these deficits and reach maximum level of function.    Recent Surgery: * No surgery found *      Plan:     During this hospitalization, patient to be seen 5 x/week to address the identified rehab impairments via therapeutic activities, therapeutic exercises, gait training and progress toward the following goals:    Plan of Care Expires:  07/31/23    Subjective     Chief Complaint: R thigh pain, sensitive to touch  Patient/Family Comments/goals: none stated  Pain/Comfort:   Not rated    Objective:     Patient found HOB elevated upon PT entry to room.     General Precautions: Standard, fall  Orthopedic Precautions: N/A  Braces: N/A  Respiratory Status: Nasal cannula, flow 3 L/min  Blood Pressure: 126/77, HR:75    Functional Mobility:  Bed Mobility:     Supine <> Sit: modAx2. Patient required assistance with LEs during sit to supine.   Transfers:     Sit <> Stand:  modAx2 with rolling walker  Bed to chair: MinAx2 with RW using step transfer.     Therapeutic Activities/Exercises:  Patient performed AAROM R hip flexion, abduction/adduction, internal rotation 1x10 supine in bed.     Patient left  supine with call button in reach..    GOALS:   Multidisciplinary Problems       Physical Therapy Goals          Problem: Physical Therapy    Goal Priority Disciplines Outcome Goal Variances Interventions   Physical Therapy Goal     PT, PT/OT Ongoing, Progressing     Description: Goals to be met by: 23     Patient will increase functional independence with mobility by performin. Supine to sit with Modified [place]  2. Sit to stand transfer with Modified [place]  3. Gait  x 50 feet with Supervision using Rolling Walker.                          Time Tracking:     PT Received On: 23  PT Start Time: 1011     PT Stop Time: 1029  PT Total Time (min): 18 min     Billable Minutes: Therapeutic Activity 18    Treatment Type: Treatment  PT/PTA: PTA     Number of PTA visits since last PT visit: 2     2023

## 2023-07-11 NOTE — NURSING
Pt is not following the recombinations made by speech. Says she has no trouble swallowing Provide notified.

## 2023-07-11 NOTE — CONSULTS
[facility]                       [address] [place] [address]    PATIENT NAME:       JASON ALONSO  YOB: 1960  CSN:                [id number]   MRN:                [id number]  ADMIT DATE:         07/06/2023 14:16:00  PHYSICIAN:          Frank Pickens MD                            CONSULTATION    DATE OF CONSULT:      CHIEF COMPLAINT:  Dysphagia.    HISTORY OF PRESENT ILLNESS:  This is a 62-year-old female with a past medical   history significant for hypertension, hyperlipidemia, anxiety, depression,   diabetes type 2, CVA, rheumatoid arthritis, sciatica, and TBI.  She presented to   the emergency department on 07/06/2023 with right-sided weakness.  She has had   extensive workup.  She has been complaining of some difficulty swallowing.  MBSS   revealed possible base of tongue abnormality per speech.  ENT is consulted.    PHYSICAL EXAMINATION:  VITAL SIGNS:  Afebrile, vital signs stable.  GENERAL:  No distress.  Follows commands.  EARS:  Clear.  NOSE:  Clear.  ORAL CAVITY AND OROPHARYNX:  Clear.  NECK:  No lymphadenopathy or masses.    LABORATORY STUDIES:  Reviewed.    ASSESSMENT AND PLAN:  A 62-year-old female with multiple medical comorbidities   with dysphagia.  Flexible fiberoptic laryngoscopy reveals evidence of lingual   tonsil hypertrophy bilaterally.  The airway is otherwise stable.  This could be   limiting p.o. intake and could be contributing to her dysphagia.  The vocal   cords were moving bilaterally and symmetrically.  See op note for details.  This   has been communicated with speech.  I would like to see the patient in the   office as an outpatient once she has been discharged as this may need to be   addressed.  Please call me with any questions.      ______________________________  Frank Pickens MD    JWA/AQS  DD:  07/11/2023  Time:  04:56PM  DT:  07/11/2023  Time:  06:21PM  Job #:   [id number]      CONSULTATION

## 2023-07-11 NOTE — PROCEDURES
"Speech Language Pathology Department  Modified Barium Swallow (MBS) Study    Patient Name:  Elba Barnes   MRN:  [id number]    Recommendations:     General recommendations:  dysphagia therapy  Repeat MBS study: 1-2 weeks or as clinically warranted  Diet recommendations:  Minced & Moist Diet - IDDSI Level 5, Liquid Diet Level: Mildly thick liquids - IDDSI Level 2   Swallow strategies/precautions: small bites/sips and slow rate  General precautions: Standard,      History/Reason for Referral:     Elba Barnes is a/n 62 y.o. female admitted with right facial droop, right leg and arm weakness, and slurred speech. MRI cervical spine revealed  Nonspecific lesion was identified within the right parotid gland.     Past Medical History:   Diagnosis Date    Anxiety     Arthritis     CVA (cerebral vascular accident)     "mini stroke"    Depression     Diabetes mellitus     Heart problem     History of psychiatric hospitalization     three(1983, 1995 and another (years ago")): depression    HTN (hypertension)     Hx of psychiatric care     Hypertension     Pacemaker     Psychiatric exam requested by authority     Psychiatric problem     TBI (traumatic brain injury)     Therapy      Past Surgical History:   Procedure Laterality Date    CARDIAC PACEMAKER PLACEMENT      CARDIAC PACEMAKER REMOVAL      CHOLECYSTECTOMY      HYSTERECTOMY       A MBS Study was completed to assess the efficiency of her swallow function, rule out aspiration and make recommendations regarding safe dietary consistencies, effective compensatory strategies, and safe eating environment.     Home Diet: Regular and thin liquids  Current Method of Nutrition: PO diet    Patient complaint:  denies    Imaging   Results for orders placed during the hospital encounter of 07/06/23    X-Ray Chest 1 View    Narrative  EXAMINATION:  XR CHEST 1 VIEW    CLINICAL HISTORY:  Hypoxia;    TECHNIQUE:  Single frontal view of the chest was " performed.    COMPARISON:  07/06/2023    FINDINGS:  Heart size enlarged the pulmonary vasculature is congested.    Coarse reticulonodular appearance of the interstitium without evidence for effusion.    Impression  Likely changes of interstitial edema without significant interval change.      Electronically signed by: Frank Lr MD  Date:    07/09/2023  Time:    12:55    No results found for this or any previous visit.    Results for orders placed during the hospital encounter of 07/06/23    MRI BRAIN WITHOUT CONTRAST    Narrative  TECHNIQUE:MULTIPLANAR, MULTISEQUENCE MAGNETIC RESONANCE IMAGING OF THE BRAIN WAS PERFORMED WITHOUT INTRAVENOUS CONTRAST.    COMPARISON:CORRELATION IS WITH CT STUDY DATED 2023-07-06 14:13:13.    CLINICAL HISTORY:STROKE FOLLOW UP, FLEX COIL USED.    Findings:    Hemorrhage:No acute intracranial hemorrhage is identified.    Stroke:No abnormal signal is identified on the diffusion images to suggest acute infarct.    Extra axial spaces:Age appropriate.    Cerebral, cerebellar, and brainstem parenchyma:There are periventricular and subcortical and brainstem white matter signal abnormalities are seen. The main consideration is small vessel ischemic changes.    Herniation:None.    Calvarium:Within normal limits. Post surgical changes are seen in the left frontal scalp, also appreciated on the CT study.    Impression  Impression:    1. No acute intracranial hemorrhage is identified.    2. No abnormal signal is identified on the diffusion images to suggest acute infarct.    3. No acute intracranial process is identified.    Electronically signed by: Davon Thakkar  Date:    07/07/2023  Time:    08:18    Subjective:     Patient awake and alert.  Spiritual/Cultural/Hinduism Beliefs/Practices that affect care: no  Pain/Comfort: Pain Rating 1: 0/10    Fluoroscopic Results:     Oral Musculature  Dentition: own teeth  Secretion Management: adequate  Mucosal Quality: good  Facial Movement: WFL  Buccal  Strength & Mobility: WFL  Volitional Cough: Able to clear secretions    Positioning: upright in bed  Visualization  Patient was seen in the lateral view    Oral Phase:   Adequate lip closure  Prolonged/disorganized bolus formation  Inadequate mastication  Reduced bolus control with liquids    Pharyngeal Phase:   Swallow delay with spill to the valleculae  Reduced base of tongue retraction  Reduced hyolaryngeal excursion  Reduced airway protection  Laryngeal penetration of thin liquids   Consistency Laryngeal Penetration Aspiration Residue   Mildly thick liquid by spoon None None Trace   Mildly thick liquid by straw  *mislabeled on Fanny None None Mild   Thin liquid by straw  *mislabeled on Fanny During the swallow  To the vocal folds  Did NOT clear None Mild   Puree None   None None   Chewable solid During the swallow None BOT, valleculae  *LP with liquid wash      Cervical Esophageal Phase:   residual material visualized    Assessment:     Pt exhibited moderate oropharyngeal dysphagia characterized by the findings noted above.  Laryngeal penetration of thin liquids did NOT clear placing patient at HIGH risk.  Both swallow safety and efficiency are impaired.     Patient appears to be at moderate risk for aspiration related pneumonia when considering poor oral health status, complex overall health/immune status, reduced laryngeal vestibule closure, and severity of dysphagia.  Prognosis for behavioral swallow rehabilitation is fair.    Goals:     Multidisciplinary Problems       SLP Goals          Problem: SLP    Goal Priority Disciplines Outcome   SLP Goal     SLP    Description: LTG: To tolerate least restrictive diet without clinical signs/sx of aspiration.   ST. Tongue base/laryngeal excursion exercise   2. Tolerate thermal stimulation x10 with 100% swallow response with less than a 2 second delay.                      Patient Education:     Patient provided with verbal education regarding POC.  Understanding  was verbalized.    Plan:     SLP Follow-Up:  Yes    Patient to be seen:  5 x/week   Plan of Care expires:  07/21/23  Plan of Care reviewed with:  patient     Time Tracking:     SLP Treatment Date:    7/11/23  Speech Start Time:   1040  Speech Stop Time:     1100   Speech Total Time (min):   20    Billable minutes:   Motion Fluoroscopic Evaluation, Video Recording, 20 minutes     07/11/2023

## 2023-07-11 NOTE — PROGRESS NOTES
[facility] - 9th Floor [facility] MEDICINE ~ PROGRESS NOTE        CHIEF COMPLAINT   Hospital follow up    HOSPITAL COURSE   Elba Barnes is a 62 y.o. Black or  female with a past medical history of hypertension, hyperlipidemia, anxiety/depression, diabetes mellitus type 2, CVA, rheumatoid arthritis, sciatica and TBI. The patient presented to [facility] on 7/6/2023 with a primary complaint of right facial droop, right leg and arm weakness, slurred speech and blurry vision, headache and right-sided chest pain.  Patient reports waking up this morning with weakness the right or and leg and nausea with an episode of vomiting.  Patient reports symptoms are improving.  She denies complaints of shortness of breath and diarrhea.  She is supposed to be taking aspirin daily but does not do so.  She is a current smoker.   Upon presentation to the ED, temperature 97.9° F, heart rate 87, blood pressure 150/71, respiratory rate 18 SpO2 98% on room air.  Labs with glucose 246 and troponin 0.020 and BNP 16.7.  EKG normal sinus rhythm and nonspecific ST and T-wave abnormalities.  Chest x-ray with questionable pulmonary vascular congestion.  CT of the head with no acute intracranial abnormalities but chronic microvascular ischemic changes.  CTA multiphase stroke event no hemodynamically significant stenosis, occlusion, aneurysm or dissection.  In ED patient received full-dose aspirin and labetalol.  She is admitted to hospital medicine services for further medical management.  Upon further clarification she had a syncopal event at around 7:15 a.m., she took her 80 units of insulin long-acting at 6:00 a.m. and was 43 glucose when she took the 80 units.  She thought the lower the glucose the better.  MRI brain was negative for acute abnormalities.  MRI cervical spine showed spondylotic changes without evidence of significant central canal stenosis or neuroforaminal narrowing.  MRI lumbar spine  without contrast showed severe degenerative spinal canal stenosis L3-L5, mild at other levels with impingement on the descending bilateral S1 nerve roots at L5-S1.    Today  Continues to complain of pretty much the whole right side including the chest wall and abdominal wall as well.  Unable to explain what type of pain.  This is not bursitis.  I will discontinue fenofibrate in place she is having muscle pain from that.        OBJECTIVE/PHYSICAL EXAM     VITAL SIGNS (MOST RECENT):  Temp: 98.9 °F (37.2 °C) (07/11/23 0607)  Pulse: 105 (07/11/23 0607)  Resp: 16 (07/11/23 0639)  BP: 108/72 (07/11/23 0607)  SpO2: 96 % (07/11/23 0607) VITAL SIGNS (24 HOUR RANGE):  Temp:  [97.4 °F (36.3 °C)-99.5 °F (37.5 °C)] 98.9 °F (37.2 °C)  Pulse:  [] 105  Resp:  [16-20] 16  SpO2:  [95 %-97 %] 96 %  BP: (108-153)/(72-84) 108/72   GENERAL: In no acute distress, afebrile  HEENT:  CHEST: Clear to auscultation bilaterally  HEART: S1, S2, no appreciable murmur  ABDOMEN: Soft, nontender, BS +  MSK: Warm, no lower extremity edema, no clubbing or cyanosis  NEUROLOGIC: Alert and oriented x4, right upper extremity 4/5, right lower extremity 0/5, left side 5/5  INTEGUMENTARY:  PSYCHIATRY:        ASSESSMENT/PLAN   Right-sided weakness and neuropathy  Syncope-likely 2/2 hypoglycemia  Substernal chest pain-resolved   Hypertriglyceridemia  Severe lumbar spinal stenosis L3-L5  Nerve impingement descending bilateral S1 nerve roots L5-S1    History of: HTN, HLD, insulin-dependent diabetes mellitus, anxiety/depression, CVA, rheumatoid arthritis, sciatica, TBI, mildly reduced systolic 45% ejection fraction      Neurology signed off.    Neurosurgery following.  Pending further recommendations.  Adjust insulin dosing to prevent hypoglycemic events   Cardiology signed off.  Outpatient follow-up with Dr. Hong.  Was told she needed home oxygen but she declined.  Will likely need home oxygen upon discharge.  Start scheduled 1 g Tylenol t.i.d., tizanidine  2 mg t.i.d., tramadol 50 mg t.i.d..  Continue gabapentin 800 t.i.d..  Discontinue fenofibrate in case muscle related side effects.  Give 1 dose dexamethasone 12 mg.  Case management consulted for rehab/SNF placement continue PT and OT.  Continue Lasix 40 b.i.d. oral, reduce valsartan, discontinue hydralazine    DVT prophylaxis:  Xarelto 10     Anticipated discharge and disposition:   __________________________________________________________________________    LABS/MICRO/MEDS/DIAGNOSTICS       LABS  Recent Labs     07/10/23  0410      K 5.6*   CHLORIDE 104   CO2 27   BUN 32.4*   CREATININE 1.19*   GLUCOSE 247*   CALCIUM 8.7       No results for input(s): WBC, RBC, HCT, MCV, PLT in the last 72 hours.    Invalid input(s):       MICROBIOLOGY  Microbiology Results (last 7 days)       ** No results found for the last 168 hours. **               MEDICATIONS   acetaminophen  1,000 mg Oral TID    albuterol-ipratropium  3 mL Nebulization Q6H WAKE    aspirin  81 mg Oral Daily    atorvastatin  40 mg Oral Daily    carvediloL  3.125 mg Oral BID    DULoxetine  60 mg Oral Daily    fluticasone furoate-vilanteroL  1 puff Inhalation Daily    furosemide  40 mg Oral BID    gabapentin  800 mg Oral TID    hydrALAZINE  25 mg Oral TID    insulin aspart U-100  20 Units Subcutaneous TIDWM    insulin detemir U-100  40 Units Subcutaneous BID    nicotine  1 patch Transdermal Daily    NIFEdipine  90 mg Oral Daily    rivaroxaban  10 mg Oral Daily with dinner    senna-docusate 8.6-50 mg  2 tablet Oral BID    sodium chloride 0.9%  10 mL Intravenous Q8H    tiZANidine  2 mg Oral TID    traMADoL  50 mg Oral TID    valsartan  160 mg Oral Daily         INFUSIONS           DIAGNOSTIC TESTS  X-Ray Hip 2 or 3 views Right (with Pelvis when performed)   Final Result      No acute osseous process appreciated.         Electronically signed by: Sung Elaine   Date:    07/10/2023   Time:    16:49      MRI Lumbar Spine Without Contrast   Final Result       1. Severe degenerative spinal canal stenosis at L3-L5, mild at other levels with impingement on the descending bilateral S1 nerve roots at L5-S1.   2. Mild neural foraminal stenoses as described.         Electronically signed by: Kelsie Prescott   Date:    07/10/2023   Time:    10:15      X-Ray Chest 1 View   Final Result      Likely changes of interstitial edema without significant interval change.         Electronically signed by: Frank Lr MD   Date:    07/09/2023   Time:    12:55      MRI Cervical Spine Without Contrast   Final Result      Spondylotic changes of the cervical spine without evidence for significant central canal stenosis or neural foraminal narrowing.         Electronically signed by: Frank Lr MD   Date:    07/08/2023   Time:    16:46      X-Ray Ankle Complete Right   Final Result      No acute osseous abnormality.         Electronically signed by: Pham Whipple   Date:    07/07/2023   Time:    12:27      X-Ray Elbow Complete 3 views Right   Final Result      No acute osseous abnormality.         Electronically signed by: Pham Whipple   Date:    07/07/2023   Time:    12:26      X-ray Shoulder 2 or More Views Right   Final Result      No acute osseous abnormality.         Electronically signed by: Pham Whipple   Date:    07/07/2023   Time:    12:26      MRI BRAIN WITHOUT CONTRAST   Final Result   Impression:      1. No acute intracranial hemorrhage is identified.      2. No abnormal signal is identified on the diffusion images to suggest acute infarct.      3. No acute intracranial process is identified.      No significant discrepancy with overnight report.         Electronically signed by: Davon Thakkar   Date:    07/07/2023   Time:    08:18      CTA STROKE MULTI-PHASE   Final Result      No hemodynamically significant stenosis, occlusion, aneurysm or dissection.         Electronically signed by: Pham Whipple   Date:    07/06/2023   Time:    15:06      X-Ray Chest AP  Portable   Final Result      Question some pulmonary vascular congestion.         Electronically signed by: Mauri Morris   Date:    07/06/2023   Time:    15:04      CT HEAD FOR STROKE   Final Result      1. No acute intracranial abnormality.   2. Chronic microvascular ischemic changes.   Code fast findings given to Dr. Noel at the time of dictation.         Electronically signed by: Kelsie Prescott   Date:    07/06/2023   Time:    14:25           EF   Date Value Ref Range Status   07/07/2023 45 % Final   02/14/2023 51 % Final            Case related differential diagnoses have been reviewed; assessment and plan has been documented. I have personally reviewed the labs and test results that are currently available; I have reviewed the patients medication list. I have reviewed the consulting providers recommendations. I have reviewed or attempted to review medical records based upon their availability.  All of the patient's and/or family's questions have been addressed and answered to the best of my ability.  I will continue to monitor closely and make adjustments to medical management as needed.  This document was created using M*Modal Fluency Direct.  Transcription errors may have been made.  Please contact me if any questions may rise regarding documentation to clarify transcription.        Chandu Monroy MD   Internal Medicine  Department of Hospital Medicine  [facility] - 9th Floor Med Surg

## 2023-07-11 NOTE — PT/OT/SLP PROGRESS
Occupational Therapy   Treatment    Name: Elba Barnes  MRN: [id number]  Admitting Diagnosis:  <principal problem not specified>       Recommendations:     Discharge Recommendations: nursing facility, skilled  Discharge Equipment Recommendations:  walker, rolling  Barriers to discharge:       Assessment:     Elba Barnes is a 62 y.o. female with a medical diagnosis of <principal problem not specified>.  She presents with decreased safety awareness. Performance deficits affecting function are weakness, gait instability, decreased lower extremity function, impaired balance, impaired endurance, decreased safety awareness, impaired self care skills, impaired functional mobility.     Rehab Prognosis:  Good; patient would benefit from acute skilled OT services to address these deficits and reach maximum level of function.       Plan:     Patient to be seen 3 x/week to address the above listed problems via self-care/home management, therapeutic activities, therapeutic exercises  Plan of Care Expires:    Plan of Care Reviewed with: patient    Subjective     Pain/Comfort:  No complaints of pain today    Objective:     Communicated with: nurse prior to session.  Patient found HOB elevated with PureWick, peripheral IV upon OT entry to room.    General Precautions: Standard, fall    Orthopedic Precautions:N/A  Braces: N/A  Respiratory Status: Nasal cannula, flow 3 L/min     Occupational Performance:     Bed Mobility:    Supine to sit with SBA, sit to supine with Min A, assistance lifting right leg into bed     Functional Mobility/Transfers:  Sit to stand with CGA, transfer to toilet with RW with Min A secondary to poor safety  Functional Mobility: ambulate to bathroom from bedside with RW with Min A secondary to poor safety    Activities of Daily Living:  Toileting with SBA, Standing at sink to wash hands with CGA and verbal cues    [facility] 6 Click ADL: 17    Patient Education:  Patient provided with verbal and  demonstration education regarding fall prevention and safety awareness.  Understanding was verbalized, however additional teaching warranted.      Patient left right sidelying with all lines intact and call button in reach    GOALS:   Multidisciplinary Problems       Occupational Therapy Goals          Problem: Occupational Therapy    Goal Priority Disciplines Outcome Interventions   Occupational Therapy Goal     OT, PT/OT Ongoing, Progressing    Description: Pt will t/f EOB/chair <>BSC mod I without AD (stand pivot)  Pt will increase standing endurance x 3 minutes to assist with ADL tasks                       Time Tracking:     OT Date of Treatment: 07/11/23  OT Start Time: 1401  OT Stop Time: 1431  OT Total Time (min): 30 min    Billable Minutes:Self Care/Home Management 15  Therapeutic Activity 15    OT/JULIETA: JULIETA     Number of JULIETA visits since last OT visit: 2    7/11/2023

## 2023-07-11 NOTE — PT/OT/SLP EVAL
"Speech Language Pathology Department  Clinical Swallow Evaluation    Patient Name:  Elba Barnes   MRN:  [id number]    Recommendations:     General recommendations:  Modified Barium Swallow Study  Diet recommendations:  NPO, Liquid Diet Level: NPO   Precautions: Standard,      History:     Elba Barnes is a/n 62 y.o. female admitted with right facial droop, right leg and arm weakness and slurred speech.Pt coughing with breakfast.     Past Medical History:   Diagnosis Date    Anxiety     Arthritis     CVA (cerebral vascular accident)     "mini stroke"    Depression     Diabetes mellitus     Heart problem     History of psychiatric hospitalization     three(1983, 1995 and another (years ago")): depression    HTN (hypertension)     Hx of psychiatric care     Hypertension     Pacemaker     Psychiatric exam requested by authority     Psychiatric problem     TBI (traumatic brain injury)     Therapy      Past Surgical History:   Procedure Laterality Date    CARDIAC PACEMAKER PLACEMENT      CARDIAC PACEMAKER REMOVAL      CHOLECYSTECTOMY      HYSTERECTOMY         Home Diet: Regular and thin liquids  Current Method of Nutrition: PO diet      Patient complaint: denies    Imaging   Results for orders placed during the hospital encounter of 07/06/23    X-Ray Chest 1 View    Narrative  EXAMINATION:  XR CHEST 1 VIEW    CLINICAL HISTORY:  Hypoxia;    TECHNIQUE:  Single frontal view of the chest was performed.    COMPARISON:  07/06/2023    FINDINGS:  Heart size enlarged the pulmonary vasculature is congested.    Coarse reticulonodular appearance of the interstitium without evidence for effusion.    Impression  Likely changes of interstitial edema without significant interval change.      Electronically signed by: Frank Lr MD  Date:    07/09/2023  Time:    12:55    No results found for this or any previous visit.    Results for orders placed during the hospital encounter of 07/06/23    MRI BRAIN WITHOUT " "CONTRAST    Narrative  TECHNIQUE:MULTIPLANAR, MULTISEQUENCE MAGNETIC RESONANCE IMAGING OF THE BRAIN WAS PERFORMED WITHOUT INTRAVENOUS CONTRAST.    COMPARISON:CORRELATION IS WITH CT STUDY DATED 2023-07-06 14:13:13.    CLINICAL HISTORY:STROKE FOLLOW UP, FLEX COIL USED.    Findings:    Hemorrhage:No acute intracranial hemorrhage is identified.    Stroke:No abnormal signal is identified on the diffusion images to suggest acute infarct.    Extra axial spaces:Age appropriate.    Cerebral, cerebellar, and brainstem parenchyma:There are periventricular and subcortical and brainstem white matter signal abnormalities are seen. The main consideration is small vessel ischemic changes.    Herniation:None.    Calvarium:Within normal limits. Post surgical changes are seen in the left frontal scalp, also appreciated on the CT study.    Impression  Impression:    1. No acute intracranial hemorrhage is identified.    2. No abnormal signal is identified on the diffusion images to suggest acute infarct.    3. No acute intracranial process is identified.    No significant discrepancy with overnight report.      Electronically signed by: Davon Thakkar  Date:    07/07/2023  Time:    08:18    Subjective     Patient awake and alert.  Patient goals: "to get better"   Spiritual/Cultural/Christianity Beliefs/Practices that affect care: no  Pain/Comfort: Pain Rating 1: 0/10    Objective:     ORAL MUSCULATURE  Dentition: missing teeth  Secretion Management: adequate  Mucosal Quality: good  Facial Movement: WFL    Consistency Fed By Oral Symptoms Pharyngeal Symptoms   Thin liquid by straw SLP None Multiple swallows  Wet vocal quality after swallow  Cough after swallow   Puree SLP None Multiple swallows  Wet vocal quality after swallow   Chewable solid SLP Prolonged bolus formation/mastication Multiple swallows     Assessment:     Signs/sx of aspiration. REC: MBS to further evaluate swallow function.     Patient Education:     Patient provided with " verbal education regarding POC.  Understanding was verbalized.    Plan:     SLP Follow-Up:  Yes   Patient to be seen:  5 x/week   Plan of Care expires:  07/21/23  Plan of Care reviewed with:  patient      Time Tracking:     SLP Treatment Date:    7/11/23  Speech Start Time:   0900  Speech Stop Time:      0915  Speech Total Time (min):   15    Billable minutes:  Swallow and Oral Function Evaluation, 15 minutes     07/11/2023

## 2023-07-11 NOTE — PROGRESS NOTES
[facility]  Neurosurgery  Progress Note    Subjective:     Interval History:  Patient attempted to work with physical therapy today.  She required assistance with her lower extremities during sitting and standing.  Today she complains of lower back pain that radiates towards the right and down her leg to the knee.  She also complains of right-sided chest wall pain that radiates from the ribs to the sternum-Dr. Monroy aware.  Cardiology has signed off.      Awake and oriented to all spheres.  PERRLA bilateral brisk.  Follows commands   Right upper and left upper extremity 5/5 motor strength.  Sensory intact.  She is extremely guarded with her right lower extremity as any motion elicits pain as described above.  Right lower extremity:  3/5 hip flexion, 4/5 knee extension, 4/5 dorsiflexion, plantar flexion.  Left lower extremity 5/5.  Sensory intact to lower extremities bilaterally.    History of Present Illness: 62 y.o. female with a past medical history of hypertension, hyperlipidemia, anxiety/depression, diabetes mellitus type 2, CVA, rheumatoid arthritis, sciatica and TBI, who presented to [facility] on 7/6/2023 with a primary complaint of right facial droop, right leg and arm weakness, slurred speech and blurry vision, headache and right-sided chest pain.  Patient reported waking up with weakness in the right arm and leg and nausea with an episode of vomiting. She denied complaints of shortness of breath and diarrhea.  She was supposed to be taking aspirin daily but does not do so. She is a current smoker.      CT of the head with no acute intracranial abnormalities but chronic microvascular ischemic changes.  CTA multiphase stroke event no hemodynamically significant stenosis, occlusion, aneurysm or dissection.  In ED patient received full-dose aspirin and labetalol.  She is admitted to hospital medicine services for further medical management.  Upon further clarification she had a  syncopal event at around 7:15 a.m., she took her 80 units of insulin long-acting at 6:00 a.m. and was 43 glucose when she took the 80 units.  She thought the lower the glucose the better.  MRI brain was negative for acute abnormalities.  MRI cervical spine showed spondylotic changes without evidence of significant central canal stenosis or neuroforaminal narrowing.  MRI lumbar completed today shows severe degenerative spinal canal stenosis at L3-L5, mild at other levels with impingement on the descending bilateral S1 nerve roots at L5-S1. Patient has had persistent weakness and pain in the right LE so Dr. Sears has been consulted for evaluation and treatment recommendations.    Post-Op Info:  * No surgery found *          Medications:  Continuous Infusions:  Scheduled Meds:   acetaminophen  1,000 mg Oral TID    albuterol-ipratropium  3 mL Nebulization Q6H WAKE    aspirin  81 mg Oral Daily    atorvastatin  40 mg Oral Daily    carvediloL  3.125 mg Oral BID    DULoxetine  60 mg Oral Daily    fluticasone furoate-vilanteroL  1 puff Inhalation Daily    furosemide  40 mg Oral BID    gabapentin  800 mg Oral TID    insulin aspart U-100  20 Units Subcutaneous TIDWM    insulin detemir U-100  40 Units Subcutaneous BID    nicotine  1 patch Transdermal Daily    NIFEdipine  90 mg Oral Daily    rivaroxaban  10 mg Oral Daily with dinner    senna-docusate 8.6-50 mg  2 tablet Oral BID    sodium chloride 0.9%  10 mL Intravenous Q8H    tiZANidine  2 mg Oral TID    traMADoL  50 mg Oral TID    valsartan  80 mg Oral Daily     PRN Meds:acetaminophen, ALPRAZolam, dextrose 50%, glucagon (human recombinant), insulin aspart U-100, labetalol     Review of Systems  Objective:     Weight: 115.5 kg (254 lb 10.1 oz)  Body mass index is 41.1 kg/m².  Vital Signs (Most Recent):  Temp: 98.6 °F (37 °C) (07/11/23 1100)  Pulse: 82 (07/11/23 1100)  Resp: 15 (07/11/23 1100)  BP: 116/73 (07/11/23 1100)  SpO2: 96 % (07/11/23 0818) Vital Signs (24h  Range):  Temp:  [98 °F (36.7 °C)-99.5 °F (37.5 °C)] 98.6 °F (37 °C)  Pulse:  [] 82  Resp:  [15-18] 15  SpO2:  [96 %] 96 %  BP: (108-153)/(68-84) 116/73                              Neurosurgery Physical Exam    Significant Labs:  Recent Labs   Lab 07/10/23  0410      K 5.6*   CO2 27   BUN 32.4*   CREATININE 1.19*   CALCIUM 8.7     No results for input(s): WBC, HGB, HCT, PLT in the last 48 hours.  No results for input(s): LABPT, INR, APTT in the last 48 hours.  Microbiology Results (last 7 days)       ** No results found for the last 168 hours. **              Assessment/Plan:    An MRI of the thoracic spine was ordered this morning per TEZ Downey.  Pain control  PTOT   Fall precautions  SCDs   Case management for rehab eval.       Active Diagnoses:    Diagnosis Date Noted POA    Cerebrovascular accident (CVA) [I63.9] 07/07/2023 Unknown    Heart problem [I51.9] 07/07/2023 Unknown    Right sided weakness [R53.1] 07/06/2023 Unknown      Problems Resolved During this Admission:       MAGGIE Ambrose-BC  Neurosurgery  [facility] - 9th Floor Med Surg

## 2023-07-12 LAB
ANION GAP SERPL CALC-SCNC: 15 MEQ/L
ANION GAP SERPL CALC-SCNC: 9 MEQ/L
BUN SERPL-MCNC: 33.8 MG/DL (ref 9.8–20.1)
BUN SERPL-MCNC: 36.4 MG/DL (ref 9.8–20.1)
CALCIUM SERPL-MCNC: 9.5 MG/DL (ref 8.4–10.2)
CALCIUM SERPL-MCNC: 9.8 MG/DL (ref 8.4–10.2)
CHLORIDE SERPL-SCNC: 92 MMOL/L (ref 98–107)
CHLORIDE SERPL-SCNC: 95 MMOL/L (ref 98–107)
CO2 SERPL-SCNC: 29 MMOL/L (ref 23–31)
CO2 SERPL-SCNC: 30 MMOL/L (ref 23–31)
CREAT SERPL-MCNC: 1.21 MG/DL (ref 0.55–1.02)
CREAT SERPL-MCNC: 1.29 MG/DL (ref 0.55–1.02)
CREAT/UREA NIT SERPL: 26
CREAT/UREA NIT SERPL: 30
GFR SERPLBLD CREATININE-BSD FMLA CKD-EPI: 48 MLS/MIN/1.73/M2
GFR SERPLBLD CREATININE-BSD FMLA CKD-EPI: 51 MLS/MIN/1.73/M2
GLUCOSE SERPL-MCNC: 399 MG/DL (ref 82–115)
GLUCOSE SERPL-MCNC: 527 MG/DL (ref 82–115)
MAGNESIUM SERPL-MCNC: 2.2 MG/DL (ref 1.6–2.6)
POCT GLUCOSE: 364 MG/DL (ref 70–110)
POCT GLUCOSE: 405 MG/DL (ref 70–110)
POCT GLUCOSE: 405 MG/DL (ref 70–110)
POCT GLUCOSE: 446 MG/DL (ref 70–110)
POCT GLUCOSE: 463 MG/DL (ref 70–110)
POTASSIUM SERPL-SCNC: 5.3 MMOL/L (ref 3.5–5.1)
POTASSIUM SERPL-SCNC: 6.2 MMOL/L (ref 3.5–5.1)
SODIUM SERPL-SCNC: 133 MMOL/L (ref 136–145)
SODIUM SERPL-SCNC: 137 MMOL/L (ref 136–145)

## 2023-07-12 PROCEDURE — 96375 TX/PRO/DX INJ NEW DRUG ADDON: CPT

## 2023-07-12 PROCEDURE — S4991 NICOTINE PATCH NONLEGEND: HCPCS | Performed by: PHYSICIAN ASSISTANT

## 2023-07-12 PROCEDURE — 94761 N-INVAS EAR/PLS OXIMETRY MLT: CPT

## 2023-07-12 PROCEDURE — 27000221 HC OXYGEN, UP TO 24 HOURS

## 2023-07-12 PROCEDURE — 96374 THER/PROPH/DIAG INJ IV PUSH: CPT

## 2023-07-12 PROCEDURE — 25000003 PHARM REV CODE 250: Performed by: INTERNAL MEDICINE

## 2023-07-12 PROCEDURE — 94640 AIRWAY INHALATION TREATMENT: CPT | Mod: XB

## 2023-07-12 PROCEDURE — 96372 THER/PROPH/DIAG INJ SC/IM: CPT | Mod: 59 | Performed by: INTERNAL MEDICINE

## 2023-07-12 PROCEDURE — 92523 SPEECH SOUND LANG COMPREHEN: CPT

## 2023-07-12 PROCEDURE — 96376 TX/PRO/DX INJ SAME DRUG ADON: CPT

## 2023-07-12 PROCEDURE — 25000003 PHARM REV CODE 250: Performed by: PHYSICIAN ASSISTANT

## 2023-07-12 PROCEDURE — 63600175 PHARM REV CODE 636 W HCPCS: Performed by: INTERNAL MEDICINE

## 2023-07-12 PROCEDURE — 92526 ORAL FUNCTION THERAPY: CPT

## 2023-07-12 PROCEDURE — 63600175 PHARM REV CODE 636 W HCPCS: Performed by: NURSE PRACTITIONER

## 2023-07-12 PROCEDURE — 25000242 PHARM REV CODE 250 ALT 637 W/ HCPCS: Performed by: INTERNAL MEDICINE

## 2023-07-12 PROCEDURE — 99205 OFFICE O/P NEW HI 60 MIN: CPT | Mod: ,,, | Performed by: NEUROLOGICAL SURGERY

## 2023-07-12 PROCEDURE — G0378 HOSPITAL OBSERVATION PER HR: HCPCS

## 2023-07-12 PROCEDURE — 80048 BASIC METABOLIC PNL TOTAL CA: CPT | Performed by: INTERNAL MEDICINE

## 2023-07-12 PROCEDURE — A4216 STERILE WATER/SALINE, 10 ML: HCPCS | Performed by: PHYSICIAN ASSISTANT

## 2023-07-12 PROCEDURE — 83735 ASSAY OF MAGNESIUM: CPT | Performed by: INTERNAL MEDICINE

## 2023-07-12 PROCEDURE — 99900031 HC PATIENT EDUCATION (STAT)

## 2023-07-12 PROCEDURE — 99900035 HC TECH TIME PER 15 MIN (STAT)

## 2023-07-12 PROCEDURE — 97110 THERAPEUTIC EXERCISES: CPT | Mod: CQ

## 2023-07-12 PROCEDURE — 99205 PR OFFICE/OUTPT VISIT, NEW, LEVL V, 60-74 MIN: ICD-10-PCS | Mod: ,,, | Performed by: NEUROLOGICAL SURGERY

## 2023-07-12 PROCEDURE — 97116 GAIT TRAINING THERAPY: CPT | Mod: CQ

## 2023-07-12 PROCEDURE — 94660 CPAP INITIATION&MGMT: CPT

## 2023-07-12 PROCEDURE — 25000003 PHARM REV CODE 250

## 2023-07-12 RX ORDER — INSULIN ASPART 100 [IU]/ML
20 INJECTION, SOLUTION INTRAVENOUS; SUBCUTANEOUS
Status: DISCONTINUED | OUTPATIENT
Start: 2023-07-12 | End: 2023-07-17 | Stop reason: HOSPADM

## 2023-07-12 RX ADMIN — IPRATROPIUM BROMIDE AND ALBUTEROL SULFATE 3 ML: 2.5; .5 SOLUTION RESPIRATORY (INHALATION) at 01:07

## 2023-07-12 RX ADMIN — GABAPENTIN 800 MG: 400 CAPSULE ORAL at 05:07

## 2023-07-12 RX ADMIN — NIFEDIPINE 90 MG: 90 TABLET, FILM COATED, EXTENDED RELEASE ORAL at 08:07

## 2023-07-12 RX ADMIN — LORAZEPAM 1 MG: 2 INJECTION INTRAMUSCULAR; INTRAVENOUS at 03:07

## 2023-07-12 RX ADMIN — INSULIN ASPART 20 UNITS: 100 INJECTION, SOLUTION INTRAVENOUS; SUBCUTANEOUS at 11:07

## 2023-07-12 RX ADMIN — FUROSEMIDE 40 MG: 40 TABLET ORAL at 05:07

## 2023-07-12 RX ADMIN — SENNOSIDES AND DOCUSATE SODIUM 2 TABLET: 50; 8.6 TABLET ORAL at 08:07

## 2023-07-12 RX ADMIN — CARVEDILOL 3.12 MG: 3.12 TABLET, FILM COATED ORAL at 08:07

## 2023-07-12 RX ADMIN — ACETAMINOPHEN 1000 MG: 500 TABLET, FILM COATED ORAL at 08:07

## 2023-07-12 RX ADMIN — INSULIN ASPART 20 UNITS: 100 INJECTION, SOLUTION INTRAVENOUS; SUBCUTANEOUS at 05:07

## 2023-07-12 RX ADMIN — IPRATROPIUM BROMIDE AND ALBUTEROL SULFATE 3 ML: 2.5; .5 SOLUTION RESPIRATORY (INHALATION) at 08:07

## 2023-07-12 RX ADMIN — RIVAROXABAN 10 MG: 10 TABLET, FILM COATED ORAL at 05:07

## 2023-07-12 RX ADMIN — TIZANIDINE 2 MG: 2 TABLET ORAL at 08:07

## 2023-07-12 RX ADMIN — SODIUM CHLORIDE, PRESERVATIVE FREE 10 ML: 5 INJECTION INTRAVENOUS at 10:07

## 2023-07-12 RX ADMIN — TIZANIDINE 2 MG: 2 TABLET ORAL at 05:07

## 2023-07-12 RX ADMIN — SODIUM ZIRCONIUM CYCLOSILICATE 10 G: 10 POWDER, FOR SUSPENSION ORAL at 08:07

## 2023-07-12 RX ADMIN — DULOXETINE HYDROCHLORIDE 60 MG: 30 CAPSULE, DELAYED RELEASE ORAL at 08:07

## 2023-07-12 RX ADMIN — INSULIN HUMAN 5 UNITS: 100 INJECTION, SOLUTION PARENTERAL at 08:07

## 2023-07-12 RX ADMIN — SODIUM CHLORIDE, PRESERVATIVE FREE 10 ML: 5 INJECTION INTRAVENOUS at 06:07

## 2023-07-12 RX ADMIN — INSULIN HUMAN 5 UNITS: 100 INJECTION, SOLUTION PARENTERAL at 10:07

## 2023-07-12 RX ADMIN — INSULIN ASPART 20 UNITS: 100 INJECTION, SOLUTION INTRAVENOUS; SUBCUTANEOUS at 08:07

## 2023-07-12 RX ADMIN — GABAPENTIN 800 MG: 400 CAPSULE ORAL at 08:07

## 2023-07-12 RX ADMIN — SODIUM CHLORIDE, PRESERVATIVE FREE 10 ML: 5 INJECTION INTRAVENOUS at 03:07

## 2023-07-12 RX ADMIN — NICOTINE 1 PATCH: 7 PATCH, EXTENDED RELEASE TRANSDERMAL at 08:07

## 2023-07-12 RX ADMIN — INSULIN DETEMIR 40 UNITS: 100 INJECTION, SOLUTION SUBCUTANEOUS at 08:07

## 2023-07-12 RX ADMIN — SODIUM POLYSTYRENE SULFONATE 15 G: 15 SUSPENSION ORAL; RECTAL at 12:07

## 2023-07-12 RX ADMIN — SODIUM ZIRCONIUM CYCLOSILICATE 10 G: 10 POWDER, FOR SUSPENSION ORAL at 05:07

## 2023-07-12 RX ADMIN — TRAMADOL HYDROCHLORIDE 50 MG: 50 TABLET, COATED ORAL at 08:07

## 2023-07-12 RX ADMIN — ACETAMINOPHEN 1000 MG: 500 TABLET, FILM COATED ORAL at 05:07

## 2023-07-12 RX ADMIN — ASPIRIN 81 MG: 81 TABLET, COATED ORAL at 08:07

## 2023-07-12 RX ADMIN — SODIUM ZIRCONIUM CYCLOSILICATE 10 G: 10 POWDER, FOR SUSPENSION ORAL at 06:07

## 2023-07-12 RX ADMIN — SODIUM CHLORIDE, PRESERVATIVE FREE 10 ML: 5 INJECTION INTRAVENOUS at 12:07

## 2023-07-12 RX ADMIN — FUROSEMIDE 40 MG: 40 TABLET ORAL at 08:07

## 2023-07-12 RX ADMIN — TRAMADOL HYDROCHLORIDE 50 MG: 50 TABLET, COATED ORAL at 05:07

## 2023-07-12 RX ADMIN — ATORVASTATIN CALCIUM 40 MG: 40 TABLET, FILM COATED ORAL at 08:07

## 2023-07-12 RX ADMIN — FLUTICASONE FUROATE AND VILANTEROL TRIFENATATE 1 PUFF: 200; 25 POWDER RESPIRATORY (INHALATION) at 08:07

## 2023-07-12 NOTE — PT/OT/SLP EVAL
"Speech Language Pathology Department  Cognitive-Communication Evaluation    Patient Name:  Elba Barnes   MRN:  [id number]    Recommendations:     General recommendations:  dysphagia therapy  Communication strategies:  none    History:     Elba Barnes is a/n 62 y.o. female admitted with right facial droop.     Past Medical History:   Diagnosis Date    Anxiety     Arthritis     CVA (cerebral vascular accident)     "mini stroke"    Depression     Diabetes mellitus     Heart problem     History of psychiatric hospitalization     three(1983, 1995 and another (years ago")): depression    HTN (hypertension)     Hx of psychiatric care     Hypertension     Pacemaker     Psychiatric exam requested by authority     Psychiatric problem     TBI (traumatic brain injury)     Therapy      Past Surgical History:   Procedure Laterality Date    CARDIAC PACEMAKER PLACEMENT      CARDIAC PACEMAKER REMOVAL      CHOLECYSTECTOMY      HYSTERECTOMY         Previous level of Function  Education:middle school  Occupation: unemployed, disabled  Lives: with children  Handed: Right  Glasses: yes  Hearing Aids: no    Imaging   Results for orders placed during the hospital encounter of 07/06/23    MRI BRAIN WITHOUT CONTRAST    Narrative  TECHNIQUE:MULTIPLANAR, MULTISEQUENCE MAGNETIC RESONANCE IMAGING OF THE BRAIN WAS PERFORMED WITHOUT INTRAVENOUS CONTRAST.    COMPARISON:CORRELATION IS WITH CT STUDY DATED 2023-07-06 14:13:13.    CLINICAL HISTORY:STROKE FOLLOW UP, FLEX COIL USED.    Findings:    Hemorrhage:No acute intracranial hemorrhage is identified.    Stroke:No abnormal signal is identified on the diffusion images to suggest acute infarct.    Extra axial spaces:Age appropriate.    Cerebral, cerebellar, and brainstem parenchyma:There are periventricular and subcortical and brainstem white matter signal abnormalities are seen. The main consideration is small vessel ischemic changes.    Herniation:None.    Calvarium:Within normal limits. " Post surgical changes are seen in the left frontal scalp, also appreciated on the CT study.    Impression  Impression:    1. No acute intracranial hemorrhage is identified.    2. No abnormal signal is identified on the diffusion images to suggest acute infarct.    3. No acute intracranial process is identified.    No significant discrepancy with overnight report.      Electronically signed by: Davon Thakkar  Date:    07/07/2023  Time:    08:18    Subjective     Patient awake and alert.  Spiritual/Cultural/Denominational Beliefs/Practices that affect care: no  Pain/Comfort: Pain Rating 1: 0/10    Objective:     ORAL MUSCULATURE  Dentition: own teeth  Facial Movement: WFL  Buccal Strength & Mobility: WFL  Mandibular Strength & Mobility: WFL    SPEECH PRODUCTION  Phoneme Production: adequate  Voice Quality: adequate  Voice Production: adequate  Speech Rate: appropriate  Loudness: acceptable  Speech Intelligibility  Known Context: Greater that 90%  Unknown Context: Greater that 90%    AUDITORY COMPREHENSION  Identification:  Body parts: 100%  Objects: 100%  Following Directions:  1-Step: 100%  2-Step: 100%  Yes/No Questions:  Biographical: 100%  Environmental: 100%  Simple: 100%  Complex: 100%    VERBAL EXPRESSION  Automatic Speech:  Days of the week: 100%  Months of the year: 100%  Phrase Completion: 100%  Confrontation Naming  Body Parts: 100%  Objects: 100%  Wh- Questions:  Object name: 100%  Object function: 100%    COGNITION  Orientation:  Person: yes  Place: yes  Time: yes  Situation: yes   Memory:  Immediate: 100%  Delayed: 100%  Short Term: 100%  Problem Solving  Functional simple: WNL  Functional complex: WNL  Organization:  Convergent thinking: WNL  Divergent thinking: WNL  Sequencing: WNL      Assessment:     Pt with functional speech and language skills, cognitive linguistic skills note to be at baseline. No skilled SLP intervention is warranted at this time.     Goals:     Multidisciplinary Problems       SLP  Goals          Problem: SLP    Goal Priority Disciplines Outcome   SLP Goal     SLP    Description: LTG: To tolerate least restrictive diet without clinical signs/sx of aspiration.   ST. Tongue base/laryngeal excursion exercise   2. Tolerate thermal stimulation x10 with 100% swallow response with less than a 2 second delay.   3. Soft and bite sized trials(completed)                     Patient Education:     Patient provided with verbal education regarding POC.  Understanding was verbalized.    Plan:     SLP Follow-Up:  Yes   Patient to be seen:  5 x/week   Plan of Care expires:  23  Plan of Care reviewed with:  patient      Time Tracking:     SLP Treatment Date:    23  Speech Start Time:   0950  Speech Stop Time:     1005   Speech Total Time (min):   15    Billable minutes:  Evaluation of Speech Sound Production with Comprehension and Expression, 15 minutes     2023

## 2023-07-12 NOTE — PT/OT/SLP PROGRESS
Physical Therapy Treatment    Patient Name:  Elba Barnes   MRN:  [id number]    Recommendations:     Discharge Recommendations: nursing facility, skilled  Discharge Equipment Recommendations: walker, rolling  Barriers to discharge: Impaired mobility and Ongoing medical needs    Assessment:     Elba Barnes is a 62 y.o. female admitted with a medical diagnosis of  weakness, h/o chronic CVA with RUE/RLE weakness.  She presents with the following impairments/functional limitations: impaired endurance, weakness, impaired self care skills, impaired functional mobility, gait instability, impaired balance, decreased safety awareness, decreased lower extremity function, pain. Patient stated she would not wear gait belt, explained to her that she could not ambulate with therapy without gait belt. Patient agreed to wear gait belt during treatment session so she could be discharged.     Rehab Prognosis: Good; patient would benefit from acute skilled PT services to address these deficits and reach maximum level of function.    Recent Surgery: * No surgery found *      Plan:     During this hospitalization, patient to be seen 5 x/week to address the identified rehab impairments via therapeutic activities, therapeutic exercises, gait training and progress toward the following goals:    Plan of Care Expires:  07/31/23    Subjective     Chief Complaint: R anterior thigh pain, sensitive to touch  Patient/Family Comments/goals: to go home to spend time with granddaughter  Pain/Comfort:   Not rated    Objective:     Patient found HOB elevated upon PT entry to room.     General Precautions: Standard, fall  Orthopedic Precautions: N/A  Braces: N/A  Respiratory Status: Nasal cannula, flow 3 L/min    Functional Mobility:  Bed Mobility:     Supine <> Sit: Sindy  Transfers:     Sit <> Stand:  CGA-Sindy with rolling walker.   Gait: Patient ambulated 30ftx2 Sindy with RW with step through gait pattern. Patient presented with increased  stance time of LLE and decrease heel strike on RLE and WBOS. Patient refused to wear nasal cannula during ambulation, oxygen saturation remained above 88 throughout treatment session.     Therapeutic Activities/Exercises:  Patient performed R hip flexion, hip abduction, hip adduction, LAQs 1x10 in bedside chair.     Patient left supine with call button in reach..    GOALS:   Multidisciplinary Problems       Physical Therapy Goals          Problem: Physical Therapy    Goal Priority Disciplines Outcome Goal Variances Interventions   Physical Therapy Goal     PT, PT/OT Ongoing, Progressing     Description: Goals to be met by: 23     Patient will increase functional independence with mobility by performin. Supine to sit with Modified [place]ette  2. Sit to stand transfer with Modified Payette  3. Gait  x 50 feet with Supervision using Rolling Walker.                          Time Tracking:     PT Received On: 23  PT Start Time: 1042     PT Stop Time: 1105  PT Total Time (min): 23 min     Billable Minutes: Gait Training 23 and Therapeutic Exercise 8    Treatment Type: Treatment  PT/PTA: PTA     Number of PTA visits since last PT visit: 3     2023

## 2023-07-12 NOTE — PLAN OF CARE
Spoke to Ana at [facility]. They are currently reviewing the patient's referral, and awaiting the psych consult requested yesterday. Will send her that consult as soon as it is posted.

## 2023-07-12 NOTE — PLAN OF CARE
Problem: Violence Risk or Actual  Goal: Anger and Impulse Control  7/12/2023 0020 by Piotr Pugh RN  Outcome: Ongoing, Progressing  7/12/2023 0017 by Piotr Pugh RN  Outcome: Ongoing, Progressing     Problem: Adult Inpatient Plan of Care  Goal: Plan of Care Review  7/12/2023 0020 by Piotr Pugh RN  Outcome: Ongoing, Progressing  7/12/2023 0017 by Piotr Pugh RN  Outcome: Ongoing, Progressing  Goal: Patient-Specific Goal (Individualized)  7/12/2023 0020 by Piotr Pugh RN  Outcome: Ongoing, Progressing  7/12/2023 0017 by Piotr Pugh RN  Outcome: Ongoing, Progressing  Goal: Absence of Hospital-Acquired Illness or Injury  7/12/2023 0020 by Piotr Pugh RN  Outcome: Ongoing, Progressing  7/12/2023 0017 by Piotr Pugh RN  Outcome: Ongoing, Progressing  Goal: Optimal Comfort and Wellbeing  7/12/2023 0020 by Piotr Pugh RN  Outcome: Ongoing, Progressing  7/12/2023 0017 by Piotr Pugh RN  Outcome: Ongoing, Progressing  Goal: Readiness for Transition of Care  7/12/2023 0020 by Piotr Pugh RN  Outcome: Ongoing, Progressing  7/12/2023 0017 by Piotr Pugh RN  Outcome: Ongoing, Progressing     Problem: Bariatric Environmental Safety  Goal: Safety Maintained with Care  7/12/2023 0020 by Piotr Pugh RN  Outcome: Ongoing, Progressing  7/12/2023 0017 by Piotr Pugh RN  Outcome: Ongoing, Progressing     Problem: Adjustment to Illness (Stroke, Ischemic/Transient Ischemic Attack)  Goal: Optimal Coping  7/12/2023 0020 by Piotr Pugh RN  Outcome: Ongoing, Progressing  7/12/2023 0017 by Piotr Pugh RN  Outcome: Ongoing, Progressing     Problem: Bowel Elimination Impaired (Stroke, Ischemic/Transient Ischemic Attack)  Goal: Effective Bowel Elimination  7/12/2023 0020 by Piotr Pugh RN  Outcome: Ongoing, Progressing  7/12/2023 0017 by Piotr  Nancy Pugh RN  Outcome: Ongoing, Progressing     Problem: Cerebral Tissue Perfusion (Stroke, Ischemic/Transient Ischemic Attack)  Goal: Optimal Cerebral Tissue Perfusion  7/12/2023 0020 by Piotr Pugh RN  Outcome: Ongoing, Progressing  7/12/2023 0017 by Piotr Pugh RN  Outcome: Ongoing, Progressing     Problem: Cognitive Impairment (Stroke, Ischemic/Transient Ischemic Attack)  Goal: Optimal Cognitive Function  7/12/2023 0020 by Piotr Pugh RN  Outcome: Ongoing, Progressing  7/12/2023 0017 by Piotr Pugh RN  Outcome: Ongoing, Progressing     Problem: Communication Impairment (Stroke, Ischemic/Transient Ischemic Attack)  Goal: Improved Communication Skills  7/12/2023 0020 by Piotr Pugh RN  Outcome: Ongoing, Progressing  7/12/2023 0017 by Piotr Pugh RN  Outcome: Ongoing, Progressing     Problem: Functional Ability Impaired (Stroke, Ischemic/Transient Ischemic Attack)  Goal: Optimal Functional Ability  7/12/2023 0020 by Piotr Pugh RN  Outcome: Ongoing, Progressing  7/12/2023 0017 by Piotr Pugh RN  Outcome: Ongoing, Progressing     Problem: Respiratory Compromise (Stroke, Ischemic/Transient Ischemic Attack)  Goal: Effective Oxygenation and Ventilation  7/12/2023 0020 by Piotr Pugh RN  Outcome: Ongoing, Progressing  7/12/2023 0017 by Piotr Pugh RN  Outcome: Ongoing, Progressing     Problem: Sensorimotor Impairment (Stroke, Ischemic/Transient Ischemic Attack)  Goal: Improved Sensorimotor Function  7/12/2023 0020 by Piotr Pugh RN  Outcome: Ongoing, Progressing  7/12/2023 0017 by Piotr Pugh RN  Outcome: Ongoing, Progressing     Problem: Swallowing Impairment (Stroke, Ischemic/Transient Ischemic Attack)  Goal: Optimal Eating and Swallowing without Aspiration  7/12/2023 0020 by Piotr Pugh RN  Outcome: Ongoing, Progressing  7/12/2023 0017 by Piotr Cruz  KIANNA Pugh  Outcome: Ongoing, Progressing     Problem: Urinary Elimination Impaired (Stroke, Ischemic/Transient Ischemic Attack)  Goal: Effective Urinary Elimination  7/12/2023 0020 by Piotr Pugh RN  Outcome: Ongoing, Progressing  7/12/2023 0017 by Piotr Pugh RN  Outcome: Ongoing, Progressing     Problem: Diabetes Comorbidity  Goal: Blood Glucose Level Within Targeted Range  7/12/2023 0020 by Piotr Pugh RN  Outcome: Ongoing, Progressing  7/12/2023 0017 by Piotr Pugh RN  Outcome: Ongoing, Progressing     Problem: Skin Injury Risk Increased  Goal: Skin Health and Integrity  7/12/2023 0020 by Piotr Pugh RN  Outcome: Ongoing, Progressing  7/12/2023 0017 by Piotr Pugh RN  Outcome: Ongoing, Progressing

## 2023-07-12 NOTE — PROGRESS NOTES
[facility] - 9th Floor [facility] MEDICINE ~ PROGRESS NOTE        CHIEF COMPLAINT   Hospital follow up    HOSPITAL COURSE   Elba Barnes is a 62 y.o. Black or  female with a past medical history of hypertension, hyperlipidemia, anxiety/depression, diabetes mellitus type 2, CVA, rheumatoid arthritis, sciatica and TBI. The patient presented to [facility] on 7/6/2023 with a primary complaint of right facial droop, right leg and arm weakness, slurred speech and blurry vision, headache and right-sided chest pain.  Patient reports waking up this morning with weakness the right or and leg and nausea with an episode of vomiting.  Patient reports symptoms are improving.  She denies complaints of shortness of breath and diarrhea.  She is supposed to be taking aspirin daily but does not do so.  She is a current smoker.   Upon presentation to the ED, temperature 97.9° F, heart rate 87, blood pressure 150/71, respiratory rate 18 SpO2 98% on room air.  Labs with glucose 246 and troponin 0.020 and BNP 16.7.  EKG normal sinus rhythm and nonspecific ST and T-wave abnormalities.  Chest x-ray with questionable pulmonary vascular congestion.  CT of the head with no acute intracranial abnormalities but chronic microvascular ischemic changes.  CTA multiphase stroke event no hemodynamically significant stenosis, occlusion, aneurysm or dissection.  In ED patient received full-dose aspirin and labetalol.  She is admitted to hospital medicine services for further medical management.  Upon further clarification she had a syncopal event at around 7:15 a.m., she took her 80 units of insulin long-acting at 6:00 a.m. and was 43 glucose when she took the 80 units.  She thought the lower the glucose the better.  MRI brain was negative for acute abnormalities.  MRI cervical spine showed spondylotic changes without evidence of significant central canal stenosis or neuroforaminal narrowing.  MRI lumbar spine  without contrast showed severe degenerative spinal canal stenosis L3-L5, mild at other levels with impingement on the descending bilateral S1 nerve roots at L5-S1.    Today  She was asking about going home, discussed her potassium is high and needs to come down 1st.  We are also looking for SNF for her due to her weakness.        OBJECTIVE/PHYSICAL EXAM     VITAL SIGNS (MOST RECENT):  Temp: 97.5 °F (36.4 °C) (07/12/23 0815)  Pulse: 70 (07/12/23 0816)  Resp: 16 (07/12/23 0816)  BP: 121/68 (07/12/23 0815)  SpO2: 97 % (oxygen level was at 91 prior to tx. on room air. placed on NC and raised to 97) (07/12/23 0809) VITAL SIGNS (24 HOUR RANGE):  Temp:  [97.5 °F (36.4 °C)-98.9 °F (37.2 °C)] 97.5 °F (36.4 °C)  Pulse:  [70-84] 70  Resp:  [13-20] 16  SpO2:  [96 %-98 %] 97 %  BP: (116-158)/(67-83) 121/68   GENERAL: In no acute distress, afebrile  HEENT:  CHEST: Clear to auscultation bilaterally  HEART: S1, S2, no appreciable murmur  ABDOMEN: Soft, nontender, BS +  MSK: Warm, no lower extremity edema, no clubbing or cyanosis  NEUROLOGIC: Alert and oriented x4, right upper extremity 4/5, right lower extremity 3/5, left side 5/5  INTEGUMENTARY:  PSYCHIATRY:        ASSESSMENT/PLAN   Right-sided weakness and neuropathy  Syncope-likely 2/2 hypoglycemia  Substernal chest pain-resolved   Hypertriglyceridemia  Severe lumbar spinal stenosis L3-L5  Nerve impingement descending bilateral S1 nerve roots L5-S1    History of: HTN, HLD, insulin-dependent diabetes mellitus, anxiety/depression, CVA, rheumatoid arthritis, sciatica, TBI, mildly reduced systolic 45% ejection fraction      Neurology signed off.    Neurosurgery following.  Pending MRI thoracic spine.  Adjust insulin dosing to prevent hypoglycemic events   Cardiology signed off.  Outpatient follow-up with Dr. Hong.  Was told she needed home oxygen but she declined.  Will likely need home oxygen upon discharge.  Start scheduled 1 g Tylenol t.i.d., tizanidine 2 mg t.i.d., tramadol 50  mg t.i.d..  Continue gabapentin 800 t.i.d..  Discontinue fenofibrate in case muscle related side effects.    Case management consulted for rehab/SNF placement continue PT and OT.  Continue Lasix 40 b.i.d. oral.  Discontinue hydralazine.  Discontinue valsartan due to hyperkalemia temporarily.  Kayexalate and Lokelma today.  Repeat labs later today.    DVT prophylaxis:  Xarelto 10     Anticipated discharge and disposition:   __________________________________________________________________________    LABS/MICRO/MEDS/DIAGNOSTICS       LABS  Recent Labs     07/12/23  0447   *   K 6.2*   CHLORIDE 95*   CO2 29   BUN 33.8*   CREATININE 1.29*   GLUCOSE 399*   CALCIUM 9.5       No results for input(s): WBC, RBC, HCT, MCV, PLT in the last 72 hours.    Invalid input(s):       MICROBIOLOGY  Microbiology Results (last 7 days)       ** No results found for the last 168 hours. **               MEDICATIONS   acetaminophen  1,000 mg Oral TID    albuterol-ipratropium  3 mL Nebulization Q6H WAKE    aspirin  81 mg Oral Daily    atorvastatin  40 mg Oral Daily    carvediloL  3.125 mg Oral BID    DULoxetine  60 mg Oral Daily    fluticasone furoate-vilanteroL  1 puff Inhalation Daily    furosemide  40 mg Oral BID    gabapentin  800 mg Oral TID    insulin aspart U-100  20 Units Subcutaneous TIDWM    insulin detemir U-100  40 Units Subcutaneous BID    nicotine  1 patch Transdermal Daily    NIFEdipine  90 mg Oral Daily    rivaroxaban  10 mg Oral Daily with dinner    senna-docusate 8.6-50 mg  2 tablet Oral BID    sodium chloride 0.9%  10 mL Intravenous Q8H    sodium polystyrene sulfonate  15 g Oral Once    sodium zirconium cyclosilicate  10 g Oral TID    tiZANidine  2 mg Oral TID    traMADoL  50 mg Oral TID         INFUSIONS           DIAGNOSTIC TESTS  Fl Modified Barium Swallow Speech   Final Result      X-Ray Hip 2 or 3 views Right (with Pelvis when performed)   Final Result      No acute osseous process appreciated.          Electronically signed by: Sung Elaine   Date:    07/10/2023   Time:    16:49      MRI Lumbar Spine Without Contrast   Final Result      1. Severe degenerative spinal canal stenosis at L3-L5, mild at other levels with impingement on the descending bilateral S1 nerve roots at L5-S1.   2. Mild neural foraminal stenoses as described.         Electronically signed by: Kelsie Prescott   Date:    07/10/2023   Time:    10:15      X-Ray Chest 1 View   Final Result      Likely changes of interstitial edema without significant interval change.         Electronically signed by: Frank Lr MD   Date:    07/09/2023   Time:    12:55      MRI Cervical Spine Without Contrast   Final Result      Spondylotic changes of the cervical spine without evidence for significant central canal stenosis or neural foraminal narrowing.         Electronically signed by: Frank Lr MD   Date:    07/08/2023   Time:    16:46      X-Ray Ankle Complete Right   Final Result      No acute osseous abnormality.         Electronically signed by: Pham Whipple   Date:    07/07/2023   Time:    12:27      X-Ray Elbow Complete 3 views Right   Final Result      No acute osseous abnormality.         Electronically signed by: Pham Whipple   Date:    07/07/2023   Time:    12:26      X-ray Shoulder 2 or More Views Right   Final Result      No acute osseous abnormality.         Electronically signed by: Pham Whipple   Date:    07/07/2023   Time:    12:26      MRI BRAIN WITHOUT CONTRAST   Final Result   Impression:      1. No acute intracranial hemorrhage is identified.      2. No abnormal signal is identified on the diffusion images to suggest acute infarct.      3. No acute intracranial process is identified.      No significant discrepancy with overnight report.         Electronically signed by: Davon Thakkar   Date:    07/07/2023   Time:    08:18      CTA STROKE MULTI-PHASE   Final Result      No hemodynamically significant stenosis, occlusion,  aneurysm or dissection.         Electronically signed by: Pham Whipple   Date:    07/06/2023   Time:    15:06      X-Ray Chest AP Portable   Final Result      Question some pulmonary vascular congestion.         Electronically signed by: Mauri Morris   Date:    07/06/2023   Time:    15:04      CT HEAD FOR STROKE   Final Result      1. No acute intracranial abnormality.   2. Chronic microvascular ischemic changes.   Code fast findings given to Dr. Noel at the time of dictation.         Electronically signed by: Kelsie Prescott   Date:    07/06/2023   Time:    14:25      MRI Thoracic Spine Without Contrast    (Results Pending)        EF   Date Value Ref Range Status   07/07/2023 45 % Final   02/14/2023 51 % Final            Case related differential diagnoses have been reviewed; assessment and plan has been documented. I have personally reviewed the labs and test results that are currently available; I have reviewed the patients medication list. I have reviewed the consulting providers recommendations. I have reviewed or attempted to review medical records based upon their availability.  All of the patient's and/or family's questions have been addressed and answered to the best of my ability.  I will continue to monitor closely and make adjustments to medical management as needed.  This document was created using M*Modal Fluency Direct.  Transcription errors may have been made.  Please contact me if any questions may rise regarding documentation to clarify transcription.        Chandu Monroy MD   Internal Medicine  Department of Hospital Medicine  [facility] - 9th Floor Med Surg

## 2023-07-12 NOTE — OP NOTE
[facility]                       [address]                      [place] [address]    PATIENT NAME:      JASON ALONSO  YOB: 1960  CSN:               [id number]  MRN:               [id number]  ADMIT DATE:        07/06/2023 14:16:00  PHYSICIAN:         Frank Pickens MD                          OPERATIVE REPORT      DATE OF SURGERY:    07/11/2023 00:00:00    SURGEON:  Frank Pickens MD    PREOPERATIVE DIAGNOSES:    1. Dysphonia.  2. Dysphasia.    POSTOPERATIVE DIAGNOSES:    1. Dysphonia.  2. Dysphasia.    PROCEDURE PERFORMED:  Flexible fiberoptic laryngoscopy.    FINDINGS:  Bilateral true vocal cord movement.  Stable airway.  Bilateral   lingual tonsil hypertrophy.    ANESTHESIA:  Topical.    COMPLICATIONS:  None.    BLOOD LOSS:  None.    PROCEDURE IN DETAIL:  After informed consent was obtained, the nose and pharynx   were sprayed with topical Afrin and 2% plain lidocaine.  After sufficient time   was allowed for medication to take effect, a flexible fiberoptic laryngoscope   was passed along the floor of the nose on the left side and then again on the   right side.  No lesions or masses were noted in the nasal cavity.  The scope was   then passed through the nasopharynx on the right side into the oropharynx   visualized in the hypopharynx.  Bilateral vocal cord movement was noted.  Stable   airway.  There was bilateral lingual tonsil hypertrophy on the posterior aspect   of the base of the tongue.  The scope was then gently retracted.  The patient   tolerated the procedure well.        ______________________________  MD VICKEY Eaton/NEISHA  DD:  07/11/2023  Time:  04:58PM  DT:  07/11/2023  Time:  07:00PM  Job #:  [id number]      OPERATIVE REPORT

## 2023-07-12 NOTE — PLAN OF CARE
Received notification from Radha at [facility] that patient is not eligible for Hospital Exemption due to being observation status instead of inpatient. She instructed me to correct her pasrr to no longer reflect hospital exemption and resubmit. I did as directed and am now awaiting patient's Letter of Consideration from [facility].

## 2023-07-12 NOTE — PT/OT/SLP PROGRESS
Speech Language Pathology Department  Dysphagia Therapy Progress Note    Patient Name:  Elba Barnes   MRN:  [id number]    Recommendations:     General recommendations:  Speech/Language and Cognitive Evaluation  Diet recommendations:  Soft & Bite Sized Diet - IDDSI Level 6, Liquid Diet Level: Mildly thick liquids - IDDSI Level 2   Aspiration precautions: small bites/sips and slow rate    Subjective     Patient awake and alert.  Pain/Comfort:  0/10  Spiritual/Cultural/Restoration Beliefs/Practices that affect care: no    Objective:     Oral Musculature  Dentition: own teeth  Secretion Management: adequate  Facial Movement: WFL    Therapeutic PO Trials:  Consistency Amount Fed By Oral Symptoms Pharyngeal Symptoms   Soft and bite sized 3 oz Self None None     Assessment:     REC: upgrade diet to soft and bite sized. Pt continues to present with oropharyngeal dysphagia requiring diet modification to reduce the risk of aspiration.    Goals:     Multidisciplinary Problems       SLP Goals          Problem: SLP    Goal Priority Disciplines Outcome   SLP Goal     SLP    Description: LTG: To tolerate least restrictive diet without clinical signs/sx of aspiration.   ST. Tongue base/laryngeal excursion exercise   2. Tolerate thermal stimulation x10 with 100% swallow response with less than a 2 second delay.   3. Soft and bite sized trials (Completed)                       Patient Education:     Patient provided with verbal education regarding POC.  Understanding was verbalized.    Plan:     Will continue to follow and tx as appropriate.    SLP Follow-Up:  Yes   Patient to be seen:  5 x/week   Plan of Care expires:  23  Plan of Care reviewed with:  patient       Time Tracking:     SLP Treatment Date:    940  Speech Start Time:   940  Speech Stop Time:      50  Speech Total Time (min):   10    Billable minutes:  Treatment of Swallow Dysfunction, 10 minutes       2023

## 2023-07-12 NOTE — PLAN OF CARE
Called to check on the status of the psych consult I thought was called in yesterday afternoon by BO Mohan. Was informed by Adriana that it was not called in, so I gave her the necessary information to schedule the consult for nursing home placement. Awaiting consult for level .

## 2023-07-13 LAB
ANION GAP SERPL CALC-SCNC: 9 MEQ/L
BUN SERPL-MCNC: 35.9 MG/DL (ref 9.8–20.1)
CALCIUM SERPL-MCNC: 9.3 MG/DL (ref 8.4–10.2)
CHLORIDE SERPL-SCNC: 96 MMOL/L (ref 98–107)
CO2 SERPL-SCNC: 31 MMOL/L (ref 23–31)
CREAT SERPL-MCNC: 1.18 MG/DL (ref 0.55–1.02)
CREAT/UREA NIT SERPL: 30
GFR SERPLBLD CREATININE-BSD FMLA CKD-EPI: 53 MLS/MIN/1.73/M2
GLUCOSE SERPL-MCNC: 387 MG/DL (ref 82–115)
POCT GLUCOSE: 274 MG/DL (ref 70–110)
POCT GLUCOSE: 391 MG/DL (ref 70–110)
POCT GLUCOSE: 443 MG/DL (ref 70–110)
POTASSIUM SERPL-SCNC: 4.7 MMOL/L (ref 3.5–5.1)
SODIUM SERPL-SCNC: 136 MMOL/L (ref 136–145)

## 2023-07-13 PROCEDURE — 25000242 PHARM REV CODE 250 ALT 637 W/ HCPCS: Performed by: INTERNAL MEDICINE

## 2023-07-13 PROCEDURE — 97535 SELF CARE MNGMENT TRAINING: CPT | Mod: CO

## 2023-07-13 PROCEDURE — 27000221 HC OXYGEN, UP TO 24 HOURS

## 2023-07-13 PROCEDURE — 80048 BASIC METABOLIC PNL TOTAL CA: CPT | Performed by: INTERNAL MEDICINE

## 2023-07-13 PROCEDURE — 63600175 PHARM REV CODE 636 W HCPCS: Performed by: INTERNAL MEDICINE

## 2023-07-13 PROCEDURE — 94640 AIRWAY INHALATION TREATMENT: CPT | Mod: XB

## 2023-07-13 PROCEDURE — 97116 GAIT TRAINING THERAPY: CPT | Mod: CQ

## 2023-07-13 PROCEDURE — 94660 CPAP INITIATION&MGMT: CPT

## 2023-07-13 PROCEDURE — 25000003 PHARM REV CODE 250: Performed by: PHYSICIAN ASSISTANT

## 2023-07-13 PROCEDURE — 96372 THER/PROPH/DIAG INJ SC/IM: CPT | Performed by: INTERNAL MEDICINE

## 2023-07-13 PROCEDURE — 25000003 PHARM REV CODE 250: Performed by: NURSE PRACTITIONER

## 2023-07-13 PROCEDURE — 94761 N-INVAS EAR/PLS OXIMETRY MLT: CPT

## 2023-07-13 PROCEDURE — 99900035 HC TECH TIME PER 15 MIN (STAT)

## 2023-07-13 PROCEDURE — A4216 STERILE WATER/SALINE, 10 ML: HCPCS | Performed by: PHYSICIAN ASSISTANT

## 2023-07-13 PROCEDURE — 99900031 HC PATIENT EDUCATION (STAT)

## 2023-07-13 PROCEDURE — G0378 HOSPITAL OBSERVATION PER HR: HCPCS

## 2023-07-13 PROCEDURE — 25000003 PHARM REV CODE 250

## 2023-07-13 PROCEDURE — S4991 NICOTINE PATCH NONLEGEND: HCPCS | Performed by: PHYSICIAN ASSISTANT

## 2023-07-13 PROCEDURE — 25000003 PHARM REV CODE 250: Performed by: INTERNAL MEDICINE

## 2023-07-13 RX ORDER — INSULIN ASPART 100 [IU]/ML
0-15 INJECTION, SOLUTION INTRAVENOUS; SUBCUTANEOUS EVERY 6 HOURS PRN
Status: DISCONTINUED | OUTPATIENT
Start: 2023-07-13 | End: 2023-07-17 | Stop reason: HOSPADM

## 2023-07-13 RX ORDER — ENOXAPARIN SODIUM 100 MG/ML
40 INJECTION SUBCUTANEOUS EVERY 12 HOURS
Status: DISCONTINUED | OUTPATIENT
Start: 2023-07-13 | End: 2023-07-14

## 2023-07-13 RX ADMIN — IPRATROPIUM BROMIDE AND ALBUTEROL SULFATE 3 ML: 2.5; .5 SOLUTION RESPIRATORY (INHALATION) at 08:07

## 2023-07-13 RX ADMIN — INSULIN DETEMIR 40 UNITS: 100 INJECTION, SOLUTION SUBCUTANEOUS at 08:07

## 2023-07-13 RX ADMIN — GABAPENTIN 800 MG: 400 CAPSULE ORAL at 08:07

## 2023-07-13 RX ADMIN — GABAPENTIN 800 MG: 400 CAPSULE ORAL at 09:07

## 2023-07-13 RX ADMIN — ACETAMINOPHEN 650 MG: 325 TABLET, FILM COATED ORAL at 02:07

## 2023-07-13 RX ADMIN — CARVEDILOL 3.12 MG: 3.12 TABLET, FILM COATED ORAL at 08:07

## 2023-07-13 RX ADMIN — TRAMADOL HYDROCHLORIDE 50 MG: 50 TABLET, COATED ORAL at 09:07

## 2023-07-13 RX ADMIN — TIZANIDINE 2 MG: 2 TABLET ORAL at 03:07

## 2023-07-13 RX ADMIN — ACETAMINOPHEN 1000 MG: 500 TABLET, FILM COATED ORAL at 09:07

## 2023-07-13 RX ADMIN — ACETAMINOPHEN 1000 MG: 500 TABLET, FILM COATED ORAL at 03:07

## 2023-07-13 RX ADMIN — INSULIN ASPART 15 UNITS: 100 INJECTION, SOLUTION INTRAVENOUS; SUBCUTANEOUS at 05:07

## 2023-07-13 RX ADMIN — INSULIN ASPART 10 UNITS: 100 INJECTION, SOLUTION INTRAVENOUS; SUBCUTANEOUS at 12:07

## 2023-07-13 RX ADMIN — CARVEDILOL 3.12 MG: 3.12 TABLET, FILM COATED ORAL at 09:07

## 2023-07-13 RX ADMIN — NICOTINE 1 PATCH: 7 PATCH, EXTENDED RELEASE TRANSDERMAL at 08:07

## 2023-07-13 RX ADMIN — SODIUM CHLORIDE, PRESERVATIVE FREE 10 ML: 5 INJECTION INTRAVENOUS at 02:07

## 2023-07-13 RX ADMIN — ACETAMINOPHEN 1000 MG: 500 TABLET, FILM COATED ORAL at 08:07

## 2023-07-13 RX ADMIN — FLUTICASONE FUROATE AND VILANTEROL TRIFENATATE 1 PUFF: 200; 25 POWDER RESPIRATORY (INHALATION) at 08:07

## 2023-07-13 RX ADMIN — INSULIN DETEMIR 44 UNITS: 100 INJECTION, SOLUTION SUBCUTANEOUS at 09:07

## 2023-07-13 RX ADMIN — SODIUM CHLORIDE, PRESERVATIVE FREE 10 ML: 5 INJECTION INTRAVENOUS at 05:07

## 2023-07-13 RX ADMIN — NIFEDIPINE 90 MG: 90 TABLET, FILM COATED, EXTENDED RELEASE ORAL at 08:07

## 2023-07-13 RX ADMIN — IPRATROPIUM BROMIDE AND ALBUTEROL SULFATE 3 ML: 2.5; .5 SOLUTION RESPIRATORY (INHALATION) at 01:07

## 2023-07-13 RX ADMIN — ASPIRIN 81 MG: 81 TABLET, COATED ORAL at 08:07

## 2023-07-13 RX ADMIN — FUROSEMIDE 40 MG: 40 TABLET ORAL at 08:07

## 2023-07-13 RX ADMIN — SODIUM CHLORIDE, PRESERVATIVE FREE 10 ML: 5 INJECTION INTRAVENOUS at 09:07

## 2023-07-13 RX ADMIN — INSULIN ASPART 20 UNITS: 100 INJECTION, SOLUTION INTRAVENOUS; SUBCUTANEOUS at 05:07

## 2023-07-13 RX ADMIN — ATORVASTATIN CALCIUM 40 MG: 40 TABLET, FILM COATED ORAL at 08:07

## 2023-07-13 RX ADMIN — FUROSEMIDE 40 MG: 40 TABLET ORAL at 05:07

## 2023-07-13 RX ADMIN — TIZANIDINE 2 MG: 2 TABLET ORAL at 09:07

## 2023-07-13 RX ADMIN — TRAMADOL HYDROCHLORIDE 50 MG: 50 TABLET, COATED ORAL at 03:07

## 2023-07-13 RX ADMIN — GABAPENTIN 800 MG: 400 CAPSULE ORAL at 03:07

## 2023-07-13 RX ADMIN — ENOXAPARIN SODIUM 40 MG: 40 INJECTION SUBCUTANEOUS at 09:07

## 2023-07-13 RX ADMIN — DULOXETINE HYDROCHLORIDE 60 MG: 30 CAPSULE, DELAYED RELEASE ORAL at 08:07

## 2023-07-13 RX ADMIN — SENNOSIDES AND DOCUSATE SODIUM 2 TABLET: 50; 8.6 TABLET ORAL at 08:07

## 2023-07-13 RX ADMIN — TRAMADOL HYDROCHLORIDE 50 MG: 50 TABLET, COATED ORAL at 08:07

## 2023-07-13 RX ADMIN — SENNOSIDES AND DOCUSATE SODIUM 2 TABLET: 50; 8.6 TABLET ORAL at 09:07

## 2023-07-13 RX ADMIN — INSULIN ASPART 20 UNITS: 100 INJECTION, SOLUTION INTRAVENOUS; SUBCUTANEOUS at 11:07

## 2023-07-13 NOTE — PSYCH
Unable to complete eval earlier in the day, pt current being sedated/prepped for MRI. Will reattempt tomorrow.

## 2023-07-13 NOTE — PLAN OF CARE
Letter of Consideration received. Awaiting psych assessment to fax completed information to [facility] for review.

## 2023-07-13 NOTE — PLAN OF CARE
GRICEL -downgrade to OBSERVATION status explained to patient.  Questions answered.  Emailed MONSALVE to [employer] for delivery.

## 2023-07-13 NOTE — PLAN OF CARE
Problem: Violence Risk or Actual  Goal: Anger and Impulse Control  Outcome: Ongoing, Progressing     Problem: Adult Inpatient Plan of Care  Goal: Plan of Care Review  Outcome: Ongoing, Progressing  Goal: Patient-Specific Goal (Individualized)  Outcome: Ongoing, Progressing  Goal: Absence of Hospital-Acquired Illness or Injury  Outcome: Ongoing, Progressing  Goal: Optimal Comfort and Wellbeing  Outcome: Ongoing, Progressing  Goal: Readiness for Transition of Care  Outcome: Ongoing, Progressing     Problem: Bariatric Environmental Safety  Goal: Safety Maintained with Care  Outcome: Ongoing, Progressing     Problem: Adjustment to Illness (Stroke, Ischemic/Transient Ischemic Attack)  Goal: Optimal Coping  Outcome: Ongoing, Progressing     Problem: Bowel Elimination Impaired (Stroke, Ischemic/Transient Ischemic Attack)  Goal: Effective Bowel Elimination  Outcome: Ongoing, Progressing     Problem: Cerebral Tissue Perfusion (Stroke, Ischemic/Transient Ischemic Attack)  Goal: Optimal Cerebral Tissue Perfusion  Outcome: Ongoing, Progressing     Problem: Cognitive Impairment (Stroke, Ischemic/Transient Ischemic Attack)  Goal: Optimal Cognitive Function  Outcome: Ongoing, Progressing     Problem: Communication Impairment (Stroke, Ischemic/Transient Ischemic Attack)  Goal: Improved Communication Skills  Outcome: Ongoing, Progressing     Problem: Functional Ability Impaired (Stroke, Ischemic/Transient Ischemic Attack)  Goal: Optimal Functional Ability  Outcome: Ongoing, Progressing     Problem: Respiratory Compromise (Stroke, Ischemic/Transient Ischemic Attack)  Goal: Effective Oxygenation and Ventilation  Outcome: Ongoing, Progressing     Problem: Sensorimotor Impairment (Stroke, Ischemic/Transient Ischemic Attack)  Goal: Improved Sensorimotor Function  Outcome: Ongoing, Progressing     Problem: Swallowing Impairment (Stroke, Ischemic/Transient Ischemic Attack)  Goal: Optimal Eating and Swallowing without Aspiration  Outcome:  Ongoing, Progressing     Problem: Urinary Elimination Impaired (Stroke, Ischemic/Transient Ischemic Attack)  Goal: Effective Urinary Elimination  Outcome: Ongoing, Progressing     Problem: Diabetes Comorbidity  Goal: Blood Glucose Level Within Targeted Range  Outcome: Ongoing, Progressing     Problem: Skin Injury Risk Increased  Goal: Skin Health and Integrity  Outcome: Ongoing, Progressing     Problem: Fall Injury Risk  Goal: Absence of Fall and Fall-Related Injury  Outcome: Ongoing, Progressing

## 2023-07-13 NOTE — PROGRESS NOTES
[facility]  [facility] Progress Note        Chief Complaint: Inpatient Follow-up    HPI:     62-year-old  female with significant history of HTN, HLD, anxiety/depression, type 2 diabetes mellitus, CVA, rheumatoid arthritis, sciatica, traumatic brain injury was admitted hospitalist medicine service with complaints of right facial droop, right leg and arm weakness, slurry speech, blurry vision, headache and right-sided chest pain paid patient was hemodynamically stable in the ED.  Lab significant for hyperglycemia.  EKG with nonspecific ST T-wave abnormalities . chest x-ray with concerns for possible pulmonary vascular congestion.  CT head negative.  CT angiogram with no hemodynamically significant stenosis . patient admitted to hospitalist medicine service for stroke workup.  MRI brain came back negative for acute changes.  MRI C-spine with cervical spondylosis.  MRI L-spine with severe degenerative spinal canal stenosis at L3-L5 with impingement on the descending bilateral S1 nerve roots at L5-S1.  It is to be noted that prior to patient's symptoms she had taken long-acting insulin 80 units at home.  Her CBG was 43 when she took the 80 units . she reportedly thought lower the glucose the better.  Evaluated by Neurology.  Recommended to continue aspirin, statin paid recommended neurosurgery consult given MRI L-spine results .  neurosurgery consulted and they additionally ordered MRI T-spine.  Therapy Services on board.  Recommending skilled nursing facility placement . patient hesitant to consider placement.  Evaluated by Cardiology given intermittent chest pain.  EKG with nonspecific changes . recommended outpatient stress testing .  volume status stable .  recommended to continue aspirin along with statin.  Patient also was complaining of dysphagia.  Evaluated by ENT, she was found to have bilateral lingual tonsil hypertrophy.  This might be contributing to  dysphagia and ENT recommended outpatient follow-up to address this.  MRI T-spine with mild spinal canal narrowing at T10-T11.  Neurosurgery re-evaluated and recommended lumbar decompression.    Interval Hx:   Patient seen at bedside.  Comfortably sitting in the couch.  Hemodynamics stable.  No acute events overnight.  No new complaints.  She is globally weak.    Objective/physical exam:  General: In no acute distress, afebrile  Chest: Clear to auscultation bilaterally  Heart: S1, S2, no appreciable murmur  Abdomen: Soft, nontender, BS +  MSK: Warm, no lower extremity edema, no clubbing or cyanosis  Neurologic: Alert and oriented x4, globally weak, more weakness on right side    VITAL SIGNS: 24 HRS MIN & MAX LAST   Temp  Min: 97.5 °F (36.4 °C)  Max: 97.8 °F (36.6 °C) 97.8 °F (36.6 °C)   BP  Min: 121/68  Max: 139/79 139/79   Pulse  Min: 64  Max: 96  91   Resp  Min: 13  Max: 22 16   SpO2  Min: 93 %  Max: 98 % 98 %       Recent Labs   Lab 07/07/23  0441   WBC 7.59   RBC 5.12   HGB 14.3   HCT 45.2   MCV 88.3   MCH 27.9   MCHC 31.6*   RDW 15.2      MPV 9.2         Recent Labs   Lab 07/07/23  0441 07/08/23  0552 07/12/23  0447 07/12/23  1754 07/13/23  0432      < > 133*   < > 136   K 3.9   < > 6.2*   < > 4.7   CO2 25   < > 29   < > 31   BUN 11.9   < > 33.8*   < > 35.9*   CREATININE 0.87   < > 1.29*   < > 1.18*   CALCIUM 8.9   < > 9.5   < > 9.3   MG 1.80  --  2.20  --   --    ALBUMIN 3.0*  --   --   --   --    ALKPHOS 67  --   --   --   --    ALT 17  --   --   --   --    AST 16  --   --   --   --    BILITOT 0.2  --   --   --   --     < > = values in this interval not displayed.          Microbiology Results (last 7 days)       ** No results found for the last 168 hours. **             Scheduled Med:   acetaminophen  1,000 mg Oral TID    albuterol-ipratropium  3 mL Nebulization Q6H WAKE    aspirin  81 mg Oral Daily    atorvastatin  40 mg Oral Daily    carvediloL  3.125 mg Oral BID    DULoxetine  60 mg Oral Daily     fluticasone furoate-vilanteroL  1 puff Inhalation Daily    furosemide  40 mg Oral BID    gabapentin  800 mg Oral TID    insulin aspart U-100  20 Units Subcutaneous TIDWM    insulin detemir U-100  40 Units Subcutaneous BID    insulin regular  5 Units Intravenous Once    nicotine  1 patch Transdermal Daily    NIFEdipine  90 mg Oral Daily    rivaroxaban  10 mg Oral Daily with dinner    senna-docusate 8.6-50 mg  2 tablet Oral BID    sodium chloride 0.9%  10 mL Intravenous Q8H    tiZANidine  2 mg Oral TID    traMADoL  50 mg Oral TID          Assessment/Plan:    Right-sided weakness with neuropathy  Severe lumbar spinal stenosis with nerve impingement L5-S1  Syncope on admit likely secondary to hypoglycemia  Intermittent atypical chest pain-improved   Dysphagia secondary to bilateral lingual tonsil hypertrophy  Chronic systolic/diastolic heart failure with ejection fraction-45%  HLD   Essential HTN-stable   Type 2 diabetes mellitus-poorly controlled with hyperglycemia   Anxiety/depression  History of CVA  Rheumatoid arthritis  History of traumatic brain injury   Prophylaxis      Evaluated by Neurology, Neurosurgery   MRI brain was negative   Neurosurgery planning for lumbar decompression   Timing to be decided   Evaluated by Cardiology for intermittent atypical chest pain   EF-45%, patient is compensated   Planning for outpatient stress test  Evaluated by ENT for bilateral lingual tonsillar hypertrophy, planning for outpatient treatment   Continue aggressive therapy services   Continue home meds-aspirin, statin, Coreg, Lasix, Procardia  Levemir, sliding scale for diabetes mellitus  Levemir increased to 44 units b.i.d. for better control of CBG is   She is also on scheduled short-acting insulin 20 units t.i.d.  Continue multimodal pain control for neuropathy   She is on gabapentin, Tylenol, tizanidine, tramadol  Continue nebulized bronchodilators  Continue nicotine patch  DVT prophylaxis-I will switch Xarelto to Lovenox  pending decision from Neurosurgery as far as timing for lumbar decompression      Jennifer Santos MD   07/13/2023

## 2023-07-13 NOTE — PT/OT/SLP PROGRESS
Physical Therapy Treatment    Patient Name:  Elba Barnes   MRN:  [id number]    Recommendations:     Discharge Recommendations: nursing facility, skilled  Discharge Equipment Recommendations: walker, rolling  Barriers to discharge: Impaired mobility and Ongoing medical needs    Assessment:     Elba Barnes is a 62 y.o. female admitted with a medical diagnosis of  weakness, h/o chronic CVA with RUE/RLE weakness.  She presents with the following impairments/functional limitations: weakness, impaired endurance, impaired self care skills, impaired functional mobility, gait instability, impaired balance, decreased lower extremity function, decreased safety awareness, pain.     Rehab Prognosis: Good; patient would benefit from acute skilled PT services to address these deficits and reach maximum level of function.    Recent Surgery: * No surgery found *      Plan:     During this hospitalization, patient to be seen 5 x/week to address the identified rehab impairments via therapeutic exercises, therapeutic activities, gait training and progress toward the following goals:    Plan of Care Expires:  07/31/23    Subjective     Chief Complaint: R anterior thigh pain  Patient/Family Comments/goals: to get out of here  Pain/Comfort:   Not rated    Objective:     Patient found HOB elevated upon PT entry to room.     General Precautions: Standard, fall  Orthopedic Precautions: N/A  Braces: N/A  Respiratory Status: Nasal cannula, flow 3 L/min    Functional Mobility:  Bed Mobility:     Supine <> Sit: Sindy  Transfers:     Sit <> Stand:  CGA-Sindy with rolling walker.   Gait: Patient ambulated 54ftx2 Sindy with RW with step to gait pattern. Patient presented with antalgic gait pattern, increased stance time of LLE and decrease heel strike on RLE and WBOS.     Patient left supine with call button in reach..    GOALS:   Multidisciplinary Problems       Physical Therapy Goals          Problem: Physical Therapy    Goal Priority  Disciplines Outcome Goal Variances Interventions   Physical Therapy Goal     PT, PT/OT Ongoing, Progressing     Description: Goals to be met by: 23     Patient will increase functional independence with mobility by performin. Supine to sit with Modified [place]  2. Sit to stand transfer with Modified [place]  3. Gait  x 50 feet with Supervision using Rolling Walker.                          Time Tracking:     PT Received On: 23  PT Start Time: 906     PT Stop Time: 924  PT Total Time (min): 18 min     Billable Minutes: Gait Training 18    Treatment Type: Treatment  PT/PTA: PTA     Number of PTA visits since last PT visit: 4     2023

## 2023-07-13 NOTE — PROGRESS NOTES
"7/13/2023  Elba Barnes   1960   [id number]            Psychiatry Inpatient Admission Note    Date of Admission: 7/6/2023  2:16 PM    Chief Complaint: "I am going home when I leave her. I am fine. There is nothing wrong wit me".    SUBJECTIVE:   History of Present Illness:   Elba Barnes is a 62 y.o. female with a past medical history of hypertension, hyperlipidemia, anxiety/depression, diabetes mellitus type 2, CVA, rheumatoid arthritis, sciatica and TBI. Psych consulted for Level II. At time of visit, she was observed lying in bed. She was easy to arouse. She indicates she has left side weakness. She reports she had another stroke. She reports she had a CVA . She reports she has been feeling weak. She verbalized understanding the need for therapy. However, she reports her daughter and granddaughter will be in the home. She states she has an option to either go to PT or PT can go to her home for strengthening exercises. She reports her family feels something is wrong with her. She continues to grieve the loss of her son. He was 21 years old when he was murdered. She reports she grieves for him on holidays, month hr and birthdays. She denies thoughts to harm self and others. She reports she is sleeping well. Appetite is good. Energy is decreased to leg pain. She denies AVH an paranoia.     Past Psychiatric History:   Previous Psychiatric Hospitalizations:[facility] a month ago   Previous Medication Trials: she does not recall  Previous Suicide Attempts: she denies   Outpatient psychiatrist: [facility]    Past Medical/Surgical History:   Past Medical History:   Diagnosis Date    Anxiety     Arthritis     CVA (cerebral vascular accident)     "mini stroke"    Depression     Diabetes mellitus     Heart problem     History of psychiatric hospitalization     three(1983, 1995 and another (years ago")): depression    HTN (hypertension)     Hx of psychiatric care     Hypertension     Pacemaker     Psychiatric exam " requested by authority     Psychiatric problem     TBI (traumatic brain injury)     Therapy      Past Surgical History:   Procedure Laterality Date    CARDIAC PACEMAKER PLACEMENT      CARDIAC PACEMAKER REMOVAL      CHOLECYSTECTOMY      HYSTERECTOMY         Family Psychiatric History:   MDD-parents  Substance use- youngest brother    Allergies:   Review of patient's allergies indicates:   Allergen Reactions    Pcn [penicillins] Anaphylaxis       Substance Abuse History:   Tobacco: she denies  Alcohol: she denies  Illicit Substances: she denies  Treatment: she denies      Current Medications:   Home Psychiatric Meds: xanax and zoloft    Scheduled Meds:    acetaminophen  1,000 mg Oral TID    albuterol-ipratropium  3 mL Nebulization Q6H WAKE    aspirin  81 mg Oral Daily    atorvastatin  40 mg Oral Daily    carvediloL  3.125 mg Oral BID    DULoxetine  60 mg Oral Daily    enoxparin  40 mg Subcutaneous Q12H    fluticasone furoate-vilanteroL  1 puff Inhalation Daily    furosemide  40 mg Oral BID    gabapentin  800 mg Oral TID    insulin aspart U-100  20 Units Subcutaneous TIDWM    insulin detemir U-100  44 Units Subcutaneous BID    insulin regular  5 Units Intravenous Once    nicotine  1 patch Transdermal Daily    NIFEdipine  90 mg Oral Daily    senna-docusate 8.6-50 mg  2 tablet Oral BID    sodium chloride 0.9%  10 mL Intravenous Q8H    tiZANidine  2 mg Oral TID    traMADoL  50 mg Oral TID      PRN Meds: acetaminophen, ALPRAZolam, dextrose 10%, dextrose 10%, dextrose 50%, glucagon (human recombinant), insulin aspart U-100, insulin aspart U-100, labetalol, lorazepam   Psychotherapeutics (From admission, onward)      Start     Stop Route Frequency Ordered    07/12/23 0049  LORazepam (ATIVAN) injection 1 mg         -- IV On Call Procedure 07/12/23 0049    07/07/23 0900  DULoxetine DR capsule 60 mg         -- Oral Daily 07/07/23 0608    07/07/23 0704  ALPRAZolam tablet 1 mg         -- Oral Daily PRN 07/07/23 0608             Social History:  Housing Status: she resides in [place]  Relationship Status/Sexual Orientation: heterosexual   Children: 7 (2 are )  Education: 7th grade   Employment Status/Info: housekeeping, picked cotton, sugar cane industry    history: she denies  History of physical/sexual abuse: she denies   Access to gun: she denies       Legal History:   Past Charges/Incarcerations: fighting in her 20s   Pending charges: she denies      OBJECTIVE:       Vitals   Vitals:    23 1557   BP: (!) 170/80   Pulse: 85   Resp:    Temp: 98.3 °F (36.8 °C)        Labs/Imaging/Studies:   Recent Results (from the past 48 hour(s))   POCT glucose    Collection Time: 23  8:31 PM   Result Value Ref Range    POCT Glucose 463 (HH) 70 - 110 mg/dL   Basic Metabolic Panel    Collection Time: 23  4:47 AM   Result Value Ref Range    Sodium Level 133 (L) 136 - 145 mmol/L    Potassium Level 6.2 (H) 3.5 - 5.1 mmol/L    Chloride 95 (L) 98 - 107 mmol/L    Carbon Dioxide 29 23 - 31 mmol/L    Glucose Level 399 (H) 82 - 115 mg/dL    Blood Urea Nitrogen 33.8 (H) 9.8 - 20.1 mg/dL    Creatinine 1.29 (H) 0.55 - 1.02 mg/dL    BUN/Creatinine Ratio 26     Calcium Level Total 9.5 8.4 - 10.2 mg/dL    Anion Gap 9.0 mEq/L    eGFR 48 mls/min/1.73/m2   Magnesium    Collection Time: 23  4:47 AM   Result Value Ref Range    Magnesium Level 2.20 1.60 - 2.60 mg/dL   POCT glucose    Collection Time: 23  6:52 AM   Result Value Ref Range    POCT Glucose 364 (H) 70 - 110 mg/dL   POCT glucose    Collection Time: 23 11:22 AM   Result Value Ref Range    POCT Glucose 405 (H) 70 - 110 mg/dL   POCT glucose    Collection Time: 23 11:23 AM   Result Value Ref Range    POCT Glucose 405 (H) 70 - 110 mg/dL   Basic Metabolic Panel    Collection Time: 23  5:54 PM   Result Value Ref Range    Sodium Level 137 136 - 145 mmol/L    Potassium Level 5.3 (H) 3.5 - 5.1 mmol/L    Chloride 92 (L) 98 - 107 mmol/L    Carbon Dioxide  "30 23 - 31 mmol/L    Glucose Level 527 (HH) 82 - 115 mg/dL    Blood Urea Nitrogen 36.4 (H) 9.8 - 20.1 mg/dL    Creatinine 1.21 (H) 0.55 - 1.02 mg/dL    BUN/Creatinine Ratio 30     Calcium Level Total 9.8 8.4 - 10.2 mg/dL    Anion Gap 15.0 mEq/L    eGFR 51 mls/min/1.73/m2   POCT glucose    Collection Time: 07/12/23  9:27 PM   Result Value Ref Range    POCT Glucose 446 (H) 70 - 110 mg/dL   POCT glucose    Collection Time: 07/13/23 12:07 AM   Result Value Ref Range    POCT Glucose 443 (H) 70 - 110 mg/dL   Basic Metabolic Panel    Collection Time: 07/13/23  4:32 AM   Result Value Ref Range    Sodium Level 136 136 - 145 mmol/L    Potassium Level 4.7 3.5 - 5.1 mmol/L    Chloride 96 (L) 98 - 107 mmol/L    Carbon Dioxide 31 23 - 31 mmol/L    Glucose Level 387 (H) 82 - 115 mg/dL    Blood Urea Nitrogen 35.9 (H) 9.8 - 20.1 mg/dL    Creatinine 1.18 (H) 0.55 - 1.02 mg/dL    BUN/Creatinine Ratio 30     Calcium Level Total 9.3 8.4 - 10.2 mg/dL    Anion Gap 9.0 mEq/L    eGFR 53 mls/min/1.73/m2   POCT glucose    Collection Time: 07/13/23  5:48 AM   Result Value Ref Range    POCT Glucose 391 (H) 70 - 110 mg/dL   POCT glucose    Collection Time: 07/13/23 11:47 AM   Result Value Ref Range    POCT Glucose 274 (H) 70 - 110 mg/dL      No results found for: PHENYTOIN, PHENOBARB, VALPROATE, CBMZ    Psychiatric Mental Status Exam:  General Appearance: dressed in hospital garb, in no acute distress, appears older than stated age, overweight  Arousal: alert  Behavior: cooperative  Movements and Motor Activity: no abnormal involuntary movements noted; no tics, no tremors, no akathisia, no dystonia, no evidence of tardive dyskinesia; no psychomotor agitation or retardation  Orientation: oriented to person, place, time, and situation  Speech: normal rate, rhythm, volume, tone and pitch  Mood: "Neutral"  Affect:  fair  Thought Process: linear  Associations: no loosening of associations  Thought Content and Perceptions: no suicidal or homicidal " "ideation, no auditory or visual hallucinations, no paranoid ideation, no ideas of reference, no evidence of delusions or psychosis  Recent and Remote Memory: recent memory intact, remote memory intact; per interview/observation with patient  Attention and Concentration: easily distractible; per interview/observation with patient  Fund of Knowledge: aware of current events; based on history, vocabulary, fund of knowledge, syntax, grammar, and content  Insight: questionable; based on understanding of severity of illness and HPI  Judgment: poor; based on patient's behavior and HPI    Patient Strengths:  Family/Peer support    Patient Liabilities:  Complicated medical illness    ASSESSMENT/PLAN:   Diagnoses:  MOOD DISORDERS; Major Depressive Disorder, Recurrent: Moderate (F33.1) and ANXIETY DISORDERS; 7.9.Generalized Anxiety Disorder (F41.1)         Past Medical History:   Diagnosis Date    Anxiety     Arthritis     CVA (cerebral vascular accident)     "mini stroke"    Depression     Diabetes mellitus     Heart problem     History of psychiatric hospitalization     three(1983, 1995 and another (years ago")): depression    HTN (hypertension)     Hx of psychiatric care     Hypertension     Pacemaker     Psychiatric exam requested by authority     Psychiatric problem     TBI (traumatic brain injury)     Therapy           Problem lists and Management Plans:  Continue Cymbalta  Encourage SNF to improve strengthening     On this date, I have reviewed the medical history and Nursing Assessment, as well as records from referral source.  I have evaluated the mental status of the above named person and concur with the findings of all assessments.  I have provided medical direction for the development of the Treatment Plan.    Cyrus Dickerson   "

## 2023-07-13 NOTE — PROGRESS NOTES
Thoracic MRI unremarkable  Lumbar MRI shows severe stenosis at L3-4, L4-5 and is the source of her R hip/leg pain  She has been too immobile for me to elicit any neurogenic claudication symptoms  I'm not sure that she is medically stable for surgery right now, but she would likely benefit from MIS L3-4, L4-5 decompression when ready.

## 2023-07-13 NOTE — PT/OT/SLP PROGRESS
Occupational Therapy   Treatment    Name: Elba Barnes  MRN: [id number]  Admitting Diagnosis:  <principal problem not specified>       Recommendations:     Discharge Recommendations: nursing facility, skilled  Discharge Equipment Recommendations:  walker, rolling  Barriers to discharge:       Assessment:     Elba Barnes is a 62 y.o. female with a medical diagnosis of <principal problem not specified>.  She presents with decreased safety awareness. Performance deficits affecting function are weakness, gait instability, impaired balance, impaired endurance, decreased safety awareness, impaired self care skills, impaired functional mobility.     Rehab Prognosis:  Good; patient would benefit from acute skilled OT services to address these deficits and reach maximum level of function.       Plan:     Patient to be seen 3 x/week to address the above listed problems via self-care/home management, therapeutic activities, therapeutic exercises  Plan of Care Expires:    Plan of Care Reviewed with: patient    Subjective     Pain/Comfort:  No complaints    Objective:     Communicated with: nurse prior to session.  Patient found  sitting in shower. Informed patient of the need to call for assistance prior to standing for safety. Patient found  with PureWick, peripheral IV upon OT entry to room.    General Precautions: Standard, fall    Orthopedic Precautions:N/A  Braces: N/A  Respiratory Status: Room air     Occupational Performance:     Functional Mobility/Transfers:  Sit to stand with SBA, transfer out of shower with CGA secondary to wet floor, transfer to toilet with CGA  Functional Mobility: ambulate back to chair from toilet with CGA    Activities of Daily Living:  UB dressing, don hospital gown with setup, LB dressing with AE issued, dressing stick, reacher,sock aide and long shoehorn. Initially required Min A, setup after instruction. Completed sitting in bedside chair    [facility] 6 Click ADL: 17    Patient  Education:  Patient provided with verbal and demonstration education regarding  dressing equipment .  Understanding was verbalized.      Patient left up in chair with all lines intact and call button in reach    GOALS:   Multidisciplinary Problems       Occupational Therapy Goals          Problem: Occupational Therapy    Goal Priority Disciplines Outcome Interventions   Occupational Therapy Goal     OT, PT/OT Ongoing, Progressing    Description: Pt will t/f EOB/chair <>BSC mod I without AD (stand pivot)  Pt will increase standing endurance x 3 minutes to assist with ADL tasks                       Time Tracking:     OT Date of Treatment: 07/13/23  OT Start Time: 1054  OT Stop Time: 1128  OT Total Time (min): 34 min    Billable Minutes:Self Care/Home Management 18  Therapeutic Activity 16    OT/JULIETA: JULIETA     Number of JULIETA visits since last OT visit: 3    7/13/2023

## 2023-07-14 LAB
ALBUMIN SERPL-MCNC: 3.2 G/DL (ref 3.4–4.8)
ALBUMIN/GLOB SERPL: 0.9 RATIO (ref 1.1–2)
ALP SERPL-CCNC: 64 UNIT/L (ref 40–150)
ALT SERPL-CCNC: 16 UNIT/L (ref 0–55)
AST SERPL-CCNC: 14 UNIT/L (ref 5–34)
BASOPHILS # BLD AUTO: 0.05 X10(3)/MCL
BASOPHILS NFR BLD AUTO: 0.7 %
BILIRUBIN DIRECT+TOT PNL SERPL-MCNC: 0.2 MG/DL
BUN SERPL-MCNC: 27.1 MG/DL (ref 9.8–20.1)
CALCIUM SERPL-MCNC: 9.3 MG/DL (ref 8.4–10.2)
CHLORIDE SERPL-SCNC: 94 MMOL/L (ref 98–107)
CO2 SERPL-SCNC: 36 MMOL/L (ref 23–31)
CREAT SERPL-MCNC: 0.95 MG/DL (ref 0.55–1.02)
EOSINOPHIL # BLD AUTO: 0.17 X10(3)/MCL (ref 0–0.9)
EOSINOPHIL NFR BLD AUTO: 2.2 %
ERYTHROCYTE [DISTWIDTH] IN BLOOD BY AUTOMATED COUNT: 15 % (ref 11.5–17)
GFR SERPLBLD CREATININE-BSD FMLA CKD-EPI: >60 MLS/MIN/1.73/M2
GLOBULIN SER-MCNC: 3.7 GM/DL (ref 2.4–3.5)
GLUCOSE SERPL-MCNC: 232 MG/DL (ref 82–115)
HCT VFR BLD AUTO: 41.4 % (ref 37–47)
HGB BLD-MCNC: 13.7 G/DL (ref 12–16)
IMM GRANULOCYTES # BLD AUTO: 0.04 X10(3)/MCL (ref 0–0.04)
IMM GRANULOCYTES NFR BLD AUTO: 0.5 %
LYMPHOCYTES # BLD AUTO: 3.15 X10(3)/MCL (ref 0.6–4.6)
LYMPHOCYTES NFR BLD AUTO: 41.6 %
MCH RBC QN AUTO: 28.3 PG (ref 27–31)
MCHC RBC AUTO-ENTMCNC: 33.1 G/DL (ref 33–36)
MCV RBC AUTO: 85.5 FL (ref 80–94)
MONOCYTES # BLD AUTO: 0.49 X10(3)/MCL (ref 0.1–1.3)
MONOCYTES NFR BLD AUTO: 6.5 %
NEUTROPHILS # BLD AUTO: 3.68 X10(3)/MCL (ref 2.1–9.2)
NEUTROPHILS NFR BLD AUTO: 48.5 %
NRBC BLD AUTO-RTO: 0 %
PLATELET # BLD AUTO: 329 X10(3)/MCL (ref 130–400)
PMV BLD AUTO: 9.3 FL (ref 7.4–10.4)
POCT GLUCOSE: 170 MG/DL (ref 70–110)
POCT GLUCOSE: 174 MG/DL (ref 70–110)
POCT GLUCOSE: 258 MG/DL (ref 70–110)
POCT GLUCOSE: 295 MG/DL (ref 70–110)
POTASSIUM SERPL-SCNC: 4.4 MMOL/L (ref 3.5–5.1)
PROT SERPL-MCNC: 6.9 GM/DL (ref 5.8–7.6)
RBC # BLD AUTO: 4.84 X10(6)/MCL (ref 4.2–5.4)
SODIUM SERPL-SCNC: 139 MMOL/L (ref 136–145)
WBC # SPEC AUTO: 7.58 X10(3)/MCL (ref 4.5–11.5)

## 2023-07-14 PROCEDURE — 97164 PT RE-EVAL EST PLAN CARE: CPT

## 2023-07-14 PROCEDURE — 94761 N-INVAS EAR/PLS OXIMETRY MLT: CPT

## 2023-07-14 PROCEDURE — 94640 AIRWAY INHALATION TREATMENT: CPT

## 2023-07-14 PROCEDURE — 25000242 PHARM REV CODE 250 ALT 637 W/ HCPCS: Performed by: INTERNAL MEDICINE

## 2023-07-14 PROCEDURE — 99900035 HC TECH TIME PER 15 MIN (STAT)

## 2023-07-14 PROCEDURE — 92526 ORAL FUNCTION THERAPY: CPT

## 2023-07-14 PROCEDURE — 25000003 PHARM REV CODE 250: Performed by: PHYSICIAN ASSISTANT

## 2023-07-14 PROCEDURE — 96372 THER/PROPH/DIAG INJ SC/IM: CPT | Mod: 59 | Performed by: INTERNAL MEDICINE

## 2023-07-14 PROCEDURE — 99900031 HC PATIENT EDUCATION (STAT)

## 2023-07-14 PROCEDURE — 25000003 PHARM REV CODE 250

## 2023-07-14 PROCEDURE — 94640 AIRWAY INHALATION TREATMENT: CPT | Mod: XB

## 2023-07-14 PROCEDURE — 80053 COMPREHEN METABOLIC PANEL: CPT | Performed by: INTERNAL MEDICINE

## 2023-07-14 PROCEDURE — G0378 HOSPITAL OBSERVATION PER HR: HCPCS

## 2023-07-14 PROCEDURE — 85025 COMPLETE CBC W/AUTO DIFF WBC: CPT | Performed by: INTERNAL MEDICINE

## 2023-07-14 PROCEDURE — 63600175 PHARM REV CODE 636 W HCPCS: Performed by: INTERNAL MEDICINE

## 2023-07-14 PROCEDURE — 94660 CPAP INITIATION&MGMT: CPT

## 2023-07-14 PROCEDURE — 27000221 HC OXYGEN, UP TO 24 HOURS

## 2023-07-14 PROCEDURE — A4216 STERILE WATER/SALINE, 10 ML: HCPCS | Performed by: PHYSICIAN ASSISTANT

## 2023-07-14 PROCEDURE — 25000003 PHARM REV CODE 250: Performed by: INTERNAL MEDICINE

## 2023-07-14 RX ADMIN — TRAMADOL HYDROCHLORIDE 50 MG: 50 TABLET, COATED ORAL at 09:07

## 2023-07-14 RX ADMIN — GABAPENTIN 800 MG: 400 CAPSULE ORAL at 03:07

## 2023-07-14 RX ADMIN — INSULIN ASPART 20 UNITS: 100 INJECTION, SOLUTION INTRAVENOUS; SUBCUTANEOUS at 11:07

## 2023-07-14 RX ADMIN — SODIUM CHLORIDE, PRESERVATIVE FREE 10 ML: 5 INJECTION INTRAVENOUS at 02:07

## 2023-07-14 RX ADMIN — INSULIN ASPART 20 UNITS: 100 INJECTION, SOLUTION INTRAVENOUS; SUBCUTANEOUS at 09:07

## 2023-07-14 RX ADMIN — FUROSEMIDE 40 MG: 40 TABLET ORAL at 05:07

## 2023-07-14 RX ADMIN — IPRATROPIUM BROMIDE AND ALBUTEROL SULFATE 3 ML: 2.5; .5 SOLUTION RESPIRATORY (INHALATION) at 07:07

## 2023-07-14 RX ADMIN — CARVEDILOL 3.12 MG: 3.12 TABLET, FILM COATED ORAL at 09:07

## 2023-07-14 RX ADMIN — FUROSEMIDE 40 MG: 40 TABLET ORAL at 09:07

## 2023-07-14 RX ADMIN — TIZANIDINE 2 MG: 2 TABLET ORAL at 09:07

## 2023-07-14 RX ADMIN — INSULIN DETEMIR 46 UNITS: 100 INJECTION, SOLUTION SUBCUTANEOUS at 09:07

## 2023-07-14 RX ADMIN — SODIUM CHLORIDE, PRESERVATIVE FREE 10 ML: 5 INJECTION INTRAVENOUS at 05:07

## 2023-07-14 RX ADMIN — RIVAROXABAN 10 MG: 10 TABLET, FILM COATED ORAL at 05:07

## 2023-07-14 RX ADMIN — SENNOSIDES AND DOCUSATE SODIUM 2 TABLET: 50; 8.6 TABLET ORAL at 09:07

## 2023-07-14 RX ADMIN — ACETAMINOPHEN 1000 MG: 500 TABLET, FILM COATED ORAL at 09:07

## 2023-07-14 RX ADMIN — TRAMADOL HYDROCHLORIDE 50 MG: 50 TABLET, COATED ORAL at 03:07

## 2023-07-14 RX ADMIN — FLUTICASONE FUROATE AND VILANTEROL TRIFENATATE 1 PUFF: 200; 25 POWDER RESPIRATORY (INHALATION) at 09:07

## 2023-07-14 RX ADMIN — INSULIN DETEMIR 44 UNITS: 100 INJECTION, SOLUTION SUBCUTANEOUS at 09:07

## 2023-07-14 RX ADMIN — TIZANIDINE 2 MG: 2 TABLET ORAL at 02:07

## 2023-07-14 RX ADMIN — ALPRAZOLAM 1 MG: 0.5 TABLET ORAL at 01:07

## 2023-07-14 RX ADMIN — ENOXAPARIN SODIUM 40 MG: 40 INJECTION SUBCUTANEOUS at 09:07

## 2023-07-14 RX ADMIN — DULOXETINE HYDROCHLORIDE 60 MG: 30 CAPSULE, DELAYED RELEASE ORAL at 09:07

## 2023-07-14 RX ADMIN — INSULIN ASPART 6 UNITS: 100 INJECTION, SOLUTION INTRAVENOUS; SUBCUTANEOUS at 09:07

## 2023-07-14 RX ADMIN — NIFEDIPINE 90 MG: 90 TABLET, FILM COATED, EXTENDED RELEASE ORAL at 09:07

## 2023-07-14 RX ADMIN — ATORVASTATIN CALCIUM 40 MG: 40 TABLET, FILM COATED ORAL at 09:07

## 2023-07-14 RX ADMIN — GABAPENTIN 800 MG: 400 CAPSULE ORAL at 09:07

## 2023-07-14 RX ADMIN — ASPIRIN 81 MG: 81 TABLET, COATED ORAL at 09:07

## 2023-07-14 RX ADMIN — IPRATROPIUM BROMIDE AND ALBUTEROL SULFATE 3 ML: 2.5; .5 SOLUTION RESPIRATORY (INHALATION) at 01:07

## 2023-07-14 RX ADMIN — SODIUM CHLORIDE, PRESERVATIVE FREE 10 ML: 5 INJECTION INTRAVENOUS at 09:07

## 2023-07-14 NOTE — PROGRESS NOTES
[facility]  Hospital Medicine Progress Note        Chief Complaint: Inpatient Follow-up    HPI:     62-year-old  female with significant history of HTN, HLD, anxiety/depression, type 2 diabetes mellitus, CVA, rheumatoid arthritis, sciatica, traumatic brain injury was admitted hospitalist medicine service with complaints of right facial droop, right leg and arm weakness, slurry speech, blurry vision, headache and right-sided chest pain paid patient was hemodynamically stable in the ED.  Lab significant for hyperglycemia.  EKG with nonspecific ST T-wave abnormalities . chest x-ray with concerns for possible pulmonary vascular congestion.  CT head negative.  CT angiogram with no hemodynamically significant stenosis . patient admitted to hospitalist medicine service for stroke workup.  MRI brain came back negative for acute changes.  MRI C-spine with cervical spondylosis.  MRI L-spine with severe degenerative spinal canal stenosis at L3-L5 with impingement on the descending bilateral S1 nerve roots at L5-S1.  It is to be noted that prior to patient's symptoms she had taken long-acting insulin 80 units at home.  Her CBG was 43 when she took the 80 units . she reportedly thought lower the glucose the better.  Evaluated by Neurology.  Recommended to continue aspirin, statin paid recommended neurosurgery consult given MRI L-spine results .  neurosurgery consulted and they additionally ordered MRI T-spine.  Therapy Services on board.  Recommending skilled nursing facility placement . patient hesitant to consider placement.  Evaluated by Cardiology given intermittent chest pain.  EKG with nonspecific changes . recommended outpatient stress testing .  volume status stable .  recommended to continue aspirin along with statin.  Patient also was complaining of dysphagia.  Evaluated by ENT, she was found to have bilateral lingual tonsil hypertrophy.  This might be contributing to  dysphagia and ENT recommended outpatient follow-up to address this.  MRI T-spine with mild spinal canal narrowing at T10-T11.  Neurosurgery re-evaluated and recommended lumbar decompression.  Timing to be decided    Interval Hx:   Patient seen at bedside.  CBG is still not at goal.  Noncompliant with diet.  Hemodynamics stable.  No new complaints, no acute events overnight.    Objective/physical exam:  General: In no acute distress, afebrile  Chest: Clear to auscultation bilaterally  Heart: S1, S2, no appreciable murmur  Abdomen: Soft, nontender, BS +  MSK: Warm, no lower extremity edema, no clubbing or cyanosis  Neurologic: Alert and oriented x4, globally weak, more weakness on right side    VITAL SIGNS: 24 HRS MIN & MAX LAST   Temp  Min: 96.7 °F (35.9 °C)  Max: 98.4 °F (36.9 °C) 98.4 °F (36.9 °C)   BP  Min: 126/75  Max: 170/80 129/72   Pulse  Min: 55  Max: 85  (!) 55   Resp  Min: 16  Max: 20 18   SpO2  Min: 90 %  Max: 96 % (!) 91 %       No results for input(s): WBC, RBC, HGB, HCT, MCV, MCH, MCHC, RDW, PLT, MPV, GRAN, LYMPH, MONO, BASO, NRBC in the last 168 hours.        Recent Labs   Lab 07/12/23  0447 07/12/23  1754 07/13/23  0432   *   < > 136   K 6.2*   < > 4.7   CO2 29   < > 31   BUN 33.8*   < > 35.9*   CREATININE 1.29*   < > 1.18*   CALCIUM 9.5   < > 9.3   MG 2.20  --   --     < > = values in this interval not displayed.          Microbiology Results (last 7 days)       ** No results found for the last 168 hours. **             Scheduled Med:   acetaminophen  1,000 mg Oral TID    albuterol-ipratropium  3 mL Nebulization Q6H WAKE    aspirin  81 mg Oral Daily    atorvastatin  40 mg Oral Daily    carvediloL  3.125 mg Oral BID    DULoxetine  60 mg Oral Daily    enoxparin  40 mg Subcutaneous Q12H    fluticasone furoate-vilanteroL  1 puff Inhalation Daily    furosemide  40 mg Oral BID    gabapentin  800 mg Oral TID    insulin aspart U-100  20 Units Subcutaneous TIDWM    insulin detemir U-100  44 Units  Subcutaneous BID    insulin regular  5 Units Intravenous Once    nicotine  1 patch Transdermal Daily    NIFEdipine  90 mg Oral Daily    senna-docusate 8.6-50 mg  2 tablet Oral BID    sodium chloride 0.9%  10 mL Intravenous Q8H    tiZANidine  2 mg Oral TID    traMADoL  50 mg Oral TID          Assessment/Plan:    Right-sided weakness with neuropathy  Severe lumbar spinal stenosis with nerve impingement L5-S1  Syncope on admit likely secondary to hypoglycemia  Intermittent atypical chest pain-improved   Dysphagia secondary to bilateral lingual tonsil hypertrophy  Chronic systolic/diastolic heart failure with ejection fraction-45%  HLD   Essential HTN-stable   Type 2 diabetes mellitus-poorly controlled with hyperglycemia   Anxiety/depression  History of CVA  Rheumatoid arthritis  History of traumatic brain injury   Prophylaxis      Evaluated by Neurology, Neurosurgery   MRI brain was negative   Contacted Neurosurgery today, no plans for lumbar decompression this hospitalization  Neurosurgery would like her to do rehabilitation and consider lumbar decompression as outpatient  Evaluated by Cardiology for intermittent atypical chest pain   EF-45%, patient is compensated   Planning for outpatient stress test  Evaluated by ENT for bilateral lingual tonsillar hypertrophy, planning for outpatient treatment   Continue aggressive therapy services   Continue home meds-aspirin, statin, Coreg, Lasix, Procardia  Levemir, sliding scale for diabetes mellitus  Levemir increased to 46 units b.i.d. for better control of CBG is   She is also on scheduled short-acting insulin 20 units t.i.d.  Continue multimodal pain control for neuropathy   She is on gabapentin, Tylenol, tizanidine, tramadol  Continue nebulized bronchodilators  Continue nicotine patch  DVT prophylaxis-since there are no plans for surgery will switch Lovenox back to Xarelto 10 mg daily    Therapy services recommending snf, but patient is currently under observation and  does not qualify for skilled nursing facility placement  Case management to get in touch with utilization review to see what options we have  At the same time patient refuses to go to rehab, she does not understand the severity of the situation, I will contact her daughter to discuss further   She does not have 24/7 care at home    Jennifer Santos MD   07/14/2023

## 2023-07-14 NOTE — PT/OT/SLP RE-EVAL
Physical Therapy Re-Evaluation    Patient Name:  Elba Barnes   MRN:  [id number]    Recommendations:     Discharge Recommendations: nursing facility, skilled   Discharge Equipment Recommendations: walker, rolling   Barriers to discharge: Impaired mobility and Ongoing medical needs    Assessment:     Elba Barnes is a 62 y.o. female admitted with a medical diagnosis of <principal problem not specified>.  She presents with the following impairments/functional limitations: impaired endurance, weakness, impaired self care skills, impaired functional mobility, gait instability, impaired balance, pain, decreased safety awareness, decreased lower extremity function. Patient slowly progressing with PT. Requiring MIN A for all mobility. Limited by right knee pain. Would benefit from SNF placement at discharge to address the above deficits. Continue to progress as tolerated.     Rehab Prognosis: Good; patient would benefit from acute skilled PT services to address these deficits and reach maximum level of function.    Recent Surgery: * No surgery found *      Plan:     During this hospitalization, patient to be seen 5 x/week to address the identified rehab impairments via gait training, therapeutic activities, therapeutic exercises, neuromuscular re-education and progress toward the following goals:    Plan of Care Expires:  07/31/23    Subjective     Chief Complaint: none  Patient/Family Comments/goals: none  Pain/Comfort:  Pain Rating 1: 7/10  Location - Side 1: Right  Location 1: knee  Pain Addressed 1: Reposition, Distraction  Pain Rating Post-Intervention 1: 7/10    Patients cultural, spiritual, Yazdanism conflicts given the current situation: no    Objective:     Communicated with nurse prior to session.  Patient found right sidelying with telemetry, [employer]  upon PT entry to room.    General Precautions: Standard, fall  Orthopedic Precautions:N/A   Braces: N/A  Respiratory Status: Room air  Exams:  Cognitive  "Exam:  Patient is oriented to Person, Place, Time, and Situation  Sensation:    -       Intact  RLE ROM: WFL  RLE Strength: -4/5 grossly  LLE ROM: WFL  LLE Strength: 4/5 grossly  Skin integrity: Visible skin intact      Functional Mobility:  Bed Mobility:     Supine to Sit: stand by assistance  Transfers:  Sit to Stand:  contact guard assistance with rolling walker  Gait: 37 ft with RW & CGA  Balance: fair      AM-PAC 6 CLICK MOBILITY  Total Score:19     Education Provided:  Role and goals of PT, transfer training, bed mobility, gait training, balance training, safety awareness, assistive device, strengthening exercises, deep breathing techniques, and importance of participating in PT to return to PLOF.    Expected compliance:  Moderate      Patient left up in chair with all lines intact and call button in reach.    GOALS:   Multidisciplinary Problems       Physical Therapy Goals          Problem: Physical Therapy    Goal Priority Disciplines Outcome Goal Variances Interventions   Physical Therapy Goal     PT, PT/OT Ongoing, Progressing     Description: Goals to be met by: 23     Patient will increase functional independence with mobility by performin. Supine to sit with Modified [place]  2. Sit to stand transfer with Modified [place]  3. Gait  x 200 feet with Supervision using Rolling Walker.                          History:     Past Medical History:   Diagnosis Date    Anxiety     Arthritis     CVA (cerebral vascular accident)     "mini stroke"    Depression     Diabetes mellitus     Heart problem     History of psychiatric hospitalization     three(,  and another (years ago")): depression    HTN (hypertension)     Hx of psychiatric care     Hypertension     Pacemaker     Psychiatric exam requested by authority     Psychiatric problem     TBI (traumatic brain injury)     Therapy        Past Surgical History:   Procedure Laterality Date    CARDIAC PACEMAKER PLACEMENT      CARDIAC " PACEMAKER REMOVAL      CHOLECYSTECTOMY      HYSTERECTOMY         Time Tracking:     PT Received On: 07/14/23  PT Start Time: 0806     PT Stop Time: 0821  PT Total Time (min): 15 min     Billable Minutes: Re-eval 15 minutes      07/14/2023

## 2023-07-14 NOTE — PROGRESS NOTES
"Inpatient Nutrition Evaluation    Admit Date: 7/6/2023   Total duration of encounter: 8 days    Nutrition Recommendation/Prescription     Continue diabetic diet     Nutrition Assessment     Chart Review    Reason Seen: continuous nutrition monitoring for Dx    Malnutrition Screening Tool Results   Have you recently lost weight without trying?: No  Have you been eating poorly because of a decreased appetite?: No   MST Score: 0     Diagnosis:  Right-sided weakness   Syncope-likely 2/2 hypoglycemia  Substernal chest pain   Polypharmacy     Relevant Medical History: hypertension, hyperlipidemia, anxiety/depression, diabetes mellitus type 2, CVA, rheumatoid arthritis, sciatica and TBI    Nutrition-Related Medications: glipizide, insulin    Nutrition-Related Labs:  7/7: Gluc 228, Alb 3.0  7/14: Cl 94, BUN 27.1, Glu 232, Alb 3.2    Diet Order: Diet Soft & Bite Sized (IDDSI Level 6) Mildly Thick Liquids (IDDSI Level 2)  Oral Supplement Order: none  Appetite/Oral Intake: good/% of meals  Factors Affecting Nutritional Intake: constipation  Food/Episcopal/Cultural Preferences: none reported  Food Allergies: no known food allergies    Skin Integrity: intact  Wound(s):   none noted    Comments    7/7/23: Patient reports fair appetite, states daughter cooks for her at home and she consumes about 50% of what she makes. Denies nausea, vomiting and diarrhea. Constipation noted.    7/14 pt tolerating oral diet, eating 100% of meals per nursing, last BM 7/12    Anthropometrics    Height: 5' 6" (167.6 cm)    Last Weight: 115.5 kg (254 lb 10.1 oz) (07/06/23 2145) Weight Method: Standard Scale  BMI (Calculated): 41.1  BMI Classification: obese grade III (BMI >/=40)     Ideal Body Weight (IBW), Female: 130 lb     % Ideal Body Weight, Female (lb): 195.87 %                             Usual Weight Provided By: patient and EMR weight history, patient reports UBW about 340 lbs years ago. Wt stable past 3-4 months.     Wt Readings from " Last 5 Encounters:   07/06/23 115.5 kg (254 lb 10.1 oz)   06/08/23 115 kg (253 lb 9.6 oz)   05/10/23 114.9 kg (253 lb 4.9 oz)   04/25/23 114 kg (251 lb 5.2 oz)   03/17/23 114 kg (251 lb 6.4 oz)     Weight Change(s) Since Admission:  Admit Weight: 115.5 kg (254 lb 10.1 oz) (07/06/23 1408)  .    Patient Education    Not applicable.    Monitoring & Evaluation     Dietitian will monitor energy intake and weight.  Nutrition Risk/Follow-Up: low (follow-up in 5-7 days)  Patients assigned 'low nutrition risk' status do not qualify for a full nutritional assessment but will be monitored and re-evaluated in a 5-7 day time period. Please consult if re-evaluation needed sooner.

## 2023-07-14 NOTE — PT/OT/SLP PROGRESS
Speech Language Pathology Department  Dysphagia Therapy Progress Note    Patient Name:  Elba Barnes   MRN:  [id number]    Recommendations:     General recommendations:  dysphagia therapy  Diet recommendations:  Soft & Bite Sized Diet - IDDSI Level 6, Liquid Diet Level: Mildly thick liquids - IDDSI Level 2   Aspiration precautions: small bites/sips and slow rate    Subjective     Patient awake and alert.  Pain/Comfort:  0/10  Spiritual/Cultural/Yarsanism Beliefs/Practices that affect care: no    Objective:     Therapeutic Activities:  Pt completed base of tongue and laryngeal x60 with moderate cues.  Pt tolerated thermal stimulation to the anterior faucial pillars x10 with 100 swallow responses.      Assessment:     Pt continues to present with oropharyngeal dysphagia requiring diet modification to reduce the risk of aspiration.    Goals:     Multidisciplinary Problems       SLP Goals          Problem: SLP    Goal Priority Disciplines Outcome   SLP Goal     SLP    Description: LTG: To tolerate least restrictive diet without clinical signs/sx of aspiration.   ST. Tongue base/laryngeal excursion exercise   2. Tolerate thermal stimulation x10 with 100% swallow response with less than a 2 second delay.   3. Soft and bite sized trials(completed)                     Patient Education:     Patient provided with verbal education regarding POC.  Understanding was verbalized.    Plan:     Will continue to follow and tx as appropriate.    SLP Follow-Up:  Yes   Patient to be seen:  5 x/week   Plan of Care expires:  23  Plan of Care reviewed with:  patient       Time Tracking:     SLP Treatment Date:    23  Speech Start Time:   1120  Speech Stop Time:      1130  Speech Total Time (min):   10    Billable minutes:  Treatment of Swallow Dysfunction, 10 minutes       2023

## 2023-07-14 NOTE — PLAN OF CARE
Requested information faxed to OB as well as Christina, regarding level .    Also sent updates to Ana at [facility]. Awaiting response.

## 2023-07-14 NOTE — PLAN OF CARE
Problem: Physical Therapy  Goal: Physical Therapy Goal  Description: Goals to be met by: 23     Patient will increase functional independence with mobility by performin. Supine to sit with Modified [place]  2. Sit to stand transfer with Modified [place]  3. Gait  x 200 feet with Supervision using Rolling Walker.     2023 1259 by Dana Guadarrama PT  Outcome: Revised

## 2023-07-15 LAB
ALBUMIN SERPL-MCNC: 3.1 G/DL (ref 3.4–4.8)
ALBUMIN/GLOB SERPL: 0.8 RATIO (ref 1.1–2)
ALP SERPL-CCNC: 63 UNIT/L (ref 40–150)
ALT SERPL-CCNC: 17 UNIT/L (ref 0–55)
AST SERPL-CCNC: 18 UNIT/L (ref 5–34)
BASOPHILS # BLD AUTO: 0.06 X10(3)/MCL
BASOPHILS NFR BLD AUTO: 0.6 %
BILIRUBIN DIRECT+TOT PNL SERPL-MCNC: 0.2 MG/DL
BUN SERPL-MCNC: 30.4 MG/DL (ref 9.8–20.1)
CALCIUM SERPL-MCNC: 9.2 MG/DL (ref 8.4–10.2)
CHLORIDE SERPL-SCNC: 93 MMOL/L (ref 98–107)
CO2 SERPL-SCNC: 33 MMOL/L (ref 23–31)
CREAT SERPL-MCNC: 1.08 MG/DL (ref 0.55–1.02)
EOSINOPHIL # BLD AUTO: 0.24 X10(3)/MCL (ref 0–0.9)
EOSINOPHIL NFR BLD AUTO: 2.5 %
ERYTHROCYTE [DISTWIDTH] IN BLOOD BY AUTOMATED COUNT: 15.3 % (ref 11.5–17)
GFR SERPLBLD CREATININE-BSD FMLA CKD-EPI: 59 MLS/MIN/1.73/M2
GLOBULIN SER-MCNC: 3.9 GM/DL (ref 2.4–3.5)
GLUCOSE SERPL-MCNC: 281 MG/DL (ref 82–115)
HCT VFR BLD AUTO: 43 % (ref 37–47)
HGB BLD-MCNC: 14 G/DL (ref 12–16)
IMM GRANULOCYTES # BLD AUTO: 0.03 X10(3)/MCL (ref 0–0.04)
IMM GRANULOCYTES NFR BLD AUTO: 0.3 %
LYMPHOCYTES # BLD AUTO: 3.68 X10(3)/MCL (ref 0.6–4.6)
LYMPHOCYTES NFR BLD AUTO: 37.6 %
MCH RBC QN AUTO: 28.2 PG (ref 27–31)
MCHC RBC AUTO-ENTMCNC: 32.6 G/DL (ref 33–36)
MCV RBC AUTO: 86.7 FL (ref 80–94)
MONOCYTES # BLD AUTO: 0.5 X10(3)/MCL (ref 0.1–1.3)
MONOCYTES NFR BLD AUTO: 5.1 %
NEUTROPHILS # BLD AUTO: 5.28 X10(3)/MCL (ref 2.1–9.2)
NEUTROPHILS NFR BLD AUTO: 53.9 %
NRBC BLD AUTO-RTO: 0 %
PLATELET # BLD AUTO: 339 X10(3)/MCL (ref 130–400)
PMV BLD AUTO: 9.3 FL (ref 7.4–10.4)
POCT GLUCOSE: 233 MG/DL (ref 70–110)
POCT GLUCOSE: 247 MG/DL (ref 70–110)
POCT GLUCOSE: 253 MG/DL (ref 70–110)
POCT GLUCOSE: 299 MG/DL (ref 70–110)
POTASSIUM SERPL-SCNC: 4.7 MMOL/L (ref 3.5–5.1)
PROT SERPL-MCNC: 7 GM/DL (ref 5.8–7.6)
RBC # BLD AUTO: 4.96 X10(6)/MCL (ref 4.2–5.4)
SODIUM SERPL-SCNC: 138 MMOL/L (ref 136–145)
WBC # SPEC AUTO: 9.79 X10(3)/MCL (ref 4.5–11.5)

## 2023-07-15 PROCEDURE — 25000003 PHARM REV CODE 250

## 2023-07-15 PROCEDURE — 99900031 HC PATIENT EDUCATION (STAT)

## 2023-07-15 PROCEDURE — 63600175 PHARM REV CODE 636 W HCPCS: Performed by: INTERNAL MEDICINE

## 2023-07-15 PROCEDURE — 25000003 PHARM REV CODE 250: Performed by: INTERNAL MEDICINE

## 2023-07-15 PROCEDURE — 27000221 HC OXYGEN, UP TO 24 HOURS

## 2023-07-15 PROCEDURE — 94640 AIRWAY INHALATION TREATMENT: CPT | Mod: XB

## 2023-07-15 PROCEDURE — 96372 THER/PROPH/DIAG INJ SC/IM: CPT | Mod: 59 | Performed by: NURSE PRACTITIONER

## 2023-07-15 PROCEDURE — 25000242 PHARM REV CODE 250 ALT 637 W/ HCPCS: Performed by: INTERNAL MEDICINE

## 2023-07-15 PROCEDURE — 96372 THER/PROPH/DIAG INJ SC/IM: CPT | Mod: 59 | Performed by: INTERNAL MEDICINE

## 2023-07-15 PROCEDURE — 25000003 PHARM REV CODE 250: Performed by: PHYSICIAN ASSISTANT

## 2023-07-15 PROCEDURE — A4216 STERILE WATER/SALINE, 10 ML: HCPCS | Performed by: PHYSICIAN ASSISTANT

## 2023-07-15 PROCEDURE — 94761 N-INVAS EAR/PLS OXIMETRY MLT: CPT

## 2023-07-15 PROCEDURE — G0378 HOSPITAL OBSERVATION PER HR: HCPCS

## 2023-07-15 PROCEDURE — 99900035 HC TECH TIME PER 15 MIN (STAT)

## 2023-07-15 PROCEDURE — 94660 CPAP INITIATION&MGMT: CPT

## 2023-07-15 PROCEDURE — 80053 COMPREHEN METABOLIC PANEL: CPT | Performed by: INTERNAL MEDICINE

## 2023-07-15 PROCEDURE — 85025 COMPLETE CBC W/AUTO DIFF WBC: CPT | Performed by: INTERNAL MEDICINE

## 2023-07-15 PROCEDURE — 63600175 PHARM REV CODE 636 W HCPCS: Performed by: NURSE PRACTITIONER

## 2023-07-15 RX ORDER — POLYETHYLENE GLYCOL 3350 17 G/17G
17 POWDER, FOR SOLUTION ORAL 2 TIMES DAILY
Status: DISCONTINUED | OUTPATIENT
Start: 2023-07-15 | End: 2023-07-17 | Stop reason: HOSPADM

## 2023-07-15 RX ADMIN — FUROSEMIDE 40 MG: 40 TABLET ORAL at 06:07

## 2023-07-15 RX ADMIN — INSULIN ASPART 20 UNITS: 100 INJECTION, SOLUTION INTRAVENOUS; SUBCUTANEOUS at 04:07

## 2023-07-15 RX ADMIN — SODIUM CHLORIDE, PRESERVATIVE FREE 10 ML: 5 INJECTION INTRAVENOUS at 11:07

## 2023-07-15 RX ADMIN — TIZANIDINE 2 MG: 2 TABLET ORAL at 02:07

## 2023-07-15 RX ADMIN — FLUTICASONE FUROATE AND VILANTEROL TRIFENATATE 1 PUFF: 200; 25 POWDER RESPIRATORY (INHALATION) at 09:07

## 2023-07-15 RX ADMIN — IPRATROPIUM BROMIDE AND ALBUTEROL SULFATE 3 ML: 2.5; .5 SOLUTION RESPIRATORY (INHALATION) at 08:07

## 2023-07-15 RX ADMIN — ACETAMINOPHEN 1000 MG: 500 TABLET, FILM COATED ORAL at 09:07

## 2023-07-15 RX ADMIN — SODIUM CHLORIDE, PRESERVATIVE FREE 10 ML: 5 INJECTION INTRAVENOUS at 02:07

## 2023-07-15 RX ADMIN — GABAPENTIN 800 MG: 400 CAPSULE ORAL at 02:07

## 2023-07-15 RX ADMIN — INSULIN DETEMIR 48 UNITS: 100 INJECTION, SOLUTION SUBCUTANEOUS at 09:07

## 2023-07-15 RX ADMIN — SODIUM CHLORIDE, PRESERVATIVE FREE 10 ML: 5 INJECTION INTRAVENOUS at 07:07

## 2023-07-15 RX ADMIN — TIZANIDINE 2 MG: 2 TABLET ORAL at 09:07

## 2023-07-15 RX ADMIN — RIVAROXABAN 10 MG: 10 TABLET, FILM COATED ORAL at 05:07

## 2023-07-15 RX ADMIN — ATORVASTATIN CALCIUM 40 MG: 40 TABLET, FILM COATED ORAL at 09:07

## 2023-07-15 RX ADMIN — ACETAMINOPHEN 1000 MG: 500 TABLET, FILM COATED ORAL at 02:07

## 2023-07-15 RX ADMIN — SENNOSIDES AND DOCUSATE SODIUM 2 TABLET: 50; 8.6 TABLET ORAL at 09:07

## 2023-07-15 RX ADMIN — NIFEDIPINE 90 MG: 90 TABLET, FILM COATED, EXTENDED RELEASE ORAL at 09:07

## 2023-07-15 RX ADMIN — FUROSEMIDE 40 MG: 40 TABLET ORAL at 09:07

## 2023-07-15 RX ADMIN — TRAMADOL HYDROCHLORIDE 50 MG: 50 TABLET, COATED ORAL at 09:07

## 2023-07-15 RX ADMIN — GABAPENTIN 800 MG: 400 CAPSULE ORAL at 09:07

## 2023-07-15 RX ADMIN — IPRATROPIUM BROMIDE AND ALBUTEROL SULFATE 3 ML: 2.5; .5 SOLUTION RESPIRATORY (INHALATION) at 02:07

## 2023-07-15 RX ADMIN — CARVEDILOL 3.12 MG: 3.12 TABLET, FILM COATED ORAL at 09:07

## 2023-07-15 RX ADMIN — INSULIN ASPART 20 UNITS: 100 INJECTION, SOLUTION INTRAVENOUS; SUBCUTANEOUS at 11:07

## 2023-07-15 RX ADMIN — DULOXETINE HYDROCHLORIDE 60 MG: 30 CAPSULE, DELAYED RELEASE ORAL at 09:07

## 2023-07-15 RX ADMIN — INSULIN ASPART 9 UNITS: 100 INJECTION, SOLUTION INTRAVENOUS; SUBCUTANEOUS at 09:07

## 2023-07-15 RX ADMIN — IPRATROPIUM BROMIDE AND ALBUTEROL SULFATE 3 ML: 2.5; .5 SOLUTION RESPIRATORY (INHALATION) at 09:07

## 2023-07-15 RX ADMIN — ASPIRIN 81 MG: 81 TABLET, COATED ORAL at 09:07

## 2023-07-15 RX ADMIN — TRAMADOL HYDROCHLORIDE 50 MG: 50 TABLET, COATED ORAL at 02:07

## 2023-07-15 RX ADMIN — INSULIN ASPART 20 UNITS: 100 INJECTION, SOLUTION INTRAVENOUS; SUBCUTANEOUS at 06:07

## 2023-07-15 NOTE — PLAN OF CARE
Problem: Adult Inpatient Plan of Care  Goal: Plan of Care Review  Outcome: Ongoing, Progressing  Goal: Patient-Specific Goal (Individualized)  Outcome: Ongoing, Progressing  Goal: Absence of Hospital-Acquired Illness or Injury  Outcome: Ongoing, Progressing  Goal: Optimal Comfort and Wellbeing  Outcome: Ongoing, Progressing  Goal: Readiness for Transition of Care  Outcome: Ongoing, Progressing     Problem: Bariatric Environmental Safety  Goal: Safety Maintained with Care  Outcome: Ongoing, Progressing     Problem: Adjustment to Illness (Stroke, Ischemic/Transient Ischemic Attack)  Goal: Optimal Coping  Outcome: Ongoing, Progressing     Problem: Bowel Elimination Impaired (Stroke, Ischemic/Transient Ischemic Attack)  Goal: Effective Bowel Elimination  Outcome: Ongoing, Progressing     Problem: Cerebral Tissue Perfusion (Stroke, Ischemic/Transient Ischemic Attack)  Goal: Optimal Cerebral Tissue Perfusion  Outcome: Ongoing, Progressing     Problem: Functional Ability Impaired (Stroke, Ischemic/Transient Ischemic Attack)  Goal: Optimal Functional Ability  Outcome: Ongoing, Progressing     Problem: Respiratory Compromise (Stroke, Ischemic/Transient Ischemic Attack)  Goal: Effective Oxygenation and Ventilation  Outcome: Ongoing, Progressing     Problem: Sensorimotor Impairment (Stroke, Ischemic/Transient Ischemic Attack)  Goal: Improved Sensorimotor Function  Outcome: Ongoing, Progressing     Problem: Swallowing Impairment (Stroke, Ischemic/Transient Ischemic Attack)  Goal: Optimal Eating and Swallowing without Aspiration  Outcome: Ongoing, Progressing     Problem: Urinary Elimination Impaired (Stroke, Ischemic/Transient Ischemic Attack)  Goal: Effective Urinary Elimination  Outcome: Ongoing, Progressing     Problem: Diabetes Comorbidity  Goal: Blood Glucose Level Within Targeted Range  Outcome: Ongoing, Progressing     Problem: Skin Injury Risk Increased  Goal: Skin Health and Integrity  Outcome: Ongoing, Progressing      Problem: Fall Injury Risk  Goal: Absence of Fall and Fall-Related Injury  Outcome: Ongoing, Progressing

## 2023-07-15 NOTE — PROGRESS NOTES
[facility]  Hospital Medicine Progress Note        Chief Complaint: Inpatient Follow-up    HPI:     62-year-old  female with significant history of HTN, HLD, anxiety/depression, type 2 diabetes mellitus, CVA, rheumatoid arthritis, sciatica, traumatic brain injury was admitted hospitalist medicine service with complaints of right facial droop, right leg and arm weakness, slurry speech, blurry vision, headache and right-sided chest pain paid patient was hemodynamically stable in the ED.  Lab significant for hyperglycemia.  EKG with nonspecific ST T-wave abnormalities . chest x-ray with concerns for possible pulmonary vascular congestion.  CT head negative.  CT angiogram with no hemodynamically significant stenosis . patient admitted to hospitalist medicine service for stroke workup.  MRI brain came back negative for acute changes.  MRI C-spine with cervical spondylosis.  MRI L-spine with severe degenerative spinal canal stenosis at L3-L5 with impingement on the descending bilateral S1 nerve roots at L5-S1.  It is to be noted that prior to patient's symptoms she had taken long-acting insulin 80 units at home.  Her CBG was 43 when she took the 80 units . she reportedly thought lower the glucose the better.  Evaluated by Neurology.  Recommended to continue aspirin, statin paid recommended neurosurgery consult given MRI L-spine results .  neurosurgery consulted and they additionally ordered MRI T-spine.  Therapy Services on board.  Recommending skilled nursing facility placement . patient hesitant to consider placement.  Evaluated by Cardiology given intermittent chest pain.  EKG with nonspecific changes . recommended outpatient stress testing .  volume status stable .  recommended to continue aspirin along with statin.  Patient also was complaining of dysphagia.  Evaluated by ENT, she was found to have bilateral lingual tonsil hypertrophy.  This might be contributing to  dysphagia and ENT recommended outpatient follow-up to address this.  MRI T-spine with mild spinal canal narrowing at T10-T11.  Neurosurgery re-evaluated and recommended lumbar decompression.  Timing to be decided.  Check with Neurosurgery, no plans for intervention this hospitalization, planning to do as outpatient.  Levemir doses adjusted to maintain goal CBG.    Interval Hx:   Patient seen at bedside.  No new neurological changes.  No acute events overnight.  Hemodynamics stable.  CBG is still high, patient is noncompliant with diet.    Objective/physical exam:  General: In no acute distress, afebrile  Chest: Clear to auscultation bilaterally  Heart: S1, S2, no appreciable murmur  Abdomen: Soft, nontender, BS +  MSK: Warm, no lower extremity edema, no clubbing or cyanosis  Neurologic: Alert and oriented x4, globally weak, more weakness on right side    VITAL SIGNS: 24 HRS MIN & MAX LAST   Temp  Min: 97.6 °F (36.4 °C)  Max: 98.9 °F (37.2 °C) 98.4 °F (36.9 °C)   BP  Min: 96/66  Max: 142/80 136/70   Pulse  Min: 63  Max: 84  63   Resp  Min: 16  Max: 22 (!) 22   SpO2  Min: 93 %  Max: 98 % 95 %       Recent Labs   Lab 07/15/23  0441   WBC 9.79   RBC 4.96   HGB 14.0   HCT 43.0   MCV 86.7   MCH 28.2   MCHC 32.6*   RDW 15.3      MPV 9.3           Recent Labs   Lab 07/12/23  0447 07/12/23  1754 07/15/23  0441   *   < > 138   K 6.2*   < > 4.7   CO2 29   < > 33*   BUN 33.8*   < > 30.4*   CREATININE 1.29*   < > 1.08*   CALCIUM 9.5   < > 9.2   MG 2.20  --   --    ALBUMIN  --    < > 3.1*   ALKPHOS  --    < > 63   ALT  --    < > 17   AST  --    < > 18   BILITOT  --    < > 0.2    < > = values in this interval not displayed.          Microbiology Results (last 7 days)       ** No results found for the last 168 hours. **             Scheduled Med:   acetaminophen  1,000 mg Oral TID    albuterol-ipratropium  3 mL Nebulization Q6H WAKE    aspirin  81 mg Oral Daily    atorvastatin  40 mg Oral Daily    carvediloL  3.125 mg  Oral BID    DULoxetine  60 mg Oral Daily    fluticasone furoate-vilanteroL  1 puff Inhalation Daily    furosemide  40 mg Oral BID    gabapentin  800 mg Oral TID    insulin aspart U-100  20 Units Subcutaneous TIDWM    insulin detemir U-100  48 Units Subcutaneous BID    insulin regular  5 Units Intravenous Once    nicotine  1 patch Transdermal Daily    NIFEdipine  90 mg Oral Daily    rivaroxaban  10 mg Oral Daily with dinner    senna-docusate 8.6-50 mg  2 tablet Oral BID    sodium chloride 0.9%  10 mL Intravenous Q8H    tiZANidine  2 mg Oral TID    traMADoL  50 mg Oral TID          Assessment/Plan:    Right-sided weakness with neuropathy  Severe lumbar spinal stenosis with nerve impingement L5-S1  Syncope on admit likely secondary to hypoglycemia  Intermittent atypical chest pain-improved   Dysphagia secondary to bilateral lingual tonsil hypertrophy  Chronic systolic/diastolic heart failure with ejection fraction-45%  HLD   Essential HTN-stable   Type 2 diabetes mellitus-poorly controlled with hyperglycemia   Anxiety/depression  History of CVA  Rheumatoid arthritis  History of traumatic brain injury   Prophylaxis      Evaluated by Neurology, Neurosurgery   MRI brain was negative   Contacted Neurosurgery on 07/14, no plans for lumbar decompression this hospitalization  Neurosurgery would like her to do rehabilitation and consider lumbar decompression as outpatient  Evaluated by Cardiology for intermittent atypical chest pain   EF-45%, patient is compensated   Planning for outpatient stress test  Evaluated by ENT for bilateral lingual tonsillar hypertrophy, planning for outpatient treatment   Continue aggressive therapy services   Continue home meds-aspirin, statin, Coreg, Lasix, Procardia  Levemir, sliding scale for diabetes mellitus  Levemir increased to 48 units b.i.d. for better control of CBG is   She is also on scheduled short-acting insulin 20 units t.i.d.  Continue multimodal pain control for neuropathy   She is  on gabapentin, Tylenol, tizanidine, tramadol  Continue nebulized bronchodilators  Continue nicotine patch  DVT prophylaxis-Xarelto 10 mg daily      Therapy services recommending snf  Patient refusing this, I contacted her daughter Kathy, but she lives in [place].  Kathy told me that on of  other daughters lives with the patient and Kathy wanted me to contact the other daughter to see if she has the capability to care for the patient  I plan to call Ms. Wright today  Jennifer Santos MD   07/15/2023

## 2023-07-16 LAB
POCT GLUCOSE: 208 MG/DL (ref 70–110)
POCT GLUCOSE: 226 MG/DL (ref 70–110)
POCT GLUCOSE: 246 MG/DL (ref 70–110)
POCT GLUCOSE: 346 MG/DL (ref 70–110)
POCT GLUCOSE: 393 MG/DL (ref 70–110)

## 2023-07-16 PROCEDURE — 25000003 PHARM REV CODE 250: Performed by: NURSE PRACTITIONER

## 2023-07-16 PROCEDURE — 94640 AIRWAY INHALATION TREATMENT: CPT | Mod: XB

## 2023-07-16 PROCEDURE — 25000003 PHARM REV CODE 250: Performed by: INTERNAL MEDICINE

## 2023-07-16 PROCEDURE — 63600175 PHARM REV CODE 636 W HCPCS: Performed by: INTERNAL MEDICINE

## 2023-07-16 PROCEDURE — 25000242 PHARM REV CODE 250 ALT 637 W/ HCPCS: Performed by: INTERNAL MEDICINE

## 2023-07-16 PROCEDURE — 63600175 PHARM REV CODE 636 W HCPCS: Performed by: NURSE PRACTITIONER

## 2023-07-16 PROCEDURE — 96372 THER/PROPH/DIAG INJ SC/IM: CPT | Performed by: NURSE PRACTITIONER

## 2023-07-16 PROCEDURE — 99900035 HC TECH TIME PER 15 MIN (STAT)

## 2023-07-16 PROCEDURE — 99900031 HC PATIENT EDUCATION (STAT)

## 2023-07-16 PROCEDURE — 94761 N-INVAS EAR/PLS OXIMETRY MLT: CPT

## 2023-07-16 PROCEDURE — G0378 HOSPITAL OBSERVATION PER HR: HCPCS

## 2023-07-16 PROCEDURE — 25000003 PHARM REV CODE 250

## 2023-07-16 PROCEDURE — 94660 CPAP INITIATION&MGMT: CPT

## 2023-07-16 PROCEDURE — 25000003 PHARM REV CODE 250: Performed by: PHYSICIAN ASSISTANT

## 2023-07-16 PROCEDURE — A4216 STERILE WATER/SALINE, 10 ML: HCPCS | Performed by: PHYSICIAN ASSISTANT

## 2023-07-16 PROCEDURE — 96372 THER/PROPH/DIAG INJ SC/IM: CPT | Performed by: INTERNAL MEDICINE

## 2023-07-16 RX ADMIN — INSULIN ASPART 20 UNITS: 100 INJECTION, SOLUTION INTRAVENOUS; SUBCUTANEOUS at 12:07

## 2023-07-16 RX ADMIN — ASPIRIN 81 MG: 81 TABLET, COATED ORAL at 08:07

## 2023-07-16 RX ADMIN — SODIUM CHLORIDE, PRESERVATIVE FREE 10 ML: 5 INJECTION INTRAVENOUS at 07:07

## 2023-07-16 RX ADMIN — TRAMADOL HYDROCHLORIDE 50 MG: 50 TABLET, COATED ORAL at 03:07

## 2023-07-16 RX ADMIN — CARVEDILOL 3.12 MG: 3.12 TABLET, FILM COATED ORAL at 08:07

## 2023-07-16 RX ADMIN — ALPRAZOLAM 1 MG: 0.5 TABLET ORAL at 01:07

## 2023-07-16 RX ADMIN — FUROSEMIDE 40 MG: 40 TABLET ORAL at 08:07

## 2023-07-16 RX ADMIN — IPRATROPIUM BROMIDE AND ALBUTEROL SULFATE 3 ML: 2.5; .5 SOLUTION RESPIRATORY (INHALATION) at 07:07

## 2023-07-16 RX ADMIN — INSULIN ASPART 12 UNITS: 100 INJECTION, SOLUTION INTRAVENOUS; SUBCUTANEOUS at 05:07

## 2023-07-16 RX ADMIN — SENNOSIDES AND DOCUSATE SODIUM 2 TABLET: 50; 8.6 TABLET ORAL at 08:07

## 2023-07-16 RX ADMIN — ATORVASTATIN CALCIUM 40 MG: 40 TABLET, FILM COATED ORAL at 08:07

## 2023-07-16 RX ADMIN — ALPRAZOLAM 1 MG: 0.5 TABLET ORAL at 08:07

## 2023-07-16 RX ADMIN — NIFEDIPINE 90 MG: 90 TABLET, FILM COATED, EXTENDED RELEASE ORAL at 08:07

## 2023-07-16 RX ADMIN — INSULIN ASPART 20 UNITS: 100 INJECTION, SOLUTION INTRAVENOUS; SUBCUTANEOUS at 08:07

## 2023-07-16 RX ADMIN — INSULIN DETEMIR 48 UNITS: 100 INJECTION, SOLUTION SUBCUTANEOUS at 08:07

## 2023-07-16 RX ADMIN — DULOXETINE HYDROCHLORIDE 60 MG: 30 CAPSULE, DELAYED RELEASE ORAL at 08:07

## 2023-07-16 RX ADMIN — ACETAMINOPHEN 650 MG: 325 TABLET, FILM COATED ORAL at 05:07

## 2023-07-16 RX ADMIN — RIVAROXABAN 10 MG: 10 TABLET, FILM COATED ORAL at 06:07

## 2023-07-16 RX ADMIN — TRAMADOL HYDROCHLORIDE 50 MG: 50 TABLET, COATED ORAL at 09:07

## 2023-07-16 RX ADMIN — GABAPENTIN 800 MG: 400 CAPSULE ORAL at 08:07

## 2023-07-16 RX ADMIN — TIZANIDINE 2 MG: 2 TABLET ORAL at 08:07

## 2023-07-16 RX ADMIN — SODIUM CHLORIDE, PRESERVATIVE FREE 10 ML: 5 INJECTION INTRAVENOUS at 09:07

## 2023-07-16 RX ADMIN — INSULIN DETEMIR 50 UNITS: 100 INJECTION, SOLUTION SUBCUTANEOUS at 08:07

## 2023-07-16 RX ADMIN — TIZANIDINE 2 MG: 2 TABLET ORAL at 03:07

## 2023-07-16 RX ADMIN — INSULIN ASPART 20 UNITS: 100 INJECTION, SOLUTION INTRAVENOUS; SUBCUTANEOUS at 03:07

## 2023-07-16 RX ADMIN — POLYETHYLENE GLYCOL 3350 17 G: 17 POWDER, FOR SOLUTION ORAL at 08:07

## 2023-07-16 RX ADMIN — FUROSEMIDE 40 MG: 40 TABLET ORAL at 06:07

## 2023-07-16 RX ADMIN — TRAMADOL HYDROCHLORIDE 50 MG: 50 TABLET, COATED ORAL at 08:07

## 2023-07-16 RX ADMIN — SODIUM CHLORIDE, PRESERVATIVE FREE 10 ML: 5 INJECTION INTRAVENOUS at 02:07

## 2023-07-16 RX ADMIN — FLUTICASONE FUROATE AND VILANTEROL TRIFENATATE 1 PUFF: 200; 25 POWDER RESPIRATORY (INHALATION) at 09:07

## 2023-07-16 RX ADMIN — IPRATROPIUM BROMIDE AND ALBUTEROL SULFATE 3 ML: 2.5; .5 SOLUTION RESPIRATORY (INHALATION) at 12:07

## 2023-07-16 RX ADMIN — INSULIN ASPART 6 UNITS: 100 INJECTION, SOLUTION INTRAVENOUS; SUBCUTANEOUS at 03:07

## 2023-07-16 RX ADMIN — ACETAMINOPHEN 1000 MG: 500 TABLET, FILM COATED ORAL at 03:07

## 2023-07-16 RX ADMIN — ACETAMINOPHEN 1000 MG: 500 TABLET, FILM COATED ORAL at 08:07

## 2023-07-16 RX ADMIN — GABAPENTIN 800 MG: 400 CAPSULE ORAL at 03:07

## 2023-07-16 NOTE — PROGRESS NOTES
[facility]  Hospital Medicine Progress Note        Chief Complaint: Inpatient Follow-up    HPI:     62-year-old  female with significant history of HTN, HLD, anxiety/depression, type 2 diabetes mellitus, CVA, rheumatoid arthritis, sciatica, traumatic brain injury was admitted hospitalist medicine service with complaints of right facial droop, right leg and arm weakness, slurry speech, blurry vision, headache and right-sided chest pain paid patient was hemodynamically stable in the ED.  Lab significant for hyperglycemia.  EKG with nonspecific ST T-wave abnormalities . chest x-ray with concerns for possible pulmonary vascular congestion.  CT head negative.  CT angiogram with no hemodynamically significant stenosis . patient admitted to hospitalist medicine service for stroke workup.  MRI brain came back negative for acute changes.  MRI C-spine with cervical spondylosis.  MRI L-spine with severe degenerative spinal canal stenosis at L3-L5 with impingement on the descending bilateral S1 nerve roots at L5-S1.  It is to be noted that prior to patient's symptoms she had taken long-acting insulin 80 units at home.  Her CBG was 43 when she took the 80 units . she reportedly thought lower the glucose the better.  Evaluated by Neurology.  Recommended to continue aspirin, statin paid recommended neurosurgery consult given MRI L-spine results .  neurosurgery consulted and they additionally ordered MRI T-spine.  Therapy Services on board.  Recommending skilled nursing facility placement . patient hesitant to consider placement.  Evaluated by Cardiology given intermittent chest pain.  EKG with nonspecific changes . recommended outpatient stress testing .  volume status stable .  recommended to continue aspirin along with statin.  Patient also was complaining of dysphagia.  Evaluated by ENT, she was found to have bilateral lingual tonsil hypertrophy.  This might be contributing to  dysphagia and ENT recommended outpatient follow-up to address this.  MRI T-spine with mild spinal canal narrowing at T10-T11.  Neurosurgery re-evaluated and recommended lumbar decompression.  Timing to be decided.  Check with Neurosurgery, no plans for intervention this hospitalization, planning to do as outpatient.  Levemir doses adjusted to maintain goal CBG.    Interval Hx:   Patient seen at bedside.  Noncompliant with diet and therefore CBG is high.  No acute events overnight.  No new neurological changes .  hemodynamics are stable.    Objective/physical exam:  General: In no acute distress, afebrile  Chest: Clear to auscultation bilaterally  Heart: S1, S2, no appreciable murmur  Abdomen: Soft, nontender, BS +  MSK: Warm, no lower extremity edema, no clubbing or cyanosis  Neurologic: Alert and oriented x4, globally weak, more weakness on right side    VITAL SIGNS: 24 HRS MIN & MAX LAST   Temp  Min: 97.9 °F (36.6 °C)  Max: 98.4 °F (36.9 °C) 98.4 °F (36.9 °C)   BP  Min: 103/66  Max: 136/70 129/72   Pulse  Min: 61  Max: 82  71   Resp  Min: 12  Max: 18 18   SpO2  Min: 91 %  Max: 100 % 100 %       Recent Labs   Lab 07/15/23  0441   WBC 9.79   RBC 4.96   HGB 14.0   HCT 43.0   MCV 86.7   MCH 28.2   MCHC 32.6*   RDW 15.3      MPV 9.3           Recent Labs   Lab 07/12/23  0447 07/12/23  1754 07/15/23  0441   *   < > 138   K 6.2*   < > 4.7   CO2 29   < > 33*   BUN 33.8*   < > 30.4*   CREATININE 1.29*   < > 1.08*   CALCIUM 9.5   < > 9.2   MG 2.20  --   --    ALBUMIN  --    < > 3.1*   ALKPHOS  --    < > 63   ALT  --    < > 17   AST  --    < > 18   BILITOT  --    < > 0.2    < > = values in this interval not displayed.          Microbiology Results (last 7 days)       ** No results found for the last 168 hours. **             Scheduled Med:   acetaminophen  1,000 mg Oral TID    albuterol-ipratropium  3 mL Nebulization Q6H WAKE    aspirin  81 mg Oral Daily    atorvastatin  40 mg Oral Daily    carvediloL  3.125 mg  Oral BID    DULoxetine  60 mg Oral Daily    fluticasone furoate-vilanteroL  1 puff Inhalation Daily    furosemide  40 mg Oral BID    gabapentin  800 mg Oral TID    insulin aspart U-100  20 Units Subcutaneous TIDWM    insulin detemir U-100  48 Units Subcutaneous BID    insulin regular  5 Units Intravenous Once    nicotine  1 patch Transdermal Daily    NIFEdipine  90 mg Oral Daily    polyethylene glycol  17 g Oral BID    rivaroxaban  10 mg Oral Daily with dinner    senna-docusate 8.6-50 mg  2 tablet Oral BID    sodium chloride 0.9%  10 mL Intravenous Q8H    tiZANidine  2 mg Oral TID    traMADoL  50 mg Oral TID          Assessment/Plan:    Right-sided weakness with neuropathy  Severe lumbar spinal stenosis with nerve impingement L5-S1  Syncope on admit likely secondary to hypoglycemia  Intermittent atypical chest pain-improved   Dysphagia secondary to bilateral lingual tonsil hypertrophy  Chronic systolic/diastolic heart failure with ejection fraction-45%  HLD   Essential HTN-stable   Type 2 diabetes mellitus-poorly controlled with hyperglycemia   Anxiety/depression  History of CVA  Rheumatoid arthritis  History of traumatic brain injury   Prophylaxis      Evaluated by Neurology, Neurosurgery   MRI brain was negative   Contacted Neurosurgery on 07/14, no plans for lumbar decompression this hospitalization  Neurosurgery would like her to do rehabilitation and consider lumbar decompression as outpatient  Evaluated by Cardiology for intermittent atypical chest pain   EF-45%, patient is compensated   Planning for outpatient stress test  Evaluated by ENT for bilateral lingual tonsillar hypertrophy, planning for outpatient treatment   Continue aggressive therapy services   Continue home meds-aspirin, statin, Coreg, Lasix, Procardia  Levemir, sliding scale for diabetes mellitus  Levemir increased to 50 units b.i.d. for better control of CBG   She is also on scheduled short-acting insulin 20 units t.i.d. which I have increased  to 24 units t.i.d.  Continue multimodal pain control for neuropathy   She is on gabapentin, Tylenol, tizanidine, tramadol  Continue nebulized bronchodilators  Continue nicotine patch  DVT prophylaxis-Xarelto 10 mg daily    Patient is not willing to go to skilled nursing facility   I spoke with her daughter Kathy on 07/15, but the patient lives with daughter ashely, I tried to call her from the patient's phone, she did not    Will try again today   If the daughter is willing to care for her at home then plan to discharge her home with home health and therapy  I have requested nursing staff to request therapy to evaluate her for home and provide recommendations as far as needed equipments        Jennifer Santos MD   07/16/2023

## 2023-07-16 NOTE — PLAN OF CARE
Patient educated on the importance of diabetic diet compliance. Patient continues to eat outside unhealthy foods.

## 2023-07-16 NOTE — PLAN OF CARE
Problem: Violence Risk or Actual  Goal: Anger and Impulse Control  Outcome: Ongoing, Progressing     Problem: Adult Inpatient Plan of Care  Goal: Plan of Care Review  Outcome: Ongoing, Progressing  Goal: Patient-Specific Goal (Individualized)  Outcome: Ongoing, Progressing  Goal: Absence of Hospital-Acquired Illness or Injury  Outcome: Ongoing, Progressing  Goal: Optimal Comfort and Wellbeing  Outcome: Ongoing, Progressing     Problem: Bariatric Environmental Safety  Goal: Safety Maintained with Care  Outcome: Ongoing, Progressing     Problem: Adjustment to Illness (Stroke, Ischemic/Transient Ischemic Attack)  Goal: Optimal Coping  Outcome: Ongoing, Progressing     Problem: Bowel Elimination Impaired (Stroke, Ischemic/Transient Ischemic Attack)  Goal: Effective Bowel Elimination  Outcome: Ongoing, Progressing     Problem: Cerebral Tissue Perfusion (Stroke, Ischemic/Transient Ischemic Attack)  Goal: Optimal Cerebral Tissue Perfusion  Outcome: Ongoing, Progressing     Problem: Functional Ability Impaired (Stroke, Ischemic/Transient Ischemic Attack)  Goal: Optimal Functional Ability  Outcome: Ongoing, Progressing     Problem: Respiratory Compromise (Stroke, Ischemic/Transient Ischemic Attack)  Goal: Effective Oxygenation and Ventilation  Outcome: Ongoing, Progressing     Problem: Sensorimotor Impairment (Stroke, Ischemic/Transient Ischemic Attack)  Goal: Improved Sensorimotor Function  Outcome: Ongoing, Progressing     Problem: Swallowing Impairment (Stroke, Ischemic/Transient Ischemic Attack)  Goal: Optimal Eating and Swallowing without Aspiration  Outcome: Ongoing, Progressing     Problem: Urinary Elimination Impaired (Stroke, Ischemic/Transient Ischemic Attack)  Goal: Effective Urinary Elimination  Outcome: Ongoing, Progressing     Problem: Diabetes Comorbidity  Goal: Blood Glucose Level Within Targeted Range  Outcome: Ongoing, Progressing     Problem: Skin Injury Risk Increased  Goal: Skin Health and  Integrity  Outcome: Ongoing, Progressing     Problem: Fall Injury Risk  Goal: Absence of Fall and Fall-Related Injury  Outcome: Ongoing, Progressing

## 2023-07-17 ENCOUNTER — TELEPHONE (OUTPATIENT)
Dept: NEUROSURGERY | Facility: CLINIC | Age: 63
End: 2023-07-17
Payer: MEDICARE

## 2023-07-17 VITALS
HEIGHT: 66 IN | HEART RATE: 71 BPM | DIASTOLIC BLOOD PRESSURE: 76 MMHG | WEIGHT: 254.63 LBS | BODY MASS INDEX: 40.92 KG/M2 | RESPIRATION RATE: 18 BRPM | SYSTOLIC BLOOD PRESSURE: 118 MMHG | OXYGEN SATURATION: 94 % | TEMPERATURE: 98 F

## 2023-07-17 LAB
POCT GLUCOSE: 351 MG/DL (ref 70–110)
POCT GLUCOSE: 353 MG/DL (ref 70–110)
POCT GLUCOSE: 353 MG/DL (ref 70–110)

## 2023-07-17 PROCEDURE — 94640 AIRWAY INHALATION TREATMENT: CPT | Mod: XB

## 2023-07-17 PROCEDURE — A4216 STERILE WATER/SALINE, 10 ML: HCPCS | Performed by: PHYSICIAN ASSISTANT

## 2023-07-17 PROCEDURE — 63600175 PHARM REV CODE 636 W HCPCS: Performed by: INTERNAL MEDICINE

## 2023-07-17 PROCEDURE — 96372 THER/PROPH/DIAG INJ SC/IM: CPT | Performed by: INTERNAL MEDICINE

## 2023-07-17 PROCEDURE — 94761 N-INVAS EAR/PLS OXIMETRY MLT: CPT

## 2023-07-17 PROCEDURE — 25000003 PHARM REV CODE 250

## 2023-07-17 PROCEDURE — 27000221 HC OXYGEN, UP TO 24 HOURS

## 2023-07-17 PROCEDURE — S4991 NICOTINE PATCH NONLEGEND: HCPCS | Performed by: PHYSICIAN ASSISTANT

## 2023-07-17 PROCEDURE — 25000003 PHARM REV CODE 250: Performed by: INTERNAL MEDICINE

## 2023-07-17 PROCEDURE — G0378 HOSPITAL OBSERVATION PER HR: HCPCS

## 2023-07-17 PROCEDURE — 94660 CPAP INITIATION&MGMT: CPT

## 2023-07-17 PROCEDURE — 94760 N-INVAS EAR/PLS OXIMETRY 1: CPT

## 2023-07-17 PROCEDURE — 99900035 HC TECH TIME PER 15 MIN (STAT)

## 2023-07-17 PROCEDURE — 25000003 PHARM REV CODE 250: Performed by: PHYSICIAN ASSISTANT

## 2023-07-17 PROCEDURE — 25000242 PHARM REV CODE 250 ALT 637 W/ HCPCS: Performed by: INTERNAL MEDICINE

## 2023-07-17 RX ORDER — INSULIN GLARGINE 100 [IU]/ML
50 INJECTION, SOLUTION SUBCUTANEOUS
Qty: 60 ML | Refills: 3 | Status: SHIPPED | OUTPATIENT
Start: 2023-07-17 | End: 2023-10-20

## 2023-07-17 RX ORDER — NICOTINE 7MG/24HR
1 PATCH, TRANSDERMAL 24 HOURS TRANSDERMAL DAILY
Qty: 14 PATCH | Refills: 0 | Status: SHIPPED | OUTPATIENT
Start: 2023-07-17 | End: 2023-07-31

## 2023-07-17 RX ORDER — FLUTICASONE FUROATE AND VILANTEROL 200; 25 UG/1; UG/1
1 POWDER RESPIRATORY (INHALATION) DAILY
Qty: 180 EACH | Refills: 3
Start: 2023-07-17 | End: 2024-02-29 | Stop reason: ALTCHOICE

## 2023-07-17 RX ORDER — IPRATROPIUM BROMIDE AND ALBUTEROL SULFATE 2.5; .5 MG/3ML; MG/3ML
3 SOLUTION RESPIRATORY (INHALATION)
Qty: 270 ML | Refills: 0 | Status: SHIPPED | OUTPATIENT
Start: 2023-07-17 | End: 2023-08-21

## 2023-07-17 RX ORDER — TIZANIDINE 2 MG/1
2 TABLET ORAL EVERY 8 HOURS PRN
Qty: 90 TABLET | Refills: 0 | Status: SHIPPED | OUTPATIENT
Start: 2023-07-17 | End: 2023-08-16

## 2023-07-17 RX ORDER — CALCIUM CARBONATE 200(500)MG
500 TABLET,CHEWABLE ORAL EVERY 6 HOURS PRN
Status: DISCONTINUED | OUTPATIENT
Start: 2023-07-17 | End: 2023-07-17 | Stop reason: HOSPADM

## 2023-07-17 RX ORDER — FUROSEMIDE 40 MG/1
40 TABLET ORAL 2 TIMES DAILY
Qty: 60 TABLET | Refills: 0 | Status: ON HOLD | OUTPATIENT
Start: 2023-07-17 | End: 2023-08-26 | Stop reason: HOSPADM

## 2023-07-17 RX ORDER — TRAMADOL HYDROCHLORIDE 50 MG/1
50 TABLET ORAL EVERY 8 HOURS PRN
Qty: 15 TABLET | Refills: 0 | Status: SHIPPED | OUTPATIENT
Start: 2023-07-17 | End: 2023-07-22

## 2023-07-17 RX ADMIN — SODIUM CHLORIDE, PRESERVATIVE FREE 10 ML: 5 INJECTION INTRAVENOUS at 05:07

## 2023-07-17 RX ADMIN — NIFEDIPINE 90 MG: 90 TABLET, FILM COATED, EXTENDED RELEASE ORAL at 09:07

## 2023-07-17 RX ADMIN — TRAMADOL HYDROCHLORIDE 50 MG: 50 TABLET, COATED ORAL at 09:07

## 2023-07-17 RX ADMIN — FLUTICASONE FUROATE AND VILANTEROL TRIFENATATE 1 PUFF: 200; 25 POWDER RESPIRATORY (INHALATION) at 09:07

## 2023-07-17 RX ADMIN — ASPIRIN 81 MG: 81 TABLET, COATED ORAL at 09:07

## 2023-07-17 RX ADMIN — INSULIN ASPART 20 UNITS: 100 INJECTION, SOLUTION INTRAVENOUS; SUBCUTANEOUS at 06:07

## 2023-07-17 RX ADMIN — TIZANIDINE 2 MG: 2 TABLET ORAL at 09:07

## 2023-07-17 RX ADMIN — CARVEDILOL 3.12 MG: 3.12 TABLET, FILM COATED ORAL at 09:07

## 2023-07-17 RX ADMIN — GABAPENTIN 800 MG: 400 CAPSULE ORAL at 09:07

## 2023-07-17 RX ADMIN — FUROSEMIDE 40 MG: 40 TABLET ORAL at 09:07

## 2023-07-17 RX ADMIN — SENNOSIDES AND DOCUSATE SODIUM 2 TABLET: 50; 8.6 TABLET ORAL at 09:07

## 2023-07-17 RX ADMIN — ACETAMINOPHEN 1000 MG: 500 TABLET, FILM COATED ORAL at 09:07

## 2023-07-17 RX ADMIN — INSULIN ASPART 20 UNITS: 100 INJECTION, SOLUTION INTRAVENOUS; SUBCUTANEOUS at 11:07

## 2023-07-17 RX ADMIN — CALCIUM CARBONATE (ANTACID) CHEW TAB 500 MG 500 MG: 500 CHEW TAB at 09:07

## 2023-07-17 RX ADMIN — NICOTINE 1 PATCH: 7 PATCH, EXTENDED RELEASE TRANSDERMAL at 09:07

## 2023-07-17 RX ADMIN — IPRATROPIUM BROMIDE AND ALBUTEROL SULFATE 3 ML: 2.5; .5 SOLUTION RESPIRATORY (INHALATION) at 08:07

## 2023-07-17 RX ADMIN — DULOXETINE HYDROCHLORIDE 60 MG: 30 CAPSULE, DELAYED RELEASE ORAL at 09:07

## 2023-07-17 RX ADMIN — ATORVASTATIN CALCIUM 40 MG: 40 TABLET, FILM COATED ORAL at 09:07

## 2023-07-17 RX ADMIN — INSULIN DETEMIR 50 UNITS: 100 INJECTION, SOLUTION SUBCUTANEOUS at 09:07

## 2023-07-17 NOTE — PLAN OF CARE
07/17/23 1340   Discharge Assessment   Assessment Type Final Discharge Note   Discharge Plan A Home with family;Home Health   DME Needed Upon Discharge  walker, rolling;oxygen   OTHER   Name(s) of People in Home Adriana (dgt)     PT stated she did not want to go to SNF, she stated she feels safe going home with her dgt Adriana who is her caregiver. PT choice for HH provided and pt chose [facility]. Referral sent to [facility] via [employer]. Referral for RW and home O2 sent to [facility].  Pt's dgt will provide transportation.

## 2023-07-17 NOTE — TELEPHONE ENCOUNTER
"Edwige with [facility] is calling to schedule a follow up for this patient.  Dr. Sears reviewed her scans on 7/12/23 where he stated "she would likely benefit from MIS L3-4, L4-5 decompression when ready."  I sent a text to Elicia and Nasreen to see if they knew of the appropriate follow up and possible testing prior.  BH  "

## 2023-07-17 NOTE — DISCHARGE SUMMARY
[facility] Hospital Medicine Discharge Summary    Admit Date: 7/6/2023  Discharge Date and Time: 7/17/20239:43 AM  Admitting Physician: SARAH Team  Discharging Physician: Jennifer Santos MD.  Primary Care Physician: VIRGEN Hoyt      Discharge Diagnoses:  Right-sided weakness with neuropathy  Severe lumbar spinal stenosis with nerve impingement L5-S1  Syncope on admit likely secondary to hypoglycemia  Intermittent atypical chest pain-improved   Dysphagia secondary to bilateral lingual tonsil hypertrophy  Chronic systolic/diastolic heart failure with ejection fraction-45%  HLD   Essential HTN-stable   Type 2 diabetes mellitus-poorly controlled with hyperglycemia   Anxiety/depression  History of CVA  Rheumatoid arthritis  History of traumatic brain injury     Hospital Course:   62-year-old  female with significant history of HTN, HLD, anxiety/depression, type 2 diabetes mellitus, CVA, rheumatoid arthritis, sciatica, traumatic brain injury was admitted hospitalist medicine service with complaints of right facial droop, right leg and arm weakness, slurry speech, blurry vision, headache and right-sided chest pain paid patient was hemodynamically stable in the ED.  Lab significant for hyperglycemia.  EKG with nonspecific ST T-wave abnormalities . chest x-ray with concerns for possible pulmonary vascular congestion.  CT head negative.  CT angiogram with no hemodynamically significant stenosis . patient admitted to hospitalist medicine service for stroke workup.  MRI brain came back negative for acute changes.  MRI C-spine with cervical spondylosis.  MRI L-spine with severe degenerative spinal canal stenosis at L3-L5 with impingement on the descending bilateral S1 nerve roots at L5-S1.  It is to be noted that prior to patient's symptoms she had taken long-acting insulin 80 units at home.  Her CBG was 43 when she took the 80 units . she reportedly thought lower the glucose  the better.  Evaluated by Neurology.  Recommended to continue aspirin, statin paid recommended neurosurgery consult given MRI L-spine results .  neurosurgery consulted and they additionally ordered MRI T-spine.  Therapy Services on board.  Recommending skilled nursing facility placement . patient hesitant to consider placement.  Evaluated by Cardiology given intermittent chest pain.  EKG with nonspecific changes . recommended outpatient stress testing .  volume status stable .  recommended to continue aspirin along with statin.  Patient also was complaining of dysphagia.  Evaluated by ENT, she was found to have bilateral lingual tonsil hypertrophy.  This might be contributing to dysphagia and ENT recommended outpatient follow-up to address this.  MRI T-spine with mild spinal canal narrowing at T10-T11.  Neurosurgery re-evaluated and recommended lumbar decompression.  Timing to be decided.  Checked with Neurosurgery, no plans for intervention this hospitalization, planning to do as outpatient.  Levemir doses adjusted to maintain goal CBG.  Even though therapy Services recommended skilled nursing facility placement patient adamantly refused this . I spoke with 1 of her daughters, but she did not live with the patient . I tried to call the daughter who lives with the patient, but when this unable to get in touch with her.  Patient reported that her daughter will be able to take care of her at home and she does not want to go to skilled nursing facility.  Therefore per patient request she was discharged home with home health.  Discharge medications per med rec.  Recommended compliance with medications.  Recommended compliance with diet for diabetes . patient will need to follow up with Neurosurgery as outpatient to decide as far as surgery timing.  She will need follow-up with ENT for bilateral lingual tonsillar hypertrophy.  She will get therapy services through home health paid also needs follow-up with Cardiology.   Treatment plans discussed with the patient and she voiced understanding to everything explained.  Discharged in stable condition paid discharge medications per med rec  Vitals:  VITAL SIGNS: 24 HRS MIN & MAX LAST   Temp  Min: 97.6 °F (36.4 °C)  Max: 98.4 °F (36.9 °C) 98.3 °F (36.8 °C)   BP  Min: 116/78  Max: 133/76 (P) 116/78   Pulse  Min: 72  Max: 88  (P) 88   Resp  Min: 16  Max: 18 (P) 16   SpO2  Min: 88 %  Max: 95 % (!) 88 %       Physical Exam:  General appearance:  No acute distress  HENT: Atraumatic   Lungs: Clear to auscultation bilaterally.   Heart: RRR,No edema  Abdomen: Soft, non tender   Extremities: warm  Neuro:  Awake, alert, oriented x4  Psych/mental status: Appropriate mood and affect.      Procedures Performed: No admission procedures for hospital encounter.     Significant Diagnostic Studies: See Full reports for all details    Recent Labs   Lab 07/14/23  0723 07/15/23  0441   WBC 7.58 9.79   RBC 4.84 4.96   HGB 13.7 14.0   HCT 41.4 43.0   MCV 85.5 86.7   MCH 28.3 28.2   MCHC 33.1 32.6*   RDW 15.0 15.3    339   MPV 9.3 9.3       Recent Labs   Lab 07/12/23  0447 07/12/23  1754 07/13/23  0432 07/14/23  0723 07/15/23  0441   *   < > 136 139 138   K 6.2*   < > 4.7 4.4 4.7   CO2 29   < > 31 36* 33*   BUN 33.8*   < > 35.9* 27.1* 30.4*   CREATININE 1.29*   < > 1.18* 0.95 1.08*   CALCIUM 9.5   < > 9.3 9.3 9.2   MG 2.20  --   --   --   --    ALBUMIN  --   --   --  3.2* 3.1*   ALKPHOS  --   --   --  64 63   ALT  --   --   --  16 17   AST  --   --   --  14 18   BILITOT  --   --   --  0.2 0.2    < > = values in this interval not displayed.        Microbiology Results (last 7 days)       ** No results found for the last 168 hours. **             MRI Thoracic Spine Without Contrast  Narrative: EXAMINATION:  MRI THORACIC SPINE WITHOUT CONTRAST    CLINICAL HISTORY:  Myelopathy, acute, thoracic spine;    TECHNIQUE:  Multiplanar multisequence MR images of the thoracic spine are obtained without  contrast.    COMPARISON:  None    FINDINGS:  Thoracic alignment is normal.  The vertebral body heights are maintained.  The bone marrow is normal signal.    There is no definite cord signal abnormality.  There is no epidural fluid collection.    There are mild multilevel degenerative changes with marginal osteophytes and facet arthropathy.  Mild narrowing of the canal is noted at T10-T11 secondary to facet hypertrophy.  The neural foramina are patent.    The paraspinal soft tissues are unremarkable.  Impression: 1. Mild narrowing the spinal canal at T10-T11.  2. No definite cord signal abnormality.    Electronically signed by: Kelsie Prescott  Date:    07/12/2023  Time:    17:13         Medication List        START taking these medications      albuterol-ipratropium 2.5 mg-0.5 mg/3 mL nebulizer solution  Commonly known as: DUO-NEB  Take 3 mLs by nebulization every 6 (six) hours while awake. Rescue     furosemide 40 MG tablet  Commonly known as: LASIX  Take 1 tablet (40 mg total) by mouth 2 (two) times daily.     nicotine 7 mg/24 hr  Commonly known as: NICODERM CQ  Place 1 patch onto the skin once daily. for 14 days     tiZANidine 2 MG tablet  Commonly known as: ZANAFLEX  Take 1 tablet (2 mg total) by mouth every 8 (eight) hours as needed (muscle pain).     traMADoL 50 mg tablet  Commonly known as: ULTRAM  Take 1 tablet (50 mg total) by mouth every 8 (eight) hours as needed for Pain.            CHANGE how you take these medications      insulin glargine 100 units/mL SubQ pen  Commonly known as: LANTUS SOLOSTAR U-100 INSULIN  Inject 50 Units into the skin 2 (two) times daily before meals. Adjust doses based on cbg  What changed:   how much to take  additional instructions            CONTINUE taking these medications      albuterol 90 mcg/actuation inhaler  Commonly known as: PROVENTIL/VENTOLIN HFA  Inhale 1-2 puffs into the lungs every 6 (six) hours as needed for Wheezing. Rescue     ALPRAZolam 1 MG tablet  Commonly  known as: XANAX     ammonium lactate 12 % lotion  Commonly known as: LAC-HYDRIN     aspirin 81 MG EC tablet  Commonly known as: ECOTRIN  Take 1 tablet (81 mg total) by mouth once daily.     atorvastatin 40 MG tablet  Commonly known as: LIPITOR     butalbital-acetaminophen-caffeine -40 mg -40 mg per tablet  Commonly known as: FIORICET, ESGIC  Take 1 tablet by mouth every 6 (six) hours as needed for Pain.     carvediloL 3.125 MG tablet  Commonly known as: COREG     cholecalciferol (vitamin D3) 50 mcg (2,000 unit) Cap capsule  Commonly known as: VITAMIN D3     diclofenac sodium 1 % Gel  Commonly known as: VOLTAREN  Apply 2 g topically 4 (four) times daily.     docusate sodium 250 MG capsule  Commonly known as: COLACE  Take 1 capsule (250 mg total) by mouth 2 (two) times daily.     DULoxetine 60 MG capsule  Commonly known as: CYMBALTA     esomeprazole 40 MG capsule  Commonly known as: NEXIUM     fluticasone furoate-vilanteroL 200-25 mcg/dose Dsdv diskus inhaler  Commonly known as: BREO ELLIPTA  Inhale 1 puff into the lungs once daily. Controller     fluticasone propionate 50 mcg/actuation nasal spray  Commonly known as: FLONASE  1 spray (50 mcg total) by Each Nostril route 2 (two) times daily as needed for Rhinitis.     gabapentin 600 MG tablet  Commonly known as: NEURONTIN  Take 1 tablet (600 mg total) by mouth 3 (three) times daily.     glipiZIDE 10 MG tablet  Commonly known as: GLUCOTROL     magnesium citrate solution     multivitamin per tablet  Commonly known as: THERAGRAN     NIFEdipine 90 MG (OSM) 24 hr tablet  Commonly known as: PROCARDIA-XL  Take 1 tablet (90 mg total) by mouth once daily.     nitroGLYCERIN 0.4 MG SL tablet  Commonly known as: NITROSTAT     NovoLOG FlexPen U-100 Insulin 100 unit/mL (3 mL) Inpn pen  Generic drug: insulin aspart U-100  Inject 20 Units into the skin 3 (three) times daily with meals. DISCARD OPEN PEN AFTER 28 DAYS     nystatin cream  Commonly known as: MYCOSTATIN      oxybutynin 5 MG Tr24  Commonly known as: DITROPAN-XL  Take 1 tablet (5 mg total) by mouth once daily.     polyethylene glycol 17 gram/dose powder  Commonly known as: GLYCOLAX  Take 17 g by mouth once daily.     sertraline 50 MG tablet  Commonly known as: ZOLOFT     terconazole 0.4 % Crea  Commonly known as: TERAZOL 7            STOP taking these medications      amLODIPine 2.5 MG tablet  Commonly known as: NORVASC     hydrALAZINE 25 MG tablet  Commonly known as: APRESOLINE     insulin lispro 100 unit/mL pen     nebivoloL 10 MG Tab  Commonly known as: BYSTOLIC     valsartan 320 MG tablet  Commonly known as: DIOVAN               Where to Get Your Medications        These medications were sent to [facility] - [address]  [address] [address]      Phone: [phone]   albuterol-ipratropium 2.5 mg-0.5 mg/3 mL nebulizer solution  furosemide 40 MG tablet  insulin glargine 100 units/mL SubQ pen  nicotine 7 mg/24 hr  tiZANidine 2 MG tablet  traMADoL 50 mg tablet       Information about where to get these medications is not yet available    Ask your nurse or doctor about these medications  fluticasone furoate-vilanteroL 200-25 mcg/dose Dsdv diskus inhaler          Explained in detail to the patient about the discharge plan, medications, and follow-up visits. Pt understands and agrees with the treatment plan  [facility]   Discharged Condition: stable  Diet-   Dietary Orders (From admission, onward)       Start     Ordered    07/12/23 1045  Diet Soft & Bite Sized (IDDSI Level 6) Mildly Thick Liquids (IDDSI Level 2)  Diet effective now        Question:  Consistency:  Answer:  Mildly Thick Liquids (IDDSI Level 2)    07/12/23 1044                   Medications Per DC med rec  Activities as tolerated   Follow-up Information       Juanito Patel MD. Go on 8/14/2023.    Specialties: Neurology,  Interventional Neurology  Why: Appointnment is scheduled for August 14th at 11:15a.m.  Contact information:  [address]  [address]  [place] [address]  [phone]               VIRGEN Hoyt Follow up in 1 week(s).    Specialty: Nurse Practitioner  Contact information:  [address] [facility] [facility]  [place] [address]  [phone]               Amish Sears MD Follow up in 1 week(s).    Specialty: Neurosurgery  Why: Office will contact patient with appointnment scheduling information.  Contact information:  [address] [place] [address] [address]  [address]  [facility] [id number] [phone]               Frank Pickens MD. Go on 7/24/2023.    Specialty: Otolaryngology  Why: Appointnment is scheduled for July 24th at 2:45p.m.  Contact information:  [address] [place] [address]  [phone]                           For further questions contact hospitalist office    Discharge time 33 minutes    For worsening symptoms, chest pain, shortness of breath, increased abdominal pain, high grade fever, stroke or stroke like symptoms, immediately go to the nearest Emergency Room or call 911 as soon as possible.      Jennifer De La Rosa M.D on 7/17/2023. at 9:43 AM.

## 2023-07-17 NOTE — TELEPHONE ENCOUNTER
Jojo responded that the patient needs a follow up in 3-4 weeks.  Nasreen stated that no new testing was needed.  I scheduled the patient with Dr. Sears on 8/16/23 at 10:00.  Edwige with the hospital is aware...BH

## 2023-07-17 NOTE — PLAN OF CARE
07/17/23 0956   Medicare Message   Important Message from Medicare regarding Discharge Appeal Rights Given to patient/caregiver;Explained to patient/caregiver     Pt voiced understanding of IMM.

## 2023-07-17 NOTE — PLAN OF CARE
Problem: Violence Risk or Actual  Goal: Anger and Impulse Control  Outcome: Met     Problem: Adult Inpatient Plan of Care  Goal: Plan of Care Review  Outcome: Met  Goal: Patient-Specific Goal (Individualized)  Outcome: Met  Goal: Absence of Hospital-Acquired Illness or Injury  Outcome: Met  Goal: Optimal Comfort and Wellbeing  Outcome: Met  Goal: Readiness for Transition of Care  Outcome: Met     Problem: Bariatric Environmental Safety  Goal: Safety Maintained with Care  Outcome: Met     Problem: Adjustment to Illness (Stroke, Ischemic/Transient Ischemic Attack)  Goal: Optimal Coping  Outcome: Met     Problem: Bowel Elimination Impaired (Stroke, Ischemic/Transient Ischemic Attack)  Goal: Effective Bowel Elimination  Outcome: Met     Problem: Cerebral Tissue Perfusion (Stroke, Ischemic/Transient Ischemic Attack)  Goal: Optimal Cerebral Tissue Perfusion  Outcome: Met     Problem: Cognitive Impairment (Stroke, Ischemic/Transient Ischemic Attack)  Goal: Optimal Cognitive Function  Outcome: Met     Problem: Communication Impairment (Stroke, Ischemic/Transient Ischemic Attack)  Goal: Improved Communication Skills  Outcome: Met     Problem: Functional Ability Impaired (Stroke, Ischemic/Transient Ischemic Attack)  Goal: Optimal Functional Ability  Outcome: Met     Problem: Respiratory Compromise (Stroke, Ischemic/Transient Ischemic Attack)  Goal: Effective Oxygenation and Ventilation  Outcome: Met     Problem: Sensorimotor Impairment (Stroke, Ischemic/Transient Ischemic Attack)  Goal: Improved Sensorimotor Function  Outcome: Met     Problem: Swallowing Impairment (Stroke, Ischemic/Transient Ischemic Attack)  Goal: Optimal Eating and Swallowing without Aspiration  Outcome: Met     Problem: Urinary Elimination Impaired (Stroke, Ischemic/Transient Ischemic Attack)  Goal: Effective Urinary Elimination  Outcome: Met     Problem: Diabetes Comorbidity  Goal: Blood Glucose Level Within Targeted Range  Outcome: Met     Problem:  Physical Therapy  Goal: Physical Therapy Goal  Description: Goals to be met by: 23     Patient will increase functional independence with mobility by performin. Supine to sit with Modified [place]  2. Sit to stand transfer with Modified [place]  3. Gait  x 200 feet with Supervision using Rolling Walker.     Outcome: Met     Problem: Occupational Therapy  Goal: Occupational Therapy Goal  Description: Pt will t/f EOB/chair <>BSC mod I without AD (stand pivot)  Pt will increase standing endurance x 3 minutes to assist with ADL tasks  Outcome: Met     Problem: Skin Injury Risk Increased  Goal: Skin Health and Integrity  Outcome: Met     Problem: SLP  Goal: SLP Goal  Description: LTG: To tolerate least restrictive diet without clinical signs/sx of aspiration.   ST. Tongue base/laryngeal excursion exercise   2. Tolerate thermal stimulation x10 with 100% swallow response with less than a 2 second delay.   3. Soft and bite sized trials(completed)  Outcome: Met     Problem: Fall Injury Risk  Goal: Absence of Fall and Fall-Related Injury  Outcome: Met

## 2023-07-26 ENCOUNTER — TELEPHONE (OUTPATIENT)
Dept: SMOKING CESSATION | Facility: CLINIC | Age: 63
End: 2023-07-26
Payer: MEDICARE

## 2023-08-10 ENCOUNTER — TELEPHONE (OUTPATIENT)
Dept: SMOKING CESSATION | Facility: CLINIC | Age: 63
End: 2023-08-10
Payer: MEDICARE

## 2023-08-14 ENCOUNTER — TELEPHONE (OUTPATIENT)
Dept: NEUROSURGERY | Facility: CLINIC | Age: 63
End: 2023-08-14
Payer: MEDICARE

## 2023-08-14 NOTE — TELEPHONE ENCOUNTER
Spoke with patient about needing to move appt & she stated she was okay with waiting until something opens up sooner rather than later. I told her I would put her on the waitlist and call her when I know of an available time and that it would be called in advance due to transportation.

## 2023-08-16 LAB
INR PPP: 1
INR PPP: 1
PROTHROMBIN TIME: 12.6 SECONDS (ref 11.4–14)
PROTHROMBIN TIME: 12.8 SECONDS (ref 11.4–14)

## 2023-08-21 ENCOUNTER — OFFICE VISIT (OUTPATIENT)
Dept: INTERNAL MEDICINE | Facility: CLINIC | Age: 63
End: 2023-08-21
Payer: MEDICARE

## 2023-08-21 VITALS
SYSTOLIC BLOOD PRESSURE: 131 MMHG | HEIGHT: 66 IN | HEART RATE: 80 BPM | BODY MASS INDEX: 41.76 KG/M2 | RESPIRATION RATE: 20 BRPM | TEMPERATURE: 98 F | WEIGHT: 259.81 LBS | DIASTOLIC BLOOD PRESSURE: 74 MMHG

## 2023-08-21 DIAGNOSIS — M48.061 NEUROFORAMINAL STENOSIS OF LUMBAR SPINE: Chronic | ICD-10-CM

## 2023-08-21 DIAGNOSIS — G43.901 MIGRAINE WITH STATUS MIGRAINOSUS, NOT INTRACTABLE, UNSPECIFIED MIGRAINE TYPE: ICD-10-CM

## 2023-08-21 DIAGNOSIS — E11.65 UNCONTROLLED TYPE 2 DIABETES MELLITUS WITH HYPERGLYCEMIA: Primary | Chronic | ICD-10-CM

## 2023-08-21 DIAGNOSIS — J35.1 LINGUAL TONSIL HYPERTROPHY: Chronic | ICD-10-CM

## 2023-08-21 DIAGNOSIS — F17.200 TOBACCO USE DISORDER: Chronic | ICD-10-CM

## 2023-08-21 DIAGNOSIS — Z11.3 ROUTINE SCREENING FOR STI (SEXUALLY TRANSMITTED INFECTION): ICD-10-CM

## 2023-08-21 PROCEDURE — 1160F PR REVIEW ALL MEDS BY PRESCRIBER/CLIN PHARMACIST DOCUMENTED: ICD-10-PCS | Mod: CPTII,,, | Performed by: NURSE PRACTITIONER

## 2023-08-21 PROCEDURE — 3052F PR MOST RECENT HEMOGLOBIN A1C LEVEL 8.0 - < 9.0%: ICD-10-PCS | Mod: CPTII,,, | Performed by: NURSE PRACTITIONER

## 2023-08-21 PROCEDURE — 1159F MED LIST DOCD IN RCRD: CPT | Mod: CPTII,,, | Performed by: NURSE PRACTITIONER

## 2023-08-21 PROCEDURE — 1160F RVW MEDS BY RX/DR IN RCRD: CPT | Mod: CPTII,,, | Performed by: NURSE PRACTITIONER

## 2023-08-21 PROCEDURE — 99214 PR OFFICE/OUTPT VISIT, EST, LEVL IV, 30-39 MIN: ICD-10-PCS | Mod: 25,S$PBB,, | Performed by: NURSE PRACTITIONER

## 2023-08-21 PROCEDURE — 3075F SYST BP GE 130 - 139MM HG: CPT | Mod: CPTII,,, | Performed by: NURSE PRACTITIONER

## 2023-08-21 PROCEDURE — 3008F BODY MASS INDEX DOCD: CPT | Mod: CPTII,,, | Performed by: NURSE PRACTITIONER

## 2023-08-21 PROCEDURE — 3052F HG A1C>EQUAL 8.0%<EQUAL 9.0%: CPT | Mod: CPTII,,, | Performed by: NURSE PRACTITIONER

## 2023-08-21 PROCEDURE — 3066F NEPHROPATHY DOC TX: CPT | Mod: CPTII,,, | Performed by: NURSE PRACTITIONER

## 2023-08-21 PROCEDURE — 3078F PR MOST RECENT DIASTOLIC BLOOD PRESSURE < 80 MM HG: ICD-10-PCS | Mod: CPTII,,, | Performed by: NURSE PRACTITIONER

## 2023-08-21 PROCEDURE — 99406 BEHAV CHNG SMOKING 3-10 MIN: CPT | Mod: S$PBB,,, | Performed by: NURSE PRACTITIONER

## 2023-08-21 PROCEDURE — 99215 OFFICE O/P EST HI 40 MIN: CPT | Mod: PBBFAC | Performed by: NURSE PRACTITIONER

## 2023-08-21 PROCEDURE — 3075F PR MOST RECENT SYSTOLIC BLOOD PRESS GE 130-139MM HG: ICD-10-PCS | Mod: CPTII,,, | Performed by: NURSE PRACTITIONER

## 2023-08-21 PROCEDURE — 99406 PR TOBACCO USE CESSATION INTERMEDIATE 3-10 MINUTES: ICD-10-PCS | Mod: S$PBB,,, | Performed by: NURSE PRACTITIONER

## 2023-08-21 PROCEDURE — 3008F PR BODY MASS INDEX (BMI) DOCUMENTED: ICD-10-PCS | Mod: CPTII,,, | Performed by: NURSE PRACTITIONER

## 2023-08-21 PROCEDURE — 4010F PR ACE/ARB THEARPY RXD/TAKEN: ICD-10-PCS | Mod: CPTII,,, | Performed by: NURSE PRACTITIONER

## 2023-08-21 PROCEDURE — 99214 OFFICE O/P EST MOD 30 MIN: CPT | Mod: 25,S$PBB,, | Performed by: NURSE PRACTITIONER

## 2023-08-21 PROCEDURE — 4010F ACE/ARB THERAPY RXD/TAKEN: CPT | Mod: CPTII,,, | Performed by: NURSE PRACTITIONER

## 2023-08-21 PROCEDURE — 3066F PR DOCUMENTATION OF TREATMENT FOR NEPHROPATHY: ICD-10-PCS | Mod: CPTII,,, | Performed by: NURSE PRACTITIONER

## 2023-08-21 PROCEDURE — 3062F POS MACROALBUMINURIA REV: CPT | Mod: CPTII,,, | Performed by: NURSE PRACTITIONER

## 2023-08-21 PROCEDURE — 3078F DIAST BP <80 MM HG: CPT | Mod: CPTII,,, | Performed by: NURSE PRACTITIONER

## 2023-08-21 PROCEDURE — 1159F PR MEDICATION LIST DOCUMENTED IN MEDICAL RECORD: ICD-10-PCS | Mod: CPTII,,, | Performed by: NURSE PRACTITIONER

## 2023-08-21 PROCEDURE — 3062F PR POS MACROALBUMINURIA RESULT DOCUMENTED/REVIEW: ICD-10-PCS | Mod: CPTII,,, | Performed by: NURSE PRACTITIONER

## 2023-08-21 RX ORDER — ESCITALOPRAM OXALATE 10 MG/1
10 TABLET ORAL
COMMUNITY
Start: 2023-08-03

## 2023-08-21 RX ORDER — DOXEPIN 6 MG/1
1 TABLET, FILM COATED ORAL NIGHTLY
COMMUNITY
Start: 2023-08-03

## 2023-08-21 RX ORDER — BLOOD-GLUCOSE METER
EACH MISCELLANEOUS 4 TIMES DAILY
COMMUNITY
Start: 2023-07-19 | End: 2023-12-11 | Stop reason: ALTCHOICE

## 2023-08-21 RX ORDER — VALSARTAN 320 MG/1
320 TABLET ORAL
COMMUNITY
Start: 2023-08-07 | End: 2024-02-29 | Stop reason: SDUPTHER

## 2023-08-21 RX ORDER — PEN NEEDLE, DIABETIC 32GX 5/32"
NEEDLE, DISPOSABLE MISCELLANEOUS
COMMUNITY
Start: 2023-07-17

## 2023-08-21 RX ORDER — BUTALBITAL, ACETAMINOPHEN AND CAFFEINE 50; 325; 40 MG/1; MG/1; MG/1
1 TABLET ORAL EVERY 6 HOURS PRN
Qty: 30 TABLET | Refills: 2 | Status: SHIPPED | OUTPATIENT
Start: 2023-08-21 | End: 2023-10-20

## 2023-08-21 RX ORDER — BUTALBITAL, ACETAMINOPHEN AND CAFFEINE 50; 325; 40 MG/1; MG/1; MG/1
1 TABLET ORAL EVERY 6 HOURS PRN
Qty: 30 TABLET | Refills: 2 | Status: SHIPPED | OUTPATIENT
Start: 2023-08-21 | End: 2023-08-21 | Stop reason: SDUPTHER

## 2023-08-21 NOTE — PROGRESS NOTES
Purvi Rai, VIRGEN   [facility]  [address] [facility] [address]      PATIENT NAME: Elba Barnes  : 1960  DATE: 23  MRN: [id number]      Reason for Visit / Chief Complaint: Hosp DC f/u (X-ray results, hosp DC )       History of Present Illness / Problem Focused Workflow     Elba Barnes is a 63 y.o. Black or  female presents to the clinic for hospital d/c visit. PMH uncontrolled DM, HTN, CAD, CHF, DM polyneuropathy, CVA w/right sided deficits (2021), MI, mood disorder (inpt admits x 3), TBI (), lumbar stenosis w/sciatica, noncompliance, GERD, osteoarthritis of multiple sites, tobacco abuse, and morbid obesity. Followed by Dr. Hong, cardio, Marga Mosier, psych, and [facility].     Pt presented to ED on 23 with c/o right sided weakness, right facial droop, slurred speech, blurred vision, headache, nausea and right sided chest pain. Pt was admitted to r/o CVA which was ultimately ruled out.  She was discharged on 23 with a dx of right sided weakness with neuropathy, severe lumbar stenosis with nerve impingement L5-S1, syncope likely 2/2 hypoglycemia and bilateral lingual tonsil hypertrophy. Per chart review, Pt reported taking 80 units of long acting insulin with CBG reading of 43 at home. States she thought the lower the better. Pt was consulted by neurology, neurosx, cardio and ENT while inpt and scheduled f/u at discharge. Medication adjustments were made at discharge. Pt saw Dr. Sears since discharge with recommended surgical intervention which pt states she refused cause she's too old. She saw Dr. Pickens and will call to schedule surgical intervention. She missed her neurology appt with Dr. Patel d/t transportation issues and is rescheduled for later this week. Pt also had visit with renal at the beginning of the month. Pt is attending PT as scheduled.      Today, pt states she is taking her asa q4 days and is not taking her lasix. She is continuing to report LBP with associated BLE weakness. Reports one fall since discharge. Is using rollator as instructed. States receiving epidural injections q3 months without improvement any longer. Reports Pt has received O2 and is wearing 3L at home; is not wearing today d/t too hard to carry. Has resumed smoking 4 cigs/day. Has psych f/u is 9/7/23 and has cardio f/u 8/28/23. CBGs ranging 100s when checked since discharge. Attempts ADA diet. Declines vaccines today. Denies cough, fever, dizziness, abdominal pain, nausea, vomiting, diarrhea, constipation, dysuria, SI/HI.    Review of Systems     Review of Systems     See HPI for details    Constitutional: Denies Change in appetite. Denies Chills. Denies Fever. Denies Night sweats.  Eye: Denies Blurred vision. Denies Discharge. Denies Eye pain.  ENT: Denies Decreased hearing. Denies Sore throat. Denies Swollen glands. Admits dysphagia.  Respiratory: Denies Cough. Denies Shortness of breath. Denies Shortness of breath with exertion. Denies Wheezing.  Cardiovascular: Denies Chest pain at rest. Denies Chest pain with exertion. Denies Irregular heartbeat. Denies Palpitations. Denies Edema.  Gastrointestinal: Denies Abdominal pain. Denies Diarrhea. Denies Nausea. Denies Vomiting. Denies Hematemesis or Hematochezia.  Genitourinary: Denies Dysuria. Denies Urinary frequency. Denies Urinary urgency. Denies Blood in urine.  Endocrine: Denies Cold intolerance. Denies Excessive thirst. Denies Heat intolerance. Denies Weight loss. Denies Weight gain.  Musculoskeletal: Admits Painful joints. Admits Weakness.  Integumentary: Denies Rash. Denies Itching. Denies Dry skin.  Neurologic: Denies Dizziness. Denies Fainting. Denies Headache.  Psychiatric: Denies Suicidal/Homicidal ideations.    All Other ROS: Negative except as stated in HPI.     Medical / Surgical / Social / Family History  "      ----------------------------  Anxiety  Arthritis  CVA (cerebral vascular accident)      Comment:  "mini stroke"  Depression  Diabetes mellitus  Heart problem  History of psychiatric hospitalization      Comment:  three(,  and another (years ago")): depression  HTN (hypertension)  Hx of psychiatric care  Hypertension  Pacemaker  Psychiatric exam requested by authority  Psychiatric problem  TBI (traumatic brain injury)  Therapy     Past Surgical History:   Procedure Laterality Date    CARDIAC PACEMAKER PLACEMENT      CARDIAC PACEMAKER REMOVAL      CHOLECYSTECTOMY      HYSTERECTOMY         Social History     Socioeconomic History    Marital status: Single    Number of children: 5   Tobacco Use    Smoking status: Every Day     Current packs/day: 0.00     Types: Cigarettes     Start date: 1978     Last attempt to quit: 2018     Years since quittin.9    Smokeless tobacco: Never    Tobacco comments:     1 pk every 2 weeks, 4 a day   Substance and Sexual Activity    Alcohol use: Not Currently     Comment: wine on occ    Drug use: No    Sexual activity: Never   Other Topics Concern    Patient feels they ought to cut down on drinking/drug use No    Patient annoyed by others criticizing their drinking/drug use No    Patient has felt bad or guilty about drinking/drug use No    Patient has had a drink/used drugs as an eye opener in the AM No   Social History Narrative    ** Merged History Encounter **          Social Determinants of Health     Financial Resource Strain: Low Risk  (2023)    Overall Financial Resource Strain (CARDIA)     Difficulty of Paying Living Expenses: Not hard at all   Food Insecurity: No Food Insecurity (2023)    Hunger Vital Sign     Worried About Running Out of Food in the Last Year: Never true     Ran Out of Food in the Last Year: Never true   Transportation Needs: No Transportation Needs (2023)    PRAPARE - Transportation     Lack of Transportation (Medical): No "     Lack of Transportation (Non-Medical): No   Physical Activity: Inactive (7/7/2023)    Exercise Vital Sign     Days of Exercise per Week: 0 days     Minutes of Exercise per Session: 0 min   Stress: No Stress Concern Present (7/7/2023)    [employer] [place] [employer] - Occupational Stress Questionnaire     Feeling of Stress : Only a little   Social Connections: Moderately Isolated (7/7/2023)    Social Connection and Isolation Panel [NHANES]     Frequency of Communication with Friends and Family: More than three times a week     Frequency of Social Gatherings with Friends and Family: More than three times a week     Attends Sabianist Services: 1 to 4 times per year     Active Member of Clubs or Organizations: No     Attends Club or Organization Meetings: Never     Marital Status: Never    Housing Stability: Low Risk  (7/7/2023)    Housing Stability Vital Sign     Unable to Pay for Housing in the Last Year: No     Number of Places Lived in the Last Year: 1     Unstable Housing in the Last Year: No        Family History   Problem Relation Age of Onset    Schizophrenia Mother     Bipolar disorder Mother     Bipolar disorder Father         Medications and Allergies     Medications  Current Outpatient Medications   Medication Instructions    albuterol (PROVENTIL/VENTOLIN HFA) 90 mcg/actuation inhaler 1-2 puffs, Inhalation, Every 6 hours PRN, Rescue    albuterol-ipratropium (DUO-NEB) 2.5 mg-0.5 mg/3 mL nebulizer solution 3 mLs, Nebulization, Every 6 hours while awake, Rescue    ALPRAZolam (XANAX) 1 MG tablet Take 1 tablet by mouth once a day as needed as needed for anxiety    ammonium lactate (LAC-HYDRIN) 12 % lotion Apply to dry skin areas on feet, legs, and elbow areas TWICE A DAY AS NEEDED    aspirin (ECOTRIN) 81 mg, Oral, Daily    atorvastatin (LIPITOR) 40 mg, Oral, Nightly    butalbital-acetaminophen-caffeine -40 mg (FIORICET, ESGIC) -40 mg per tablet 1 tablet, Oral, Every 6 hours PRN  "   carvediloL (COREG) 3.125 mg, Oral, 2 times daily    cholecalciferol, vitamin D3, (VITAMIN D3) 50 mcg (2,000 unit) Cap capsule 1 capsule, Oral    diclofenac sodium (VOLTAREN) 2 g, Topical (Top), 4 times daily    docusate sodium (COLACE) 250 mg, Oral, 2 times daily    doxepin (SILENOR) 6 mg Tab 1 tablet, Oral, Nightly    DULoxetine (CYMBALTA) 60 mg, Oral, Every morning    EScitalopram oxalate (LEXAPRO) 10 mg, Oral    esomeprazole (NEXIUM) 40 mg, Oral, Every morning    fluticasone furoate-vilanteroL (BREO ELLIPTA) 200-25 mcg/dose DsDv diskus inhaler 1 puff, Inhalation, Daily, Controller    fluticasone propionate (FLONASE) 50 mcg, Each Nostril, 2 times daily PRN    furosemide (LASIX) 40 mg, Oral, 2 times daily    gabapentin (NEURONTIN) 600 mg, Oral, 3 times daily    glipiZIDE (GLUCOTROL) 10 mg, Oral    insulin (LANTUS SOLOSTAR U-100 INSULIN) 50 Units, Subcutaneous, 2 times daily before meals, Adjust doses based on cbg    magnesium citrate solution 296 mLs, Oral, Daily PRN    multivitamin (THERAGRAN) per tablet 1 tablet, Oral, Daily    NIFEdipine (PROCARDIA-XL) 90 mg, Oral, Daily    nitroGLYCERIN (NITROSTAT) 0.4 mg, Sublingual, Every 5 min PRN    NOVOLOG FLEXPEN U-100 INSULIN 100 unit/mL (3 mL) InPn pen Inject 20 Units into the skin 3 (three) times daily with meals. DISCARD OPEN PEN AFTER 28 DAYS    nystatin (MYCOSTATIN) cream Apply topically 3 (three) times daily. Use under breast    ONETOUCH VERIO TEST STRIPS Strp 4 times daily    oxybutynin (DITROPAN-XL) 5 mg, Oral, Daily    polyethylene glycol (GLYCOLAX) 17 g, Oral, Daily    sertraline (ZOLOFT) 50 mg, Oral, Daily    SURE COMFORT PEN NEEDLE 32 gauge x 5/32" Ndle USE 1 pen needle TO inject insulin TWICE DAILY BEFORE meals AS DIRECTED by prescriber    terconazole (TERAZOL 7) 0.4 % Crea 1 applicator, Vaginal, Nightly    valsartan (DIOVAN) 320 mg, Oral         Allergies  Review of patient's allergies indicates:   Allergen Reactions    Pcn [penicillins] Anaphylaxis " "      Physical Examination     /74 (BP Location: Right arm, Patient Position: Sitting, BP Method: Large (Automatic))   Pulse 80   Temp 98.3 °F (36.8 °C) (Oral)   Resp 20   Ht 5' 5.98" (1.676 m)   Wt 117.8 kg (259 lb 12.8 oz)   BMI 41.95 kg/m²     Physical Exam    General: Alert and oriented, No acute distress.  Head: Normocephalic, Atraumatic.  Eye: Pupils are equal, round and reactive to light, Extraocular movements are intact, Sclera non-icteric.  Ears/Nose/Throat: Normal, Mucosa moist,Clear.  Neck/Thyroid: Supple, Non-tender, No carotid bruit, No lymphadenopathy, No JVD, Full range of motion.  Respiratory: Clear to auscultation bilaterally; No wheezes, rales or rhonchi,Non-labored respirations, Symmetrical chest wall expansion.  Cardiovascular: Regular rate and rhythm, S1/S2 normal, No murmurs, rubs or gallops.  Gastrointestinal: Soft, Non-tender, Non-distended, Normal bowel sounds, No palpable organomegaly.  Integumentary: Warm, Dry, Intact, No suspicious lesions or rashes.  Extremities: No clubbing, cyanosis or edema  Psychiatric: Normal interaction, Coherent speech, Appropriate affect       Results     Lab Results   Component Value Date    WBC 9.79 07/15/2023    HGB 14.0 07/15/2023    HCT 43.0 07/15/2023     07/15/2023    CHOL 155 07/08/2023    TRIG 268 (H) 07/08/2023    ALT 17 07/15/2023    AST 18 07/15/2023     07/15/2023    K 4.7 07/15/2023     03/24/2022    CREATININE 1.08 (H) 07/15/2023    BUN 30.4 (H) 07/15/2023    CO2 33 (H) 07/15/2023    TSH 0.858 07/06/2023    INR 1.04 07/07/2023    HGBA1C 8.4 (H) 07/06/2023     Details    Reading Physician Reading Date Result Priority   Kelsie Perscott MD  [phone] 7/12/2023 Routine     Narrative & Impression  EXAMINATION:  MRI THORACIC SPINE WITHOUT CONTRAST     CLINICAL HISTORY:  Myelopathy, acute, thoracic spine;     TECHNIQUE:  Multiplanar multisequence MR images of the thoracic spine are obtained without contrast.   "   COMPARISON:  None     FINDINGS:  Thoracic alignment is normal.  The vertebral body heights are maintained.  The bone marrow is normal signal.     There is no definite cord signal abnormality.  There is no epidural fluid collection.     There are mild multilevel degenerative changes with marginal osteophytes and facet arthropathy.  Mild narrowing of the canal is noted at T10-T11 secondary to facet hypertrophy.  The neural foramina are patent.     The paraspinal soft tissues are unremarkable.     Impression:     1. Mild narrowing the spinal canal at T10-T11.  2. No definite cord signal abnormality.        Electronically signed by: Kelsie Prescott  Date:                                            07/12/2023  Time:                                           17:13    Details    Reading Physician Reading Date Result Priority   Kelsie Prescott MD  [phone] 7/10/2023 Routine     Narrative & Impression  EXAMINATION:  MRI LUMBAR SPINE WITHOUT CONTRAST     CLINICAL HISTORY:  RLE 0/5 weakness and numbness ;     TECHNIQUE:  Multiplanar multisequence MR images of the lumbar spine are obtained without contrast.     COMPARISON:  CT abdomen pelvis dated 02/15/2023     FINDINGS:  There are 5 non-rib-bearing lumbar type vertebral bodies.  Alignment is normal.  The vertebral body heights are maintained.     The conus terminates at the level of L1.  It is normal in signal and contour.  There is no epidural fluid collection.     Disc spaces, spinal canal and neural foramina are as follows:     L1-L2: Disc bulge and facet hypertrophy with mild narrowing of the spinal canal.  No significant neural foraminal stenosis.     L2-L3: Disc bulge and facet hypertrophy with mild narrowing of the spinal canal.  No significant neural foraminal stenosis.     L3-L4: Disc bulge and facet hypertrophy with severe narrowing of the spinal canal.  Mild bilateral neural foraminal stenosis.     L4-L5: Disc bulge and facet hypertrophy with severe  narrowing of the spinal canal.  Mild bilateral neural foraminal stenosis.     L5-S1: Disc bulge and facet hypertrophy with mild narrowing of the spinal canal and impingement on the descending bilateral S1 nerve roots.  No significant neural foraminal stenosis.     No significant abnormality within the visualized paraspinous musculature.     Impression:     1. Severe degenerative spinal canal stenosis at L3-L5, mild at other levels with impingement on the descending bilateral S1 nerve roots at L5-S1.  2. Mild neural foraminal stenoses as described.        Electronically signed by: Kelsie Prescott  Date:                                            07/10/2023  Time:                                           10:15  Assessment and Plan (including Health Maintenance)     Plan:     1. Neuroforaminal stenosis of lumbar spine  Keep appts with neurosx as scheduled.  Pain management list provided; explained I do cannot treat chronic pain.  Fall precautions.  Continue rollator use.  Attend PT as scheduled.   Perform back stretches daily.   Avoid activities than increase back pain or stiffness.   Apply heating pad, ice pack, and BioFreeze as needed; alternate every 15-20 minutes.   Massage back to loosen muscles.   Continue tylenol arthritis/NSAID/muscle relaxer as needed.      2. Lingual tonsil hypertrophy  Keep ENT appts as scheduled.  Continue soft, bland foods as instructed.  Dysphagia precautions.    3. Uncontrolled type 2 diabetes mellitus with hyperglycemia  ***  - Hemoglobin A1C; Future  - Comprehensive Metabolic Panel; Future    4. Tobacco use disorder  ***        Problem List Items Addressed This Visit          Neuro    Neuroforaminal stenosis of lumbar spine (Chronic)    Overview     With nerve impingement L5-S1            ENT    Lingual tonsil hypertrophy (Chronic)       Endocrine    Uncontrolled type 2 diabetes mellitus with hyperglycemia - Primary (Chronic)    Relevant Orders    Hemoglobin A1C    Comprehensive  Metabolic Panel       Other    Tobacco use disorder (Chronic)     Other Visit Diagnoses       Migraine with status migrainosus, not intractable, unspecified migraine type        Relevant Medications    butalbital-acetaminophen-caffeine -40 mg (FIORICET, ESGIC) -40 mg per tablet    Routine screening for STI (sexually transmitted infection)        Relevant Orders    Hepatitis Panel, Acute    HIV 1/2 Ag/Ab (4th Gen)              Health Maintenance Due   Topic Date Due    Hepatitis C Screening  Never done    COVID-19 Vaccine (1) Never done    Eye Exam  Never done    HIV Screening  Never done    Colorectal Cancer Screening  Never done    Shingles Vaccine (1 of 2) Never done    Pneumococcal Vaccines (Age 0-64) (2 - PCV) 05/09/2017       Follow up in about 2 months (around 10/21/2023) for Follow up, Lab Results.        Signature:  VIRGEN Hoyt  [facility]  [address] [facility] [address]

## 2023-08-21 NOTE — PROGRESS NOTES
Purvi Rai, VIRGEN   [facility]  [address] [facility] [address]      PATIENT NAME: Elba Barnes  : 1960  DATE: 23  MRN: [id number]      Reason for Visit / Chief Complaint: Hosp DC f/u (X-ray results, hosp DC )       History of Present Illness / Problem Focused Workflow     Elba Barnes is a 63 y.o. Black or  female presents to the clinic for Elba Barnes is a 63 y.o. Black or  female presents to the clinic for hospital d/c visit. PMH uncontrolled DM, HTN, CAD, CHF, DM polyneuropathy, CVA w/right sided deficits (2021), MI, mood disorder (inpt admits x 3), TBI (), lumbar stenosis w/sciatica, noncompliance, GERD, osteoarthritis of multiple sites, tobacco abuse, and morbid obesity. Followed by Dr. Hong, cardio, Marga Galeasfield, psych, and [facility].      Pt presented to ED on 23 with c/o right sided weakness, right facial droop, slurred speech, blurred vision, headache, nausea and right sided chest pain. Pt was admitted to r/o CVA which was ultimately ruled out.  She was discharged on 23 with a dx of right sided weakness with neuropathy, severe lumbar stenosis with nerve impingement L5-S1, syncope likely 2/2 hypoglycemia and bilateral lingual tonsil hypertrophy. Per chart review, Pt reported taking 80 units of long acting insulin with CBG reading of 43 at home. States she thought the lower the better. Pt was consulted by neurology, neurosx, cardio and ENT while inpt and scheduled f/u at discharge. Medication adjustments were made at discharge. Pt saw Dr. Sears since discharge with recommended surgical intervention which pt states she refused cause she's too old. She saw Dr. Pickens and will call to schedule surgical intervention. She missed her neurology appt with Dr. Patel d/t transportation issues and is rescheduled for later this week. Pt also  had visit with renal at the beginning of the month. Pt is attending PT as scheduled.      Today, pt states she is taking her asa q4 days and is not taking her lasix. She is continuing to report LBP that shoots constantly all the way down to her feet with associated BLE weakness. Reports one fall since discharge. Is using rollator as instructed. States receiving epidural injections q3 months without improvement any longer. Reports Pt has received O2 and is wearing 3L at home; is not wearing today d/t too hard to carry. Has resumed smoking 4 cigs/day. Has psych f/u is 9/7/23 and has cardio f/u 8/28/23. CBGs ranging 100s when checked since discharge. Attempts ADA diet. Declines vaccines today. Denies cough, fever, dizziness, abdominal pain, nausea, vomiting, diarrhea, constipation, dysuria, SI/HI.      Review of Systems     Review of Systems     See HPI for details    Constitutional: Denies Change in appetite. Denies Chills. Denies Fever. Denies Night sweats.  Eye: Denies Blurred vision. Denies Discharge. Denies Eye pain.  ENT: Denies Decreased hearing. Denies Sore throat. Denies Swollen glands. Admits Dysphagia.   Respiratory: Denies Cough. Denies Shortness of breath. Denies Shortness of breath with exertion. Denies Wheezing.  Cardiovascular: Denies Chest pain at rest. Denies Chest pain with exertion. Denies Irregular heartbeat. Denies Palpitations. Denies Edema.  Gastrointestinal: Denies Abdominal pain. Denies Diarrhea. Denies Nausea. Denies Vomiting. Denies Hematemesis or Hematochezia.  Genitourinary: Denies Dysuria. Denies Urinary frequency. Denies Urinary urgency. Denies Blood in urine.  Endocrine: Denies Cold intolerance. Denies Excessive thirst. Denies Heat intolerance. Denies Weight loss. Denies Weight gain.  Musculoskeletal: Admits Painful joints. Admits Weakness.  Integumentary: Denies Rash. Denies Itching. Denies Dry skin.  Neurologic: Denies Dizziness. Denies Fainting. Denies Headache.  Psychiatric: Denies  "Suicidal/Homicidal ideations.    All Other ROS: Negative except as stated in HPI.     Medical / Surgical / Social / Family History       ----------------------------  Anxiety  Arthritis  CVA (cerebral vascular accident)      Comment:  "mini stroke"  Depression  Diabetes mellitus  Heart problem  History of psychiatric hospitalization      Comment:  three(,  and another (years ago")): depression  HTN (hypertension)  Hx of psychiatric care  Hypertension  Pacemaker  Psychiatric exam requested by authority  Psychiatric problem  TBI (traumatic brain injury)  Therapy     Past Surgical History:   Procedure Laterality Date    CARDIAC PACEMAKER PLACEMENT      CARDIAC PACEMAKER REMOVAL      CHOLECYSTECTOMY      HYSTERECTOMY         Social History     Socioeconomic History    Marital status: Single    Number of children: 5   Tobacco Use    Smoking status: Every Day     Current packs/day: 0.00     Types: Cigarettes     Start date: 1978     Last attempt to quit: 2018     Years since quittin.9    Smokeless tobacco: Never    Tobacco comments:     1 pk every 2 weeks, 4 a day   Substance and Sexual Activity    Alcohol use: Not Currently     Comment: wine on occ    Drug use: No    Sexual activity: Never   Other Topics Concern    Patient feels they ought to cut down on drinking/drug use No    Patient annoyed by others criticizing their drinking/drug use No    Patient has felt bad or guilty about drinking/drug use No    Patient has had a drink/used drugs as an eye opener in the AM No   Social History Narrative    ** Merged History Encounter **          Social Determinants of Health     Financial Resource Strain: Low Risk  (2023)    Overall Financial Resource Strain (CARDIA)     Difficulty of Paying Living Expenses: Not hard at all   Food Insecurity: No Food Insecurity (2023)    Hunger Vital Sign     Worried About Running Out of Food in the Last Year: Never true     Ran Out of Food in the Last Year: Never " true   Transportation Needs: No Transportation Needs (7/7/2023)    PRAPARE - Transportation     Lack of Transportation (Medical): No     Lack of Transportation (Non-Medical): No   Physical Activity: Inactive (7/7/2023)    Exercise Vital Sign     Days of Exercise per Week: 0 days     Minutes of Exercise per Session: 0 min   Stress: No Stress Concern Present (7/7/2023)    [employer] - Occupational Stress Questionnaire     Feeling of Stress : Only a little   Social Connections: Moderately Isolated (7/7/2023)    Social Connection and Isolation Panel [NHANES]     Frequency of Communication with Friends and Family: More than three times a week     Frequency of Social Gatherings with Friends and Family: More than three times a week     Attends Holiness Services: 1 to 4 times per year     Active Member of Clubs or Organizations: No     Attends Club or Organization Meetings: Never     Marital Status: Never    Housing Stability: Low Risk  (7/7/2023)    Housing Stability Vital Sign     Unable to Pay for Housing in the Last Year: No     Number of Places Lived in the Last Year: 1     Unstable Housing in the Last Year: No        Family History   Problem Relation Age of Onset    Schizophrenia Mother     Bipolar disorder Mother     Bipolar disorder Father         Medications and Allergies     Medications  Current Outpatient Medications   Medication Instructions    albuterol (PROVENTIL/VENTOLIN HFA) 90 mcg/actuation inhaler 1-2 puffs, Inhalation, Every 6 hours PRN, Rescue    albuterol-ipratropium (DUO-NEB) 2.5 mg-0.5 mg/3 mL nebulizer solution 3 mLs, Nebulization, Every 6 hours while awake, Rescue    ALPRAZolam (XANAX) 1 MG tablet Take 1 tablet by mouth once a day as needed as needed for anxiety    ammonium lactate (LAC-HYDRIN) 12 % lotion Apply to dry skin areas on feet, legs, and elbow areas TWICE A DAY AS NEEDED    aspirin (ECOTRIN) 81 mg, Oral, Daily    atorvastatin (LIPITOR) 40 mg, Oral,  "Nightly    butalbital-acetaminophen-caffeine -40 mg (FIORICET, ESGIC) -40 mg per tablet 1 tablet, Oral, Every 6 hours PRN    carvediloL (COREG) 3.125 mg, Oral, 2 times daily    cholecalciferol, vitamin D3, (VITAMIN D3) 50 mcg (2,000 unit) Cap capsule 1 capsule, Oral    diclofenac sodium (VOLTAREN) 2 g, Topical (Top), 4 times daily    docusate sodium (COLACE) 250 mg, Oral, 2 times daily    doxepin (SILENOR) 6 mg Tab 1 tablet, Oral, Nightly    DULoxetine (CYMBALTA) 60 mg, Oral, Every morning    EScitalopram oxalate (LEXAPRO) 10 mg, Oral    esomeprazole (NEXIUM) 40 mg, Oral, Every morning    fluticasone furoate-vilanteroL (BREO ELLIPTA) 200-25 mcg/dose DsDv diskus inhaler 1 puff, Inhalation, Daily, Controller    fluticasone propionate (FLONASE) 50 mcg, Each Nostril, 2 times daily PRN    furosemide (LASIX) 40 mg, Oral, 2 times daily    gabapentin (NEURONTIN) 600 mg, Oral, 3 times daily    glipiZIDE (GLUCOTROL) 10 mg, Oral    insulin (LANTUS SOLOSTAR U-100 INSULIN) 50 Units, Subcutaneous, 2 times daily before meals, Adjust doses based on cbg    magnesium citrate solution 296 mLs, Oral, Daily PRN    multivitamin (THERAGRAN) per tablet 1 tablet, Oral, Daily    NIFEdipine (PROCARDIA-XL) 90 mg, Oral, Daily    nitroGLYCERIN (NITROSTAT) 0.4 mg, Sublingual, Every 5 min PRN    NOVOLOG FLEXPEN U-100 INSULIN 100 unit/mL (3 mL) InPn pen Inject 20 Units into the skin 3 (three) times daily with meals. DISCARD OPEN PEN AFTER 28 DAYS    nystatin (MYCOSTATIN) cream Apply topically 3 (three) times daily. Use under breast    ONETOUCH VERIO TEST STRIPS Strp 4 times daily    oxybutynin (DITROPAN-XL) 5 mg, Oral, Daily    polyethylene glycol (GLYCOLAX) 17 g, Oral, Daily    sertraline (ZOLOFT) 50 mg, Oral, Daily    SURE COMFORT PEN NEEDLE 32 gauge x 5/32" Ndle USE 1 pen needle TO inject insulin TWICE DAILY BEFORE meals AS DIRECTED by prescriber    terconazole (TERAZOL 7) 0.4 % Crea 1 applicator, Vaginal, Nightly    valsartan " "(DIOVAN) 320 mg, Oral         Allergies  Review of patient's allergies indicates:   Allergen Reactions    Pcn [penicillins] Anaphylaxis       Physical Examination     /74 (BP Location: Right arm, Patient Position: Sitting, BP Method: Large (Automatic))   Pulse 80   Temp 98.3 °F (36.8 °C) (Oral)   Resp 20   Ht 5' 5.98" (1.676 m)   Wt 117.8 kg (259 lb 12.8 oz)   BMI 41.95 kg/m²     Physical Exam  Constitutional:       Appearance: She is morbidly obese.   Musculoskeletal:      Lumbar back: Spasms and tenderness present. Decreased range of motion. Positive right straight leg raise test and positive left straight leg raise test.        Back:        General: Alert and oriented, No acute distress.  Head: Normocephalic, Atraumatic.  Eye: Pupils are equal, round and reactive to light, Extraocular movements are intact, Sclera non-icteric.  Ears/Nose/Throat: Normal, Mucosa moist,Clear.  Neck/Thyroid: Supple, Non-tender, No carotid bruit, No lymphadenopathy, No JVD, Full range of motion.  Respiratory: Clear to auscultation bilaterally; No wheezes, rales or rhonchi,Non-labored respirations, Symmetrical chest wall expansion.  Cardiovascular: Regular rate and rhythm, S1/S2 normal, No murmurs, rubs or gallops.  Gastrointestinal: Soft, Non-tender, Non-distended, Normal bowel sounds, No palpable organomegaly.  Integumentary: Warm, Dry, Intact, No suspicious lesions or rashes.  Extremities: No clubbing, cyanosis or edema  Psychiatric: Normal interaction, Coherent speech, Appropriate affect       Results     Lab Results   Component Value Date    WBC 9.79 07/15/2023    HGB 14.0 07/15/2023    HCT 43.0 07/15/2023     07/15/2023    CHOL 155 07/08/2023    TRIG 268 (H) 07/08/2023    ALT 17 07/15/2023    AST 18 07/15/2023     07/15/2023    K 4.7 07/15/2023     03/24/2022    CREATININE 1.08 (H) 07/15/2023    BUN 30.4 (H) 07/15/2023    CO2 33 (H) 07/15/2023    TSH 0.858 07/06/2023    INR 1.04 07/07/2023    HGBA1C " "8.4 (H) 07/06/2023         Assessment and Plan (including Health Maintenance)     Plan:     1. Neuroforaminal stenosis of lumbar spine  Keep PT appts as scheduled.  Keep appts with neurosx as scheduled.  At length discussion with pt regarding meaning of nerve impingement and pain.  Pain management list provided to pt; receiving epidural injections q3 months without improvement.  Explained I cannot treat chronic pain.  Fall precautions.  Continue rollator use.  Perform back stretches daily.   Avoid activities than increase back pain or stiffness.   Apply heating pad, ice pack, and BioFreeze as needed; alternate every 15-20 minutes.   Massage back to loosen muscles.   Continue tylenol arthritis/NSAID/muscle relaxer as needed.      2. Lingual tonsil hypertrophy  Keep ENT appts as scheduled.  Continue soft diet.  Drink water with meals.  Sit straight up while eating.  Dysphagia precautions.    3. Uncontrolled type 2 diabetes mellitus with hyperglycemia  A1C 8.4 not at goal.   Continue glipizide, lantus as prescribed.  Follow ADA diet.  Avoid soda, simple sweets, and limit rice/pasta/bread/starches and consume brown options when possible.   Maintain healthy weight with BMI goal <30.   Perform aerobic exercise for 150 minutes per week (or 5 days a week for 30 minutes each day).   Examine feet daily.   Obtain annual dilated eye exam.  Eye exam: will contact clinic with optho provider's name. States saw this year.  Foot exam: 12/21/22    - Hemoglobin A1C; Future  - Comprehensive Metabolic Panel; Future    4. Tobacco use disorder  Smoking cessation discussed; total time 3 mins.   Pt not ready to quit; states her "nerves are too bad to quit right now."  Currently enrolled in Smoking Cessation program.  Discussed benefits of quitting including improved health, decreased cardiac/vascular/pulmonary/stroke risks as well as saving money.          Problem List Items Addressed This Visit          Neuro    Neuroforaminal stenosis of " lumbar spine (Chronic)    Overview     With nerve impingement L5-S1            ENT    Lingual tonsil hypertrophy (Chronic)       Endocrine    Uncontrolled type 2 diabetes mellitus with hyperglycemia - Primary (Chronic)    Relevant Orders    Hemoglobin A1C    Comprehensive Metabolic Panel       Other    Tobacco use disorder (Chronic)     Other Visit Diagnoses       Migraine with status migrainosus, not intractable, unspecified migraine type        Relevant Medications    butalbital-acetaminophen-caffeine -40 mg (FIORICET, ESGIC) -40 mg per tablet    Routine screening for STI (sexually transmitted infection)        Relevant Orders    Hepatitis Panel, Acute    HIV 1/2 Ag/Ab (4th Gen)              Health Maintenance Due   Topic Date Due    Hepatitis C Screening  Never done    COVID-19 Vaccine (1) Never done    Eye Exam  Never done    HIV Screening  Never done    Colorectal Cancer Screening  Never done    Shingles Vaccine (1 of 2) Never done    Pneumococcal Vaccines (Age 0-64) (2 - PCV) 05/09/2017       Follow up in about 2 months (around 10/21/2023) for Follow up, Lab Results.        Signature:  VIRGEN Hoyt  [facility]  [address] [facility] [address]

## 2023-08-22 ENCOUNTER — TELEPHONE (OUTPATIENT)
Dept: INTERNAL MEDICINE | Facility: CLINIC | Age: 63
End: 2023-08-22
Payer: MEDICARE

## 2023-08-22 DIAGNOSIS — N39.46 MIXED STRESS AND URGE URINARY INCONTINENCE: ICD-10-CM

## 2023-08-22 DIAGNOSIS — N32.81 OAB (OVERACTIVE BLADDER): Primary | Chronic | ICD-10-CM

## 2023-08-22 NOTE — TELEPHONE ENCOUNTER
Pt is requesting that a referral be sent to [place] for adult diapers. Please advise. MARY

## 2023-08-23 ENCOUNTER — HOSPITAL ENCOUNTER (OUTPATIENT)
Facility: HOSPITAL | Age: 63
Discharge: HOME | End: 2023-08-26
Attending: EMERGENCY MEDICINE | Admitting: INTERNAL MEDICINE
Payer: MEDICARE

## 2023-08-23 DIAGNOSIS — K92.2 GASTROINTESTINAL HEMORRHAGE, UNSPECIFIED GASTROINTESTINAL HEMORRHAGE TYPE: ICD-10-CM

## 2023-08-23 DIAGNOSIS — L60.2 OVERGROWN TOENAILS: ICD-10-CM

## 2023-08-23 DIAGNOSIS — R42 DIZZINESS: ICD-10-CM

## 2023-08-23 DIAGNOSIS — I63.9 CEREBROVASCULAR ACCIDENT (CVA), UNSPECIFIED MECHANISM: ICD-10-CM

## 2023-08-23 DIAGNOSIS — Z13.9 SCREENING DUE: ICD-10-CM

## 2023-08-23 DIAGNOSIS — E11.9 DIABETES MELLITUS TYPE 2, INSULIN DEPENDENT: ICD-10-CM

## 2023-08-23 DIAGNOSIS — K92.1 HEMATOCHEZIA: ICD-10-CM

## 2023-08-23 DIAGNOSIS — I51.9 HEART PROBLEM: ICD-10-CM

## 2023-08-23 DIAGNOSIS — K92.2 ACUTE LOWER GI BLEEDING: Primary | ICD-10-CM

## 2023-08-23 DIAGNOSIS — R10.9 ABDOMINAL PAIN, UNSPECIFIED ABDOMINAL LOCATION: ICD-10-CM

## 2023-08-23 DIAGNOSIS — R53.1 RIGHT SIDED WEAKNESS: ICD-10-CM

## 2023-08-23 DIAGNOSIS — Z79.4 DIABETES MELLITUS TYPE 2, INSULIN DEPENDENT: ICD-10-CM

## 2023-08-23 LAB
ALBUMIN SERPL-MCNC: 3.1 G/DL (ref 3.4–4.8)
ALBUMIN/GLOB SERPL: 0.6 RATIO (ref 1.1–2)
ALP SERPL-CCNC: 62 UNIT/L (ref 40–150)
ALT SERPL-CCNC: 17 UNIT/L (ref 0–55)
APPEARANCE UR: ABNORMAL
APTT PPP: 24.1 SECONDS (ref 23.2–33.7)
AST SERPL-CCNC: 25 UNIT/L (ref 5–34)
BACTERIA #/AREA URNS AUTO: ABNORMAL /HPF
BASOPHILS # BLD AUTO: 0.04 X10(3)/MCL
BASOPHILS NFR BLD AUTO: 0.4 %
BILIRUB SERPL-MCNC: 0.2 MG/DL
BILIRUB UR QL STRIP.AUTO: NEGATIVE
BUN SERPL-MCNC: 17.6 MG/DL (ref 9.8–20.1)
CALCIUM SERPL-MCNC: 9.7 MG/DL (ref 8.4–10.2)
CHLORIDE SERPL-SCNC: 110 MMOL/L (ref 98–107)
CO2 SERPL-SCNC: 23 MMOL/L (ref 23–31)
COLOR UR: ABNORMAL
CREAT SERPL-MCNC: 1.02 MG/DL (ref 0.55–1.02)
EOSINOPHIL # BLD AUTO: 0.11 X10(3)/MCL (ref 0–0.9)
EOSINOPHIL NFR BLD AUTO: 1.1 %
ERYTHROCYTE [DISTWIDTH] IN BLOOD BY AUTOMATED COUNT: 15.2 % (ref 11.5–17)
GFR SERPLBLD CREATININE-BSD FMLA CKD-EPI: >60 MLS/MIN/1.73/M2
GLOBULIN SER-MCNC: 5 GM/DL (ref 2.4–3.5)
GLUCOSE SERPL-MCNC: 186 MG/DL (ref 82–115)
GLUCOSE UR QL STRIP.AUTO: NORMAL
HCT VFR BLD AUTO: 42 % (ref 37–47)
HGB BLD-MCNC: 13.7 G/DL (ref 12–16)
HYALINE CASTS #/AREA URNS LPF: ABNORMAL /LPF
IMM GRANULOCYTES # BLD AUTO: 0.09 X10(3)/MCL (ref 0–0.04)
IMM GRANULOCYTES NFR BLD AUTO: 0.9 %
INR PPP: 0.9
KETONES UR QL STRIP.AUTO: NEGATIVE
LEUKOCYTE ESTERASE UR QL STRIP.AUTO: 25
LIPASE SERPL-CCNC: 14 U/L
LYMPHOCYTES # BLD AUTO: 2.61 X10(3)/MCL (ref 0.6–4.6)
LYMPHOCYTES NFR BLD AUTO: 26.2 %
MAGNESIUM SERPL-MCNC: 1.8 MG/DL (ref 1.6–2.6)
MCH RBC QN AUTO: 28.2 PG (ref 27–31)
MCHC RBC AUTO-ENTMCNC: 32.6 G/DL (ref 33–36)
MCV RBC AUTO: 86.4 FL (ref 80–94)
MONOCYTES # BLD AUTO: 0.54 X10(3)/MCL (ref 0.1–1.3)
MONOCYTES NFR BLD AUTO: 5.4 %
MUCOUS THREADS URNS QL MICRO: ABNORMAL /LPF
NEUTROPHILS # BLD AUTO: 6.57 X10(3)/MCL (ref 2.1–9.2)
NEUTROPHILS NFR BLD AUTO: 66 %
NITRITE UR QL STRIP.AUTO: NEGATIVE
NRBC BLD AUTO-RTO: 0 %
PH UR STRIP.AUTO: 5.5 [PH]
PLATELET # BLD AUTO: 371 X10(3)/MCL (ref 130–400)
PLATELETS.RETICULATED NFR BLD AUTO: 3 % (ref 0.9–11.2)
PMV BLD AUTO: 10 FL (ref 7.4–10.4)
POCT GLUCOSE: 87 MG/DL (ref 70–110)
POTASSIUM SERPL-SCNC: 4.6 MMOL/L (ref 3.5–5.1)
PROT SERPL-MCNC: 8.1 GM/DL (ref 5.8–7.6)
PROT UR QL STRIP.AUTO: ABNORMAL
PROTHROMBIN TIME: 12.3 SECONDS (ref 11.4–14)
RBC # BLD AUTO: 4.86 X10(6)/MCL (ref 4.2–5.4)
RBC #/AREA URNS AUTO: ABNORMAL /HPF
RBC UR QL AUTO: ABNORMAL
SODIUM SERPL-SCNC: 144 MMOL/L (ref 136–145)
SP GR UR STRIP.AUTO: 1.04
SQUAMOUS #/AREA URNS LPF: ABNORMAL /HPF
UROBILINOGEN UR STRIP-ACNC: NORMAL
WBC # SPEC AUTO: 9.96 X10(3)/MCL (ref 4.5–11.5)
WBC #/AREA URNS AUTO: ABNORMAL /HPF
YEAST BUDDING URNS QL: ABNORMAL /HPF

## 2023-08-23 PROCEDURE — G0378 HOSPITAL OBSERVATION PER HR: HCPCS

## 2023-08-23 PROCEDURE — 96376 TX/PRO/DX INJ SAME DRUG ADON: CPT

## 2023-08-23 PROCEDURE — 25500020 PHARM REV CODE 255: Performed by: EMERGENCY MEDICINE

## 2023-08-23 PROCEDURE — 96374 THER/PROPH/DIAG INJ IV PUSH: CPT | Mod: 59

## 2023-08-23 PROCEDURE — 80053 COMPREHEN METABOLIC PANEL: CPT | Performed by: PHYSICIAN ASSISTANT

## 2023-08-23 PROCEDURE — 93005 ELECTROCARDIOGRAM TRACING: CPT

## 2023-08-23 PROCEDURE — 96361 HYDRATE IV INFUSION ADD-ON: CPT

## 2023-08-23 PROCEDURE — 83735 ASSAY OF MAGNESIUM: CPT | Performed by: STUDENT IN AN ORGANIZED HEALTH CARE EDUCATION/TRAINING PROGRAM

## 2023-08-23 PROCEDURE — 86901 BLOOD TYPING SEROLOGIC RH(D): CPT | Performed by: EMERGENCY MEDICINE

## 2023-08-23 PROCEDURE — 99285 EMERGENCY DEPT VISIT HI MDM: CPT | Mod: 25

## 2023-08-23 PROCEDURE — 85025 COMPLETE CBC W/AUTO DIFF WBC: CPT | Performed by: PHYSICIAN ASSISTANT

## 2023-08-23 PROCEDURE — 85730 THROMBOPLASTIN TIME PARTIAL: CPT | Performed by: EMERGENCY MEDICINE

## 2023-08-23 PROCEDURE — 63600175 PHARM REV CODE 636 W HCPCS: Performed by: EMERGENCY MEDICINE

## 2023-08-23 PROCEDURE — 83690 ASSAY OF LIPASE: CPT | Performed by: EMERGENCY MEDICINE

## 2023-08-23 PROCEDURE — 85610 PROTHROMBIN TIME: CPT | Performed by: EMERGENCY MEDICINE

## 2023-08-23 PROCEDURE — 81001 URINALYSIS AUTO W/SCOPE: CPT | Performed by: PHYSICIAN ASSISTANT

## 2023-08-23 PROCEDURE — 25000003 PHARM REV CODE 250: Performed by: STUDENT IN AN ORGANIZED HEALTH CARE EDUCATION/TRAINING PROGRAM

## 2023-08-23 PROCEDURE — 25000003 PHARM REV CODE 250: Performed by: EMERGENCY MEDICINE

## 2023-08-23 RX ORDER — GLUCAGON 1 MG
1 KIT INJECTION
Status: DISCONTINUED | OUTPATIENT
Start: 2023-08-23 | End: 2023-08-26 | Stop reason: HOSPADM

## 2023-08-23 RX ORDER — ONDANSETRON 2 MG/ML
4 INJECTION INTRAMUSCULAR; INTRAVENOUS EVERY 8 HOURS PRN
Status: COMPLETED | OUTPATIENT
Start: 2023-08-23 | End: 2023-08-25

## 2023-08-23 RX ORDER — VALSARTAN 80 MG/1
320 TABLET ORAL DAILY
Status: DISCONTINUED | OUTPATIENT
Start: 2023-08-23 | End: 2023-08-26 | Stop reason: HOSPADM

## 2023-08-23 RX ORDER — NITROGLYCERIN 0.4 MG/1
0.4 TABLET SUBLINGUAL EVERY 5 MIN PRN
Status: DISCONTINUED | OUTPATIENT
Start: 2023-08-23 | End: 2023-08-26 | Stop reason: HOSPADM

## 2023-08-23 RX ORDER — HYDROMORPHONE HYDROCHLORIDE 1 MG/ML
0.5 INJECTION, SOLUTION INTRAMUSCULAR; INTRAVENOUS; SUBCUTANEOUS EVERY 6 HOURS PRN
Status: DISCONTINUED | OUTPATIENT
Start: 2023-08-23 | End: 2023-08-24

## 2023-08-23 RX ORDER — ALBUTEROL SULFATE 90 UG/1
1 AEROSOL, METERED RESPIRATORY (INHALATION) EVERY 6 HOURS PRN
Status: DISCONTINUED | OUTPATIENT
Start: 2023-08-23 | End: 2023-08-26 | Stop reason: HOSPADM

## 2023-08-23 RX ORDER — MORPHINE SULFATE 2 MG/ML
4 INJECTION, SOLUTION INTRAMUSCULAR; INTRAVENOUS
Status: COMPLETED | OUTPATIENT
Start: 2023-08-23 | End: 2023-08-23

## 2023-08-23 RX ORDER — HYDRALAZINE HYDROCHLORIDE 20 MG/ML
10 INJECTION INTRAMUSCULAR; INTRAVENOUS EVERY 4 HOURS PRN
Status: DISCONTINUED | OUTPATIENT
Start: 2023-08-24 | End: 2023-08-26 | Stop reason: HOSPADM

## 2023-08-23 RX ORDER — TALC
6 POWDER (GRAM) TOPICAL NIGHTLY PRN
Status: DISCONTINUED | OUTPATIENT
Start: 2023-08-23 | End: 2023-08-26 | Stop reason: HOSPADM

## 2023-08-23 RX ORDER — NICOTINE 7MG/24HR
1 PATCH, TRANSDERMAL 24 HOURS TRANSDERMAL DAILY
Status: DISCONTINUED | OUTPATIENT
Start: 2023-08-24 | End: 2023-08-23

## 2023-08-23 RX ORDER — HYDRALAZINE HYDROCHLORIDE 20 MG/ML
10 INJECTION INTRAMUSCULAR; INTRAVENOUS ONCE
Status: DISCONTINUED | OUTPATIENT
Start: 2023-08-23 | End: 2023-08-23

## 2023-08-23 RX ORDER — SODIUM CHLORIDE 0.9 % (FLUSH) 0.9 %
10 SYRINGE (ML) INJECTION
Status: DISCONTINUED | OUTPATIENT
Start: 2023-08-23 | End: 2023-08-26 | Stop reason: HOSPADM

## 2023-08-23 RX ORDER — INSULIN ASPART 100 [IU]/ML
0-10 INJECTION, SOLUTION INTRAVENOUS; SUBCUTANEOUS EVERY 6 HOURS PRN
Status: DISCONTINUED | OUTPATIENT
Start: 2023-08-23 | End: 2023-08-26 | Stop reason: HOSPADM

## 2023-08-23 RX ORDER — PANTOPRAZOLE SODIUM 40 MG/1
40 TABLET, DELAYED RELEASE ORAL DAILY
Status: DISCONTINUED | OUTPATIENT
Start: 2023-08-23 | End: 2023-08-26 | Stop reason: HOSPADM

## 2023-08-23 RX ORDER — GABAPENTIN 300 MG/1
300 CAPSULE ORAL 3 TIMES DAILY
Status: DISCONTINUED | OUTPATIENT
Start: 2023-08-23 | End: 2023-08-26 | Stop reason: HOSPADM

## 2023-08-23 RX ORDER — OXYBUTYNIN CHLORIDE 5 MG/1
5 TABLET, EXTENDED RELEASE ORAL DAILY
Status: DISCONTINUED | OUTPATIENT
Start: 2023-08-24 | End: 2023-08-26 | Stop reason: HOSPADM

## 2023-08-23 RX ORDER — FUROSEMIDE 20 MG/1
40 TABLET ORAL DAILY
Status: DISCONTINUED | OUTPATIENT
Start: 2023-08-23 | End: 2023-08-26 | Stop reason: HOSPADM

## 2023-08-23 RX ORDER — FLUTICASONE FUROATE AND VILANTEROL 200; 25 UG/1; UG/1
1 POWDER RESPIRATORY (INHALATION) DAILY
Status: DISCONTINUED | OUTPATIENT
Start: 2023-08-24 | End: 2023-08-26 | Stop reason: HOSPADM

## 2023-08-23 RX ORDER — DULOXETIN HYDROCHLORIDE 30 MG/1
60 CAPSULE, DELAYED RELEASE ORAL EVERY MORNING
Status: DISCONTINUED | OUTPATIENT
Start: 2023-08-24 | End: 2023-08-26 | Stop reason: HOSPADM

## 2023-08-23 RX ORDER — CARVEDILOL 3.12 MG/1
3.12 TABLET ORAL 2 TIMES DAILY
Status: DISCONTINUED | OUTPATIENT
Start: 2023-08-23 | End: 2023-08-26 | Stop reason: HOSPADM

## 2023-08-23 RX ORDER — ATORVASTATIN CALCIUM 40 MG/1
40 TABLET, FILM COATED ORAL NIGHTLY
Status: DISCONTINUED | OUTPATIENT
Start: 2023-08-23 | End: 2023-08-26 | Stop reason: HOSPADM

## 2023-08-23 RX ORDER — ACETAMINOPHEN 325 MG/1
650 TABLET ORAL EVERY 8 HOURS PRN
Status: DISCONTINUED | OUTPATIENT
Start: 2023-08-23 | End: 2023-08-26 | Stop reason: HOSPADM

## 2023-08-23 RX ORDER — NIFEDIPINE 30 MG/1
90 TABLET, EXTENDED RELEASE ORAL DAILY
Status: DISCONTINUED | OUTPATIENT
Start: 2023-08-23 | End: 2023-08-26 | Stop reason: HOSPADM

## 2023-08-23 RX ORDER — NICOTINE 7MG/24HR
1 PATCH, TRANSDERMAL 24 HOURS TRANSDERMAL DAILY
Status: DISCONTINUED | OUTPATIENT
Start: 2023-08-23 | End: 2023-08-26 | Stop reason: HOSPADM

## 2023-08-23 RX ADMIN — FUROSEMIDE 40 MG: 20 TABLET ORAL at 09:08

## 2023-08-23 RX ADMIN — ACETAMINOPHEN 650 MG: 325 TABLET, FILM COATED ORAL at 10:08

## 2023-08-23 RX ADMIN — ATORVASTATIN CALCIUM 40 MG: 40 TABLET, FILM COATED ORAL at 09:08

## 2023-08-23 RX ADMIN — VALSARTAN 320 MG: 80 TABLET, FILM COATED ORAL at 10:08

## 2023-08-23 RX ADMIN — MORPHINE SULFATE 4 MG: 2 INJECTION, SOLUTION INTRAMUSCULAR; INTRAVENOUS at 04:08

## 2023-08-23 RX ADMIN — CARVEDILOL 3.12 MG: 3.12 TABLET, FILM COATED ORAL at 09:08

## 2023-08-23 RX ADMIN — MORPHINE SULFATE 4 MG: 2 INJECTION, SOLUTION INTRAMUSCULAR; INTRAVENOUS at 07:08

## 2023-08-23 RX ADMIN — IOPAMIDOL 100 ML: 755 INJECTION, SOLUTION INTRAVENOUS at 05:08

## 2023-08-23 RX ADMIN — NIFEDIPINE 90 MG: 30 TABLET, FILM COATED, EXTENDED RELEASE ORAL at 09:08

## 2023-08-23 RX ADMIN — GABAPENTIN 300 MG: 300 CAPSULE ORAL at 09:08

## 2023-08-23 RX ADMIN — SODIUM CHLORIDE 1000 ML: 9 INJECTION, SOLUTION INTRAVENOUS at 04:08

## 2023-08-23 RX ADMIN — PANTOPRAZOLE SODIUM 40 MG: 40 TABLET, DELAYED RELEASE ORAL at 09:08

## 2023-08-23 NOTE — FIRST PROVIDER EVALUATION
"Medical screening examination initiated.  I have conducted a focused provider triage encounter, findings are as follows:    Brief history of present illness: LLQ abdominal pain with diarrhea and rectal bleeding today, had a fall yesterday out of the shower, no blood thinners, no LOC, hit left shoulder/chest wall on door frame    Vitals:    08/23/23 1539   BP: (!) 177/87   BP Location: Right arm   Patient Position: Sitting   Pulse: 94   Resp: (!) 24   Temp: 97.3 °F (36.3 °C)   TempSrc: Temporal   SpO2: 95%   Weight: 117 kg (257 lb 15 oz)   Height: 5' 5" (1.651 m)       Pertinent physical exam:  VSS, NAD    Brief workup plan:  labs initiated    Preliminary workup initiated; this workup will be continued and followed by the physician or advanced practice provider that is assigned to the patient when roomed.  "

## 2023-08-23 NOTE — ED PROVIDER NOTES
"Encounter Date: 8/23/2023       History     Chief Complaint   Patient presents with    Fall    Rectal Bleeding     C/o fall yesterday. States hit side of face with possible loc. Today states had rectal dark bleeding blood clots. Also c/o left abdominal pain    Abdominal Pain     Patient here endorsing dark blood per rectum yesterday evening and this morning.  She is endorsing left lower quadrant pain, dizziness.  Patient is on O2 chronically for COPD.  Patient takes no anticoagulants.  She became sufficiently dizzy yesterday evening that she fell, striking her head.      Rectal Bleeding   The current episode started yesterday. The onset was gradual. The problem occurs frequently. The problem has been unchanged. The pain is at a severity of 2/10. The stool is described as soft. There was no prior unsuccessful therapy. Associated symptoms include abdominal pain. Pertinent negatives include no anorexia, no fever, no diarrhea, no hematemesis, no hemorrhoids, no nausea, no rectal pain, no vomiting and no hematuria. She has been Eating and drinking normally.     Review of patient's allergies indicates:   Allergen Reactions    Pcn [penicillins] Anaphylaxis     Past Medical History:   Diagnosis Date    Anxiety     Arthritis     CVA (cerebral vascular accident)     "mini stroke"    Depression     Diabetes mellitus     Heart problem     History of psychiatric hospitalization     three(1983, 1995 and another (years ago")): depression    HTN (hypertension)     Hx of psychiatric care     Hypertension     Pacemaker     Psychiatric exam requested by authority     Psychiatric problem     TBI (traumatic brain injury)     Therapy      Past Surgical History:   Procedure Laterality Date    CARDIAC PACEMAKER PLACEMENT      CARDIAC PACEMAKER REMOVAL      CHOLECYSTECTOMY      HYSTERECTOMY       Family History   Problem Relation Age of Onset    Schizophrenia Mother     Bipolar disorder Mother     Bipolar disorder Father      Social History "     Tobacco Use    Smoking status: Every Day     Current packs/day: 0.00     Types: Cigarettes     Start date: 1978     Last attempt to quit: 2018     Years since quittin.9    Smokeless tobacco: Never    Tobacco comments:     1 pk every 2 weeks, 4 a day   Substance Use Topics    Alcohol use: Not Currently     Comment: wine on occ    Drug use: No     Review of Systems   Constitutional:  Negative for fever.   Gastrointestinal:  Positive for abdominal pain and hematochezia. Negative for anorexia, diarrhea, hematemesis, hemorrhoids, nausea, rectal pain and vomiting.   Genitourinary:  Negative for hematuria.   Neurological:  Positive for dizziness.   All other systems reviewed and are negative.      Physical Exam     Initial Vitals [23 1539]   BP Pulse Resp Temp SpO2   (!) 177/87 94 (!) 24 97.3 °F (36.3 °C) 95 %      MAP       --         Physical Exam    Nursing note and vitals reviewed.  Constitutional: She appears well-developed and well-nourished. She is not diaphoretic. No distress.   HENT:   Head: Normocephalic and atraumatic.   Right Ear: External ear normal.   Left Ear: External ear normal.   Eyes: EOM are normal. Pupils are equal, round, and reactive to light. Right eye exhibits no discharge. Left eye exhibits no discharge.   Neck: Neck supple. No thyromegaly present. No tracheal deviation present. No JVD present.   Normal range of motion.  Cardiovascular:  Normal rate, regular rhythm, normal heart sounds and intact distal pulses.     Exam reveals no gallop and no friction rub.       No murmur heard.  Pulmonary/Chest: Breath sounds normal. No stridor. No respiratory distress. She has no wheezes. She has no rhonchi. She has no rales.   Abdominal: Abdomen is soft. Bowel sounds are normal. She exhibits no distension. There is no abdominal tenderness. There is no rebound and no guarding.   Genitourinary: Rectum:      Guaiac result positive.   Guaiac positive stool. :  Acceptable.  Musculoskeletal:         General: No tenderness or edema. Normal range of motion.      Cervical back: Normal range of motion and neck supple.     Neurological: She is alert and oriented to person, place, and time. She has normal strength. No cranial nerve deficit or sensory deficit. GCS score is 15. GCS eye subscore is 4. GCS verbal subscore is 5. GCS motor subscore is 6.   Skin: Skin is warm and dry. Capillary refill takes less than 2 seconds. No rash and no abscess noted. No erythema. No pallor.   Psychiatric: She has a normal mood and affect. Her behavior is normal. Judgment and thought content normal.         ED Course   Procedures  Labs Reviewed   COMPREHENSIVE METABOLIC PANEL - Abnormal; Notable for the following components:       Result Value    Chloride 110 (*)     Glucose Level 186 (*)     Protein Total 8.1 (*)     Albumin Level 3.1 (*)     Globulin 5.0 (*)     Albumin/Globulin Ratio 0.6 (*)     All other components within normal limits   URINALYSIS, REFLEX TO URINE CULTURE - Abnormal; Notable for the following components:    Appearance, UA Turbid (*)     Protein, UA 1+ (*)     Blood, UA 2+ (*)     Leukocyte Esterase, UA 25 (*)     WBC, UA 6-10 (*)     Bacteria, UA Trace (*)     Budding Yeast, UA Few (*)     Squamous Epithelial Cells, UA Many (*)     Mucous, UA Trace (*)     RBC, UA 11-20 (*)     All other components within normal limits   CBC WITH DIFFERENTIAL - Abnormal; Notable for the following components:    MCHC 32.6 (*)     IG# 0.09 (*)     All other components within normal limits   LIPASE - Normal   PROTIME-INR - Normal   APTT - Normal   CBC W/ AUTO DIFFERENTIAL    Narrative:     The following orders were created for panel order CBC auto differential.  Procedure                               Abnormality         Status                     ---------                               -----------         ------                     CBC with Differential[[id number]]        Abnormal             Final result                 Please view results for these tests on the individual orders.   TYPE & SCREEN   POCT GLUCOSE MONITORING CONTINUOUS     EKG Readings: (Independently Interpreted)   NSR @ 91, non acute and non ischemic appearing ;     ECG Results              EKG 12-lead (Final result)  Result time 08/23/23 19:40:04      Final result by Interface, Lab In [facility] (08/23/23 19:40:04)                   Narrative:    Test Reason : Z13.9,    Vent. Rate : 091 BPM     Atrial Rate : 091 BPM     P-R Int : 134 ms          QRS Dur : 096 ms      QT Int : 376 ms       P-R-T Axes : 070 -26 087 degrees     QTc Int : 462 ms    Normal sinus rhythm  LVH with repolarization abnormality  Abnormal ECG  When compared with ECG of 06-JUL-2023 14:30,  Non-specific change in ST segment in Inferior leads  Confirmed by Rickey Giraldo MD ([id number]) on 8/23/2023 7:39:54 PM    Referred By: [id number]   SELF           Confirmed By:Rickey Giraldo MD                                  Imaging Results              CT Abdomen Pelvis W Wo Contrast (Final result)  Result time 08/23/23 17:31:03      Final result by Letha Foster MD (08/23/23 17:31:03)                   Impression:      No evidence of active GI hemorrhage seen.  No colitis or diverticulitis is seen.    Benign right adrenal nodule      Electronically signed by: Letha Foster  Date:    08/23/2023  Time:    17:31               Narrative:    EXAMINATION:  CT ABDOMEN PELVIS W WO CONTRAST    CLINICAL HISTORY:  LLQ pain, melena, dizzy (? gi bleed);    TECHNIQUE:  Low dose axial images, sagittal and coronal reformations were obtained from the lung bases to the pubic symphysis following the IV administration of  contrast.  Delayed imaging and pre contrasted imaging was performed as well. Automatic exposure control is utilized to reduce patient radiation exposure.    COMPARISON:  None    FINDINGS:  There are chronic lung changes seen lung bases bilaterally and some changes of COPD  and mild interstitial fibrosis.  There is some bronchiectasis seen in the lung bases bilaterally.    .    The liver is enlarged in size.  No liver mass or lesion is seen.  Portal and hepatic veins appear normal.    The patient is status post cholecystectomy.    The pancreas appears normal.  No pancreatic mass or lesion is seen.    The spleen appears normal.  No splenic mass or lesion is seen.    There is an adrenal nodule seen on the right side.  It measures 2.4 by 3.1 cm.  Hounsfield units on the precontrast imaging are 4.8 consistent with a benign adenomatous type lesion.  Left adrenal gland appears normal..    The kidneys are normal in size.  No hydronephrosis is seen.  No hydroureter is seen.  No nephrolithiasis or ureteral stone is seen.  There is a cyst in the lower pole of the left kidney.    Urinary bladder appears normal.    No colitis is seen.  No diverticulitis is seen.  No colonic mass or lesion or inflammatory process is seen.  No evidence pooling of contrast is seen to suggest active GI hemorrhage.  The appendix appears normal.    No free air is seen.  No free fluid is seen.    The bones appear grossly unremarkable.                                       CT Head Without Contrast (Final result)  Result time 08/23/23 17:25:29      Final result by Kelsie Prescott MD (08/23/23 17:25:29)                   Impression:      1. No acute intracranial abnormality.  2. Chronic microvascular ischemic changes.      Electronically signed by: Kelsie Prescott  Date:    08/23/2023  Time:    17:25               Narrative:    EXAMINATION:  CT HEAD WITHOUT CONTRAST    CLINICAL HISTORY:  dizzy, fall;    TECHNIQUE:  Axial scans were obtained from skull base to the vertex.    Coronal and sagittal reconstructions obtained from the axial data.    Automatic exposure control was utilized to limit radiation dose.    Contrast: None    Radiation Dose:    Total DLP: 2430 mGy*cm    COMPARISON:  MRI brain dated  07/07/2023    FINDINGS:  There is no acute intracranial hemorrhage or edema. The gray-white matter differentiation is preserved.  Patchy hypodensities in the subcortical and periventricular white matter and thalami likely represent chronic microvascular ischemic changes.    There is no mass effect or midline shift. The ventricles and sulci are normal in size. The basal cisterns are patent. There is no abnormal extra-axial fluid collection.    The calvarium and skull base are intact. The visualized paranasal sinuses and the mastoid air cells are clear.                                    X-Rays:   Independently Interpreted Readings:   Head CT: Negative study ;   Abdomen: Normal ct abd/pelvis ;     Medications   albuterol inhaler 1 puff (has no administration in time range)   atorvastatin tablet 40 mg (has no administration in time range)   carvediloL tablet 3.125 mg (has no administration in time range)   DULoxetine DR capsule 60 mg (has no administration in time range)   pantoprazole EC tablet 40 mg (has no administration in time range)   fluticasone furoate-vilanteroL 200-25 mcg/dose diskus inhaler 1 puff (has no administration in time range)   gabapentin capsule 300 mg (has no administration in time range)   NIFEdipine 24 hr tablet 90 mg (has no administration in time range)   nitroGLYCERIN SL tablet 0.4 mg (has no administration in time range)   oxybutynin 24 hr tablet 5 mg (has no administration in time range)   valsartan tablet 320 mg (has no administration in time range)   insulin detemir U-100 injection 25 Units (has no administration in time range)   sodium chloride 0.9% flush 10 mL (has no administration in time range)   melatonin tablet 6 mg (has no administration in time range)   acetaminophen tablet 650 mg (has no administration in time range)   HYDROmorphone injection 0.5 mg (has no administration in time range)   glucagon (human recombinant) injection 1 mg (has no administration in time range)   insulin  aspart U-100 injection 0-10 Units (has no administration in time range)   dextrose 10% bolus 125 mL 125 mL (has no administration in time range)   dextrose 10% bolus 250 mL 250 mL (has no administration in time range)   hydrALAZINE injection 10 mg (has no administration in time range)   nicotine 7 mg/24 hr 1 patch (has no administration in time range)   sodium chloride 0.9% bolus 1,000 mL 1,000 mL (0 mLs Intravenous Stopped 8/23/23 1838)   morphine injection 4 mg (4 mg Intravenous Given 8/23/23 1639)   iopamidoL (ISOVUE-370) injection 100 mL (100 mLs Intravenous Given 8/23/23 1722)   morphine injection 4 mg (4 mg Intravenous Given 8/23/23 1936)     Medical Decision Making  Patient presents endorsing melenic stools since yesterday.  She also describes sufficient dizziness last night that she fell and struck her head.  Patient is here endorsing generalized weakness.  No localizing symptoms.  Hemoccult positive on digital rectal exam.  Differential diagnosis includes GI bleed, diverticulitis, syncope, others ...    Amount and/or Complexity of Data Reviewed  Labs: ordered. Decision-making details documented in ED Course.     Details: + hemoccult ;  Radiology: ordered.  ECG/medicine tests: ordered and independent interpretation performed. Decision-making details documented in ED Course.     Details: NSR @ 91, non acute and non ischemic appearing ;  Discussion of management or test interpretation with external provider(s): Inpatient medicine team aware of case, plans admit.    Risk  Prescription drug management.  Decision regarding hospitalization.  Risk Details: Older person endorsing melena with guaiac-positive stools and subjective dizziness.  Constellation of findings necessarily high-risk.  Plan admission for further evaluation, treatment.               ED Course as of 08/23/23 2031   Wed Aug 23, 2023   2026 Reassuring coags ; [CT]   2026 Equivocal ua ; [CT]   2026 Reassuring hemogram ; [CT]   2026 Reassuring  chemistries ; [CT]   2027 Ct abd pelvis without acute abnormal findings ; [CT]   2027 Normal ct head ; [CT]      ED Course User Index  [CT] Toni Grace MD                    Clinical Impression:   Final diagnoses:  [Z13.9] Screening due  [R10.9] Abdominal pain, unspecified abdominal location  [R42] Dizziness  [K92.2] Gastrointestinal hemorrhage, unspecified gastrointestinal hemorrhage type        ED Disposition Condition    Observation                 Toni Grace MD  08/23/23 2031

## 2023-08-24 LAB
ABORH RETYPE: NORMAL
ALBUMIN SERPL-MCNC: 3 G/DL (ref 3.4–4.8)
ALBUMIN/GLOB SERPL: 0.7 RATIO (ref 1.1–2)
ALP SERPL-CCNC: 61 UNIT/L (ref 40–150)
ALT SERPL-CCNC: 14 UNIT/L (ref 0–55)
AST SERPL-CCNC: 17 UNIT/L (ref 5–34)
BASOPHILS # BLD AUTO: 0.05 X10(3)/MCL
BASOPHILS NFR BLD AUTO: 0.5 %
BILIRUB SERPL-MCNC: 0.2 MG/DL
BUN SERPL-MCNC: 15.9 MG/DL (ref 9.8–20.1)
CALCIUM SERPL-MCNC: 9.2 MG/DL (ref 8.4–10.2)
CHLORIDE SERPL-SCNC: 105 MMOL/L (ref 98–107)
CO2 SERPL-SCNC: 27 MMOL/L (ref 23–31)
CREAT SERPL-MCNC: 0.78 MG/DL (ref 0.55–1.02)
EOSINOPHIL # BLD AUTO: 0.15 X10(3)/MCL (ref 0–0.9)
EOSINOPHIL NFR BLD AUTO: 1.6 %
ERYTHROCYTE [DISTWIDTH] IN BLOOD BY AUTOMATED COUNT: 15.3 % (ref 11.5–17)
GFR SERPLBLD CREATININE-BSD FMLA CKD-EPI: >60 MLS/MIN/1.73/M2
GLOBULIN SER-MCNC: 4.4 GM/DL (ref 2.4–3.5)
GLUCOSE SERPL-MCNC: 113 MG/DL (ref 82–115)
HCT VFR BLD AUTO: 42.8 % (ref 37–47)
HGB BLD-MCNC: 13.3 G/DL (ref 12–16)
IMM GRANULOCYTES # BLD AUTO: 0.07 X10(3)/MCL (ref 0–0.04)
IMM GRANULOCYTES NFR BLD AUTO: 0.7 %
LACTATE SERPL-SCNC: 1.2 MMOL/L (ref 0.5–2.2)
LDH SERPL-CCNC: 214 U/L (ref 125–220)
LYMPHOCYTES # BLD AUTO: 2.7 X10(3)/MCL (ref 0.6–4.6)
LYMPHOCYTES NFR BLD AUTO: 28.9 %
MAGNESIUM SERPL-MCNC: 1.8 MG/DL (ref 1.6–2.6)
MCH RBC QN AUTO: 27.7 PG (ref 27–31)
MCHC RBC AUTO-ENTMCNC: 31.1 G/DL (ref 33–36)
MCV RBC AUTO: 89.2 FL (ref 80–94)
MONOCYTES # BLD AUTO: 0.53 X10(3)/MCL (ref 0.1–1.3)
MONOCYTES NFR BLD AUTO: 5.7 %
NEUTROPHILS # BLD AUTO: 5.85 X10(3)/MCL (ref 2.1–9.2)
NEUTROPHILS NFR BLD AUTO: 62.6 %
NRBC BLD AUTO-RTO: 0 %
PLATELET # BLD AUTO: 404 X10(3)/MCL (ref 130–400)
PMV BLD AUTO: 9.6 FL (ref 7.4–10.4)
POCT GLUCOSE: 113 MG/DL (ref 70–110)
POCT GLUCOSE: 157 MG/DL (ref 70–110)
POCT GLUCOSE: 187 MG/DL (ref 70–110)
POCT GLUCOSE: 199 MG/DL (ref 70–110)
POCT GLUCOSE: 227 MG/DL (ref 70–110)
POTASSIUM SERPL-SCNC: 3.4 MMOL/L (ref 3.5–5.1)
PROT SERPL-MCNC: 7.4 GM/DL (ref 5.8–7.6)
RBC # BLD AUTO: 4.8 X10(6)/MCL (ref 4.2–5.4)
SODIUM SERPL-SCNC: 141 MMOL/L (ref 136–145)
WBC # SPEC AUTO: 9.35 X10(3)/MCL (ref 4.5–11.5)

## 2023-08-24 PROCEDURE — 83615 LACTATE (LD) (LDH) ENZYME: CPT

## 2023-08-24 PROCEDURE — 27100098 HC SPACER

## 2023-08-24 PROCEDURE — G0378 HOSPITAL OBSERVATION PER HR: HCPCS

## 2023-08-24 PROCEDURE — 83735 ASSAY OF MAGNESIUM: CPT | Performed by: STUDENT IN AN ORGANIZED HEALTH CARE EDUCATION/TRAINING PROGRAM

## 2023-08-24 PROCEDURE — 99900035 HC TECH TIME PER 15 MIN (STAT)

## 2023-08-24 PROCEDURE — 63600175 PHARM REV CODE 636 W HCPCS: Performed by: STUDENT IN AN ORGANIZED HEALTH CARE EDUCATION/TRAINING PROGRAM

## 2023-08-24 PROCEDURE — 25000242 PHARM REV CODE 250 ALT 637 W/ HCPCS: Performed by: STUDENT IN AN ORGANIZED HEALTH CARE EDUCATION/TRAINING PROGRAM

## 2023-08-24 PROCEDURE — 96375 TX/PRO/DX INJ NEW DRUG ADDON: CPT | Mod: 59

## 2023-08-24 PROCEDURE — 96372 THER/PROPH/DIAG INJ SC/IM: CPT | Mod: 59 | Performed by: STUDENT IN AN ORGANIZED HEALTH CARE EDUCATION/TRAINING PROGRAM

## 2023-08-24 PROCEDURE — 94761 N-INVAS EAR/PLS OXIMETRY MLT: CPT

## 2023-08-24 PROCEDURE — 25000003 PHARM REV CODE 250: Performed by: STUDENT IN AN ORGANIZED HEALTH CARE EDUCATION/TRAINING PROGRAM

## 2023-08-24 PROCEDURE — 25000003 PHARM REV CODE 250: Performed by: NURSE PRACTITIONER

## 2023-08-24 PROCEDURE — 85025 COMPLETE CBC W/AUTO DIFF WBC: CPT | Performed by: STUDENT IN AN ORGANIZED HEALTH CARE EDUCATION/TRAINING PROGRAM

## 2023-08-24 PROCEDURE — 83605 ASSAY OF LACTIC ACID: CPT

## 2023-08-24 PROCEDURE — 80053 COMPREHEN METABOLIC PANEL: CPT | Performed by: STUDENT IN AN ORGANIZED HEALTH CARE EDUCATION/TRAINING PROGRAM

## 2023-08-24 PROCEDURE — 25500020 PHARM REV CODE 255: Performed by: INTERNAL MEDICINE

## 2023-08-24 PROCEDURE — 94640 AIRWAY INHALATION TREATMENT: CPT

## 2023-08-24 RX ORDER — POLYETHYLENE GLYCOL 3350, SODIUM SULFATE ANHYDROUS, SODIUM BICARBONATE, SODIUM CHLORIDE, POTASSIUM CHLORIDE 236; 22.74; 6.74; 5.86; 2.97 G/4L; G/4L; G/4L; G/4L; G/4L
2000 POWDER, FOR SOLUTION ORAL
Status: DISPENSED | OUTPATIENT
Start: 2023-08-24 | End: 2023-08-25

## 2023-08-24 RX ADMIN — GABAPENTIN 300 MG: 300 CAPSULE ORAL at 10:08

## 2023-08-24 RX ADMIN — IOPAMIDOL 100 ML: 755 INJECTION, SOLUTION INTRAVENOUS at 03:08

## 2023-08-24 RX ADMIN — ONDANSETRON 4 MG: 2 INJECTION INTRAMUSCULAR; INTRAVENOUS at 02:08

## 2023-08-24 RX ADMIN — POLYETHYLENE GLYCOL 3350, SODIUM SULFATE ANHYDROUS, SODIUM BICARBONATE, SODIUM CHLORIDE, POTASSIUM CHLORIDE 2000 ML: 236; 22.74; 6.74; 5.86; 2.97 POWDER, FOR SOLUTION ORAL at 07:08

## 2023-08-24 RX ADMIN — NIFEDIPINE 90 MG: 30 TABLET, FILM COATED, EXTENDED RELEASE ORAL at 10:08

## 2023-08-24 RX ADMIN — CARVEDILOL 3.12 MG: 3.12 TABLET, FILM COATED ORAL at 10:08

## 2023-08-24 RX ADMIN — DULOXETINE HYDROCHLORIDE 60 MG: 30 CAPSULE, DELAYED RELEASE ORAL at 10:08

## 2023-08-24 RX ADMIN — PANTOPRAZOLE SODIUM 40 MG: 40 TABLET, DELAYED RELEASE ORAL at 10:08

## 2023-08-24 RX ADMIN — OXYBUTYNIN CHLORIDE 5 MG: 5 TABLET, EXTENDED RELEASE ORAL at 10:08

## 2023-08-24 RX ADMIN — ACETAMINOPHEN 650 MG: 325 TABLET, FILM COATED ORAL at 07:08

## 2023-08-24 RX ADMIN — ATORVASTATIN CALCIUM 40 MG: 40 TABLET, FILM COATED ORAL at 08:08

## 2023-08-24 RX ADMIN — ALBUTEROL SULFATE 1 PUFF: 90 AEROSOL, METERED RESPIRATORY (INHALATION) at 08:08

## 2023-08-24 RX ADMIN — HYDROMORPHONE HYDROCHLORIDE 0.5 MG: 0.5 INJECTION, SOLUTION INTRAMUSCULAR; INTRAVENOUS; SUBCUTANEOUS at 02:08

## 2023-08-24 RX ADMIN — CARVEDILOL 3.12 MG: 3.12 TABLET, FILM COATED ORAL at 08:08

## 2023-08-24 RX ADMIN — VALSARTAN 320 MG: 80 TABLET, FILM COATED ORAL at 10:08

## 2023-08-24 RX ADMIN — ACETAMINOPHEN 650 MG: 325 TABLET, FILM COATED ORAL at 12:08

## 2023-08-24 RX ADMIN — GABAPENTIN 300 MG: 300 CAPSULE ORAL at 03:08

## 2023-08-24 RX ADMIN — HYDRALAZINE HYDROCHLORIDE 10 MG: 20 INJECTION INTRAMUSCULAR; INTRAVENOUS at 01:08

## 2023-08-24 RX ADMIN — Medication 6 MG: at 09:08

## 2023-08-24 RX ADMIN — INSULIN DETEMIR 25 UNITS: 100 INJECTION, SOLUTION SUBCUTANEOUS at 08:08

## 2023-08-24 RX ADMIN — FUROSEMIDE 40 MG: 20 TABLET ORAL at 10:08

## 2023-08-24 RX ADMIN — GABAPENTIN 300 MG: 300 CAPSULE ORAL at 08:08

## 2023-08-24 NOTE — H&P
[facility] INTERNAL MEDICINE  ADMISSION HISTORY AND PHYSICAL    Resident Team: [facility] Medicine List 3  Attending Physician: Justin Fuentes MD  Date of Admit: 8/23/2023    SUBJECTIVE:      HPI: Elba Barnes is a 63 y.o. 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 She has had a total of 4 bloody bowel movements with clots since yesterday afternoon, causing her to have intermittent weakness and dizziness. Nothing makes her symptoms better or worse. She also had a fall at home while using the bathroom and did not have any head injuries. Other complaint includes LLQ pain of 4 out of 10 that is intermittent, dull, and achy; it is relieved by having bowel movements with no obvious aggravating factors. Of note, she takes fioricet at home for headaches. In the ED: /104; labs significant for hypoalbuminemia; U/A revealed bacteriuria and hematuria. CT ab/pelvis revealed no signs of active GI bleed/colitis/diverticulitis, right benign adrenal nodule identified; CT head w/o contrast revealed no acute intracranial abnormalities. Hospital medicine team was consulted for lower GI bleeding management.     ROS:   (+) Rectal bleeding, LLQ pain,   (-) Chest pain, palpitations, shortness of breath, fever, night sweat, chills, diarrhea, constipation    PMH: TBI, mood disorder (inpt admits x 3), TBI (2013), lumbar stenosis w/sciatica, GERD, osteoarthritis of multiple sites, tobacco abuse, and morbid obesity  Family HX: Parents (HTN, DM, CAD)  Allergy: PCN (hives)  Social HX: 1 PPD since 14 yo, recently cut down to 4 cigarettes/day; denies alcohol and illicit drug use  Previous hosp: 7/6/2023  for CVA rule out  Home meds:   Current Outpatient Medications   Medication Instructions    albuterol (PROVENTIL/VENTOLIN HFA) 90 mcg/actuation inhaler 1-2 puffs, Inhalation, Every 6 hours PRN, Rescue    albuterol-ipratropium (DUO-NEB) 2.5 mg-0.5 mg/3 mL nebulizer solution 3 mLs, Nebulization, Every 6 hours while awake, Rescue    ALPRAZolam (XANAX) 1 MG tablet Take 1 tablet by mouth once a day as needed as needed for anxiety    ammonium lactate (LAC-HYDRIN) 12 % lotion Apply to dry skin areas on feet, legs, and elbow areas TWICE A DAY AS NEEDED    aspirin (ECOTRIN) 81 mg, Oral, Daily    atorvastatin (LIPITOR) 40 mg, Oral, Nightly    butalbital-acetaminophen-caffeine -40 mg (FIORICET, ESGIC) -40 mg per tablet 1 tablet, Oral, Every 6 hours PRN    carvediloL (COREG) 3.125 mg, Oral, 2 times daily    cholecalciferol, vitamin D3, (VITAMIN D3) 50 mcg (2,000 unit) Cap capsule 1 capsule, Oral    diaper,brief,adult,disposable Misc 1 each, Misc.(Non-Drug; Combo Route), Every 6 hours PRN    diclofenac sodium (VOLTAREN) 2 g, Topical (Top), 4 times daily    docusate sodium (COLACE) 250 mg, Oral, 2 times daily    doxepin (SILENOR) 6 mg Tab 1 tablet, Oral, Nightly    DULoxetine (CYMBALTA) 60 mg, Oral, Every morning    EScitalopram oxalate (LEXAPRO) 10 mg, Oral    esomeprazole (NEXIUM) 40 mg, Oral, Every morning    fluticasone furoate-vilanteroL (BREO ELLIPTA) 200-25 mcg/dose DsDv diskus inhaler 1 puff, Inhalation, Daily, Controller    fluticasone propionate (FLONASE) 50 mcg, Each Nostril, 2 times daily PRN    furosemide (LASIX) 40 mg, Oral, 2 times daily    gabapentin (NEURONTIN) 600 mg, Oral, 3 times daily    glipiZIDE (GLUCOTROL) 10 mg, Oral    insulin (LANTUS SOLOSTAR U-100 INSULIN) 50 Units, Subcutaneous, 2 times daily before meals, Adjust doses based on cbg    magnesium citrate solution 296 mLs, Oral, Daily PRN    multivitamin (THERAGRAN) per tablet 1 tablet, Oral, Daily    NIFEdipine (PROCARDIA-XL) 90 mg, Oral,  "Daily    nitroGLYCERIN (NITROSTAT) 0.4 mg, Sublingual, Every 5 min PRN    NOVOLOG FLEXPEN U-100 INSULIN 100 unit/mL (3 mL) InPn pen Inject 20 Units into the skin 3 (three) times daily with meals. DISCARD OPEN PEN AFTER 28 DAYS    nystatin (MYCOSTATIN) cream Apply topically 3 (three) times daily. Use under breast    ONETOUCH VERIO TEST STRIPS Strp 4 times daily    oxybutynin (DITROPAN-XL) 5 mg, Oral, Daily    polyethylene glycol (GLYCOLAX) 17 g, Oral, Daily    sertraline (ZOLOFT) 50 mg, Oral, Daily    SURE COMFORT PEN NEEDLE 32 gauge x 5/32" Ndle USE 1 pen needle TO inject insulin TWICE DAILY BEFORE meals AS DIRECTED by prescriber    terconazole (TERAZOL 7) 0.4 % Crea 1 applicator, Vaginal, Nightly    valsartan (DIOVAN) 320 mg, Oral         OBJECTIVE:     Vital signs:   BP (!) 185/104   Pulse 85   Temp 97.3 °F (36.3 °C) (Temporal)   Resp 18   Ht 5' 5" (1.651 m)   Wt 117 kg (257 lb 15 oz)   SpO2 95%   BMI 42.92 kg/m²      Physical Examination:  General: Obese w/ mild distress  HEENT: NC/AT; PERRL; nasal and oral mucosa moist and clear  Pulm: Diminished in lower lobes bilaterally, normal work of breathing on room air  CV: S1, S2 w/o murmurs or gallops; trace edema in BLE  GI: Soft with normal bowel sounds in all quadrants, no masses on palpation; truncal obesity; FLAKO revealed multiple small blood clots w/o active bleeding  MSK: Limited ROM in lower extremities d/t weakness   Neuro: AAOx4; motor/sensory function intact; weakness in RLE  Psych: Cooperative; appropriate mood and affect    Laboratory:  Labs on admission reviewed    Imagin2023 - CT ab/pelvis: No active GI bleed/colitis/diverticulitis; benign right adrenal nodule  2023 - CT head w/o contrast: no acute intracranial abnormalities    ASSESSMENT & PLAN:     Acute GI bleeding   -Bedside FLAKO revealed multiple small blood clots w/o active bleeding; vital signs and labs stable  -Holding home ASA 81mg for now  -Keeping patient NPO in preparation " for possible GI consult next AM    HTN urgency   -/104 at the time of assessment; will resume home antihypertensive before considering IV PRNs; she did not take home antihypertensives today  -Aim to reduce MAP by no more than 25% in first hour then no more than 160/100 mmHg for the next 6 hours  -Continue home antihypertensive: nifedipine 90mg daily, coreg 3.125 mg BID, valsartan 320mg daily     Asymptomatic bacteriuria  Hematuria  Overactive bladder  -U/A revealed 2+ occult blood and moderate RBC; this could be d/t contaminated urine sample   -Continue home oxybutynin    DM II w/ neuropathy  -A1c 8.4 (7/6/2023)  -Initiated moderate SSI (NPO protocol)  -Home insulin regimen: Novolog 20 untis TIDWM & Lantus 50 units BID before meals; will give detemir 25 units QHS while NPO  -Continue home gabapentin    CHF (EF 45% July 2023)  -EF was 50-55% in November 2021  -Continue Coreg 3.125mg BID and lasix 40mg daily    COPD   -Not currently using home O2; breathing well on room air   -Continue home therapy: albuterol PRN, Breo    CVA w/ right-sided residual weakness   -Consulted PT for further assistance  -Holding home ASA d/t GI bleed    Depression  -HX of inpatient psych placement  -Continue dulexetine 60mg daily; holding home lexapro d/t concern for serotonin syndrome    Tobacco use  -Will give nicotine patch while hospitalized    DVT PPx: SCD (holding antiplatelet and anticoagulation d/t lower GI bleed)  GI PPx: Protonix  Diet: NPO  ABX: None  Fluids: None  O2: Room air  PCP: Purvi Rai FNP    Disposition (08/23/2023): Admitted to inpatient service for ongoing monitoring and medical therapy. Patient has family support and home health services, she can be discharged home when medically stable.     Orlando Kearns, DO   [facility], PGY-II

## 2023-08-24 NOTE — NURSING
Dr. Tineo with GI spoke with patient about a colonoscopy scheduled for tomorrow, he stated that the patient is on board and aware of NPO status after MN and colon prep.

## 2023-08-24 NOTE — NURSING
Lio with CT calling, patient is c/o nausea and gagging, requesting medication be brought down to CT, charge nurse notified.

## 2023-08-24 NOTE — CONSULTS
"Gastroenterology/Hepatology Initial Consult Note    Patient Name: Elba Barnes  Age: 63 y.o.  : 1960  MRN: [id number]  Admission Date: 2023    Reason for Consult:      Medical Management  Chief Complaint   Patient presents with    Fall    Rectal Bleeding     C/o fall yesterday. States hit side of face with possible loc. Today states had rectal dark bleeding blood clots. Also c/o left abdominal pain    Abdominal Pain           HPI:     Elba Barnes is a 63 y.o. female  with significant PMH of HTN, COPD (not using home O2), CHF (EF 45% 2023), DM II w/ neuropathy, CAD, CVA w/ right-sided weakness (2021), presented to the ED on 2023 with a CC of rectal bleeding.  She mentions that she noticed streaks of blood in her stool for about a month and thought it might have been from her accidentally scratching while wiping however yesterday afternoon bowel movement she noticed dark maroon blood in the toilet bowl as well as blood clots while wiping on the toilet paper.  Her bowel movements are mostly formed and soft.  She endorses a history of chronic constipation for about 2 years.  She has been taking her stool softener with minimal relief.  She mentions her bowel movements are every 4th day.  She also points out a left lower quadrant abdominal pain that started after a fall 2 days ago.  Pain character is sharp in nature, constant, currently 7/10 in severity.  Position changes aggravates the pain.  No alleviating factors except for sleep.  She complains of mild nausea. She endorses unintentional weight loss from 370 lb to 250 lb in a span of 3 months.  However upon chart review, her admission back in 2023 reveals a weight of 261 lb and currently she is at 257 lb.  She endorses urinary incontinence and bowel incontinence but mentions that she can "feel it coming just can make it on time to the bathroom" She has some mild chest discomfort.  She also endorses chronic right hip pain and " "right knee pain that is throbbing in nature.  She used to take Norco for her pain but has not taken in the last 3 months.  She states she takes Tylenol 250 mg 1 tablet twice daily for her pain.  She denies any NSAID use.  She denies any history of colonoscopy or EGD in the past.  She denies any shortness of breath, fever, chills, vomiting, dyschezia, melena, new onset of weakness, headaches.       In the ED, her vitals showed elevated /104.  Her CBC on arrival showed no leukocytosis, hemoglobin at 13.7 and hematocrit at 42.0, and platelet count of 371. CBC this morning shows H&H stable with 13.3 and 42.8 respectively.  CMP this morning remarkable for hypoalbuminemia of 3.0 and hypokalemia of 3.4.  LFTs are within normal limits.  U/A revealed bacteriuria and hematuria. CT ab/pelvis revealed no signs of active GI bleed/colitis/diverticulitis, right benign adrenal nodule identified; CT head w/o contrast revealed no acute intracranial abnormalities.  Gastroenterology service were consulted for evaluation of lower GI bleed and further management.     PSH:  Hysterectomy, cholecystectomy.   SH: 4 cigs per day now; onset at 13 years old, denies EtOH use or other illicit drug use.   FH: HTN and DM in mother and father. No family history of any cancer      Purvi Rai FNP    Past Medical History:   Diagnosis Date    Anxiety     Arthritis     CVA (cerebral vascular accident)     "mini stroke"    Depression     Diabetes mellitus     Heart problem     History of psychiatric hospitalization     three(1983, 1995 and another (years ago")): depression    HTN (hypertension)     Hx of psychiatric care     Hypertension     Pacemaker     Psychiatric exam requested by authority     Psychiatric problem     TBI (traumatic brain injury)     Therapy         Past Surgical History:   Procedure Laterality Date    CARDIAC PACEMAKER PLACEMENT      CARDIAC PACEMAKER REMOVAL      CHOLECYSTECTOMY      HYSTERECTOMY          Family History "   Problem Relation Age of Onset    Schizophrenia Mother     Bipolar disorder Mother     Bipolar disorder Father        Social History     Tobacco Use    Smoking status: Every Day     Current packs/day: 0.00     Types: Cigarettes     Start date: 1978     Last attempt to quit: 2018     Years since quittin.9    Smokeless tobacco: Never    Tobacco comments:     1 pk every 2 weeks, 4 a day   Substance Use Topics    Alcohol use: Not Currently     Comment: wine on occ         Review of patient's allergies indicates:   Allergen Reactions    Pcn [penicillins] Anaphylaxis        Medications Prior to Admission   Medication Sig Dispense Refill Last Dose    albuterol (PROVENTIL/VENTOLIN HFA) 90 mcg/actuation inhaler Inhale 1-2 puffs into the lungs every 6 (six) hours as needed for Wheezing. Rescue 1 g 3     albuterol-ipratropium (DUO-NEB) 2.5 mg-0.5 mg/3 mL nebulizer solution Take 3 mLs by nebulization every 6 (six) hours while awake. Rescue 270 mL 0     ALPRAZolam (XANAX) 1 MG tablet Take 1 tablet by mouth once a day as needed as needed for anxiety       ammonium lactate (LAC-HYDRIN) 12 % lotion Apply to dry skin areas on feet, legs, and elbow areas TWICE A DAY AS NEEDED       aspirin (ECOTRIN) 81 MG EC tablet Take 1 tablet (81 mg total) by mouth once daily. 30 tablet 11     atorvastatin (LIPITOR) 40 MG tablet Take 40 mg by mouth every evening.       butalbital-acetaminophen-caffeine -40 mg (FIORICET, ESGIC) -40 mg per tablet Take 1 tablet by mouth every 6 (six) hours as needed for Headaches. 30 tablet 2     carvediloL (COREG) 3.125 MG tablet Take 3.125 mg by mouth 2 (two) times daily.       cholecalciferol, vitamin D3, (VITAMIN D3) 50 mcg (2,000 unit) Cap capsule Take 1 capsule by mouth.       diaper,brief,adult,disposable Misc 1 each by Misc.(Non-Drug; Combo Route) route every 6 (six) hours as needed (urinary incontinence). 120 each 3     diclofenac sodium (VOLTAREN) 1 % Gel Apply 2 g topically 4  (four) times daily. 100 g 3     docusate sodium (COLACE) 250 MG capsule Take 1 capsule (250 mg total) by mouth 2 (two) times daily. 60 capsule 3     doxepin (SILENOR) 6 mg Tab Take 1 tablet by mouth every evening.       DULoxetine (CYMBALTA) 60 MG capsule Take 60 mg by mouth every morning.       EScitalopram oxalate (LEXAPRO) 10 MG tablet Take 10 mg by mouth.       esomeprazole (NEXIUM) 40 MG capsule Take 40 mg by mouth every morning.       fluticasone furoate-vilanteroL (BREO ELLIPTA) 200-25 mcg/dose DsDv diskus inhaler Inhale 1 puff into the lungs once daily. Controller 180 each 3     fluticasone propionate (FLONASE) 50 mcg/actuation nasal spray 1 spray (50 mcg total) by Each Nostril route 2 (two) times daily as needed for Rhinitis. 15 g 0     furosemide (LASIX) 40 MG tablet Take 1 tablet (40 mg total) by mouth 2 (two) times daily. (Patient not taking: Reported on 8/21/2023) 60 tablet 0     gabapentin (NEURONTIN) 600 MG tablet Take 1 tablet (600 mg total) by mouth 3 (three) times daily. 90 tablet 5     glipiZIDE (GLUCOTROL) 10 MG tablet Take 10 mg by mouth.       insulin (LANTUS SOLOSTAR U-100 INSULIN) glargine 100 units/mL SubQ pen Inject 50 Units into the skin 2 (two) times daily before meals. Adjust doses based on cbg 60 mL 3     magnesium citrate solution Take 296 mLs by mouth daily as needed.       multivitamin (THERAGRAN) per tablet Take 1 tablet by mouth once daily.       NIFEdipine (PROCARDIA-XL) 90 MG (OSM) 24 hr tablet Take 1 tablet (90 mg total) by mouth once daily. 30 tablet 11     nitroGLYCERIN (NITROSTAT) 0.4 MG SL tablet Place 0.4 mg under the tongue every 5 (five) minutes as needed for Chest pain.       NOVOLOG FLEXPEN U-100 INSULIN 100 unit/mL (3 mL) InPn pen Inject 20 Units into the skin 3 (three) times daily with meals. DISCARD OPEN PEN AFTER 28 DAYS 15 mL 2     nystatin (MYCOSTATIN) cream Apply topically 3 (three) times daily. Use under breast       ONETOUCH VERIO TEST STRIPS Strp 4 (four)  "times daily.       oxybutynin (DITROPAN-XL) 5 MG TR24 Take 1 tablet (5 mg total) by mouth once daily. 30 tablet 1     polyethylene glycol (GLYCOLAX) 17 gram/dose powder Take 17 g by mouth once daily. 595 g 1     sertraline (ZOLOFT) 50 MG tablet Take 50 mg by mouth once daily.       SURE COMFORT PEN NEEDLE 32 gauge x 5/32" Ndle USE 1 pen needle TO inject insulin TWICE DAILY BEFORE meals AS DIRECTED by prescriber       terconazole (TERAZOL 7) 0.4 % Crea Place 1 applicator vaginally every evening.       valsartan (DIOVAN) 320 MG tablet Take 320 mg by mouth.            INPATIENT MEDICATIONS    Scheduled Meds:   atorvastatin  40 mg Oral QHS    carvediloL  3.125 mg Oral BID    DULoxetine  60 mg Oral QAM    fluticasone furoate-vilanteroL  1 puff Inhalation Daily    furosemide  40 mg Oral Daily    gabapentin  300 mg Oral TID    insulin detemir U-100  25 Units Subcutaneous QHS    nicotine  1 patch Transdermal Daily    NIFEdipine  90 mg Oral Daily    oxybutynin  5 mg Oral Daily    pantoprazole  40 mg Oral Daily    valsartan  320 mg Oral Daily     Continuous Infusions:  PRN Meds:  acetaminophen, albuterol, dextrose 10%, dextrose 10%, glucagon (human recombinant), hydrALAZINE, insulin aspart U-100, melatonin, nitroGLYCERIN, ondansetron, sodium chloride 0.9%          Review of Systems:       Review of Systems   Constitutional:  Positive for appetite change (gradual decrease in appetite). Negative for fatigue and fever.   HENT:  Negative for nasal congestion, rhinorrhea and sore throat.    Respiratory:  Positive for chest tightness (mild discomfort). Negative for cough, shortness of breath and wheezing.    Cardiovascular:  Negative for chest pain, palpitations and leg swelling.   Gastrointestinal:  Positive for abdominal pain (Left sided), blood in stool (with clots), constipation, nausea and fecal incontinence (can feel it but can't make it to the bathroom on time). Negative for diarrhea, rectal pain, vomiting and reflux. "   Genitourinary:  Positive for bladder incontinence. Negative for dysuria, frequency, hematuria and urgency.   Musculoskeletal:  Positive for back pain.        Right knee and right hip pain - chronic     Neurological:  Positive for weakness (right sided - chronic). Negative for dizziness, seizures and headaches.   Psychiatric/Behavioral:  Negative for confusion and sleep disturbance.           Objective:       VITAL SIGNS: 24 HR MIN & MAX LAST    Temp  Min: 97.3 °F (36.3 °C)  Max: 98.7 °F (37.1 °C)  98 °F (36.7 °C)        BP  Min: 137/76  Max: 190/95  137/76     Pulse  Min: 60  Max: 94  81     Resp  Min: 16  Max: 24  16    SpO2  Min: 93 %  Max: 99 %  (!) 93 %        Physical Exam  Vitals and nursing note reviewed.   Constitutional:       General: She is not in acute distress.     Appearance: She is obese.   HENT:      Head: Normocephalic and atraumatic.   Eyes:      General: No scleral icterus.     Pupils: Pupils are equal, round, and reactive to light.      Comments: Mildly injected bilateral conjunctiva   Cardiovascular:      Rate and Rhythm: Normal rate and regular rhythm.      Heart sounds: No murmur heard.     No friction rub. No gallop.   Pulmonary:      Effort: Pulmonary effort is normal. No respiratory distress.      Breath sounds: No wheezing.   Abdominal:      General: Bowel sounds are normal. There is no distension.      Palpations: Abdomen is soft.      Tenderness: There is abdominal tenderness (LLQ, LUQ and mildly on epigastric and suprapubic area).      Comments: FLAKO: no active bleed or clots noted, no anal tags, hemorrhoids or rectal prolapse noted.    Musculoskeletal:         General: No tenderness.      Cervical back: Normal range of motion.      Right lower leg: No edema.      Left lower leg: No edema.   Skin:     General: Skin is warm and dry.      Capillary Refill: Capillary refill takes less than 2 seconds.      Findings: No bruising or lesion.   Neurological:      Mental Status: She is alert  "and oriented to person, place, and time.      Comments: Right sided weakness noted with residual weakness on the right lower face and motor strength of 3/5 on the RUE and RLE.   Plantarflexion and dorsiflexion present bilaterally.    Psychiatric:         Mood and Affect: Mood normal.         Behavior: Behavior normal.         LABS:      Recent Labs   Lab 08/23/23 1618 08/24/23 0317   WBC 9.96 9.35   HGB 13.7 13.3   HCT 42.0 42.8    404*   MCV 86.4 89.2       Recent Labs   Lab 08/23/23 1618 08/24/23 0317   HGB 13.7 13.3   HCT 42.0 42.8        Recent Labs   Lab 08/23/23 1618 08/24/23 0317    141   K 4.6 3.4*   CO2 23 27   BUN 17.6 15.9   CREATININE 1.02 0.78   BILITOT 0.2 0.2   ALKPHOS 62 61   AST 25 17   ALT 17 14   LABPROT 8.1* 7.4   ALBUMIN 3.1* 3.0*        Recent Labs   Lab 08/23/23 1618   INR 0.9   PROTIME 12.3   PTT 24.1        No results for input(s): "IRON", "FERRITIN" in the last 168 hours.       IMAGING:   CT Abdomen Pelvis W Wo Contrast    Result Date: 8/23/2023  EXAMINATION: CT ABDOMEN PELVIS W WO CONTRAST CLINICAL HISTORY: LLQ pain, melena, dizzy (? gi bleed); TECHNIQUE: Low dose axial images, sagittal and coronal reformations were obtained from the lung bases to the pubic symphysis following the IV administration of  contrast.  Delayed imaging and pre contrasted imaging was performed as well. Automatic exposure control is utilized to reduce patient radiation exposure. COMPARISON: None FINDINGS: There are chronic lung changes seen lung bases bilaterally and some changes of COPD and mild interstitial fibrosis.  There is some bronchiectasis seen in the lung bases bilaterally. . The liver is enlarged in size.  No liver mass or lesion is seen.  Portal and hepatic veins appear normal. The patient is status post cholecystectomy. The pancreas appears normal.  No pancreatic mass or lesion is seen. The spleen appears normal.  No splenic mass or lesion is seen. There is an adrenal nodule seen on " the right side.  It measures 2.4 by 3.1 cm.  Hounsfield units on the precontrast imaging are 4.8 consistent with a benign adenomatous type lesion.  Left adrenal gland appears normal.. The kidneys are normal in size.  No hydronephrosis is seen.  No hydroureter is seen.  No nephrolithiasis or ureteral stone is seen.  There is a cyst in the lower pole of the left kidney. Urinary bladder appears normal. No colitis is seen.  No diverticulitis is seen.  No colonic mass or lesion or inflammatory process is seen.  No evidence pooling of contrast is seen to suggest active GI hemorrhage.  The appendix appears normal. No free air is seen.  No free fluid is seen. The bones appear grossly unremarkable.     No evidence of active GI hemorrhage seen.  No colitis or diverticulitis is seen. Benign right adrenal nodule Electronically signed by: Letha Foster Date:    08/23/2023 Time:    17:31    CT Head Without Contrast    Result Date: 8/23/2023  EXAMINATION: CT HEAD WITHOUT CONTRAST CLINICAL HISTORY: dizzy, fall; TECHNIQUE: Axial scans were obtained from skull base to the vertex. Coronal and sagittal reconstructions obtained from the axial data. Automatic exposure control was utilized to limit radiation dose. Contrast: None Radiation Dose: Total DLP: 2430 mGy*cm COMPARISON: MRI brain dated 07/07/2023 FINDINGS: There is no acute intracranial hemorrhage or edema. The gray-white matter differentiation is preserved.  Patchy hypodensities in the subcortical and periventricular white matter and thalami likely represent chronic microvascular ischemic changes. There is no mass effect or midline shift. The ventricles and sulci are normal in size. The basal cisterns are patent. There is no abnormal extra-axial fluid collection. The calvarium and skull base are intact. The visualized paranasal sinuses and the mastoid air cells are clear.     1. No acute intracranial abnormality. 2. Chronic microvascular ischemic changes. Electronically  signed by: Kelsie Prescott Date:    08/23/2023 Time:    17:25        Assessment / Plan:     Hematochezia  - dark maroon stool noted for 1 day, clots noted, intermitted red streaks for about 1 month.  - FLAKO: no active bleeding, no hemorrhoids, anal tags, thrombosed hemorrhoids or rectal prolapse noted.  - Ddx: hemorrhoidal bleed, diverticular bleed  - no bowel movements since admission  - clear liquid diet for now  - Plan for colonoscopy tomorrow  - NPO after midnight  - Hold all anticoagulation (if any)  - Start bowel prep with GoLYTELY, 2000 ml at 1800 and then 2000 ml at 0600, will advise the patient to finish as much as possible. Can mix the bowel prep in a separate cup with lemon packets, ice, or sprite.     Abdominal pain  - onset 2 days ago after fall and hitting on the left side  - tender to palpation on LLQ, LUQ, and mild on the epigastric and suprapubic area; no bruising noted  - CT abd/pelvis showed hepatomegaly, benign right adrenal nodules but no evidence of active GI hemorrhage, colitis, diverticulitis.  - likely 2/2 MSK injury from recent fall or chronic constipation    Chronic constipation  - history of constipation for about 2 years   - likely opiate induced or diabetic gastroparesis  - home meds include Glycolax once daily, Colace 250 mg twice daily  - no bowel movements since admission  - Continue to monitor bowel movements    GI service will continue to follow up.  Rest management as per primary team.     Harish Faust  Internal Medicine - PGY-1

## 2023-08-24 NOTE — PLAN OF CARE
08/24/23 1338   Discharge Assessment   Assessment Type Discharge Planning Assessment   Confirmed/corrected address, phone number and insurance Yes   Confirmed Demographics Correct on Facesheet   Source of Information patient   When was your last doctors appointment?   (Purvi Rai)   Does patient/caregiver understand observation status Yes   Reason For Admission Dizziness  K92.2 Acute lower GI bleeding  E11.9, Z79.4 Diabetes mellitus type 2, insulin dependent  L60.2 Overgrown toenails  R53.1 Right sided weakness  R10.9 Abdominal pain, unspecified abdominal location  K92.2 Gastrointestinal hemorrhage, unspecified gastrointestinal hemorrhage type  I63.9 Cerebrovascular accident (CVA), unspecified mechanism  Z13.9 Screening due   People in Home child(marisela), adult   Facility Arrived From: Home   Do you expect to return to your current living situation? Yes   Do you have help at home or someone to help you manage your care at home? Yes   Who are your caregiver(s) and their phone number(s)? CameronKathy (Daughter)   [phone]; Adriana Bolton ([phone])   Prior to hospitilization cognitive status: Alert/Oriented   Current cognitive status: Alert/Oriented   Walking or Climbing Stairs ambulation difficulty, requires equipment   Mobility Management Rollator   Dressing/Bathing bathing difficulty, requires equipment   Dressing/Bathing Management Grab Bars, Shower Chair   Equipment Currently Used at Home oxygen;glucometer;blood pressure machine;rollator;grab bar;shower chair   Readmission within 30 days? No   Patient currently being followed by outpatient case management? No   Name and Contact number of agency [facility]; [facility]   Is the pt/caregiver preference to resume services with current agency Yes   Do you take prescription medications? Yes  ([facility])   Do you have prescription coverage? Yes   Coverage [employer] [employer] M/C; M/D   Do you have any problems affording  any of your prescribed medications? No   Who is going to help you get home at discharge? Dghtr   How do you get to doctors appointments? health plan transportation   Are you on dialysis? No   DME Needed Upon Discharge  none   Discharge Plan discussed with: Patient   Transition of Care Barriers None   Discharge Plan A Home with family;Home Health   OTHER   Name(s) of People in Home Adriana ([phone])     Pt single with 6 children; Receives [facility] & is active with [facility] ; DghtrKathy, is emergency contact and resides in [facility] to follow for d/c planning needs.

## 2023-08-24 NOTE — NURSING
Patient voices concerns about 2nd CTA scheduled for today, states she does not want that much contrast, I explained to patient that it would be 24hr between scan but she  still has concerns, Team 3 notified.

## 2023-08-24 NOTE — NURSING
", patient refused insulin coverage staing " yall are hardly feeding me, now you want to give me insulin"  "

## 2023-08-24 NOTE — NURSING
Attempted to restart iv, pt stated 'why do they want to give me that contrast again?', informed we would clarify

## 2023-08-24 NOTE — NURSING
Even though the patient stated earlier that she was unable to walk, she got out of bed unassisted  and ambulated to the restroom and back to the wheelchair.

## 2023-08-24 NOTE — MEDICAL/APP STUDENT
[facility] Medicine Wards Progress Note     Resident Team: [facility] Medicine List 3   Attending Physician: Justin Fuentes MD  Resident: Dr. Macdonald  Intern: Dr. Quiroz      Subjective:      Brief HPI:  Elba Barnes is a 63 y.o. female with significant PMH of HTN, COPD (not using home O2), CHF (EF 45% 2023), DM II w/ neuropathy, CAD, CVA w/ right-sided weakness (2021), presented to the ED on 2023 with a CC of rectal bleeding that started approximately 1 week ago; she states that it started with serosanguineous discharge for first few days then slowly progressed to dark red blood in the past 2 days. She has had a total of 4 bloody bowel movements with clots since yesterday afternoon, causing her to have intermittent weakness and dizziness. Nothing makes her symptoms better or worse. She also had a fall at home while using the bathroom and did not have any head injuries.     Other complaint includes LLQ pain of 4 out of 10 that is intermittent, dull, and achy; it is relieved by having bowel movements with no obvious aggravating factors. Of note, she takes fioricet at home for headaches. In the ED: /104; labs significant for hypoalbuminemia; U/A revealed bacteriuria and hematuria. CT ab/pelvis revealed no signs of active GI bleed/colitis/diverticulitis, right benign adrenal nodule identified; CT head w/o contrast revealed no acute intracranial abnormalities. Hospital medicine team was consulted for lower GI bleeding management    Interval History: No acute events overnight. Afebrile, VSS. Hypertensive to 190/95 overnight. Has been stepped up to 2L NC from room air and is satting 95%. Complains of throbbing headache overnight, LLQ abdominal pain, and hip pain. GI has been consulted for  on management of lower GI bleed.       Review of Systems:  ROS completed and negative except as indicated above.     Objective:     Last 24 Hour Vital Signs:  BP  Min: 137/76  Max: 190/95  Temp  Av.9 °F (36.6  "°C)  Min: 97.3 °F (36.3 °C)  Max: 98.7 °F (37.1 °C)  Pulse  Av.1  Min: 60  Max: 94  Resp  Av.6  Min: 16  Max: 24  SpO2  Av.6 %  Min: 93 %  Max: 99 %  Height  Av' 5" (165.1 cm)  Min: 5' 5" (165.1 cm)  Max: 5' 5" (165.1 cm)  Weight  Av.8 kg (257 lb 7.5 oz)  Min: 116.6 kg (257 lb)  Max: 117 kg (257 lb 15 oz)  I/O last 3 completed shifts:  In: 360 [P.O.:360]  Out: 1000 [Urine:1000]    Physical Examination:  Physical Exam  Constitutional:       General: She is not in acute distress.     Appearance: She is obese. She is ill-appearing.   HENT:      Mouth/Throat:      Mouth: Mucous membranes are moist.      Pharynx: No oropharyngeal exudate.   Eyes:      General:         Right eye: No discharge.         Left eye: No discharge.      Pupils: Pupils are equal, round, and reactive to light.   Cardiovascular:      Rate and Rhythm: Normal rate and regular rhythm.      Pulses: Normal pulses.   Pulmonary:      Effort: Pulmonary effort is normal. No respiratory distress.      Breath sounds: No wheezing.   Abdominal:      Tenderness: There is abdominal tenderness. There is no guarding.      Comments: LLQ pain on palpation   Musculoskeletal:         General: No deformity or signs of injury.   Skin:     General: Skin is warm and dry.   Neurological:      General: No focal deficit present.   Psychiatric:      Comments: Patient is teary and on phone with  during rounds at 11am on , considering leaving [place]          Laboratory:  Most Recent Data:  CBC:   Lab Results   Component Value Date    WBC 9.35 2023    HGB 13.3 2023    HCT 42.8 2023     (H) 2023    MCV 89.2 2023    RDW 15.3 2023     WBC Differential:   Recent Labs   Lab 23  1618 23  0317   WBC 9.96 9.35   HGB 13.7 13.3   HCT 42.0 42.8    404*   MCV 86.4 89.2     BMP:   Lab Results   Component Value Date     2023    K 3.4 (L) 2023     2022    CO2 27 " "08/24/2023    BUN 15.9 08/24/2023    CREATININE 0.78 08/24/2023     (HH) 09/30/2021    CALCIUM 9.2 08/24/2023    MG 1.80 08/24/2023    PHOS 3.7 07/07/2023     LFTs:   Lab Results   Component Value Date    PROT 8.6 (H) 09/30/2021    ALBUMIN 3.0 (L) 08/24/2023    BILITOT 0.2 08/24/2023    AST 17 08/24/2023    ALKPHOS 61 08/24/2023    ALT 14 08/24/2023     Coags:   Lab Results   Component Value Date    INR 0.9 08/23/2023    PROTIME 12.3 08/23/2023    PTT 24.1 08/23/2023     FLP:   Lab Results   Component Value Date    CHOL 155 07/08/2023    HDL 32 (L) 07/08/2023    LDLCALC 66 10/18/2022    TRIG 268 (H) 07/08/2023     DM:   Lab Results   Component Value Date    HGBA1C 8.4 (H) 07/06/2023    HGBA1C 8.7 (H) 02/09/2023    HGBA1C 10.5 (H) 10/18/2022    LDLCALC 66 10/18/2022    CREATININE 0.78 08/24/2023     Thyroid:   Lab Results   Component Value Date    TSH 0.858 07/06/2023     Anemia: No results found for: "IRON", "TIBC", "FERRITIN", "ORITFADC00", "FOLATE"  Cardiac:   Lab Results   Component Value Date    TROPONINI 0.043 07/07/2023    BNP 16.7 07/06/2023       Microbiology Data Reviewed:   Pertinent Findings:      Other Results:  EKG 8/23 (my interpretation):   -LVH, nonspecific ST changes in Inferior Leads    Radiology:    CT Abdomen/Pelvis W/WO Contrast 8/23:  -no active bleed, colitis, diverticulitis noted on read    CT head WO Contrast 8/23:   -no acute intracranial process      Current Medications:     Infusions:       Scheduled:   atorvastatin  40 mg Oral QHS    carvediloL  3.125 mg Oral BID    DULoxetine  60 mg Oral QAM    fluticasone furoate-vilanteroL  1 puff Inhalation Daily    furosemide  40 mg Oral Daily    gabapentin  300 mg Oral TID    insulin detemir U-100  25 Units Subcutaneous QHS    nicotine  1 patch Transdermal Daily    NIFEdipine  90 mg Oral Daily    oxybutynin  5 mg Oral Daily    pantoprazole  40 mg Oral Daily    valsartan  320 mg Oral Daily        PRN:  acetaminophen, albuterol, dextrose 10%, " dextrose 10%, glucagon (human recombinant), hydrALAZINE, insulin aspart U-100, melatonin, nitroGLYCERIN, ondansetron, sodium chloride 0.9%    Antibiotics and Day Number of Therapy:  N/A    Assessment & Plan:     Acute Lower GI Bleed  -patient presented with complain of gross blood in stool 2 days prior to admit  -FLAKO with blood clots w/o active bleeding  -H/H stable at 13.3/42.8  -CT Abdomen/Pelvis without signs of active bleed   -LLQ pain out of proportion to exam; ordering lactic acid to r/u ischemic bowel  -GI has been consulted; inpatient vs. outpatient scope to determine etiology    HTN Urgency  -BP elevated at admit to 185/104; has improved to 163/79 on 8/24  -continuing nifedipine 90mg and losartan 320mg daily, as well as Coreg 3.125mg daily    Asymptomatic Bacteriuria  Hematuria  Overactive Bladder  -U/A w/ +2 occult blood and moderate RBC; is asymptomatic at this time   -continuing home oxybutynin; purewick in place     Type 2 DM  -A1c 8.4% on 7/6/2023  -moderate SSI in place + detemir 25 units basal  -continuing home gabapentin    CHF (EF 45% as of July 2023)  -continuing Coreg 3.125mg BID and lasix 40mg daily  -does not appear to be volume overloaded at this time     COPD  -satting 95% on 2L NC, PRN  -continuing home therapy of PRN albuterol and breo    CVA  -PT has been consulted but holding PT for now until patient is more amenable to moving about    Depression  -continuing duloxetine 60mg daily; noted history of inpatient psych treatment       CODE STATUS: Full  Access: Peripheral  Antibiotics: N/A  Diet: NPO  DVT Prophylaxis: Teds/SCDs   GI Prophylaxis: Pantoprazole 40mg daily      Disposition: Hypertensive urgency is improving, will continue current BP regimen and monitor for gradual improvement. Patient is now agreeable to inpatient workup for acute lower GI bleed and GI has daysi consulted.     Gt Aguilar, L3   [facility] Wards Team 3

## 2023-08-24 NOTE — PROGRESS NOTES
[facility] Medicine Wards   Progress Note     Resident Team: [facility] Medicine List 3  Attending Physician: Justin Fuentes MD  Resident: Renae  Intern: [facility] Length of Stay: 0 days    Subjective:      Brief HPI:  Elba Barnes is a 63 y.o. 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 She has had a total of 4 bloody bowel movements with clots since yesterday afternoon, causing her to have intermittent weakness and dizziness. Nothing makes her symptoms better or worse. She also had a fall at home while using the bathroom and did not have any head injuries. Other complaint includes LLQ pain of 4 out of 10 that is intermittent, dull, and achy; it is relieved by having bowel movements with no obvious aggravating factors. Of note, she takes fioricet at home for headaches. In the ED: /104; labs significant for hypoalbuminemia; U/A revealed bacteriuria and hematuria. CT ab/pelvis revealed no signs of active GI bleed/colitis/diverticulitis, right benign adrenal nodule identified; CT head w/o contrast revealed no acute intracranial abnormalities. Hospital medicine team was consulted for lower GI bleeding management.     Interval History:   Patient had no acute events overnight.  She did not have a bowel movement since her admission to the hospital.  She continues to endorse having red discharge from her rectum since admission.  Hemoglobin remained stable this morning.  And vital signs are within normal limits.  Blood pressure is now controlled.  We will consult GI for possible scope versus performing an outpatient.    Review of Systems   Constitutional:  Negative for  chills and fever.   HENT:  Negative for congestion, sinus pain and sore throat.    Eyes:  Negative for blurred vision and double vision.   Respiratory:  Negative for cough, sputum production, shortness of breath and wheezing.    Cardiovascular:  Negative for chest pain, palpitations and leg swelling.   Gastrointestinal:  Positive for abdominal pain. Negative for nausea and vomiting.        LLQ pain, rectal bleeding   Genitourinary:  Negative for dysuria.   Musculoskeletal:  Negative for myalgias.   Neurological:  Negative for dizziness, focal weakness, seizures and weakness.           Objective:     Vital Signs (Most Recent):  Temp: 97.8 °F (36.6 °C) (08/24/23 0427)  Pulse: 86 (08/24/23 0427)  Resp: 20 (08/24/23 0206)  BP: (!) 163/79 (08/24/23 0427)  SpO2: 99 % (08/24/23 0427) Vital Signs (24h Range):  Temp:  [97.3 °F (36.3 °C)-98.7 °F (37.1 °C)] 97.8 °F (36.6 °C)  Pulse:  [60-94] 86  Resp:  [18-24] 20  SpO2:  [95 %-99 %] 99 %  BP: (156-190)/() 163/79     Physical Exam  Constitutional:       Appearance: Normal appearance. She is obese.   HENT:      Head: Normocephalic and atraumatic.      Mouth/Throat:      Mouth: Mucous membranes are moist.      Pharynx: Oropharynx is clear.   Eyes:      Conjunctiva/sclera: Conjunctivae normal.      Pupils: Pupils are equal, round, and reactive to light.   Cardiovascular:      Rate and Rhythm: Normal rate and regular rhythm.      Pulses: Normal pulses.      Heart sounds: No murmur heard.  Pulmonary:      Effort: Pulmonary effort is normal. No respiratory distress.      Breath sounds: No wheezing.   Chest:      Chest wall: No tenderness.   Abdominal:      General: Abdomen is flat. Bowel sounds are normal. There is no distension.      Palpations: Abdomen is soft.      Tenderness: There is abdominal tenderness.      Comments: LLQ pain and tenderness to palpation   Musculoskeletal:         General: No swelling. Normal range of motion.      Cervical back: Normal range of motion.    Skin:     General: Skin is warm.   Neurological:      General: No focal deficit present.      Mental Status: She is alert and oriented to person, place, and time.          Laboratory:  Most Recent Data:  CBC:   Recent Labs   Lab 08/23/23  1618 08/24/23 0317   WBC 9.96 9.35   HGB 13.7 13.3   HCT 42.0 42.8    404*     CMP:   Recent Labs   Lab 08/24/23 0317   CALCIUM 9.2   ALBUMIN 3.0*      K 3.4*   CO2 27   BUN 15.9   CREATININE 0.78   ALKPHOS 61   ALT 14   AST 17   BILITOT 0.2         Microbiology Data Reviewed: yes  Pertinent Findings:  N/A    Other Results:  EKG (my interpretation): normal EKG, normal sinus rhythm, unchanged from previous tracings.    Radiology:  Imaging Results              CT Abdomen Pelvis W Wo Contrast (Final result)  Result time 08/23/23 17:31:03      Final result by Letha Foster MD (08/23/23 17:31:03)                   Impression:      No evidence of active GI hemorrhage seen.  No colitis or diverticulitis is seen.    Benign right adrenal nodule      Electronically signed by: Letha Foster  Date:    08/23/2023  Time:    17:31               Narrative:    EXAMINATION:  CT ABDOMEN PELVIS W WO CONTRAST    CLINICAL HISTORY:  LLQ pain, melena, dizzy (? gi bleed);    TECHNIQUE:  Low dose axial images, sagittal and coronal reformations were obtained from the lung bases to the pubic symphysis following the IV administration of  contrast.  Delayed imaging and pre contrasted imaging was performed as well. Automatic exposure control is utilized to reduce patient radiation exposure.    COMPARISON:  None    FINDINGS:  There are chronic lung changes seen lung bases bilaterally and some changes of COPD and mild interstitial fibrosis.  There is some bronchiectasis seen in the lung bases bilaterally.    .    The liver is enlarged in size.  No liver mass or lesion is seen.  Portal and hepatic veins appear normal.    The patient is status post cholecystectomy.    The pancreas  appears normal.  No pancreatic mass or lesion is seen.    The spleen appears normal.  No splenic mass or lesion is seen.    There is an adrenal nodule seen on the right side.  It measures 2.4 by 3.1 cm.  Hounsfield units on the precontrast imaging are 4.8 consistent with a benign adenomatous type lesion.  Left adrenal gland appears normal..    The kidneys are normal in size.  No hydronephrosis is seen.  No hydroureter is seen.  No nephrolithiasis or ureteral stone is seen.  There is a cyst in the lower pole of the left kidney.    Urinary bladder appears normal.    No colitis is seen.  No diverticulitis is seen.  No colonic mass or lesion or inflammatory process is seen.  No evidence pooling of contrast is seen to suggest active GI hemorrhage.  The appendix appears normal.    No free air is seen.  No free fluid is seen.    The bones appear grossly unremarkable.                                       CT Head Without Contrast (Final result)  Result time 08/23/23 17:25:29      Final result by Kelsie Prescott MD (08/23/23 17:25:29)                   Impression:      1. No acute intracranial abnormality.  2. Chronic microvascular ischemic changes.      Electronically signed by: Kelsie Prescott  Date:    08/23/2023  Time:    17:25               Narrative:    EXAMINATION:  CT HEAD WITHOUT CONTRAST    CLINICAL HISTORY:  dizzy, fall;    TECHNIQUE:  Axial scans were obtained from skull base to the vertex.    Coronal and sagittal reconstructions obtained from the axial data.    Automatic exposure control was utilized to limit radiation dose.    Contrast: None    Radiation Dose:    Total DLP: 2430 mGy*cm    COMPARISON:  MRI brain dated 07/07/2023    FINDINGS:  There is no acute intracranial hemorrhage or edema. The gray-white matter differentiation is preserved.  Patchy hypodensities in the subcortical and periventricular white matter and thalami likely represent chronic microvascular ischemic changes.    There is no mass  effect or midline shift. The ventricles and sulci are normal in size. The basal cisterns are patent. There is no abnormal extra-axial fluid collection.    The calvarium and skull base are intact. The visualized paranasal sinuses and the mastoid air cells are clear.                                      Current Medications:     Infusions:       Scheduled:   atorvastatin  40 mg Oral QHS    carvediloL  3.125 mg Oral BID    DULoxetine  60 mg Oral QAM    fluticasone furoate-vilanteroL  1 puff Inhalation Daily    furosemide  40 mg Oral Daily    gabapentin  300 mg Oral TID    insulin detemir U-100  25 Units Subcutaneous QHS    nicotine  1 patch Transdermal Daily    NIFEdipine  90 mg Oral Daily    oxybutynin  5 mg Oral Daily    pantoprazole  40 mg Oral Daily    valsartan  320 mg Oral Daily        PRN:  acetaminophen, albuterol, dextrose 10%, dextrose 10%, glucagon (human recombinant), hydrALAZINE, HYDROmorphone, insulin aspart U-100, melatonin, nitroGLYCERIN, ondansetron, sodium chloride 0.9%    Antibiotics and Day Number of Therapy:  N/A      Intake/Output Summary (Last 24 hours) at 8/24/2023 0533  Last data filed at 8/24/2023 0025  Gross per 24 hour   Intake 360 ml   Output 400 ml   Net -40 ml       Lines/Drains/Airways       Peripheral Intravenous Line  Duration                  Peripheral IV - Single Lumen 08/23/23 1638 22 G Posterior;Proximal;Right Forearm <1 day                    Assessment & Plan:     Acute GI bleeding   -Bedside FLAKO revealed multiple small blood clots w/o active bleeding; vital signs and labs stable  -Holding home ASA 81mg for now  -Keeping patient NPO in preparation for possible GI consult next AM  -GI consulted; appreciate their assistance     HTN urgency   -/104 at the time of assessment; will resume home antihypertensive before considering IV PRNs; she did not take home antihypertensives today  -Aim to reduce MAP by no more than 25% in first hour then no more than 160/100 mmHg for the next  6 hours  -Continue home antihypertensive: nifedipine 90mg daily, coreg 3.125 mg BID, valsartan 320mg daily   -Past initial timeframe, okay to resume home meds and BP control     Asymptomatic bacteriuria  Hematuria  Overactive bladder  -U/A revealed 2+ occult blood and moderate RBC; this could be d/t contaminated urine sample   -Continue home oxybutynin     DM II w/ neuropathy  -A1c 8.4 (7/6/2023)  -Initiated moderate SSI (NPO protocol)  -Home insulin regimen: Novolog 20 untis TIDWM & Lantus 50 units BID before meals; will give detemir 25 units QHS while NPO  -Continue home gabapentin     CHF (EF 45% July 2023)  -EF was 50-55% in November 2021  -Continue Coreg 3.125mg BID and lasix 40mg daily     COPD   -Not currently using home O2; breathing well on room air   -Continue home therapy: albuterol PRN, Breo     CVA w/ right-sided residual weakness   -Consulted PT for further assistance  -Holding home ASA d/t GI bleed     Depression  -HX of inpatient psych placement  -Continue dulexetine 60mg daily; holding home lexapro d/t concern for serotonin syndrome     Tobacco use  -Will give nicotine patch while hospitalized    CODE STATUS: Full Code  Access: Peripheral  Antibiotics: N/A  Diet: NPO  DVT Prophylaxis: Teds/SCDs  GI Prophylaxis: proton pump inhibitor per orders    Disposition: day 0 of admission for GI bleed workup and hypertensive urgency.    Corwin Macdonald MD  [facility], [facility]

## 2023-08-24 NOTE — PT/OT/SLP PROGRESS
Physical Therapy    Missed Treatment Session    Patient Name:  Elba Barnes   MRN:  [id number]      Patient not seen at this time secondary to MD hold (Comment). PT spoke with MD during IM rounds who notes to hold PT sessions until pt is stable and has decreased pain. PT will require updated Eval orders once appropriate.     Will follow-up as patient is available to participate and as therapists' schedule allows.

## 2023-08-24 NOTE — PROGRESS NOTES
"Inpatient Nutrition Evaluation    Admit Date: 2023   Total duration of encounter: 1 day    Nutrition Recommendation/Prescription     Clear liquid diet--> ADAT to cardiac /diabetic  Pt education on diet completed  MVI/fe  Biweekly wt  Will monitor nutrition status     Nutrition Assessment     Chart Review    Reason Seen: continuous nutrition monitoring    Malnutrition Screening Tool Results   Have you recently lost weight without trying?: No  Have you been eating poorly because of a decreased appetite?: No   MST Score: 0     Diagnosis:  Acute GI bleed, HTN, bacteriuria, hematuria, overactive bladder, DM, CHF, COPD, CVA, depression, tobacco use     Relevant Medical History: HTN, COPD, CHF, DM, CAD, CVA     Nutrition-Related Medications: atorvastatin, furosemide, insulin, pantoprazole, valsartan    Nutrition-Related Labs:  (-) H/H 13.3/42.8 Gluc 113 Bun 15.9 Cr 0.7 k3.4     Diet Order: Diet clear liquid  Oral Supplement Order: none  Appetite/Oral Intake: good/% of meals  Factors Affecting Nutritional Intake: altered gastrointestinal function and clear liquid diet  Food/Congregation/Cultural Preferences: none reported  Food Allergies: none reported       Wound(s):   none    Comments    () Pt on cl liq diet; hx GI bleed; possible scope tomorrow; reported eating well; good appetite; having bloody stools PTA; no wt loss. Labs acknowledged.       Anthropometrics    Height: 5' 5" (165.1 cm)    Last Weight: 116.6 kg (257 lb) (23 2100) Weight Method: Bed Scale  BMI (Calculated): 42.8  BMI Classification: obese grade III (BMI >/=40)     Ideal Body Weight (IBW), Female: 125 lb     % Ideal Body Weight, Female (lb): 205.6 %                    Usual Body Weight (UBW), k.6 kg  % Usual Body Weight: 100.19     Usual Weight Provided By: patient and EMR weight history    Wt Readings from Last 5 Encounters:   23 116.6 kg (257 lb)   23 117.8 kg (259 lb 12.8 oz)   23 115.5 kg (254 lb 10.1 oz) "   06/08/23 115 kg (253 lb 9.6 oz)   05/10/23 114.9 kg (253 lb 4.9 oz)     Weight Change(s) Since Admission:  Admit Weight: 117 kg (257 lb 15 oz) (08/23/23 1539)  No wt loss     Patient Education    Education Provided: diabetic diet and heart healthy diet  Teaching Method: explanation and printed materials  Comprehension: verbalizes understanding  Barriers to Learning: none evident  Expected Compliance: fair  Comments: All questions were answered and dietitian's contact information was provided.     Monitoring & Evaluation     Dietitian will monitor food and beverage intake and weight.  Nutrition Risk/Follow-Up: low (follow-up in 5-7 days)  Patients assigned 'low nutrition risk' status do not qualify for a full nutritional assessment but will be monitored and re-evaluated in a 5-7 day time period. Please consult if re-evaluation needed sooner.

## 2023-08-25 ENCOUNTER — ANESTHESIA EVENT (OUTPATIENT)
Dept: ENDOSCOPY | Facility: HOSPITAL | Age: 63
End: 2023-08-25
Payer: MEDICARE

## 2023-08-25 ENCOUNTER — ANESTHESIA (OUTPATIENT)
Dept: ENDOSCOPY | Facility: HOSPITAL | Age: 63
End: 2023-08-25
Payer: MEDICARE

## 2023-08-25 LAB
ALBUMIN SERPL-MCNC: 2.9 G/DL (ref 3.4–4.8)
ALBUMIN/GLOB SERPL: 0.7 RATIO (ref 1.1–2)
ALP SERPL-CCNC: 62 UNIT/L (ref 40–150)
ALT SERPL-CCNC: 16 UNIT/L (ref 0–55)
AST SERPL-CCNC: 19 UNIT/L (ref 5–34)
BASOPHILS # BLD AUTO: 0.03 X10(3)/MCL
BASOPHILS NFR BLD AUTO: 0.4 %
BILIRUB SERPL-MCNC: 0.2 MG/DL
BUN SERPL-MCNC: 11.5 MG/DL (ref 9.8–20.1)
CALCIUM SERPL-MCNC: 9.1 MG/DL (ref 8.4–10.2)
CHLORIDE SERPL-SCNC: 105 MMOL/L (ref 98–107)
CO2 SERPL-SCNC: 29 MMOL/L (ref 23–31)
CREAT SERPL-MCNC: 0.87 MG/DL (ref 0.55–1.02)
EOSINOPHIL # BLD AUTO: 0.15 X10(3)/MCL (ref 0–0.9)
EOSINOPHIL NFR BLD AUTO: 1.8 %
ERYTHROCYTE [DISTWIDTH] IN BLOOD BY AUTOMATED COUNT: 15.2 % (ref 11.5–17)
GFR SERPLBLD CREATININE-BSD FMLA CKD-EPI: >60 MLS/MIN/1.73/M2
GLOBULIN SER-MCNC: 4.1 GM/DL (ref 2.4–3.5)
GLUCOSE SERPL-MCNC: 127 MG/DL (ref 82–115)
HCT VFR BLD AUTO: 40.9 % (ref 37–47)
HGB BLD-MCNC: 13.3 G/DL (ref 12–16)
IMM GRANULOCYTES # BLD AUTO: 0.08 X10(3)/MCL (ref 0–0.04)
IMM GRANULOCYTES NFR BLD AUTO: 1 %
LYMPHOCYTES # BLD AUTO: 2.29 X10(3)/MCL (ref 0.6–4.6)
LYMPHOCYTES NFR BLD AUTO: 27.2 %
MCH RBC QN AUTO: 28.4 PG (ref 27–31)
MCHC RBC AUTO-ENTMCNC: 32.5 G/DL (ref 33–36)
MCV RBC AUTO: 87.2 FL (ref 80–94)
MONOCYTES # BLD AUTO: 0.55 X10(3)/MCL (ref 0.1–1.3)
MONOCYTES NFR BLD AUTO: 6.5 %
NEUTROPHILS # BLD AUTO: 5.32 X10(3)/MCL (ref 2.1–9.2)
NEUTROPHILS NFR BLD AUTO: 63.1 %
NRBC BLD AUTO-RTO: 0 %
PLATELET # BLD AUTO: 377 X10(3)/MCL (ref 130–400)
PMV BLD AUTO: 9.3 FL (ref 7.4–10.4)
POCT GLUCOSE: 110 MG/DL (ref 70–110)
POCT GLUCOSE: 111 MG/DL (ref 70–110)
POCT GLUCOSE: 213 MG/DL (ref 70–110)
POCT GLUCOSE: 287 MG/DL (ref 70–110)
POTASSIUM SERPL-SCNC: 4.1 MMOL/L (ref 3.5–5.1)
PROT SERPL-MCNC: 7 GM/DL (ref 5.8–7.6)
RBC # BLD AUTO: 4.69 X10(6)/MCL (ref 4.2–5.4)
SODIUM SERPL-SCNC: 142 MMOL/L (ref 136–145)
WBC # SPEC AUTO: 8.42 X10(3)/MCL (ref 4.5–11.5)

## 2023-08-25 PROCEDURE — 99900035 HC TECH TIME PER 15 MIN (STAT)

## 2023-08-25 PROCEDURE — 63600175 PHARM REV CODE 636 W HCPCS: Performed by: NURSE ANESTHETIST, CERTIFIED REGISTERED

## 2023-08-25 PROCEDURE — 37000008 HC ANESTHESIA 1ST 15 MINUTES: Performed by: INTERNAL MEDICINE

## 2023-08-25 PROCEDURE — G0378 HOSPITAL OBSERVATION PER HR: HCPCS

## 2023-08-25 PROCEDURE — 80053 COMPREHEN METABOLIC PANEL: CPT | Performed by: STUDENT IN AN ORGANIZED HEALTH CARE EDUCATION/TRAINING PROGRAM

## 2023-08-25 PROCEDURE — 94761 N-INVAS EAR/PLS OXIMETRY MLT: CPT

## 2023-08-25 PROCEDURE — 27100098 HC SPACER

## 2023-08-25 PROCEDURE — 25000003 PHARM REV CODE 250: Performed by: NURSE ANESTHETIST, CERTIFIED REGISTERED

## 2023-08-25 PROCEDURE — 85025 COMPLETE CBC W/AUTO DIFF WBC: CPT | Performed by: STUDENT IN AN ORGANIZED HEALTH CARE EDUCATION/TRAINING PROGRAM

## 2023-08-25 PROCEDURE — 45385 COLONOSCOPY W/LESION REMOVAL: CPT | Performed by: INTERNAL MEDICINE

## 2023-08-25 PROCEDURE — 94640 AIRWAY INHALATION TREATMENT: CPT

## 2023-08-25 PROCEDURE — 25000003 PHARM REV CODE 250: Performed by: STUDENT IN AN ORGANIZED HEALTH CARE EDUCATION/TRAINING PROGRAM

## 2023-08-25 PROCEDURE — D9220A PRA ANESTHESIA: Mod: ,,, | Performed by: NURSE ANESTHETIST, CERTIFIED REGISTERED

## 2023-08-25 PROCEDURE — 63600175 PHARM REV CODE 636 W HCPCS: Performed by: STUDENT IN AN ORGANIZED HEALTH CARE EDUCATION/TRAINING PROGRAM

## 2023-08-25 PROCEDURE — 88305 TISSUE EXAM BY PATHOLOGIST: CPT | Mod: TC | Performed by: INTERNAL MEDICINE

## 2023-08-25 PROCEDURE — 82962 GLUCOSE BLOOD TEST: CPT | Performed by: INTERNAL MEDICINE

## 2023-08-25 PROCEDURE — 96372 THER/PROPH/DIAG INJ SC/IM: CPT | Mod: 59 | Performed by: STUDENT IN AN ORGANIZED HEALTH CARE EDUCATION/TRAINING PROGRAM

## 2023-08-25 PROCEDURE — 27201423 OPTIME MED/SURG SUP & DEVICES STERILE SUPPLY: Performed by: INTERNAL MEDICINE

## 2023-08-25 PROCEDURE — 63600175 PHARM REV CODE 636 W HCPCS: Performed by: ANESTHESIOLOGY

## 2023-08-25 PROCEDURE — 96376 TX/PRO/DX INJ SAME DRUG ADON: CPT

## 2023-08-25 PROCEDURE — 25000003 PHARM REV CODE 250: Performed by: NURSE PRACTITIONER

## 2023-08-25 PROCEDURE — 25000003 PHARM REV CODE 250

## 2023-08-25 PROCEDURE — D9220A PRA ANESTHESIA: ICD-10-PCS | Mod: ,,, | Performed by: NURSE ANESTHETIST, CERTIFIED REGISTERED

## 2023-08-25 PROCEDURE — 37000009 HC ANESTHESIA EA ADD 15 MINS: Performed by: INTERNAL MEDICINE

## 2023-08-25 RX ORDER — DIPHENHYDRAMINE HCL 25 MG
25 CAPSULE ORAL ONCE
Status: COMPLETED | OUTPATIENT
Start: 2023-08-25 | End: 2023-08-25

## 2023-08-25 RX ORDER — SODIUM CHLORIDE, SODIUM LACTATE, POTASSIUM CHLORIDE, CALCIUM CHLORIDE 600; 310; 30; 20 MG/100ML; MG/100ML; MG/100ML; MG/100ML
INJECTION, SOLUTION INTRAVENOUS CONTINUOUS
Status: DISCONTINUED | OUTPATIENT
Start: 2023-08-25 | End: 2023-08-26 | Stop reason: HOSPADM

## 2023-08-25 RX ORDER — LIDOCAINE HYDROCHLORIDE 20 MG/ML
INJECTION INTRAVENOUS
Status: DISCONTINUED | OUTPATIENT
Start: 2023-08-25 | End: 2023-08-25

## 2023-08-25 RX ORDER — PROPOFOL 10 MG/ML
VIAL (ML) INTRAVENOUS
Status: DISCONTINUED | OUTPATIENT
Start: 2023-08-25 | End: 2023-08-25

## 2023-08-25 RX ADMIN — GABAPENTIN 300 MG: 300 CAPSULE ORAL at 08:08

## 2023-08-25 RX ADMIN — VALSARTAN 320 MG: 80 TABLET, FILM COATED ORAL at 02:08

## 2023-08-25 RX ADMIN — PROPOFOL 40 MG: 10 INJECTION, EMULSION INTRAVENOUS at 01:08

## 2023-08-25 RX ADMIN — INSULIN DETEMIR 25 UNITS: 100 INJECTION, SOLUTION SUBCUTANEOUS at 08:08

## 2023-08-25 RX ADMIN — PROPOFOL 30 MG: 10 INJECTION, EMULSION INTRAVENOUS at 12:08

## 2023-08-25 RX ADMIN — CARVEDILOL 3.12 MG: 3.12 TABLET, FILM COATED ORAL at 08:08

## 2023-08-25 RX ADMIN — PROPOFOL 80 MG: 10 INJECTION, EMULSION INTRAVENOUS at 12:08

## 2023-08-25 RX ADMIN — OXYBUTYNIN CHLORIDE 5 MG: 5 TABLET, EXTENDED RELEASE ORAL at 02:08

## 2023-08-25 RX ADMIN — ONDANSETRON 4 MG: 2 INJECTION INTRAMUSCULAR; INTRAVENOUS at 09:08

## 2023-08-25 RX ADMIN — DULOXETINE HYDROCHLORIDE 60 MG: 30 CAPSULE, DELAYED RELEASE ORAL at 06:08

## 2023-08-25 RX ADMIN — INSULIN ASPART 3 UNITS: 100 INJECTION, SOLUTION INTRAVENOUS; SUBCUTANEOUS at 08:08

## 2023-08-25 RX ADMIN — FUROSEMIDE 40 MG: 20 TABLET ORAL at 02:08

## 2023-08-25 RX ADMIN — Medication 2 ENEMA: at 11:08

## 2023-08-25 RX ADMIN — PANTOPRAZOLE SODIUM 40 MG: 40 TABLET, DELAYED RELEASE ORAL at 02:08

## 2023-08-25 RX ADMIN — ACETAMINOPHEN 650 MG: 325 TABLET, FILM COATED ORAL at 09:08

## 2023-08-25 RX ADMIN — LIDOCAINE HYDROCHLORIDE 50 MG: 20 INJECTION INTRAVENOUS at 12:08

## 2023-08-25 RX ADMIN — NIFEDIPINE 90 MG: 30 TABLET, FILM COATED, EXTENDED RELEASE ORAL at 02:08

## 2023-08-25 RX ADMIN — SODIUM CHLORIDE, POTASSIUM CHLORIDE, SODIUM LACTATE AND CALCIUM CHLORIDE: 600; 310; 30; 20 INJECTION, SOLUTION INTRAVENOUS at 12:08

## 2023-08-25 RX ADMIN — DIPHENHYDRAMINE HYDROCHLORIDE 25 MG: 25 CAPSULE ORAL at 08:08

## 2023-08-25 RX ADMIN — ATORVASTATIN CALCIUM 40 MG: 40 TABLET, FILM COATED ORAL at 08:08

## 2023-08-25 RX ADMIN — GABAPENTIN 300 MG: 300 CAPSULE ORAL at 02:08

## 2023-08-25 RX ADMIN — INSULIN ASPART 4 UNITS: 100 INJECTION, SOLUTION INTRAVENOUS; SUBCUTANEOUS at 05:08

## 2023-08-25 RX ADMIN — ALBUTEROL SULFATE 1 PUFF: 90 AEROSOL, METERED RESPIRATORY (INHALATION) at 06:08

## 2023-08-25 RX ADMIN — PROPOFOL 30 MG: 10 INJECTION, EMULSION INTRAVENOUS at 01:08

## 2023-08-25 RX ADMIN — CARVEDILOL 3.12 MG: 3.12 TABLET, FILM COATED ORAL at 11:08

## 2023-08-25 NOTE — PROGRESS NOTES
Gastroenterology/Hepatology Progress Note    Patient Name: Elba Barnes  Age: 63 y.o.  : 1960  MRN: [id number]  Admission Date: 2023      Self, Aaareferral    SUBJECTIVE:     Patient was seen in her room.  Her caretaker was also present for the encounter.  Patient states that she was unable to complete the bowel prep due to nausea.  She was able to complete more than half of the 1st bottle of 2000 mL of bowel prep.  Patient states that she has had no bowel movements since the start of prep.  However towards the end of the encounter patient did have 1 bowel movement of loose stools.  She endorses generalized abdominal pain mostly pointing towards the lower quadrant of the abdomen.  She denies any chest pain, shortness of breath, fever, chills, headache, emesis, new onset of weakness.  She has remained afebrile.  Her blood pressure has been essentially stable and rest of the vitals were unremarkable.  CBC and CMP are unremarkable.       Review of patient's allergies indicates:   Allergen Reactions    Pcn [penicillins] Anaphylaxis          INPATIENT MEDICATIONS    Scheduled Meds:   atorvastatin  40 mg Oral QHS    carvediloL  3.125 mg Oral BID    DULoxetine  60 mg Oral QAM    fluticasone furoate-vilanteroL  1 puff Inhalation Daily    furosemide  40 mg Oral Daily    gabapentin  300 mg Oral TID    insulin detemir U-100  25 Units Subcutaneous QHS    nicotine  1 patch Transdermal Daily    NIFEdipine  90 mg Oral Daily    oxybutynin  5 mg Oral Daily    pantoprazole  40 mg Oral Daily    polyethylene glycol  2,000 mL Oral Q12H    valsartan  320 mg Oral Daily     Continuous Infusions:   lactated ringers 100 mL/hr at 23 1247     PRN Meds:  acetaminophen, albuterol, dextrose 10%, dextrose 10%, glucagon (human recombinant), hydrALAZINE, insulin aspart U-100, melatonin, nitroGLYCERIN, sodium chloride 0.9%          Review of Systems:       As stated above in the HPI    Objective:       VITAL SIGNS: 24 HR MIN &  MAX LAST    Temp  Min: 97.7 °F (36.5 °C)  Max: 98.8 °F (37.1 °C)  98.2 °F (36.8 °C)        BP  Min: 113/69  Max: 172/78  (!) 172/78     Pulse  Min: 64  Max: 82  64     Resp  Min: 18  Max: 22  (!) 22    SpO2  Min: 91 %  Max: 96 %  (!) 93 %        Physical Exam  Vitals and nursing note reviewed.   Constitutional:       General: She is not in acute distress.     Appearance: She is obese.   HENT:      Head: Normocephalic and atraumatic.   Eyes:      General: No scleral icterus.     Pupils: Pupils are equal, round, and reactive to light.   Cardiovascular:      Rate and Rhythm: Normal rate and regular rhythm.      Heart sounds: No murmur heard.     No friction rub. No gallop.   Pulmonary:      Effort: Pulmonary effort is normal. No respiratory distress.      Breath sounds: No wheezing.   Abdominal:      General: Bowel sounds are normal. There is no distension.      Palpations: Abdomen is soft.      Tenderness: There is abdominal tenderness (LLQ, LUQ and mildly on epigastric and suprapubic area).   Musculoskeletal:         General: No tenderness.      Cervical back: Normal range of motion.      Right lower leg: No edema.      Left lower leg: No edema.   Skin:     General: Skin is warm and dry.      Capillary Refill: Capillary refill takes less than 2 seconds.      Findings: No bruising or lesion.   Neurological:      Mental Status: She is alert and oriented to person, place, and time.      Comments: Right sided weakness noted with residual weakness on the right lower face and motor strength of 3/5 on the RUE and RLE.   Plantarflexion and dorsiflexion present bilaterally.    Psychiatric:         Mood and Affect: Mood normal.         Behavior: Behavior normal.              LABS:    Recent Labs   Lab 08/23/23  1618 08/24/23  0317 08/25/23  0354   WBC 9.96 9.35 8.42   HGB 13.7 13.3 13.3   HCT 42.0 42.8 40.9    404* 377   MCV 86.4 89.2 87.2       Recent Labs   Lab 08/23/23  1618 08/24/23  0317 08/25/23  0354   HGB 13.7  "13.3 13.3   HCT 42.0 42.8 40.9        Recent Labs   Lab 08/23/23  1618 08/24/23  0317 08/25/23  0354    141 142   K 4.6 3.4* 4.1   CO2 23 27 29   BUN 17.6 15.9 11.5   CREATININE 1.02 0.78 0.87   BILITOT 0.2 0.2 0.2   ALKPHOS 62 61 62   AST 25 17 19   ALT 17 14 16   LABPROT 8.1* 7.4 7.0   ALBUMIN 3.1* 3.0* 2.9*        Recent Labs   Lab 08/23/23  1618   INR 0.9   PROTIME 12.3   PTT 24.1        No results for input(s): "IRON", "FERRITIN" in the last 168 hours.         IMAGING:   CTA Abdomen and Pelvis    Result Date: 8/24/2023  EXAMINATION: CTA ABDOMEN AND PELVIS CLINICAL HISTORY: Suspect ischemic bowel; TECHNIQUE: Multiple axial images were obtained from the base of the lungs to the pubic symphysis after intravenous administration of contrast.  Precontrast imaging was performed as well.  Imaging was optimized to evaluate the arterial system in the abdomen and pelvis.  Multiplanar MIPS reconstructions were performed. Automatic exposure control (AEC) is utilized to reduce patient radiation exposure. COMPARISON: 08/23/2023 FINDINGS: The lung bases are clear.  The liver appears unremarkable.  No liver mass or lesion is seen.  The patient is status post cholecystectomy.  Pancreas appears normal.  Spleen appears grossly unremarkable. There is a right adrenal nodule seen which has previously shown to be benign adenomatous/lipomatous lesion.  It is stable. The kidneys appear normal.  No hydronephrosis is seen.  No hydroureter seen.  There is a cyst in the lower pole the right kidney. Visualized portions of the bowel appear grossly unremarkable.  There is no evidence of bowel wall thickening or edema.  No evidence of pneumatosis is seen.  There are extensive diverticuli seen in the colon but no evidence of diverticulitis is seen. No free fluid is seen.  No free air is seen. The small bowel appears unremarkable. Vascular images: The abdominal aorta is normal in caliber.  The celiac axis is widely patent.  No stenosis is " seen.  SMA is widely patent with no stenosis seen.  SMA branches appear to be patent. There is a mild amount of calcification in the proximal renal artery on the right side but no significant stenosis is seen.  Left renal artery appears normal. The KADEEM is widely patent.  No arterial thrombus is seen. Common and external iliac and internal iliac vessels have a mild amount atherosclerotic plaque but no significant stenosis is seen.     No significant arterial abnormality seen in the abdomen pelvis No evidence of bowel ischemia seen Electronically signed by: Letha Foster Date:    08/24/2023 Time:    15:37    CT Abdomen Pelvis W Wo Contrast    Result Date: 8/23/2023  EXAMINATION: CT ABDOMEN PELVIS W WO CONTRAST CLINICAL HISTORY: LLQ pain, melena, dizzy (? gi bleed); TECHNIQUE: Low dose axial images, sagittal and coronal reformations were obtained from the lung bases to the pubic symphysis following the IV administration of  contrast.  Delayed imaging and pre contrasted imaging was performed as well. Automatic exposure control is utilized to reduce patient radiation exposure. COMPARISON: None FINDINGS: There are chronic lung changes seen lung bases bilaterally and some changes of COPD and mild interstitial fibrosis.  There is some bronchiectasis seen in the lung bases bilaterally. . The liver is enlarged in size.  No liver mass or lesion is seen.  Portal and hepatic veins appear normal. The patient is status post cholecystectomy. The pancreas appears normal.  No pancreatic mass or lesion is seen. The spleen appears normal.  No splenic mass or lesion is seen. There is an adrenal nodule seen on the right side.  It measures 2.4 by 3.1 cm.  Hounsfield units on the precontrast imaging are 4.8 consistent with a benign adenomatous type lesion.  Left adrenal gland appears normal.. The kidneys are normal in size.  No hydronephrosis is seen.  No hydroureter is seen.  No nephrolithiasis or ureteral stone is seen.  There is a  cyst in the lower pole of the left kidney. Urinary bladder appears normal. No colitis is seen.  No diverticulitis is seen.  No colonic mass or lesion or inflammatory process is seen.  No evidence pooling of contrast is seen to suggest active GI hemorrhage.  The appendix appears normal. No free air is seen.  No free fluid is seen. The bones appear grossly unremarkable.     No evidence of active GI hemorrhage seen.  No colitis or diverticulitis is seen. Benign right adrenal nodule Electronically signed by: Letha Foster Date:    08/23/2023 Time:    17:31    CT Head Without Contrast    Result Date: 8/23/2023  EXAMINATION: CT HEAD WITHOUT CONTRAST CLINICAL HISTORY: dizzy, fall; TECHNIQUE: Axial scans were obtained from skull base to the vertex. Coronal and sagittal reconstructions obtained from the axial data. Automatic exposure control was utilized to limit radiation dose. Contrast: None Radiation Dose: Total DLP: 2430 mGy*cm COMPARISON: MRI brain dated 07/07/2023 FINDINGS: There is no acute intracranial hemorrhage or edema. The gray-white matter differentiation is preserved.  Patchy hypodensities in the subcortical and periventricular white matter and thalami likely represent chronic microvascular ischemic changes. There is no mass effect or midline shift. The ventricles and sulci are normal in size. The basal cisterns are patent. There is no abnormal extra-axial fluid collection. The calvarium and skull base are intact. The visualized paranasal sinuses and the mastoid air cells are clear.     1. No acute intracranial abnormality. 2. Chronic microvascular ischemic changes. Electronically signed by: Kelsie Prescott Date:    08/23/2023 Time:    17:25        Assessment / Plan:     Hematochezia  - dark maroon stool noted for 1 day, clots noted, intermitted red streaks for about 1 month.  - FLAKO: no active bleeding, no hemorrhoids, anal tags, thrombosed hemorrhoids or rectal prolapse noted.  - Ddx: hemorrhoidal bleed,  diverticular bleed  - no bowel movements since admission  - clear liquid diet for now  - Plan for colonoscopy today  - patient has remained NPO after midnight  - Will advised her to finish rest of the bowel prep as tolerated.  We will order 2 enemas prior to colonoscopy today.      Abdominal pain  - onset 2 days ago after fall and hitting on the left side  - tender to palpation on LLQ, LUQ, and mild on the epigastric and suprapubic area; no bruising noted  - CT abd/pelvis showed hepatomegaly, benign right adrenal nodules but no evidence of active GI hemorrhage, colitis, diverticulitis.  - likely 2/2 MSK injury from recent fall or chronic constipation     Chronic constipation  - history of constipation for about 2 years   - likely opiate induced or diabetic gastroparesis  - home meds include Glycolax once daily, Colace 250 mg twice daily  - 1 bowel movement after today's encounter    GI service will continue to follow up.  Rest management as per primary team.     Harish Faust  Internal Medicine - PGY-1

## 2023-08-25 NOTE — PLAN OF CARE
Post  procedure report given to KIANNA Kemp. Pt awake and alert, returning to room 404 via wheelchair

## 2023-08-25 NOTE — ANESTHESIA PREPROCEDURE EVALUATION
08/25/2023  Elba Barnes is a 63 y.o., female with PMHx of morbid obesity, HTN, HLD, CAD/MI, HFrEF, COPD (home oxygen 2L NC), LYNDON, smoking, DM, CVA, anxiety/depression presents for colonoscopy secondary to rectal bleeding.    Carvedilol--last dose 8/25/23 @ 1139    Active Ambulatory Problems     Diagnosis Date Noted    Mood disorder 08/11/2015    Anxiety 08/11/2015    PTSD (post-traumatic stress disorder) 08/11/2015    Class 3 severe obesity with body mass index (BMI) of 40.0 to 44.9 in adult 08/11/2015    Tobacco use disorder 05/09/2017    Essential hypertension 05/09/2017    Mixed hyperlipidemia 05/09/2017    Diabetes mellitus type 2, insulin dependent 05/09/2017    Encounter for diabetic foot exam 12/21/2022    Uncontrolled type 2 diabetes mellitus with hyperglycemia 12/21/2022    Noncompliance 12/21/2022    Overgrown toenails 01/09/2023    Acute cystitis without hematuria 02/13/2023    Tricompartment osteoarthritis of right knee 02/13/2023    Precordial pain 02/13/2023    Abdominal pain 02/16/2023    Nausea and vomiting 02/16/2023    Hypertensive emergency 02/16/2023    Drug overdose, intentional 05/09/2023    Drug-induced constipation 06/08/2023    Aspiration pneumonia 06/08/2023    OAB (overactive bladder) 06/08/2023    Right sided weakness 07/06/2023    Cerebrovascular accident (CVA) 07/07/2023    Heart problem 07/07/2023    Lingual tonsil hypertrophy 08/21/2023    Neuroforaminal stenosis of lumbar spine 08/21/2023     Resolved Ambulatory Problems     Diagnosis Date Noted    Bereavement 08/11/2015    Depression 09/22/2015    Anxiety 09/22/2015    Trauma and stressor-related disorder 09/22/2015    Obesity 09/22/2015    NSTEMI (non-ST elevated myocardial infarction) 02/16/2023    Ingestion of unknown medication 05/09/2023     Past Medical History:   Diagnosis Date     Arthritis     CVA (cerebral vascular accident)     Diabetes mellitus     History of psychiatric hospitalization     HTN (hypertension)     Hx of psychiatric care     Hypertension     Pacemaker     Psychiatric exam requested by authority     Psychiatric problem     TBI (traumatic brain injury)     Therapy        Pre-op Assessment    I have reviewed the NPO Status.      Review of Systems  Anesthesia Hx:  No problems with previous Anesthesia    Social:  Smoker    Cardiovascular:   Hypertension, well controlled Past MI CAD asymptomatic  CHF hyperlipidemia    Pulmonary:   COPD, severe Sleep Apnea, CPAP    Renal/:  Renal/ Normal     Hepatic/GI:  Hepatic/GI Normal    Neurological:   CVA, residual symptoms    Endocrine:   Diabetes, poorly controlled, type 2  Morbid Obesity / BMI > 40  Psych:   anxiety depression        Vitals:    08/25/23 0355 08/25/23 0655 08/25/23 0722 08/25/23 1107   BP: (!) 145/82  127/67 (!) 168/83   Pulse: 68 68 79 66   Resp:  18     Temp: 36.8 °C (98.3 °F)  37.1 °C (98.8 °F) 36.7 °C (98.1 °F)   TempSrc: Oral  Oral Oral   SpO2: (!) 92% (!) 94% 95% 96%   Weight:       Height:             Physical Exam  General: Alert, Cooperative and Well nourished    Airway:  Mallampati: II   Mouth Opening: Normal  TM Distance: Normal  Tongue: Normal  Neck ROM: Normal ROM    Dental:  Intact    Chest/Lungs:  Clear to auscultation, Normal Respiratory Rate    Heart:  Rate: Normal  Rhythm: Regular Rhythm  Sounds: Normal      Lab Results   Component Value Date    WBC 8.42 08/25/2023    HGB 13.3 08/25/2023    HCT 40.9 08/25/2023    MCV 87.2 08/25/2023     08/25/2023       CMP  Sodium   Date Value Ref Range Status   03/24/2022 138 136 - 145 mmol/L Final   09/30/2021 134 (L) 136 - 145 mmol/L Final     Sodium Level   Date Value Ref Range Status   08/25/2023 142 136 - 145 mmol/L Final     Potassium   Date Value Ref Range Status   03/24/2022 3.9 3.5 - 5.1 mmol/L Final   09/30/2021 3.9 3.5 - 5.1 mmol/L  Final     Potassium Level   Date Value Ref Range Status   08/25/2023 4.1 3.5 - 5.1 mmol/L Final     Chloride   Date Value Ref Range Status   03/24/2022 105 100 - 109 mmol/L Final   09/30/2021 98 95 - 110 mmol/L Final     CO2   Date Value Ref Range Status   09/30/2021 25 23 - 29 mmol/L Final     Carbon Dioxide   Date Value Ref Range Status   08/25/2023 29 23 - 31 mmol/L Final   03/24/2022 25 22 - 33 mmol/L Final     Glucose   Date Value Ref Range Status   09/30/2021 503 (HH) 70 - 110 mg/dL Final     Comment:     Glucose  critical result(s) called and verbal readback obtained from   Dr. Torres by AK5 09/30/2021 16:20       BUN   Date Value Ref Range Status   09/30/2021 10 6 - 20 mg/dL Final     Blood Urea Nitrogen   Date Value Ref Range Status   08/25/2023 11.5 9.8 - 20.1 mg/dL Final   03/24/2022 12 5 - 25 mg/dL Final     Creatinine   Date Value Ref Range Status   08/25/2023 0.87 0.55 - 1.02 mg/dL Final   03/24/2022 0.69 0.57 - 1.25 mg/dL Final   09/30/2021 1.3 0.5 - 1.4 mg/dL Final     Calcium   Date Value Ref Range Status   03/24/2022 8.8 8.8 - 10.6 mg/dL Final   09/30/2021 9.7 8.7 - 10.5 mg/dL Final     Calcium Level Total   Date Value Ref Range Status   08/25/2023 9.1 8.4 - 10.2 mg/dL Final     Total Protein   Date Value Ref Range Status   09/30/2021 8.6 (H) 6.0 - 8.4 g/dL Final     Albumin   Date Value Ref Range Status   09/30/2021 3.4 (L) 3.5 - 5.2 g/dL Final     Albumin Level   Date Value Ref Range Status   08/25/2023 2.9 (L) 3.4 - 4.8 g/dL Final     Total Bilirubin   Date Value Ref Range Status   09/30/2021 0.5 0.1 - 1.0 mg/dL Final     Comment:     For infants and newborns, interpretation of results should be based  on gestational age, weight and in agreement with clinical  observations.    Premature Infant recommended reference ranges:  Up to 24 hours.............<8.0 mg/dL  Up to 48 hours............<12.0 mg/dL  3-5 days..................<15.0 mg/dL  6-29 days.................<15.0 mg/dL       Bilirubin  Total   Date Value Ref Range Status   08/25/2023 0.2 <=1.5 mg/dL Final     Alkaline Phosphatase   Date Value Ref Range Status   08/25/2023 62 40 - 150 unit/L Final   09/30/2021 87 55 - 135 U/L Final     AST   Date Value Ref Range Status   09/30/2021 14 10 - 40 U/L Final     Aspartate Aminotransferase   Date Value Ref Range Status   08/25/2023 19 5 - 34 unit/L Final     ALT   Date Value Ref Range Status   09/30/2021 21 10 - 44 U/L Final     Alanine Aminotransferase   Date Value Ref Range Status   08/25/2023 16 0 - 55 unit/L Final     Anion Gap   Date Value Ref Range Status   03/24/2022 8 8 - 16 mmol/L Final   09/30/2021 11 8 - 16 mmol/L Final     eGFR   Date Value Ref Range Status   08/25/2023 >60 mls/min/1.73/m2 Final     Lab Results   Component Value Date    HGBA1C 8.4 (H) 07/06/2023               Anesthesia Plan  Type of Anesthesia, risks & benefits discussed:    Anesthesia Type: Gen Natural Airway  Intra-op Monitoring Plan: Standard ASA Monitors  Post Op Pain Control Plan: IV/PO Opioids PRN  Induction:  IV  Informed Consent: Informed consent signed with the Patient and all parties understand the risks and agree with anesthesia plan.  All questions answered.   ASA Score: 4  Day of Surgery Review of History & Physical: H&P Update referred to the surgeon/provider.    Ready For Surgery From Anesthesia Perspective.     .

## 2023-08-25 NOTE — PLAN OF CARE
Problem: Adult Inpatient Plan of Care  Goal: Absence of Hospital-Acquired Illness or Injury  Outcome: Ongoing, Progressing  Goal: Optimal Comfort and Wellbeing  Outcome: Ongoing, Progressing  Goal: Readiness for Transition of Care  Outcome: Ongoing, Progressing     Problem: Bariatric Environmental Safety  Goal: Safety Maintained with Care  Outcome: Ongoing, Progressing     Problem: Diabetes Comorbidity  Goal: Blood Glucose Level Within Targeted Range  Outcome: Ongoing, Progressing

## 2023-08-25 NOTE — TRANSFER OF CARE
Anesthesia Transfer of Care Note    Patient: Elba Barnes    Procedure(s) Performed: Procedure(s) (LRB):  COLONOSCOPY (Left)    Patient location: GI    Anesthesia Type: general    Post pain: adequate analgesia    Post assessment: no apparent anesthetic complications and tolerated procedure well    Post vital signs: stable    Level of consciousness: awake    Nausea/Vomiting: no nausea/vomiting    Complications: none    Transfer of care protocol was followed

## 2023-08-25 NOTE — PROVATION PATIENT INSTRUCTIONS
Discharge Summary/Instructions after an Endoscopic Procedure  Patient Name: Elba Perez  Patient MRN: [id number]  Patient YOB: 1960 Friday, August 25, 2023  Yany Davis MD  Dear patient,  As a result of recent federal legislation (The Federal Cures Act), you may   receive lab or pathology results from your procedure in your [facility]   account before your physician is able to contact you. Your physician or   their representative will relay the results to you with their   recommendations at their soonest availability.  Thank you,  RESTRICTIONS:  During your procedure today, you received medications for sedation.  These   medications may affect your judgment, balance and coordination.  Therefore,   for 24 hours, you have the following restrictions:   - DO NOT drive a car, operate machinery, make legal/financial decisions,   sign important papers or drink alcohol.    ACTIVITY:  Today: no heavy lifting, straining or running due to procedural   sedation/anesthesia.  The following day: return to full activity including work.  DIET:  Eat and drink normally unless instructed otherwise.     TREATMENT FOR COMMON SIDE EFFECTS:  - Mild abdominal pain, nausea, belching, bloating or excessive gas:  rest,   eat lightly and use a heating pad.  - Sore Throat: treat with throat lozenges and/or gargle with warm salt   water.  - Because air was used during the procedure, expelling large amounts of air   from your rectum or belching is normal.  - If a bowel prep was taken, you may not have a bowel movement for 1-3 days.    This is normal.  SYMPTOMS TO WATCH FOR AND REPORT TO YOUR PHYSICIAN:  1. Abdominal pain or bloating, other than gas cramps.  2. Chest pain.  3. Back pain.  4. Signs of infection such as: chills or fever occurring within 24 hours   after the procedure.  5. Rectal bleeding, which would show as bright red, maroon, or black stools.   (A tablespoon of blood from the rectum is not serious,  especially if   hemorrhoids are present.)  6. Vomiting.  7. Weakness or dizziness.  GO DIRECTLY TO THE NEAREST EMERGENCY ROOM IF YOU HAVE ANY OF THE FOLLOWING:      Difficulty breathing              Chills and/or fever over 101 F   Persistent vomiting and/or vomiting blood   Severe abdominal pain   Severe chest pain   Black, tarry stools   Bleeding- more than one tablespoon   Any other symptom or condition that you feel may need urgent attention  Your doctor recommends these additional instructions:  If any biopsies were taken, your doctors clinic will contact you in 1 to 2   weeks with any results.  - Return patient to hospital [facility] for ongoing care.   - Use Linzess (linaclotide) 145 mcg PO daily.   - Await pathology results.   - Repeat colonoscopy in 7 years for surveillance.   - Advance diet as tolerated today.   - Continue present medications.  For questions, problems or results please call your physician - Yany Davis MD at Work:  [phone], Work:  [phone].  [facility] , EMERGENCY ROOM PHONE NUMBER: [phone]  IF A COMPLICATION OR EMERGENCY SITUATION ARISES AND YOU ARE UNABLE TO REACH   YOUR PHYSICIAN - GO DIRECTLY TO THE EMERGENCY ROOM.  Yany Davis MD  8/25/2023 2:13:54 PM  This report has been verified and signed electronically.  Dear patient,  As a result of recent federal legislation (The Federal Cures Act), you may   receive lab or pathology results from your procedure in your [facility]   account before your physician is able to contact you. Your physician or   their representative will relay the results to you with their   recommendations at their soonest availability.  Thank you,  PROVATION

## 2023-08-25 NOTE — NURSING
"Received 1 jug of goLYTELY from pharmacy and pt started at 1945 last night. Pt tolerating well, however, more compliant when consistently encouraged by nurse going into room. At 4am, pt still has not had a bowel movement with finishing 50% of bowel prep. Nurse encouraged pt to try to get up to go to the restroom; pt stated "have not eaten in 4 days, I have nothing to put out". Dr. Macdonald notified.   "

## 2023-08-25 NOTE — PROGRESS NOTES
[facility] Medicine Wards   Progress Note     Resident Team: [facility] Medicine List 3  Attending Physician: Justin Fuentes MD  Resident: Renae  Intern: [facility] Length of Stay: 0 days    Subjective:      Brief HPI:  Elba Barnes is a 63 y.o. 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 She has had a total of 4 bloody bowel movements with clots since yesterday afternoon, causing her to have intermittent weakness and dizziness. Nothing makes her symptoms better or worse. She also had a fall at home while using the bathroom and did not have any head injuries. Other complaint includes LLQ pain of 4 out of 10 that is intermittent, dull, and achy; it is relieved by having bowel movements with no obvious aggravating factors. Of note, she takes fioricet at home for headaches. In the ED: /104; labs significant for hypoalbuminemia; U/A revealed bacteriuria and hematuria. CT ab/pelvis revealed no signs of active GI bleed/colitis/diverticulitis, right benign adrenal nodule identified; CT head w/o contrast revealed no acute intracranial abnormalities. Hospital medicine team was consulted for lower GI bleeding management.     Interval History:   Patient had no acute events overnight. Patient was threatening to leave AMA yesterday but was able to be calmed down to continue to her care. CTA abdomen/pelvis was obtained to assess for possible mesenteric ischemia due to severe LLQ pain which came back negative. GI was consulted due to suspected GI bleed who plan for colonoscopy today. Patient only completed about half of her bowel prep overnight and has not had a bowel movement  since admission. Patient reports not having appetite for past few days and attributes her lack of bowel movements to this. Encouraged patient to finish her bowel prep this morning with aim to hopefully have bowel movement before colonoscopy so study can be done without issues.     Review of Systems   Constitutional:  Negative for chills and fever.   HENT:  Negative for congestion, sinus pain and sore throat.    Eyes:  Negative for blurred vision and double vision.   Respiratory:  Negative for cough, sputum production, shortness of breath and wheezing.    Cardiovascular:  Negative for chest pain, palpitations and leg swelling.   Gastrointestinal:  Positive for abdominal pain. Negative for nausea and vomiting.        LLQ pain, rectal bleeding   Genitourinary:  Negative for dysuria.   Musculoskeletal:  Negative for myalgias.   Neurological:  Negative for dizziness, focal weakness, seizures and weakness.           Objective:     Vital Signs (Most Recent):  Temp: 98.8 °F (37.1 °C) (08/25/23 0722)  Pulse: 79 (08/25/23 0722)  Resp: 18 (08/25/23 0655)  BP: 127/67 (08/25/23 0722)  SpO2: 95 % (08/25/23 0722) Vital Signs (24h Range):  Temp:  [97.7 °F (36.5 °C)-98.8 °F (37.1 °C)] 98.8 °F (37.1 °C)  Pulse:  [68-82] 79  Resp:  [18-21] 18  SpO2:  [91 %-95 %] 95 %  BP: (113-168)/(67-84) 127/67     Physical Exam  Constitutional:       Appearance: Normal appearance. She is obese.   HENT:      Head: Normocephalic and atraumatic.      Mouth/Throat:      Mouth: Mucous membranes are moist.      Pharynx: Oropharynx is clear.   Eyes:      Conjunctiva/sclera: Conjunctivae normal.      Pupils: Pupils are equal, round, and reactive to light.   Cardiovascular:      Rate and Rhythm: Normal rate and regular rhythm.      Pulses: Normal pulses.      Heart sounds: No murmur heard.  Pulmonary:      Effort: Pulmonary effort is normal. No respiratory distress.      Breath sounds: No wheezing.   Chest:      Chest wall: No tenderness.   Abdominal:       General: Abdomen is flat. Bowel sounds are normal. There is no distension.      Palpations: Abdomen is soft.      Tenderness: There is abdominal tenderness.      Comments: LLQ pain and tenderness to palpation   Musculoskeletal:         General: No swelling. Normal range of motion.      Cervical back: Normal range of motion.   Skin:     General: Skin is warm.   Neurological:      General: No focal deficit present.      Mental Status: She is alert and oriented to person, place, and time.          Laboratory:  Most Recent Data:  CBC:   Recent Labs   Lab 08/23/23  1618 08/24/23  0317 08/25/23  0354   WBC 9.96 9.35 8.42   HGB 13.7 13.3 13.3   HCT 42.0 42.8 40.9    404* 377     CMP:   Recent Labs   Lab 08/25/23  0354   CALCIUM 9.1   ALBUMIN 2.9*      K 4.1   CO2 29   BUN 11.5   CREATININE 0.87   ALKPHOS 62   ALT 16   AST 19   BILITOT 0.2         Microbiology Data Reviewed: yes  Pertinent Findings:  N/A    Other Results:  EKG (my interpretation): normal EKG, normal sinus rhythm, unchanged from previous tracings.    Radiology:  Imaging Results              CT Abdomen Pelvis W Wo Contrast (Final result)  Result time 08/23/23 17:31:03      Final result by Letha Foster MD (08/23/23 17:31:03)                   Impression:      No evidence of active GI hemorrhage seen.  No colitis or diverticulitis is seen.    Benign right adrenal nodule      Electronically signed by: Letha Foster  Date:    08/23/2023  Time:    17:31               Narrative:    EXAMINATION:  CT ABDOMEN PELVIS W WO CONTRAST    CLINICAL HISTORY:  LLQ pain, melena, dizzy (? gi bleed);    TECHNIQUE:  Low dose axial images, sagittal and coronal reformations were obtained from the lung bases to the pubic symphysis following the IV administration of  contrast.  Delayed imaging and pre contrasted imaging was performed as well. Automatic exposure control is utilized to reduce patient radiation  exposure.    COMPARISON:  None    FINDINGS:  There are chronic lung changes seen lung bases bilaterally and some changes of COPD and mild interstitial fibrosis.  There is some bronchiectasis seen in the lung bases bilaterally.    .    The liver is enlarged in size.  No liver mass or lesion is seen.  Portal and hepatic veins appear normal.    The patient is status post cholecystectomy.    The pancreas appears normal.  No pancreatic mass or lesion is seen.    The spleen appears normal.  No splenic mass or lesion is seen.    There is an adrenal nodule seen on the right side.  It measures 2.4 by 3.1 cm.  Hounsfield units on the precontrast imaging are 4.8 consistent with a benign adenomatous type lesion.  Left adrenal gland appears normal..    The kidneys are normal in size.  No hydronephrosis is seen.  No hydroureter is seen.  No nephrolithiasis or ureteral stone is seen.  There is a cyst in the lower pole of the left kidney.    Urinary bladder appears normal.    No colitis is seen.  No diverticulitis is seen.  No colonic mass or lesion or inflammatory process is seen.  No evidence pooling of contrast is seen to suggest active GI hemorrhage.  The appendix appears normal.    No free air is seen.  No free fluid is seen.    The bones appear grossly unremarkable.                                       CT Head Without Contrast (Final result)  Result time 08/23/23 17:25:29      Final result by Kelsie Prescott MD (08/23/23 17:25:29)                   Impression:      1. No acute intracranial abnormality.  2. Chronic microvascular ischemic changes.      Electronically signed by: Kelsie Prescott  Date:    08/23/2023  Time:    17:25               Narrative:    EXAMINATION:  CT HEAD WITHOUT CONTRAST    CLINICAL HISTORY:  dizzy, fall;    TECHNIQUE:  Axial scans were obtained from skull base to the vertex.    Coronal and sagittal reconstructions obtained from the axial data.    Automatic exposure control was utilized to limit  radiation dose.    Contrast: None    Radiation Dose:    Total DLP: 2430 mGy*cm    COMPARISON:  MRI brain dated 07/07/2023    FINDINGS:  There is no acute intracranial hemorrhage or edema. The gray-white matter differentiation is preserved.  Patchy hypodensities in the subcortical and periventricular white matter and thalami likely represent chronic microvascular ischemic changes.    There is no mass effect or midline shift. The ventricles and sulci are normal in size. The basal cisterns are patent. There is no abnormal extra-axial fluid collection.    The calvarium and skull base are intact. The visualized paranasal sinuses and the mastoid air cells are clear.                                      Current Medications:     Infusions:       Scheduled:   atorvastatin  40 mg Oral QHS    carvediloL  3.125 mg Oral BID    DULoxetine  60 mg Oral QAM    fluticasone furoate-vilanteroL  1 puff Inhalation Daily    furosemide  40 mg Oral Daily    gabapentin  300 mg Oral TID    insulin detemir U-100  25 Units Subcutaneous QHS    nicotine  1 patch Transdermal Daily    NIFEdipine  90 mg Oral Daily    oxybutynin  5 mg Oral Daily    pantoprazole  40 mg Oral Daily    polyethylene glycol  2,000 mL Oral Q12H    valsartan  320 mg Oral Daily        PRN:  acetaminophen, albuterol, dextrose 10%, dextrose 10%, glucagon (human recombinant), hydrALAZINE, insulin aspart U-100, melatonin, nitroGLYCERIN, ondansetron, sodium chloride 0.9%    Antibiotics and Day Number of Therapy:  N/A      Intake/Output Summary (Last 24 hours) at 8/25/2023 0841  Last data filed at 8/25/2023 0629  Gross per 24 hour   Intake 2620 ml   Output 2300 ml   Net 320 ml       Lines/Drains/Airways       Peripheral Intravenous Line  Duration                  Peripheral IV - Single Lumen 08/23/23 1638 22 G Posterior;Proximal;Right Forearm 1 day         Peripheral IV - Single Lumen 08/24/23 1405 20 G Posterior;Proximal;Right Forearm <1 day                    Assessment & Plan:      Acute GI bleeding   LLQ pain  -Bedside FLAKO revealed multiple small blood clots w/o active bleeding; vital signs and labs stable  -Holding home ASA 81mg for now  -CTA abdomen/pelvis negative for acute ischemia  -Keeping patient NPO; bowel prep initiated overnight with only partial completion  -Encouraged patient to completed bowel prep so colonoscopy can be performed without issues and patient reported understanding and will try  -GI consulted; planning for colonoscopy today  -Consider Surgery consult if patient pain still not resolved for possible mesenteric ischemia though lower on differential at this time     HTN urgency   -/104 at the time of assessment; will resume home antihypertensive before considering IV PRNs; she did not take home antihypertensives today  -Continue home antihypertensive: nifedipine 90mg daily, coreg 3.125 mg BID, valsartan 320mg daily   -Past initial timeframe, okay to resume home meds and BP control     Asymptomatic bacteriuria  Hematuria  Overactive bladder  -U/A revealed 2+ occult blood and moderate RBC; this could be d/t contaminated urine sample   -Continue home oxybutynin     DM II w/ neuropathy  -A1c 8.4 (7/6/2023)  -Initiated moderate SSI (NPO protocol)  -Home insulin regimen: Novolog 20 untis TIDWM & Lantus 50 units BID before meals; will give detemir 25 units QHS while NPO  -Continue home gabapentin     CHF (EF 45% July 2023)  -EF was 50-55% in November 2021  -Continue Coreg 3.125mg BID and lasix 40mg daily     COPD   -Not currently using home O2; breathing well on room air   -Continue home therapy: albuterol PRN, Breo     CVA w/ right-sided residual weakness   -Consulted PT for further assistance  -Holding home ASA d/t GI bleed     Depression  -HX of inpatient psych placement  -Continue dulexetine 60mg daily; holding home lexapro d/t concern for serotonin syndrome     Tobacco use  -Will give nicotine patch while hospitalized    CODE STATUS: Full Code  Access:  Peripheral  Antibiotics: N/A  Diet: NPO  DVT Prophylaxis: Teds/SCDs  GI Prophylaxis: proton pump inhibitor per orders    Disposition: day 0 of admission for GI bleed workup and hypertensive urgency.    Corwin Macdonald MD  [facility] Internal Medicine, [facility]

## 2023-08-25 NOTE — MEDICAL/APP STUDENT
"[facility] Medicine Wards Progress Note     Resident Team: [facility] Medicine List 3   Attending Physician: Dr. Dailey  Resident: Dr. Macdonald  Intern: Dr. Quiroz      Subjective:      Brief HPI:  Elba Barnes is a 63 y.o. 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 She has had a total of 4 bloody bowel movements with clots since yesterday afternoon, causing her to have intermittent weakness and dizziness. Nothing makes her symptoms better or worse. She also had a fall at home while using the bathroom and did not have any head injuries.      Other complaint includes LLQ pain of 4 out of 10 that is intermittent, dull, and achy; it is relieved by having bowel movements with no obvious aggravating factors. Of note, she takes fioricet at home for headaches. In the ED: /104; labs significant for hypoalbuminemia; U/A revealed bacteriuria and hematuria. CT ab/pelvis revealed no signs of active GI bleed/colitis/diverticulitis, right benign adrenal nodule identified; CT head w/o contrast revealed no acute intracranial abnormalities. Hospital medicine team was consulted for lower GI bleeding management    Interval History: NAEON, VSS, afebrile. CTA abdomen and lactic acid have returned unremarkable, ordered for suspicion of mesenteric ischemia. GI has been consulted and plan for colonoscopy today. Today, patient says her stomach is "shaky" from drinking bowel prep prior to planned colonoscopy today. Hemoglobin remains stable. Pt has had a BM as of rounds at 1030am.    Review of Systems:  ROS completed and negative except as indicated above.     Objective:     Last 24 Hour Vital Signs:  BP  " Min: 113/69  Max: 168/83  Temp  Av.2 °F (36.8 °C)  Min: 97.7 °F (36.5 °C)  Max: 98.8 °F (37.1 °C)  Pulse  Av.3  Min: 66  Max: 82  Resp  Av.7  Min: 18  Max: 21  SpO2  Av.3 %  Min: 91 %  Max: 96 %  I/O last 3 completed shifts:  In: 2980 [P.O.:2980]  Out: 3300 [Urine:3300]    Physical Examination:  Physical Exam  Constitutional:       General: She is not in acute distress.     Appearance: She is obese.   HENT:      Mouth/Throat:      Mouth: Mucous membranes are moist.      Pharynx: No oropharyngeal exudate.   Eyes:      General: No scleral icterus.     Pupils: Pupils are equal, round, and reactive to light.   Cardiovascular:      Rate and Rhythm: Normal rate and regular rhythm.      Pulses: Normal pulses.   Pulmonary:      Effort: Pulmonary effort is normal. No respiratory distress.   Abdominal:      Palpations: There is no mass.      Tenderness: There is abdominal tenderness. There is no guarding.      Comments: Discomfort to palpation over abdomen with point tenderness over LLQ   Musculoskeletal:      Right lower leg: No edema.      Left lower leg: No edema.   Skin:     General: Skin is warm and dry.   Neurological:      General: No focal deficit present.      Mental Status: She is alert.          Laboratory:  Most Recent Data:  CBC:   Lab Results   Component Value Date    WBC 8.42 2023    HGB 13.3 2023    HCT 40.9 2023     2023    MCV 87.2 2023    RDW 15.2 2023     WBC Differential:   Recent Labs   Lab 23  1618 23  0317 23  0354   WBC 9.96 9.35 8.42   HGB 13.7 13.3 13.3   HCT 42.0 42.8 40.9    404* 377   MCV 86.4 89.2 87.2     BMP:   Lab Results   Component Value Date     2023    K 4.1 2023     2022    CO2 29 2023    BUN 11.5 2023    CREATININE 0.87 2023     () 2021    CALCIUM 9.1 2023    MG 1.80 2023    PHOS 3.7 2023     LFTs:   Lab Results  "  Component Value Date    PROT 8.6 (H) 09/30/2021    ALBUMIN 2.9 (L) 08/25/2023    BILITOT 0.2 08/25/2023    AST 19 08/25/2023    ALKPHOS 62 08/25/2023    ALT 16 08/25/2023     Coags:   Lab Results   Component Value Date    INR 0.9 08/23/2023    PROTIME 12.3 08/23/2023    PTT 24.1 08/23/2023     FLP:   Lab Results   Component Value Date    CHOL 155 07/08/2023    HDL 32 (L) 07/08/2023    LDLCALC 66 10/18/2022    TRIG 268 (H) 07/08/2023     DM:   Lab Results   Component Value Date    HGBA1C 8.4 (H) 07/06/2023    HGBA1C 8.7 (H) 02/09/2023    HGBA1C 10.5 (H) 10/18/2022    LDLCALC 66 10/18/2022    CREATININE 0.87 08/25/2023     Thyroid:   Lab Results   Component Value Date    TSH 0.858 07/06/2023     Anemia: No results found for: "IRON", "TIBC", "FERRITIN", "[id number]", "FOLATE"  Cardiac:   Lab Results   Component Value Date    TROPONINI 0.043 07/07/2023    BNP 16.7 07/06/2023       Microbiology Data Reviewed: Yes  Pertinent Findings: N/A      Other Results:  EKG 8/23 (my interpretation):   -LVH, nonspecific ST changes in Inferior Leads     Radiology:     CT Abdomen/Pelvis W/WO Contrast 8/23:  -no active bleed, colitis, diverticulitis noted on read     CT head WO Contrast 8/23:   -no acute intracranial process    CT Abdomen/Pelvis 8/24:  -no evidence of bowel ischemia   -no significant arterial abnormality    Current Medications:     Infusions:       Scheduled:   atorvastatin  40 mg Oral QHS    carvediloL  3.125 mg Oral BID    DULoxetine  60 mg Oral QAM    fluticasone furoate-vilanteroL  1 puff Inhalation Daily    furosemide  40 mg Oral Daily    gabapentin  300 mg Oral TID    insulin detemir U-100  25 Units Subcutaneous QHS    nicotine  1 patch Transdermal Daily    NIFEdipine  90 mg Oral Daily    oxybutynin  5 mg Oral Daily    pantoprazole  40 mg Oral Daily    polyethylene glycol  2,000 mL Oral Q12H    sodium phosphates  2 enema Rectal Once    valsartan  320 mg Oral Daily        PRN:  acetaminophen, albuterol, dextrose " 10%, dextrose 10%, glucagon (human recombinant), hydrALAZINE, insulin aspart U-100, melatonin, nitroGLYCERIN, sodium chloride 0.9%    Antibiotics and Day Number of Therapy:  N/A    Assessment & Plan:     Acute Lower GI Bleed  -patient presented with complain of gross blood in stool 2 days prior to admit  -FLAKO on admit with blood clots w/o active bleeding  -H/H stable at 13.3/40.9, holding ASA  -CT Abdomen/Pelvis without signs of active bleed   -LLQ pain out of proportion to exam on 8/24; lactic acid and abdominal CVA unrevealing and lowering suspicion for acute mesenteric ischemia  -GI has been consulted; planning for colonoscopy today and holding anticoagulation  -etiology of abdominal pain potentially constipation 2/2 diabetic gastroparesis v.  opiate use per GI     HTN Urgency  -BP elevated at admit to 185/104; had improved to 163/79 on 8/24 > 145/82 on 8/25   -continuing nifedipine 90mg and losartan 320mg daily, as well as Coreg 3.125mg daily     Asymptomatic Bacteriuria  Hematuria  Overactive Bladder  -U/A w/ +2 occult blood and moderate RBC; is asymptomatic at this time   -continuing home oxybutynin; purewick in place      Type 2 DM  -A1c 8.4% on 7/6/2023  -moderate SSI in place + detemir 25 units basal  -continuing home gabapentin     CHF (EF 45% as of July 2023)  -continuing Coreg 3.125mg BID and lasix 40mg daily  -does not appear to be volume overloaded at this time      COPD  -continuing home therapy of PRN albuterol and breo     CVA  -PT has been consulted, hopeful for evaluation after colonoscopy  -holding ASA at this time     Depression  -continuing duloxetine 60mg daily; holding home lexapro      CODE STATUS: Full  Access: Peripheral  Antibiotics: N/A  Diet: NPO until colonoscopy  DVT Prophylaxis: Teds/SCDs  GI Prophylaxis: Pantoprazole 40mg daily      Disposition: GI is onboard and planning for colonoscopy today, as patient has had a BM, but will need to finish bowel prep regimen. Patient's  hypertensive urgency has been lowered gradually and is nearing good control. Continuing management of T2DM, CHF w/EF 45%, and depression with current medication regimen, with good control at this time. Will proceed per findings of colonoscopy today.       Gt Aguilar, L3   [facility] Internal Medicine Wards Team 3

## 2023-08-25 NOTE — ANESTHESIA POSTPROCEDURE EVALUATION
Anesthesia Post Evaluation    Patient: Elba Barnes    Procedure(s) Performed: Procedure(s) (LRB):  COLONOSCOPY, WITH POLYPECTOMY USING SNARE (Left)    Final Anesthesia Type: general      Patient location during evaluation: GI PACU  Patient participation: Yes- Able to Participate  Level of consciousness: awake and alert  Post-procedure vital signs: reviewed and stable  Pain management: adequate  Airway patency: patent    PONV status at discharge: No PONV  Anesthetic complications: no      Cardiovascular status: hemodynamically stable  Respiratory status: unassisted and room air  Follow-up not needed.              Pain/Demetris Score: Pain Rating Prior to Med Admin: 8 (8/25/2023  9:45 AM)  Demetris Score: 10 (8/25/2023  1:30 PM)

## 2023-08-26 VITALS
HEIGHT: 65 IN | HEART RATE: 74 BPM | OXYGEN SATURATION: 92 % | TEMPERATURE: 98 F | RESPIRATION RATE: 18 BRPM | SYSTOLIC BLOOD PRESSURE: 158 MMHG | BODY MASS INDEX: 42.83 KG/M2 | WEIGHT: 257.06 LBS | DIASTOLIC BLOOD PRESSURE: 82 MMHG

## 2023-08-26 LAB
POCT GLUCOSE: 125 MG/DL (ref 70–110)
POCT GLUCOSE: 273 MG/DL (ref 70–110)

## 2023-08-26 PROCEDURE — 96372 THER/PROPH/DIAG INJ SC/IM: CPT | Performed by: STUDENT IN AN ORGANIZED HEALTH CARE EDUCATION/TRAINING PROGRAM

## 2023-08-26 PROCEDURE — 99900035 HC TECH TIME PER 15 MIN (STAT)

## 2023-08-26 PROCEDURE — G0378 HOSPITAL OBSERVATION PER HR: HCPCS

## 2023-08-26 PROCEDURE — 94761 N-INVAS EAR/PLS OXIMETRY MLT: CPT

## 2023-08-26 PROCEDURE — 25000003 PHARM REV CODE 250: Performed by: STUDENT IN AN ORGANIZED HEALTH CARE EDUCATION/TRAINING PROGRAM

## 2023-08-26 PROCEDURE — 63600175 PHARM REV CODE 636 W HCPCS: Performed by: STUDENT IN AN ORGANIZED HEALTH CARE EDUCATION/TRAINING PROGRAM

## 2023-08-26 PROCEDURE — S4991 NICOTINE PATCH NONLEGEND: HCPCS | Performed by: STUDENT IN AN ORGANIZED HEALTH CARE EDUCATION/TRAINING PROGRAM

## 2023-08-26 RX ORDER — HYDROCORTISONE ACETATE 25 MG/1
25 SUPPOSITORY RECTAL 2 TIMES DAILY PRN
Qty: 20 SUPPOSITORY | Refills: 0 | Status: SHIPPED | OUTPATIENT
Start: 2023-08-26 | End: 2023-09-05

## 2023-08-26 RX ORDER — CHLORHEXIDINE GLUCONATE ORAL RINSE 1.2 MG/ML
15 SOLUTION DENTAL 2 TIMES DAILY
Status: DISCONTINUED | OUTPATIENT
Start: 2023-08-26 | End: 2023-08-26 | Stop reason: HOSPADM

## 2023-08-26 RX ADMIN — CARVEDILOL 3.12 MG: 3.12 TABLET, FILM COATED ORAL at 09:08

## 2023-08-26 RX ADMIN — NICOTINE 1 PATCH: 7 PATCH, EXTENDED RELEASE TRANSDERMAL at 09:08

## 2023-08-26 RX ADMIN — FUROSEMIDE 40 MG: 20 TABLET ORAL at 09:08

## 2023-08-26 RX ADMIN — PANTOPRAZOLE SODIUM 40 MG: 40 TABLET, DELAYED RELEASE ORAL at 09:08

## 2023-08-26 RX ADMIN — CHLORHEXIDINE GLUCONATE 0.12% ORAL RINSE 15 ML: 1.2 LIQUID ORAL at 03:08

## 2023-08-26 RX ADMIN — INSULIN ASPART 3 UNITS: 100 INJECTION, SOLUTION INTRAVENOUS; SUBCUTANEOUS at 06:08

## 2023-08-26 RX ADMIN — GABAPENTIN 300 MG: 300 CAPSULE ORAL at 09:08

## 2023-08-26 RX ADMIN — NIFEDIPINE 90 MG: 30 TABLET, FILM COATED, EXTENDED RELEASE ORAL at 09:08

## 2023-08-26 RX ADMIN — VALSARTAN 320 MG: 80 TABLET, FILM COATED ORAL at 09:08

## 2023-08-26 RX ADMIN — DULOXETINE HYDROCHLORIDE 60 MG: 30 CAPSULE, DELAYED RELEASE ORAL at 06:08

## 2023-08-26 RX ADMIN — OXYBUTYNIN CHLORIDE 5 MG: 5 TABLET, EXTENDED RELEASE ORAL at 09:08

## 2023-08-26 NOTE — DISCHARGE SUMMARY
[facility] Discharge Summary    Admitting Physician: Justin Fuentes MD  Attending Physician: Justin Fuentes MD  Date of Admit: 8/23/2023  Date of Discharge: 8/26/2023    Condition: Stable  Outcome: Patient tolerated treatment/procedure well without complication and is now ready for discharge.  DISPOSITION: Home or Self Care          Discharge Diagnoses     Patient Active Problem List   Diagnosis    Mood disorder    Anxiety    PTSD (post-traumatic stress disorder)    Class 3 severe obesity with body mass index (BMI) of 40.0 to 44.9 in adult    Tobacco use disorder    Essential hypertension    Mixed hyperlipidemia    Diabetes mellitus type 2, insulin dependent    Encounter for diabetic foot exam    Uncontrolled type 2 diabetes mellitus with hyperglycemia    Noncompliance    Overgrown toenails    Acute cystitis without hematuria    Tricompartment osteoarthritis of right knee    Precordial pain    Abdominal pain    Nausea and vomiting    Hypertensive emergency    Drug overdose, intentional    Drug-induced constipation    Aspiration pneumonia    OAB (overactive bladder)    Right sided weakness    Cerebrovascular accident (CVA)    Heart problem    Lingual tonsil hypertrophy    Neuroforaminal stenosis of lumbar spine    Acute lower GI bleeding       Principal Problem:  Acute lower GI bleeding    Consultants and Procedures     Consultants:  IP CONSULT TO HOSPITAL MEDICINE  IP CONSULT TO GI    Procedures:   Procedure(s) (LRB):  COLONOSCOPY, WITH POLYPECTOMY USING SNARE (Left)     Brief Admission History      Patient was seen in her room.  Her caretaker was also present for the encounter.  Patient states that she was unable to complete the bowel prep due to nausea.  She was able to complete more than half of the 1st bottle of 2000 mL of bowel prep.  Patient states that she has had no bowel movements since the start of prep.  However towards the end of the encounter patient did have 1 bowel movement of  "loose stools.  She endorses generalized abdominal pain mostly pointing towards the lower quadrant of the abdomen.  She denies any chest pain, shortness of breath, fever, chills, headache, emesis, new onset of weakness.  She has remained afebrile.  Her blood pressure has been essentially stable and rest of the vitals were unremarkable.  CBC and CMP are unremarkable.    Hospital Course with Pertinent Findings     During admission, GI was consulted due to suspicion for active GI bleed.  Patient remained hemodynamically stable during her admission and mainly complained of abdominal pain.  CT abdomen and pelvis without contrast was obtained which did not show any evidence of acute GI bleed or diverticulitis. Due to concerns for possible mesenteric ischemia CTA abdomen and pelvis was obtained which was negative for bowel ischemia.  Patient was started on bowel breath and started having bowel movements.  Colonoscopy was performed by GI where they had found 2 benign polyps, hemorrhoids, and diverticulosis.  Recommendations were made to add Linzess to patient's bowel regimen for her chronic constipation.  She will also be prescribed hydrocortisone suppositories p.r.n. for hemorrhoid bleeding.  At this time suspect patient's abdominal pain is a combination of patient's chronic constipation along with diverticulosis.  Otherwise, patient's only complaint is about mouth pain and suspicion for abscess.  Patient does have poor dentition and has appointment with her dentist on Thursday.  Plan to discharge patient with magic mouthwash with instructions to follow with her dentist.  Patient has a follow-up with Internal Medicine post wards clinic.  Patient medically stable at this time and will plan to discharge.    Discharge physical exam:  Vitals  BP: (!) 158/82  Temp: 98.2 °F (36.8 °C)  Temp Source: Oral  Pulse: 74  Resp: 18  SpO2: (!) 92 %  Height: 5' 5" (165.1 cm)  Weight: 116.6 kg (257 lb 0.9 oz)    Physical " Exam  Constitutional:       Appearance: Normal appearance. She is obese.   HENT:      Head: Normocephalic and atraumatic.      Mouth/Throat:      Mouth: Mucous membranes are moist.      Pharynx: Oropharynx is clear.   Eyes:      Conjunctiva/sclera: Conjunctivae normal.      Pupils: Pupils are equal, round, and reactive to light.   Cardiovascular:      Rate and Rhythm: Normal rate and regular rhythm.      Pulses: Normal pulses.      Heart sounds: No murmur heard.  Pulmonary:      Effort: Pulmonary effort is normal. No respiratory distress.      Breath sounds: No wheezing.   Chest:      Chest wall: No tenderness.   Abdominal:      General: Abdomen is flat. Bowel sounds are normal. There is no distension.      Palpations: Abdomen is soft.      Tenderness: There is abdominal tenderness.      Comments: LLQ tenderness to palpation   Musculoskeletal:         General: No swelling. Normal range of motion.      Cervical back: Normal range of motion.   Skin:     General: Skin is warm.   Neurological:      General: No focal deficit present.      Mental Status: She is alert and oriented to person, place, and time.          TIME SPENT ON DISCHARGE: 60 minutes    Discharge Medications        Medication List        START taking these medications      (MAGIC MOUTHWASH) 1:1:1 BENADRYL 12.5MG/5ML LIQ, ALUMINUM & MAGNESIUM  Swish and spit 10 mLs every 4 (four) hours as needed (Mouth pain). for mouth sores     hydrocortisone 25 mg suppository  Commonly known as: ANUSOL-HC  Place 1 suppository (25 mg total) rectally 2 (two) times daily as needed for Hemorrhoids (As needed for rectal bleeding).     linaCLOtide 145 mcg Cap capsule  Commonly known as: LINZESS  Take 1 capsule (145 mcg total) by mouth before breakfast.            CONTINUE taking these medications      albuterol 90 mcg/actuation inhaler  Commonly known as: PROVENTIL/VENTOLIN HFA  Inhale 1-2 puffs into the lungs every 6 (six) hours as needed for Wheezing. Rescue      albuterol-ipratropium 2.5 mg-0.5 mg/3 mL nebulizer solution  Commonly known as: DUO-NEB  Take 3 mLs by nebulization every 6 (six) hours while awake. Rescue     ALPRAZolam 1 MG tablet  Commonly known as: XANAX     ammonium lactate 12 % lotion  Commonly known as: LAC-HYDRIN     aspirin 81 MG EC tablet  Commonly known as: ECOTRIN  Take 1 tablet (81 mg total) by mouth once daily.     atorvastatin 40 MG tablet  Commonly known as: LIPITOR     butalbital-acetaminophen-caffeine -40 mg -40 mg per tablet  Commonly known as: FIORICET, ESGIC  Take 1 tablet by mouth every 6 (six) hours as needed for Headaches.     carvediloL 3.125 MG tablet  Commonly known as: COREG     cholecalciferol (vitamin D3) 50 mcg (2,000 unit) Cap capsule  Commonly known as: VITAMIN D3     diaper,brief,adult,disposable Misc  1 each by Misc.(Non-Drug; Combo Route) route every 6 (six) hours as needed (urinary incontinence).     diclofenac sodium 1 % Gel  Commonly known as: VOLTAREN  Apply 2 g topically 4 (four) times daily.     docusate sodium 250 MG capsule  Commonly known as: COLACE  Take 1 capsule (250 mg total) by mouth 2 (two) times daily.     doxepin 6 mg Tab  Commonly known as: SILENOR     DULoxetine 60 MG capsule  Commonly known as: CYMBALTA     EScitalopram oxalate 10 MG tablet  Commonly known as: LEXAPRO     esomeprazole 40 MG capsule  Commonly known as: NEXIUM     fluticasone furoate-vilanteroL 200-25 mcg/dose Dsdv diskus inhaler  Commonly known as: BREO ELLIPTA  Inhale 1 puff into the lungs once daily. Controller     fluticasone propionate 50 mcg/actuation nasal spray  Commonly known as: FLONASE  1 spray (50 mcg total) by Each Nostril route 2 (two) times daily as needed for Rhinitis.     gabapentin 600 MG tablet  Commonly known as: NEURONTIN  Take 1 tablet (600 mg total) by mouth 3 (three) times daily.     glipiZIDE 10 MG tablet  Commonly known as: GLUCOTROL     insulin glargine 100 units/mL SubQ pen  Commonly known as: LANTUS  "SOLOSTAR U-100 INSULIN  Inject 50 Units into the skin 2 (two) times daily before meals. Adjust doses based on cbg     magnesium citrate solution     multivitamin per tablet  Commonly known as: THERAGRAN     NIFEdipine 90 MG (OSM) 24 hr tablet  Commonly known as: PROCARDIA-XL  Take 1 tablet (90 mg total) by mouth once daily.     nitroGLYCERIN 0.4 MG SL tablet  Commonly known as: NITROSTAT     NovoLOG FlexPen U-100 Insulin 100 unit/mL (3 mL) Inpn pen  Generic drug: insulin aspart U-100  Inject 20 Units into the skin 3 (three) times daily with meals. DISCARD OPEN PEN AFTER 28 DAYS     nystatin cream  Commonly known as: MYCOSTATIN     ONETOUCH VERIO TEST STRIPS Strp  Generic drug: blood sugar diagnostic     oxybutynin 5 MG Tr24  Commonly known as: DITROPAN-XL  Take 1 tablet (5 mg total) by mouth once daily.     polyethylene glycol 17 gram/dose powder  Commonly known as: GLYCOLAX  Take 17 g by mouth once daily.     sertraline 50 MG tablet  Commonly known as: ZOLOFT     SURE COMFORT PEN NEEDLE 32 gauge x 5/32" Ndle  Generic drug: pen needle, diabetic     terconazole 0.4 % Crea  Commonly known as: TERAZOL 7     valsartan 320 MG tablet  Commonly known as: DIOVAN            STOP taking these medications      furosemide 40 MG tablet  Commonly known as: LASIX               Where to Get Your Medications        These medications were sent to [facility], [employer]. [address] [address]      Phone: [phone]   (MAGIC MOUTHWASH) 1:1:1 BENADRYL 12.5MG/5ML LIQ, ALUMINUM & MAGNESIUM  hydrocortisone 25 mg suppository  linaCLOtide 145 mcg Cap capsule         Discharge Information:      Follow-up Information       [facility] Follow up.    Specialty: Emergency Medicine  Why: As needed, If symptoms worsen  Contact information:  [address] [facility] [address] [phone]             [facility] Follow up in 1 week(s). "    Specialty: Internal Medicine  Contact information:  [address] [facility] [address] [phone]                         Prescribed Linzess 145 mg daily for bowel regimen   Prescribe hydrocortisone suppositories for p.r.n. bleeding from hemorrhoids  Prescribed magic mouthwash for mouth pain  Follow-up with dentist on Thursday   Follow with Internal Medicine post wards clinic      Corwin Macdonald MD  [facility], [facility] [place]

## 2023-08-26 NOTE — CARE UPDATE
"Called to patient's bedside for a "abscess in mouth" by nurse. Patient states she feels a painful nodule on her upper lip, near gum line. Appears to be a small firm tender nodule off midline of the upper lip near the gum line. Ordered the chlorhexidine solution to be used as an antiseptic mouthwash  to prevent infection.    Preston Mendosa, DO  Family Medicine, [facility]     "

## 2023-08-28 NOTE — PT/OT/SLP PROGRESS
Physical Therapy    Missed Treatment Session    Patient Name:  Elba Barnes   MRN:  [id number]      Patient not seen at this time secondary to MD hold (Comment). Pt D/C from hospital before PT was able to perform Evaluation.

## 2023-08-29 LAB
ESTROGEN SERPL-MCNC: NORMAL PG/ML
INSULIN SERPL-ACNC: NORMAL U[IU]/ML
LAB AP CLINICAL INFORMATION: NORMAL
LAB AP GROSS DESCRIPTION: NORMAL
LAB AP REPORT FOOTNOTES: NORMAL
T3RU NFR SERPL: NORMAL %

## 2023-09-05 ENCOUNTER — TELEPHONE (OUTPATIENT)
Dept: ENDOSCOPY | Facility: HOSPITAL | Age: 63
End: 2023-09-05
Payer: MEDICARE

## 2023-09-05 LAB
GROUP & RH: NORMAL
INDIRECT COOMBS GEL: NEGATIVE
SPECIMEN OUTDATE: NORMAL

## 2023-09-05 NOTE — TELEPHONE ENCOUNTER
----- Message from Yany Davis MD sent at 9/1/2023  4:57 PM CDT -----  [employer] message sent to the patient about results.  Please call patient with results if not viewed.     Thanks,  MB

## 2023-09-05 NOTE — TELEPHONE ENCOUNTER
----- Message from Yany Davis MD sent at 9/1/2023  4:57 PM CDT -----  Incis[employer] message sent to the patient about results.  Please call patient with results if not viewed.     Thanks,  MB

## 2023-09-07 ENCOUNTER — PATIENT MESSAGE (OUTPATIENT)
Dept: RESEARCH | Facility: HOSPITAL | Age: 63
End: 2023-09-07
Payer: MEDICARE

## 2023-09-11 PROBLEM — J69.0 ASPIRATION PNEUMONIA: Status: RESOLVED | Noted: 2023-06-08 | Resolved: 2023-09-11

## 2023-09-13 ENCOUNTER — HOSPITAL ENCOUNTER (EMERGENCY)
Facility: HOSPITAL | Age: 63
Discharge: HOME | End: 2023-09-13
Attending: EMERGENCY MEDICINE
Payer: MEDICARE

## 2023-09-13 VITALS
OXYGEN SATURATION: 95 % | SYSTOLIC BLOOD PRESSURE: 161 MMHG | TEMPERATURE: 98 F | RESPIRATION RATE: 18 BRPM | WEIGHT: 251.31 LBS | DIASTOLIC BLOOD PRESSURE: 99 MMHG | BODY MASS INDEX: 40.39 KG/M2 | HEIGHT: 66 IN | HEART RATE: 83 BPM

## 2023-09-13 DIAGNOSIS — R07.89 ATYPICAL CHEST PAIN: ICD-10-CM

## 2023-09-13 DIAGNOSIS — R07.89 CHEST WALL PAIN: Primary | ICD-10-CM

## 2023-09-13 LAB
ALBUMIN SERPL-MCNC: 3.4 G/DL (ref 3.4–4.8)
ALBUMIN/GLOB SERPL: 0.6 RATIO (ref 1.1–2)
ALP SERPL-CCNC: 74 UNIT/L (ref 40–150)
ALT SERPL-CCNC: 18 UNIT/L (ref 0–55)
AST SERPL-CCNC: 17 UNIT/L (ref 5–34)
BASOPHILS # BLD AUTO: 0.04 X10(3)/MCL
BASOPHILS NFR BLD AUTO: 0.5 %
BILIRUB SERPL-MCNC: 0.3 MG/DL
BNP BLD-MCNC: <10 PG/ML
BUN SERPL-MCNC: 21.2 MG/DL (ref 9.8–20.1)
CALCIUM SERPL-MCNC: 9.7 MG/DL (ref 8.4–10.2)
CHLORIDE SERPL-SCNC: 104 MMOL/L (ref 98–107)
CO2 SERPL-SCNC: 26 MMOL/L (ref 23–31)
CREAT SERPL-MCNC: 1.02 MG/DL (ref 0.55–1.02)
EOSINOPHIL # BLD AUTO: 0.09 X10(3)/MCL (ref 0–0.9)
EOSINOPHIL NFR BLD AUTO: 1.2 %
ERYTHROCYTE [DISTWIDTH] IN BLOOD BY AUTOMATED COUNT: 15.1 % (ref 11.5–17)
FLUAV AG UPPER RESP QL IA.RAPID: NOT DETECTED
FLUBV AG UPPER RESP QL IA.RAPID: NOT DETECTED
GFR SERPLBLD CREATININE-BSD FMLA CKD-EPI: >60 MLS/MIN/1.73/M2
GLOBULIN SER-MCNC: 5.3 GM/DL (ref 2.4–3.5)
GLUCOSE SERPL-MCNC: 307 MG/DL (ref 82–115)
HCT VFR BLD AUTO: 45.9 % (ref 37–47)
HGB BLD-MCNC: 14.7 G/DL (ref 12–16)
IMM GRANULOCYTES # BLD AUTO: 0.03 X10(3)/MCL (ref 0–0.04)
IMM GRANULOCYTES NFR BLD AUTO: 0.4 %
LYMPHOCYTES # BLD AUTO: 2.04 X10(3)/MCL (ref 0.6–4.6)
LYMPHOCYTES NFR BLD AUTO: 27.2 %
MAGNESIUM SERPL-MCNC: 1.9 MG/DL (ref 1.6–2.6)
MCH RBC QN AUTO: 28.2 PG (ref 27–31)
MCHC RBC AUTO-ENTMCNC: 32 G/DL (ref 33–36)
MCV RBC AUTO: 87.9 FL (ref 80–94)
MONOCYTES # BLD AUTO: 0.46 X10(3)/MCL (ref 0.1–1.3)
MONOCYTES NFR BLD AUTO: 6.1 %
NEUTROPHILS # BLD AUTO: 4.84 X10(3)/MCL (ref 2.1–9.2)
NEUTROPHILS NFR BLD AUTO: 64.6 %
NRBC BLD AUTO-RTO: 0 %
PLATELET # BLD AUTO: 375 X10(3)/MCL (ref 130–400)
PMV BLD AUTO: 9.3 FL (ref 7.4–10.4)
POTASSIUM SERPL-SCNC: 3.8 MMOL/L (ref 3.5–5.1)
PROT SERPL-MCNC: 8.7 GM/DL (ref 5.8–7.6)
RBC # BLD AUTO: 5.22 X10(6)/MCL (ref 4.2–5.4)
SARS-COV-2 RNA RESP QL NAA+PROBE: NOT DETECTED
SODIUM SERPL-SCNC: 142 MMOL/L (ref 136–145)
TROPONIN I SERPL-MCNC: 0.04 NG/ML (ref 0–0.04)
WBC # SPEC AUTO: 7.5 X10(3)/MCL (ref 4.5–11.5)

## 2023-09-13 PROCEDURE — 85025 COMPLETE CBC W/AUTO DIFF WBC: CPT | Performed by: PHYSICIAN ASSISTANT

## 2023-09-13 PROCEDURE — 93005 ELECTROCARDIOGRAM TRACING: CPT

## 2023-09-13 PROCEDURE — 83880 ASSAY OF NATRIURETIC PEPTIDE: CPT | Performed by: PHYSICIAN ASSISTANT

## 2023-09-13 PROCEDURE — 99285 EMERGENCY DEPT VISIT HI MDM: CPT | Mod: 25

## 2023-09-13 PROCEDURE — 84484 ASSAY OF TROPONIN QUANT: CPT | Performed by: PHYSICIAN ASSISTANT

## 2023-09-13 PROCEDURE — 80053 COMPREHEN METABOLIC PANEL: CPT | Performed by: PHYSICIAN ASSISTANT

## 2023-09-13 PROCEDURE — 83735 ASSAY OF MAGNESIUM: CPT | Performed by: PHYSICIAN ASSISTANT

## 2023-09-13 PROCEDURE — 0240U COVID/FLU A&B PCR: CPT | Performed by: PHYSICIAN ASSISTANT

## 2023-09-13 RX ORDER — DICLOFENAC SODIUM 75 MG/1
75 TABLET, DELAYED RELEASE ORAL 2 TIMES DAILY
Qty: 22 TABLET | Refills: 0 | Status: SHIPPED | OUTPATIENT
Start: 2023-09-13 | End: 2023-09-26 | Stop reason: SDUPTHER

## 2023-09-13 NOTE — ED PROVIDER NOTES
"Encounter Date: 9/13/2023       History     Chief Complaint   Patient presents with    Chest Pain     Reports midline chest pain/heaviness that radiates to right arm for 2 days.  Also reports right breast pain.  Reports fatigue and sob. Ekg in triage.  Hx of heart attack last year     The patient is here complaining of chest pain which has been ongoing and intermittent for 6 months.  Patient reports the symptoms initially began after she had a mammography and reports she believes the pain is related to the previous mammography.  The principal request today is her follow up in the clinic where she received the mammography for further discussion of her ongoing right breast pain.  There is no dyspnea, no nausea, no diaphoresis, no fevers, no chills.      Chest Pain  Illness onset: Symptoms ongoing for 6 months, unchanged pattern, frequency, intensity, . Chest pain occurs intermittently. The chest pain is unchanged. Associated with: No apparent association. At its most intense, the chest pain is at 2/10. The chest pain is currently at 2/10. The quality of the pain is described as sharp. The pain does not radiate. Chest pain is worsened by certain positions. Pertinent negatives for primary symptoms include no fever, no fatigue, no syncope, no shortness of breath, no cough, no wheezing, no palpitations, no abdominal pain, no nausea and no vomiting.   Pertinent negatives for associated symptoms include no claudication, no diaphoresis, no lower extremity edema and no near-syncope. She tried nothing for the symptoms.   Pertinent negatives for past medical history include no aneurysm, no anxiety/panic attacks, no aortic aneurysm, no aortic dissection, no arrhythmia, no bicuspid aortic valve and no CAD.     Review of patient's allergies indicates:   Allergen Reactions    Pcn [penicillins] Anaphylaxis     Past Medical History:   Diagnosis Date    Anxiety     Arthritis     CVA (cerebral vascular accident)     "mini stroke"    " "Depression     Diabetes mellitus     Heart problem     History of psychiatric hospitalization     three(,  and another (years ago")): depression    HTN (hypertension)     Hx of psychiatric care     Hypertension     Pacemaker     Psychiatric exam requested by authority     Psychiatric problem     TBI (traumatic brain injury)     Therapy      Past Surgical History:   Procedure Laterality Date    CARDIAC PACEMAKER PLACEMENT      CARDIAC PACEMAKER REMOVAL      CHOLECYSTECTOMY      COLONOSCOPY, WITH POLYPECTOMY USING SNARE Left 2023    Procedure: COLONOSCOPY, WITH POLYPECTOMY USING SNARE;  Surgeon: Yany Davis MD;  Location: [facility] ENDOSCOPY;  Service: Gastroenterology;  Laterality: Left;    HYSTERECTOMY       Family History   Problem Relation Age of Onset    Schizophrenia Mother     Bipolar disorder Mother     Bipolar disorder Father      Social History     Tobacco Use    Smoking status: Every Day     Current packs/day: 0.00     Types: Cigarettes     Start date: 1978     Last attempt to quit: 2018     Years since quittin.0    Smokeless tobacco: Never    Tobacco comments:     1 pk every 2 weeks, 4 a day   Substance Use Topics    Alcohol use: Not Currently     Comment: wine on occ    Drug use: No     Review of Systems   Constitutional:  Negative for diaphoresis, fatigue and fever.   Respiratory:  Negative for cough, shortness of breath and wheezing.    Cardiovascular:  Positive for chest pain. Negative for palpitations, claudication, syncope and near-syncope.   Gastrointestinal:  Negative for abdominal pain, nausea and vomiting.   All other systems reviewed and are negative.      Physical Exam     Initial Vitals [23 1046]   BP Pulse Resp Temp SpO2   (!) 171/90 89 (!) 25 98.4 °F (36.9 °C) (!) 93 %      MAP       --         Physical Exam    Nursing note and vitals reviewed.  Constitutional: She appears well-developed and well-nourished. She is not diaphoretic. No distress.   HENT: "   Head: Normocephalic and atraumatic.   Right Ear: External ear normal.   Left Ear: External ear normal.   Eyes: EOM are normal. Pupils are equal, round, and reactive to light. Right eye exhibits no discharge. Left eye exhibits no discharge.   Neck: Neck supple. No thyromegaly present. No tracheal deviation present. No JVD present.   Normal range of motion.  Cardiovascular:  Normal rate, regular rhythm, normal heart sounds and intact distal pulses.     Exam reveals no gallop and no friction rub.       No murmur heard.  Pulmonary/Chest: Breath sounds normal. No stridor. No respiratory distress. She has no wheezes. She has no rhonchi. She has no rales.   Abdominal: Abdomen is soft. Bowel sounds are normal. She exhibits no distension. There is no abdominal tenderness. There is no rebound and no guarding.   Musculoskeletal:         General: No tenderness or edema. Normal range of motion.      Cervical back: Normal range of motion and neck supple.     Neurological: She is alert and oriented to person, place, and time. She has normal strength. No cranial nerve deficit or sensory deficit. GCS score is 15. GCS eye subscore is 4. GCS verbal subscore is 5. GCS motor subscore is 6.   Skin: Skin is warm and dry. Capillary refill takes less than 2 seconds. No rash and no abscess noted. No erythema. No pallor.   Psychiatric: She has a normal mood and affect. Her behavior is normal. Judgment and thought content normal.         ED Course   Procedures  Labs Reviewed   COMPREHENSIVE METABOLIC PANEL - Abnormal; Notable for the following components:       Result Value    Glucose Level 307 (*)     Blood Urea Nitrogen 21.2 (*)     Protein Total 8.7 (*)     Globulin 5.3 (*)     Albumin/Globulin Ratio 0.6 (*)     All other components within normal limits   CBC WITH DIFFERENTIAL - Abnormal; Notable for the following components:    MCHC 32.0 (*)     All other components within normal limits   B-TYPE NATRIURETIC PEPTIDE - Normal   TROPONIN I  - Normal   MAGNESIUM - Normal   COVID/FLU A&B PCR - Normal    Narrative:     The Xpert Xpress SARS-CoV-2/FLU/RSV plus is a rapid, multiplexed real-time PCR test intended for the simultaneous qualitative detection and differentiation of SARS-CoV-2, Influenza A, Influenza B, and respiratory syncytial virus (RSV) viral RNA in either nasopharyngeal swab or nasal swab specimens.         CBC W/ AUTO DIFFERENTIAL    Narrative:     The following orders were created for panel order CBC Auto Differential.  Procedure                               Abnormality         Status                     ---------                               -----------         ------                     CBC with Differential[[id number]]        Abnormal            Final result                 Please view results for these tests on the individual orders.   EXTRA TUBES    Narrative:     The following orders were created for panel order EXTRA TUBES.  Procedure                               Abnormality         Status                     ---------                               -----------         ------                     Light Blue Top Hold[[id number]]                              In process                 Gold Top Hold[[id number]]                                    In process                 Pink Top Hold[[id number]]                                    In process                   Please view results for these tests on the individual orders.   LIGHT BLUE TOP HOLD   GOLD TOP HOLD   PINK TOP HOLD     EKG Readings: (Independently Interpreted)   NSR @ 84, non acute and non ischemic appearing ;     ECG Results              EKG 12-lead (Final result)  Result time 09/13/23 12:33:07      Final result by Interface, Lab In [facility] (09/13/23 12:33:07)                   Narrative:    Test Reason : R07.89,    Vent. Rate : 084 BPM     Atrial Rate : 084 BPM     P-R Int : 136 ms          QRS Dur : 108 ms      QT Int : 406 ms       P-R-T Axes : 057 -26 110 degrees     QTc Int  : 479 ms    Normal sinus rhythm  LVH with repolarization abnormality  Abnormal ECG  Confirmed by Osmany Robbins MD ([id number]) on 9/13/2023 12:33:01 PM    Referred By:             Confirmed By:Osmany Robbins MD                                  Imaging Results              X-Ray Chest 1 View (Final result)  Result time 09/13/23 11:38:08      Final result by Pham Whipple MD (09/13/23 11:38:08)                   Impression:      Mild prominence of the interstitium and bronchial wall thickening may be related to edema/vascular status or atypical infection.      Electronically signed by: Pham Whipple  Date:    09/13/2023  Time:    11:38               Narrative:    EXAMINATION:  XR CHEST 1 VIEW    CLINICAL HISTORY:  chest pain;    TECHNIQUE:  Single frontal view of the chest was performed.    COMPARISON:  07/09/2023    FINDINGS:  LINES AND TUBES: EKG/telemetry leads overlie the chest.    MEDIASTINUM AND ELVIE: The cardiac silhouette is normal.    LUNGS: Mild prominence of the interstitium and bronchial wall thickening.    PLEURA:No pleural effusion. No pneumothorax.    OTHER: Old right rib deformities.                                    X-Rays:   Independently Interpreted Readings:   Chest X-Ray: No acute abnormal ;     Medications - No data to display  Medical Decision Making  Patient is here with ongoing intermittent chest pain unchanged in quality, frequency, associations, duration, pattern over 6 months.  The symptoms emerged after a mammogram.  We did opt to obtain an expanded evaluation with objective data today which are found reassuring.  Plan outpatient follow-up for continuing consideration of the pain after mastectomy.  Patient home with anticipatory guidance, return precautions, follow-up instructions.    Amount and/or Complexity of Data Reviewed  External Data Reviewed: labs, radiology, ECG and notes.  Labs: ordered. Decision-making details documented in ED Course.  Radiology: ordered and independent  interpretation performed. Decision-making details documented in ED Course.  ECG/medicine tests: ordered and independent interpretation performed. Decision-making details documented in ED Course.    Risk  Prescription drug management.  Risk Details: No clinical features concerning for a change in the underlying risk portfolio over the last 6 months.  Risk is found sufficient to obtain an expanded evaluation with objective data which resulted in found reassuring.  Plan continue outpatient follow up.  Home in stable condition without event.                               Clinical Impression:   Final diagnoses:  [R07.89] Atypical chest pain  [R07.89] Chest wall pain (Primary)        ED Disposition Condition    Discharge Stable          ED Prescriptions       Medication Sig Dispense Start Date End Date Auth. Provider    diclofenac (VOLTAREN) 75 MG EC tablet Take 1 tablet (75 mg total) by mouth 2 (two) times daily. 22 tablet 9/13/2023 -- Toni Grace MD          Follow-up Information       Follow up With Specialties Details Why Contact Info    [facility] Emergency Medicine  As needed, If symptoms worsen [address] [facility] [address] [phone]    Purvi Rai FNP Nurse Practitioner   [address] [facility]  Internal Medicine Clinic  [facility] [address]  [phone]               Toni Grace MD  09/13/23 [id number]

## 2023-09-13 NOTE — FIRST PROVIDER EVALUATION
Medical screening examination initiated.  I have conducted a focused provider triage encounter, findings are as follows:    Brief history of present illness:  2 day hx of R sided & substernal CP w/ SOB    There were no vitals filed for this visit.    Pertinent physical exam:  VSS, NAD    Brief workup plan:  EKG, labs, XR ordered    Preliminary workup initiated; this workup will be continued and followed by the physician or advanced practice provider that is assigned to the patient when roomed.

## 2023-09-26 ENCOUNTER — HOSPITAL ENCOUNTER (EMERGENCY)
Facility: HOSPITAL | Age: 63
Discharge: HOME | End: 2023-09-26
Attending: INTERNAL MEDICINE
Payer: MEDICARE

## 2023-09-26 VITALS
BODY MASS INDEX: 40.35 KG/M2 | WEIGHT: 250 LBS | OXYGEN SATURATION: 96 % | RESPIRATION RATE: 18 BRPM | SYSTOLIC BLOOD PRESSURE: 157 MMHG | HEART RATE: 77 BPM | DIASTOLIC BLOOD PRESSURE: 83 MMHG | TEMPERATURE: 98 F

## 2023-09-26 DIAGNOSIS — I10 UNCONTROLLED HYPERTENSION: ICD-10-CM

## 2023-09-26 DIAGNOSIS — R07.89 ATYPICAL CHEST PAIN: Primary | ICD-10-CM

## 2023-09-26 DIAGNOSIS — R07.9 CHEST PAIN: ICD-10-CM

## 2023-09-26 LAB
ALBUMIN SERPL-MCNC: 3 G/DL (ref 3.4–4.8)
ALBUMIN/GLOB SERPL: 0.7 RATIO (ref 1.1–2)
ALP SERPL-CCNC: 66 UNIT/L (ref 40–150)
ALT SERPL-CCNC: 11 UNIT/L (ref 0–55)
AST SERPL-CCNC: 16 UNIT/L (ref 5–34)
BILIRUB SERPL-MCNC: 0.3 MG/DL
BUN SERPL-MCNC: 13.4 MG/DL (ref 9.8–20.1)
CALCIUM SERPL-MCNC: 9.1 MG/DL (ref 8.4–10.2)
CHLORIDE SERPL-SCNC: 107 MMOL/L (ref 98–107)
CO2 SERPL-SCNC: 25 MMOL/L (ref 23–31)
CREAT SERPL-MCNC: 0.88 MG/DL (ref 0.55–1.02)
D DIMER PPP IA.FEU-MCNC: 1.18 UG/ML FEU (ref 0–0.5)
GFR SERPLBLD CREATININE-BSD FMLA CKD-EPI: >60 MLS/MIN/1.73/M2
GLOBULIN SER-MCNC: 4.5 GM/DL (ref 2.4–3.5)
GLUCOSE SERPL-MCNC: 243 MG/DL (ref 82–115)
POTASSIUM SERPL-SCNC: 3.9 MMOL/L (ref 3.5–5.1)
PROT SERPL-MCNC: 7.5 GM/DL (ref 5.8–7.6)
SODIUM SERPL-SCNC: 141 MMOL/L (ref 136–145)
TROPONIN I SERPL-MCNC: 0.06 NG/ML (ref 0–0.04)
TROPONIN I SERPL-MCNC: 0.06 NG/ML (ref 0–0.04)

## 2023-09-26 PROCEDURE — 80053 COMPREHEN METABOLIC PANEL: CPT | Performed by: INTERNAL MEDICINE

## 2023-09-26 PROCEDURE — 63600175 PHARM REV CODE 636 W HCPCS: Performed by: INTERNAL MEDICINE

## 2023-09-26 PROCEDURE — 25000242 PHARM REV CODE 250 ALT 637 W/ HCPCS: Performed by: INTERNAL MEDICINE

## 2023-09-26 PROCEDURE — 99285 EMERGENCY DEPT VISIT HI MDM: CPT | Mod: 25

## 2023-09-26 PROCEDURE — 84484 ASSAY OF TROPONIN QUANT: CPT | Performed by: INTERNAL MEDICINE

## 2023-09-26 PROCEDURE — 25000003 PHARM REV CODE 250: Performed by: INTERNAL MEDICINE

## 2023-09-26 PROCEDURE — 93005 ELECTROCARDIOGRAM TRACING: CPT

## 2023-09-26 PROCEDURE — 96374 THER/PROPH/DIAG INJ IV PUSH: CPT

## 2023-09-26 PROCEDURE — 85379 FIBRIN DEGRADATION QUANT: CPT | Performed by: INTERNAL MEDICINE

## 2023-09-26 RX ORDER — ALPRAZOLAM 0.25 MG/1
0.5 TABLET ORAL
Status: COMPLETED | OUTPATIENT
Start: 2023-09-26 | End: 2023-09-26

## 2023-09-26 RX ORDER — METOPROLOL TARTRATE 25 MG/1
25 TABLET, FILM COATED ORAL
Status: COMPLETED | OUTPATIENT
Start: 2023-09-26 | End: 2023-09-26

## 2023-09-26 RX ORDER — KETOROLAC TROMETHAMINE 30 MG/ML
15 INJECTION, SOLUTION INTRAMUSCULAR; INTRAVENOUS
Status: COMPLETED | OUTPATIENT
Start: 2023-09-26 | End: 2023-09-26

## 2023-09-26 RX ORDER — DICLOFENAC SODIUM 75 MG/1
75 TABLET, DELAYED RELEASE ORAL 2 TIMES DAILY PRN
Qty: 15 TABLET | Refills: 0 | Status: SHIPPED | OUTPATIENT
Start: 2023-09-26

## 2023-09-26 RX ORDER — NITROGLYCERIN 0.4 MG/1
0.4 TABLET SUBLINGUAL
Status: COMPLETED | OUTPATIENT
Start: 2023-09-26 | End: 2023-09-26

## 2023-09-26 RX ORDER — CYCLOBENZAPRINE HCL 5 MG
5 TABLET ORAL 3 TIMES DAILY PRN
Qty: 15 TABLET | Refills: 0 | Status: SHIPPED | OUTPATIENT
Start: 2023-09-26 | End: 2024-02-29 | Stop reason: ALTCHOICE

## 2023-09-26 RX ADMIN — METOPROLOL TARTRATE 25 MG: 25 TABLET, FILM COATED ORAL at 10:09

## 2023-09-26 RX ADMIN — KETOROLAC TROMETHAMINE 15 MG: 30 INJECTION, SOLUTION INTRAMUSCULAR; INTRAVENOUS at 12:09

## 2023-09-26 RX ADMIN — NITROGLYCERIN 0.4 MG: 0.4 TABLET SUBLINGUAL at 10:09

## 2023-09-26 RX ADMIN — ALPRAZOLAM 0.5 MG: 0.25 TABLET ORAL at 10:09

## 2023-09-26 NOTE — ED PROVIDER NOTES
"Encounter Date: 9/26/2023       History     Chief Complaint   Patient presents with    Chest Pain     CO RT SIDED CP W RAD TO SHOULDER AND SOB X 3 DAYS.  EKG OBTAINED.       Presents with chest pain for the last few hours, states was doing therapy on the pool after her stroke and her heart rate dropped and the therapist was concern, she also started experiencing sharp chest pains for which came to ED. Pt admits feeling very anxious, Hx of CAD with an AID in place, stroke, HTN and DM. Pt denies diaphoresis, SOB or swelling    The history is provided by the patient.     Review of patient's allergies indicates:   Allergen Reactions    Pcn [penicillins] Anaphylaxis     Past Medical History:   Diagnosis Date    Anxiety     Arthritis     CVA (cerebral vascular accident)     "mini stroke"    Depression     Diabetes mellitus     Heart problem     History of psychiatric hospitalization     three(1983, 1995 and another (years ago")): depression    HTN (hypertension)     Hx of psychiatric care     Hypertension     Pacemaker     Psychiatric exam requested by authority     Psychiatric problem     TBI (traumatic brain injury)     Therapy      Past Surgical History:   Procedure Laterality Date    CARDIAC PACEMAKER PLACEMENT      CARDIAC PACEMAKER REMOVAL      CHOLECYSTECTOMY      COLONOSCOPY, WITH POLYPECTOMY USING SNARE Left 8/25/2023    Procedure: COLONOSCOPY, WITH POLYPECTOMY USING SNARE;  Surgeon: Yany Davis MD;  Location: [facility] ENDOSCOPY;  Service: Gastroenterology;  Laterality: Left;    HYSTERECTOMY       Family History   Problem Relation Age of Onset    Schizophrenia Mother     Bipolar disorder Mother     Bipolar disorder Father      Social History     Tobacco Use    Smoking status: Every Day     Current packs/day: 0.50     Average packs/day: (0.4 ttl pk-yrs)     Types: Cigarettes     Start date: 9/14/1978     Last attempt to quit: 9/14/2018    Smokeless tobacco: Never    Tobacco comments:     1 pk every 2 weeks, " 4 a day   Substance Use Topics    Alcohol use: Not Currently     Comment: wine on occ    Drug use: No     Review of Systems   Constitutional:  Negative for fever.   HENT:  Negative for sore throat.    Respiratory:  Negative for shortness of breath.    Cardiovascular:  Positive for chest pain.   Gastrointestinal:  Negative for nausea.   Genitourinary:  Negative for dysuria.   Musculoskeletal:  Negative for back pain.   Skin:  Negative for rash.   Neurological:  Negative for weakness.   Hematological:  Does not bruise/bleed easily.   Psychiatric/Behavioral:  The patient is nervous/anxious.    All other systems reviewed and are negative.      Physical Exam     Initial Vitals [09/26/23 0936]   BP Pulse Resp Temp SpO2   (!) 168/79 82 16 98.1 °F (36.7 °C) (!) 94 %      MAP       --         Physical Exam    Nursing note and vitals reviewed.  Constitutional: She appears well-developed and well-nourished. No distress.   HENT:   Head: Normocephalic and atraumatic.   Mouth/Throat: Oropharynx is clear and moist.   Eyes: Conjunctivae and EOM are normal. Pupils are equal, round, and reactive to light.   Neck: Neck supple. No JVD present.   Normal range of motion.  Cardiovascular:  Normal rate, regular rhythm, normal heart sounds and intact distal pulses.           Pulmonary/Chest: Breath sounds normal.   Abdominal: Abdomen is soft. Bowel sounds are normal. She exhibits no distension. There is no abdominal tenderness. There is no rebound and no guarding.   Musculoskeletal:         General: No edema. Normal range of motion.      Cervical back: Normal range of motion and neck supple.     Neurological: She is alert and oriented to person, place, and time. She has normal strength. GCS score is 15. GCS eye subscore is 4. GCS verbal subscore is 5. GCS motor subscore is 6.   Skin: Skin is warm and dry. No rash noted.   Psychiatric: Her behavior is normal.   Anxious, tearful         ED Course   Procedures  Labs Reviewed   COMPREHENSIVE  METABOLIC PANEL - Abnormal; Notable for the following components:       Result Value    Glucose Level 243 (*)     Albumin Level 3.0 (*)     Globulin 4.5 (*)     Albumin/Globulin Ratio 0.7 (*)     All other components within normal limits   D DIMER, QUANTITATIVE - Abnormal; Notable for the following components:    D-Dimer 1.18 (*)     All other components within normal limits   TROPONIN I - Abnormal; Notable for the following components:    Troponin-I 0.059 (*)     All other components within normal limits   TROPONIN I - Abnormal; Notable for the following components:    Troponin-I 0.056 (*)     All other components within normal limits   EXTRA TUBES    Narrative:     The following orders were created for panel order EXTRA TUBES.  Procedure                               Abnormality         Status                     ---------                               -----------         ------                     Light Blue Top Hold[[id number]]                             In process                 Lavender Top Hold[[id number]]                               In process                 Gold Top Hold[[id number]]                                   In process                   Please view results for these tests on the individual orders.   LIGHT BLUE TOP HOLD   LAVENDER TOP HOLD   GOLD TOP HOLD   EXTRA TUBES    Narrative:     The following orders were created for panel order EXTRA TUBES.  Procedure                               Abnormality         Status                     ---------                               -----------         ------                     Light Blue Top Hold[[id number]]                             In process                 Lavender Top Hold[[id number]]                               In process                 Gold Top Hold[[id number]]                                   In process                   Please view results for these tests on the individual orders.   LIGHT BLUE TOP HOLD   LAVENDER TOP HOLD   GOLD TOP HOLD      EKG Readings: (Independently Interpreted)   Initial Reading: No STEMI. Rhythm: Normal Sinus Rhythm. Heart Rate: 76. Ectopy: No Ectopy. Conduction: Normal. ST Segments: Normal ST Segments. T Waves: Normal. Axis: Left Axis Deviation. Clinical Impression: Normal Sinus Rhythm     ECG Results              EKG 12-lead (In process)  Result time 09/26/23 09:38:17      In process by Interface, Lab In [facility] (09/26/23 09:38:17)                   Narrative:    Test Reason : R07.9,    Vent. Rate : 077 BPM     Atrial Rate : 077 BPM     P-R Int : 134 ms          QRS Dur : 102 ms      QT Int : 406 ms       P-R-T Axes : 066 -16 034 degrees     QTc Int : 459 ms    Normal sinus rhythm with sinus arrhythmia  Voltage criteria for left ventricular hypertrophy  Abnormal ECG  When compared with ECG of 13-SEP-2023 10:46,  Nonspecific T wave abnormality now evident in Inferior leads    Referred By:             Confirmed By:                                   Imaging Results              X-Ray Chest PA And Lateral (Final result)  Result time 09/26/23 10:32:42      Final result by Davon Thakkar MD (09/26/23 10:32:42)                   Impression:      No acute cardiopulmonary process identified.      Electronically signed by: Davon Thakkar  Date:    09/26/2023  Time:    10:32               Narrative:    EXAMINATION:  XR CHEST PA AND LATERAL    CLINICAL HISTORY:  Chest pain, unspecified    TECHNIQUE:  Two-view    COMPARISON:  September 13, 2023.    FINDINGS:  Cardiopericardial silhouette is within normal limits.  Interval essential resolution of bilateral interstitial opacities which likely were related to congestive process.  No new acute dense focal or segmental consolidation, congestion, pleural effusion or pneumothorax.                                       Medications   nitroGLYCERIN SL tablet 0.4 mg (0.4 mg Sublingual Given 9/26/23 1028)   metoprolol tartrate (LOPRESSOR) tablet 25 mg (25 mg Oral Given 9/26/23 1028)   ALPRAZolam  tablet 0.5 mg (0.5 mg Oral Given 9/26/23 1028)   ketorolac injection 15 mg (15 mg Intravenous Given 9/26/23 1224)     Medical Decision Making  Amount and/or Complexity of Data Reviewed  Labs: ordered. Decision-making details documented in ED Course.     Details: D Dimer elevated but not significantly as compared to the value on March 2023. Without tachycardia or hypoxia is less likely an acute PE for which will hold on CTA    Troponin at pts baseline.   Radiology: ordered and independent interpretation performed. Decision-making details documented in ED Course.  ECG/medicine tests: ordered and independent interpretation performed. Decision-making details documented in ED Course.    Risk  Prescription drug management.      Additional MDM:   Differential Diagnosis:   Miocardial infarction, pneumothorax, aortic dissection, pulmonary emboli, pneumonia, among others         Well's Criteria Score:  -Clinical symptoms of DVT (leg swelling, pain with palpation) = 0.0  -PE is #1 diagnosis OR equally likely =            0.0  -Heart Rate >100 =   0.0  -Immobilization (= or > than 3 days) or surgery in the previous 4 weeks = 1.5  -Previous DVT/PE = 0.0  -Hemoptysis =          0.0  -Malignancy =           0.0  Well's Probability Score =    1.5                              Clinical Impression:   Final diagnoses:  [R07.9] Chest pain  [R07.89] Atypical chest pain (Primary)  [I10] Uncontrolled hypertension        ED Disposition Condition    Discharge Stable          ED Prescriptions       Medication Sig Dispense Start Date End Date Auth. Provider    diclofenac (VOLTAREN) 75 MG EC tablet Take 1 tablet (75 mg total) by mouth 2 (two) times daily as needed. 15 tablet 9/26/2023 -- Bello Iglesias MD    cyclobenzaprine (FLEXERIL) 5 MG tablet Take 1 tablet (5 mg total) by mouth 3 (three) times daily as needed for Muscle spasms. 15 tablet 9/26/2023 -- Bello Iglesias MD          Follow-up Information       Follow  up With Specialties Details Why Contact Info    Purvi Rai FNP Nurse Practitioner In 2 weeks  [address] [facility]  Internal Medicine [facility] [address]  [phone]      [facility] Emergency Medicine  If symptoms worsen [address] [facility] [address] [phone]             Bello Iglesias MD  09/26/23 [id number]       Bello Iglesias MD  09/26/23 9436

## 2023-09-28 ENCOUNTER — OFFICE VISIT (OUTPATIENT)
Dept: INTERNAL MEDICINE | Facility: CLINIC | Age: 63
End: 2023-09-28
Payer: MEDICARE

## 2023-09-28 VITALS
SYSTOLIC BLOOD PRESSURE: 180 MMHG | HEIGHT: 66 IN | HEART RATE: 76 BPM | BODY MASS INDEX: 41.25 KG/M2 | WEIGHT: 256.63 LBS | TEMPERATURE: 98 F | DIASTOLIC BLOOD PRESSURE: 82 MMHG | RESPIRATION RATE: 18 BRPM

## 2023-09-28 DIAGNOSIS — N64.4 BREAST PAIN, RIGHT: ICD-10-CM

## 2023-09-28 PROBLEM — N61.1 BREAST ABSCESS: Status: ACTIVE | Noted: 2023-09-28

## 2023-09-28 PROCEDURE — 3077F SYST BP >= 140 MM HG: CPT | Mod: CPTII,,, | Performed by: NURSE PRACTITIONER

## 2023-09-28 PROCEDURE — 1159F PR MEDICATION LIST DOCUMENTED IN MEDICAL RECORD: ICD-10-PCS | Mod: CPTII,,, | Performed by: NURSE PRACTITIONER

## 2023-09-28 PROCEDURE — 3079F DIAST BP 80-89 MM HG: CPT | Mod: CPTII,,, | Performed by: NURSE PRACTITIONER

## 2023-09-28 PROCEDURE — 4010F PR ACE/ARB THEARPY RXD/TAKEN: ICD-10-PCS | Mod: CPTII,,, | Performed by: NURSE PRACTITIONER

## 2023-09-28 PROCEDURE — 99214 PR OFFICE/OUTPT VISIT, EST, LEVL IV, 30-39 MIN: ICD-10-PCS | Mod: S$PBB,,, | Performed by: NURSE PRACTITIONER

## 2023-09-28 PROCEDURE — 3062F POS MACROALBUMINURIA REV: CPT | Mod: CPTII,,, | Performed by: NURSE PRACTITIONER

## 2023-09-28 PROCEDURE — 1160F PR REVIEW ALL MEDS BY PRESCRIBER/CLIN PHARMACIST DOCUMENTED: ICD-10-PCS | Mod: CPTII,,, | Performed by: NURSE PRACTITIONER

## 2023-09-28 PROCEDURE — 1159F MED LIST DOCD IN RCRD: CPT | Mod: CPTII,,, | Performed by: NURSE PRACTITIONER

## 2023-09-28 PROCEDURE — 99214 OFFICE O/P EST MOD 30 MIN: CPT | Mod: S$PBB,,, | Performed by: NURSE PRACTITIONER

## 2023-09-28 PROCEDURE — 3066F NEPHROPATHY DOC TX: CPT | Mod: CPTII,,, | Performed by: NURSE PRACTITIONER

## 2023-09-28 PROCEDURE — 3008F PR BODY MASS INDEX (BMI) DOCUMENTED: ICD-10-PCS | Mod: CPTII,,, | Performed by: NURSE PRACTITIONER

## 2023-09-28 PROCEDURE — 4010F ACE/ARB THERAPY RXD/TAKEN: CPT | Mod: CPTII,,, | Performed by: NURSE PRACTITIONER

## 2023-09-28 PROCEDURE — 1160F RVW MEDS BY RX/DR IN RCRD: CPT | Mod: CPTII,,, | Performed by: NURSE PRACTITIONER

## 2023-09-28 PROCEDURE — 3052F HG A1C>EQUAL 8.0%<EQUAL 9.0%: CPT | Mod: CPTII,,, | Performed by: NURSE PRACTITIONER

## 2023-09-28 PROCEDURE — 3066F PR DOCUMENTATION OF TREATMENT FOR NEPHROPATHY: ICD-10-PCS | Mod: CPTII,,, | Performed by: NURSE PRACTITIONER

## 2023-09-28 PROCEDURE — 3062F PR POS MACROALBUMINURIA RESULT DOCUMENTED/REVIEW: ICD-10-PCS | Mod: CPTII,,, | Performed by: NURSE PRACTITIONER

## 2023-09-28 PROCEDURE — 3008F BODY MASS INDEX DOCD: CPT | Mod: CPTII,,, | Performed by: NURSE PRACTITIONER

## 2023-09-28 PROCEDURE — 3077F PR MOST RECENT SYSTOLIC BLOOD PRESSURE >= 140 MM HG: ICD-10-PCS | Mod: CPTII,,, | Performed by: NURSE PRACTITIONER

## 2023-09-28 PROCEDURE — 3052F PR MOST RECENT HEMOGLOBIN A1C LEVEL 8.0 - < 9.0%: ICD-10-PCS | Mod: CPTII,,, | Performed by: NURSE PRACTITIONER

## 2023-09-28 PROCEDURE — 3079F PR MOST RECENT DIASTOLIC BLOOD PRESSURE 80-89 MM HG: ICD-10-PCS | Mod: CPTII,,, | Performed by: NURSE PRACTITIONER

## 2023-09-28 PROCEDURE — 99215 OFFICE O/P EST HI 40 MIN: CPT | Mod: PBBFAC | Performed by: NURSE PRACTITIONER

## 2023-09-28 RX ORDER — HYDRALAZINE HYDROCHLORIDE 25 MG/1
25 TABLET, FILM COATED ORAL 3 TIMES DAILY
COMMUNITY
Start: 2023-09-20 | End: 2024-02-29 | Stop reason: SDUPTHER

## 2023-09-28 RX ORDER — PREGABALIN 200 MG/1
200 CAPSULE ORAL 2 TIMES DAILY
COMMUNITY
Start: 2023-09-05

## 2023-09-28 RX ORDER — DOXYCYCLINE 100 MG/1
100 CAPSULE ORAL EVERY 12 HOURS
Qty: 20 CAPSULE | Refills: 0 | Status: SHIPPED | OUTPATIENT
Start: 2023-09-28 | End: 2023-10-08

## 2023-09-28 NOTE — PROGRESS NOTES
"  Purvi Rai, VIRGEN   [facility]  [address] [facility] [address]      PATIENT NAME: Elba Barnes  : 1960  DATE: 23  MRN: [id number]      Reason for Visit / Chief Complaint: Breast Pain (R br pain since 2023 after mammogram)       History of Present Illness / Problem Focused Workflow     Elba Barnes is a 63 y.o. Black or  female presents to the clinic for right breast pain. PMH uncontrolled DM, HTN, CAD, CHF, DM polyneuropathy, CVA w/right sided deficits (2021), MI, mood disorder (inpt admits x 3), TBI (), lumbar stenosis w/sciatica, noncompliance, GERD, osteoarthritis of multiple sites, tobacco abuse, and morbid obesity. Followed by Dr. Hong, cardio, Marga [place], psych, and [facility].     Pt reported to ED on 23 with c/o intermittent chest pain since mammogram in . Pt was informed to f/u with PCP. Reports ongoing throbbing upper, outer lateral and axillary right breast pain, onset after MMG in . Upon further investigation, reports had pressed on her breast to try and help alleviate some of the pain and a clear discharge was present. Pt continued to "press" on the breast at least every other day and now reports the discharge smoky gray color. Pt's , Saad, present during visit today. BP elevated; reports took bp meds this am. Reports bp is elevated d/t pain. Declines vaccines today. Denies shortness of breath, cough, fever, headache, dizziness, weakness, abdominal pain, nausea, vomiting, diarrhea, constipation, dysuria, SI/HI.    Review of Systems     Review of Systems     See HPI for details    Constitutional: Denies Change in appetite. Denies Chills. Denies Fever. Denies Night sweats.  Eye: Denies Blurred vision. Denies Discharge. Denies Eye pain.  ENT: Denies Decreased hearing. Denies Sore throat. Denies Swollen glands.  Respiratory: Denies Cough. " "Denies Shortness of breath. Denies Shortness of breath with exertion. Denies Wheezing.  Cardiovascular: Denies Chest pain at rest. Denies Chest pain with exertion. Denies Irregular heartbeat. Denies Palpitations. Denies Edema.  Gastrointestinal: Denies Abdominal pain. Denies Diarrhea. Denies Nausea. Denies Vomiting. Denies Hematemesis or Hematochezia.  Genitourinary: Denies Dysuria. Denies Urinary frequency. Denies Urinary urgency. Denies Blood in urine.  Endocrine: Denies Cold intolerance. Denies Excessive thirst. Denies Heat intolerance. Denies Weight loss. Denies Weight gain.  Integumentary: Denies Rash. Denies Itching. Denies Dry skin.  Neurologic: Denies Dizziness. Denies Fainting. Denies Headache.  Psychiatric: Denies Suicidal/Homicidal ideations.    All Other ROS: Negative except as stated in HPI.     Medical / Surgical / Social / Family History       ----------------------------  Anxiety  Arthritis  CVA (cerebral vascular accident)      Comment:  "mini stroke"  Depression  Diabetes mellitus  Heart problem  History of psychiatric hospitalization      Comment:  three(1983, 1995 and another (years ago")): depression  HTN (hypertension)  Hx of psychiatric care  Hypertension  Pacemaker  Psychiatric exam requested by authority  Psychiatric problem  TBI (traumatic brain injury)  Therapy     Past Surgical History:   Procedure Laterality Date    CARDIAC PACEMAKER PLACEMENT      CARDIAC PACEMAKER REMOVAL      CHOLECYSTECTOMY      COLONOSCOPY, WITH POLYPECTOMY USING SNARE Left 8/25/2023    Procedure: COLONOSCOPY, WITH POLYPECTOMY USING SNARE;  Surgeon: Yany Davis MD;  Location: [facility] ENDOSCOPY;  Service: Gastroenterology;  Laterality: Left;    HYSTERECTOMY         Social History     Socioeconomic History    Marital status: Single    Number of children: 5   Tobacco Use    Smoking status: Every Day     Current packs/day: 0.50     Average packs/day: (0.4 ttl pk-yrs)     Types: Cigarettes     Start date: " 9/14/1978     Last attempt to quit: 9/14/2018    Smokeless tobacco: Never    Tobacco comments:     1 pk every 2 weeks, 5 a day   Substance and Sexual Activity    Alcohol use: Not Currently     Comment: wine on occ    Drug use: No    Sexual activity: Never   Other Topics Concern    Patient feels they ought to cut down on drinking/drug use No    Patient annoyed by others criticizing their drinking/drug use No    Patient has felt bad or guilty about drinking/drug use No    Patient has had a drink/used drugs as an eye opener in the AM No   Social History Narrative    ** Merged History Encounter **          Social Determinants of Health     Financial Resource Strain: Low Risk  (7/7/2023)    Overall Financial Resource Strain (CARDIA)     Difficulty of Paying Living Expenses: Not hard at all   Food Insecurity: No Food Insecurity (7/7/2023)    Hunger Vital Sign     Worried About Running Out of Food in the Last Year: Never true     Ran Out of Food in the Last Year: Never true   Transportation Needs: No Transportation Needs (7/7/2023)    PRAPARE - Transportation     Lack of Transportation (Medical): No     Lack of Transportation (Non-Medical): No   Physical Activity: Inactive (7/7/2023)    Exercise Vital Sign     Days of Exercise per Week: 0 days     Minutes of Exercise per Session: 0 min   Stress: No Stress Concern Present (7/7/2023)    [employer] [place] [employer] - Occupational Stress Questionnaire     Feeling of Stress : Only a little   Social Connections: Moderately Isolated (7/7/2023)    Social Connection and Isolation Panel [NHANES]     Frequency of Communication with Friends and Family: More than three times a week     Frequency of Social Gatherings with Friends and Family: More than three times a week     Attends [employer] Services: 1 to 4 times per year     Active Member of Clubs or Organizations: No     Attends Club or Organization Meetings: Never     Marital Status: Never    Housing Stability:  Low Risk  (7/7/2023)    Housing Stability Vital Sign     Unable to Pay for Housing in the Last Year: No     Number of Places Lived in the Last Year: 1     Unstable Housing in the Last Year: No        Family History   Problem Relation Age of Onset    Schizophrenia Mother     Bipolar disorder Mother     Bipolar disorder Father         Medications and Allergies     Medications  Current Outpatient Medications   Medication Instructions    (Magic mouthwash) 1:1:1 diphenhydrAMINE(Benadryl) 12.5mg/5ml liq, aluminum & magnesium hydroxide-simethicone (Maalox), LIDOcaine viscous 2% 10 mLs, Swish & Spit, Every 4 hours PRN, for mouth sores    albuterol (PROVENTIL/VENTOLIN HFA) 90 mcg/actuation inhaler 1-2 puffs, Inhalation, Every 6 hours PRN, Rescue    albuterol-ipratropium (DUO-NEB) 2.5 mg-0.5 mg/3 mL nebulizer solution 3 mLs, Nebulization, Every 6 hours while awake, Rescue    ALPRAZolam (XANAX) 1 MG tablet Take 1 tablet by mouth once a day as needed as needed for anxiety    ammonium lactate (LAC-HYDRIN) 12 % lotion Apply to dry skin areas on feet, legs, and elbow areas TWICE A DAY AS NEEDED    aspirin (ECOTRIN) 81 mg, Oral, Daily    atorvastatin (LIPITOR) 40 mg, Oral, Nightly    butalbital-acetaminophen-caffeine -40 mg (FIORICET, ESGIC) -40 mg per tablet 1 tablet, Oral, Every 6 hours PRN    carvediloL (COREG) 3.125 mg, Oral, 2 times daily    cholecalciferol, vitamin D3, (VITAMIN D3) 50 mcg (2,000 unit) Cap capsule 1 capsule, Oral    cyclobenzaprine (FLEXERIL) 5 mg, Oral, 3 times daily PRN    diaper,brief,adult,disposable Misc 1 each, Misc.(Non-Drug; Combo Route), Every 6 hours PRN    diclofenac (VOLTAREN) 75 mg, Oral, 2 times daily PRN    docusate sodium (COLACE) 250 mg, Oral, 2 times daily    doxepin (SILENOR) 6 mg Tab 1 tablet, Oral, Nightly    doxycycline (VIBRAMYCIN) 100 mg, Oral, Every 12 hours    DULoxetine (CYMBALTA) 60 mg, Oral, Every morning    EScitalopram oxalate (LEXAPRO) 10 mg, Oral    esomeprazole  "(NEXIUM) 40 mg, Oral, Every morning    fluticasone furoate-vilanteroL (BREO ELLIPTA) 200-25 mcg/dose DsDv diskus inhaler 1 puff, Inhalation, Daily, Controller    fluticasone propionate (FLONASE) 50 mcg, Each Nostril, 2 times daily PRN    gabapentin (NEURONTIN) 600 mg, Oral, 3 times daily    glipiZIDE (GLUCOTROL) 10 mg, Oral    hydrALAZINE (APRESOLINE) 25 mg, Oral, 3 times daily    insulin (LANTUS SOLOSTAR U-100 INSULIN) 50 Units, Subcutaneous, 2 times daily before meals, Adjust doses based on cbg    linaCLOtide (LINZESS) 145 mcg, Oral, Before breakfast    magnesium citrate solution 296 mLs, Oral, Daily PRN    multivitamin (THERAGRAN) per tablet 1 tablet, Oral, Daily    NIFEdipine (PROCARDIA-XL) 90 mg, Oral, Daily    nitroGLYCERIN (NITROSTAT) 0.4 mg, Sublingual, Every 5 min PRN    NOVOLOG FLEXPEN U-100 INSULIN 100 unit/mL (3 mL) InPn pen Inject 20 Units into the skin 3 (three) times daily with meals. DISCARD OPEN PEN AFTER 28 DAYS    nystatin (MYCOSTATIN) cream Apply topically 3 (three) times daily. Use under breast    ONETOUCH VERIO TEST STRIPS Strp 4 times daily    oxybutynin (DITROPAN-XL) 5 mg, Oral, Daily    polyethylene glycol (GLYCOLAX) 17 g, Oral, Daily    pregabalin (LYRICA) 200 mg, Oral, 2 times daily    sertraline (ZOLOFT) 50 mg, Oral, Daily    SURE COMFORT PEN NEEDLE 32 gauge x 5/32" Ndle USE 1 pen needle TO inject insulin TWICE DAILY BEFORE meals AS DIRECTED by prescriber    terconazole (TERAZOL 7) 0.4 % Crea 1 applicator, Vaginal, Nightly    valsartan (DIOVAN) 320 mg, Oral         Allergies  Review of patient's allergies indicates:   Allergen Reactions    Pcn [penicillins] Anaphylaxis       Physical Examination     BP (!) 180/82 (BP Location: Left arm, Patient Position: Sitting, BP Method: Large (Manual))   Pulse 76   Temp 98.3 °F (36.8 °C) (Oral)   Resp 18   Ht 5' 5.98" (1.676 m)   Wt 116.4 kg (256 lb 9.6 oz)   BMI 41.44 kg/m²     Physical Exam  Chest:   Breasts:     Right: Tenderness present. "             General: Alert and oriented, No acute distress.  Head: Normocephalic, Atraumatic.  Eye: Pupils are equal, round and reactive to light, Extraocular movements are intact, Sclera non-icteric.  Ears/Nose/Throat: Normal, Mucosa moist,Clear.  Neck/Thyroid: Supple, Non-tender, No carotid bruit, No lymphadenopathy, No JVD, Full range of motion.  Respiratory: Clear to auscultation bilaterally; No wheezes, rales or rhonchi,Non-labored respirations, Symmetrical chest wall expansion.  Cardiovascular: Regular rate and rhythm, S1/S2 normal, No murmurs, rubs or gallops.  Gastrointestinal: Soft, Non-tender, Non-distended, Normal bowel sounds, No palpable organomegaly.  Integumentary: Warm, Dry, Intact, No suspicious lesions or rashes.  Extremities: No clubbing, cyanosis or edema  Neurologic: No focal deficits, Cranial Nerves II-XII are grossly intact, Motor strength normal upper and lower extremities, Sensory exam intact.  Psychiatric: Normal interaction, Coherent speech, Appropriate affect       Results     Lab Results   Component Value Date    WBC 7.50 09/13/2023    HGB 14.7 09/13/2023    HCT 45.9 09/13/2023     09/13/2023    CHOL 155 07/08/2023    TRIG 268 (H) 07/08/2023    ALT 11 09/26/2023    AST 16 09/26/2023     09/26/2023    K 3.9 09/26/2023     03/24/2022    CREATININE 0.88 09/26/2023    BUN 13.4 09/26/2023    CO2 25 09/26/2023    TSH 0.858 07/06/2023    INR 0.9 08/23/2023    HGBA1C 8.4 (H) 07/06/2023         Assessment and Plan (including Health Maintenance)     Plan:     1. Breast pain, right  Rx doxycycline BID x 10 days.  Complete all abx as prescribed.   Rt Dx MMG and US ordered.  Encouraged to keep when scheduled.  - Mammo Digital Diagnostic Right; Future  - US Breast Right Complete; Future      Problem List Items Addressed This Visit          Renal/    Breast pain, right    Relevant Orders    Mammo Digital Diagnostic Right    US Breast Right Complete         Health Maintenance Due    Topic Date Due    Hepatitis C Screening  Never done    COVID-19 Vaccine (1) Never done    Eye Exam  Never done    HIV Screening  Never done    Shingles Vaccine (1 of 2) Never done    Pneumococcal Vaccines (Age 0-64) (2 - PCV) 05/09/2017    Influenza Vaccine (1) Never done    Hemoglobin A1c  10/06/2023       Follow up if symptoms worsen or fail to improve, for Keep appt as scheduled..        Signature:  VIRGEN Hoyt  [facility]  [address] [facility] [address]

## 2023-10-09 DIAGNOSIS — G43.901 MIGRAINE WITH STATUS MIGRAINOSUS, NOT INTRACTABLE, UNSPECIFIED MIGRAINE TYPE: ICD-10-CM

## 2023-10-09 PROBLEM — I63.9 CEREBROVASCULAR ACCIDENT (CVA): Status: RESOLVED | Noted: 2023-07-07 | Resolved: 2023-10-09

## 2023-10-11 ENCOUNTER — HOSPITAL ENCOUNTER (EMERGENCY)
Facility: HOSPITAL | Age: 63
Discharge: HOME | End: 2023-10-11
Attending: EMERGENCY MEDICINE
Payer: MEDICARE

## 2023-10-11 VITALS
HEIGHT: 66 IN | BODY MASS INDEX: 34.72 KG/M2 | RESPIRATION RATE: 18 BRPM | DIASTOLIC BLOOD PRESSURE: 81 MMHG | OXYGEN SATURATION: 96 % | HEART RATE: 97 BPM | WEIGHT: 216.06 LBS | SYSTOLIC BLOOD PRESSURE: 160 MMHG

## 2023-10-11 DIAGNOSIS — E11.65 HYPERGLYCEMIA DUE TO DIABETES MELLITUS: ICD-10-CM

## 2023-10-11 DIAGNOSIS — R52 PAIN: ICD-10-CM

## 2023-10-11 DIAGNOSIS — R07.89 CHEST WALL PAIN: Primary | ICD-10-CM

## 2023-10-11 LAB
ALBUMIN SERPL-MCNC: 3.4 G/DL (ref 3.4–4.8)
ALBUMIN/GLOB SERPL: 0.7 RATIO (ref 1.1–2)
ALP SERPL-CCNC: 84 UNIT/L (ref 40–150)
ALT SERPL-CCNC: 20 UNIT/L (ref 0–55)
AST SERPL-CCNC: 21 UNIT/L (ref 5–34)
BASOPHILS # BLD AUTO: 0.04 X10(3)/MCL
BASOPHILS NFR BLD AUTO: 0.5 %
BILIRUB SERPL-MCNC: 0.4 MG/DL
BUN SERPL-MCNC: 12.4 MG/DL (ref 9.8–20.1)
CALCIUM SERPL-MCNC: 9.3 MG/DL (ref 8.4–10.2)
CHLORIDE SERPL-SCNC: 100 MMOL/L (ref 98–107)
CO2 SERPL-SCNC: 27 MMOL/L (ref 23–31)
CREAT SERPL-MCNC: 1.04 MG/DL (ref 0.55–1.02)
EOSINOPHIL # BLD AUTO: 0.08 X10(3)/MCL (ref 0–0.9)
EOSINOPHIL NFR BLD AUTO: 1 %
ERYTHROCYTE [DISTWIDTH] IN BLOOD BY AUTOMATED COUNT: 14.8 % (ref 11.5–17)
GFR SERPLBLD CREATININE-BSD FMLA CKD-EPI: >60 MLS/MIN/1.73/M2
GLOBULIN SER-MCNC: 4.6 GM/DL (ref 2.4–3.5)
GLUCOSE SERPL-MCNC: 395 MG/DL (ref 82–115)
HCT VFR BLD AUTO: 47.2 % (ref 37–47)
HGB BLD-MCNC: 15.6 G/DL (ref 12–16)
IMM GRANULOCYTES # BLD AUTO: 0.03 X10(3)/MCL (ref 0–0.04)
IMM GRANULOCYTES NFR BLD AUTO: 0.4 %
LYMPHOCYTES # BLD AUTO: 2.13 X10(3)/MCL (ref 0.6–4.6)
LYMPHOCYTES NFR BLD AUTO: 26.6 %
MCH RBC QN AUTO: 28.5 PG (ref 27–31)
MCHC RBC AUTO-ENTMCNC: 33.1 G/DL (ref 33–36)
MCV RBC AUTO: 86.1 FL (ref 80–94)
MONOCYTES # BLD AUTO: 0.47 X10(3)/MCL (ref 0.1–1.3)
MONOCYTES NFR BLD AUTO: 5.9 %
NEUTROPHILS # BLD AUTO: 5.26 X10(3)/MCL (ref 2.1–9.2)
NEUTROPHILS NFR BLD AUTO: 65.6 %
NRBC BLD AUTO-RTO: 0 %
PLATELET # BLD AUTO: 389 X10(3)/MCL (ref 130–400)
PMV BLD AUTO: 9.6 FL (ref 7.4–10.4)
POTASSIUM SERPL-SCNC: 3.8 MMOL/L (ref 3.5–5.1)
PROT SERPL-MCNC: 8 GM/DL (ref 5.8–7.6)
RBC # BLD AUTO: 5.48 X10(6)/MCL (ref 4.2–5.4)
SODIUM SERPL-SCNC: 140 MMOL/L (ref 136–145)
TROPONIN I SERPL-MCNC: 0.07 NG/ML (ref 0–0.04)
WBC # SPEC AUTO: 8.01 X10(3)/MCL (ref 4.5–11.5)

## 2023-10-11 PROCEDURE — 84484 ASSAY OF TROPONIN QUANT: CPT | Performed by: EMERGENCY MEDICINE

## 2023-10-11 PROCEDURE — 99285 EMERGENCY DEPT VISIT HI MDM: CPT | Mod: 25

## 2023-10-11 PROCEDURE — 93010 ELECTROCARDIOGRAM REPORT: CPT | Mod: ,,, | Performed by: INTERNAL MEDICINE

## 2023-10-11 PROCEDURE — 96374 THER/PROPH/DIAG INJ IV PUSH: CPT

## 2023-10-11 PROCEDURE — 85025 COMPLETE CBC W/AUTO DIFF WBC: CPT | Performed by: EMERGENCY MEDICINE

## 2023-10-11 PROCEDURE — 80053 COMPREHEN METABOLIC PANEL: CPT | Performed by: EMERGENCY MEDICINE

## 2023-10-11 PROCEDURE — 93010 EKG 12-LEAD: ICD-10-PCS | Mod: ,,, | Performed by: INTERNAL MEDICINE

## 2023-10-11 PROCEDURE — 93005 ELECTROCARDIOGRAM TRACING: CPT

## 2023-10-11 PROCEDURE — 63600175 PHARM REV CODE 636 W HCPCS: Performed by: EMERGENCY MEDICINE

## 2023-10-11 RX ORDER — KETOROLAC TROMETHAMINE 30 MG/ML
30 INJECTION, SOLUTION INTRAMUSCULAR; INTRAVENOUS
Status: COMPLETED | OUTPATIENT
Start: 2023-10-11 | End: 2023-10-11

## 2023-10-11 RX ADMIN — KETOROLAC TROMETHAMINE 30 MG: 30 INJECTION, SOLUTION INTRAMUSCULAR; INTRAVENOUS at 01:10

## 2023-10-11 NOTE — ED PROVIDER NOTES
"Encounter Date: 10/11/2023       History     Chief Complaint   Patient presents with    Chest Pain     Intermittant cp for several weeks     63-year-old female comes to emergency room stating she has had constant pain to the right side of her chest since her right breast was pinched by a mammogram she had 8 months ago.  She states there is never a moment when she is pain-free, but when she lays on her right side the pain is worse.  She has had numerous evaluations for her chest pain and was told there is nothing wrong with her heart or her lungs.  She states she came to the ER today because she wants to know what is wrong with her chest.  She denies cough cold fevers chills sweats rhinorrhea sore throat nausea vomiting and diarrhea.      Review of patient's allergies indicates:   Allergen Reactions    Pcn [penicillins] Anaphylaxis     Past Medical History:   Diagnosis Date    Anxiety     Arthritis     CVA (cerebral vascular accident)     "mini stroke"    Depression     Diabetes mellitus     Heart problem     History of psychiatric hospitalization     three(1983, 1995 and another (years ago")): depression    HTN (hypertension)     Hx of psychiatric care     Hypertension     Pacemaker     Psychiatric exam requested by authority     Psychiatric problem     TBI (traumatic brain injury)     Therapy      Past Surgical History:   Procedure Laterality Date    CARDIAC PACEMAKER PLACEMENT      CARDIAC PACEMAKER REMOVAL      CHOLECYSTECTOMY      COLONOSCOPY, WITH POLYPECTOMY USING SNARE Left 8/25/2023    Procedure: COLONOSCOPY, WITH POLYPECTOMY USING SNARE;  Surgeon: Yany Davis MD;  Location: [facility] ENDOSCOPY;  Service: Gastroenterology;  Laterality: Left;    HYSTERECTOMY       Family History   Problem Relation Age of Onset    Schizophrenia Mother     Bipolar disorder Mother     Bipolar disorder Father      Social History     Tobacco Use    Smoking status: Every Day     Current packs/day: 0.50     Average packs/day: " (0.4 ttl pk-yrs)     Types: Cigarettes     Start date: 9/14/1978     Last attempt to quit: 9/14/2018    Smokeless tobacco: Never    Tobacco comments:     1 pk every 2 weeks, 5 a day   Substance Use Topics    Alcohol use: Not Currently     Comment: wine on occ    Drug use: No     Review of Systems   All other systems reviewed and are negative.      Physical Exam     Initial Vitals [10/11/23 1147]   BP Pulse Resp Temp SpO2   (!) 175/106 97 18 -- 96 %      MAP       --         Physical Exam    Nursing note and vitals reviewed.  Constitutional: She appears well-developed. No distress.   HENT:   Mouth/Throat: Oropharynx is clear and moist.   Eyes: Conjunctivae are normal.   Cardiovascular:  Normal rate, regular rhythm and normal heart sounds.     Exam reveals no gallop and no friction rub.       No murmur heard.  Pulmonary/Chest: Breath sounds normal. No respiratory distress. She has no wheezes. She has no rhonchi. She has no rales.   Palpation of the right lateral breast  reproduces the chest pain.  The breast has no mass, erythema, ecchymosis, swelling, wound, nor other abnormality.   Abdominal: Abdomen is soft. Bowel sounds are normal. She exhibits no distension. There is no abdominal tenderness. There is no guarding.   Musculoskeletal:         General: No edema.     Lymphadenopathy:     She has no cervical adenopathy.   Neurological: She is alert. She displays a negative Romberg sign. Coordination and gait normal. GCS eye subscore is 4. GCS verbal subscore is 5. GCS motor subscore is 6.   Skin: Skin is warm.   Psychiatric: She has a normal mood and affect.         ED Course   Procedures  Labs Reviewed   COMPREHENSIVE METABOLIC PANEL - Abnormal; Notable for the following components:       Result Value    Glucose Level 395 (*)     Creatinine 1.04 (*)     Protein Total 8.0 (*)     Globulin 4.6 (*)     Albumin/Globulin Ratio 0.7 (*)     All other components within normal limits   TROPONIN I - Abnormal; Notable for the  following components:    Troponin-I 0.065 (*)     All other components within normal limits   CBC WITH DIFFERENTIAL - Abnormal; Notable for the following components:    RBC 5.48 (*)     Hct 47.2 (*)     All other components within normal limits   CBC W/ AUTO DIFFERENTIAL    Narrative:     The following orders were created for panel order CBC auto differential.  Procedure                               Abnormality         Status                     ---------                               -----------         ------                     CBC with Differential[[id number]]       Abnormal            Final result                 Please view results for these tests on the individual orders.     EKG Readings: (Independently Interpreted)   EKG time 11:48 a.m. rhythm normal sinus rate 86 axis left QRS shows left ventricular hypertrophy by Jerry criteria nonspecific ST changes QTC is normal     ECG Results              EKG 12-lead (In process)  Result time 10/11/23 12:33:36      In process by Interface, Lab In [facility] (10/11/23 12:33:36)                   Narrative:    Test Reason : R52,    Vent. Rate : 086 BPM     Atrial Rate : 086 BPM     P-R Int : 132 ms          QRS Dur : 102 ms      QT Int : 354 ms       P-R-T Axes : 056 -33 123 degrees     QTc Int : 423 ms    Normal sinus rhythm  Possible Left atrial enlargement  Left axis deviation  LVH with repolarization abnormality  Cannot rule out Septal infarct ,age undetermined  Abnormal ECG  No previous ECGs available    Referred By: [email or web address]   SELF           Confirmed By:                                   Imaging Results              X-Ray Chest 1 View (Final result)  Result time 10/11/23 12:41:43      Final result by Mauri Morris MD (10/11/23 12:41:43)                   Impression:      No acute findings or significant interval change.      Electronically signed by: Mauri Morris  Date:    10/11/2023  Time:    12:41               Narrative:    EXAMINATION:  XR CHEST 1  VIEW    CLINICAL HISTORY:  Pain, unspecified    COMPARISON:  26 September 2023    FINDINGS:  Portable frontal view of the chest was obtained. The heart is not significantly enlarged.  Similar mild interstitial prominence.  No new focal consolidation or pneumothorax.                                       Medications - No data to display  Medical Decision Making  Differential diagnosis:  Breast contusion, pneumothorax, aortic aneurysm, aortic dissection, pulmonary embolus, anxiety    Amount and/or Complexity of Data Reviewed  Independent Historian: caregiver  External Data Reviewed: labs and notes.     Details: In epic  Patient's troponin is consistently at the level that it is today.  There is no indication to repeat a 2 hour troponin.  Labs: ordered. Decision-making details documented in ED Course.  Radiology: ordered.               ED Course as of 10/11/23 1317   Wed Oct 11, 2023   1250 Troponin I(!): 0.065 [RL]   1250 Creatinine(!): 1.04 [RL]   1250 Glucose(!): 395 [RL]   1250 WBC: 8.01 [RL]   1250 Hemoglobin: 15.6 [RL]   1250 Hematocrit(!): 47.2 [RL]   1250 Platelet Count: 389 [RL]      ED Course User Index  [RL] Sarabjit Steele Jr., MD                    Clinical Impression:   Final diagnoses:  [R52] Pain  [R07.89] Chest wall pain (Primary)  [E11.65] Hyperglycemia due to diabetes mellitus        ED Disposition Condition    Discharge Stable          ED Prescriptions    None       Follow-up Information       Follow up With Specialties Details Why Contact Info    Purvi Rai FNP Nurse Practitioner In 2 days  [address] [facility]  Internal Medicine Clinic  [facility] [address]  [phone]               Sarabjit Steele Jr., MD  10/11/23 0796

## 2023-10-20 RX ORDER — INSULIN GLARGINE 100 [IU]/ML
INJECTION, SOLUTION SUBCUTANEOUS
Qty: 60 ML | Refills: 3 | Status: SHIPPED | OUTPATIENT
Start: 2023-10-20 | End: 2024-03-16

## 2023-10-20 RX ORDER — BUTALBITAL, ACETAMINOPHEN AND CAFFEINE 50; 325; 40 MG/1; MG/1; MG/1
1 TABLET ORAL EVERY 6 HOURS PRN
Qty: 30 TABLET | Refills: 2 | Status: SHIPPED | OUTPATIENT
Start: 2023-10-20

## 2023-11-06 ENCOUNTER — HOSPITAL ENCOUNTER (EMERGENCY)
Facility: HOSPITAL | Age: 63
Discharge: HOME | End: 2023-11-06
Attending: STUDENT IN AN ORGANIZED HEALTH CARE EDUCATION/TRAINING PROGRAM
Payer: MEDICARE

## 2023-11-06 VITALS
RESPIRATION RATE: 20 BRPM | SYSTOLIC BLOOD PRESSURE: 187 MMHG | BODY MASS INDEX: 36.96 KG/M2 | WEIGHT: 230 LBS | TEMPERATURE: 98 F | DIASTOLIC BLOOD PRESSURE: 93 MMHG | OXYGEN SATURATION: 99 % | HEIGHT: 66 IN | HEART RATE: 85 BPM

## 2023-11-06 DIAGNOSIS — R07.9 CHRONIC CHEST PAIN: Primary | ICD-10-CM

## 2023-11-06 DIAGNOSIS — R73.9 HYPERGLYCEMIA: ICD-10-CM

## 2023-11-06 DIAGNOSIS — I10 HYPERTENSION, UNSPECIFIED TYPE: ICD-10-CM

## 2023-11-06 DIAGNOSIS — G89.29 CHRONIC CHEST PAIN: Primary | ICD-10-CM

## 2023-11-06 DIAGNOSIS — R07.9 CHEST PAIN: ICD-10-CM

## 2023-11-06 LAB
ALBUMIN SERPL-MCNC: 3 G/DL (ref 3.4–4.8)
ALBUMIN/GLOB SERPL: 0.6 RATIO (ref 1.1–2)
ALP SERPL-CCNC: 80 UNIT/L (ref 40–150)
ALT SERPL-CCNC: 23 UNIT/L (ref 0–55)
AST SERPL-CCNC: 18 UNIT/L (ref 5–34)
BASOPHILS # BLD AUTO: 0.03 X10(3)/MCL
BASOPHILS NFR BLD AUTO: 0.3 %
BILIRUB SERPL-MCNC: 0.4 MG/DL
BNP BLD-MCNC: 97.6 PG/ML
BUN SERPL-MCNC: 14 MG/DL (ref 9.8–20.1)
CALCIUM SERPL-MCNC: 9.5 MG/DL (ref 8.4–10.2)
CHLORIDE SERPL-SCNC: 99 MMOL/L (ref 98–107)
CO2 SERPL-SCNC: 26 MMOL/L (ref 23–31)
CREAT SERPL-MCNC: 1.12 MG/DL (ref 0.55–1.02)
EOSINOPHIL # BLD AUTO: 0.08 X10(3)/MCL (ref 0–0.9)
EOSINOPHIL NFR BLD AUTO: 0.9 %
ERYTHROCYTE [DISTWIDTH] IN BLOOD BY AUTOMATED COUNT: 14.7 % (ref 11.5–17)
GFR SERPLBLD CREATININE-BSD FMLA CKD-EPI: 56 MLS/MIN/1.73/M2
GLOBULIN SER-MCNC: 4.8 GM/DL (ref 2.4–3.5)
GLUCOSE SERPL-MCNC: 455 MG/DL (ref 82–115)
HCT VFR BLD AUTO: 47.3 % (ref 37–47)
HGB BLD-MCNC: 15.9 G/DL (ref 12–16)
HOLD SPECIMEN: NORMAL
IMM GRANULOCYTES # BLD AUTO: 0.05 X10(3)/MCL (ref 0–0.04)
IMM GRANULOCYTES NFR BLD AUTO: 0.5 %
INR PPP: 0.9
LYMPHOCYTES # BLD AUTO: 2.2 X10(3)/MCL (ref 0.6–4.6)
LYMPHOCYTES NFR BLD AUTO: 23.4 %
MAGNESIUM SERPL-MCNC: 1.8 MG/DL (ref 1.6–2.6)
MCH RBC QN AUTO: 28.8 PG (ref 27–31)
MCHC RBC AUTO-ENTMCNC: 33.6 G/DL (ref 33–36)
MCV RBC AUTO: 85.5 FL (ref 80–94)
MONOCYTES # BLD AUTO: 0.44 X10(3)/MCL (ref 0.1–1.3)
MONOCYTES NFR BLD AUTO: 4.7 %
NEUTROPHILS # BLD AUTO: 6.6 X10(3)/MCL (ref 2.1–9.2)
NEUTROPHILS NFR BLD AUTO: 70.2 %
NRBC BLD AUTO-RTO: 0 %
PLATELET # BLD AUTO: 323 X10(3)/MCL (ref 130–400)
PMV BLD AUTO: 10 FL (ref 7.4–10.4)
POCT GLUCOSE: 342 MG/DL (ref 70–110)
POTASSIUM SERPL-SCNC: 4 MMOL/L (ref 3.5–5.1)
PROT SERPL-MCNC: 7.8 GM/DL (ref 5.8–7.6)
PROTHROMBIN TIME: 12.2 SECONDS (ref 11.4–14)
RBC # BLD AUTO: 5.53 X10(6)/MCL (ref 4.2–5.4)
SODIUM SERPL-SCNC: 136 MMOL/L (ref 136–145)
TROPONIN I SERPL-MCNC: 0.12 NG/ML (ref 0–0.04)
TROPONIN I SERPL-MCNC: 0.12 NG/ML (ref 0–0.04)
WBC # SPEC AUTO: 9.4 X10(3)/MCL (ref 4.5–11.5)

## 2023-11-06 PROCEDURE — 63600175 PHARM REV CODE 636 W HCPCS: Performed by: STUDENT IN AN ORGANIZED HEALTH CARE EDUCATION/TRAINING PROGRAM

## 2023-11-06 PROCEDURE — 85025 COMPLETE CBC W/AUTO DIFF WBC: CPT | Performed by: STUDENT IN AN ORGANIZED HEALTH CARE EDUCATION/TRAINING PROGRAM

## 2023-11-06 PROCEDURE — 84484 ASSAY OF TROPONIN QUANT: CPT | Performed by: STUDENT IN AN ORGANIZED HEALTH CARE EDUCATION/TRAINING PROGRAM

## 2023-11-06 PROCEDURE — 83735 ASSAY OF MAGNESIUM: CPT | Performed by: STUDENT IN AN ORGANIZED HEALTH CARE EDUCATION/TRAINING PROGRAM

## 2023-11-06 PROCEDURE — 93005 ELECTROCARDIOGRAM TRACING: CPT

## 2023-11-06 PROCEDURE — 85610 PROTHROMBIN TIME: CPT | Performed by: STUDENT IN AN ORGANIZED HEALTH CARE EDUCATION/TRAINING PROGRAM

## 2023-11-06 PROCEDURE — 83880 ASSAY OF NATRIURETIC PEPTIDE: CPT | Performed by: STUDENT IN AN ORGANIZED HEALTH CARE EDUCATION/TRAINING PROGRAM

## 2023-11-06 PROCEDURE — 96372 THER/PROPH/DIAG INJ SC/IM: CPT | Performed by: STUDENT IN AN ORGANIZED HEALTH CARE EDUCATION/TRAINING PROGRAM

## 2023-11-06 PROCEDURE — 80053 COMPREHEN METABOLIC PANEL: CPT | Performed by: STUDENT IN AN ORGANIZED HEALTH CARE EDUCATION/TRAINING PROGRAM

## 2023-11-06 PROCEDURE — 99285 EMERGENCY DEPT VISIT HI MDM: CPT | Mod: 25

## 2023-11-06 RX ORDER — HYDRALAZINE HYDROCHLORIDE 20 MG/ML
10 INJECTION INTRAMUSCULAR; INTRAVENOUS
Status: DISCONTINUED | OUTPATIENT
Start: 2023-11-06 | End: 2023-11-06 | Stop reason: HOSPADM

## 2023-11-06 RX ORDER — HYDRALAZINE HYDROCHLORIDE 20 MG/ML
20 INJECTION INTRAMUSCULAR; INTRAVENOUS
Status: DISCONTINUED | OUTPATIENT
Start: 2023-11-06 | End: 2023-11-06

## 2023-11-06 RX ORDER — MORPHINE SULFATE 2 MG/ML
4 INJECTION, SOLUTION INTRAMUSCULAR; INTRAVENOUS
Status: DISCONTINUED | OUTPATIENT
Start: 2023-11-06 | End: 2023-11-06

## 2023-11-06 RX ORDER — MORPHINE SULFATE 2 MG/ML
4 INJECTION, SOLUTION INTRAMUSCULAR; INTRAVENOUS
Status: COMPLETED | OUTPATIENT
Start: 2023-11-06 | End: 2023-11-06

## 2023-11-06 RX ORDER — TRAMADOL HYDROCHLORIDE 50 MG/1
50 TABLET ORAL EVERY 6 HOURS PRN
Qty: 12 TABLET | Refills: 0 | Status: SHIPPED | OUTPATIENT
Start: 2023-11-06 | End: 2023-12-04 | Stop reason: ALTCHOICE

## 2023-11-06 RX ADMIN — HUMAN INSULIN 4 UNITS: 100 INJECTION, SOLUTION SUBCUTANEOUS at 02:11

## 2023-11-06 RX ADMIN — MORPHINE SULFATE 4 MG: 2 INJECTION, SOLUTION INTRAMUSCULAR; INTRAVENOUS at 02:11

## 2023-11-06 NOTE — DISCHARGE INSTRUCTIONS
Please take your medications as prescribed and follow up with the primary care physician for further evaluation.  Return to the ER with any new or worsening symptoms.

## 2023-11-06 NOTE — ED PROVIDER NOTES
"Encounter Date: 11/6/2023       History     Chief Complaint   Patient presents with    Chest Pain     Pt presents to the ed (via ems) c/o chest pain that started a "few hours ago". Also states she took 3 SL nitro pills prior to arrival, nausea, sob and rates pain 10/10 at this time. Vss. 12 lead ekg obtained.     Patient presents to the emergency department due to right-sided chest pain.  This has been going on for 6 months per patient.  She was describes it as a squeezing in her right breast.  She reports the mammogram was normal.  She is been seen in our ER multiple times as well as [facility] ER and has been admitted for an NSTEMI.  There she recently had a catheterization at the end of October that showed clear coronary arteries.  She also had a CTA at that time that showed no PE.  She was here because the pain has continued and she was having a difficult time sleeping.  She states it worse since when she takes a deep breath.    The history is provided by the patient.     Review of patient's allergies indicates:   Allergen Reactions    Pcn [penicillins] Anaphylaxis     Past Medical History:   Diagnosis Date    Anxiety     Arthritis     CVA (cerebral vascular accident)     "mini stroke"    Depression     Diabetes mellitus     Heart problem     History of psychiatric hospitalization     three(1983, 1995 and another (years ago")): depression    HTN (hypertension)     Hx of psychiatric care     Hypertension     Pacemaker     Psychiatric exam requested by authority     Psychiatric problem     TBI (traumatic brain injury)     Therapy      Past Surgical History:   Procedure Laterality Date    CARDIAC PACEMAKER PLACEMENT      CARDIAC PACEMAKER REMOVAL      CHOLECYSTECTOMY      COLONOSCOPY, WITH POLYPECTOMY USING SNARE Left 8/25/2023    Procedure: COLONOSCOPY, WITH POLYPECTOMY USING SNARE;  Surgeon: Yany Davis MD;  Location: [facility] ENDOSCOPY;  Service: Gastroenterology;  Laterality: Left;    HYSTERECTOMY   "     Family History   Problem Relation Age of Onset    Schizophrenia Mother     Bipolar disorder Mother     Bipolar disorder Father      Social History     Tobacco Use    Smoking status: Every Day     Current packs/day: 0.50     Average packs/day: (0.4 ttl pk-yrs)     Types: Cigarettes     Start date: 9/14/1978     Last attempt to quit: 9/14/2018    Smokeless tobacco: Never    Tobacco comments:     1 pk every 2 weeks, 5 a day   Substance Use Topics    Alcohol use: Not Currently     Comment: wine on occ    Drug use: No     Review of Systems   Constitutional:  Negative for chills and fever.   HENT:  Negative for congestion and sore throat.    Respiratory:  Negative for cough and shortness of breath.    Cardiovascular:  Positive for chest pain. Negative for palpitations.   Gastrointestinal:  Negative for abdominal pain and nausea.   Genitourinary:  Negative for dysuria and hematuria.   Musculoskeletal:  Negative for arthralgias and myalgias.   Neurological:  Negative for dizziness and weakness.       Physical Exam     Initial Vitals [11/06/23 1107]   BP Pulse Resp Temp SpO2   (!) 169/93 83 (!) 22 98.3 °F (36.8 °C) 95 %      MAP       --         Physical Exam    Nursing note and vitals reviewed.  Constitutional: She appears well-developed and well-nourished.   HENT:   Head: Normocephalic and atraumatic.   Eyes: EOM are normal. Pupils are equal, round, and reactive to light.   Neck: Neck supple.   Normal range of motion.  Cardiovascular:  Normal rate, regular rhythm and normal heart sounds.           Pulmonary/Chest: Breath sounds normal. No respiratory distress. She has no wheezes.   Abdominal: Abdomen is soft. There is no abdominal tenderness.   Musculoskeletal:         General: No edema. Normal range of motion.      Cervical back: Normal range of motion and neck supple.     Neurological: She is alert and oriented to person, place, and time.   Skin: Skin is warm and dry.         ED Course   Procedures  Labs Reviewed    COMPREHENSIVE METABOLIC PANEL - Abnormal; Notable for the following components:       Result Value    Glucose Level 455 (*)     Creatinine 1.12 (*)     Protein Total 7.8 (*)     Albumin Level 3.0 (*)     Globulin 4.8 (*)     Albumin/Globulin Ratio 0.6 (*)     All other components within normal limits   TROPONIN I - Abnormal; Notable for the following components:    Troponin-I 0.115 (*)     All other components within normal limits   CBC WITH DIFFERENTIAL - Abnormal; Notable for the following components:    RBC 5.53 (*)     Hct 47.3 (*)     IG# 0.05 (*)     All other components within normal limits   TROPONIN I - Abnormal; Notable for the following components:    Troponin-I 0.115 (*)     All other components within normal limits   POCT GLUCOSE - Abnormal; Notable for the following components:    POCT Glucose 342 (*)     All other components within normal limits   B-TYPE NATRIURETIC PEPTIDE - Normal   MAGNESIUM - Normal   PROTIME-INR - Normal   CBC W/ AUTO DIFFERENTIAL    Narrative:     The following orders were created for panel order CBC auto differential.  Procedure                               Abnormality         Status                     ---------                               -----------         ------                     CBC with Differential[[id number]]       Abnormal            Final result                 Please view results for these tests on the individual orders.   EXTRA TUBES    Narrative:     The following orders were created for panel order EXTRA TUBES.  Procedure                               Abnormality         Status                     ---------                               -----------         ------                     Gold Top Hold[[id number]]                                   Final result               Pink Top Hold[[id number]]                                   Final result                 Please view results for these tests on the individual orders.   GOLD TOP HOLD   PINK TOP HOLD   EXTRA  TUBES    Narrative:     The following orders were created for panel order EXTRA TUBES.  Procedure                               Abnormality         Status                     ---------                               -----------         ------                     Lavender Top Hold[[id number]]                               In process                   Please view results for these tests on the individual orders.   LAVENDER TOP HOLD     EKG Readings: (Independently Interpreted)   Initial Reading: No STEMI. Rhythm: Normal Sinus Rhythm. Heart Rate: 83. Ectopy: No Ectopy. Conduction: Normal. Axis: Left Axis Deviation.     ECG Results              EKG 12-lead (Chest Pain) Age >30 (In process)  Result time 11/06/23 11:16:04      In process by Interface, Lab In [facility] (11/06/23 11:16:04)                   Narrative:    Test Reason : R07.9,    Vent. Rate : 083 BPM     Atrial Rate : 083 BPM     P-R Int : 136 ms          QRS Dur : 100 ms      QT Int : 408 ms       P-R-T Axes : 060 -31 100 degrees     QTc Int : 479 ms    Normal sinus rhythm  Left axis deviation  LVH with repolarization abnormality  Abnormal ECG  When compared with ECG of 11-OCT-2023 11:48,  QT has lengthened    Referred By:             Confirmed By:                                   Imaging Results              X-Ray Chest AP Portable (Final result)  Result time 11/06/23 13:54:02      Final result by Enmanuel Orellana MD (11/06/23 13:54:02)                   Impression:      Increase in interstitial and pulmonary vascular markings may indicate a degree of pulmonary vascular congestion.    No other significant change      Electronically signed by: Enmanuel Orellana  Date:    11/06/2023  Time:    13:54               Narrative:    EXAMINATION:  XR CHEST AP PORTABLE    CLINICAL HISTORY:  Chest pain;    TECHNIQUE:  Single frontal view of the chest was performed.    COMPARISON:  October 11, 2023.    FINDINGS:  Examination reveals mediastinal cardiac silhouette to be  within normal limits there is increase in interstitial and pulmonary vascular markings indicating some degree of pulmonary vascular congestion and cardiac decompensation.    No focal consolidative changes are otherwise identified                                       Medications   hydrALAZINE injection 10 mg (has no administration in time range)   insulin regular injection 4 Units 0.04 mL (4 Units Subcutaneous Given 11/6/23 1424)   morphine injection 4 mg (4 mg Intramuscular Given 11/6/23 1423)     Medical Decision Making  Mildly hypertensive otherwise vital signs stable.  EKGs without acute ischemic changes.  CBC is generally unremarkable.  CMP shows significant hyperglycemia above 400 but no evidence of an anion gap or DKA.  Troponin is mildly elevated at 0.115, reviewing patient was started patient always has a elevated troponin, delta troponin was unchanged from previous.  Patient recently had a heart catheterization that showed clear coronary arteries, recently had a CT of the showed no evidence of PE or pneumonia for the same pain has been going on for 6 months.  She would improved after IM morphine.  Blood sugar improved after subQ insulin to 300s.  Appropriate for discharge and follow up with the primary care physician, return precautions were given.    Amount and/or Complexity of Data Reviewed  Labs: ordered. Decision-making details documented in ED Course.  Radiology: ordered. Decision-making details documented in ED Course.  ECG/medicine tests: ordered and independent interpretation performed. Decision-making details documented in ED Course.    Risk  OTC drugs.  Prescription drug management.                               Clinical Impression:   Final diagnoses:  [R07.9] Chest pain  [R07.9, G89.29] Chronic chest pain (Primary)  [I10] Hypertension, unspecified type  [R73.9] Hyperglycemia        ED Disposition Condition    Discharge Stable          ED Prescriptions       Medication Sig Dispense Start Date End  Date Auth. Provider    traMADoL (ULTRAM) 50 mg tablet Take 1 tablet (50 mg total) by mouth every 6 (six) hours as needed for Pain. 12 tablet 11/6/2023 -- Rich Oakes MD          Follow-up Information       Follow up With Specialties Details Why Contact Info    Purvi Rai FNP Nurse Practitioner Call  For ED follow up [address] [facility]  Internal Medicine Clinic  [facility] [address]  [phone]      [facility] Emergency Medicine Go to  If symptoms worsen [address] [facility] [id number] [phone]             Rich Oakes MD  11/06/23 3581

## 2023-11-08 ENCOUNTER — PATIENT MESSAGE (OUTPATIENT)
Dept: INTERNAL MEDICINE | Facility: CLINIC | Age: 63
End: 2023-11-08
Payer: MEDICARE

## 2023-11-13 ENCOUNTER — HOSPITAL ENCOUNTER (OUTPATIENT)
Dept: RADIOLOGY | Facility: HOSPITAL | Age: 63
Discharge: HOME | End: 2023-11-13
Attending: NURSE PRACTITIONER
Payer: MEDICARE

## 2023-11-13 ENCOUNTER — DOCUMENTATION ONLY (OUTPATIENT)
Dept: RADIOLOGY | Facility: HOSPITAL | Age: 63
End: 2023-11-13
Payer: MEDICARE

## 2023-11-13 DIAGNOSIS — N64.4 BREAST PAIN, RIGHT: ICD-10-CM

## 2023-11-13 PROCEDURE — 76642 US BREAST RIGHT LIMITED: ICD-10-PCS | Mod: 26,RT,, | Performed by: RADIOLOGY

## 2023-11-13 PROCEDURE — 76642 ULTRASOUND BREAST LIMITED: CPT | Mod: 26,RT,, | Performed by: RADIOLOGY

## 2023-11-13 PROCEDURE — 76642 ULTRASOUND BREAST LIMITED: CPT | Mod: TC,RT

## 2023-11-13 NOTE — PROGRESS NOTES
Patient presented to mammography today for diagnostic mammogram and ultrasound to evaluate right breast pain. Patient refused diagnostic mammogram to mammography tech stating mammogram intolerable given breast pain. She stated mammogram in January is the cause of the breast problems she is experiencing today, and she stated she came today for ultrasound only. RN discussed reason for diagnostic work up with patient, and patient ultimately refused diagnostic mammogram requesting to proceed with ultrasound only. RN let patient know we would proceed with ultrasound only today. Radiologist notified, and patient was brought to ultrasound room by ultrasound tech.

## 2023-11-27 PROBLEM — K92.2 ACUTE LOWER GI BLEEDING: Status: RESOLVED | Noted: 2023-08-23 | Resolved: 2023-11-27

## 2023-12-04 ENCOUNTER — HOSPITAL ENCOUNTER (EMERGENCY)
Facility: HOSPITAL | Age: 63
Discharge: HOME | End: 2023-12-04
Attending: INTERNAL MEDICINE
Payer: MEDICARE

## 2023-12-04 ENCOUNTER — TELEPHONE (OUTPATIENT)
Dept: INTERNAL MEDICINE | Facility: CLINIC | Age: 63
End: 2023-12-04
Payer: MEDICARE

## 2023-12-04 VITALS
BODY MASS INDEX: 39.18 KG/M2 | RESPIRATION RATE: 20 BRPM | OXYGEN SATURATION: 98 % | TEMPERATURE: 98 F | HEIGHT: 66 IN | SYSTOLIC BLOOD PRESSURE: 150 MMHG | WEIGHT: 243.81 LBS | DIASTOLIC BLOOD PRESSURE: 76 MMHG | HEART RATE: 83 BPM

## 2023-12-04 DIAGNOSIS — G90.50 COMPLEX REGIONAL PAIN SYNDROME TYPE 1, AFFECTING UNSPECIFIED SITE: Primary | ICD-10-CM

## 2023-12-04 PROCEDURE — 63600175 PHARM REV CODE 636 W HCPCS: Performed by: STUDENT IN AN ORGANIZED HEALTH CARE EDUCATION/TRAINING PROGRAM

## 2023-12-04 PROCEDURE — 25000003 PHARM REV CODE 250: Performed by: STUDENT IN AN ORGANIZED HEALTH CARE EDUCATION/TRAINING PROGRAM

## 2023-12-04 PROCEDURE — 96372 THER/PROPH/DIAG INJ SC/IM: CPT | Performed by: STUDENT IN AN ORGANIZED HEALTH CARE EDUCATION/TRAINING PROGRAM

## 2023-12-04 PROCEDURE — 99284 EMERGENCY DEPT VISIT MOD MDM: CPT

## 2023-12-04 RX ORDER — HYDROCODONE BITARTRATE AND ACETAMINOPHEN 5; 325 MG/1; MG/1
1 TABLET ORAL EVERY 6 HOURS PRN
Qty: 12 TABLET | Refills: 0 | Status: SHIPPED | OUTPATIENT
Start: 2023-12-04 | End: 2023-12-07

## 2023-12-04 RX ORDER — HYDROCODONE BITARTRATE AND ACETAMINOPHEN 10; 325 MG/1; MG/1
1 TABLET ORAL
Status: COMPLETED | OUTPATIENT
Start: 2023-12-04 | End: 2023-12-04

## 2023-12-04 RX ORDER — HYDRALAZINE HYDROCHLORIDE 20 MG/ML
20 INJECTION INTRAMUSCULAR; INTRAVENOUS ONCE
Status: COMPLETED | OUTPATIENT
Start: 2023-12-04 | End: 2023-12-04

## 2023-12-04 RX ADMIN — HYDROCODONE BITARTRATE AND ACETAMINOPHEN 1 TABLET: 10; 325 TABLET ORAL at 04:12

## 2023-12-04 RX ADMIN — HYDRALAZINE HYDROCHLORIDE 20 MG: 20 INJECTION INTRAMUSCULAR; INTRAVENOUS at 04:12

## 2023-12-04 NOTE — FIRST PROVIDER EVALUATION
Medical screening examination initiated.  I have conducted a focused provider triage encounter, findings are as follows:    Brief history of present illness:  4 day hx of pain to R axilla. Reports her home health nurse told her it was an abscess. BP significantly elevated. Reports taking home meds.    There were no vitals filed for this visit.    Pertinent physical exam:  No abscess appreciated. Patient very sensitive to even light touch to R axilla.    Brief workup plan:  Defer to provider that can perform more in depth physical exam.    Preliminary workup initiated; this workup will be continued and followed by the physician or advanced practice provider that is assigned to the patient when roomed.

## 2023-12-04 NOTE — ED PROVIDER NOTES
"Encounter Date: 12/4/2023       History     Chief Complaint   Patient presents with    axilla pain     Right axilla pain x4 days and was told she has an abscess.Skin appears normal without redness or swelling. Hypertensive 225/111. States she took her bp meds today.      Ms. Barnes is a 63 y.o. 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 Patient reports "burning hot" pain to right chest/back/axilla on and off for 10 months. Constant, unrelenting and not relieved by PRN Tramadol at home. Worse with touch, no relieving factors. Pain keeps her up at night. Denies substernal pain/pressure, denies SOB/CALDERA, dizziness, diaphoresis, n/v, abdominal pain. States she was told by her HH nurse she may have an abscess under her axilla, denies rash or skin changes, fever or chills.     Patient reports compliance with medications and caregiver at bedside corroborating, states she took all BP medications today. BP has been high in the past, but much higher today attributed to severe pain.    The history is provided by the patient. No  was used.     Review of patient's allergies indicates:   Allergen Reactions    Pcn [penicillins] Anaphylaxis     Past Medical History:   Diagnosis Date    Anxiety     Arthritis     CVA (cerebral vascular accident)     "mini stroke"    Depression     Diabetes mellitus     Heart problem     History of psychiatric hospitalization     three(1983, 1995 and another (years ago")): depression    HTN (hypertension)     Hx of psychiatric care     Hypertension     Pacemaker     Psychiatric exam requested by authority     Psychiatric problem     TBI (traumatic brain injury)     Therapy      Past Surgical History: "   Procedure Laterality Date    CARDIAC PACEMAKER PLACEMENT      CARDIAC PACEMAKER REMOVAL      CHOLECYSTECTOMY      COLONOSCOPY, WITH POLYPECTOMY USING SNARE Left 8/25/2023    Procedure: COLONOSCOPY, WITH POLYPECTOMY USING SNARE;  Surgeon: Yany Davis MD;  Location: [facility] ENDOSCOPY;  Service: Gastroenterology;  Laterality: Left;    HYSTERECTOMY       Family History   Problem Relation Age of Onset    Schizophrenia Mother     Bipolar disorder Mother     Bipolar disorder Father      Social History     Tobacco Use    Smoking status: Every Day     Current packs/day: 0.50     Average packs/day: (0.5 ttl pk-yrs)     Types: Cigarettes     Start date: 9/14/1978     Last attempt to quit: 9/14/2018    Smokeless tobacco: Never    Tobacco comments:     1 pk every 2 weeks, 5 a day   Substance Use Topics    Alcohol use: Not Currently     Comment: wine on occ    Drug use: No     Review of Systems   Constitutional:  Negative for chills, diaphoresis, fatigue and fever.   Respiratory:  Negative for choking, chest tightness, shortness of breath and wheezing.    Cardiovascular:  Negative for chest pain, palpitations and leg swelling.   Gastrointestinal:  Negative for abdominal pain, constipation, diarrhea, nausea and vomiting.   Genitourinary:  Negative for dysuria.   Musculoskeletal:  Positive for arthralgias (right hip pain) and back pain. Negative for myalgias.   Skin:  Negative for color change, rash and wound.        Pain to entire right side chest/back/shoulder/axilla worse with physical contact   Neurological:  Negative for dizziness, syncope, light-headedness and headaches.   All other systems reviewed and are negative.      Physical Exam     Initial Vitals [12/04/23 1515]   BP Pulse Resp Temp SpO2   (!) 225/111 82 20 97.5 °F (36.4 °C) (!) 94 %      MAP       --         Physical Exam    Nursing note and vitals reviewed.  Constitutional: She appears well-developed and well-nourished. She is not diaphoretic. She appears  distressed.   obese   HENT:   Head: Normocephalic and atraumatic.   Mouth/Throat: No oropharyngeal exudate.   Eyes: Conjunctivae and EOM are normal. Pupils are equal, round, and reactive to light.   Neck: Neck supple. No JVD present.   Normal range of motion.  Cardiovascular:  Normal rate, regular rhythm, normal heart sounds and intact distal pulses.     Exam reveals no gallop and no friction rub.       No murmur heard.  Pulmonary/Chest: Breath sounds normal. No respiratory distress. She has no wheezes.   Abdominal: Abdomen is soft. Bowel sounds are normal. She exhibits no distension. There is no abdominal tenderness.   Musculoskeletal:         General: No edema. Normal range of motion.      Cervical back: Normal range of motion and neck supple.     Neurological: She is alert and oriented to person, place, and time.   Strength in RUE 4/5, LUE 5/5, BLE 5/5  Hyperesthesia upon light touch to right thorax: chest, back, shoulder, axilla    Skin: Skin is warm and dry. Capillary refill takes less than 2 seconds. No rash and no abscess noted. No erythema.         ED Course   Procedures  Labs Reviewed - No data to display       Imaging Results    None          Medications   HYDROcodone-acetaminophen  mg per tablet 1 tablet (1 tablet Oral Given 12/4/23 1605)   hydrALAZINE injection 20 mg (20 mg Intramuscular Given 12/4/23 1606)     Medical Decision Making  Ddx:1. Hyperesthesia, complex regional pain syndrome, diabetic neuropathy 2. hypertensive urgency, hypertensive emergency, uncontrolled hypertension    1610: Pt's pain improving with Norco, BP improving with hydralazine 20 mg IM, 171/92.      1724: BP now 149/93. Pain greatly improved now 4/10. Okay for dispo home with short course of pain medication, f/u with PCP.    Amount and/or Complexity of Data Reviewed  Independent Historian: caregiver     Details:  with patient  External Data Reviewed: labs, radiology, ECG and notes.    Risk  Prescription drug  management.              Attending Attestation:   Physician Attestation Statement for Resident:  As the supervising MD   Physician Attestation Statement: I have personally seen and examined this patient.   I agree with the above history.  -:   As the supervising MD I agree with the above PE.     As the supervising MD I agree with the above treatment, course, plan, and disposition.    I have reviewed and agree with the residents interpretation of the following: EKG, x-rays and lab data.  I have reviewed the following: old records at this facility and CT reports.                                       Clinical Impression:  Final diagnoses:  [G90.50] Complex regional pain syndrome type 1, affecting unspecified site (Primary)          ED Disposition Condition    Discharge Stable          ED Prescriptions       Medication Sig Dispense Start Date End Date Auth. Provider    HYDROcodone-acetaminophen (NORCO) 5-325 mg per tablet Take 1 tablet by mouth every 6 (six) hours as needed for Pain. 12 tablet 12/4/2023 12/7/2023 Miko Chapin MD          Follow-up Information       Follow up With Specialties Details Why Contact Info    Purvi Rai FNP Nurse Practitioner Call in 1 day  [address] [facility]  Internal Medicine Clinic  [facility] [address]  [phone]      [facility] Emergency Medicine  As needed, If symptoms worsen [address] [facility] [id number] [phone]            Miko Chapin MD  [facility] IM PGY III     Miko Chapin MD  Resident  12/04/23 1730       Bello Iglesias MD  12/04/23 9006

## 2023-12-04 NOTE — TELEPHONE ENCOUNTER
Returned call to pt regarding message left in reference to breast. Awaiting return call from pt as per her voicemail stating her phone does not ring on her end and she will return all calls.

## 2023-12-11 ENCOUNTER — TELEPHONE (OUTPATIENT)
Dept: INTERNAL MEDICINE | Facility: CLINIC | Age: 63
End: 2023-12-11
Payer: MEDICARE

## 2023-12-11 DIAGNOSIS — E11.65 UNCONTROLLED TYPE 2 DIABETES MELLITUS WITH HYPERGLYCEMIA: Primary | Chronic | ICD-10-CM

## 2023-12-11 RX ORDER — INSULIN PUMP SYRINGE, 3 ML
EACH MISCELLANEOUS
Qty: 1 EACH | Refills: 0 | Status: SHIPPED | OUTPATIENT
Start: 2023-12-11

## 2023-12-11 RX ORDER — LANCETS
EACH MISCELLANEOUS
Qty: 100 EACH | Refills: 6 | Status: SHIPPED | OUTPATIENT
Start: 2023-12-11

## 2023-12-18 ENCOUNTER — HOSPITAL ENCOUNTER (EMERGENCY)
Facility: HOSPITAL | Age: 63
Discharge: HOME | End: 2023-12-18
Attending: EMERGENCY MEDICINE
Payer: MEDICARE

## 2023-12-18 ENCOUNTER — TELEPHONE (OUTPATIENT)
Dept: INTERNAL MEDICINE | Facility: CLINIC | Age: 63
End: 2023-12-18
Payer: MEDICARE

## 2023-12-18 VITALS
BODY MASS INDEX: 36.96 KG/M2 | HEIGHT: 66 IN | TEMPERATURE: 98 F | SYSTOLIC BLOOD PRESSURE: 176 MMHG | DIASTOLIC BLOOD PRESSURE: 116 MMHG | RESPIRATION RATE: 20 BRPM | OXYGEN SATURATION: 98 % | WEIGHT: 230 LBS | HEART RATE: 97 BPM

## 2023-12-18 DIAGNOSIS — N64.4 BREAST PAIN, RIGHT: Primary | ICD-10-CM

## 2023-12-18 DIAGNOSIS — N64.4 BREAST PAIN: ICD-10-CM

## 2023-12-18 DIAGNOSIS — R07.9 CHEST PAIN: ICD-10-CM

## 2023-12-18 LAB
ALBUMIN SERPL-MCNC: 3 G/DL (ref 3.4–4.8)
ALBUMIN/GLOB SERPL: 0.7 RATIO (ref 1.1–2)
ALP SERPL-CCNC: 80 UNIT/L (ref 40–150)
ALT SERPL-CCNC: 17 UNIT/L (ref 0–55)
AST SERPL-CCNC: 18 UNIT/L (ref 5–34)
BASOPHILS # BLD AUTO: 0.05 X10(3)/MCL
BASOPHILS NFR BLD AUTO: 0.5 %
BILIRUB SERPL-MCNC: 0.3 MG/DL
BNP BLD-MCNC: 35.9 PG/ML
BUN SERPL-MCNC: 14.2 MG/DL (ref 9.8–20.1)
CALCIUM SERPL-MCNC: 9 MG/DL (ref 8.4–10.2)
CHLORIDE SERPL-SCNC: 109 MMOL/L (ref 98–107)
CO2 SERPL-SCNC: 20 MMOL/L (ref 23–31)
CREAT SERPL-MCNC: 0.92 MG/DL (ref 0.55–1.02)
EOSINOPHIL # BLD AUTO: 0.11 X10(3)/MCL (ref 0–0.9)
EOSINOPHIL NFR BLD AUTO: 1.2 %
ERYTHROCYTE [DISTWIDTH] IN BLOOD BY AUTOMATED COUNT: 15.3 % (ref 11.5–17)
GFR SERPLBLD CREATININE-BSD FMLA CKD-EPI: >60 MLS/MIN/1.73/M2
GLOBULIN SER-MCNC: 4.4 GM/DL (ref 2.4–3.5)
GLUCOSE SERPL-MCNC: 263 MG/DL (ref 82–115)
HCT VFR BLD AUTO: 51.5 % (ref 37–47)
HGB BLD-MCNC: 16.6 G/DL (ref 12–16)
IMM GRANULOCYTES # BLD AUTO: 0.03 X10(3)/MCL (ref 0–0.04)
IMM GRANULOCYTES NFR BLD AUTO: 0.3 %
LYMPHOCYTES # BLD AUTO: 2.93 X10(3)/MCL (ref 0.6–4.6)
LYMPHOCYTES NFR BLD AUTO: 31.9 %
MCH RBC QN AUTO: 28.4 PG (ref 27–31)
MCHC RBC AUTO-ENTMCNC: 32.2 G/DL (ref 33–36)
MCV RBC AUTO: 88 FL (ref 80–94)
MONOCYTES # BLD AUTO: 0.58 X10(3)/MCL (ref 0.1–1.3)
MONOCYTES NFR BLD AUTO: 6.3 %
NEUTROPHILS # BLD AUTO: 5.48 X10(3)/MCL (ref 2.1–9.2)
NEUTROPHILS NFR BLD AUTO: 59.8 %
NRBC BLD AUTO-RTO: 0 %
PLATELET # BLD AUTO: 330 X10(3)/MCL (ref 130–400)
PLATELETS.RETICULATED NFR BLD AUTO: 3.6 % (ref 0.9–11.2)
PMV BLD AUTO: 10 FL (ref 7.4–10.4)
POTASSIUM SERPL-SCNC: 4.6 MMOL/L (ref 3.5–5.1)
PROT SERPL-MCNC: 7.4 GM/DL (ref 5.8–7.6)
RBC # BLD AUTO: 5.85 X10(6)/MCL (ref 4.2–5.4)
SODIUM SERPL-SCNC: 139 MMOL/L (ref 136–145)
TROPONIN I SERPL-MCNC: 0.06 NG/ML (ref 0–0.04)
TROPONIN I SERPL-MCNC: 0.07 NG/ML (ref 0–0.04)
WBC # SPEC AUTO: 9.18 X10(3)/MCL (ref 4.5–11.5)

## 2023-12-18 PROCEDURE — 84484 ASSAY OF TROPONIN QUANT: CPT | Performed by: PHYSICIAN ASSISTANT

## 2023-12-18 PROCEDURE — 96375 TX/PRO/DX INJ NEW DRUG ADDON: CPT | Mod: 59

## 2023-12-18 PROCEDURE — 63600175 PHARM REV CODE 636 W HCPCS: Performed by: EMERGENCY MEDICINE

## 2023-12-18 PROCEDURE — 25500020 PHARM REV CODE 255: Performed by: EMERGENCY MEDICINE

## 2023-12-18 PROCEDURE — 25000003 PHARM REV CODE 250: Performed by: EMERGENCY MEDICINE

## 2023-12-18 PROCEDURE — 80053 COMPREHEN METABOLIC PANEL: CPT | Performed by: PHYSICIAN ASSISTANT

## 2023-12-18 PROCEDURE — 84484 ASSAY OF TROPONIN QUANT: CPT | Performed by: EMERGENCY MEDICINE

## 2023-12-18 PROCEDURE — 83880 ASSAY OF NATRIURETIC PEPTIDE: CPT | Performed by: PHYSICIAN ASSISTANT

## 2023-12-18 PROCEDURE — 99285 EMERGENCY DEPT VISIT HI MDM: CPT | Mod: 25

## 2023-12-18 PROCEDURE — 85025 COMPLETE CBC W/AUTO DIFF WBC: CPT | Performed by: PHYSICIAN ASSISTANT

## 2023-12-18 PROCEDURE — 96374 THER/PROPH/DIAG INJ IV PUSH: CPT

## 2023-12-18 PROCEDURE — 93005 ELECTROCARDIOGRAM TRACING: CPT

## 2023-12-18 RX ORDER — DROPERIDOL 2.5 MG/ML
1.25 INJECTION, SOLUTION INTRAMUSCULAR; INTRAVENOUS
Status: COMPLETED | OUTPATIENT
Start: 2023-12-18 | End: 2023-12-18

## 2023-12-18 RX ORDER — MORPHINE SULFATE 4 MG/ML
4 INJECTION, SOLUTION INTRAMUSCULAR; INTRAVENOUS
Status: COMPLETED | OUTPATIENT
Start: 2023-12-18 | End: 2023-12-18

## 2023-12-18 RX ORDER — LABETALOL HYDROCHLORIDE 5 MG/ML
20 INJECTION, SOLUTION INTRAVENOUS
Status: COMPLETED | OUTPATIENT
Start: 2023-12-18 | End: 2023-12-18

## 2023-12-18 RX ORDER — ASPIRIN 325 MG
325 TABLET, DELAYED RELEASE (ENTERIC COATED) ORAL
Status: COMPLETED | OUTPATIENT
Start: 2023-12-18 | End: 2023-12-18

## 2023-12-18 RX ADMIN — ASPIRIN 325 MG: 325 TABLET, COATED ORAL at 05:12

## 2023-12-18 RX ADMIN — MORPHINE SULFATE 4 MG: 4 INJECTION, SOLUTION INTRAMUSCULAR; INTRAVENOUS at 05:12

## 2023-12-18 RX ADMIN — DROPERIDOL 1.25 MG: 2.5 INJECTION, SOLUTION INTRAMUSCULAR; INTRAVENOUS at 05:12

## 2023-12-18 RX ADMIN — IOPAMIDOL 100 ML: 755 INJECTION, SOLUTION INTRAVENOUS at 04:12

## 2023-12-18 RX ADMIN — LABETALOL HYDROCHLORIDE 20 MG: 5 INJECTION, SOLUTION INTRAVENOUS at 05:12

## 2023-12-18 NOTE — FIRST PROVIDER EVALUATION
"Medical screening examination initiated.  I have conducted a focused provider triage encounter, findings are as follows:    Chief Complaint   Patient presents with    Breast Pain     Patient reports right sided pain under breast radiating into right shoulder with "beige leakage" under breast since mammogram 9 months ago. No drainage noted in triage. Reports midline chest pain as well. Denies SOB, fevers, or chills.     Brief history of present illness:  63 y.o. female presents to the ED with pain under right breast with intermittent drainage for the last 9 months s/t mammogram. Also c/o midline chest pain onset this morning. Denies SOB, fever, chills     Vitals:    12/18/23 1202   BP: (!) 180/92   Pulse: 91   Resp: 18   Temp: 98.2 °F (36.8 °C)   TempSrc: Oral   SpO2: 97%   Weight: 104.3 kg (230 lb)   Height: 5' 6" (1.676 m)       Pertinent physical exam:  Awake, alert, ambulatory, non-labored respirations, no drainage     Brief workup plan:  labs, EKG, CXR    Preliminary workup initiated; this workup will be continued and followed by the physician or advanced practice provider that is assigned to the patient when roomed.  "

## 2023-12-18 NOTE — ED PROVIDER NOTES
"Encounter Date: 12/18/2023    SCRIBE #1 NOTE: I, Eunice Macias, am scribing for, and in the presence of,  Dmitry Bear MD. I have scribed the following portions of the note - Other sections scribed: HPI, ROS, PE, MDM.       History     Chief Complaint   Patient presents with    Breast Pain     Patient reports right sided pain under breast radiating into right shoulder with "beige leakage" under breast since mammogram 9 months ago. No drainage noted in triage. Reports midline chest pain as well. Denies SOB, fevers, or chills.     63 year old female with a history of DM, HTN, and CVA presents to ED complaining of right breast pain.  Pt says that it hurts under her right breast and to move her right arm.  She says the symptoms began after she had a mammogram in September.  She said that she had a normal US and that another doctor told her "it was all in her head."      Per chart review: She had an angiogram in October that showed patent coronary arteries.     The history is provided by the patient. No  was used.     Review of patient's allergies indicates:   Allergen Reactions    Pcn [penicillins] Anaphylaxis     Past Medical History:   Diagnosis Date    Anxiety     Arthritis     CVA (cerebral vascular accident)     "mini stroke"    Depression     Diabetes mellitus     Heart problem     History of psychiatric hospitalization     three(1983, 1995 and another (years ago")): depression    HTN (hypertension)     Hx of psychiatric care     Hypertension     Pacemaker     Psychiatric exam requested by authority     Psychiatric problem     TBI (traumatic brain injury)     Therapy      Past Surgical History:   Procedure Laterality Date    CARDIAC PACEMAKER PLACEMENT      CARDIAC PACEMAKER REMOVAL      CHOLECYSTECTOMY      COLONOSCOPY, WITH POLYPECTOMY USING SNARE Left 8/25/2023    Procedure: COLONOSCOPY, WITH POLYPECTOMY USING SNARE;  Surgeon: Yany Davis MD;  Location: [facility]" ENDOSCOPY;  Service: Gastroenterology;  Laterality: Left;    HYSTERECTOMY       Family History   Problem Relation Age of Onset    Schizophrenia Mother     Bipolar disorder Mother     Bipolar disorder Father      Social History     Tobacco Use    Smoking status: Every Day     Current packs/day: 0.50     Average packs/day: (0.5 ttl pk-yrs)     Types: Cigarettes     Start date: 9/14/1978     Last attempt to quit: 9/14/2018    Smokeless tobacco: Never    Tobacco comments:     1 pk every 2 weeks, 5 a day   Substance Use Topics    Alcohol use: Not Currently     Comment: wine on occ    Drug use: No     Review of Systems   Musculoskeletal:         Right scapula pain, right breast pain   Skin:  Negative for rash and wound.       Physical Exam     Initial Vitals [12/18/23 1202]   BP Pulse Resp Temp SpO2   (!) 180/92 91 18 98.2 °F (36.8 °C) 97 %      MAP       --         Physical Exam    Nursing note and vitals reviewed.  Constitutional: She appears well-developed. No distress.   HENT:   Head: Normocephalic and atraumatic.   Mouth/Throat: Oropharynx is clear and moist.   Eyes: Conjunctivae and EOM are normal. Pupils are equal, round, and reactive to light.   Neck: Neck supple.   Cardiovascular:  Normal rate and regular rhythm.           No murmur heard.  Pulmonary/Chest: Breath sounds normal. No respiratory distress. She exhibits no tenderness.   Abdominal: Abdomen is soft. Bowel sounds are normal. She exhibits no distension. There is no abdominal tenderness.   Musculoskeletal:         General: Normal range of motion.      Cervical back: Neck supple.      Lumbar back: Normal. Normal range of motion.      Comments: Right scapula pain and pain under right breast, however at one point in the exam she was moving her right arm freely without pain.  No signs of obvious trauma, rashes, or injury.     Neurological: She is alert and oriented to person, place, and time. She has normal strength. No cranial nerve deficit or sensory  deficit.   Psychiatric: She has a normal mood and affect. Judgment normal.         ED Course   Procedures  Labs Reviewed   COMPREHENSIVE METABOLIC PANEL - Abnormal; Notable for the following components:       Result Value    Chloride 109 (*)     Carbon Dioxide 20 (*)     Glucose Level 263 (*)     Albumin Level 3.0 (*)     Globulin 4.4 (*)     Albumin/Globulin Ratio 0.7 (*)     All other components within normal limits   TROPONIN I - Abnormal; Notable for the following components:    Troponin-I 0.063 (*)     All other components within normal limits   CBC WITH DIFFERENTIAL - Abnormal; Notable for the following components:    RBC 5.85 (*)     Hgb 16.6 (*)     Hct 51.5 (*)     MCHC 32.2 (*)     All other components within normal limits   TROPONIN I - Abnormal; Notable for the following components:    Troponin-I 0.069 (*)     All other components within normal limits   B-TYPE NATRIURETIC PEPTIDE - Normal   CBC W/ AUTO DIFFERENTIAL    Narrative:     The following orders were created for panel order CBC auto differential.  Procedure                               Abnormality         Status                     ---------                               -----------         ------                     CBC with Differential[[id number]]       Abnormal            Final result                 Please view results for these tests on the individual orders.          Imaging Results              CTA Chest Non-Coronary (PE Studies) (Final result)  Result time 12/18/23 16:57:58      Final result by Mauri Morris MD (12/18/23 16:57:58)                   Impression:      No pulmonary embolus seen.  No acute findings in the chest.      Electronically signed by: Mauri Morris  Date:    12/18/2023  Time:    16:57               Narrative:    EXAMINATION:  CTA CHEST NON CORONARY (PE STUDIES)    CLINICAL HISTORY:  Pulmonary embolism (PE) suspected, high prob;    TECHNIQUE:  Helical acquisition through the chest with IV contrast targeting the  pulmonary arteries. Multiplanar and 3D MIP reconstructed images were provided for review.  mGycm. Automatic exposure control, adjustment of mA/kV or iterative reconstruction technique was used to reduce radiation.    COMPARISON:  13 March 2023.    FINDINGS:  There is good opacification of the pulmonary arterial tree. No pulmonary embolus seen.  Small pulmonary arteries are obscured by respiratory motion and other artifact.  There is no aortic dissection.    There is no mediastinal, hilar or axillary lymphadenopathy by size criteria.    The heart is not significantly enlarged.  No pericardial effusion.    No pleural effusion.  No opacities suspicious for pneumonia.  There are old right rib fractures with adjacent scarring in the right lung.    There are no acute findings in the imaged upper abdomen.  A right adrenal nodule is stable.  No acute osseous findings.                                       X-Ray Chest PA And Lateral (Final result)  Result time 12/18/23 12:34:07      Final result by Enmanuel Orellana MD (12/18/23 12:34:07)                   Impression:      No acute chest disease is identified.      Electronically signed by: Enmanuel Orellana  Date:    12/18/2023  Time:    12:34               Narrative:    EXAMINATION:  XR CHEST PA AND LATERAL    CLINICAL HISTORY:  , Chest pain, unspecified.    FINDINGS:  No alveolar consolidation, effusion, or pneumothorax is seen.   The thoracic aorta is normal  cardiac silhouette, central pulmonary vessels and mediastinum are normal in size and are grossly unremarkable.  There is evidence of healed rib fractures                                       Medications   morphine injection 4 mg (4 mg Intravenous Given 12/18/23 1727)   droPERidol injection 1.25 mg (1.25 mg Intravenous Given 12/18/23 1726)   iopamidoL (ISOVUE-370) injection 100 mL (100 mLs Intravenous Given 12/18/23 1646)   labetaloL injection 20 mg (20 mg Intravenous Given 12/18/23 1728)   aspirin EC  tablet 325 mg (325 mg Oral Given 12/18/23 1727)     Medical Decision Making  Differential diagnosis includes, but is not limited to     Amount and/or Complexity of Data Reviewed  External Data Reviewed: notes.     Details: Per chart review: She had an angiogram in October that showed patent coronary arteries.     Labs: ordered.  Radiology: ordered.    Risk  OTC drugs.  Prescription drug management.            Scribe Attestation:   Scribe #1: I performed the above scribed service and the documentation accurately describes the services I performed. I attest to the accuracy of the note.    Attending Attestation:           Physician Attestation for Scribe:  Physician Attestation Statement for Scribe #1: I, Dmitry Bear MD, reviewed documentation, as scribed by Eunice Macias in my presence, and it is both accurate and complete.             ED Course as of 12/18/23 1914   Mon Dec 18, 2023   1717 Paged Dr. Hong. [BP]   1731 Less says repeated troponin it has not going up as probably just some demand ischemia she had normal coronaries less than 2 months ago on left heart catheterization [NL]      ED Course User Index  [BP] Eunice Macias  [NL] Dmitry Bear MD                           Clinical Impression:  Final diagnoses:  [R07.9] Chest pain  [N64.4] Breast pain (Primary)          ED Disposition Condition    Discharge Stable          ED Prescriptions    None       Follow-up Information       Follow up With Specialties Details Why Contact Info    Purvi Rai FNP Nurse Practitioner   [address] [facility]  Internal Medicine Clinic  [facility] [address] [phone]               Dmitry Bear MD  12/18/23 1915

## 2023-12-18 NOTE — TELEPHONE ENCOUNTER
Pt called c/o right breast pain with discoloration and drainage. Pt made several threats to what she was going to do to NP Patience. Pt was made aware of a referral that she refused in between dates that she c/o the breast discomfort. NP is aware of pt's behavior.

## 2023-12-19 ENCOUNTER — TELEPHONE (OUTPATIENT)
Dept: INTERNAL MEDICINE | Facility: CLINIC | Age: 63
End: 2023-12-19
Payer: MEDICARE

## 2023-12-19 NOTE — TELEPHONE ENCOUNTER
----- Message from Vin Jean sent at 12/19/2023 11:32 AM CST -----  Type:  Needs Medical Advice    Who Called: Anali from [facility]    Would the patient rather a call back or a response via [employer]? Call back  Best Call Back Number: [phone]  Additional Information: Called to inquire if order was received

## 2023-12-19 NOTE — TELEPHONE ENCOUNTER
Called pt and she said we were supposed to be getting a fax from [employer]. Pt advised we did not receive it yet. Pt also is supposed to be seeing [facility], pt made aware and wanted to know when we sent the referral to them. I notified pt that when we spoke to her yesterday we informed her that she would need to be seen by them. Pt yelled and states she was supposed to have sent her all these referrals months ago and that she didn't send it yesterday bc we didn't speak to her yesterday. I informed her we did speak with her yesterday and reminded her of the conversation. Pt states we never spoke to her and that we are mistaken. I told her that we will have [facility] call her for an appt.

## 2024-01-29 ENCOUNTER — OFFICE VISIT (OUTPATIENT)
Dept: INTERNAL MEDICINE | Facility: CLINIC | Age: 64
End: 2024-01-29
Payer: MEDICAID

## 2024-01-29 VITALS
SYSTOLIC BLOOD PRESSURE: 139 MMHG | RESPIRATION RATE: 19 BRPM | TEMPERATURE: 98 F | BODY MASS INDEX: 37.99 KG/M2 | HEART RATE: 79 BPM | OXYGEN SATURATION: 96 % | HEIGHT: 66 IN | DIASTOLIC BLOOD PRESSURE: 89 MMHG | WEIGHT: 236.38 LBS

## 2024-01-29 DIAGNOSIS — Z12.4 CERVICAL CANCER SCREENING: ICD-10-CM

## 2024-01-29 DIAGNOSIS — N64.4 BREAST PAIN, RIGHT: ICD-10-CM

## 2024-01-29 DIAGNOSIS — E11.9 TYPE 2 DIABETES MELLITUS WITHOUT COMPLICATION, WITHOUT LONG-TERM CURRENT USE OF INSULIN: ICD-10-CM

## 2024-01-29 DIAGNOSIS — M25.519 SHOULDER PAIN, UNSPECIFIED CHRONICITY, UNSPECIFIED LATERALITY: Primary | ICD-10-CM

## 2024-01-29 DIAGNOSIS — R10.11 RIGHT UPPER QUADRANT PAIN: ICD-10-CM

## 2024-01-29 PROCEDURE — 96372 THER/PROPH/DIAG INJ SC/IM: CPT | Mod: PBBFAC

## 2024-01-29 PROCEDURE — 99214 OFFICE O/P EST MOD 30 MIN: CPT | Mod: 25,PBBFAC

## 2024-01-29 RX ORDER — KETOROLAC TROMETHAMINE 30 MG/ML
30 INJECTION, SOLUTION INTRAMUSCULAR; INTRAVENOUS
Status: COMPLETED | OUTPATIENT
Start: 2024-01-29 | End: 2024-01-29

## 2024-01-29 RX ORDER — NYSTATIN 100000 U/G
CREAM TOPICAL 2 TIMES DAILY
Qty: 30 G | Refills: 0 | Status: SHIPPED | OUTPATIENT
Start: 2024-01-29

## 2024-01-29 RX ORDER — HYDRALAZINE HYDROCHLORIDE 25 MG/1
25 TABLET, FILM COATED ORAL 3 TIMES DAILY
Qty: 90 TABLET | Refills: 11 | Status: SHIPPED | OUTPATIENT
Start: 2024-01-29 | End: 2024-02-29 | Stop reason: SDUPTHER

## 2024-01-29 RX ORDER — ESOMEPRAZOLE MAGNESIUM 40 MG/1
40 CAPSULE, DELAYED RELEASE ORAL
Qty: 30 CAPSULE | Refills: 11 | Status: SHIPPED | OUTPATIENT
Start: 2024-01-29 | End: 2025-01-28

## 2024-01-29 RX ORDER — ALBUTEROL SULFATE 90 UG/1
2 AEROSOL, METERED RESPIRATORY (INHALATION) EVERY 6 HOURS PRN
Qty: 18 G | Refills: 0 | Status: SHIPPED | OUTPATIENT
Start: 2024-01-29 | End: 2024-02-06

## 2024-01-29 RX ORDER — KETOROLAC TROMETHAMINE 30 MG/ML
30 INJECTION, SOLUTION INTRAMUSCULAR; INTRAVENOUS
Status: DISCONTINUED | OUTPATIENT
Start: 2024-01-29 | End: 2024-01-29

## 2024-01-29 RX ORDER — TERCONAZOLE 4 MG/G
1 CREAM VAGINAL NIGHTLY
Qty: 7 G | Refills: 0 | Status: SHIPPED | OUTPATIENT
Start: 2024-01-29

## 2024-01-29 RX ORDER — VALSARTAN 320 MG/1
320 TABLET ORAL DAILY
Qty: 90 TABLET | Refills: 3 | Status: SHIPPED | OUTPATIENT
Start: 2024-01-29 | End: 2024-02-29 | Stop reason: SDUPTHER

## 2024-01-29 RX ORDER — IPRATROPIUM BROMIDE AND ALBUTEROL SULFATE 2.5; .5 MG/3ML; MG/3ML
3 SOLUTION RESPIRATORY (INHALATION) EVERY 6 HOURS PRN
Qty: 75 ML | Refills: 0 | Status: SHIPPED | OUTPATIENT
Start: 2024-01-29 | End: 2024-02-29 | Stop reason: SDUPTHER

## 2024-01-29 RX ORDER — ATORVASTATIN CALCIUM 40 MG/1
40 TABLET, FILM COATED ORAL DAILY
Qty: 90 TABLET | Refills: 3 | Status: SHIPPED | OUTPATIENT
Start: 2024-01-29 | End: 2025-01-28

## 2024-01-29 RX ORDER — GLIPIZIDE 10 MG/1
10 TABLET ORAL
Qty: 180 TABLET | Refills: 3 | Status: SHIPPED | OUTPATIENT
Start: 2024-01-29 | End: 2025-01-28

## 2024-01-29 RX ADMIN — KETOROLAC TROMETHAMINE 30 MG: 30 INJECTION, SOLUTION INTRAMUSCULAR; INTRAVENOUS at 01:01
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
## 2024-01-29 NOTE — PROGRESS NOTES
Attending Addendum:   Patient seen and examined in clinic. Management and Plan were discussed with resident. Care was reasonable and necessary.   aSndrita Nevarez MD  [facility]

## 2024-01-29 NOTE — PROGRESS NOTES
Attending Addendum:   Patient seen and examined in clinic. Management and Plan were discussed with resident. Care was reasonable and necessary.   Sandrita Nevarez MD  [facility]

## 2024-02-02 DIAGNOSIS — E11.9 TYPE 2 DIABETES MELLITUS WITHOUT COMPLICATION, WITHOUT LONG-TERM CURRENT USE OF INSULIN: Primary | ICD-10-CM

## 2024-02-05 ENCOUNTER — TELEPHONE (OUTPATIENT)
Dept: INTERNAL MEDICINE | Facility: CLINIC | Age: 64
End: 2024-02-05
Payer: MEDICARE

## 2024-02-05 NOTE — TELEPHONE ENCOUNTER
"TELEPHONE CALL:      PT IS REQUESTING A "PUMP" TO OPEN HER UP AND SOMETHING FOR HER COUGH THAT SHE HAS HAD FOR ALMOST 2 WEEKS NOW.  GREEN TINGED MUCUS.  PLEASE ADVISE.    "

## 2024-02-06 RX ORDER — ALBUTEROL SULFATE 90 UG/1
2 AEROSOL, METERED RESPIRATORY (INHALATION) EVERY 6 HOURS PRN
Qty: 18 G | Refills: 0 | Status: SHIPPED | OUTPATIENT
Start: 2024-02-06 | End: 2024-02-29 | Stop reason: ALTCHOICE

## 2024-02-06 NOTE — TELEPHONE ENCOUNTER
Call placed to patient . ID verified by name/ . Informed patient of Rx for Albuterol sent per M.D. Pt instructed to report to ER if symptoms worsening.

## 2024-02-15 ENCOUNTER — HOSPITAL ENCOUNTER (OUTPATIENT)
Dept: RADIOLOGY | Facility: HOSPITAL | Age: 64
Discharge: HOME | End: 2024-02-15
Payer: MEDICARE

## 2024-02-15 DIAGNOSIS — M25.519 SHOULDER PAIN, UNSPECIFIED CHRONICITY, UNSPECIFIED LATERALITY: ICD-10-CM

## 2024-02-15 NOTE — PROGRESS NOTES
Patient unable to complete MRI right shoulder w/o contrast due to being claustrophobic. Patent stated she would reschedule her exam.

## 2024-02-20 ENCOUNTER — TELEPHONE (OUTPATIENT)
Dept: INTERNAL MEDICINE | Facility: CLINIC | Age: 64
End: 2024-02-20
Payer: MEDICARE

## 2024-02-20 ENCOUNTER — HOSPITAL ENCOUNTER (OUTPATIENT)
Dept: RADIOLOGY | Facility: HOSPITAL | Age: 64
Discharge: HOME | End: 2024-02-20
Payer: MEDICARE

## 2024-02-20 PROCEDURE — 73221 MRI JOINT UPR EXTREM W/O DYE: CPT | Mod: TC,RT

## 2024-02-20 NOTE — TELEPHONE ENCOUNTER
02/20/24 Called and spoke to Dr. Wolf office, states there is a year waiting list at this time and they will reach out to patient when availablity.   A-T Advancement Flap Text: The defect edges were debeveled with a #15c scalpel blade.  Given the location of the defect, shape of the defect and the proximity to free margins an A-T advancement flap was deemed most appropriate.  Using a sterile surgical marker, an appropriate advancement flap was drawn incorporating the defect and placing the expected incisions within the relaxed skin tension lines where possible.    The area thus outlined was incised deep to adipose tissue with a #15 scalpel blade.  The skin margins were undermined to an appropriate distance in all directions utilizing iris scissors.

## 2024-02-21 DIAGNOSIS — S46.811A PARTIAL TEAR OF RIGHT SUBSCAPULARIS TENDON, INITIAL ENCOUNTER: Primary | ICD-10-CM

## 2024-02-21 NOTE — PROGRESS NOTES
MRI results:  A high-grade partial-thickness articular surface tear is present to the distal supraspinatus tendon without retraction or associated atrophy.     An intermediate grade partial-thickness delamination tear is present to the distal subscapularis tendon.     A tear is present to the superior portion of the glenoid labrum and is best seen on image 13 of series 2.    Patient informed. Expressed understanding. Sitter also informed. Referral to orthopedics placed.

## 2024-02-26 NOTE — PROGRESS NOTES
"  [facility] INTERNAL MEDICINE RESIDENT CLINIC    Subjective:      This is a 63 y.o. 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 Followed by Dr. Hong, cardio, Marga Galeasfield, psych, and [facility].  She was previously following Fredi ERNANDEZ as her PCP but was referred to us due to medical complexities.    During previous visit, she was complaining of chronic right breast and shoulder pain, extensive work-up has been done by previous PCP with no apparent reason. MRI shoulder was ordered on previous visit revealing multiple shoulder tears, ortho referral was placed.    Right shoulder, breast, back pain  Today, patient remains in severe pain due to above. She missed her ortho appt due to scheduling mixed up. She takes Tylenol for pain with minimal relief. She has tried Aleve and Mobic before, insufficient relief.    Chest pain  She has 3-4 day history of right sided chest pain radiating to left sided and sometime right jaw and arm. She said the pain is different from her previous heart attack before however this has worsened from her usual breast pain.    Urinary incontinence  For over 1 year now, apparently was told before that her bladder had "slits", tried oxybutynin and vaginal estrogen with no relief.    Chronic cough  Chronic cough likely secondary to smoking, says it has worsened, denies fever, shortness of breath, cough makes her right breast, shoulder, chest pain hurt more. She said robitussin, tessalon, mucinex does not work at all.    Leg pain  3-4 days onset of left leg numbness, she does have neuropathy from diabetes. Left pedal pulse noted to be weaker than right however palpable.    Review of Systems:  General: No fever, fatigue, weight " loss  Cardiovascular: No chest pain, palpitations, orthopnea, PND  Respiratory: No hemoptysis, shortness of breath, wheezing, (+) cough  Gastrointestinal: No nausea, vomiting, abdominal pain, diarrhea, melena, hematochezia  Genitourinary: No dysuria, urgency, frequency, hematuria  Musculoskeletal: Tenderness on right breast, right shoulder, chest, upper quadrant abd  Neurological: No dizziness, headache, sensory changes, focal weakness    Objective:     Vital Signs:  Vitals:    02/29/24 1411   BP: (!) 202/98   Pulse:    Resp:    Temp:           Body mass index is 38.7 kg/m².     General:  Examined a well-developed patient in no acute respiratory distress  HENT: Normocephalic, atraumatic, PERRL, neck supple  Cardiovascular:  Normal rate and rhythm, no murmurs appreciated  Pulmonary:  Clear to auscultation bilaterally, no wheezes, rales, or rhonci  Abdominal:  Soft, non-distended, normoactive bowel sounds  MSK:  Pain right anterior and posterior truncal area  Skin:  No rashes, ulcers, erythema  Neuro:  Alert and oriented x3, no focal neuro deficits, CNII-XII grossly intact    Protective Sensation (w/ 10 gram monofilament):  Right: Intact  Left: Decreased    Visual Inspection:  Normal -  Bilateral, Nails Intact - without Evidence of Foot Deformity- Bilateral, Callus -  Bilateral, and Dry Skin -  Bilateral    Pedal Pulses:   Right: Present  Left: Diminished    Posterior Tibialis Pulses:   Right:Present  Left: Present     Laboratory:  Lab Results   Component Value Date    WBC 9.18 12/18/2023    HGB 16.6 (H) 12/18/2023    HCT 51.5 (H) 12/18/2023     12/18/2023    MCV 88.0 12/18/2023    RDW 15.3 12/18/2023    Lab Results   Component Value Date     12/18/2023    K 4.6 12/18/2023     03/24/2022    CO2 20 (L) 12/18/2023    BUN 14.2 12/18/2023    CREATININE 0.92 12/18/2023     (HH) 09/30/2021    CALCIUM 9.0 12/18/2023    MG 1.80 11/06/2023    PHOS 3.7 07/07/2023      Lab Results   Component Value Date     HGBA1C 8.4 (H) 07/06/2023    .4 07/06/2023    LDLCALC 88 10/18/2023    CREATININE 0.92 12/18/2023    CREATRANDUR 65.7 02/09/2023    Lab Results   Component Value Date    TSH 0.858 07/06/2023        Current Medications:  Current Outpatient Medications   Medication Instructions    (Magic mouthwash) 1:1:1 diphenhydrAMINE(Benadryl) 12.5mg/5ml liq, aluminum & magnesium hydroxide-simethicone (Maalox), LIDOcaine viscous 2% 10 mLs, Swish & Spit, Every 4 hours PRN, for mouth sores    albuterol (PROAIR HFA) 90 mcg/actuation inhaler 2 puffs, Inhalation, Every 6 hours PRN, Rescue    ALPRAZolam (XANAX) 1 MG tablet Take 1 tablet by mouth once a day as needed as needed for anxiety    ammonium lactate (LAC-HYDRIN) 12 % lotion Apply to dry skin areas on feet, legs, and elbow areas TWICE A DAY AS NEEDED    aspirin (ECOTRIN) 81 mg, Oral, Daily    atorvastatin (LIPITOR) 40 mg, Oral, Nightly    atorvastatin (LIPITOR) 40 mg, Oral, Daily    blood sugar diagnostic Strp To check BG 2 times daily, to use with insurance preferred meter    blood-glucose meter kit To check BG 2 times daily, to use with insurance preferred meter    butalbital-acetaminophen-caffeine -40 mg (FIORICET, ESGIC) -40 mg per tablet 1 tablet, Oral, Every 6 hours PRN    carvediloL (COREG) 3.125 mg, Oral, 2 times daily    carvediloL (COREG) 3.125 mg, Oral, 2 times daily with meals    cholecalciferol, vitamin D3, (VITAMIN D3) 50 mcg (2,000 unit) Cap capsule 1 capsule, Oral    diabetic supplies, miscellan. Misc 1 each, Misc.(Non-Drug; Combo Route), 3 times daily with meals    diaper,brief,adult,disposable Misc 1 each, Misc.(Non-Drug; Combo Route), Every 6 hours PRN    diclofenac (VOLTAREN) 75 mg, Oral, 2 times daily PRN    doxepin (SILENOR) 6 mg Tab 1 tablet, Oral, Nightly    DULoxetine (CYMBALTA) 60 mg, Oral, Every morning    EScitalopram oxalate (LEXAPRO) 10 mg, Oral    esomeprazole (NEXIUM) 40 mg, Oral, Before breakfast    fluticasone propionate  "(FLONASE) 50 mcg, Each Nostril, 2 times daily PRN    fluticasone propionate (FLONASE) 50 mcg, Each Nostril, Daily    gabapentin (NEURONTIN) 600 mg, Oral, 3 times daily    glipiZIDE (GLUCOTROL) 10 mg, Oral, 2 times daily before meals    hydrALAZINE (APRESOLINE) 50 mg, Oral, 3 times daily    hydroCHLOROthiazide (HYDRODIURIL) 12.5 mg, Oral, Daily    lancets Misc To check BG 2 times daily, to use with insurance preferred meter    LANTUS SOLOSTAR U-100 INSULIN glargine 100 units/mL SubQ pen Inject 80 Units into the skin 2 (two) times daily before meals.    magnesium citrate solution 296 mLs, Oral, Daily PRN    multivitamin (THERAGRAN) per tablet 1 tablet, Oral, Daily    naproxen (NAPROSYN) 500 mg, Oral, 2 times daily with meals    NIFEdipine (PROCARDIA-XL) 90 mg, Oral, Daily    nitroGLYCERIN (NITROSTAT) 0.4 mg, Sublingual, Every 5 min PRN    NOVOLOG FLEXPEN U-100 INSULIN 100 unit/mL (3 mL) InPn pen Inject 20 Units into the skin 3 (three) times daily with meals. DISCARD OPEN PEN AFTER 28 DAYS    nystatin (MYCOSTATIN) cream Apply topically 3 (three) times daily. Use under breast    nystatin (MYCOSTATIN) cream Topical (Top), 2 times daily    oxybutynin (DITROPAN-XL) 5 mg, Oral, Daily    pregabalin (LYRICA) 200 mg, Oral, 2 times daily    promethazine-dextromethorphan (PROMETHAZINE-DM) 6.25-15 mg/5 mL Syrp 5 mLs, Oral, Every 4 hours PRN    sertraline (ZOLOFT) 50 mg, Oral, Daily    SURE COMFORT PEN NEEDLE 32 gauge x 5/32" Ndle USE 1 pen needle TO inject insulin TWICE DAILY BEFORE meals AS DIRECTED by prescriber    terconazole (TERAZOL 7) 0.4 % Crea 1 applicator, Vaginal, Nightly    terconazole (TERAZOL 7) 0.4 % Crea 1 applicator, Vaginal, Nightly    valsartan (DIOVAN) 320 mg, Oral, Daily        Assessment and Plan:   Elba Barnes is a 63 y.o. female who  has a past medical history of Anxiety, Arthritis, CVA (cerebral vascular accident), Depression, Diabetes mellitus, Heart problem, History of psychiatric hospitalization, " HTN (hypertension), psychiatric care, Hypertension, Pacemaker, Psychiatric exam requested by authority, Psychiatric problem, TBI (traumatic brain injury), and Therapy.      High-grade partial-thickness articular surface tear, supraspinatus tendon  Intermediate grade partial-thickness delamination tear, subscapularis tendon  Tear, glenoid labrum  Chronic right breast, shoulder, upper back pain  -CXR revealed old healed rib fractures  -Right breast ultrasound with negative findings no sonographic correlation to reported symptom  -Mammogram with no suspicious mass, asymmetry, distortion or calcification  -MRI cervical spine revealed spondylotic changes without evidence of significant central canal stenosis or narrowing  -MRI lumbar spine with severe degenerative spinal canal stenosis L3-L5, mild neural foraminal stenosis  -MRI thoracic spine with mild narrowing at T10-T11, no definite cord signal abnormality  -Patient is frustrated as she has been suffering from right breast pain for over a year now with no relief from pain meds  -Has tried and failed Tylenol, NSAID, gabapentin, duloxetine, pregabalin  -Referral to pain management placed  -MRI shoulder: A high-grade partial-thickness articular surface tear is present to the distal supraspinatus tendon without retraction or associated atrophy. An intermediate grade partial-thickness delamination tear is present to the distal subscapularis tendon. A tear is present to the superior portion of the glenoid labrum.  -Apt with ortho 3/26/24  -Short term naproxen for relief until she sees ortho; she does not want Tylenol, Mobic, opioids    Diabetes mellitus  -Closely monitor blood glucose at home, keep recordings  -Dietary and lifestyle modifications discussed, expressed understanding  Hemoglobin A1C   Date Value Ref Range Status   10/18/2022 10.5 (H) <=6.5 % Final   03/23/2022 11.5 (H) <=6.5 % Final   11/24/2021 >14.0 (H) <=6.5 % Final   12/07/2020 9.6 (H) 4.0 - 6.0 % Final      Hemoglobin A1c   Date Value Ref Range Status   07/06/2023 8.4 (H) <=7.0 % Final   02/09/2023 8.7 (H) <=7.0 % Final   -On Lantus 30U in AM, 80U in AM and 5U as needed, glipizide 10 mg BID  -Check A1c today  -Consider CGM/endo referral next visit    Essential hypertension  CHFpEF 55%  -Echo 10/2023: EF 55% severely increased LV wall thickness; diastolic dysfunction  -Closely monitor blood pressure at home  -Lifestyle modifications advised, expressed understanding  -Discussed importance of medication compliance  -On Coreg 3.125 mg BID, Hydralazine 25 md TID, Valsartan 320 mg OD  -Will increase hydralazine to 50 TID, add HCTZ 12.5 mg OD  -ER precautions advised  -Likely compounded by severe pain of right shoulder/breast/back area  -Chest pain likely related to above shoulder tears  -[facility] 10/2023: No change from 2022/12 angiogram: chest pain and small chronic elevation   in troponin most likely 2nd to HTN and endothelial dysfunction   Coronary angiogram findings:   1. Coronary dominance:  left      2. Left Main:  patent   3. Left Anterior Descending:  patent   4. Left Circumflex:  patent   5. Right: Dominant.  patent   -Follows closely with Dr. Hong    Mood disorder  -Follows with psych  -Defer adjustment of pscyh meds to them    Chronic cough  -Failed tessalon, mucinex, robitussin before  -Promethazine DM prescribed    Urinary Incontinence  -1 year duration  -Urology referral sent  -Oxybutynin does not work, apparently previously tried vaginal estrogen with no relief as well  -Needs better sugar control  -Urinalysis ordered    Neuroforaminal stenosis of lumbar spine  Leg pain  -Referral for pain mgt placed    Follow-up on: 1 month with CBG, BP log    Health Maintenance:  Influenza vaccine: Refused   Pneumococcal vaccine (>64 yo): Refused  Shingrix vaccine (>51 yo): Refused  TDAP: Refused    AAA screening (men 65-74 yo smokers): N/A  Breast cancer screening (women 40-73 yo): Mammo 1/2023, patient hesitant on further  mammogram because of severe right breast pain; order placed  Cervical cancer screening (women 21-64 yo): Referral placed, gyn appt on 9/2025  Colon cancer screening (45-74 yo): Done 8/2023, repeat in 2030 as advised  Lung cancer screening (50-79 yo): CT chest done 12/2023 no nodules seen  Osteoporosis screening (>64 yo, or <65 at risk): N/A     Health Maintenance   Topic Date Due    Hepatitis C Screening  Never done    Shingles Vaccine (1 of 2) Never done    Hemoglobin A1c  10/06/2023    Foot Exam  12/21/2023    Mammogram  01/18/2024    Lipid Panel  10/18/2024    Eye Exam  02/28/2025    TETANUS VACCINE  04/03/2027    Colorectal Cancer Screening  08/25/2030        Harjeet Rockwell MD  [facility], PGY-2

## 2024-02-29 ENCOUNTER — OFFICE VISIT (OUTPATIENT)
Dept: INTERNAL MEDICINE | Facility: CLINIC | Age: 64
End: 2024-02-29
Payer: MEDICARE

## 2024-02-29 ENCOUNTER — CLINICAL SUPPORT (OUTPATIENT)
Dept: INTERNAL MEDICINE | Facility: CLINIC | Age: 64
End: 2024-02-29
Payer: MEDICARE

## 2024-02-29 VITALS
BODY MASS INDEX: 38.54 KG/M2 | HEIGHT: 66 IN | DIASTOLIC BLOOD PRESSURE: 98 MMHG | HEART RATE: 78 BPM | WEIGHT: 239.81 LBS | RESPIRATION RATE: 19 BRPM | OXYGEN SATURATION: 97 % | SYSTOLIC BLOOD PRESSURE: 202 MMHG | TEMPERATURE: 98 F

## 2024-02-29 DIAGNOSIS — R32 URINARY INCONTINENCE, UNSPECIFIED TYPE: ICD-10-CM

## 2024-02-29 DIAGNOSIS — E11.65 UNCONTROLLED TYPE 2 DIABETES MELLITUS WITH HYPERGLYCEMIA: ICD-10-CM

## 2024-02-29 DIAGNOSIS — F17.200 NEEDS SMOKING CESSATION EDUCATION: ICD-10-CM

## 2024-02-29 DIAGNOSIS — Z12.39 ENCOUNTER FOR SCREENING FOR MALIGNANT NEOPLASM OF BREAST, UNSPECIFIED SCREENING MODALITY: ICD-10-CM

## 2024-02-29 DIAGNOSIS — Z12.31 ENCOUNTER FOR SCREENING MAMMOGRAM FOR MALIGNANT NEOPLASM OF BREAST: ICD-10-CM

## 2024-02-29 DIAGNOSIS — R07.9 CHEST PAIN, UNSPECIFIED TYPE: Primary | ICD-10-CM

## 2024-02-29 LAB
LEFT EYE DM RETINOPATHY: NEGATIVE
RIGHT EYE DM RETINOPATHY: NEGATIVE

## 2024-02-29 PROCEDURE — 93005 ELECTROCARDIOGRAM TRACING: CPT

## 2024-02-29 PROCEDURE — 92228 IMG RTA DETC/MNTR DS PHY/QHP: CPT | Mod: 59,PBBFAC

## 2024-02-29 PROCEDURE — 99215 OFFICE O/P EST HI 40 MIN: CPT | Mod: PBBFAC,25

## 2024-02-29 RX ORDER — HYDROCHLOROTHIAZIDE 12.5 MG/1
12.5 TABLET ORAL DAILY
Qty: 90 TABLET | Refills: 3 | Status: SHIPPED | OUTPATIENT
Start: 2024-02-29 | End: 2025-02-28

## 2024-02-29 RX ORDER — HYDRALAZINE HYDROCHLORIDE 50 MG/1
50 TABLET, FILM COATED ORAL 3 TIMES DAILY
Qty: 90 TABLET | Refills: 6 | Status: SHIPPED | OUTPATIENT
Start: 2024-02-29 | End: 2025-02-28

## 2024-02-29 RX ORDER — OXYBUTYNIN CHLORIDE 5 MG/1
5 TABLET, EXTENDED RELEASE ORAL DAILY
Qty: 30 TABLET | Refills: 11 | Status: CANCELLED | OUTPATIENT
Start: 2024-02-29 | End: 2025-02-28

## 2024-02-29 RX ORDER — HYDRALAZINE HYDROCHLORIDE 25 MG/1
25 TABLET, FILM COATED ORAL 3 TIMES DAILY
Qty: 90 TABLET | Refills: 11 | Status: SHIPPED | OUTPATIENT
Start: 2024-02-29 | End: 2024-02-29

## 2024-02-29 RX ORDER — ASPIRIN 81 MG/1
81 TABLET ORAL DAILY
Qty: 90 TABLET | Refills: 3 | Status: SHIPPED | OUTPATIENT
Start: 2024-02-29

## 2024-02-29 RX ORDER — GABAPENTIN 600 MG/1
600 TABLET ORAL 3 TIMES DAILY
Qty: 90 TABLET | Refills: 11 | Status: SHIPPED | OUTPATIENT
Start: 2024-02-29 | End: 2025-02-28

## 2024-02-29 RX ORDER — NAPROXEN 500 MG/1
500 TABLET ORAL 2 TIMES DAILY WITH MEALS
Qty: 28 TABLET | Refills: 0 | Status: SHIPPED | OUTPATIENT
Start: 2024-02-29

## 2024-02-29 RX ORDER — CARVEDILOL 3.12 MG/1
3.12 TABLET ORAL 2 TIMES DAILY WITH MEALS
Qty: 60 TABLET | Refills: 11 | Status: SHIPPED | OUTPATIENT
Start: 2024-02-29 | End: 2025-02-28

## 2024-02-29 RX ORDER — FLUTICASONE PROPIONATE 50 MCG
1 SPRAY, SUSPENSION (ML) NASAL DAILY
Qty: 18.2 ML | Refills: 0 | Status: SHIPPED | OUTPATIENT
Start: 2024-02-29

## 2024-02-29 RX ORDER — ALBUTEROL SULFATE 90 UG/1
2 AEROSOL, METERED RESPIRATORY (INHALATION) EVERY 6 HOURS PRN
Qty: 18 G | Refills: 0 | Status: SHIPPED | OUTPATIENT
Start: 2024-02-29 | End: 2025-02-28

## 2024-02-29 RX ORDER — FLUTICASONE PROPIONATE 50 MCG
1 SPRAY, SUSPENSION (ML) NASAL DAILY
Refills: 0 | Status: CANCELLED | OUTPATIENT
Start: 2024-02-29

## 2024-02-29 RX ORDER — VALSARTAN 320 MG/1
320 TABLET ORAL DAILY
Qty: 90 TABLET | Refills: 3 | Status: SHIPPED | OUTPATIENT
Start: 2024-02-29 | End: 2025-02-28

## 2024-02-29 RX ORDER — PROMETHAZINE HYDROCHLORIDE AND DEXTROMETHORPHAN HYDROBROMIDE 6.25; 15 MG/5ML; MG/5ML
5 SYRUP ORAL EVERY 4 HOURS PRN
Qty: 180 ML | Refills: 0 | Status: SHIPPED | OUTPATIENT
Start: 2024-02-29 | End: 2024-03-10

## 2024-02-29 NOTE — PROGRESS NOTES
I have reviewed and concur with the resident's history, physical, assessment, and plan.  I have discussed with him all issues related to the diagnosis, workup and treatment plan. Care provided as reasonable and necessary.  Cath nml 2023. Generalised pain on meds  Chandu Parisi MD  [facility]

## 2024-02-29 NOTE — PROGRESS NOTES
Elba Barnes is a 63 y.o. female here for a diabetic eye screening with non-dilated fundus photos per Harjeet Rockwell MD.    Patient cooperative?: Yes  Small pupils?: No  Last eye exam: unknown    For exam results, see Encounter Report.     
Yes, the patient is being discharged from [facility]...

## 2024-03-01 ENCOUNTER — TELEPHONE (OUTPATIENT)
Dept: INTERNAL MEDICINE | Facility: CLINIC | Age: 64
End: 2024-03-01
Payer: MEDICAID

## 2024-03-01 LAB
OHS QRS DURATION: 102 MS
OHS QTC CALCULATION: 484 MS

## 2024-03-08 ENCOUNTER — HOSPITAL ENCOUNTER (OUTPATIENT)
Dept: RADIOLOGY | Facility: HOSPITAL | Age: 64
Discharge: HOME | End: 2024-03-08
Payer: MEDICARE

## 2024-03-08 ENCOUNTER — TELEPHONE (OUTPATIENT)
Dept: RADIOLOGY | Facility: HOSPITAL | Age: 64
End: 2024-03-08
Payer: MEDICAID

## 2024-03-08 DIAGNOSIS — Z12.39 ENCOUNTER FOR SCREENING FOR MALIGNANT NEOPLASM OF BREAST, UNSPECIFIED SCREENING MODALITY: ICD-10-CM

## 2024-03-08 DIAGNOSIS — Z12.31 ENCOUNTER FOR SCREENING MAMMOGRAM FOR MALIGNANT NEOPLASM OF BREAST: ICD-10-CM

## 2024-03-08 NOTE — TELEPHONE ENCOUNTER
Patient came today for screening mammogram. Patient stated that her breast are still hurting and that is how now gone to the left side. Bilateral breast pain. Patient also stated she had a mammogram in [place] recently. I told the patient that she may get a bill since it would not have been a year. Patient also expressed how she has a yeast infection under both breasts. Tech examined the breasts and noticed how bad the rash was. I explained to the patient that if we did her mammogram today not only may she get a bill, but she may tear under her breasts due to yeast infection. Patient decided against mammogram because she did not want to tear and she stated that her breasts are very painful and she would not be able to tolerate the mammogram.

## 2024-03-16 RX ORDER — INSULIN GLARGINE 100 [IU]/ML
INJECTION, SOLUTION SUBCUTANEOUS
Qty: 60 ML | Refills: 2 | Status: SHIPPED | OUTPATIENT
Start: 2024-03-16

## 2024-04-01 ENCOUNTER — TELEPHONE (OUTPATIENT)
Dept: INTERNAL MEDICINE | Facility: CLINIC | Age: 64
End: 2024-04-01
Payer: MEDICAID

## 2024-04-22 ENCOUNTER — OFFICE VISIT (OUTPATIENT)
Dept: INTERNAL MEDICINE | Facility: CLINIC | Age: 64
End: 2024-04-22
Payer: MEDICAID

## 2024-04-22 ENCOUNTER — HOSPITAL ENCOUNTER (EMERGENCY)
Facility: HOSPITAL | Age: 64
Discharge: HOME | End: 2024-04-22
Attending: EMERGENCY MEDICINE
Payer: MEDICARE

## 2024-04-22 VITALS
HEIGHT: 66 IN | HEART RATE: 75 BPM | WEIGHT: 247 LBS | OXYGEN SATURATION: 98 % | RESPIRATION RATE: 17 BRPM | DIASTOLIC BLOOD PRESSURE: 91 MMHG | SYSTOLIC BLOOD PRESSURE: 180 MMHG | BODY MASS INDEX: 39.7 KG/M2 | TEMPERATURE: 98 F

## 2024-04-22 VITALS
SYSTOLIC BLOOD PRESSURE: 192 MMHG | HEIGHT: 66 IN | HEART RATE: 71 BPM | TEMPERATURE: 98 F | RESPIRATION RATE: 19 BRPM | OXYGEN SATURATION: 94 % | WEIGHT: 245.13 LBS | BODY MASS INDEX: 39.4 KG/M2 | DIASTOLIC BLOOD PRESSURE: 88 MMHG

## 2024-04-22 DIAGNOSIS — R07.89 ATYPICAL CHEST PAIN: ICD-10-CM

## 2024-04-22 DIAGNOSIS — R51.9 CHRONIC NONINTRACTABLE HEADACHE, UNSPECIFIED HEADACHE TYPE: ICD-10-CM

## 2024-04-22 DIAGNOSIS — E11.9 TYPE 2 DIABETES MELLITUS WITHOUT COMPLICATION, WITHOUT LONG-TERM CURRENT USE OF INSULIN: Primary | ICD-10-CM

## 2024-04-22 DIAGNOSIS — I10 UNCONTROLLED HYPERTENSION: Primary | ICD-10-CM

## 2024-04-22 DIAGNOSIS — G89.29 CHRONIC NONINTRACTABLE HEADACHE, UNSPECIFIED HEADACHE TYPE: ICD-10-CM

## 2024-04-22 LAB
ALBUMIN SERPL-MCNC: 2.9 G/DL (ref 3.4–4.8)
ALBUMIN/GLOB SERPL: 0.6 RATIO (ref 1.1–2)
ALP SERPL-CCNC: 75 UNIT/L (ref 40–150)
ALT SERPL-CCNC: 15 UNIT/L (ref 0–55)
AST SERPL-CCNC: 19 UNIT/L (ref 5–34)
BASOPHILS # BLD AUTO: 0.06 X10(3)/MCL
BASOPHILS NFR BLD AUTO: 0.6 %
BILIRUB SERPL-MCNC: 0.1 MG/DL
BNP BLD-MCNC: 48 PG/ML
BUN SERPL-MCNC: 18.2 MG/DL (ref 9.8–20.1)
CALCIUM SERPL-MCNC: 9.4 MG/DL (ref 8.4–10.2)
CHLORIDE SERPL-SCNC: 103 MMOL/L (ref 98–107)
CO2 SERPL-SCNC: 34 MMOL/L (ref 23–31)
CREAT SERPL-MCNC: 1 MG/DL (ref 0.55–1.02)
EOSINOPHIL # BLD AUTO: 0.09 X10(3)/MCL (ref 0–0.9)
EOSINOPHIL NFR BLD AUTO: 1 %
ERYTHROCYTE [DISTWIDTH] IN BLOOD BY AUTOMATED COUNT: 13.8 % (ref 11.5–17)
GFR SERPLBLD CREATININE-BSD FMLA CKD-EPI: >60 MLS/MIN/1.73/M2
GLOBULIN SER-MCNC: 5.2 GM/DL (ref 2.4–3.5)
GLUCOSE SERPL-MCNC: 175 MG/DL (ref 82–115)
HCT VFR BLD AUTO: 46.6 % (ref 37–47)
HGB BLD-MCNC: 15.4 G/DL (ref 12–16)
HOLD SPECIMEN: NORMAL
IMM GRANULOCYTES # BLD AUTO: 0.06 X10(3)/MCL (ref 0–0.04)
IMM GRANULOCYTES NFR BLD AUTO: 0.6 %
LYMPHOCYTES # BLD AUTO: 2.86 X10(3)/MCL (ref 0.6–4.6)
LYMPHOCYTES NFR BLD AUTO: 30.9 %
MAGNESIUM SERPL-MCNC: 2 MG/DL (ref 1.6–2.6)
MCH RBC QN AUTO: 29.7 PG (ref 27–31)
MCHC RBC AUTO-ENTMCNC: 33 G/DL (ref 33–36)
MCV RBC AUTO: 90 FL (ref 80–94)
MONOCYTES # BLD AUTO: 0.48 X10(3)/MCL (ref 0.1–1.3)
MONOCYTES NFR BLD AUTO: 5.2 %
NEUTROPHILS # BLD AUTO: 5.71 X10(3)/MCL (ref 2.1–9.2)
NEUTROPHILS NFR BLD AUTO: 61.7 %
NRBC BLD AUTO-RTO: 0 %
PLATELET # BLD AUTO: 379 X10(3)/MCL (ref 130–400)
PMV BLD AUTO: 9.1 FL (ref 7.4–10.4)
POCT GLUCOSE: 250 MG/DL (ref 70–110)
POTASSIUM SERPL-SCNC: 3.7 MMOL/L (ref 3.5–5.1)
PROT SERPL-MCNC: 8.1 GM/DL (ref 5.8–7.6)
RBC # BLD AUTO: 5.18 X10(6)/MCL (ref 4.2–5.4)
SODIUM SERPL-SCNC: 143 MMOL/L (ref 136–145)
TROPONIN I SERPL-MCNC: 0.06 NG/ML (ref 0–0.04)
WBC # SPEC AUTO: 9.26 X10(3)/MCL (ref 4.5–11.5)

## 2024-04-22 PROCEDURE — 93005 ELECTROCARDIOGRAM TRACING: CPT

## 2024-04-22 PROCEDURE — 83735 ASSAY OF MAGNESIUM: CPT | Performed by: PHYSICIAN ASSISTANT

## 2024-04-22 PROCEDURE — 82962 GLUCOSE BLOOD TEST: CPT

## 2024-04-22 PROCEDURE — 36415 COLL VENOUS BLD VENIPUNCTURE: CPT | Performed by: PHYSICIAN ASSISTANT

## 2024-04-22 PROCEDURE — 85025 COMPLETE CBC W/AUTO DIFF WBC: CPT | Performed by: PHYSICIAN ASSISTANT

## 2024-04-22 PROCEDURE — 80053 COMPREHEN METABOLIC PANEL: CPT | Performed by: PHYSICIAN ASSISTANT

## 2024-04-22 PROCEDURE — 99213 OFFICE O/P EST LOW 20 MIN: CPT | Mod: PBBFAC

## 2024-04-22 PROCEDURE — 84484 ASSAY OF TROPONIN QUANT: CPT | Performed by: PHYSICIAN ASSISTANT

## 2024-04-22 PROCEDURE — 99285 EMERGENCY DEPT VISIT HI MDM: CPT | Mod: 25,27

## 2024-04-22 PROCEDURE — 25000003 PHARM REV CODE 250: Performed by: PHYSICIAN ASSISTANT

## 2024-04-22 PROCEDURE — 83880 ASSAY OF NATRIURETIC PEPTIDE: CPT | Performed by: PHYSICIAN ASSISTANT

## 2024-04-22 RX ORDER — HYDROCORTISONE 25 MG/G
OINTMENT TOPICAL 2 TIMES DAILY
Qty: 35 G | Refills: 0 | Status: SHIPPED | OUTPATIENT
Start: 2024-04-22

## 2024-04-22 RX ORDER — HYDRALAZINE HYDROCHLORIDE 25 MG/1
25 TABLET, FILM COATED ORAL
Status: COMPLETED | OUTPATIENT
Start: 2024-04-22 | End: 2024-04-22

## 2024-04-22 RX ORDER — BUTALBITAL, ACETAMINOPHEN AND CAFFEINE 50; 325; 40 MG/1; MG/1; MG/1
1 TABLET ORAL EVERY 4 HOURS PRN
Qty: 10 TABLET | Refills: 0 | Status: SHIPPED | OUTPATIENT
Start: 2024-04-22 | End: 2024-05-22

## 2024-04-22 RX ADMIN — HYDRALAZINE HYDROCHLORIDE 25 MG: 25 TABLET ORAL at 04:04

## 2024-04-22 NOTE — ED PROVIDER NOTES
Encounter Date: 4/22/2024       History     Chief Complaint   Patient presents with    Hypertension     States was sent by PCP for elevated bp, cholesterol, and CBG. Also reports left sided chest pain that started this morning.      63-year-old female with past medical history significant for CVA, TBI, hypertension, hyperlipidemia, diabetes, CAD, MI, smoking, obesity, anxiety and PTSD presents to ED via the [facility] for concerns of hypertensive emergency.  Via chart review, patient presented to her clinic visit today with a multitude of complaints including left-sided chest pain, severe right-sided headache and recent worsening of dyspnea on exertion, along with recently uncontrolled blood pressure and blood sugars.  Patient had blood pressure of 191/84 in clinic.  Upon arrival to ED CBG was done which showed blood glucose of 250.  Patient tells me that her chest pain is just mild sticking intermittently to the left side of her chest with no identifiable aggravating or alleviating factors.  Denies any shortness of breath at rest, palpitations, diaphoresis, syncope, orthopnea, edema, calf pain, hemoptysis.  Patient reports she does have a severe right-sided headache, but states this is chronic secondary to her TBI and reports only reason she is having headaches as because she ran out of Fioricet and they would be resolved if she just had a refill of Fioricet.  Denies vision changes, slurred speech, dizziness, confusion, neck stiffness, numbness, paresthesia, paralysis, focal weakness, ataxia, abnormal balance.  Also denies abdominal pain, nausea, vomiting, blood in stool, vaginal bleeding, fever, chills.  Aside from elevated blood pressure, all vital signs within normal limits.  Reports strict compliance with all medications, including 3 blood pressure medications today.  Patient is in no acute distress and states she does not want to be here and wants to go home.      Review of patient's allergies  "indicates:   Allergen Reactions    Pcn [penicillins] Anaphylaxis     Past Medical History:   Diagnosis Date    Anxiety     Arthritis     CVA (cerebral vascular accident)     "mini stroke"    Depression     Diabetes mellitus     Heart problem     History of psychiatric hospitalization     three(1983, 1995 and another (years ago")): depression    HTN (hypertension)     Hx of psychiatric care     Hypertension     Pacemaker     Psychiatric exam requested by authority     Psychiatric problem     TBI (traumatic brain injury)     Therapy      Past Surgical History:   Procedure Laterality Date    CARDIAC PACEMAKER PLACEMENT      CARDIAC PACEMAKER REMOVAL      CHOLECYSTECTOMY      COLONOSCOPY, WITH POLYPECTOMY USING SNARE Left 8/25/2023    Procedure: COLONOSCOPY, WITH POLYPECTOMY USING SNARE;  Surgeon: Yany Davis MD;  Location: [facility] ENDOSCOPY;  Service: Gastroenterology;  Laterality: Left;    HYSTERECTOMY       Family History   Problem Relation Name Age of Onset    Schizophrenia Mother      Bipolar disorder Mother      Bipolar disorder Father       Social History     Tobacco Use    Smoking status: Every Day     Current packs/day: 0.50     Average packs/day: 0.5 packs/day for 1.3 years (0.7 ttl pk-yrs)     Types: Cigarettes     Start date: 9/14/1978     Last attempt to quit: 9/14/2018    Smokeless tobacco: Never    Tobacco comments:     1 pk every 2 weeks, 5 a day   Substance Use Topics    Alcohol use: Not Currently     Comment: wine on occ    Drug use: No     Review of Systems   All other systems reviewed and are negative.      Physical Exam     Initial Vitals [04/22/24 1358]   BP Pulse Resp Temp SpO2   (!) 193/99 66 18 97.5 °F (36.4 °C) 96 %      MAP       --         Physical Exam    Nursing note and vitals reviewed.  Constitutional: She appears well-developed and well-nourished. She is not diaphoretic. No distress.   HENT:   Head: Normocephalic and atraumatic.   Nose: Nose normal.   Mouth/Throat: Oropharynx is " clear and moist. No oropharyngeal exudate.   Eyes: Conjunctivae and EOM are normal. Pupils are equal, round, and reactive to light. No scleral icterus.   Neck: Neck supple. No JVD present.   Normal range of motion.  Cardiovascular:  Normal rate, regular rhythm, normal heart sounds and intact distal pulses.     Exam reveals no gallop and no friction rub.       No murmur heard.  Pulmonary/Chest: Breath sounds normal. No respiratory distress. She has no wheezes. She has no rhonchi. She has no rales.   Abdominal: Abdomen is soft. Bowel sounds are normal. She exhibits no distension. There is no abdominal tenderness. There is no rebound and no guarding.   Musculoskeletal:         General: No tenderness or edema. Normal range of motion.      Cervical back: Normal range of motion and neck supple. No rigidity. Normal range of motion.     Neurological: She is alert and oriented to person, place, and time. She has normal strength. She is not disoriented. She displays no tremor. No cranial nerve deficit or sensory deficit. She exhibits normal muscle tone. She displays no seizure activity. Coordination normal. GCS score is 15. GCS eye subscore is 4. GCS verbal subscore is 5. GCS motor subscore is 6.   Skin: Skin is warm and dry. Capillary refill takes less than 2 seconds. No rash noted. No pallor.   Psychiatric: She has a normal mood and affect. Thought content normal.         ED Course   Procedures  Labs Reviewed   COMPREHENSIVE METABOLIC PANEL - Abnormal; Notable for the following components:       Result Value    Carbon Dioxide 34 (*)     Glucose Level 175 (*)     Protein Total 8.1 (*)     Albumin Level 2.9 (*)     Globulin 5.2 (*)     Albumin/Globulin Ratio 0.6 (*)     All other components within normal limits   TROPONIN I - Abnormal; Notable for the following components:    Troponin-I 0.058 (*)     All other components within normal limits   CBC WITH DIFFERENTIAL - Abnormal; Notable for the following components:    IG# 0.06  (*)     All other components within normal limits   POCT GLUCOSE - Abnormal; Notable for the following components:    POCT Glucose 250 (*)     All other components within normal limits   B-TYPE NATRIURETIC PEPTIDE - Normal   MAGNESIUM - Normal   CBC W/ AUTO DIFFERENTIAL    Narrative:     The following orders were created for panel order CBC Auto Differential.  Procedure                               Abnormality         Status                     ---------                               -----------         ------                     CBC with Differential[[id number]]       Abnormal            Final result                 Please view results for these tests on the individual orders.   EXTRA TUBES    Narrative:     The following orders were created for panel order EXTRA TUBES.  Procedure                               Abnormality         Status                     ---------                               -----------         ------                     Light Blue Top Hold[[id number]]                             In process                 Gold Top Hold[[id number]]                                   In process                 Pink Top Hold[[id number]]                                   In process                   Please view results for these tests on the individual orders.   LIGHT BLUE TOP HOLD   GOLD TOP HOLD   PINK TOP HOLD     EKG Readings: (Independently Interpreted)   Initial Reading: No STEMI. Previous EKG: Compared with most recent EKG Previous EKG Date: 2/29/24. Rhythm: Normal Sinus Rhythm. Heart Rate: 80. Ectopy: No Ectopy. Conduction: Normal. T Waves Flipped: V6. Axis: Normal. Clinical Impression: Normal Sinus Rhythm     ECG Results              EKG 12-lead (Chest Pain) Age >30 (In process)        Collection Time Result Time QRS Duration OHS QTC Calculation    04/22/24 14:11:12 04/22/24 14:48:45 98 470                     In process by Interface, Lab In [facility] (04/22/24 14:48:47)                   Narrative:    Test  Reason : R07.9,    Vent. Rate : 080 BPM     Atrial Rate : 080 BPM     P-R Int : 138 ms          QRS Dur : 098 ms      QT Int : 408 ms       P-R-T Axes : 066 007 007 degrees     QTc Int : 470 ms    Normal sinus rhythm  Voltage criteria for left ventricular hypertrophy  Nonspecific ST and T wave abnormality  Prolonged QT  Abnormal ECG  When compared with ECG of 29-FEB-2024 14:23,  Nonspecific T wave abnormality now evident in Inferior leads    Referred By: [email or web address]   SELF           Confirmed By:                                   Imaging Results              CT Head Without Contrast (Final result)  Result time 04/22/24 16:47:22      Final result by Kelsie Prescott MD (04/22/24 16:47:22)                   Impression:      1. No acute intracranial abnormality.  2. Chronic microvascular ischemic changes.      Electronically signed by: Kelsie Prescott  Date:    04/22/2024  Time:    16:47               Narrative:    EXAMINATION:  CT HEAD WITHOUT CONTRAST    CLINICAL HISTORY:  Headache, new or worsening (Age >= 50y);    TECHNIQUE:  Axial scans were obtained from skull base to the vertex.    Coronal and sagittal reconstructions obtained from the axial data.    Automatic exposure control was utilized to limit radiation dose.    Contrast: None    Radiation Dose:    Total DLP: 1066 mGy*cm    COMPARISON:  CT head dated 08/23/2023    FINDINGS:  There is no acute intracranial hemorrhage or edema. The gray-white matter differentiation is preserved.  Patchy hypodensities in the subcortical and periventricular white matter and thalami likely represent chronic microvascular ischemic changes.    There is no mass effect or midline shift.  There is diffuse parenchymal volume loss.  The basal cisterns are patent. There is no abnormal extra-axial fluid collection.    The calvarium and skull base are intact. The visualized paranasal sinuses and the mastoid air cells are clear.                                       X-Ray Chest 1 View  (Final result)  Result time 04/22/24 15:36:14      Final result by Davon Thakkar MD (04/22/24 15:36:14)                   Impression:      NO ACUTE CARDIOPULMONARY PROCESS IDENTIFIED.      Electronically signed by: Davon Thakkar  Date:    04/22/2024  Time:    15:36               Narrative:    EXAMINATION:  XR CHEST 1 VIEW    CLINICAL HISTORY:  chest pain;    TECHNIQUE:  One view    COMPARISON:  December 18, 2023.    FINDINGS:  Cardiopericardial silhouette is within normal limits.  Lungs chronic interstitial changes without dense focal or segmental consolidation, congestive process, pleural effusions or pneumothorax.                                       Medications   hydrALAZINE tablet 25 mg (has no administration in time range)     Medical Decision Making  Differential diagnosis: Includes but not limited to CVA, ACS, CHF, hypertensive emergency, uncontrolled hypertension, medication noncompliance, chronic headaches    ED management:  Patient given p.o. hydralazine prior to discharge.    ED course:  EKG unremarkable without acute ischemic changes.  Troponin is elevated to 0.058, however this is improved from troponins in the system over the past 7 months.  Patient describes chest pain as intermittent sticking in nature, low suspicion for ACS as origin of this pain.  Patient had heart catheterization 6 months ago after labs revealed elevated troponin and results showed patent coronaries and report stated that elevated troponin likely due to chronically uncontrolled hypertension.  No indication for admission at this time for further ACS workup.  BNP within normal limits and there is no evidence of fluid overload on exam, low suspicion for acute CHF.  CBC wholly unremarkable.  CMP reveals hyperglycemia of 175 with normal anion gap.  CO2 mildly elevated to 34 and mild hypoalbuminemia of 2.9.  CMP overall relatively unremarkable with no evidence of end-organ damage to suggest hypertensive emergency.  Magnesium within  normal limits.  Chest x-ray reveals chronic interstitial changes with no acute cardiopulmonary abnormality.  CT head within normal limits.  Patient has benign neurological exam without focal deficits or meningeal signs.  Patient is nontoxic appearing with unremarkable vitals and no physical exam findings or laboratory results that would suggest patient is in hypertensive emergency.  Stable for discharge from ED. I discussed with patient at length the importance of medication compliance and sticking to a low-salt diet.  I instructed patient to contact her PCP tomorrow to schedule close follow-up.  Strict ED precautions given for new or worsening symptoms and patient verbalized understanding.  All test results explained and all questions answered.    Amount and/or Complexity of Data Reviewed  External Data Reviewed: radiology, ECG and notes.     Details: Reviewed old EKGs.  Reviewed heart catheterization from October of last year which revealed patent coronaries.  Interpretation note from this heart catheterization states that elevated troponin is likely due to chronically uncontrolled hypertension.  ECG/medicine tests: ordered and independent interpretation performed. Decision-making details documented in ED Course.  Discussion of management or test interpretation with external provider(s): Case discussed with Dr. Grace and he reviewed today's diagnostic workup and recommends that patient is stable for discharge.                                      Clinical Impression:  Final diagnoses:  [R07.89] Atypical chest pain  [R51.9, G89.29] Chronic nonintractable headache, unspecified headache type  [I10] Uncontrolled hypertension (Primary)          ED Disposition Condition    Discharge Good          ED Prescriptions       Medication Sig Dispense Start Date End Date Auth. Provider    butalbital-acetaminophen-caffeine -40 mg (FIORICET, ESGIC) -40 mg per tablet Take 1 tablet by mouth every 4 (four) hours as  needed for Headaches. 10 tablet 4/22/2024 5/22/2024 Ortega Valencia PA          Follow-up Information       Follow up With Specialties Details Why Contact Info    Harjeet Rockwell MD Internal Medicine Call in 1 day  [address] [facility] [address]  [phone]      [facility] Emergency Medicine  As needed, If symptoms worsen [address] [facility] [id number] [phone]             Ortega Valencia PA  04/22/24 3004

## 2024-04-22 NOTE — PROGRESS NOTES
Attending Addendum:   Patient seen and examined in clinic. Management and Plan were discussed with resident. Care was reasonable and necessary.   Loraine Maria MD  Internal Medicine   [facility]

## 2024-04-22 NOTE — PROGRESS NOTES
"    [facility] INTERNAL MEDICINE RESIDENT [facility]INIC    Subjective:      This is a 63 y.o. 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 Followed by Dr. Hong, cardio, Marga Galeasfield, psych, and [facility].  She was previously following Fredi ERNANDEZ as her PCP but was referred to us due to medical complexities.    Patient complaining of multiple symptoms today. She is complaining of some left-sided chest pain described as heaviness and sharp-like, severe right shoulder pain, worsening shortness of breath on exertion, severe headache, right-sided numbness which started over the weekend, recent hyperglycemia >600 mg/sl reportedly per the patient over the weekend-she was advised by her caretaker to go to the ED however patient refused at first. She stated her BP recently have been >210/110 mmHg despite taking all her meds and her CBG >600 despite eating salad most of the time. Patient was very emotional during the visit and stated "she feels tired taking all her medications" and "she feels tired being in pain all the time". She missed her ortho appt last month due to transportation problems. She is considering nursing home placement. Will send patient to the ER for further evaluation and management of hypertensive emergency and severe hyperglycemia.    Review of Systems:  General: No fever, (+) fatigue, weight loss  Cardiovascular: No palpitations, (+) orthopnea, PND, (+) chest pain  Respiratory: No hemoptysis, shortness of breath, wheezing, (+) cough  Gastrointestinal: No nausea, vomiting, abdominal pain, diarrhea, melena, hematochezia  Genitourinary: No dysuria, urgency, frequency, hematuria  Musculoskeletal: Tenderness on right breast, right shoulder, chest, upper " quadrant abd  Neurological: No dizziness, headache, sensory changes, focal weakness    Objective:     Vital Signs:  Vitals:    04/22/24 1323   BP: (!) 191/84   Pulse: 71   Resp: 19   Temp: 98.3 °F (36.8 °C)          Body mass index is 39.57 kg/m².     General:  Examined a well-developed patient in no acute respiratory distress  HENT: Normocephalic, atraumatic, PERRL, neck supple  Cardiovascular:  Normal rate and rhythm  Pulmonary:  Clear to auscultation bilaterally, no wheezes, rales, or rhonci  Abdominal:  Soft, non-distended, normoactive bowel sounds  MSK:  Pain right anterior and posterior truncal area  Skin:  No ulcers, erythema; (+)hyperpigmented pruritic rash intertriginous area  Neuro:  Alert and oriented x3, no focal neuro deficits, CNII-XII grossly intact    Laboratory:  Lab Results   Component Value Date    WBC 9.18 12/18/2023    HGB 16.6 (H) 12/18/2023    HCT 51.5 (H) 12/18/2023     12/18/2023    MCV 88.0 12/18/2023    RDW 15.3 12/18/2023    Lab Results   Component Value Date     12/18/2023    K 4.6 12/18/2023     03/24/2022    CO2 20 (L) 12/18/2023    BUN 14.2 12/18/2023    CREATININE 0.92 12/18/2023     (HH) 09/30/2021    CALCIUM 9.0 12/18/2023    MG 1.80 11/06/2023    PHOS 3.7 07/07/2023      Lab Results   Component Value Date    HGBA1C 8.4 (H) 07/06/2023    .4 07/06/2023    LDLCALC 88 10/18/2023    CREATININE 0.92 12/18/2023    CREATRANDUR 65.7 02/09/2023    Lab Results   Component Value Date    TSH 0.858 07/06/2023        Current Medications:  Current Outpatient Medications   Medication Instructions    (Magic mouthwash) 1:1:1 diphenhydrAMINE(Benadryl) 12.5mg/5ml liq, aluminum & magnesium hydroxide-simethicone (Maalox), LIDOcaine viscous 2% 10 mLs, Swish & Spit, Every 4 hours PRN, for mouth sores    albuterol (PROAIR HFA) 90 mcg/actuation inhaler 2 puffs, Inhalation, Every 6 hours PRN, Rescue    ALPRAZolam (XANAX) 1 MG tablet Take 1 tablet by mouth once a day as needed  as needed for anxiety    ammonium lactate (LAC-HYDRIN) 12 % lotion Apply to dry skin areas on feet, legs, and elbow areas TWICE A DAY AS NEEDED    aspirin (ECOTRIN) 81 mg, Oral, Daily    atorvastatin (LIPITOR) 40 mg, Oral, Nightly    atorvastatin (LIPITOR) 40 mg, Oral, Daily    blood sugar diagnostic Strp To check BG 2 times daily, to use with insurance preferred meter    blood-glucose meter kit To check BG 2 times daily, to use with insurance preferred meter    butalbital-acetaminophen-caffeine -40 mg (FIORICET, ESGIC) -40 mg per tablet 1 tablet, Oral, Every 6 hours PRN    carvediloL (COREG) 3.125 mg, 2 times daily    carvediloL (COREG) 3.125 mg, Oral, 2 times daily with meals    cholecalciferol, vitamin D3, (VITAMIN D3) 50 mcg (2,000 unit) Cap capsule 1 capsule, Oral    diabetic supplies, miscellan. Misc 1 each, Misc.(Non-Drug; Combo Route), 3 times daily with meals    diaper,brief,adult,disposable Misc 1 each, Misc.(Non-Drug; Combo Route), Every 6 hours PRN    diclofenac (VOLTAREN) 75 mg, Oral, 2 times daily PRN    doxepin (SILENOR) 6 mg Tab 1 tablet, Oral, Nightly    DULoxetine (CYMBALTA) 60 mg, Oral, Every morning    EScitalopram oxalate (LEXAPRO) 10 mg, Oral    esomeprazole (NEXIUM) 40 mg, Oral, Before breakfast    fluticasone propionate (FLONASE) 50 mcg, Each Nostril, 2 times daily PRN    fluticasone propionate (FLONASE) 50 mcg, Each Nostril, Daily    gabapentin (NEURONTIN) 600 mg, Oral, 3 times daily    glipiZIDE (GLUCOTROL) 10 mg, Oral, 2 times daily before meals    hydrALAZINE (APRESOLINE) 50 mg, Oral, 3 times daily    hydroCHLOROthiazide (HYDRODIURIL) 12.5 mg, Oral, Daily    hydrocortisone 2.5 % ointment Topical (Top), 2 times daily    lancets Misc To check BG 2 times daily, to use with insurance preferred meter    LANTUS SOLOSTAR U-100 INSULIN glargine 100 units/mL SubQ pen inject 80 units into THE SKIN TWICE DAILY BEFORE meals    magnesium citrate solution 296 mLs, Oral, Daily PRN     "multivitamin (THERAGRAN) per tablet 1 tablet, Oral, Daily    naproxen (NAPROSYN) 500 mg, Oral, 2 times daily with meals    NIFEdipine (PROCARDIA-XL) 90 mg, Oral, Daily    nitroGLYCERIN (NITROSTAT) 0.4 mg, Sublingual, Every 5 min PRN    NOVOLOG FLEXPEN U-100 INSULIN 100 unit/mL (3 mL) InPn pen Inject 20 Units into the skin 3 (three) times daily with meals. DISCARD OPEN PEN AFTER 28 DAYS    nystatin (MYCOSTATIN) cream Apply topically 3 (three) times daily. Use under breast    nystatin (MYCOSTATIN) cream Topical (Top), 2 times daily    oxybutynin (DITROPAN-XL) 5 mg, Oral, Daily    pregabalin (LYRICA) 200 mg, 2 times daily    sertraline (ZOLOFT) 50 mg, Daily    SURE COMFORT PEN NEEDLE 32 gauge x 5/32" Ndle USE 1 pen needle TO inject insulin TWICE DAILY BEFORE meals AS DIRECTED by prescriber    terconazole (TERAZOL 7) 0.4 % Crea 1 applicator, Vaginal, Nightly    terconazole (TERAZOL 7) 0.4 % Crea 1 applicator, Vaginal, Nightly    valsartan (DIOVAN) 320 mg, Oral, Daily        Assessment and Plan:   Elba Barnes is a 63 y.o. female who  has a past medical history of Anxiety, Arthritis, CVA (cerebral vascular accident), Depression, Diabetes mellitus, Heart problem, History of psychiatric hospitalization, HTN (hypertension), psychiatric care, Hypertension, Pacemaker, Psychiatric exam requested by authority, Psychiatric problem, TBI (traumatic brain injury), and Therapy.      Hypertensive emergency  Essential hypertension  CHFpEF 55%  -Will send patient to ER for further evaluation of htn emergency with chest pain and severe headache  -Echo 10/2023: EF 55% severely increased LV wall thickness; diastolic dysfunction  -Closely monitor blood pressure at home  -Lifestyle modifications advised, expressed understanding  -Discussed importance of medication compliance  -On Coreg 3.125 mg BID, Hydralazine 50 mg TID, Valsartan 320 mg OD, HCTZ 12.5 mg OD  -Likely compounded by severe pain of right shoulder/breast/back area  -[facility] " 10/2023: No change from 2022/12 angiogram: chest pain and small chronic elevation   in troponin most likely 2nd to HTN and endothelial dysfunction   Coronary angiogram findings:   1. Coronary dominance:  left      2. Left Main:  patent   3. Left Anterior Descending:  patent   4. Left Circumflex:  patent   5. Right: Dominant.  patent   -Follows closely with Dr. Hong    High-grade partial-thickness articular surface tear, supraspinatus tendon  Intermediate grade partial-thickness delamination tear, subscapularis tendon  Tear, glenoid labrum  Chronic right breast, shoulder, upper back pain  -CXR revealed old healed rib fractures  -Right breast ultrasound with negative findings no sonographic correlation to reported symptom  -Mammogram with no suspicious mass, asymmetry, distortion or calcification  -MRI cervical spine revealed spondylotic changes without evidence of significant central canal stenosis or narrowing  -MRI lumbar spine with severe degenerative spinal canal stenosis L3-L5, mild neural foraminal stenosis  -MRI thoracic spine with mild narrowing at T10-T11, no definite cord signal abnormality  -Patient is frustrated as she has been suffering from right breast pain for over a year now with no relief from pain meds  -Has tried and failed Tylenol, NSAID, gabapentin, duloxetine, pregabalin  -Referral to pain management placed  -MRI shoulder: A high-grade partial-thickness articular surface tear is present to the distal supraspinatus tendon without retraction or associated atrophy. An intermediate grade partial-thickness delamination tear is present to the distal subscapularis tendon. A tear is present to the superior portion of the glenoid labrum.  -Apt with ortho 3/26/24-missed appt d/t transportation problems    Diabetes mellitus  -Closely monitor blood glucose at home, keep recordings  -Dietary and lifestyle modifications discussed, expressed understanding  Hemoglobin A1C   Date Value Ref Range Status    10/18/2022 10.5 (H) <=6.5 % Final   03/23/2022 11.5 (H) <=6.5 % Final   11/24/2021 >14.0 (H) <=6.5 % Final   12/07/2020 9.6 (H) 4.0 - 6.0 % Final     Hemoglobin A1c   Date Value Ref Range Status   07/06/2023 8.4 (H) <=7.0 % Final   02/09/2023 8.7 (H) <=7.0 % Final   -On Lantus 30U in AM, 80U in AM and 5U as needed, glipizide 10 mg BID  -Consider CGM/endo referral next visit    Mood disorder  -Follows with psych  -Defer adjustment of pscyh meds to them    Chronic cough  -Failed tessalon, mucinex, robitussin before  -Promethazine DM prescribed during last visit    Urinary Incontinence  -1 year duration  -Urology referral sent  -Oxybutynin does not work, apparently previously tried vaginal estrogen with no relief as well  -Needs better sugar control    Neuroforaminal stenosis of lumbar spine  Leg pain  -Referral for pain mgt placed    Follow-up on: 1 month with labs    Health Maintenance:  Influenza vaccine: Refused   Pneumococcal vaccine (>64 yo): Refused  Shingrix vaccine (>51 yo): Refused  TDAP: Refused    AAA screening (men 65-74 yo smokers): N/A  Breast cancer screening (women 40-75 yo): Mammo 1/2023, patient hesitant on further mammogram because of severe right breast pain; order placed  Cervical cancer screening (women 21-64 yo): Referral placed, gyn appt on 9/2025  Colon cancer screening (45-74 yo): Done 8/2023, repeat in 2030 as advised  Lung cancer screening (50-81 yo): CT chest done 12/2023 no nodules seen  Osteoporosis screening (>64 yo, or <65 at risk): N/A     Health Maintenance   Topic Date Due    Hepatitis C Screening  Never done    Shingles Vaccine (1 of 2) Never done    Hemoglobin A1c  10/06/2023    Mammogram  01/18/2024    Lipid Panel  10/18/2024    Low Dose Statin  02/28/2025    Foot Exam  02/28/2025    Eye Exam  02/28/2025    TETANUS VACCINE  04/03/2027    Colorectal Cancer Screening  08/25/2030        Harjeet Rockwell MD  [employer] [facility], PGY-2

## 2024-04-22 NOTE — DISCHARGE INSTRUCTIONS
Report to Emergency Department if symptoms return or worsen; [facility] Within 1 to 2 days, It is important that you follow up with your primary care provider or specialist if indicated for further evaluation, workup, and treatment as necessary. The exam and treatment you received in Emergency Department was for an urgent problem and NOT INTENDED AS COMPLETE CARE. It is important that you FOLLOW UP with a doctor for ongoing care. If your symptoms become WORSE or you DO NOT IMPROVE and you are unable to reach your health care provider, you should RETURN to the Emergency Department. The Emergency Department provider has provided a PRELIMINARY INTERPRETATION of all your studies. A final interpretation may be done after you are discharged. If a change in your diagnosis or treatment is needed WE WILL CONTACT YOU. It is critical that we have a CURRENT PHONE NUMBER FOR YOU.

## 2024-04-24 LAB
OHS QRS DURATION: 98 MS
OHS QTC CALCULATION: 470 MS

## 2024-09-13 ENCOUNTER — TELEPHONE (OUTPATIENT)
Dept: INTERNAL MEDICINE | Facility: CLINIC | Age: 64
End: 2024-09-13
Payer: MEDICARE

## (undated) DEVICE — TRAP ETRAP POLYP 50 TRAY

## (undated) DEVICE — KIT SURGICAL COLON .25 1.1OZ

## (undated) DEVICE — SNARE EXACTO COLD

## (undated) DEVICE — MANIFOLD 4 PORT

## (undated) DEVICE — SOL IRRI STRL WATER 1000ML